# Patient Record
Sex: MALE | Race: WHITE | Employment: OTHER | URBAN - METROPOLITAN AREA
[De-identification: names, ages, dates, MRNs, and addresses within clinical notes are randomized per-mention and may not be internally consistent; named-entity substitution may affect disease eponyms.]

---

## 2019-01-01 ENCOUNTER — APPOINTMENT (OUTPATIENT)
Dept: GENERAL RADIOLOGY | Age: 69
DRG: 003 | End: 2019-01-01
Attending: THORACIC SURGERY (CARDIOTHORACIC VASCULAR SURGERY)
Payer: COMMERCIAL

## 2019-01-01 ENCOUNTER — APPOINTMENT (OUTPATIENT)
Dept: GENERAL RADIOLOGY | Age: 69
DRG: 003 | End: 2019-01-01
Attending: NURSE PRACTITIONER
Payer: COMMERCIAL

## 2019-01-01 ENCOUNTER — APPOINTMENT (OUTPATIENT)
Dept: GENERAL RADIOLOGY | Age: 69
DRG: 003 | End: 2019-01-01
Attending: PHYSICIAN ASSISTANT
Payer: COMMERCIAL

## 2019-01-01 ENCOUNTER — APPOINTMENT (OUTPATIENT)
Dept: CT IMAGING | Age: 69
DRG: 003 | End: 2019-01-01
Attending: INTERNAL MEDICINE
Payer: COMMERCIAL

## 2019-01-01 ENCOUNTER — APPOINTMENT (OUTPATIENT)
Dept: NON INVASIVE DIAGNOSTICS | Age: 69
DRG: 003 | End: 2019-01-01
Attending: PHYSICIAN ASSISTANT
Payer: COMMERCIAL

## 2019-01-01 ENCOUNTER — ANESTHESIA EVENT (OUTPATIENT)
Dept: CARDIOTHORACIC SURGERY | Age: 69
DRG: 003 | End: 2019-01-01
Payer: COMMERCIAL

## 2019-01-01 ENCOUNTER — APPOINTMENT (OUTPATIENT)
Dept: VASCULAR SURGERY | Age: 69
DRG: 003 | End: 2019-01-01
Attending: PHYSICIAN ASSISTANT
Payer: COMMERCIAL

## 2019-01-01 ENCOUNTER — ANESTHESIA (OUTPATIENT)
Dept: CARDIOTHORACIC SURGERY | Age: 69
DRG: 003 | End: 2019-01-01
Payer: COMMERCIAL

## 2019-01-01 ENCOUNTER — ANESTHESIA EVENT (OUTPATIENT)
Dept: ENDOSCOPY | Age: 69
DRG: 003 | End: 2019-01-01
Payer: COMMERCIAL

## 2019-01-01 ENCOUNTER — HOSPITAL ENCOUNTER (INPATIENT)
Age: 69
LOS: 38 days | DRG: 003 | End: 2019-11-26
Attending: EMERGENCY MEDICINE | Admitting: INTERNAL MEDICINE
Payer: COMMERCIAL

## 2019-01-01 ENCOUNTER — ANESTHESIA (OUTPATIENT)
Dept: ENDOSCOPY | Age: 69
DRG: 003 | End: 2019-01-01
Payer: COMMERCIAL

## 2019-01-01 ENCOUNTER — APPOINTMENT (OUTPATIENT)
Dept: GENERAL RADIOLOGY | Age: 69
DRG: 003 | End: 2019-01-01
Attending: INTERNAL MEDICINE
Payer: COMMERCIAL

## 2019-01-01 ENCOUNTER — HOSPITAL ENCOUNTER (OUTPATIENT)
Dept: NON INVASIVE DIAGNOSTICS | Age: 69
Discharge: HOME | End: 2019-10-23
Attending: THORACIC SURGERY (CARDIOTHORACIC VASCULAR SURGERY)

## 2019-01-01 ENCOUNTER — APPOINTMENT (OUTPATIENT)
Dept: CT IMAGING | Age: 69
DRG: 003 | End: 2019-01-01
Attending: NURSE PRACTITIONER
Payer: COMMERCIAL

## 2019-01-01 VITALS
WEIGHT: 132.5 LBS | HEART RATE: 108 BPM | BODY MASS INDEX: 20.8 KG/M2 | RESPIRATION RATE: 32 BRPM | DIASTOLIC BLOOD PRESSURE: 60 MMHG | SYSTOLIC BLOOD PRESSURE: 94 MMHG | OXYGEN SATURATION: 91 % | HEIGHT: 67 IN | TEMPERATURE: 100.6 F

## 2019-01-01 DIAGNOSIS — I21.3 ST ELEVATION MYOCARDIAL INFARCTION (STEMI), UNSPECIFIED ARTERY (HCC): ICD-10-CM

## 2019-01-01 DIAGNOSIS — I35.0 NONRHEUMATIC AORTIC VALVE STENOSIS: Primary | ICD-10-CM

## 2019-01-01 LAB
ABO + RH BLD: NORMAL
ADMINISTERED INITIALS, ADMINIT: NORMAL
ALBUMIN SERPL-MCNC: 1.7 G/DL (ref 3.5–5)
ALBUMIN SERPL-MCNC: 1.8 G/DL (ref 3.5–5)
ALBUMIN SERPL-MCNC: 1.8 G/DL (ref 3.5–5)
ALBUMIN SERPL-MCNC: 1.9 G/DL (ref 3.5–5)
ALBUMIN SERPL-MCNC: 2 G/DL (ref 3.5–5)
ALBUMIN SERPL-MCNC: 2.1 G/DL (ref 3.5–5)
ALBUMIN SERPL-MCNC: 2.2 G/DL (ref 3.5–5)
ALBUMIN SERPL-MCNC: 2.3 G/DL (ref 3.5–5)
ALBUMIN SERPL-MCNC: 2.4 G/DL (ref 3.5–5)
ALBUMIN SERPL-MCNC: 2.6 G/DL (ref 3.5–5)
ALBUMIN SERPL-MCNC: 2.7 G/DL (ref 3.5–5)
ALBUMIN SERPL-MCNC: 2.8 G/DL (ref 3.5–5)
ALBUMIN SERPL-MCNC: 2.9 G/DL (ref 3.5–5)
ALBUMIN SERPL-MCNC: 3 G/DL (ref 3.5–5)
ALBUMIN SERPL-MCNC: 3.1 G/DL (ref 3.5–5)
ALBUMIN SERPL-MCNC: 3.2 G/DL (ref 3.5–5)
ALBUMIN SERPL-MCNC: 3.3 G/DL (ref 3.5–5)
ALBUMIN SERPL-MCNC: 3.4 G/DL (ref 3.5–5)
ALBUMIN SERPL-MCNC: 3.4 G/DL (ref 3.5–5)
ALBUMIN SERPL-MCNC: 3.7 G/DL (ref 3.5–5)
ALBUMIN SERPL-MCNC: 3.7 G/DL (ref 3.5–5)
ALBUMIN/GLOB SERPL: 0.5 {RATIO} (ref 1.1–2.2)
ALBUMIN/GLOB SERPL: 0.6 {RATIO} (ref 1.1–2.2)
ALBUMIN/GLOB SERPL: 0.7 {RATIO} (ref 1.1–2.2)
ALBUMIN/GLOB SERPL: 0.8 {RATIO} (ref 1.1–2.2)
ALBUMIN/GLOB SERPL: 0.9 {RATIO} (ref 1.1–2.2)
ALBUMIN/GLOB SERPL: 1.1 {RATIO} (ref 1.1–2.2)
ALBUMIN/GLOB SERPL: 1.4 {RATIO} (ref 1.1–2.2)
ALBUMIN/GLOB SERPL: 1.6 {RATIO} (ref 1.1–2.2)
ALBUMIN/GLOB SERPL: 2.2 {RATIO} (ref 1.1–2.2)
ALBUMIN/GLOB SERPL: 2.2 {RATIO} (ref 1.1–2.2)
ALBUMIN/GLOB SERPL: 2.3 {RATIO} (ref 1.1–2.2)
ALP SERPL-CCNC: 100 U/L (ref 45–117)
ALP SERPL-CCNC: 102 U/L (ref 45–117)
ALP SERPL-CCNC: 108 U/L (ref 45–117)
ALP SERPL-CCNC: 108 U/L (ref 45–117)
ALP SERPL-CCNC: 109 U/L (ref 45–117)
ALP SERPL-CCNC: 110 U/L (ref 45–117)
ALP SERPL-CCNC: 111 U/L (ref 45–117)
ALP SERPL-CCNC: 115 U/L (ref 45–117)
ALP SERPL-CCNC: 116 U/L (ref 45–117)
ALP SERPL-CCNC: 122 U/L (ref 45–117)
ALP SERPL-CCNC: 126 U/L (ref 45–117)
ALP SERPL-CCNC: 128 U/L (ref 45–117)
ALP SERPL-CCNC: 131 U/L (ref 45–117)
ALP SERPL-CCNC: 133 U/L (ref 45–117)
ALP SERPL-CCNC: 133 U/L (ref 45–117)
ALP SERPL-CCNC: 134 U/L (ref 45–117)
ALP SERPL-CCNC: 135 U/L (ref 45–117)
ALP SERPL-CCNC: 142 U/L (ref 45–117)
ALP SERPL-CCNC: 144 U/L (ref 45–117)
ALP SERPL-CCNC: 150 U/L (ref 45–117)
ALP SERPL-CCNC: 155 U/L (ref 45–117)
ALP SERPL-CCNC: 17 U/L (ref 45–117)
ALP SERPL-CCNC: 172 U/L (ref 45–117)
ALP SERPL-CCNC: 176 U/L (ref 45–117)
ALP SERPL-CCNC: 179 U/L (ref 45–117)
ALP SERPL-CCNC: 18 U/L (ref 45–117)
ALP SERPL-CCNC: 20 U/L (ref 45–117)
ALP SERPL-CCNC: 24 U/L (ref 45–117)
ALP SERPL-CCNC: 26 U/L (ref 45–117)
ALP SERPL-CCNC: 32 U/L (ref 45–117)
ALP SERPL-CCNC: 47 U/L (ref 45–117)
ALP SERPL-CCNC: 52 U/L (ref 45–117)
ALP SERPL-CCNC: 70 U/L (ref 45–117)
ALP SERPL-CCNC: 72 U/L (ref 45–117)
ALP SERPL-CCNC: 85 U/L (ref 45–117)
ALP SERPL-CCNC: 88 U/L (ref 45–117)
ALP SERPL-CCNC: 91 U/L (ref 45–117)
ALP SERPL-CCNC: 94 U/L (ref 45–117)
ALP SERPL-CCNC: 96 U/L (ref 45–117)
ALP SERPL-CCNC: 96 U/L (ref 45–117)
ALP SERPL-CCNC: 97 U/L (ref 45–117)
ALP SERPL-CCNC: 98 U/L (ref 45–117)
ALP SERPL-CCNC: 99 U/L (ref 45–117)
ALT SERPL-CCNC: 102 U/L (ref 12–78)
ALT SERPL-CCNC: 106 U/L (ref 12–78)
ALT SERPL-CCNC: 11 U/L (ref 12–78)
ALT SERPL-CCNC: 112 U/L (ref 12–78)
ALT SERPL-CCNC: 12 U/L (ref 12–78)
ALT SERPL-CCNC: 12 U/L (ref 12–78)
ALT SERPL-CCNC: 129 U/L (ref 12–78)
ALT SERPL-CCNC: 17 U/L (ref 12–78)
ALT SERPL-CCNC: 174 U/L (ref 12–78)
ALT SERPL-CCNC: 19 U/L (ref 12–78)
ALT SERPL-CCNC: 28 U/L (ref 12–78)
ALT SERPL-CCNC: 28 U/L (ref 12–78)
ALT SERPL-CCNC: 29 U/L (ref 12–78)
ALT SERPL-CCNC: 297 U/L (ref 12–78)
ALT SERPL-CCNC: 31 U/L (ref 12–78)
ALT SERPL-CCNC: 33 U/L (ref 12–78)
ALT SERPL-CCNC: 34 U/L (ref 12–78)
ALT SERPL-CCNC: 38 U/L (ref 12–78)
ALT SERPL-CCNC: 41 U/L (ref 12–78)
ALT SERPL-CCNC: 41 U/L (ref 12–78)
ALT SERPL-CCNC: 42 U/L (ref 12–78)
ALT SERPL-CCNC: 454 U/L (ref 12–78)
ALT SERPL-CCNC: 46 U/L (ref 12–78)
ALT SERPL-CCNC: 47 U/L (ref 12–78)
ALT SERPL-CCNC: 50 U/L (ref 12–78)
ALT SERPL-CCNC: 51 U/L (ref 12–78)
ALT SERPL-CCNC: 536 U/L (ref 12–78)
ALT SERPL-CCNC: 56 U/L (ref 12–78)
ALT SERPL-CCNC: 61 U/L (ref 12–78)
ALT SERPL-CCNC: 62 U/L (ref 12–78)
ALT SERPL-CCNC: 63 U/L (ref 12–78)
ALT SERPL-CCNC: 64 U/L (ref 12–78)
ALT SERPL-CCNC: 65 U/L (ref 12–78)
ALT SERPL-CCNC: 65 U/L (ref 12–78)
ALT SERPL-CCNC: 69 U/L (ref 12–78)
ALT SERPL-CCNC: 7 U/L (ref 12–78)
ALT SERPL-CCNC: 7 U/L (ref 12–78)
ALT SERPL-CCNC: 74 U/L (ref 12–78)
ALT SERPL-CCNC: 84 U/L (ref 12–78)
ALT SERPL-CCNC: 84 U/L (ref 12–78)
ALT SERPL-CCNC: 87 U/L (ref 12–78)
ALT SERPL-CCNC: 9 U/L (ref 12–78)
ALT SERPL-CCNC: 9 U/L (ref 12–78)
ANA SER QL: NEGATIVE
ANION GAP SERPL CALC-SCNC: 10 MMOL/L (ref 5–15)
ANION GAP SERPL CALC-SCNC: 10 MMOL/L (ref 5–15)
ANION GAP SERPL CALC-SCNC: 2 MMOL/L (ref 5–15)
ANION GAP SERPL CALC-SCNC: 2 MMOL/L (ref 5–15)
ANION GAP SERPL CALC-SCNC: 3 MMOL/L (ref 5–15)
ANION GAP SERPL CALC-SCNC: 3 MMOL/L (ref 5–15)
ANION GAP SERPL CALC-SCNC: 4 MMOL/L (ref 5–15)
ANION GAP SERPL CALC-SCNC: 5 MMOL/L (ref 5–15)
ANION GAP SERPL CALC-SCNC: 6 MMOL/L (ref 5–15)
ANION GAP SERPL CALC-SCNC: 7 MMOL/L (ref 5–15)
ANION GAP SERPL CALC-SCNC: 8 MMOL/L (ref 5–15)
ANION GAP SERPL CALC-SCNC: 9 MMOL/L (ref 5–15)
ANION GAP SERPL CALC-SCNC: 9 MMOL/L (ref 5–15)
APPEARANCE FLD: CLEAR
APPEARANCE UR: CLEAR
APTT PPP: 24 SEC (ref 22.1–32)
APTT PPP: 38.1 SEC (ref 22.1–32)
APTT PPP: 46.7 SEC (ref 22.1–32)
APTT PPP: 61.4 SEC (ref 22.1–32)
ARTERIAL PATENCY WRIST A: ABNORMAL
ARTERIAL PATENCY WRIST A: YES
AST SERPL-CCNC: 17 U/L (ref 15–37)
AST SERPL-CCNC: 20 U/L (ref 15–37)
AST SERPL-CCNC: 20 U/L (ref 15–37)
AST SERPL-CCNC: 212 U/L (ref 15–37)
AST SERPL-CCNC: 22 U/L (ref 15–37)
AST SERPL-CCNC: 22 U/L (ref 15–37)
AST SERPL-CCNC: 23 U/L (ref 15–37)
AST SERPL-CCNC: 23 U/L (ref 15–37)
AST SERPL-CCNC: 25 U/L (ref 15–37)
AST SERPL-CCNC: 26 U/L (ref 15–37)
AST SERPL-CCNC: 28 U/L (ref 15–37)
AST SERPL-CCNC: 28 U/L (ref 15–37)
AST SERPL-CCNC: 31 U/L (ref 15–37)
AST SERPL-CCNC: 32 U/L (ref 15–37)
AST SERPL-CCNC: 32 U/L (ref 15–37)
AST SERPL-CCNC: 33 U/L (ref 15–37)
AST SERPL-CCNC: 33 U/L (ref 15–37)
AST SERPL-CCNC: 34 U/L (ref 15–37)
AST SERPL-CCNC: 34 U/L (ref 15–37)
AST SERPL-CCNC: 35 U/L (ref 15–37)
AST SERPL-CCNC: 36 U/L (ref 15–37)
AST SERPL-CCNC: 36 U/L (ref 15–37)
AST SERPL-CCNC: 37 U/L (ref 15–37)
AST SERPL-CCNC: 38 U/L (ref 15–37)
AST SERPL-CCNC: 39 U/L (ref 15–37)
AST SERPL-CCNC: 43 U/L (ref 15–37)
AST SERPL-CCNC: 43 U/L (ref 15–37)
AST SERPL-CCNC: 44 U/L (ref 15–37)
AST SERPL-CCNC: 45 U/L (ref 15–37)
AST SERPL-CCNC: 46 U/L (ref 15–37)
AST SERPL-CCNC: 48 U/L (ref 15–37)
AST SERPL-CCNC: 49 U/L (ref 15–37)
AST SERPL-CCNC: 50 U/L (ref 15–37)
AST SERPL-CCNC: 51 U/L (ref 15–37)
AST SERPL-CCNC: 52 U/L (ref 15–37)
AST SERPL-CCNC: 57 U/L (ref 15–37)
AST SERPL-CCNC: 629 U/L (ref 15–37)
AST SERPL-CCNC: 64 U/L (ref 15–37)
AST SERPL-CCNC: 715 U/L (ref 15–37)
AST SERPL-CCNC: 77 U/L (ref 15–37)
ATRIAL RATE: 102 BPM
ATRIAL RATE: 64 BPM
ATRIAL RATE: 79 BPM
ATRIAL RATE: 80 BPM
ATRIAL RATE: 90 BPM
AV PEAK GRADIENT: 37.77 MMHG
AV VELOCITY RATIO: 0.65
B2 GLYCOPROT1 IGG SER-ACNC: <9 GPI IGG UNITS (ref 0–20)
B2 GLYCOPROT1 IGM SER-ACNC: <9 GPI IGM UNITS (ref 0–32)
BACTERIA SPEC CULT: NORMAL
BACTERIA URNS QL MICRO: NEGATIVE /HPF
BASE DEFICIT BLD-SCNC: 3 MMOL/L
BASE DEFICIT BLD-SCNC: 5 MMOL/L
BASE DEFICIT BLD-SCNC: 6 MMOL/L
BASE DEFICIT BLD-SCNC: 7 MMOL/L
BASE DEFICIT BLDV-SCNC: 1 MMOL/L
BASE DEFICIT BLDV-SCNC: 2 MMOL/L
BASE DEFICIT BLDV-SCNC: 2 MMOL/L
BASE DEFICIT BLDV-SCNC: 3 MMOL/L
BASE DEFICIT BLDV-SCNC: 5 MMOL/L
BASE DEFICIT BLDV-SCNC: 6 MMOL/L
BASE DEFICIT BLDV-SCNC: 7 MMOL/L
BASE DEFICIT BLDV-SCNC: 7 MMOL/L
BASE DEFICIT BLDV-SCNC: 8 MMOL/L
BASE DEFICIT BLDV-SCNC: 8 MMOL/L
BASE EXCESS BLD CALC-SCNC: 0 MMOL/L
BASE EXCESS BLD CALC-SCNC: 0 MMOL/L
BASE EXCESS BLD CALC-SCNC: 1 MMOL/L
BASE EXCESS BLD CALC-SCNC: 10 MMOL/L
BASE EXCESS BLD CALC-SCNC: 10 MMOL/L
BASE EXCESS BLD CALC-SCNC: 11 MMOL/L
BASE EXCESS BLD CALC-SCNC: 11 MMOL/L
BASE EXCESS BLD CALC-SCNC: 13 MMOL/L
BASE EXCESS BLD CALC-SCNC: 2 MMOL/L
BASE EXCESS BLD CALC-SCNC: 4 MMOL/L
BASE EXCESS BLD CALC-SCNC: 5 MMOL/L
BASE EXCESS BLD CALC-SCNC: 5 MMOL/L
BASE EXCESS BLD CALC-SCNC: 6 MMOL/L
BASE EXCESS BLD CALC-SCNC: 7 MMOL/L
BASE EXCESS BLD CALC-SCNC: 8 MMOL/L
BASE EXCESS BLD CALC-SCNC: 9 MMOL/L
BASE EXCESS BLD CALC-SCNC: 9 MMOL/L
BASE EXCESS BLDV CALC-SCNC: 0 MMOL/L
BASOPHILS # BLD: 0 K/UL (ref 0–0.1)
BASOPHILS # BLD: 0.1 K/UL (ref 0–0.1)
BASOPHILS NFR BLD: 0 % (ref 0–1)
BASOPHILS NFR BLD: 1 % (ref 0–1)
BASOPHILS NFR BLD: 1 % (ref 0–1)
BDY SITE: ABNORMAL
BILIRUB SERPL-MCNC: 0.3 MG/DL (ref 0.2–1)
BILIRUB SERPL-MCNC: 0.4 MG/DL (ref 0.2–1)
BILIRUB SERPL-MCNC: 0.5 MG/DL (ref 0.2–1)
BILIRUB SERPL-MCNC: 0.6 MG/DL (ref 0.2–1)
BILIRUB SERPL-MCNC: 0.7 MG/DL (ref 0.2–1)
BILIRUB SERPL-MCNC: 0.8 MG/DL (ref 0.2–1)
BILIRUB SERPL-MCNC: 0.9 MG/DL (ref 0.2–1)
BILIRUB SERPL-MCNC: 0.9 MG/DL (ref 0.2–1)
BILIRUB UR QL: NEGATIVE
BLD PROD TYP BPU: NORMAL
BLOOD GROUP ANTIBODIES SERPL: NORMAL
BNP SERPL-MCNC: 8471 PG/ML
BNP SERPL-MCNC: 8560 PG/ML
BNP SERPL-MCNC: 9465 PG/ML
BNP SERPL-MCNC: 9898 PG/ML
BNP SERPL-MCNC: ABNORMAL PG/ML
BPU ID: NORMAL
BUN SERPL-MCNC: 100 MG/DL (ref 6–20)
BUN SERPL-MCNC: 100 MG/DL (ref 6–20)
BUN SERPL-MCNC: 111 MG/DL (ref 6–20)
BUN SERPL-MCNC: 114 MG/DL (ref 6–20)
BUN SERPL-MCNC: 121 MG/DL (ref 6–20)
BUN SERPL-MCNC: 122 MG/DL (ref 6–20)
BUN SERPL-MCNC: 123 MG/DL (ref 6–20)
BUN SERPL-MCNC: 127 MG/DL (ref 6–20)
BUN SERPL-MCNC: 128 MG/DL (ref 6–20)
BUN SERPL-MCNC: 135 MG/DL (ref 6–20)
BUN SERPL-MCNC: 136 MG/DL (ref 6–20)
BUN SERPL-MCNC: 144 MG/DL (ref 6–20)
BUN SERPL-MCNC: 148 MG/DL (ref 6–20)
BUN SERPL-MCNC: 149 MG/DL (ref 6–20)
BUN SERPL-MCNC: 159 MG/DL (ref 6–20)
BUN SERPL-MCNC: 167 MG/DL (ref 6–20)
BUN SERPL-MCNC: 172 MG/DL (ref 6–20)
BUN SERPL-MCNC: 22 MG/DL (ref 6–20)
BUN SERPL-MCNC: 23 MG/DL (ref 6–20)
BUN SERPL-MCNC: 23 MG/DL (ref 6–20)
BUN SERPL-MCNC: 24 MG/DL (ref 6–20)
BUN SERPL-MCNC: 24 MG/DL (ref 6–20)
BUN SERPL-MCNC: 25 MG/DL (ref 6–20)
BUN SERPL-MCNC: 25 MG/DL (ref 6–20)
BUN SERPL-MCNC: 26 MG/DL (ref 6–20)
BUN SERPL-MCNC: 28 MG/DL (ref 6–20)
BUN SERPL-MCNC: 28 MG/DL (ref 6–20)
BUN SERPL-MCNC: 29 MG/DL (ref 6–20)
BUN SERPL-MCNC: 31 MG/DL (ref 6–20)
BUN SERPL-MCNC: 34 MG/DL (ref 6–20)
BUN SERPL-MCNC: 41 MG/DL (ref 6–20)
BUN SERPL-MCNC: 48 MG/DL (ref 6–20)
BUN SERPL-MCNC: 53 MG/DL (ref 6–20)
BUN SERPL-MCNC: 54 MG/DL (ref 6–20)
BUN SERPL-MCNC: 54 MG/DL (ref 6–20)
BUN SERPL-MCNC: 55 MG/DL (ref 6–20)
BUN SERPL-MCNC: 56 MG/DL (ref 6–20)
BUN SERPL-MCNC: 57 MG/DL (ref 6–20)
BUN SERPL-MCNC: 58 MG/DL (ref 6–20)
BUN SERPL-MCNC: 60 MG/DL (ref 6–20)
BUN SERPL-MCNC: 60 MG/DL (ref 6–20)
BUN SERPL-MCNC: 61 MG/DL (ref 6–20)
BUN SERPL-MCNC: 61 MG/DL (ref 6–20)
BUN SERPL-MCNC: 62 MG/DL (ref 6–20)
BUN SERPL-MCNC: 66 MG/DL (ref 6–20)
BUN SERPL-MCNC: 70 MG/DL (ref 6–20)
BUN SERPL-MCNC: 72 MG/DL (ref 6–20)
BUN SERPL-MCNC: 76 MG/DL (ref 6–20)
BUN SERPL-MCNC: 82 MG/DL (ref 6–20)
BUN SERPL-MCNC: 88 MG/DL (ref 6–20)
BUN SERPL-MCNC: 92 MG/DL (ref 6–20)
BUN/CREAT SERPL: 15 (ref 12–20)
BUN/CREAT SERPL: 15 (ref 12–20)
BUN/CREAT SERPL: 16 (ref 12–20)
BUN/CREAT SERPL: 17 (ref 12–20)
BUN/CREAT SERPL: 18 (ref 12–20)
BUN/CREAT SERPL: 19 (ref 12–20)
BUN/CREAT SERPL: 20 (ref 12–20)
BUN/CREAT SERPL: 21 (ref 12–20)
BUN/CREAT SERPL: 23 (ref 12–20)
BUN/CREAT SERPL: 23 (ref 12–20)
BUN/CREAT SERPL: 24 (ref 12–20)
BUN/CREAT SERPL: 27 (ref 12–20)
BUN/CREAT SERPL: 31 (ref 12–20)
BUN/CREAT SERPL: 32 (ref 12–20)
BUN/CREAT SERPL: 38 (ref 12–20)
BUN/CREAT SERPL: 39 (ref 12–20)
BUN/CREAT SERPL: 39 (ref 12–20)
BUN/CREAT SERPL: 40 (ref 12–20)
BUN/CREAT SERPL: 40 (ref 12–20)
BUN/CREAT SERPL: 41 (ref 12–20)
BUN/CREAT SERPL: 42 (ref 12–20)
BUN/CREAT SERPL: 44 (ref 12–20)
BUN/CREAT SERPL: 46 (ref 12–20)
BUN/CREAT SERPL: 46 (ref 12–20)
BUN/CREAT SERPL: 48 (ref 12–20)
BUN/CREAT SERPL: 48 (ref 12–20)
BUN/CREAT SERPL: 49 (ref 12–20)
BUN/CREAT SERPL: 49 (ref 12–20)
BUN/CREAT SERPL: 52 (ref 12–20)
BUN/CREAT SERPL: 56 (ref 12–20)
BUN/CREAT SERPL: 56 (ref 12–20)
BUN/CREAT SERPL: 59 (ref 12–20)
BUN/CREAT SERPL: 60 (ref 12–20)
BUN/CREAT SERPL: 61 (ref 12–20)
BUN/CREAT SERPL: 64 (ref 12–20)
BUN/CREAT SERPL: 64 (ref 12–20)
BUN/CREAT SERPL: 66 (ref 12–20)
BUN/CREAT SERPL: 67 (ref 12–20)
BUN/CREAT SERPL: 71 (ref 12–20)
BUN/CREAT SERPL: 71 (ref 12–20)
BUN/CREAT SERPL: 72 (ref 12–20)
BUN/CREAT SERPL: 73 (ref 12–20)
BUN/CREAT SERPL: 77 (ref 12–20)
BUN/CREAT SERPL: 79 (ref 12–20)
BUN/CREAT SERPL: 86 (ref 12–20)
CA-I BLD-SCNC: 1.16 MMOL/L (ref 1.12–1.32)
CALCIUM SERPL-MCNC: 6.9 MG/DL (ref 8.5–10.1)
CALCIUM SERPL-MCNC: 7.2 MG/DL (ref 8.5–10.1)
CALCIUM SERPL-MCNC: 7.3 MG/DL (ref 8.5–10.1)
CALCIUM SERPL-MCNC: 7.3 MG/DL (ref 8.5–10.1)
CALCIUM SERPL-MCNC: 7.6 MG/DL (ref 8.5–10.1)
CALCIUM SERPL-MCNC: 7.6 MG/DL (ref 8.5–10.1)
CALCIUM SERPL-MCNC: 7.7 MG/DL (ref 8.5–10.1)
CALCIUM SERPL-MCNC: 7.7 MG/DL (ref 8.5–10.1)
CALCIUM SERPL-MCNC: 7.8 MG/DL (ref 8.5–10.1)
CALCIUM SERPL-MCNC: 7.9 MG/DL (ref 8.5–10.1)
CALCIUM SERPL-MCNC: 8 MG/DL (ref 8.5–10.1)
CALCIUM SERPL-MCNC: 8.1 MG/DL (ref 8.5–10.1)
CALCIUM SERPL-MCNC: 8.2 MG/DL (ref 8.5–10.1)
CALCIUM SERPL-MCNC: 8.3 MG/DL (ref 8.5–10.1)
CALCIUM SERPL-MCNC: 8.4 MG/DL (ref 8.5–10.1)
CALCIUM SERPL-MCNC: 8.5 MG/DL (ref 8.5–10.1)
CALCIUM SERPL-MCNC: 8.5 MG/DL (ref 8.5–10.1)
CALCIUM SERPL-MCNC: 8.6 MG/DL (ref 8.5–10.1)
CALCIUM SERPL-MCNC: 8.7 MG/DL (ref 8.5–10.1)
CALCIUM SERPL-MCNC: 8.7 MG/DL (ref 8.5–10.1)
CALCIUM SERPL-MCNC: 8.8 MG/DL (ref 8.5–10.1)
CALCIUM SERPL-MCNC: 8.9 MG/DL (ref 8.5–10.1)
CALCIUM SERPL-MCNC: 9 MG/DL (ref 8.5–10.1)
CALCIUM SERPL-MCNC: 9.1 MG/DL (ref 8.5–10.1)
CALCIUM SERPL-MCNC: 9.3 MG/DL (ref 8.5–10.1)
CALCIUM SERPL-MCNC: 9.3 MG/DL (ref 8.5–10.1)
CALCIUM SERPL-MCNC: 9.4 MG/DL (ref 8.5–10.1)
CALCIUM SERPL-MCNC: 9.5 MG/DL (ref 8.5–10.1)
CALCIUM SERPL-MCNC: 9.6 MG/DL (ref 8.5–10.1)
CALCULATED P AXIS, ECG09: 52 DEGREES
CALCULATED P AXIS, ECG09: 60 DEGREES
CALCULATED P AXIS, ECG09: 61 DEGREES
CALCULATED P AXIS, ECG09: 63 DEGREES
CALCULATED P AXIS, ECG09: 70 DEGREES
CALCULATED R AXIS, ECG10: 50 DEGREES
CALCULATED R AXIS, ECG10: 54 DEGREES
CALCULATED R AXIS, ECG10: 66 DEGREES
CALCULATED R AXIS, ECG10: 70 DEGREES
CALCULATED R AXIS, ECG10: 73 DEGREES
CALCULATED T AXIS, ECG11: -31 DEGREES
CALCULATED T AXIS, ECG11: -33 DEGREES
CALCULATED T AXIS, ECG11: -70 DEGREES
CALCULATED T AXIS, ECG11: -84 DEGREES
CALCULATED T AXIS, ECG11: 9 DEGREES
CARDIOLIPIN IGA SER IA-ACNC: <9 APL U/ML (ref 0–11)
CARDIOLIPIN IGG SER IA-ACNC: <9 GPL U/ML (ref 0–14)
CARDIOLIPIN IGM SER IA-ACNC: <9 MPL U/ML (ref 0–12)
CCP IGA+IGG SERPL IA-ACNC: 7 UNITS (ref 0–19)
CHLORIDE SERPL-SCNC: 100 MMOL/L (ref 97–108)
CHLORIDE SERPL-SCNC: 100 MMOL/L (ref 97–108)
CHLORIDE SERPL-SCNC: 101 MMOL/L (ref 97–108)
CHLORIDE SERPL-SCNC: 102 MMOL/L (ref 97–108)
CHLORIDE SERPL-SCNC: 103 MMOL/L (ref 97–108)
CHLORIDE SERPL-SCNC: 104 MMOL/L (ref 97–108)
CHLORIDE SERPL-SCNC: 104 MMOL/L (ref 97–108)
CHLORIDE SERPL-SCNC: 105 MMOL/L (ref 97–108)
CHLORIDE SERPL-SCNC: 106 MMOL/L (ref 97–108)
CHLORIDE SERPL-SCNC: 107 MMOL/L (ref 97–108)
CHLORIDE SERPL-SCNC: 108 MMOL/L (ref 97–108)
CHLORIDE SERPL-SCNC: 109 MMOL/L (ref 97–108)
CHLORIDE SERPL-SCNC: 110 MMOL/L (ref 97–108)
CHLORIDE SERPL-SCNC: 111 MMOL/L (ref 97–108)
CHLORIDE SERPL-SCNC: 112 MMOL/L (ref 97–108)
CHLORIDE SERPL-SCNC: 113 MMOL/L (ref 97–108)
CHLORIDE SERPL-SCNC: 114 MMOL/L (ref 97–108)
CHLORIDE SERPL-SCNC: 114 MMOL/L (ref 97–108)
CHLORIDE SERPL-SCNC: 115 MMOL/L (ref 97–108)
CHLORIDE SERPL-SCNC: 117 MMOL/L (ref 97–108)
CHLORIDE SERPL-SCNC: 118 MMOL/L (ref 97–108)
CHLORIDE SERPL-SCNC: 120 MMOL/L (ref 97–108)
CHLORIDE SERPL-SCNC: 88 MMOL/L (ref 97–108)
CHLORIDE SERPL-SCNC: 90 MMOL/L (ref 97–108)
CHLORIDE SERPL-SCNC: 90 MMOL/L (ref 97–108)
CHLORIDE SERPL-SCNC: 93 MMOL/L (ref 97–108)
CHLORIDE SERPL-SCNC: 93 MMOL/L (ref 97–108)
CHLORIDE SERPL-SCNC: 94 MMOL/L (ref 97–108)
CHLORIDE SERPL-SCNC: 97 MMOL/L (ref 97–108)
CHLORIDE SERPL-SCNC: 98 MMOL/L (ref 97–108)
CHLORIDE SERPL-SCNC: 99 MMOL/L (ref 97–108)
CHLORIDE UR-SCNC: 27 MMOL/L
CHOLEST SERPL-MCNC: 170 MG/DL
CO2 SERPL-SCNC: 20 MMOL/L (ref 21–32)
CO2 SERPL-SCNC: 21 MMOL/L (ref 21–32)
CO2 SERPL-SCNC: 22 MMOL/L (ref 21–32)
CO2 SERPL-SCNC: 23 MMOL/L (ref 21–32)
CO2 SERPL-SCNC: 24 MMOL/L (ref 21–32)
CO2 SERPL-SCNC: 24 MMOL/L (ref 21–32)
CO2 SERPL-SCNC: 25 MMOL/L (ref 21–32)
CO2 SERPL-SCNC: 26 MMOL/L (ref 21–32)
CO2 SERPL-SCNC: 27 MMOL/L (ref 21–32)
CO2 SERPL-SCNC: 28 MMOL/L (ref 21–32)
CO2 SERPL-SCNC: 28 MMOL/L (ref 21–32)
CO2 SERPL-SCNC: 29 MMOL/L (ref 21–32)
CO2 SERPL-SCNC: 29 MMOL/L (ref 21–32)
CO2 SERPL-SCNC: 30 MMOL/L (ref 21–32)
CO2 SERPL-SCNC: 31 MMOL/L (ref 21–32)
CO2 SERPL-SCNC: 32 MMOL/L (ref 21–32)
CO2 SERPL-SCNC: 33 MMOL/L (ref 21–32)
CO2 SERPL-SCNC: 33 MMOL/L (ref 21–32)
CO2 SERPL-SCNC: 34 MMOL/L (ref 21–32)
CO2 SERPL-SCNC: 35 MMOL/L (ref 21–32)
CO2 SERPL-SCNC: 37 MMOL/L (ref 21–32)
COLOR FLD: COLORLESS
COLOR UR: ABNORMAL
COMMENT, HOLDF: NORMAL
COPPER SERPL-MCNC: 92 UG/DL (ref 72–166)
CREAT SERPL-MCNC: 1.26 MG/DL (ref 0.7–1.3)
CREAT SERPL-MCNC: 1.31 MG/DL (ref 0.7–1.3)
CREAT SERPL-MCNC: 1.32 MG/DL (ref 0.7–1.3)
CREAT SERPL-MCNC: 1.32 MG/DL (ref 0.7–1.3)
CREAT SERPL-MCNC: 1.36 MG/DL (ref 0.7–1.3)
CREAT SERPL-MCNC: 1.37 MG/DL (ref 0.7–1.3)
CREAT SERPL-MCNC: 1.38 MG/DL (ref 0.7–1.3)
CREAT SERPL-MCNC: 1.39 MG/DL (ref 0.7–1.3)
CREAT SERPL-MCNC: 1.39 MG/DL (ref 0.7–1.3)
CREAT SERPL-MCNC: 1.4 MG/DL (ref 0.7–1.3)
CREAT SERPL-MCNC: 1.41 MG/DL (ref 0.7–1.3)
CREAT SERPL-MCNC: 1.41 MG/DL (ref 0.7–1.3)
CREAT SERPL-MCNC: 1.42 MG/DL (ref 0.7–1.3)
CREAT SERPL-MCNC: 1.45 MG/DL (ref 0.7–1.3)
CREAT SERPL-MCNC: 1.46 MG/DL (ref 0.7–1.3)
CREAT SERPL-MCNC: 1.47 MG/DL (ref 0.7–1.3)
CREAT SERPL-MCNC: 1.49 MG/DL (ref 0.7–1.3)
CREAT SERPL-MCNC: 1.52 MG/DL (ref 0.7–1.3)
CREAT SERPL-MCNC: 1.52 MG/DL (ref 0.7–1.3)
CREAT SERPL-MCNC: 1.54 MG/DL (ref 0.7–1.3)
CREAT SERPL-MCNC: 1.56 MG/DL (ref 0.7–1.3)
CREAT SERPL-MCNC: 1.56 MG/DL (ref 0.7–1.3)
CREAT SERPL-MCNC: 1.58 MG/DL (ref 0.7–1.3)
CREAT SERPL-MCNC: 1.59 MG/DL (ref 0.7–1.3)
CREAT SERPL-MCNC: 1.63 MG/DL (ref 0.7–1.3)
CREAT SERPL-MCNC: 1.67 MG/DL (ref 0.7–1.3)
CREAT SERPL-MCNC: 1.67 MG/DL (ref 0.7–1.3)
CREAT SERPL-MCNC: 1.7 MG/DL (ref 0.7–1.3)
CREAT SERPL-MCNC: 1.7 MG/DL (ref 0.7–1.3)
CREAT SERPL-MCNC: 1.73 MG/DL (ref 0.7–1.3)
CREAT SERPL-MCNC: 1.74 MG/DL (ref 0.7–1.3)
CREAT SERPL-MCNC: 1.75 MG/DL (ref 0.7–1.3)
CREAT SERPL-MCNC: 1.75 MG/DL (ref 0.7–1.3)
CREAT SERPL-MCNC: 1.77 MG/DL (ref 0.7–1.3)
CREAT SERPL-MCNC: 1.91 MG/DL (ref 0.7–1.3)
CREAT SERPL-MCNC: 1.94 MG/DL (ref 0.7–1.3)
CREAT SERPL-MCNC: 1.97 MG/DL (ref 0.7–1.3)
CREAT SERPL-MCNC: 2.07 MG/DL (ref 0.7–1.3)
CREAT SERPL-MCNC: 2.14 MG/DL (ref 0.7–1.3)
CREAT SERPL-MCNC: 2.28 MG/DL (ref 0.7–1.3)
CREAT SERPL-MCNC: 2.3 MG/DL (ref 0.7–1.3)
CREAT SERPL-MCNC: 2.3 MG/DL (ref 0.7–1.3)
CREAT SERPL-MCNC: 2.41 MG/DL (ref 0.7–1.3)
CREAT SERPL-MCNC: 2.58 MG/DL (ref 0.7–1.3)
CREAT SERPL-MCNC: 2.61 MG/DL (ref 0.7–1.3)
CREAT SERPL-MCNC: 2.92 MG/DL (ref 0.7–1.3)
CREAT UR-MCNC: 33.7 MG/DL
CROSSMATCH RESULT,%XM: NORMAL
D50 ADMINISTERED, D50ADM: 0 ML
D50 ADMINISTERED, D50ADM: 10 ML
D50 ADMINISTERED, D50ADM: 12 ML
D50 ADMINISTERED, D50ADM: 19 ML
D50 ADMINISTERED, D50ADM: 8 ML
D50 ADMINISTERED, D50ADM: 9 ML
D50 ADMINISTERED, D50ADM: 9 ML
D50 ORDER, D50ORD: 0 ML
D50 ORDER, D50ORD: 10 ML
D50 ORDER, D50ORD: 12 ML
D50 ORDER, D50ORD: 12 ML
D50 ORDER, D50ORD: 19 ML
D50 ORDER, D50ORD: 8 ML
D50 ORDER, D50ORD: 9 ML
DIAGNOSIS, 93000: NORMAL
DIFFERENTIAL METHOD BLD: ABNORMAL
ECHO AO ROOT DIAM: 4.48 CM
ECHO AV AREA PEAK VELOCITY: 2.1 CM2
ECHO AV CUSP MM: 1.71 CM
ECHO AV PEAK GRADIENT: 4.6 MMHG
ECHO AV PEAK VELOCITY: 106.92 CM/S
ECHO AV REGURGITANT PHT: 506.5 CM
ECHO LA AREA 4C: 11.3 CM2
ECHO LA MAJOR AXIS: 2.95 CM
ECHO LA TO AORTIC ROOT RATIO: 1.52
ECHO LA VOL 2C: 36.95 ML (ref 18–58)
ECHO LA VOL 4C: 26.86 ML (ref 18–58)
ECHO LA VOL BP: 37.02 ML (ref 18–58)
ECHO LA VOL/BSA BIPLANE: 22.14 ML/M2 (ref 16–28)
ECHO LA VOLUME INDEX A2C: 22.1 ML/M2 (ref 16–28)
ECHO LA VOLUME INDEX A4C: 16.06 ML/M2 (ref 16–28)
ECHO LV E' LATERAL VELOCITY: 4.44 CM/S
ECHO LV E' SEPTAL VELOCITY: 3.96 CM/S
ECHO LV EDV A2C: 144.8 ML
ECHO LV EDV A4C: 99.2 ML
ECHO LV EDV BP: 125.4 ML (ref 67–155)
ECHO LV EDV INDEX A4C: 59.3 ML/M2
ECHO LV EDV INDEX BP: 75 ML/M2
ECHO LV EDV NDEX A2C: 86.6 ML/M2
ECHO LV EDV TEICHHOLZ: 95.13 ML
ECHO LV EJECTION FRACTION A2C: 38 %
ECHO LV EJECTION FRACTION A4C: 28 %
ECHO LV EJECTION FRACTION BIPLANE: 34.7 % (ref 55–100)
ECHO LV ESV A2C: 90 ML
ECHO LV ESV A4C: 71.1 ML
ECHO LV ESV BP: 62.09 ML (ref 22–58)
ECHO LV ESV INDEX A2C: 53.8 ML/M2
ECHO LV ESV INDEX A4C: 42.5 ML/M2
ECHO LV ESV INDEX BP: 37.1 ML/M2
ECHO LV INTERNAL DIMENSION DIASTOLIC: 4.56 CM (ref 4.2–5.9)
ECHO LV INTERNAL DIMENSION SYSTOLIC: 3.8 CM
ECHO LV IVSD: 1.11 CM (ref 0.6–1)
ECHO LV MASS 2D: 169.84 G (ref 88–224)
ECHO LV MASS INDEX 2D: 101.6 G/M2 (ref 49–115)
ECHO LV POSTERIOR WALL DIASTOLIC: 44.35 CM (ref 0.6–1)
ECHO LVOT DIAM: 2.02 CM
ECHO LVOT PEAK GRADIENT: 2 MMHG
ECHO LVOT PEAK VELOCITY: 69.93 CM/S
ECHO LVOT SV: 33 ML
ECHO MV A VELOCITY: 78.72 CM/S
ECHO MV AREA PHT: 4.1 CM2
ECHO MV E DECELERATION TIME (DT): 186.5 MS
ECHO MV E VELOCITY: 61.26 CM/S
ECHO MV E/A RATIO: 0.78
ECHO MV E/E' LATERAL: 13.8
ECHO MV E/E' RATIO (AVERAGED): 14.63
ECHO MV E/E' SEPTAL: 15.47
ECHO MV PRESSURE HALF TIME (PHT): 54.1 MS
ECHO PV MAX VELOCITY: 83.35 CM/S
ECHO PV PEAK GRADIENT: 2.8 MMHG
ECHO RV INTERNAL DIMENSION: 2.1 CM
ECHO RV TAPSE: 1.7 CM (ref 1.5–2)
ECHO TV REGURGITANT MAX VELOCITY: 197.44 CM/S
ECHO TV REGURGITANT PEAK GRADIENT: 15.6 MMHG
EOSINOPHIL # BLD: 0 K/UL (ref 0–0.4)
EOSINOPHIL # BLD: 0.4 K/UL (ref 0–0.4)
EOSINOPHIL # BLD: 0.4 K/UL (ref 0–0.4)
EOSINOPHIL NFR BLD: 0 % (ref 0–7)
EOSINOPHIL NFR BLD: 5 % (ref 0–7)
EOSINOPHIL NFR BLD: 6 % (ref 0–7)
EPITH CASTS URNS QL MICRO: ABNORMAL /LPF
ERYTHROCYTE [DISTWIDTH] IN BLOOD BY AUTOMATED COUNT: 12.2 % (ref 11.5–14.5)
ERYTHROCYTE [DISTWIDTH] IN BLOOD BY AUTOMATED COUNT: 12.5 % (ref 11.5–14.5)
ERYTHROCYTE [DISTWIDTH] IN BLOOD BY AUTOMATED COUNT: 12.5 % (ref 11.5–14.5)
ERYTHROCYTE [DISTWIDTH] IN BLOOD BY AUTOMATED COUNT: 12.8 % (ref 11.5–14.5)
ERYTHROCYTE [DISTWIDTH] IN BLOOD BY AUTOMATED COUNT: 13 % (ref 11.5–14.5)
ERYTHROCYTE [DISTWIDTH] IN BLOOD BY AUTOMATED COUNT: 13.5 % (ref 11.5–14.5)
ERYTHROCYTE [DISTWIDTH] IN BLOOD BY AUTOMATED COUNT: 14.5 % (ref 11.5–14.5)
ERYTHROCYTE [DISTWIDTH] IN BLOOD BY AUTOMATED COUNT: 14.6 % (ref 11.5–14.5)
ERYTHROCYTE [DISTWIDTH] IN BLOOD BY AUTOMATED COUNT: 14.6 % (ref 11.5–14.5)
ERYTHROCYTE [DISTWIDTH] IN BLOOD BY AUTOMATED COUNT: 14.7 % (ref 11.5–14.5)
ERYTHROCYTE [DISTWIDTH] IN BLOOD BY AUTOMATED COUNT: 14.7 % (ref 11.5–14.5)
ERYTHROCYTE [DISTWIDTH] IN BLOOD BY AUTOMATED COUNT: 14.8 % (ref 11.5–14.5)
ERYTHROCYTE [DISTWIDTH] IN BLOOD BY AUTOMATED COUNT: 14.9 % (ref 11.5–14.5)
ERYTHROCYTE [DISTWIDTH] IN BLOOD BY AUTOMATED COUNT: 15.1 % (ref 11.5–14.5)
ERYTHROCYTE [DISTWIDTH] IN BLOOD BY AUTOMATED COUNT: 15.2 % (ref 11.5–14.5)
ERYTHROCYTE [DISTWIDTH] IN BLOOD BY AUTOMATED COUNT: 15.3 % (ref 11.5–14.5)
ERYTHROCYTE [DISTWIDTH] IN BLOOD BY AUTOMATED COUNT: 15.4 % (ref 11.5–14.5)
ERYTHROCYTE [DISTWIDTH] IN BLOOD BY AUTOMATED COUNT: 15.5 % (ref 11.5–14.5)
ERYTHROCYTE [DISTWIDTH] IN BLOOD BY AUTOMATED COUNT: 15.5 % (ref 11.5–14.5)
ERYTHROCYTE [DISTWIDTH] IN BLOOD BY AUTOMATED COUNT: 15.6 % (ref 11.5–14.5)
ERYTHROCYTE [DISTWIDTH] IN BLOOD BY AUTOMATED COUNT: 15.6 % (ref 11.5–14.5)
ERYTHROCYTE [DISTWIDTH] IN BLOOD BY AUTOMATED COUNT: 15.7 % (ref 11.5–14.5)
ERYTHROCYTE [DISTWIDTH] IN BLOOD BY AUTOMATED COUNT: 15.8 % (ref 11.5–14.5)
ERYTHROCYTE [DISTWIDTH] IN BLOOD BY AUTOMATED COUNT: 15.9 % (ref 11.5–14.5)
ERYTHROCYTE [DISTWIDTH] IN BLOOD BY AUTOMATED COUNT: 16 % (ref 11.5–14.5)
ERYTHROCYTE [DISTWIDTH] IN BLOOD BY AUTOMATED COUNT: 16 % (ref 11.5–14.5)
ERYTHROCYTE [DISTWIDTH] IN BLOOD BY AUTOMATED COUNT: 16.1 % (ref 11.5–14.5)
ERYTHROCYTE [DISTWIDTH] IN BLOOD BY AUTOMATED COUNT: 16.2 % (ref 11.5–14.5)
ERYTHROCYTE [DISTWIDTH] IN BLOOD BY AUTOMATED COUNT: 16.3 % (ref 11.5–14.5)
ERYTHROCYTE [DISTWIDTH] IN BLOOD BY AUTOMATED COUNT: 16.7 % (ref 11.5–14.5)
ERYTHROCYTE [SEDIMENTATION RATE] IN BLOOD: 70 MM/HR (ref 0–20)
EST. AVERAGE GLUCOSE BLD GHB EST-MCNC: 105 MG/DL
FERRITIN SERPL-MCNC: 221 NG/ML (ref 26–388)
FIBRINOGEN PPP-MCNC: 546 MG/DL (ref 200–475)
FOLATE SERPL-MCNC: 38.5 NG/ML (ref 5–21)
GAS FLOW.O2 O2 DELIVERY SYS: ABNORMAL L/MIN
GAS FLOW.O2 SETTING OXYMISER: 1 BPM
GAS FLOW.O2 SETTING OXYMISER: 14 BPM
GAS FLOW.O2 SETTING OXYMISER: 15 L/M
GAS FLOW.O2 SETTING OXYMISER: 15 L/M
GAS FLOW.O2 SETTING OXYMISER: 2 L/M
GAS FLOW.O2 SETTING OXYMISER: 2 L/M
GAS FLOW.O2 SETTING OXYMISER: 20 BPM
GAS FLOW.O2 SETTING OXYMISER: 20 BPM
GAS FLOW.O2 SETTING OXYMISER: 22 BPM
GAS FLOW.O2 SETTING OXYMISER: 24 BPM
GAS FLOW.O2 SETTING OXYMISER: 40 L/M
GAS FLOW.O2 SETTING OXYMISER: 50 L/M
GAS FLOW.O2 SETTING OXYMISER: 6 L/M
GLOBULIN SER CALC-MCNC: 1.5 G/DL (ref 2–4)
GLOBULIN SER CALC-MCNC: 1.7 G/DL (ref 2–4)
GLOBULIN SER CALC-MCNC: 1.7 G/DL (ref 2–4)
GLOBULIN SER CALC-MCNC: 2 G/DL (ref 2–4)
GLOBULIN SER CALC-MCNC: 2.4 G/DL (ref 2–4)
GLOBULIN SER CALC-MCNC: 2.9 G/DL (ref 2–4)
GLOBULIN SER CALC-MCNC: 2.9 G/DL (ref 2–4)
GLOBULIN SER CALC-MCNC: 3 G/DL (ref 2–4)
GLOBULIN SER CALC-MCNC: 3 G/DL (ref 2–4)
GLOBULIN SER CALC-MCNC: 3.1 G/DL (ref 2–4)
GLOBULIN SER CALC-MCNC: 3.2 G/DL (ref 2–4)
GLOBULIN SER CALC-MCNC: 3.3 G/DL (ref 2–4)
GLOBULIN SER CALC-MCNC: 3.3 G/DL (ref 2–4)
GLOBULIN SER CALC-MCNC: 3.4 G/DL (ref 2–4)
GLOBULIN SER CALC-MCNC: 3.5 G/DL (ref 2–4)
GLOBULIN SER CALC-MCNC: 3.5 G/DL (ref 2–4)
GLOBULIN SER CALC-MCNC: 3.7 G/DL (ref 2–4)
GLOBULIN SER CALC-MCNC: 3.7 G/DL (ref 2–4)
GLOBULIN SER CALC-MCNC: 3.8 G/DL (ref 2–4)
GLOBULIN SER CALC-MCNC: 3.8 G/DL (ref 2–4)
GLOBULIN SER CALC-MCNC: 3.9 G/DL (ref 2–4)
GLOBULIN SER CALC-MCNC: 4 G/DL (ref 2–4)
GLOBULIN SER CALC-MCNC: 4 G/DL (ref 2–4)
GLOBULIN SER CALC-MCNC: 4.1 G/DL (ref 2–4)
GLOBULIN SER CALC-MCNC: 4.2 G/DL (ref 2–4)
GLOBULIN SER CALC-MCNC: 4.5 G/DL (ref 2–4)
GLOBULIN SER CALC-MCNC: 4.6 G/DL (ref 2–4)
GLOBULIN SER CALC-MCNC: 4.7 G/DL (ref 2–4)
GLSCOM COMMENTS: NORMAL
GLUCOSE BLD STRIP.AUTO-MCNC: 100 MG/DL (ref 65–100)
GLUCOSE BLD STRIP.AUTO-MCNC: 101 MG/DL (ref 65–100)
GLUCOSE BLD STRIP.AUTO-MCNC: 102 MG/DL (ref 65–100)
GLUCOSE BLD STRIP.AUTO-MCNC: 102 MG/DL (ref 65–100)
GLUCOSE BLD STRIP.AUTO-MCNC: 103 MG/DL (ref 65–100)
GLUCOSE BLD STRIP.AUTO-MCNC: 104 MG/DL (ref 65–100)
GLUCOSE BLD STRIP.AUTO-MCNC: 105 MG/DL (ref 65–100)
GLUCOSE BLD STRIP.AUTO-MCNC: 106 MG/DL (ref 65–100)
GLUCOSE BLD STRIP.AUTO-MCNC: 107 MG/DL (ref 65–100)
GLUCOSE BLD STRIP.AUTO-MCNC: 108 MG/DL (ref 65–100)
GLUCOSE BLD STRIP.AUTO-MCNC: 109 MG/DL (ref 65–100)
GLUCOSE BLD STRIP.AUTO-MCNC: 110 MG/DL (ref 65–100)
GLUCOSE BLD STRIP.AUTO-MCNC: 111 MG/DL (ref 65–100)
GLUCOSE BLD STRIP.AUTO-MCNC: 112 MG/DL (ref 65–100)
GLUCOSE BLD STRIP.AUTO-MCNC: 113 MG/DL (ref 65–100)
GLUCOSE BLD STRIP.AUTO-MCNC: 114 MG/DL (ref 65–100)
GLUCOSE BLD STRIP.AUTO-MCNC: 115 MG/DL (ref 65–100)
GLUCOSE BLD STRIP.AUTO-MCNC: 116 MG/DL (ref 65–100)
GLUCOSE BLD STRIP.AUTO-MCNC: 117 MG/DL (ref 65–100)
GLUCOSE BLD STRIP.AUTO-MCNC: 117 MG/DL (ref 65–100)
GLUCOSE BLD STRIP.AUTO-MCNC: 118 MG/DL (ref 65–100)
GLUCOSE BLD STRIP.AUTO-MCNC: 119 MG/DL (ref 65–100)
GLUCOSE BLD STRIP.AUTO-MCNC: 120 MG/DL (ref 65–100)
GLUCOSE BLD STRIP.AUTO-MCNC: 121 MG/DL (ref 65–100)
GLUCOSE BLD STRIP.AUTO-MCNC: 122 MG/DL (ref 65–100)
GLUCOSE BLD STRIP.AUTO-MCNC: 123 MG/DL (ref 65–100)
GLUCOSE BLD STRIP.AUTO-MCNC: 124 MG/DL (ref 65–100)
GLUCOSE BLD STRIP.AUTO-MCNC: 125 MG/DL (ref 65–100)
GLUCOSE BLD STRIP.AUTO-MCNC: 126 MG/DL (ref 65–100)
GLUCOSE BLD STRIP.AUTO-MCNC: 127 MG/DL (ref 65–100)
GLUCOSE BLD STRIP.AUTO-MCNC: 128 MG/DL (ref 65–100)
GLUCOSE BLD STRIP.AUTO-MCNC: 129 MG/DL (ref 65–100)
GLUCOSE BLD STRIP.AUTO-MCNC: 130 MG/DL (ref 65–100)
GLUCOSE BLD STRIP.AUTO-MCNC: 131 MG/DL (ref 65–100)
GLUCOSE BLD STRIP.AUTO-MCNC: 132 MG/DL (ref 65–100)
GLUCOSE BLD STRIP.AUTO-MCNC: 133 MG/DL (ref 65–100)
GLUCOSE BLD STRIP.AUTO-MCNC: 134 MG/DL (ref 65–100)
GLUCOSE BLD STRIP.AUTO-MCNC: 135 MG/DL (ref 65–100)
GLUCOSE BLD STRIP.AUTO-MCNC: 136 MG/DL (ref 65–100)
GLUCOSE BLD STRIP.AUTO-MCNC: 137 MG/DL (ref 65–100)
GLUCOSE BLD STRIP.AUTO-MCNC: 138 MG/DL (ref 65–100)
GLUCOSE BLD STRIP.AUTO-MCNC: 139 MG/DL (ref 65–100)
GLUCOSE BLD STRIP.AUTO-MCNC: 140 MG/DL (ref 65–100)
GLUCOSE BLD STRIP.AUTO-MCNC: 141 MG/DL (ref 65–100)
GLUCOSE BLD STRIP.AUTO-MCNC: 142 MG/DL (ref 65–100)
GLUCOSE BLD STRIP.AUTO-MCNC: 143 MG/DL (ref 65–100)
GLUCOSE BLD STRIP.AUTO-MCNC: 144 MG/DL (ref 65–100)
GLUCOSE BLD STRIP.AUTO-MCNC: 145 MG/DL (ref 65–100)
GLUCOSE BLD STRIP.AUTO-MCNC: 146 MG/DL (ref 65–100)
GLUCOSE BLD STRIP.AUTO-MCNC: 147 MG/DL (ref 65–100)
GLUCOSE BLD STRIP.AUTO-MCNC: 148 MG/DL (ref 65–100)
GLUCOSE BLD STRIP.AUTO-MCNC: 148 MG/DL (ref 65–100)
GLUCOSE BLD STRIP.AUTO-MCNC: 150 MG/DL (ref 65–100)
GLUCOSE BLD STRIP.AUTO-MCNC: 151 MG/DL (ref 65–100)
GLUCOSE BLD STRIP.AUTO-MCNC: 152 MG/DL (ref 65–100)
GLUCOSE BLD STRIP.AUTO-MCNC: 152 MG/DL (ref 65–100)
GLUCOSE BLD STRIP.AUTO-MCNC: 153 MG/DL (ref 65–100)
GLUCOSE BLD STRIP.AUTO-MCNC: 154 MG/DL (ref 65–100)
GLUCOSE BLD STRIP.AUTO-MCNC: 155 MG/DL (ref 65–100)
GLUCOSE BLD STRIP.AUTO-MCNC: 156 MG/DL (ref 65–100)
GLUCOSE BLD STRIP.AUTO-MCNC: 159 MG/DL (ref 65–100)
GLUCOSE BLD STRIP.AUTO-MCNC: 160 MG/DL (ref 65–100)
GLUCOSE BLD STRIP.AUTO-MCNC: 162 MG/DL (ref 65–100)
GLUCOSE BLD STRIP.AUTO-MCNC: 163 MG/DL (ref 65–100)
GLUCOSE BLD STRIP.AUTO-MCNC: 163 MG/DL (ref 65–100)
GLUCOSE BLD STRIP.AUTO-MCNC: 164 MG/DL (ref 65–100)
GLUCOSE BLD STRIP.AUTO-MCNC: 164 MG/DL (ref 65–100)
GLUCOSE BLD STRIP.AUTO-MCNC: 165 MG/DL (ref 65–100)
GLUCOSE BLD STRIP.AUTO-MCNC: 167 MG/DL (ref 65–100)
GLUCOSE BLD STRIP.AUTO-MCNC: 168 MG/DL (ref 65–100)
GLUCOSE BLD STRIP.AUTO-MCNC: 169 MG/DL (ref 65–100)
GLUCOSE BLD STRIP.AUTO-MCNC: 170 MG/DL (ref 65–100)
GLUCOSE BLD STRIP.AUTO-MCNC: 170 MG/DL (ref 65–100)
GLUCOSE BLD STRIP.AUTO-MCNC: 172 MG/DL (ref 65–100)
GLUCOSE BLD STRIP.AUTO-MCNC: 173 MG/DL (ref 65–100)
GLUCOSE BLD STRIP.AUTO-MCNC: 173 MG/DL (ref 65–100)
GLUCOSE BLD STRIP.AUTO-MCNC: 175 MG/DL (ref 65–100)
GLUCOSE BLD STRIP.AUTO-MCNC: 176 MG/DL (ref 65–100)
GLUCOSE BLD STRIP.AUTO-MCNC: 177 MG/DL (ref 65–100)
GLUCOSE BLD STRIP.AUTO-MCNC: 179 MG/DL (ref 65–100)
GLUCOSE BLD STRIP.AUTO-MCNC: 180 MG/DL (ref 65–100)
GLUCOSE BLD STRIP.AUTO-MCNC: 181 MG/DL (ref 65–100)
GLUCOSE BLD STRIP.AUTO-MCNC: 182 MG/DL (ref 65–100)
GLUCOSE BLD STRIP.AUTO-MCNC: 183 MG/DL (ref 65–100)
GLUCOSE BLD STRIP.AUTO-MCNC: 185 MG/DL (ref 65–100)
GLUCOSE BLD STRIP.AUTO-MCNC: 185 MG/DL (ref 65–100)
GLUCOSE BLD STRIP.AUTO-MCNC: 186 MG/DL (ref 65–100)
GLUCOSE BLD STRIP.AUTO-MCNC: 190 MG/DL (ref 65–100)
GLUCOSE BLD STRIP.AUTO-MCNC: 190 MG/DL (ref 65–100)
GLUCOSE BLD STRIP.AUTO-MCNC: 194 MG/DL (ref 65–100)
GLUCOSE BLD STRIP.AUTO-MCNC: 195 MG/DL (ref 65–100)
GLUCOSE BLD STRIP.AUTO-MCNC: 198 MG/DL (ref 65–100)
GLUCOSE BLD STRIP.AUTO-MCNC: 199 MG/DL (ref 65–100)
GLUCOSE BLD STRIP.AUTO-MCNC: 199 MG/DL (ref 65–100)
GLUCOSE BLD STRIP.AUTO-MCNC: 200 MG/DL (ref 65–100)
GLUCOSE BLD STRIP.AUTO-MCNC: 200 MG/DL (ref 65–100)
GLUCOSE BLD STRIP.AUTO-MCNC: 202 MG/DL (ref 65–100)
GLUCOSE BLD STRIP.AUTO-MCNC: 204 MG/DL (ref 65–100)
GLUCOSE BLD STRIP.AUTO-MCNC: 204 MG/DL (ref 65–100)
GLUCOSE BLD STRIP.AUTO-MCNC: 207 MG/DL (ref 65–100)
GLUCOSE BLD STRIP.AUTO-MCNC: 209 MG/DL (ref 65–100)
GLUCOSE BLD STRIP.AUTO-MCNC: 212 MG/DL (ref 65–100)
GLUCOSE BLD STRIP.AUTO-MCNC: 213 MG/DL (ref 65–100)
GLUCOSE BLD STRIP.AUTO-MCNC: 214 MG/DL (ref 65–100)
GLUCOSE BLD STRIP.AUTO-MCNC: 214 MG/DL (ref 65–100)
GLUCOSE BLD STRIP.AUTO-MCNC: 216 MG/DL (ref 65–100)
GLUCOSE BLD STRIP.AUTO-MCNC: 217 MG/DL (ref 65–100)
GLUCOSE BLD STRIP.AUTO-MCNC: 219 MG/DL (ref 65–100)
GLUCOSE BLD STRIP.AUTO-MCNC: 219 MG/DL (ref 65–100)
GLUCOSE BLD STRIP.AUTO-MCNC: 222 MG/DL (ref 65–100)
GLUCOSE BLD STRIP.AUTO-MCNC: 224 MG/DL (ref 65–100)
GLUCOSE BLD STRIP.AUTO-MCNC: 224 MG/DL (ref 65–100)
GLUCOSE BLD STRIP.AUTO-MCNC: 225 MG/DL (ref 65–100)
GLUCOSE BLD STRIP.AUTO-MCNC: 226 MG/DL (ref 65–100)
GLUCOSE BLD STRIP.AUTO-MCNC: 228 MG/DL (ref 65–100)
GLUCOSE BLD STRIP.AUTO-MCNC: 228 MG/DL (ref 65–100)
GLUCOSE BLD STRIP.AUTO-MCNC: 230 MG/DL (ref 65–100)
GLUCOSE BLD STRIP.AUTO-MCNC: 230 MG/DL (ref 65–100)
GLUCOSE BLD STRIP.AUTO-MCNC: 234 MG/DL (ref 65–100)
GLUCOSE BLD STRIP.AUTO-MCNC: 240 MG/DL (ref 65–100)
GLUCOSE BLD STRIP.AUTO-MCNC: 243 MG/DL (ref 65–100)
GLUCOSE BLD STRIP.AUTO-MCNC: 244 MG/DL (ref 65–100)
GLUCOSE BLD STRIP.AUTO-MCNC: 249 MG/DL (ref 65–100)
GLUCOSE BLD STRIP.AUTO-MCNC: 251 MG/DL (ref 65–100)
GLUCOSE BLD STRIP.AUTO-MCNC: 260 MG/DL (ref 65–100)
GLUCOSE BLD STRIP.AUTO-MCNC: 265 MG/DL (ref 65–100)
GLUCOSE BLD STRIP.AUTO-MCNC: 265 MG/DL (ref 65–100)
GLUCOSE BLD STRIP.AUTO-MCNC: 270 MG/DL (ref 65–100)
GLUCOSE BLD STRIP.AUTO-MCNC: 271 MG/DL (ref 65–100)
GLUCOSE BLD STRIP.AUTO-MCNC: 272 MG/DL (ref 65–100)
GLUCOSE BLD STRIP.AUTO-MCNC: 276 MG/DL (ref 65–100)
GLUCOSE BLD STRIP.AUTO-MCNC: 289 MG/DL (ref 65–100)
GLUCOSE BLD STRIP.AUTO-MCNC: 307 MG/DL (ref 65–100)
GLUCOSE BLD STRIP.AUTO-MCNC: 308 MG/DL (ref 65–100)
GLUCOSE BLD STRIP.AUTO-MCNC: 311 MG/DL (ref 65–100)
GLUCOSE BLD STRIP.AUTO-MCNC: 315 MG/DL (ref 65–100)
GLUCOSE BLD STRIP.AUTO-MCNC: 318 MG/DL (ref 65–100)
GLUCOSE BLD STRIP.AUTO-MCNC: 322 MG/DL (ref 65–100)
GLUCOSE BLD STRIP.AUTO-MCNC: 342 MG/DL (ref 65–100)
GLUCOSE BLD STRIP.AUTO-MCNC: 353 MG/DL (ref 65–100)
GLUCOSE BLD STRIP.AUTO-MCNC: 361 MG/DL (ref 65–100)
GLUCOSE BLD STRIP.AUTO-MCNC: 372 MG/DL (ref 65–100)
GLUCOSE BLD STRIP.AUTO-MCNC: 390 MG/DL (ref 65–100)
GLUCOSE BLD STRIP.AUTO-MCNC: 399 MG/DL (ref 65–100)
GLUCOSE BLD STRIP.AUTO-MCNC: 416 MG/DL (ref 65–100)
GLUCOSE BLD STRIP.AUTO-MCNC: 439 MG/DL (ref 65–100)
GLUCOSE BLD STRIP.AUTO-MCNC: 52 MG/DL (ref 65–100)
GLUCOSE BLD STRIP.AUTO-MCNC: 59 MG/DL (ref 65–100)
GLUCOSE BLD STRIP.AUTO-MCNC: 65 MG/DL (ref 65–100)
GLUCOSE BLD STRIP.AUTO-MCNC: 69 MG/DL (ref 65–100)
GLUCOSE BLD STRIP.AUTO-MCNC: 70 MG/DL (ref 65–100)
GLUCOSE BLD STRIP.AUTO-MCNC: 71 MG/DL (ref 65–100)
GLUCOSE BLD STRIP.AUTO-MCNC: 71 MG/DL (ref 65–100)
GLUCOSE BLD STRIP.AUTO-MCNC: 74 MG/DL (ref 65–100)
GLUCOSE BLD STRIP.AUTO-MCNC: 74 MG/DL (ref 65–100)
GLUCOSE BLD STRIP.AUTO-MCNC: 75 MG/DL (ref 65–100)
GLUCOSE BLD STRIP.AUTO-MCNC: 75 MG/DL (ref 65–100)
GLUCOSE BLD STRIP.AUTO-MCNC: 76 MG/DL (ref 65–100)
GLUCOSE BLD STRIP.AUTO-MCNC: 77 MG/DL (ref 65–100)
GLUCOSE BLD STRIP.AUTO-MCNC: 77 MG/DL (ref 65–100)
GLUCOSE BLD STRIP.AUTO-MCNC: 78 MG/DL (ref 65–100)
GLUCOSE BLD STRIP.AUTO-MCNC: 79 MG/DL (ref 65–100)
GLUCOSE BLD STRIP.AUTO-MCNC: 80 MG/DL (ref 65–100)
GLUCOSE BLD STRIP.AUTO-MCNC: 80 MG/DL (ref 65–100)
GLUCOSE BLD STRIP.AUTO-MCNC: 81 MG/DL (ref 65–100)
GLUCOSE BLD STRIP.AUTO-MCNC: 82 MG/DL (ref 65–100)
GLUCOSE BLD STRIP.AUTO-MCNC: 83 MG/DL (ref 65–100)
GLUCOSE BLD STRIP.AUTO-MCNC: 84 MG/DL (ref 65–100)
GLUCOSE BLD STRIP.AUTO-MCNC: 85 MG/DL (ref 65–100)
GLUCOSE BLD STRIP.AUTO-MCNC: 86 MG/DL (ref 65–100)
GLUCOSE BLD STRIP.AUTO-MCNC: 87 MG/DL (ref 65–100)
GLUCOSE BLD STRIP.AUTO-MCNC: 87 MG/DL (ref 65–100)
GLUCOSE BLD STRIP.AUTO-MCNC: 88 MG/DL (ref 65–100)
GLUCOSE BLD STRIP.AUTO-MCNC: 89 MG/DL (ref 65–100)
GLUCOSE BLD STRIP.AUTO-MCNC: 90 MG/DL (ref 65–100)
GLUCOSE BLD STRIP.AUTO-MCNC: 91 MG/DL (ref 65–100)
GLUCOSE BLD STRIP.AUTO-MCNC: 92 MG/DL (ref 65–100)
GLUCOSE BLD STRIP.AUTO-MCNC: 93 MG/DL (ref 65–100)
GLUCOSE BLD STRIP.AUTO-MCNC: 94 MG/DL (ref 65–100)
GLUCOSE BLD STRIP.AUTO-MCNC: 95 MG/DL (ref 65–100)
GLUCOSE BLD STRIP.AUTO-MCNC: 96 MG/DL (ref 65–100)
GLUCOSE BLD STRIP.AUTO-MCNC: 97 MG/DL (ref 65–100)
GLUCOSE BLD STRIP.AUTO-MCNC: 98 MG/DL (ref 65–100)
GLUCOSE BLD STRIP.AUTO-MCNC: 99 MG/DL (ref 65–100)
GLUCOSE BLD STRIP.AUTO-MCNC: NORMAL MG/DL (ref 65–100)
GLUCOSE SERPL-MCNC: 100 MG/DL (ref 65–100)
GLUCOSE SERPL-MCNC: 102 MG/DL (ref 65–100)
GLUCOSE SERPL-MCNC: 102 MG/DL (ref 65–100)
GLUCOSE SERPL-MCNC: 106 MG/DL (ref 65–100)
GLUCOSE SERPL-MCNC: 106 MG/DL (ref 65–100)
GLUCOSE SERPL-MCNC: 108 MG/DL (ref 65–100)
GLUCOSE SERPL-MCNC: 110 MG/DL (ref 65–100)
GLUCOSE SERPL-MCNC: 111 MG/DL (ref 65–100)
GLUCOSE SERPL-MCNC: 112 MG/DL (ref 65–100)
GLUCOSE SERPL-MCNC: 113 MG/DL (ref 65–100)
GLUCOSE SERPL-MCNC: 115 MG/DL (ref 65–100)
GLUCOSE SERPL-MCNC: 116 MG/DL (ref 65–100)
GLUCOSE SERPL-MCNC: 117 MG/DL (ref 65–100)
GLUCOSE SERPL-MCNC: 118 MG/DL (ref 65–100)
GLUCOSE SERPL-MCNC: 118 MG/DL (ref 65–100)
GLUCOSE SERPL-MCNC: 123 MG/DL (ref 65–100)
GLUCOSE SERPL-MCNC: 124 MG/DL (ref 65–100)
GLUCOSE SERPL-MCNC: 130 MG/DL (ref 65–100)
GLUCOSE SERPL-MCNC: 147 MG/DL (ref 65–100)
GLUCOSE SERPL-MCNC: 149 MG/DL (ref 65–100)
GLUCOSE SERPL-MCNC: 153 MG/DL (ref 65–100)
GLUCOSE SERPL-MCNC: 154 MG/DL (ref 65–100)
GLUCOSE SERPL-MCNC: 155 MG/DL (ref 65–100)
GLUCOSE SERPL-MCNC: 157 MG/DL (ref 65–100)
GLUCOSE SERPL-MCNC: 170 MG/DL (ref 65–100)
GLUCOSE SERPL-MCNC: 170 MG/DL (ref 65–100)
GLUCOSE SERPL-MCNC: 183 MG/DL (ref 65–100)
GLUCOSE SERPL-MCNC: 214 MG/DL (ref 65–100)
GLUCOSE SERPL-MCNC: 218 MG/DL (ref 65–100)
GLUCOSE SERPL-MCNC: 220 MG/DL (ref 65–100)
GLUCOSE SERPL-MCNC: 221 MG/DL (ref 65–100)
GLUCOSE SERPL-MCNC: 257 MG/DL (ref 65–100)
GLUCOSE SERPL-MCNC: 263 MG/DL (ref 65–100)
GLUCOSE SERPL-MCNC: 285 MG/DL (ref 65–100)
GLUCOSE SERPL-MCNC: 370 MG/DL (ref 65–100)
GLUCOSE SERPL-MCNC: 408 MG/DL (ref 65–100)
GLUCOSE SERPL-MCNC: 72 MG/DL (ref 65–100)
GLUCOSE SERPL-MCNC: 74 MG/DL (ref 65–100)
GLUCOSE SERPL-MCNC: 78 MG/DL (ref 65–100)
GLUCOSE SERPL-MCNC: 80 MG/DL (ref 65–100)
GLUCOSE SERPL-MCNC: 85 MG/DL (ref 65–100)
GLUCOSE SERPL-MCNC: 89 MG/DL (ref 65–100)
GLUCOSE SERPL-MCNC: 89 MG/DL (ref 65–100)
GLUCOSE SERPL-MCNC: 91 MG/DL (ref 65–100)
GLUCOSE SERPL-MCNC: 92 MG/DL (ref 65–100)
GLUCOSE SERPL-MCNC: 93 MG/DL (ref 65–100)
GLUCOSE SERPL-MCNC: 95 MG/DL (ref 65–100)
GLUCOSE SERPL-MCNC: 95 MG/DL (ref 65–100)
GLUCOSE SERPL-MCNC: 96 MG/DL (ref 65–100)
GLUCOSE UR STRIP.AUTO-MCNC: NEGATIVE MG/DL
GLUCOSE, GLC: 100 MG/DL
GLUCOSE, GLC: 101 MG/DL
GLUCOSE, GLC: 102 MG/DL
GLUCOSE, GLC: 102 MG/DL
GLUCOSE, GLC: 103 MG/DL
GLUCOSE, GLC: 104 MG/DL
GLUCOSE, GLC: 105 MG/DL
GLUCOSE, GLC: 106 MG/DL
GLUCOSE, GLC: 107 MG/DL
GLUCOSE, GLC: 108 MG/DL
GLUCOSE, GLC: 109 MG/DL
GLUCOSE, GLC: 110 MG/DL
GLUCOSE, GLC: 111 MG/DL
GLUCOSE, GLC: 112 MG/DL
GLUCOSE, GLC: 113 MG/DL
GLUCOSE, GLC: 114 MG/DL
GLUCOSE, GLC: 115 MG/DL
GLUCOSE, GLC: 116 MG/DL
GLUCOSE, GLC: 117 MG/DL
GLUCOSE, GLC: 117 MG/DL
GLUCOSE, GLC: 118 MG/DL
GLUCOSE, GLC: 119 MG/DL
GLUCOSE, GLC: 120 MG/DL
GLUCOSE, GLC: 121 MG/DL
GLUCOSE, GLC: 122 MG/DL
GLUCOSE, GLC: 123 MG/DL
GLUCOSE, GLC: 124 MG/DL
GLUCOSE, GLC: 125 MG/DL
GLUCOSE, GLC: 126 MG/DL
GLUCOSE, GLC: 127 MG/DL
GLUCOSE, GLC: 128 MG/DL
GLUCOSE, GLC: 129 MG/DL
GLUCOSE, GLC: 130 MG/DL
GLUCOSE, GLC: 131 MG/DL
GLUCOSE, GLC: 132 MG/DL
GLUCOSE, GLC: 133 MG/DL
GLUCOSE, GLC: 134 MG/DL
GLUCOSE, GLC: 135 MG/DL
GLUCOSE, GLC: 136 MG/DL
GLUCOSE, GLC: 137 MG/DL
GLUCOSE, GLC: 138 MG/DL
GLUCOSE, GLC: 139 MG/DL
GLUCOSE, GLC: 140 MG/DL
GLUCOSE, GLC: 141 MG/DL
GLUCOSE, GLC: 142 MG/DL
GLUCOSE, GLC: 143 MG/DL
GLUCOSE, GLC: 144 MG/DL
GLUCOSE, GLC: 144 MG/DL
GLUCOSE, GLC: 145 MG/DL
GLUCOSE, GLC: 145 MG/DL
GLUCOSE, GLC: 146 MG/DL
GLUCOSE, GLC: 147 MG/DL
GLUCOSE, GLC: 148 MG/DL
GLUCOSE, GLC: 148 MG/DL
GLUCOSE, GLC: 150 MG/DL
GLUCOSE, GLC: 150 MG/DL
GLUCOSE, GLC: 151 MG/DL
GLUCOSE, GLC: 152 MG/DL
GLUCOSE, GLC: 152 MG/DL
GLUCOSE, GLC: 153 MG/DL
GLUCOSE, GLC: 154 MG/DL
GLUCOSE, GLC: 155 MG/DL
GLUCOSE, GLC: 156 MG/DL
GLUCOSE, GLC: 159 MG/DL
GLUCOSE, GLC: 160 MG/DL
GLUCOSE, GLC: 160 MG/DL
GLUCOSE, GLC: 162 MG/DL
GLUCOSE, GLC: 164 MG/DL
GLUCOSE, GLC: 164 MG/DL
GLUCOSE, GLC: 165 MG/DL
GLUCOSE, GLC: 167 MG/DL
GLUCOSE, GLC: 168 MG/DL
GLUCOSE, GLC: 170 MG/DL
GLUCOSE, GLC: 170 MG/DL
GLUCOSE, GLC: 173 MG/DL
GLUCOSE, GLC: 175 MG/DL
GLUCOSE, GLC: 176 MG/DL
GLUCOSE, GLC: 179 MG/DL
GLUCOSE, GLC: 180 MG/DL
GLUCOSE, GLC: 180 MG/DL
GLUCOSE, GLC: 182 MG/DL
GLUCOSE, GLC: 185 MG/DL
GLUCOSE, GLC: 186 MG/DL
GLUCOSE, GLC: 190 MG/DL
GLUCOSE, GLC: 190 MG/DL
GLUCOSE, GLC: 195 MG/DL
GLUCOSE, GLC: 199 MG/DL
GLUCOSE, GLC: 200 MG/DL
GLUCOSE, GLC: 214 MG/DL
GLUCOSE, GLC: 216 MG/DL
GLUCOSE, GLC: 217 MG/DL
GLUCOSE, GLC: 224 MG/DL
GLUCOSE, GLC: 225 MG/DL
GLUCOSE, GLC: 244 MG/DL
GLUCOSE, GLC: 265 MG/DL
GLUCOSE, GLC: 307 MG/DL
GLUCOSE, GLC: 311 MG/DL
GLUCOSE, GLC: 318 MG/DL
GLUCOSE, GLC: 353 MG/DL
GLUCOSE, GLC: 439 MG/DL
GLUCOSE, GLC: 52 MG/DL
GLUCOSE, GLC: 70 MG/DL
GLUCOSE, GLC: 71 MG/DL
GLUCOSE, GLC: 74 MG/DL
GLUCOSE, GLC: 75 MG/DL
GLUCOSE, GLC: 75 MG/DL
GLUCOSE, GLC: 76 MG/DL
GLUCOSE, GLC: 77 MG/DL
GLUCOSE, GLC: 77 MG/DL
GLUCOSE, GLC: 78 MG/DL
GLUCOSE, GLC: 79 MG/DL
GLUCOSE, GLC: 80 MG/DL
GLUCOSE, GLC: 80 MG/DL
GLUCOSE, GLC: 81 MG/DL
GLUCOSE, GLC: 81 MG/DL
GLUCOSE, GLC: 82 MG/DL
GLUCOSE, GLC: 83 MG/DL
GLUCOSE, GLC: 84 MG/DL
GLUCOSE, GLC: 84 MG/DL
GLUCOSE, GLC: 85 MG/DL
GLUCOSE, GLC: 86 MG/DL
GLUCOSE, GLC: 87 MG/DL
GLUCOSE, GLC: 87 MG/DL
GLUCOSE, GLC: 88 MG/DL
GLUCOSE, GLC: 89 MG/DL
GLUCOSE, GLC: 90 MG/DL
GLUCOSE, GLC: 91 MG/DL
GLUCOSE, GLC: 92 MG/DL
GLUCOSE, GLC: 93 MG/DL
GLUCOSE, GLC: 94 MG/DL
GLUCOSE, GLC: 95 MG/DL
GLUCOSE, GLC: 96 MG/DL
GLUCOSE, GLC: 97 MG/DL
GLUCOSE, GLC: 98 MG/DL
GLUCOSE, GLC: 99 MG/DL
GRAM STN SPEC: NORMAL
HBA1C MFR BLD: 5.3 % (ref 4.2–6.3)
HCO3 BLD-SCNC: 17.7 MMOL/L (ref 22–26)
HCO3 BLD-SCNC: 20 MMOL/L (ref 22–26)
HCO3 BLD-SCNC: 20 MMOL/L (ref 22–26)
HCO3 BLD-SCNC: 20.6 MMOL/L (ref 22–26)
HCO3 BLD-SCNC: 20.7 MMOL/L (ref 22–26)
HCO3 BLD-SCNC: 20.7 MMOL/L (ref 22–26)
HCO3 BLD-SCNC: 21.1 MMOL/L (ref 22–26)
HCO3 BLD-SCNC: 22.1 MMOL/L (ref 22–26)
HCO3 BLD-SCNC: 23.9 MMOL/L (ref 22–26)
HCO3 BLD-SCNC: 24.6 MMOL/L (ref 22–26)
HCO3 BLD-SCNC: 24.6 MMOL/L (ref 22–26)
HCO3 BLD-SCNC: 25.9 MMOL/L (ref 22–26)
HCO3 BLD-SCNC: 26.1 MMOL/L (ref 22–26)
HCO3 BLD-SCNC: 26.3 MMOL/L (ref 22–26)
HCO3 BLD-SCNC: 28 MMOL/L (ref 22–26)
HCO3 BLD-SCNC: 29.2 MMOL/L (ref 22–26)
HCO3 BLD-SCNC: 29.7 MMOL/L (ref 22–26)
HCO3 BLD-SCNC: 29.7 MMOL/L (ref 22–26)
HCO3 BLD-SCNC: 29.8 MMOL/L (ref 22–26)
HCO3 BLD-SCNC: 31.2 MMOL/L (ref 22–26)
HCO3 BLD-SCNC: 31.5 MMOL/L (ref 22–26)
HCO3 BLD-SCNC: 31.5 MMOL/L (ref 22–26)
HCO3 BLD-SCNC: 31.7 MMOL/L (ref 22–26)
HCO3 BLD-SCNC: 32.6 MMOL/L (ref 22–26)
HCO3 BLD-SCNC: 32.6 MMOL/L (ref 22–26)
HCO3 BLD-SCNC: 33.2 MMOL/L (ref 22–26)
HCO3 BLD-SCNC: 33.6 MMOL/L (ref 22–26)
HCO3 BLD-SCNC: 34.1 MMOL/L (ref 22–26)
HCO3 BLD-SCNC: 35.1 MMOL/L (ref 22–26)
HCO3 BLD-SCNC: 35.3 MMOL/L (ref 22–26)
HCO3 BLD-SCNC: 38.9 MMOL/L (ref 22–26)
HCO3 BLDV-SCNC: 17.5 MMOL/L (ref 23–28)
HCO3 BLDV-SCNC: 18.3 MMOL/L (ref 23–28)
HCO3 BLDV-SCNC: 18.6 MMOL/L (ref 23–28)
HCO3 BLDV-SCNC: 19.9 MMOL/L (ref 23–28)
HCO3 BLDV-SCNC: 20.7 MMOL/L (ref 23–28)
HCO3 BLDV-SCNC: 21.1 MMOL/L (ref 23–28)
HCO3 BLDV-SCNC: 21.7 MMOL/L (ref 23–28)
HCO3 BLDV-SCNC: 21.9 MMOL/L (ref 23–28)
HCO3 BLDV-SCNC: 22.1 MMOL/L (ref 23–28)
HCO3 BLDV-SCNC: 23.7 MMOL/L (ref 23–28)
HCO3 BLDV-SCNC: 24.9 MMOL/L (ref 23–28)
HCT VFR BLD AUTO: 19.8 % (ref 36.6–50.3)
HCT VFR BLD AUTO: 19.8 % (ref 36.6–50.3)
HCT VFR BLD AUTO: 19.9 % (ref 36.6–50.3)
HCT VFR BLD AUTO: 20.8 % (ref 36.6–50.3)
HCT VFR BLD AUTO: 21.1 % (ref 36.6–50.3)
HCT VFR BLD AUTO: 21.4 % (ref 36.6–50.3)
HCT VFR BLD AUTO: 21.5 % (ref 36.6–50.3)
HCT VFR BLD AUTO: 21.7 % (ref 36.6–50.3)
HCT VFR BLD AUTO: 22.2 % (ref 36.6–50.3)
HCT VFR BLD AUTO: 22.5 % (ref 36.6–50.3)
HCT VFR BLD AUTO: 23.3 % (ref 36.6–50.3)
HCT VFR BLD AUTO: 23.3 % (ref 36.6–50.3)
HCT VFR BLD AUTO: 23.7 % (ref 36.6–50.3)
HCT VFR BLD AUTO: 24.2 % (ref 36.6–50.3)
HCT VFR BLD AUTO: 24.3 % (ref 36.6–50.3)
HCT VFR BLD AUTO: 24.3 % (ref 36.6–50.3)
HCT VFR BLD AUTO: 24.4 % (ref 36.6–50.3)
HCT VFR BLD AUTO: 24.7 % (ref 36.6–50.3)
HCT VFR BLD AUTO: 24.8 % (ref 36.6–50.3)
HCT VFR BLD AUTO: 24.9 % (ref 36.6–50.3)
HCT VFR BLD AUTO: 25.1 % (ref 36.6–50.3)
HCT VFR BLD AUTO: 25.6 % (ref 36.6–50.3)
HCT VFR BLD AUTO: 25.6 % (ref 36.6–50.3)
HCT VFR BLD AUTO: 26 % (ref 36.6–50.3)
HCT VFR BLD AUTO: 26.5 % (ref 36.6–50.3)
HCT VFR BLD AUTO: 27.1 % (ref 36.6–50.3)
HCT VFR BLD AUTO: 27.7 % (ref 36.6–50.3)
HCT VFR BLD AUTO: 27.7 % (ref 36.6–50.3)
HCT VFR BLD AUTO: 27.8 % (ref 36.6–50.3)
HCT VFR BLD AUTO: 27.9 % (ref 36.6–50.3)
HCT VFR BLD AUTO: 28.1 % (ref 36.6–50.3)
HCT VFR BLD AUTO: 28.3 % (ref 36.6–50.3)
HCT VFR BLD AUTO: 28.4 % (ref 36.6–50.3)
HCT VFR BLD AUTO: 28.5 % (ref 36.6–50.3)
HCT VFR BLD AUTO: 28.6 % (ref 36.6–50.3)
HCT VFR BLD AUTO: 28.7 % (ref 36.6–50.3)
HCT VFR BLD AUTO: 28.8 % (ref 36.6–50.3)
HCT VFR BLD AUTO: 28.9 % (ref 36.6–50.3)
HCT VFR BLD AUTO: 29.2 % (ref 36.6–50.3)
HCT VFR BLD AUTO: 29.5 % (ref 36.6–50.3)
HCT VFR BLD AUTO: 29.5 % (ref 36.6–50.3)
HCT VFR BLD AUTO: 29.6 % (ref 36.6–50.3)
HCT VFR BLD AUTO: 29.8 % (ref 36.6–50.3)
HCT VFR BLD AUTO: 30.4 % (ref 36.6–50.3)
HCT VFR BLD AUTO: 30.9 % (ref 36.6–50.3)
HCT VFR BLD AUTO: 31 % (ref 36.6–50.3)
HCT VFR BLD AUTO: 31.2 % (ref 36.6–50.3)
HCT VFR BLD AUTO: 33.3 % (ref 36.6–50.3)
HDLC SERPL-MCNC: 58 MG/DL
HDLC SERPL: 2.9 {RATIO} (ref 0–5)
HEMOCCULT STL QL: NEGATIVE
HGB BLD-MCNC: 10 G/DL (ref 12.1–17)
HGB BLD-MCNC: 10 G/DL (ref 12.1–17)
HGB BLD-MCNC: 10.2 G/DL (ref 12.1–17)
HGB BLD-MCNC: 10.9 G/DL (ref 12.1–17)
HGB BLD-MCNC: 6 G/DL (ref 12.1–17)
HGB BLD-MCNC: 6 G/DL (ref 12.1–17)
HGB BLD-MCNC: 6.1 G/DL (ref 12.1–17)
HGB BLD-MCNC: 6.6 G/DL (ref 12.1–17)
HGB BLD-MCNC: 6.7 G/DL (ref 12.1–17)
HGB BLD-MCNC: 7 G/DL (ref 12.1–17)
HGB BLD-MCNC: 7.2 G/DL (ref 12.1–17)
HGB BLD-MCNC: 7.3 G/DL (ref 12.1–17)
HGB BLD-MCNC: 7.5 G/DL (ref 12.1–17)
HGB BLD-MCNC: 7.6 G/DL (ref 12.1–17)
HGB BLD-MCNC: 7.7 G/DL (ref 12.1–17)
HGB BLD-MCNC: 7.7 G/DL (ref 12.1–17)
HGB BLD-MCNC: 7.9 G/DL (ref 12.1–17)
HGB BLD-MCNC: 7.9 G/DL (ref 12.1–17)
HGB BLD-MCNC: 8 G/DL (ref 12.1–17)
HGB BLD-MCNC: 8.1 G/DL (ref 12.1–17)
HGB BLD-MCNC: 8.3 G/DL (ref 12.1–17)
HGB BLD-MCNC: 8.4 G/DL (ref 12.1–17)
HGB BLD-MCNC: 8.4 G/DL (ref 12.1–17)
HGB BLD-MCNC: 8.5 G/DL (ref 12.1–17)
HGB BLD-MCNC: 8.7 G/DL (ref 12.1–17)
HGB BLD-MCNC: 8.8 G/DL (ref 12.1–17)
HGB BLD-MCNC: 8.8 G/DL (ref 12.1–17)
HGB BLD-MCNC: 9.1 G/DL (ref 12.1–17)
HGB BLD-MCNC: 9.1 G/DL (ref 12.1–17)
HGB BLD-MCNC: 9.2 G/DL (ref 12.1–17)
HGB BLD-MCNC: 9.3 G/DL (ref 12.1–17)
HGB BLD-MCNC: 9.4 G/DL (ref 12.1–17)
HGB BLD-MCNC: 9.4 G/DL (ref 12.1–17)
HGB BLD-MCNC: 9.5 G/DL (ref 12.1–17)
HGB BLD-MCNC: 9.5 G/DL (ref 12.1–17)
HGB BLD-MCNC: 9.7 G/DL (ref 12.1–17)
HGB BLD-MCNC: 9.8 G/DL (ref 12.1–17)
HGB BLD-MCNC: 9.8 G/DL (ref 12.1–17)
HGB UR QL STRIP: ABNORMAL
HIGH TARGET, HITG: 130 MG/DL
HIGH TARGET, HITG: 140 MG/DL
HYALINE CASTS URNS QL MICRO: ABNORMAL /LPF (ref 0–5)
IMM GRANULOCYTES # BLD AUTO: 0 K/UL
IMM GRANULOCYTES # BLD AUTO: 0 K/UL (ref 0–0.04)
IMM GRANULOCYTES # BLD AUTO: 0 K/UL (ref 0–0.04)
IMM GRANULOCYTES # BLD AUTO: 0.2 K/UL (ref 0–0.04)
IMM GRANULOCYTES NFR BLD AUTO: 0 %
IMM GRANULOCYTES NFR BLD AUTO: 0 % (ref 0–0.5)
IMM GRANULOCYTES NFR BLD AUTO: 0 % (ref 0–0.5)
IMM GRANULOCYTES NFR BLD AUTO: 1 % (ref 0–0.5)
INR PPP: 1 (ref 0.9–1.1)
INR PPP: 1.1 (ref 0.9–1.1)
INR PPP: 1.2 (ref 0.9–1.1)
INR PPP: 1.4 (ref 0.9–1.1)
INR PPP: 1.5 (ref 0.9–1.1)
INSPIRATION.DURATION SETTING TIME VENT: 1 SEC
INSPIRATION.DURATION SETTING TIME VENT: 1.2 SEC
INSULIN ADMINSTERED, INSADM: 0 UNITS/HOUR
INSULIN ADMINSTERED, INSADM: 0.1 UNITS/HOUR
INSULIN ADMINSTERED, INSADM: 0.1 UNITS/HOUR
INSULIN ADMINSTERED, INSADM: 0.2 UNITS/HOUR
INSULIN ADMINSTERED, INSADM: 0.2 UNITS/HOUR
INSULIN ADMINSTERED, INSADM: 0.3 UNITS/HOUR
INSULIN ADMINSTERED, INSADM: 0.4 UNITS/HOUR
INSULIN ADMINSTERED, INSADM: 0.5 UNITS/HOUR
INSULIN ADMINSTERED, INSADM: 0.6 UNITS/HOUR
INSULIN ADMINSTERED, INSADM: 0.7 UNITS/HOUR
INSULIN ADMINSTERED, INSADM: 0.8 UNITS/HOUR
INSULIN ADMINSTERED, INSADM: 0.9 UNITS/HOUR
INSULIN ADMINSTERED, INSADM: 1 UNITS/HOUR
INSULIN ADMINSTERED, INSADM: 1.1 UNITS/HOUR
INSULIN ADMINSTERED, INSADM: 1.2 UNITS/HOUR
INSULIN ADMINSTERED, INSADM: 1.3 UNITS/HOUR
INSULIN ADMINSTERED, INSADM: 1.4 UNITS/HOUR
INSULIN ADMINSTERED, INSADM: 1.5 UNITS/HOUR
INSULIN ADMINSTERED, INSADM: 1.6 UNITS/HOUR
INSULIN ADMINSTERED, INSADM: 1.7 UNITS/HOUR
INSULIN ADMINSTERED, INSADM: 1.8 UNITS/HOUR
INSULIN ADMINSTERED, INSADM: 1.9 UNITS/HOUR
INSULIN ADMINSTERED, INSADM: 10 UNITS/HOUR
INSULIN ADMINSTERED, INSADM: 10 UNITS/HOUR
INSULIN ADMINSTERED, INSADM: 10.1 UNITS/HOUR
INSULIN ADMINSTERED, INSADM: 10.1 UNITS/HOUR
INSULIN ADMINSTERED, INSADM: 10.2 UNITS/HOUR
INSULIN ADMINSTERED, INSADM: 10.2 UNITS/HOUR
INSULIN ADMINSTERED, INSADM: 10.8 UNITS/HOUR
INSULIN ADMINSTERED, INSADM: 10.8 UNITS/HOUR
INSULIN ADMINSTERED, INSADM: 11 UNITS/HOUR
INSULIN ADMINSTERED, INSADM: 11.1 UNITS/HOUR
INSULIN ADMINSTERED, INSADM: 11.2 UNITS/HOUR
INSULIN ADMINSTERED, INSADM: 11.3 UNITS/HOUR
INSULIN ADMINSTERED, INSADM: 11.4 UNITS/HOUR
INSULIN ADMINSTERED, INSADM: 11.7 UNITS/HOUR
INSULIN ADMINSTERED, INSADM: 12.1 UNITS/HOUR
INSULIN ADMINSTERED, INSADM: 12.1 UNITS/HOUR
INSULIN ADMINSTERED, INSADM: 12.3 UNITS/HOUR
INSULIN ADMINSTERED, INSADM: 12.4 UNITS/HOUR
INSULIN ADMINSTERED, INSADM: 12.9 UNITS/HOUR
INSULIN ADMINSTERED, INSADM: 13.4 UNITS/HOUR
INSULIN ADMINSTERED, INSADM: 14.4 UNITS/HOUR
INSULIN ADMINSTERED, INSADM: 2 UNITS/HOUR
INSULIN ADMINSTERED, INSADM: 2.1 UNITS/HOUR
INSULIN ADMINSTERED, INSADM: 2.2 UNITS/HOUR
INSULIN ADMINSTERED, INSADM: 2.3 UNITS/HOUR
INSULIN ADMINSTERED, INSADM: 2.4 UNITS/HOUR
INSULIN ADMINSTERED, INSADM: 2.5 UNITS/HOUR
INSULIN ADMINSTERED, INSADM: 2.6 UNITS/HOUR
INSULIN ADMINSTERED, INSADM: 2.7 UNITS/HOUR
INSULIN ADMINSTERED, INSADM: 2.8 UNITS/HOUR
INSULIN ADMINSTERED, INSADM: 2.9 UNITS/HOUR
INSULIN ADMINSTERED, INSADM: 22 UNITS/HOUR
INSULIN ADMINSTERED, INSADM: 3 UNITS/HOUR
INSULIN ADMINSTERED, INSADM: 3.1 UNITS/HOUR
INSULIN ADMINSTERED, INSADM: 3.2 UNITS/HOUR
INSULIN ADMINSTERED, INSADM: 3.3 UNITS/HOUR
INSULIN ADMINSTERED, INSADM: 3.4 UNITS/HOUR
INSULIN ADMINSTERED, INSADM: 3.5 UNITS/HOUR
INSULIN ADMINSTERED, INSADM: 3.6 UNITS/HOUR
INSULIN ADMINSTERED, INSADM: 3.7 UNITS/HOUR
INSULIN ADMINSTERED, INSADM: 3.7 UNITS/HOUR
INSULIN ADMINSTERED, INSADM: 3.8 UNITS/HOUR
INSULIN ADMINSTERED, INSADM: 3.8 UNITS/HOUR
INSULIN ADMINSTERED, INSADM: 3.9 UNITS/HOUR
INSULIN ADMINSTERED, INSADM: 4 UNITS/HOUR
INSULIN ADMINSTERED, INSADM: 4.1 UNITS/HOUR
INSULIN ADMINSTERED, INSADM: 4.2 UNITS/HOUR
INSULIN ADMINSTERED, INSADM: 4.3 UNITS/HOUR
INSULIN ADMINSTERED, INSADM: 4.4 UNITS/HOUR
INSULIN ADMINSTERED, INSADM: 4.5 UNITS/HOUR
INSULIN ADMINSTERED, INSADM: 4.6 UNITS/HOUR
INSULIN ADMINSTERED, INSADM: 4.6 UNITS/HOUR
INSULIN ADMINSTERED, INSADM: 4.7 UNITS/HOUR
INSULIN ADMINSTERED, INSADM: 4.8 UNITS/HOUR
INSULIN ADMINSTERED, INSADM: 4.9 UNITS/HOUR
INSULIN ADMINSTERED, INSADM: 5 UNITS/HOUR
INSULIN ADMINSTERED, INSADM: 5.1 UNITS/HOUR
INSULIN ADMINSTERED, INSADM: 5.1 UNITS/HOUR
INSULIN ADMINSTERED, INSADM: 5.2 UNITS/HOUR
INSULIN ADMINSTERED, INSADM: 5.3 UNITS/HOUR
INSULIN ADMINSTERED, INSADM: 5.4 UNITS/HOUR
INSULIN ADMINSTERED, INSADM: 5.5 UNITS/HOUR
INSULIN ADMINSTERED, INSADM: 5.6 UNITS/HOUR
INSULIN ADMINSTERED, INSADM: 5.7 UNITS/HOUR
INSULIN ADMINSTERED, INSADM: 5.8 UNITS/HOUR
INSULIN ADMINSTERED, INSADM: 5.9 UNITS/HOUR
INSULIN ADMINSTERED, INSADM: 6 UNITS/HOUR
INSULIN ADMINSTERED, INSADM: 6.1 UNITS/HOUR
INSULIN ADMINSTERED, INSADM: 6.3 UNITS/HOUR
INSULIN ADMINSTERED, INSADM: 6.4 UNITS/HOUR
INSULIN ADMINSTERED, INSADM: 6.5 UNITS/HOUR
INSULIN ADMINSTERED, INSADM: 6.5 UNITS/HOUR
INSULIN ADMINSTERED, INSADM: 6.6 UNITS/HOUR
INSULIN ADMINSTERED, INSADM: 6.7 UNITS/HOUR
INSULIN ADMINSTERED, INSADM: 6.8 UNITS/HOUR
INSULIN ADMINSTERED, INSADM: 6.9 UNITS/HOUR
INSULIN ADMINSTERED, INSADM: 7 UNITS/HOUR
INSULIN ADMINSTERED, INSADM: 7 UNITS/HOUR
INSULIN ADMINSTERED, INSADM: 7.1 UNITS/HOUR
INSULIN ADMINSTERED, INSADM: 7.2 UNITS/HOUR
INSULIN ADMINSTERED, INSADM: 7.3 UNITS/HOUR
INSULIN ADMINSTERED, INSADM: 7.3 UNITS/HOUR
INSULIN ADMINSTERED, INSADM: 7.5 UNITS/HOUR
INSULIN ADMINSTERED, INSADM: 7.6 UNITS/HOUR
INSULIN ADMINSTERED, INSADM: 7.6 UNITS/HOUR
INSULIN ADMINSTERED, INSADM: 7.7 UNITS/HOUR
INSULIN ADMINSTERED, INSADM: 8 UNITS/HOUR
INSULIN ADMINSTERED, INSADM: 8.1 UNITS/HOUR
INSULIN ADMINSTERED, INSADM: 8.1 UNITS/HOUR
INSULIN ADMINSTERED, INSADM: 8.2 UNITS/HOUR
INSULIN ADMINSTERED, INSADM: 8.3 UNITS/HOUR
INSULIN ADMINSTERED, INSADM: 8.3 UNITS/HOUR
INSULIN ADMINSTERED, INSADM: 8.7 UNITS/HOUR
INSULIN ADMINSTERED, INSADM: 8.7 UNITS/HOUR
INSULIN ADMINSTERED, INSADM: 8.9 UNITS/HOUR
INSULIN ADMINSTERED, INSADM: 9.1 UNITS/HOUR
INSULIN ADMINSTERED, INSADM: 9.2 UNITS/HOUR
INSULIN ADMINSTERED, INSADM: 9.4 UNITS/HOUR
INSULIN ADMINSTERED, INSADM: 9.8 UNITS/HOUR
INSULIN ORDER, INSORD: 0 UNITS/HOUR
INSULIN ORDER, INSORD: 0.1 UNITS/HOUR
INSULIN ORDER, INSORD: 0.1 UNITS/HOUR
INSULIN ORDER, INSORD: 0.2 UNITS/HOUR
INSULIN ORDER, INSORD: 0.3 UNITS/HOUR
INSULIN ORDER, INSORD: 0.4 UNITS/HOUR
INSULIN ORDER, INSORD: 0.4 UNITS/HOUR
INSULIN ORDER, INSORD: 0.5 UNITS/HOUR
INSULIN ORDER, INSORD: 0.6 UNITS/HOUR
INSULIN ORDER, INSORD: 0.7 UNITS/HOUR
INSULIN ORDER, INSORD: 0.8 UNITS/HOUR
INSULIN ORDER, INSORD: 0.9 UNITS/HOUR
INSULIN ORDER, INSORD: 1 UNITS/HOUR
INSULIN ORDER, INSORD: 1.1 UNITS/HOUR
INSULIN ORDER, INSORD: 1.2 UNITS/HOUR
INSULIN ORDER, INSORD: 1.3 UNITS/HOUR
INSULIN ORDER, INSORD: 1.4 UNITS/HOUR
INSULIN ORDER, INSORD: 1.5 UNITS/HOUR
INSULIN ORDER, INSORD: 1.6 UNITS/HOUR
INSULIN ORDER, INSORD: 1.7 UNITS/HOUR
INSULIN ORDER, INSORD: 1.8 UNITS/HOUR
INSULIN ORDER, INSORD: 1.9 UNITS/HOUR
INSULIN ORDER, INSORD: 10 UNITS/HOUR
INSULIN ORDER, INSORD: 10 UNITS/HOUR
INSULIN ORDER, INSORD: 10.1 UNITS/HOUR
INSULIN ORDER, INSORD: 10.1 UNITS/HOUR
INSULIN ORDER, INSORD: 10.2 UNITS/HOUR
INSULIN ORDER, INSORD: 10.2 UNITS/HOUR
INSULIN ORDER, INSORD: 10.8 UNITS/HOUR
INSULIN ORDER, INSORD: 10.8 UNITS/HOUR
INSULIN ORDER, INSORD: 11 UNITS/HOUR
INSULIN ORDER, INSORD: 11.1 UNITS/HOUR
INSULIN ORDER, INSORD: 11.2 UNITS/HOUR
INSULIN ORDER, INSORD: 11.3 UNITS/HOUR
INSULIN ORDER, INSORD: 11.4 UNITS/HOUR
INSULIN ORDER, INSORD: 11.7 UNITS/HOUR
INSULIN ORDER, INSORD: 12.1 UNITS/HOUR
INSULIN ORDER, INSORD: 12.1 UNITS/HOUR
INSULIN ORDER, INSORD: 12.3 UNITS/HOUR
INSULIN ORDER, INSORD: 12.4 UNITS/HOUR
INSULIN ORDER, INSORD: 12.9 UNITS/HOUR
INSULIN ORDER, INSORD: 13.4 UNITS/HOUR
INSULIN ORDER, INSORD: 14.4 UNITS/HOUR
INSULIN ORDER, INSORD: 2 UNITS/HOUR
INSULIN ORDER, INSORD: 2.1 UNITS/HOUR
INSULIN ORDER, INSORD: 2.2 UNITS/HOUR
INSULIN ORDER, INSORD: 2.3 UNITS/HOUR
INSULIN ORDER, INSORD: 2.4 UNITS/HOUR
INSULIN ORDER, INSORD: 2.5 UNITS/HOUR
INSULIN ORDER, INSORD: 2.6 UNITS/HOUR
INSULIN ORDER, INSORD: 2.7 UNITS/HOUR
INSULIN ORDER, INSORD: 2.8 UNITS/HOUR
INSULIN ORDER, INSORD: 2.9 UNITS/HOUR
INSULIN ORDER, INSORD: 22 UNITS/HOUR
INSULIN ORDER, INSORD: 3 UNITS/HOUR
INSULIN ORDER, INSORD: 3.1 UNITS/HOUR
INSULIN ORDER, INSORD: 3.2 UNITS/HOUR
INSULIN ORDER, INSORD: 3.3 UNITS/HOUR
INSULIN ORDER, INSORD: 3.4 UNITS/HOUR
INSULIN ORDER, INSORD: 3.5 UNITS/HOUR
INSULIN ORDER, INSORD: 3.6 UNITS/HOUR
INSULIN ORDER, INSORD: 3.7 UNITS/HOUR
INSULIN ORDER, INSORD: 3.7 UNITS/HOUR
INSULIN ORDER, INSORD: 3.8 UNITS/HOUR
INSULIN ORDER, INSORD: 3.8 UNITS/HOUR
INSULIN ORDER, INSORD: 3.9 UNITS/HOUR
INSULIN ORDER, INSORD: 4 UNITS/HOUR
INSULIN ORDER, INSORD: 4.1 UNITS/HOUR
INSULIN ORDER, INSORD: 4.2 UNITS/HOUR
INSULIN ORDER, INSORD: 4.3 UNITS/HOUR
INSULIN ORDER, INSORD: 4.4 UNITS/HOUR
INSULIN ORDER, INSORD: 4.5 UNITS/HOUR
INSULIN ORDER, INSORD: 4.6 UNITS/HOUR
INSULIN ORDER, INSORD: 4.6 UNITS/HOUR
INSULIN ORDER, INSORD: 4.7 UNITS/HOUR
INSULIN ORDER, INSORD: 4.8 UNITS/HOUR
INSULIN ORDER, INSORD: 4.9 UNITS/HOUR
INSULIN ORDER, INSORD: 5 UNITS/HOUR
INSULIN ORDER, INSORD: 5.1 UNITS/HOUR
INSULIN ORDER, INSORD: 5.1 UNITS/HOUR
INSULIN ORDER, INSORD: 5.2 UNITS/HOUR
INSULIN ORDER, INSORD: 5.3 UNITS/HOUR
INSULIN ORDER, INSORD: 5.4 UNITS/HOUR
INSULIN ORDER, INSORD: 5.5 UNITS/HOUR
INSULIN ORDER, INSORD: 5.6 UNITS/HOUR
INSULIN ORDER, INSORD: 5.7 UNITS/HOUR
INSULIN ORDER, INSORD: 5.8 UNITS/HOUR
INSULIN ORDER, INSORD: 5.9 UNITS/HOUR
INSULIN ORDER, INSORD: 6 UNITS/HOUR
INSULIN ORDER, INSORD: 6.1 UNITS/HOUR
INSULIN ORDER, INSORD: 6.3 UNITS/HOUR
INSULIN ORDER, INSORD: 6.4 UNITS/HOUR
INSULIN ORDER, INSORD: 6.5 UNITS/HOUR
INSULIN ORDER, INSORD: 6.5 UNITS/HOUR
INSULIN ORDER, INSORD: 6.6 UNITS/HOUR
INSULIN ORDER, INSORD: 6.7 UNITS/HOUR
INSULIN ORDER, INSORD: 6.8 UNITS/HOUR
INSULIN ORDER, INSORD: 6.9 UNITS/HOUR
INSULIN ORDER, INSORD: 7 UNITS/HOUR
INSULIN ORDER, INSORD: 7 UNITS/HOUR
INSULIN ORDER, INSORD: 7.1 UNITS/HOUR
INSULIN ORDER, INSORD: 7.2 UNITS/HOUR
INSULIN ORDER, INSORD: 7.3 UNITS/HOUR
INSULIN ORDER, INSORD: 7.3 UNITS/HOUR
INSULIN ORDER, INSORD: 7.5 UNITS/HOUR
INSULIN ORDER, INSORD: 7.6 UNITS/HOUR
INSULIN ORDER, INSORD: 7.6 UNITS/HOUR
INSULIN ORDER, INSORD: 7.7 UNITS/HOUR
INSULIN ORDER, INSORD: 8 UNITS/HOUR
INSULIN ORDER, INSORD: 8.1 UNITS/HOUR
INSULIN ORDER, INSORD: 8.1 UNITS/HOUR
INSULIN ORDER, INSORD: 8.2 UNITS/HOUR
INSULIN ORDER, INSORD: 8.3 UNITS/HOUR
INSULIN ORDER, INSORD: 8.3 UNITS/HOUR
INSULIN ORDER, INSORD: 8.7 UNITS/HOUR
INSULIN ORDER, INSORD: 8.7 UNITS/HOUR
INSULIN ORDER, INSORD: 8.9 UNITS/HOUR
INSULIN ORDER, INSORD: 9.1 UNITS/HOUR
INSULIN ORDER, INSORD: 9.2 UNITS/HOUR
INSULIN ORDER, INSORD: 9.4 UNITS/HOUR
INSULIN ORDER, INSORD: 9.8 UNITS/HOUR
IRON SATN MFR SERPL: 10 % (ref 20–50)
IRON SERPL-MCNC: 34 UG/DL (ref 35–150)
KETONES UR QL STRIP.AUTO: ABNORMAL MG/DL
KOH PREP SPEC: NORMAL
KOH PREP SPEC: NORMAL
L PNEUMO AG SPEC QL IF: NEGATIVE
LACTATE SERPL-SCNC: 1.4 MMOL/L (ref 0.4–2)
LDLC SERPL CALC-MCNC: 101.2 MG/DL (ref 0–100)
LEFT ABI: 0.95
LEFT ANTERIOR TIBIAL: 90 MMHG
LEFT ARM BP: 116 MMHG
LEFT CCA DIST DIAS: 12 CM/S
LEFT CCA DIST SYS: 103.3 CM/S
LEFT CCA PROX DIAS: 9.7 CM/S
LEFT CCA PROX SYS: 97.7 CM/S
LEFT ECA DIAS: 0 CM/S
LEFT ECA SYS: 110.7 CM/S
LEFT ICA DIST DIAS: 12.5 CM/S
LEFT ICA DIST SYS: 42.1 CM/S
LEFT ICA MID DIAS: 13.3 CM/S
LEFT ICA MID SYS: 49.8 CM/S
LEFT ICA PROX DIAS: 7.9 CM/S
LEFT ICA PROX SYS: 68.4 CM/S
LEFT ICA/CCA SYS: 0.66
LEFT POSTERIOR TIBIAL: 110 MMHG
LEFT VERTEBRAL DIAS: 14.14 CM/S
LEFT VERTEBRAL SYS: 46.1 CM/S
LEUKOCYTE ESTERASE UR QL STRIP.AUTO: NEGATIVE
LIPID PROFILE,FLP: ABNORMAL
LOW TARGET, LOT: 100 MG/DL
LOW TARGET, LOT: 95 MG/DL
LVFS 2D: 16.5 %
LYMPHOCYTES # BLD: 0.1 K/UL (ref 0.8–3.5)
LYMPHOCYTES # BLD: 0.1 K/UL (ref 0.8–3.5)
LYMPHOCYTES # BLD: 0.2 K/UL (ref 0.8–3.5)
LYMPHOCYTES # BLD: 0.3 K/UL (ref 0.8–3.5)
LYMPHOCYTES # BLD: 0.6 K/UL (ref 0.8–3.5)
LYMPHOCYTES # BLD: 1.3 K/UL (ref 0.8–3.5)
LYMPHOCYTES # BLD: 1.5 K/UL (ref 0.8–3.5)
LYMPHOCYTES NFR BLD: 1 % (ref 12–49)
LYMPHOCYTES NFR BLD: 18 % (ref 12–49)
LYMPHOCYTES NFR BLD: 2 % (ref 12–49)
LYMPHOCYTES NFR BLD: 23 % (ref 12–49)
LYMPHOCYTES NFR BLD: 4 % (ref 12–49)
LYMPHOCYTES NFR BLD: 7 % (ref 12–49)
LYMPHOCYTES NFR BLD: 9 % (ref 12–49)
LYMPHOCYTES NFR FLD: 2 %
MAGNESIUM SERPL-MCNC: 1.5 MG/DL (ref 1.6–2.4)
MAGNESIUM SERPL-MCNC: 1.6 MG/DL (ref 1.6–2.4)
MAGNESIUM SERPL-MCNC: 1.8 MG/DL (ref 1.6–2.4)
MAGNESIUM SERPL-MCNC: 1.8 MG/DL (ref 1.6–2.4)
MAGNESIUM SERPL-MCNC: 1.9 MG/DL (ref 1.6–2.4)
MAGNESIUM SERPL-MCNC: 2 MG/DL (ref 1.6–2.4)
MAGNESIUM SERPL-MCNC: 2.1 MG/DL (ref 1.6–2.4)
MAGNESIUM SERPL-MCNC: 2.2 MG/DL (ref 1.6–2.4)
MAGNESIUM SERPL-MCNC: 2.4 MG/DL (ref 1.6–2.4)
MAGNESIUM SERPL-MCNC: 2.5 MG/DL (ref 1.6–2.4)
MAGNESIUM SERPL-MCNC: 2.5 MG/DL (ref 1.6–2.4)
MAGNESIUM SERPL-MCNC: 2.6 MG/DL (ref 1.6–2.4)
MAGNESIUM SERPL-MCNC: 2.7 MG/DL (ref 1.6–2.4)
MAGNESIUM SERPL-MCNC: 2.8 MG/DL (ref 1.6–2.4)
MAGNESIUM SERPL-MCNC: 2.8 MG/DL (ref 1.6–2.4)
MAGNESIUM SERPL-MCNC: 2.9 MG/DL (ref 1.6–2.4)
MAGNESIUM SERPL-MCNC: 2.9 MG/DL (ref 1.6–2.4)
MAGNESIUM SERPL-MCNC: 3 MG/DL (ref 1.6–2.4)
MAGNESIUM SERPL-MCNC: 3 MG/DL (ref 1.6–2.4)
MAGNESIUM SERPL-MCNC: 3.1 MG/DL (ref 1.6–2.4)
MAGNESIUM SERPL-MCNC: 3.2 MG/DL (ref 1.6–2.4)
MAGNESIUM SERPL-MCNC: 3.4 MG/DL (ref 1.6–2.4)
MAGNESIUM SERPL-MCNC: 3.4 MG/DL (ref 1.6–2.4)
MAGNESIUM SERPL-MCNC: 3.5 MG/DL (ref 1.6–2.4)
MAGNESIUM SERPL-MCNC: 3.5 MG/DL (ref 1.6–2.4)
MAGNESIUM SERPL-MCNC: NORMAL MG/DL (ref 1.6–2.4)
MCH RBC QN AUTO: 29.9 PG (ref 26–34)
MCH RBC QN AUTO: 30 PG (ref 26–34)
MCH RBC QN AUTO: 30 PG (ref 26–34)
MCH RBC QN AUTO: 30.1 PG (ref 26–34)
MCH RBC QN AUTO: 30.2 PG (ref 26–34)
MCH RBC QN AUTO: 30.2 PG (ref 26–34)
MCH RBC QN AUTO: 30.3 PG (ref 26–34)
MCH RBC QN AUTO: 30.3 PG (ref 26–34)
MCH RBC QN AUTO: 30.4 PG (ref 26–34)
MCH RBC QN AUTO: 30.6 PG (ref 26–34)
MCH RBC QN AUTO: 30.6 PG (ref 26–34)
MCH RBC QN AUTO: 30.7 PG (ref 26–34)
MCH RBC QN AUTO: 30.8 PG (ref 26–34)
MCH RBC QN AUTO: 30.9 PG (ref 26–34)
MCH RBC QN AUTO: 31 PG (ref 26–34)
MCH RBC QN AUTO: 31.1 PG (ref 26–34)
MCH RBC QN AUTO: 31.2 PG (ref 26–34)
MCH RBC QN AUTO: 31.3 PG (ref 26–34)
MCH RBC QN AUTO: 31.3 PG (ref 26–34)
MCH RBC QN AUTO: 31.4 PG (ref 26–34)
MCH RBC QN AUTO: 31.5 PG (ref 26–34)
MCH RBC QN AUTO: 31.6 PG (ref 26–34)
MCH RBC QN AUTO: 31.7 PG (ref 26–34)
MCH RBC QN AUTO: 31.8 PG (ref 26–34)
MCH RBC QN AUTO: 31.8 PG (ref 26–34)
MCH RBC QN AUTO: 32 PG (ref 26–34)
MCH RBC QN AUTO: 32.1 PG (ref 26–34)
MCH RBC QN AUTO: 32.3 PG (ref 26–34)
MCH RBC QN AUTO: 32.3 PG (ref 26–34)
MCH RBC QN AUTO: 32.6 PG (ref 26–34)
MCH RBC QN AUTO: 33.3 PG (ref 26–34)
MCH RBC QN AUTO: 33.5 PG (ref 26–34)
MCH RBC QN AUTO: 33.7 PG (ref 26–34)
MCH RBC QN AUTO: 33.9 PG (ref 26–34)
MCH RBC QN AUTO: 34.1 PG (ref 26–34)
MCH RBC QN AUTO: 34.1 PG (ref 26–34)
MCHC RBC AUTO-ENTMCNC: 28.5 G/DL (ref 30–36.5)
MCHC RBC AUTO-ENTMCNC: 29 G/DL (ref 30–36.5)
MCHC RBC AUTO-ENTMCNC: 29.3 G/DL (ref 30–36.5)
MCHC RBC AUTO-ENTMCNC: 30.2 G/DL (ref 30–36.5)
MCHC RBC AUTO-ENTMCNC: 30.3 G/DL (ref 30–36.5)
MCHC RBC AUTO-ENTMCNC: 30.4 G/DL (ref 30–36.5)
MCHC RBC AUTO-ENTMCNC: 30.6 G/DL (ref 30–36.5)
MCHC RBC AUTO-ENTMCNC: 30.8 G/DL (ref 30–36.5)
MCHC RBC AUTO-ENTMCNC: 30.9 G/DL (ref 30–36.5)
MCHC RBC AUTO-ENTMCNC: 31 G/DL (ref 30–36.5)
MCHC RBC AUTO-ENTMCNC: 31.1 G/DL (ref 30–36.5)
MCHC RBC AUTO-ENTMCNC: 31.3 G/DL (ref 30–36.5)
MCHC RBC AUTO-ENTMCNC: 31.5 G/DL (ref 30–36.5)
MCHC RBC AUTO-ENTMCNC: 31.6 G/DL (ref 30–36.5)
MCHC RBC AUTO-ENTMCNC: 31.7 G/DL (ref 30–36.5)
MCHC RBC AUTO-ENTMCNC: 31.7 G/DL (ref 30–36.5)
MCHC RBC AUTO-ENTMCNC: 31.8 G/DL (ref 30–36.5)
MCHC RBC AUTO-ENTMCNC: 31.8 G/DL (ref 30–36.5)
MCHC RBC AUTO-ENTMCNC: 32.1 G/DL (ref 30–36.5)
MCHC RBC AUTO-ENTMCNC: 32.1 G/DL (ref 30–36.5)
MCHC RBC AUTO-ENTMCNC: 32.2 G/DL (ref 30–36.5)
MCHC RBC AUTO-ENTMCNC: 32.5 G/DL (ref 30–36.5)
MCHC RBC AUTO-ENTMCNC: 32.6 G/DL (ref 30–36.5)
MCHC RBC AUTO-ENTMCNC: 32.7 G/DL (ref 30–36.5)
MCHC RBC AUTO-ENTMCNC: 32.7 G/DL (ref 30–36.5)
MCHC RBC AUTO-ENTMCNC: 32.8 G/DL (ref 30–36.5)
MCHC RBC AUTO-ENTMCNC: 33 G/DL (ref 30–36.5)
MCHC RBC AUTO-ENTMCNC: 33.1 G/DL (ref 30–36.5)
MCHC RBC AUTO-ENTMCNC: 33.2 G/DL (ref 30–36.5)
MCHC RBC AUTO-ENTMCNC: 33.3 G/DL (ref 30–36.5)
MCHC RBC AUTO-ENTMCNC: 33.3 G/DL (ref 30–36.5)
MCHC RBC AUTO-ENTMCNC: 33.5 G/DL (ref 30–36.5)
MCHC RBC AUTO-ENTMCNC: 33.6 G/DL (ref 30–36.5)
MCHC RBC AUTO-ENTMCNC: 33.7 G/DL (ref 30–36.5)
MCHC RBC AUTO-ENTMCNC: 33.8 G/DL (ref 30–36.5)
MCHC RBC AUTO-ENTMCNC: 33.9 G/DL (ref 30–36.5)
MCHC RBC AUTO-ENTMCNC: 34 G/DL (ref 30–36.5)
MCHC RBC AUTO-ENTMCNC: 34.4 G/DL (ref 30–36.5)
MCHC RBC AUTO-ENTMCNC: 34.7 G/DL (ref 30–36.5)
MCV RBC AUTO: 100 FL (ref 80–99)
MCV RBC AUTO: 100 FL (ref 80–99)
MCV RBC AUTO: 100.5 FL (ref 80–99)
MCV RBC AUTO: 100.7 FL (ref 80–99)
MCV RBC AUTO: 101.4 FL (ref 80–99)
MCV RBC AUTO: 101.7 FL (ref 80–99)
MCV RBC AUTO: 102.3 FL (ref 80–99)
MCV RBC AUTO: 102.5 FL (ref 80–99)
MCV RBC AUTO: 102.6 FL (ref 80–99)
MCV RBC AUTO: 102.7 FL (ref 80–99)
MCV RBC AUTO: 103.6 FL (ref 80–99)
MCV RBC AUTO: 106.2 FL (ref 80–99)
MCV RBC AUTO: 106.5 FL (ref 80–99)
MCV RBC AUTO: 110.1 FL (ref 80–99)
MCV RBC AUTO: 92.2 FL (ref 80–99)
MCV RBC AUTO: 93.1 FL (ref 80–99)
MCV RBC AUTO: 93.6 FL (ref 80–99)
MCV RBC AUTO: 93.8 FL (ref 80–99)
MCV RBC AUTO: 93.9 FL (ref 80–99)
MCV RBC AUTO: 94.2 FL (ref 80–99)
MCV RBC AUTO: 94.9 FL (ref 80–99)
MCV RBC AUTO: 94.9 FL (ref 80–99)
MCV RBC AUTO: 95 FL (ref 80–99)
MCV RBC AUTO: 95.2 FL (ref 80–99)
MCV RBC AUTO: 95.4 FL (ref 80–99)
MCV RBC AUTO: 95.5 FL (ref 80–99)
MCV RBC AUTO: 95.9 FL (ref 80–99)
MCV RBC AUTO: 96.2 FL (ref 80–99)
MCV RBC AUTO: 96.4 FL (ref 80–99)
MCV RBC AUTO: 96.6 FL (ref 80–99)
MCV RBC AUTO: 96.6 FL (ref 80–99)
MCV RBC AUTO: 96.7 FL (ref 80–99)
MCV RBC AUTO: 97 FL (ref 80–99)
MCV RBC AUTO: 97.1 FL (ref 80–99)
MCV RBC AUTO: 97.3 FL (ref 80–99)
MCV RBC AUTO: 97.3 FL (ref 80–99)
MCV RBC AUTO: 97.4 FL (ref 80–99)
MCV RBC AUTO: 97.6 FL (ref 80–99)
MCV RBC AUTO: 97.9 FL (ref 80–99)
MCV RBC AUTO: 98 FL (ref 80–99)
MCV RBC AUTO: 98.4 FL (ref 80–99)
MCV RBC AUTO: 98.4 FL (ref 80–99)
MCV RBC AUTO: 99.5 FL (ref 80–99)
MCV RBC AUTO: 99.7 FL (ref 80–99)
MCV RBC AUTO: 99.7 FL (ref 80–99)
METAMYELOCYTES NFR BLD MANUAL: 2 %
METAMYELOCYTES NFR BLD MANUAL: 6 %
MICROALBUMIN UR-MCNC: 5.68 MG/DL
MICROALBUMIN/CREAT UR-RTO: 169 MG/G (ref 0–30)
MINUTES UNTIL NEXT BG, NBG: 120 MIN
MINUTES UNTIL NEXT BG, NBG: 15 MIN
MINUTES UNTIL NEXT BG, NBG: 60 MIN
MONOCYTES # BLD: 0.1 K/UL (ref 0–1)
MONOCYTES # BLD: 0.1 K/UL (ref 0–1)
MONOCYTES # BLD: 0.4 K/UL (ref 0–1)
MONOCYTES # BLD: 0.5 K/UL (ref 0–1)
MONOCYTES # BLD: 0.6 K/UL (ref 0–1)
MONOCYTES # BLD: 0.7 K/UL (ref 0–1)
MONOCYTES # BLD: 0.7 K/UL (ref 0–1)
MONOCYTES NFR BLD: 10 % (ref 5–13)
MONOCYTES NFR BLD: 10 % (ref 5–13)
MONOCYTES NFR BLD: 3 % (ref 5–13)
MONOCYTES NFR BLD: 4 % (ref 5–13)
MONOCYTES NFR BLD: 4 % (ref 5–13)
MONOCYTES NFR BLD: 5 % (ref 5–13)
MONOCYTES NFR BLD: 6 % (ref 5–13)
MONOS+MACROS NFR FLD: 18 %
MULTIPLIER, MUL: 0
MULTIPLIER, MUL: 0.01
MULTIPLIER, MUL: 0.02
MULTIPLIER, MUL: 0.03
MULTIPLIER, MUL: 0.04
MULTIPLIER, MUL: 0.05
MULTIPLIER, MUL: 0.06
MULTIPLIER, MUL: 0.07
MULTIPLIER, MUL: 0.08
MULTIPLIER, MUL: 0.09
MULTIPLIER, MUL: 0.1
MULTIPLIER, MUL: 0.11
MULTIPLIER, MUL: 0.12
MULTIPLIER, MUL: 0.13
MULTIPLIER, MUL: 0.14
MULTIPLIER, MUL: 0.14
MV DEC SLOPE: 3.28
MYELOCYTES NFR BLD MANUAL: 1 %
NEUTROPHILS NFR FLD: 80 %
NEUTS BAND NFR BLD MANUAL: 1 % (ref 0–6)
NEUTS BAND NFR BLD MANUAL: 12 % (ref 0–6)
NEUTS BAND NFR BLD MANUAL: 8 % (ref 0–6)
NEUTS SEG # BLD: 1.3 K/UL (ref 1.8–8)
NEUTS SEG # BLD: 12.9 K/UL (ref 1.8–8)
NEUTS SEG # BLD: 22.4 K/UL (ref 1.8–8)
NEUTS SEG # BLD: 3 K/UL (ref 1.8–8)
NEUTS SEG # BLD: 3.8 K/UL (ref 1.8–8)
NEUTS SEG # BLD: 5.1 K/UL (ref 1.8–8)
NEUTS SEG # BLD: 5.7 K/UL (ref 1.8–8)
NEUTS SEG NFR BLD: 60 % (ref 32–75)
NEUTS SEG NFR BLD: 66 % (ref 32–75)
NEUTS SEG NFR BLD: 74 % (ref 32–75)
NEUTS SEG NFR BLD: 78 % (ref 32–75)
NEUTS SEG NFR BLD: 84 % (ref 32–75)
NEUTS SEG NFR BLD: 93 % (ref 32–75)
NEUTS SEG NFR BLD: 95 % (ref 32–75)
NITRITE UR QL STRIP.AUTO: NEGATIVE
NRBC # BLD: 0 K/UL (ref 0–0.01)
NRBC # BLD: 0.02 K/UL (ref 0–0.01)
NRBC # BLD: 0.03 K/UL (ref 0–0.01)
NRBC # BLD: 0.04 K/UL (ref 0–0.01)
NRBC BLD-RTO: 0 PER 100 WBC
NRBC BLD-RTO: 0.1 PER 100 WBC
NRBC BLD-RTO: 0.2 PER 100 WBC
NRBC BLD-RTO: 0.3 PER 100 WBC
NRBC BLD-RTO: 1.2 PER 100 WBC
NRBC BLD-RTO: 1.6 PER 100 WBC
NUC CELL # FLD: 49 /CU MM
O2/TOTAL GAS SETTING VFR VENT: 0.7 %
O2/TOTAL GAS SETTING VFR VENT: 0.8 %
O2/TOTAL GAS SETTING VFR VENT: 1 %
O2/TOTAL GAS SETTING VFR VENT: 100 %
O2/TOTAL GAS SETTING VFR VENT: 21 %
O2/TOTAL GAS SETTING VFR VENT: 40 %
O2/TOTAL GAS SETTING VFR VENT: 50 %
O2/TOTAL GAS SETTING VFR VENT: 60 %
O2/TOTAL GAS SETTING VFR VENT: 70 %
O2/TOTAL GAS SETTING VFR VENT: 80 %
O2/TOTAL GAS SETTING VFR VENT: 85 %
O2/TOTAL GAS SETTING VFR VENT: 90 %
ORDER INITIALS, ORDINIT: NORMAL
OSMOLALITY SERPL: 368 MOSM/KG H2O
OSMOLALITY UR: 669 MOSM/KG H2O
P-R INTERVAL, ECG05: 158 MS
P-R INTERVAL, ECG05: 160 MS
P-R INTERVAL, ECG05: 166 MS
P-R INTERVAL, ECG05: 170 MS
P-R INTERVAL, ECG05: 174 MS
PCO2 BLD: 28.6 MMHG (ref 35–45)
PCO2 BLD: 28.8 MMHG (ref 35–45)
PCO2 BLD: 30.1 MMHG (ref 35–45)
PCO2 BLD: 31.3 MMHG (ref 35–45)
PCO2 BLD: 32.4 MMHG (ref 35–45)
PCO2 BLD: 32.7 MMHG (ref 35–45)
PCO2 BLD: 35.1 MMHG (ref 35–45)
PCO2 BLD: 36.3 MMHG (ref 35–45)
PCO2 BLD: 36.7 MMHG (ref 35–45)
PCO2 BLD: 36.8 MMHG (ref 35–45)
PCO2 BLD: 36.9 MMHG (ref 35–45)
PCO2 BLD: 38.1 MMHG (ref 35–45)
PCO2 BLD: 38.2 MMHG (ref 35–45)
PCO2 BLD: 38.4 MMHG (ref 35–45)
PCO2 BLD: 38.6 MMHG (ref 35–45)
PCO2 BLD: 39.2 MMHG (ref 35–45)
PCO2 BLD: 40.9 MMHG (ref 35–45)
PCO2 BLD: 43.9 MMHG (ref 35–45)
PCO2 BLD: 45.2 MMHG (ref 35–45)
PCO2 BLD: 47 MMHG (ref 35–45)
PCO2 BLD: 47.6 MMHG (ref 35–45)
PCO2 BLD: 48.6 MMHG (ref 35–45)
PCO2 BLD: 49.1 MMHG (ref 35–45)
PCO2 BLD: 49.8 MMHG (ref 35–45)
PCO2 BLD: 53.1 MMHG (ref 35–45)
PCO2 BLD: 53.7 MMHG (ref 35–45)
PCO2 BLD: 53.7 MMHG (ref 35–45)
PCO2 BLD: 54.5 MMHG (ref 35–45)
PCO2 BLD: 59.8 MMHG (ref 35–45)
PCO2 BLD: 67.8 MMHG (ref 35–45)
PCO2 BLD: 77.4 MMHG (ref 35–45)
PCO2 BLD: >90 MMHG (ref 35–45)
PCO2 BLDV: 30.2 MMHG (ref 41–51)
PCO2 BLDV: 30.3 MMHG (ref 41–51)
PCO2 BLDV: 33 MMHG (ref 41–51)
PCO2 BLDV: 34.6 MMHG (ref 41–51)
PCO2 BLDV: 35.3 MMHG (ref 41–51)
PCO2 BLDV: 36.4 MMHG (ref 41–51)
PCO2 BLDV: 38.5 MMHG (ref 41–51)
PCO2 BLDV: 40.8 MMHG (ref 41–51)
PCO2 BLDV: 41.3 MMHG (ref 41–51)
PCO2 BLDV: 41.7 MMHG (ref 41–51)
PCO2 BLDV: 43.5 MMHG (ref 41–51)
PCO2 BLDV: >90 MMHG (ref 41–51)
PEEP RESPIRATORY: 5 CMH2O
PEEP RESPIRATORY: 7 CMH2O
PF4 HEPARIN CMPLX AB SER-ACNC: 0.19 OD (ref 0–0.4)
PH BLD: 7.11 [PH] (ref 7.35–7.45)
PH BLD: 7.3 [PH] (ref 7.35–7.45)
PH BLD: 7.31 [PH] (ref 7.35–7.45)
PH BLD: 7.31 [PH] (ref 7.35–7.45)
PH BLD: 7.33 [PH] (ref 7.35–7.45)
PH BLD: 7.34 [PH] (ref 7.35–7.45)
PH BLD: 7.34 [PH] (ref 7.35–7.45)
PH BLD: 7.37 [PH] (ref 7.35–7.45)
PH BLD: 7.38 [PH] (ref 7.35–7.45)
PH BLD: 7.38 [PH] (ref 7.35–7.45)
PH BLD: 7.4 [PH] (ref 7.35–7.45)
PH BLD: 7.41 [PH] (ref 7.35–7.45)
PH BLD: 7.42 [PH] (ref 7.35–7.45)
PH BLD: 7.43 [PH] (ref 7.35–7.45)
PH BLD: 7.44 [PH] (ref 7.35–7.45)
PH BLD: 7.45 [PH] (ref 7.35–7.45)
PH BLD: 7.45 [PH] (ref 7.35–7.45)
PH BLD: 7.46 [PH] (ref 7.35–7.45)
PH BLD: 7.47 [PH] (ref 7.35–7.45)
PH BLD: 7.47 [PH] (ref 7.35–7.45)
PH BLD: 7.49 [PH] (ref 7.35–7.45)
PH BLD: 7.5 [PH] (ref 7.35–7.45)
PH BLD: 7.59 [PH] (ref 7.35–7.45)
PH BLDV: 7.09 [PH] (ref 7.32–7.42)
PH BLDV: 7.29 [PH] (ref 7.32–7.42)
PH BLDV: 7.3 [PH] (ref 7.32–7.42)
PH BLDV: 7.3 [PH] (ref 7.32–7.42)
PH BLDV: 7.32 [PH] (ref 7.32–7.42)
PH BLDV: 7.32 [PH] (ref 7.32–7.42)
PH BLDV: 7.37 [PH] (ref 7.32–7.42)
PH BLDV: 7.39 [PH] (ref 7.32–7.42)
PH BLDV: 7.4 [PH] (ref 7.32–7.42)
PH BLDV: 7.41 [PH] (ref 7.32–7.42)
PH BLDV: 7.43 [PH] (ref 7.32–7.42)
PH BLDV: 7.45 [PH] (ref 7.32–7.42)
PH UR STRIP: 5.5 [PH] (ref 5–8)
PHOSPHATE SERPL-MCNC: 2.6 MG/DL (ref 2.6–4.7)
PHOSPHATE SERPL-MCNC: 2.7 MG/DL (ref 2.6–4.7)
PHOSPHATE SERPL-MCNC: 2.9 MG/DL (ref 2.6–4.7)
PHOSPHATE SERPL-MCNC: 3 MG/DL (ref 2.6–4.7)
PHOSPHATE SERPL-MCNC: 3.7 MG/DL (ref 2.6–4.7)
PHOSPHATE SERPL-MCNC: 3.8 MG/DL (ref 2.6–4.7)
PHOSPHATE SERPL-MCNC: 4 MG/DL (ref 2.6–4.7)
PHOSPHATE SERPL-MCNC: 4.2 MG/DL (ref 2.6–4.7)
PHOSPHATE SERPL-MCNC: 4.6 MG/DL (ref 2.6–4.7)
PHOSPHATE SERPL-MCNC: 4.8 MG/DL (ref 2.6–4.7)
PHOSPHATE SERPL-MCNC: 5.9 MG/DL (ref 2.6–4.7)
PIP ISTAT,IPIP: 10
PIP ISTAT,IPIP: 12
PIP ISTAT,IPIP: 12
PIP ISTAT,IPIP: 13
PIP ISTAT,IPIP: 15
PIP ISTAT,IPIP: 15
PIP ISTAT,IPIP: 16
PIP ISTAT,IPIP: 18
PIP ISTAT,IPIP: 22
PIP ISTAT,IPIP: 24
PIP ISTAT,IPIP: 25
PISA AR MAX VEL: 307.29 CM/S
PLATELET # BLD AUTO: 110 K/UL (ref 150–400)
PLATELET # BLD AUTO: 122 K/UL (ref 150–400)
PLATELET # BLD AUTO: 123 K/UL (ref 150–400)
PLATELET # BLD AUTO: 131 K/UL (ref 150–400)
PLATELET # BLD AUTO: 131 K/UL (ref 150–400)
PLATELET # BLD AUTO: 142 K/UL (ref 150–400)
PLATELET # BLD AUTO: 144 K/UL (ref 150–400)
PLATELET # BLD AUTO: 145 K/UL (ref 150–400)
PLATELET # BLD AUTO: 151 K/UL (ref 150–400)
PLATELET # BLD AUTO: 154 K/UL (ref 150–400)
PLATELET # BLD AUTO: 180 K/UL (ref 150–400)
PLATELET # BLD AUTO: 183 K/UL (ref 150–400)
PLATELET # BLD AUTO: 199 K/UL (ref 150–400)
PLATELET # BLD AUTO: 210 K/UL (ref 150–400)
PLATELET # BLD AUTO: 219 K/UL (ref 150–400)
PLATELET # BLD AUTO: 228 K/UL (ref 150–400)
PLATELET # BLD AUTO: 253 K/UL (ref 150–400)
PLATELET # BLD AUTO: 290 K/UL (ref 150–400)
PLATELET # BLD AUTO: 301 K/UL (ref 150–400)
PLATELET # BLD AUTO: 301 K/UL (ref 150–400)
PLATELET # BLD AUTO: 306 K/UL (ref 150–400)
PLATELET # BLD AUTO: 317 K/UL (ref 150–400)
PLATELET # BLD AUTO: 331 K/UL (ref 150–400)
PLATELET # BLD AUTO: 332 K/UL (ref 150–400)
PLATELET # BLD AUTO: 36 K/UL (ref 150–400)
PLATELET # BLD AUTO: 38 K/UL (ref 150–400)
PLATELET # BLD AUTO: 39 K/UL (ref 150–400)
PLATELET # BLD AUTO: 40 K/UL (ref 150–400)
PLATELET # BLD AUTO: 42 K/UL (ref 150–400)
PLATELET # BLD AUTO: 44 K/UL (ref 150–400)
PLATELET # BLD AUTO: 46 K/UL (ref 150–400)
PLATELET # BLD AUTO: 46 K/UL (ref 150–400)
PLATELET # BLD AUTO: 47 K/UL (ref 150–400)
PLATELET # BLD AUTO: 48 K/UL (ref 150–400)
PLATELET # BLD AUTO: 49 K/UL (ref 150–400)
PLATELET # BLD AUTO: 50 K/UL (ref 150–400)
PLATELET # BLD AUTO: 51 K/UL (ref 150–400)
PLATELET # BLD AUTO: 52 K/UL (ref 150–400)
PLATELET # BLD AUTO: 53 K/UL (ref 150–400)
PLATELET # BLD AUTO: 54 K/UL (ref 150–400)
PLATELET # BLD AUTO: 55 K/UL (ref 150–400)
PLATELET # BLD AUTO: 56 K/UL (ref 150–400)
PLATELET # BLD AUTO: 57 K/UL (ref 150–400)
PLATELET # BLD AUTO: 58 K/UL (ref 150–400)
PLATELET # BLD AUTO: 61 K/UL (ref 150–400)
PLATELET # BLD AUTO: 75 K/UL (ref 150–400)
PLATELET # BLD AUTO: 79 K/UL (ref 150–400)
PLATELET # BLD AUTO: 80 K/UL (ref 150–400)
PLATELET # BLD AUTO: 83 K/UL (ref 150–400)
PLATELET # BLD AUTO: 84 K/UL (ref 150–400)
PLATELET # BLD AUTO: 84 K/UL (ref 150–400)
PLATELET # BLD AUTO: 94 K/UL (ref 150–400)
PLATELET COMMENTS,PCOM: ABNORMAL
PLATELET COMMENTS,PCOM: ABNORMAL
PMV BLD AUTO: 10 FL (ref 8.9–12.9)
PMV BLD AUTO: 10.1 FL (ref 8.9–12.9)
PMV BLD AUTO: 10.1 FL (ref 8.9–12.9)
PMV BLD AUTO: 10.3 FL (ref 8.9–12.9)
PMV BLD AUTO: 10.4 FL (ref 8.9–12.9)
PMV BLD AUTO: 10.5 FL (ref 8.9–12.9)
PMV BLD AUTO: 10.9 FL (ref 8.9–12.9)
PMV BLD AUTO: 11.1 FL (ref 8.9–12.9)
PMV BLD AUTO: 11.2 FL (ref 8.9–12.9)
PMV BLD AUTO: 11.2 FL (ref 8.9–12.9)
PMV BLD AUTO: 11.4 FL (ref 8.9–12.9)
PMV BLD AUTO: 11.4 FL (ref 8.9–12.9)
PMV BLD AUTO: 11.5 FL (ref 8.9–12.9)
PMV BLD AUTO: 11.6 FL (ref 8.9–12.9)
PMV BLD AUTO: 11.7 FL (ref 8.9–12.9)
PMV BLD AUTO: 11.8 FL (ref 8.9–12.9)
PMV BLD AUTO: 12 FL (ref 8.9–12.9)
PMV BLD AUTO: 12.1 FL (ref 8.9–12.9)
PMV BLD AUTO: 12.5 FL (ref 8.9–12.9)
PMV BLD AUTO: 12.8 FL (ref 8.9–12.9)
PMV BLD AUTO: 12.8 FL (ref 8.9–12.9)
PO2 BLD: 124 MMHG (ref 80–100)
PO2 BLD: 141 MMHG (ref 80–100)
PO2 BLD: 144 MMHG (ref 80–100)
PO2 BLD: 168 MMHG (ref 80–100)
PO2 BLD: 174 MMHG (ref 80–100)
PO2 BLD: 181 MMHG (ref 80–100)
PO2 BLD: 183 MMHG (ref 80–100)
PO2 BLD: 195 MMHG (ref 80–100)
PO2 BLD: 198 MMHG (ref 80–100)
PO2 BLD: 220 MMHG (ref 80–100)
PO2 BLD: 224 MMHG (ref 80–100)
PO2 BLD: 226 MMHG (ref 80–100)
PO2 BLD: 52 MMHG (ref 80–100)
PO2 BLD: 55 MMHG (ref 80–100)
PO2 BLD: 58 MMHG (ref 80–100)
PO2 BLD: 59 MMHG (ref 80–100)
PO2 BLD: 62 MMHG (ref 80–100)
PO2 BLD: 64 MMHG (ref 80–100)
PO2 BLD: 66 MMHG (ref 80–100)
PO2 BLD: 67 MMHG (ref 80–100)
PO2 BLD: 67 MMHG (ref 80–100)
PO2 BLD: 70 MMHG (ref 80–100)
PO2 BLD: 70 MMHG (ref 80–100)
PO2 BLD: 73 MMHG (ref 80–100)
PO2 BLD: 75 MMHG (ref 80–100)
PO2 BLD: 76 MMHG (ref 80–100)
PO2 BLD: 78 MMHG (ref 80–100)
PO2 BLD: 78 MMHG (ref 80–100)
PO2 BLD: 85 MMHG (ref 80–100)
PO2 BLD: 89 MMHG (ref 80–100)
PO2 BLD: 93 MMHG (ref 80–100)
PO2 BLDV: 103 MMHG (ref 25–40)
PO2 BLDV: 24 MMHG (ref 25–40)
PO2 BLDV: 26 MMHG (ref 25–40)
PO2 BLDV: 27 MMHG (ref 25–40)
PO2 BLDV: 28 MMHG (ref 25–40)
PO2 BLDV: 28 MMHG (ref 25–40)
PO2 BLDV: 30 MMHG (ref 25–40)
PO2 BLDV: 32 MMHG (ref 25–40)
PO2 BLDV: 32 MMHG (ref 25–40)
PO2 BLDV: 35 MMHG (ref 25–40)
PO2 BLDV: 38 MMHG (ref 25–40)
POTASSIUM SERPL-SCNC: 3.3 MMOL/L (ref 3.5–5.1)
POTASSIUM SERPL-SCNC: 3.4 MMOL/L (ref 3.5–5.1)
POTASSIUM SERPL-SCNC: 3.5 MMOL/L (ref 3.5–5.1)
POTASSIUM SERPL-SCNC: 3.6 MMOL/L (ref 3.5–5.1)
POTASSIUM SERPL-SCNC: 3.7 MMOL/L (ref 3.5–5.1)
POTASSIUM SERPL-SCNC: 3.8 MMOL/L (ref 3.5–5.1)
POTASSIUM SERPL-SCNC: 3.8 MMOL/L (ref 3.5–5.1)
POTASSIUM SERPL-SCNC: 3.9 MMOL/L (ref 3.5–5.1)
POTASSIUM SERPL-SCNC: 4 MMOL/L (ref 3.5–5.1)
POTASSIUM SERPL-SCNC: 4 MMOL/L (ref 3.5–5.1)
POTASSIUM SERPL-SCNC: 4.1 MMOL/L (ref 3.5–5.1)
POTASSIUM SERPL-SCNC: 4.2 MMOL/L (ref 3.5–5.1)
POTASSIUM SERPL-SCNC: 4.3 MMOL/L (ref 3.5–5.1)
POTASSIUM SERPL-SCNC: 4.4 MMOL/L (ref 3.5–5.1)
POTASSIUM SERPL-SCNC: 4.5 MMOL/L (ref 3.5–5.1)
POTASSIUM SERPL-SCNC: 4.6 MMOL/L (ref 3.5–5.1)
POTASSIUM SERPL-SCNC: 4.7 MMOL/L (ref 3.5–5.1)
POTASSIUM SERPL-SCNC: 4.7 MMOL/L (ref 3.5–5.1)
POTASSIUM SERPL-SCNC: 4.8 MMOL/L (ref 3.5–5.1)
POTASSIUM SERPL-SCNC: 4.8 MMOL/L (ref 3.5–5.1)
POTASSIUM SERPL-SCNC: 4.9 MMOL/L (ref 3.5–5.1)
POTASSIUM SERPL-SCNC: 5.1 MMOL/L (ref 3.5–5.1)
POTASSIUM SERPL-SCNC: 5.2 MMOL/L (ref 3.5–5.1)
POTASSIUM SERPL-SCNC: 5.3 MMOL/L (ref 3.5–5.1)
POTASSIUM SERPL-SCNC: 5.4 MMOL/L (ref 3.5–5.1)
POTASSIUM SERPL-SCNC: ABNORMAL MMOL/L (ref 3.5–5.1)
PREALB SERPL-MCNC: 21.1 MG/DL (ref 20–40)
PRESSURE CONTROL, IPC: YES
PRESSURE SUPPORT SETTING VENT: 12 CMH2O
PRESSURE SUPPORT SETTING VENT: 5 CMH2O
PROCALCITONIN SERPL-MCNC: 0.2 NG/ML
PROCALCITONIN SERPL-MCNC: 0.4 NG/ML
PROCALCITONIN SERPL-MCNC: 0.5 NG/ML
PROCALCITONIN SERPL-MCNC: 0.6 NG/ML
PROCALCITONIN SERPL-MCNC: 0.6 NG/ML
PROCALCITONIN SERPL-MCNC: 1.6 NG/ML
PROCALCITONIN SERPL-MCNC: 2.1 NG/ML
PROCALCITONIN SERPL-MCNC: 2.9 NG/ML
PROT SERPL-MCNC: 4.9 G/DL (ref 6.4–8.2)
PROT SERPL-MCNC: 4.9 G/DL (ref 6.4–8.2)
PROT SERPL-MCNC: 5.1 G/DL (ref 6.4–8.2)
PROT SERPL-MCNC: 5.1 G/DL (ref 6.4–8.2)
PROT SERPL-MCNC: 5.2 G/DL (ref 6.4–8.2)
PROT SERPL-MCNC: 5.4 G/DL (ref 6.4–8.2)
PROT SERPL-MCNC: 5.5 G/DL (ref 6.4–8.2)
PROT SERPL-MCNC: 5.6 G/DL (ref 6.4–8.2)
PROT SERPL-MCNC: 5.6 G/DL (ref 6.4–8.2)
PROT SERPL-MCNC: 5.7 G/DL (ref 6.4–8.2)
PROT SERPL-MCNC: 5.8 G/DL (ref 6.4–8.2)
PROT SERPL-MCNC: 5.9 G/DL (ref 6.4–8.2)
PROT SERPL-MCNC: 6 G/DL (ref 6.4–8.2)
PROT SERPL-MCNC: 6.1 G/DL (ref 6.4–8.2)
PROT SERPL-MCNC: 6.1 G/DL (ref 6.4–8.2)
PROT SERPL-MCNC: 6.2 G/DL (ref 6.4–8.2)
PROT SERPL-MCNC: 6.2 G/DL (ref 6.4–8.2)
PROT SERPL-MCNC: 6.3 G/DL (ref 6.4–8.2)
PROT SERPL-MCNC: 6.5 G/DL (ref 6.4–8.2)
PROT SERPL-MCNC: 6.6 G/DL (ref 6.4–8.2)
PROT SERPL-MCNC: 6.6 G/DL (ref 6.4–8.2)
PROT SERPL-MCNC: 6.7 G/DL (ref 6.4–8.2)
PROT SERPL-MCNC: 6.9 G/DL (ref 6.4–8.2)
PROT SERPL-MCNC: 6.9 G/DL (ref 6.4–8.2)
PROT SERPL-MCNC: 7 G/DL (ref 6.4–8.2)
PROT SERPL-MCNC: 7 G/DL (ref 6.4–8.2)
PROT SERPL-MCNC: 7.4 G/DL (ref 6.4–8.2)
PROT SERPL-MCNC: 7.6 G/DL (ref 6.4–8.2)
PROT UR STRIP-MCNC: 30 MG/DL
PROTHROMBIN TIME: 10.4 SEC (ref 9–11.1)
PROTHROMBIN TIME: 10.9 SEC (ref 9–11.1)
PROTHROMBIN TIME: 12.1 SEC (ref 9–11.1)
PROTHROMBIN TIME: 13.5 SEC (ref 9–11.1)
PROTHROMBIN TIME: 14.7 SEC (ref 9–11.1)
Q-T INTERVAL, ECG07: 346 MS
Q-T INTERVAL, ECG07: 404 MS
Q-T INTERVAL, ECG07: 414 MS
Q-T INTERVAL, ECG07: 442 MS
Q-T INTERVAL, ECG07: 472 MS
QRS DURATION, ECG06: 118 MS
QRS DURATION, ECG06: 84 MS
QRS DURATION, ECG06: 84 MS
QRS DURATION, ECG06: 88 MS
QRS DURATION, ECG06: 90 MS
QTC CALCULATION (BEZET), ECG08: 450 MS
QTC CALCULATION (BEZET), ECG08: 455 MS
QTC CALCULATION (BEZET), ECG08: 477 MS
QTC CALCULATION (BEZET), ECG08: 494 MS
QTC CALCULATION (BEZET), ECG08: 541 MS
RBC # BLD AUTO: 1.93 M/UL (ref 4.1–5.7)
RBC # BLD AUTO: 1.98 M/UL (ref 4.1–5.7)
RBC # BLD AUTO: 2 M/UL (ref 4.1–5.7)
RBC # BLD AUTO: 2.11 M/UL (ref 4.1–5.7)
RBC # BLD AUTO: 2.17 M/UL (ref 4.1–5.7)
RBC # BLD AUTO: 2.21 M/UL (ref 4.1–5.7)
RBC # BLD AUTO: 2.25 M/UL (ref 4.1–5.7)
RBC # BLD AUTO: 2.34 M/UL (ref 4.1–5.7)
RBC # BLD AUTO: 2.38 M/UL (ref 4.1–5.7)
RBC # BLD AUTO: 2.41 M/UL (ref 4.1–5.7)
RBC # BLD AUTO: 2.41 M/UL (ref 4.1–5.7)
RBC # BLD AUTO: 2.47 M/UL (ref 4.1–5.7)
RBC # BLD AUTO: 2.48 M/UL (ref 4.1–5.7)
RBC # BLD AUTO: 2.49 M/UL (ref 4.1–5.7)
RBC # BLD AUTO: 2.54 M/UL (ref 4.1–5.7)
RBC # BLD AUTO: 2.55 M/UL (ref 4.1–5.7)
RBC # BLD AUTO: 2.58 M/UL (ref 4.1–5.7)
RBC # BLD AUTO: 2.62 M/UL (ref 4.1–5.7)
RBC # BLD AUTO: 2.63 M/UL (ref 4.1–5.7)
RBC # BLD AUTO: 2.65 M/UL (ref 4.1–5.7)
RBC # BLD AUTO: 2.65 M/UL (ref 4.1–5.7)
RBC # BLD AUTO: 2.68 M/UL (ref 4.1–5.7)
RBC # BLD AUTO: 2.76 M/UL (ref 4.1–5.7)
RBC # BLD AUTO: 2.76 M/UL (ref 4.1–5.7)
RBC # BLD AUTO: 2.79 M/UL (ref 4.1–5.7)
RBC # BLD AUTO: 2.82 M/UL (ref 4.1–5.7)
RBC # BLD AUTO: 2.87 M/UL (ref 4.1–5.7)
RBC # BLD AUTO: 2.89 M/UL (ref 4.1–5.7)
RBC # BLD AUTO: 2.9 M/UL (ref 4.1–5.7)
RBC # BLD AUTO: 2.93 M/UL (ref 4.1–5.7)
RBC # BLD AUTO: 2.96 M/UL (ref 4.1–5.7)
RBC # BLD AUTO: 3.02 M/UL (ref 4.1–5.7)
RBC # BLD AUTO: 3.05 M/UL (ref 4.1–5.7)
RBC # BLD AUTO: 3.07 M/UL (ref 4.1–5.7)
RBC # BLD AUTO: 3.09 M/UL (ref 4.1–5.7)
RBC # BLD AUTO: 3.11 M/UL (ref 4.1–5.7)
RBC # BLD AUTO: 3.12 M/UL (ref 4.1–5.7)
RBC # BLD AUTO: 3.17 M/UL (ref 4.1–5.7)
RBC # BLD AUTO: 3.23 M/UL (ref 4.1–5.7)
RBC # BLD AUTO: 3.24 M/UL (ref 4.1–5.7)
RBC # BLD AUTO: 3.25 M/UL (ref 4.1–5.7)
RBC # FLD: >100 /CU MM
RBC #/AREA URNS HPF: ABNORMAL /HPF (ref 0–5)
RBC MORPH BLD: ABNORMAL
RHEUMATOID FACT SERPL-ACNC: <10 IU/ML
RIGHT ABI: 0.88
RIGHT ANTERIOR TIBIAL: 102 MMHG
RIGHT ARM BP: 114 MMHG
RIGHT CCA DIST DIAS: 9.6 CM/S
RIGHT CCA DIST SYS: 72.9 CM/S
RIGHT CCA PROX DIAS: 5.8 CM/S
RIGHT CCA PROX SYS: 79.3 CM/S
RIGHT ECA DIAS: 0 CM/S
RIGHT ECA SYS: 114.6 CM/S
RIGHT ICA DIST DIAS: 12.5 CM/S
RIGHT ICA DIST SYS: 40.5 CM/S
RIGHT ICA MID DIAS: 14.3 CM/S
RIGHT ICA MID SYS: 53.2 CM/S
RIGHT ICA PROX DIAS: 12.2 CM/S
RIGHT ICA PROX SYS: 69.8 CM/S
RIGHT ICA/CCA SYS: 1
RIGHT POSTERIOR TIBIAL: 85 MMHG
RIGHT VERTEBRAL DIAS: 8.44 CM/S
RIGHT VERTEBRAL SYS: 31.4 CM/S
SAMPLES BEING HELD,HOLD: NORMAL
SAO2 % BLD: 100 % (ref 92–97)
SAO2 % BLD: 89 % (ref 92–97)
SAO2 % BLD: 90 % (ref 92–97)
SAO2 % BLD: 91 % (ref 92–97)
SAO2 % BLD: 91 % (ref 92–97)
SAO2 % BLD: 93 % (ref 92–97)
SAO2 % BLD: 93 % (ref 92–97)
SAO2 % BLD: 94 % (ref 92–97)
SAO2 % BLD: 95 % (ref 92–97)
SAO2 % BLD: 96 % (ref 92–97)
SAO2 % BLD: 96 % (ref 92–97)
SAO2 % BLD: 97 % (ref 92–97)
SAO2 % BLD: 98 % (ref 92–97)
SAO2 % BLD: 99 % (ref 92–97)
SAO2 % BLDV: 44 % (ref 65–88)
SAO2 % BLDV: 46 % (ref 65–88)
SAO2 % BLDV: 50 % (ref 65–88)
SAO2 % BLDV: 55 % (ref 65–88)
SAO2 % BLDV: 55 % (ref 65–88)
SAO2 % BLDV: 56 % (ref 65–88)
SAO2 % BLDV: 58 % (ref 65–88)
SAO2 % BLDV: 58 % (ref 65–88)
SAO2 % BLDV: 60 % (ref 65–88)
SAO2 % BLDV: 97 % (ref 65–88)
SERVICE CMNT-IMP: ABNORMAL
SERVICE CMNT-IMP: NORMAL
SODIUM SERPL-SCNC: 127 MMOL/L (ref 136–145)
SODIUM SERPL-SCNC: 128 MMOL/L (ref 136–145)
SODIUM SERPL-SCNC: 128 MMOL/L (ref 136–145)
SODIUM SERPL-SCNC: 130 MMOL/L (ref 136–145)
SODIUM SERPL-SCNC: 131 MMOL/L (ref 136–145)
SODIUM SERPL-SCNC: 132 MMOL/L (ref 136–145)
SODIUM SERPL-SCNC: 134 MMOL/L (ref 136–145)
SODIUM SERPL-SCNC: 135 MMOL/L (ref 136–145)
SODIUM SERPL-SCNC: 137 MMOL/L (ref 136–145)
SODIUM SERPL-SCNC: 137 MMOL/L (ref 136–145)
SODIUM SERPL-SCNC: 138 MMOL/L (ref 136–145)
SODIUM SERPL-SCNC: 139 MMOL/L (ref 136–145)
SODIUM SERPL-SCNC: 140 MMOL/L (ref 136–145)
SODIUM SERPL-SCNC: 141 MMOL/L (ref 136–145)
SODIUM SERPL-SCNC: 142 MMOL/L (ref 136–145)
SODIUM SERPL-SCNC: 143 MMOL/L (ref 136–145)
SODIUM SERPL-SCNC: 143 MMOL/L (ref 136–145)
SODIUM SERPL-SCNC: 144 MMOL/L (ref 136–145)
SODIUM SERPL-SCNC: 145 MMOL/L (ref 136–145)
SODIUM SERPL-SCNC: 146 MMOL/L (ref 136–145)
SODIUM SERPL-SCNC: 147 MMOL/L (ref 136–145)
SODIUM SERPL-SCNC: 148 MMOL/L (ref 136–145)
SODIUM SERPL-SCNC: 148 MMOL/L (ref 136–145)
SODIUM SERPL-SCNC: 149 MMOL/L (ref 136–145)
SODIUM SERPL-SCNC: 150 MMOL/L (ref 136–145)
SODIUM SERPL-SCNC: 150 MMOL/L (ref 136–145)
SODIUM SERPL-SCNC: 151 MMOL/L (ref 136–145)
SODIUM SERPL-SCNC: 152 MMOL/L (ref 136–145)
SODIUM SERPL-SCNC: 153 MMOL/L (ref 136–145)
SODIUM SERPL-SCNC: 153 MMOL/L (ref 136–145)
SODIUM SERPL-SCNC: 155 MMOL/L (ref 136–145)
SODIUM SERPL-SCNC: 156 MMOL/L (ref 136–145)
SODIUM UR-SCNC: 39 MMOL/L
SP GR UR REFRACTOMETRY: 1.01 (ref 1–1.03)
SPECIMEN EXP DATE BLD: NORMAL
SPECIMEN SOURCE FLD: ABNORMAL
SPECIMEN SOURCE: NORMAL
SPECIMEN TYPE: ABNORMAL
SRA 100IU/ML UFH SER-ACNC: 1 % (ref 0–20)
SRA UFH SER-IMP: NORMAL
STATUS OF UNIT,%ST: NORMAL
THERAPEUTIC RANGE,PTTT: ABNORMAL SECS (ref 58–77)
THERAPEUTIC RANGE,PTTT: NORMAL SECS (ref 58–77)
TIBC SERPL-MCNC: 333 UG/DL (ref 250–450)
TOTAL RESP. RATE, ITRR: 14
TOTAL RESP. RATE, ITRR: 16
TOTAL RESP. RATE, ITRR: 17
TOTAL RESP. RATE, ITRR: 17
TOTAL RESP. RATE, ITRR: 18
TOTAL RESP. RATE, ITRR: 20
TOTAL RESP. RATE, ITRR: 21
TOTAL RESP. RATE, ITRR: 21
TOTAL RESP. RATE, ITRR: 22
TOTAL RESP. RATE, ITRR: 24
TOTAL RESP. RATE, ITRR: 25
TOTAL RESP. RATE, ITRR: 26
TOTAL RESP. RATE, ITRR: 26
TOTAL RESP. RATE, ITRR: 27
TOTAL RESP. RATE, ITRR: 28
TOTAL RESP. RATE, ITRR: 29
TOTAL RESP. RATE, ITRR: 30
TOTAL RESP. RATE, ITRR: 32
TOTAL RESP. RATE, ITRR: 36
TRIGL SERPL-MCNC: 54 MG/DL (ref ?–150)
TROPONIN I BLD-MCNC: 1.32 NG/ML (ref 0–0.08)
TROPONIN I SERPL-MCNC: 0.27 NG/ML
TROPONIN I SERPL-MCNC: 2.22 NG/ML
TROPONIN I SERPL-MCNC: 4.78 NG/ML
TSH SERPL DL<=0.05 MIU/L-ACNC: 0.95 UIU/ML (ref 0.36–3.74)
UA: UC IF INDICATED,UAUC: ABNORMAL
UNFRAC HEPARIN LOW DOSE: 1 % (ref 0–20)
UNIT DIVISION, %UDIV: 0
UROBILINOGEN UR QL STRIP.AUTO: 1 EU/DL (ref 0.2–1)
VENTILATION MODE VENT: ABNORMAL
VENTRICULAR RATE, ECG03: 102 BPM
VENTRICULAR RATE, ECG03: 64 BPM
VENTRICULAR RATE, ECG03: 79 BPM
VENTRICULAR RATE, ECG03: 80 BPM
VENTRICULAR RATE, ECG03: 90 BPM
VIT B12 SERPL-MCNC: >2000 PG/ML (ref 193–986)
VLDLC SERPL CALC-MCNC: 10.8 MG/DL
VOLUME CONTROL IVLC: YES
VT SETTING VENT: 500 ML
WBC # BLD AUTO: 1.2 K/UL (ref 4.1–11.1)
WBC # BLD AUTO: 1.6 K/UL (ref 4.1–11.1)
WBC # BLD AUTO: 1.7 K/UL (ref 4.1–11.1)
WBC # BLD AUTO: 10.3 K/UL (ref 4.1–11.1)
WBC # BLD AUTO: 10.4 K/UL (ref 4.1–11.1)
WBC # BLD AUTO: 10.5 K/UL (ref 4.1–11.1)
WBC # BLD AUTO: 10.7 K/UL (ref 4.1–11.1)
WBC # BLD AUTO: 10.8 K/UL (ref 4.1–11.1)
WBC # BLD AUTO: 11 K/UL (ref 4.1–11.1)
WBC # BLD AUTO: 11.1 K/UL (ref 4.1–11.1)
WBC # BLD AUTO: 12.5 K/UL (ref 4.1–11.1)
WBC # BLD AUTO: 13.7 K/UL (ref 4.1–11.1)
WBC # BLD AUTO: 15.1 K/UL (ref 4.1–11.1)
WBC # BLD AUTO: 15.2 K/UL (ref 4.1–11.1)
WBC # BLD AUTO: 16.3 K/UL (ref 4.1–11.1)
WBC # BLD AUTO: 16.6 K/UL (ref 4.1–11.1)
WBC # BLD AUTO: 19.1 K/UL (ref 4.1–11.1)
WBC # BLD AUTO: 19.6 K/UL (ref 4.1–11.1)
WBC # BLD AUTO: 21.1 K/UL (ref 4.1–11.1)
WBC # BLD AUTO: 21.1 K/UL (ref 4.1–11.1)
WBC # BLD AUTO: 23.5 K/UL (ref 4.1–11.1)
WBC # BLD AUTO: 23.8 K/UL (ref 4.1–11.1)
WBC # BLD AUTO: 3.3 K/UL (ref 4.1–11.1)
WBC # BLD AUTO: 5.8 K/UL (ref 4.1–11.1)
WBC # BLD AUTO: 6.1 K/UL (ref 4.1–11.1)
WBC # BLD AUTO: 6.3 K/UL (ref 4.1–11.1)
WBC # BLD AUTO: 6.6 K/UL (ref 4.1–11.1)
WBC # BLD AUTO: 6.6 K/UL (ref 4.1–11.1)
WBC # BLD AUTO: 6.8 K/UL (ref 4.1–11.1)
WBC # BLD AUTO: 7 K/UL (ref 4.1–11.1)
WBC # BLD AUTO: 7 K/UL (ref 4.1–11.1)
WBC # BLD AUTO: 7.1 K/UL (ref 4.1–11.1)
WBC # BLD AUTO: 7.2 K/UL (ref 4.1–11.1)
WBC # BLD AUTO: 7.6 K/UL (ref 4.1–11.1)
WBC # BLD AUTO: 7.7 K/UL (ref 4.1–11.1)
WBC # BLD AUTO: 8.2 K/UL (ref 4.1–11.1)
WBC # BLD AUTO: 8.4 K/UL (ref 4.1–11.1)
WBC # BLD AUTO: 8.7 K/UL (ref 4.1–11.1)
WBC # BLD AUTO: 9.2 K/UL (ref 4.1–11.1)
WBC # BLD AUTO: 9.5 K/UL (ref 4.1–11.1)
WBC # BLD AUTO: 9.9 K/UL (ref 4.1–11.1)
WBC MORPH BLD: ABNORMAL
WBC URNS QL MICRO: ABNORMAL /HPF (ref 0–4)

## 2019-01-01 PROCEDURE — 82803 BLOOD GASES ANY COMBINATION: CPT

## 2019-01-01 PROCEDURE — 74011250637 HC RX REV CODE- 250/637: Performed by: NURSE PRACTITIONER

## 2019-01-01 PROCEDURE — 36415 COLL VENOUS BLD VENIPUNCTURE: CPT

## 2019-01-01 PROCEDURE — 74011250636 HC RX REV CODE- 250/636: Performed by: NURSE PRACTITIONER

## 2019-01-01 PROCEDURE — 74011000250 HC RX REV CODE- 250: Performed by: PHYSICIAN ASSISTANT

## 2019-01-01 PROCEDURE — 74011000250 HC RX REV CODE- 250: Performed by: INTERNAL MEDICINE

## 2019-01-01 PROCEDURE — 74011000258 HC RX REV CODE- 258: Performed by: PHYSICIAN ASSISTANT

## 2019-01-01 PROCEDURE — 65610000003 HC RM ICU SURGICAL

## 2019-01-01 PROCEDURE — 74011250636 HC RX REV CODE- 250/636: Performed by: INTERNAL MEDICINE

## 2019-01-01 PROCEDURE — 77030006998

## 2019-01-01 PROCEDURE — 83735 ASSAY OF MAGNESIUM: CPT

## 2019-01-01 PROCEDURE — 74011000250 HC RX REV CODE- 250: Performed by: NURSE PRACTITIONER

## 2019-01-01 PROCEDURE — 94640 AIRWAY INHALATION TREATMENT: CPT

## 2019-01-01 PROCEDURE — 77030019579 HC CBL PACE DISP REMG -B: Performed by: THORACIC SURGERY (CARDIOTHORACIC VASCULAR SURGERY)

## 2019-01-01 PROCEDURE — 74011250636 HC RX REV CODE- 250/636: Performed by: PHYSICIAN ASSISTANT

## 2019-01-01 PROCEDURE — 86147 CARDIOLIPIN ANTIBODY EA IG: CPT

## 2019-01-01 PROCEDURE — 87116 MYCOBACTERIA CULTURE: CPT

## 2019-01-01 PROCEDURE — 77030013798 HC KT TRNSDUC PRSSR EDWD -B: Performed by: THORACIC SURGERY (CARDIOTHORACIC VASCULAR SURGERY)

## 2019-01-01 PROCEDURE — 85049 AUTOMATED PLATELET COUNT: CPT

## 2019-01-01 PROCEDURE — 80053 COMPREHEN METABOLIC PANEL: CPT

## 2019-01-01 PROCEDURE — 77030008771 HC TU NG SALEM SUMP -A: Performed by: ANESTHESIOLOGY

## 2019-01-01 PROCEDURE — 97530 THERAPEUTIC ACTIVITIES: CPT

## 2019-01-01 PROCEDURE — 74011250636 HC RX REV CODE- 250/636: Performed by: THORACIC SURGERY (CARDIOTHORACIC VASCULAR SURGERY)

## 2019-01-01 PROCEDURE — C9113 INJ PANTOPRAZOLE SODIUM, VIA: HCPCS | Performed by: THORACIC SURGERY (CARDIOTHORACIC VASCULAR SURGERY)

## 2019-01-01 PROCEDURE — 85018 HEMOGLOBIN: CPT

## 2019-01-01 PROCEDURE — 77030004533 HC CATH ANGI DX IMP BSC -B: Performed by: INTERNAL MEDICINE

## 2019-01-01 PROCEDURE — 77030027138 HC INCENT SPIROMETER -A

## 2019-01-01 PROCEDURE — 84145 PROCALCITONIN (PCT): CPT

## 2019-01-01 PROCEDURE — 86900 BLOOD TYPING SEROLOGIC ABO: CPT

## 2019-01-01 PROCEDURE — 82962 GLUCOSE BLOOD TEST: CPT

## 2019-01-01 PROCEDURE — P9047 ALBUMIN (HUMAN), 25%, 50ML: HCPCS | Performed by: INTERNAL MEDICINE

## 2019-01-01 PROCEDURE — 77030032058 HC PK ACC HARV VESL VSVIEW GTNG -F: Performed by: THORACIC SURGERY (CARDIOTHORACIC VASCULAR SURGERY)

## 2019-01-01 PROCEDURE — 94010 BREATHING CAPACITY TEST: CPT

## 2019-01-01 PROCEDURE — 74011250637 HC RX REV CODE- 250/637: Performed by: THORACIC SURGERY (CARDIOTHORACIC VASCULAR SURGERY)

## 2019-01-01 PROCEDURE — 77030002978 HC SUT POLY TELE -A: Performed by: THORACIC SURGERY (CARDIOTHORACIC VASCULAR SURGERY)

## 2019-01-01 PROCEDURE — 84100 ASSAY OF PHOSPHORUS: CPT

## 2019-01-01 PROCEDURE — 77030005401 HC CATH RAD ARRO -A

## 2019-01-01 PROCEDURE — 71045 X-RAY EXAM CHEST 1 VIEW: CPT

## 2019-01-01 PROCEDURE — 0WCC0ZZ EXTIRPATION OF MATTER FROM MEDIASTINUM, OPEN APPROACH: ICD-10-PCS | Performed by: THORACIC SURGERY (CARDIOTHORACIC VASCULAR SURGERY)

## 2019-01-01 PROCEDURE — 77030005513 HC CATH URETH FOL11 MDII -B

## 2019-01-01 PROCEDURE — 77030002933 HC SUT MCRYL J&J -A: Performed by: THORACIC SURGERY (CARDIOTHORACIC VASCULAR SURGERY)

## 2019-01-01 PROCEDURE — 74011250637 HC RX REV CODE- 250/637: Performed by: PHYSICIAN ASSISTANT

## 2019-01-01 PROCEDURE — 74011636637 HC RX REV CODE- 636/637: Performed by: NURSE PRACTITIONER

## 2019-01-01 PROCEDURE — 74011250636 HC RX REV CODE- 250/636: Performed by: ANESTHESIOLOGY

## 2019-01-01 PROCEDURE — 83880 ASSAY OF NATRIURETIC PEPTIDE: CPT

## 2019-01-01 PROCEDURE — 74011000250 HC RX REV CODE- 250: Performed by: THORACIC SURGERY (CARDIOTHORACIC VASCULAR SURGERY)

## 2019-01-01 PROCEDURE — 94664 DEMO&/EVAL PT USE INHALER: CPT

## 2019-01-01 PROCEDURE — 85027 COMPLETE CBC AUTOMATED: CPT

## 2019-01-01 PROCEDURE — C1894 INTRO/SHEATH, NON-LASER: HCPCS

## 2019-01-01 PROCEDURE — 74011636637 HC RX REV CODE- 636/637: Performed by: NURSE ANESTHETIST, CERTIFIED REGISTERED

## 2019-01-01 PROCEDURE — 77030018836 HC SOL IRR NACL ICUM -A

## 2019-01-01 PROCEDURE — 94660 CPAP INITIATION&MGMT: CPT

## 2019-01-01 PROCEDURE — 74011636637 HC RX REV CODE- 636/637: Performed by: PHYSICIAN ASSISTANT

## 2019-01-01 PROCEDURE — 77030010545

## 2019-01-01 PROCEDURE — C1751 CATH, INF, PER/CENT/MIDLINE: HCPCS

## 2019-01-01 PROCEDURE — 74011000258 HC RX REV CODE- 258: Performed by: THORACIC SURGERY (CARDIOTHORACIC VASCULAR SURGERY)

## 2019-01-01 PROCEDURE — 65620000000 HC RM CCU GENERAL

## 2019-01-01 PROCEDURE — 86923 COMPATIBILITY TEST ELECTRIC: CPT

## 2019-01-01 PROCEDURE — 74011250637 HC RX REV CODE- 250/637: Performed by: INTERNAL MEDICINE

## 2019-01-01 PROCEDURE — P9035 PLATELET PHERES LEUKOREDUCED: HCPCS

## 2019-01-01 PROCEDURE — 77030040361 HC SLV COMPR DVT MDII -B

## 2019-01-01 PROCEDURE — 77030021668 HC NEB PREFIL KT VYRM -A

## 2019-01-01 PROCEDURE — 36430 TRANSFUSION BLD/BLD COMPNT: CPT

## 2019-01-01 PROCEDURE — 74011250636 HC RX REV CODE- 250/636

## 2019-01-01 PROCEDURE — C8929 TTE W OR WO FOL WCON,DOPPLER: HCPCS

## 2019-01-01 PROCEDURE — 74011250636 HC RX REV CODE- 250/636: Performed by: NURSE ANESTHETIST, CERTIFIED REGISTERED

## 2019-01-01 PROCEDURE — 74011000250 HC RX REV CODE- 250

## 2019-01-01 PROCEDURE — B3101ZZ FLUOROSCOPY OF THORACIC AORTA USING LOW OSMOLAR CONTRAST: ICD-10-PCS | Performed by: INTERNAL MEDICINE

## 2019-01-01 PROCEDURE — 74011000258 HC RX REV CODE- 258: Performed by: NURSE PRACTITIONER

## 2019-01-01 PROCEDURE — 77030010605 HC BLWR MR MAL MEDT -B: Performed by: THORACIC SURGERY (CARDIOTHORACIC VASCULAR SURGERY)

## 2019-01-01 PROCEDURE — 82525 ASSAY OF COPPER: CPT

## 2019-01-01 PROCEDURE — 94003 VENT MGMT INPAT SUBQ DAY: CPT

## 2019-01-01 PROCEDURE — C1760 CLOSURE DEV, VASC: HCPCS | Performed by: INTERNAL MEDICINE

## 2019-01-01 PROCEDURE — C9113 INJ PANTOPRAZOLE SODIUM, VIA: HCPCS | Performed by: PHYSICIAN ASSISTANT

## 2019-01-01 PROCEDURE — 99152 MOD SED SAME PHYS/QHP 5/>YRS: CPT | Performed by: INTERNAL MEDICINE

## 2019-01-01 PROCEDURE — 77030002986 HC SUT PROL J&J -A: Performed by: THORACIC SURGERY (CARDIOTHORACIC VASCULAR SURGERY)

## 2019-01-01 PROCEDURE — 36600 WITHDRAWAL OF ARTERIAL BLOOD: CPT

## 2019-01-01 PROCEDURE — 77030018798 HC PMP KT ENTRL FED COVD -A

## 2019-01-01 PROCEDURE — 85610 PROTHROMBIN TIME: CPT

## 2019-01-01 PROCEDURE — 74011000272 HC RX REV CODE- 272: Performed by: THORACIC SURGERY (CARDIOTHORACIC VASCULAR SURGERY)

## 2019-01-01 PROCEDURE — 77030018846 HC SOL IRR STRL H20 ICUM -A: Performed by: THORACIC SURGERY (CARDIOTHORACIC VASCULAR SURGERY)

## 2019-01-01 PROCEDURE — 88313 SPECIAL STAINS GROUP 2: CPT

## 2019-01-01 PROCEDURE — B548ZZA ULTRASONOGRAPHY OF SUPERIOR VENA CAVA, GUIDANCE: ICD-10-PCS | Performed by: ANESTHESIOLOGY

## 2019-01-01 PROCEDURE — 77030019563 HC DEV ATTCH FEED HOLL -A

## 2019-01-01 PROCEDURE — 93005 ELECTROCARDIOGRAM TRACING: CPT

## 2019-01-01 PROCEDURE — 51798 US URINE CAPACITY MEASURE: CPT

## 2019-01-01 PROCEDURE — 85025 COMPLETE CBC W/AUTO DIFF WBC: CPT

## 2019-01-01 PROCEDURE — P9045 ALBUMIN (HUMAN), 5%, 250 ML: HCPCS | Performed by: NURSE ANESTHETIST, CERTIFIED REGISTERED

## 2019-01-01 PROCEDURE — 74011000258 HC RX REV CODE- 258: Performed by: INTERNAL MEDICINE

## 2019-01-01 PROCEDURE — 74011636637 HC RX REV CODE- 636/637: Performed by: THORACIC SURGERY (CARDIOTHORACIC VASCULAR SURGERY)

## 2019-01-01 PROCEDURE — 77030018861

## 2019-01-01 PROCEDURE — 77030020747 HC TU INSUF ENDOSC TELE -A: Performed by: THORACIC SURGERY (CARDIOTHORACIC VASCULAR SURGERY)

## 2019-01-01 PROCEDURE — 80048 BASIC METABOLIC PNL TOTAL CA: CPT

## 2019-01-01 PROCEDURE — 99153 MOD SED SAME PHYS/QHP EA: CPT | Performed by: INTERNAL MEDICINE

## 2019-01-01 PROCEDURE — 87070 CULTURE OTHR SPECIMN AEROBIC: CPT

## 2019-01-01 PROCEDURE — 81001 URINALYSIS AUTO W/SCOPE: CPT

## 2019-01-01 PROCEDURE — 83540 ASSAY OF IRON: CPT

## 2019-01-01 PROCEDURE — 77030020365 HC SOL INJ SOD CL 0.9% 50ML

## 2019-01-01 PROCEDURE — 76937 US GUIDE VASCULAR ACCESS: CPT

## 2019-01-01 PROCEDURE — 77030013861 HC PNCH AORT CLNCUT QUES -B: Performed by: THORACIC SURGERY (CARDIOTHORACIC VASCULAR SURGERY)

## 2019-01-01 PROCEDURE — 89050 BODY FLUID CELL COUNT: CPT

## 2019-01-01 PROCEDURE — 85652 RBC SED RATE AUTOMATED: CPT

## 2019-01-01 PROCEDURE — 77030020256 HC SOL INJ NACL 0.9%  500ML: Performed by: THORACIC SURGERY (CARDIOTHORACIC VASCULAR SURGERY)

## 2019-01-01 PROCEDURE — 75630 X-RAY AORTA LEG ARTERIES: CPT | Performed by: INTERNAL MEDICINE

## 2019-01-01 PROCEDURE — 77030036958 HC BRONCHSCOPE ASCOPE3 FLX DISP AMBU -D

## 2019-01-01 PROCEDURE — 74011000250 HC RX REV CODE- 250: Performed by: NURSE ANESTHETIST, CERTIFIED REGISTERED

## 2019-01-01 PROCEDURE — 83036 HEMOGLOBIN GLYCOSYLATED A1C: CPT

## 2019-01-01 PROCEDURE — 77030003010 HC SUT SURG STL J&J -B: Performed by: THORACIC SURGERY (CARDIOTHORACIC VASCULAR SURGERY)

## 2019-01-01 PROCEDURE — 86022 PLATELET ANTIBODIES: CPT

## 2019-01-01 PROCEDURE — 02HV33Z INSERTION OF INFUSION DEVICE INTO SUPERIOR VENA CAVA, PERCUTANEOUS APPROACH: ICD-10-PCS | Performed by: ANESTHESIOLOGY

## 2019-01-01 PROCEDURE — 84300 ASSAY OF URINE SODIUM: CPT

## 2019-01-01 PROCEDURE — 76010000379 HC BRONCHOSCOPY DIAG/THERAPEUTIC

## 2019-01-01 PROCEDURE — 82728 ASSAY OF FERRITIN: CPT

## 2019-01-01 PROCEDURE — 76060000034 HC ANESTHESIA 1.5 TO 2 HR: Performed by: THORACIC SURGERY (CARDIOTHORACIC VASCULAR SURGERY)

## 2019-01-01 PROCEDURE — 76040000019: Performed by: THORACIC SURGERY (CARDIOTHORACIC VASCULAR SURGERY)

## 2019-01-01 PROCEDURE — C1894 INTRO/SHEATH, NON-LASER: HCPCS | Performed by: INTERNAL MEDICINE

## 2019-01-01 PROCEDURE — 5A1221Z PERFORMANCE OF CARDIAC OUTPUT, CONTINUOUS: ICD-10-PCS | Performed by: THORACIC SURGERY (CARDIOTHORACIC VASCULAR SURGERY)

## 2019-01-01 PROCEDURE — 77010033711 HC HIGH FLOW OXYGEN

## 2019-01-01 PROCEDURE — 93880 EXTRACRANIAL BILAT STUDY: CPT

## 2019-01-01 PROCEDURE — 85384 FIBRINOGEN ACTIVITY: CPT

## 2019-01-01 PROCEDURE — 0BH18EZ INSERTION OF ENDOTRACHEAL AIRWAY INTO TRACHEA, VIA NATURAL OR ARTIFICIAL OPENING ENDOSCOPIC: ICD-10-PCS | Performed by: THORACIC SURGERY (CARDIOTHORACIC VASCULAR SURGERY)

## 2019-01-01 PROCEDURE — 77030002987 HC SUT PROL J&J -B: Performed by: THORACIC SURGERY (CARDIOTHORACIC VASCULAR SURGERY)

## 2019-01-01 PROCEDURE — 86431 RHEUMATOID FACTOR QUANT: CPT

## 2019-01-01 PROCEDURE — 31500 INSERT EMERGENCY AIRWAY: CPT

## 2019-01-01 PROCEDURE — 77030013797 HC KT TRNSDUC PRSSR EDWD -A

## 2019-01-01 PROCEDURE — 06BY4ZZ EXCISION OF LOWER VEIN, PERCUTANEOUS ENDOSCOPIC APPROACH: ICD-10-PCS | Performed by: THORACIC SURGERY (CARDIOTHORACIC VASCULAR SURGERY)

## 2019-01-01 PROCEDURE — 77030012794 HC KT NSL CANN/HGH TRAN -A

## 2019-01-01 PROCEDURE — 97110 THERAPEUTIC EXERCISES: CPT

## 2019-01-01 PROCEDURE — 77030039266 HC ADH SKN EXOFIN S2SG -A: Performed by: THORACIC SURGERY (CARDIOTHORACIC VASCULAR SURGERY)

## 2019-01-01 PROCEDURE — 84484 ASSAY OF TROPONIN QUANT: CPT

## 2019-01-01 PROCEDURE — 74011000272 HC RX REV CODE- 272

## 2019-01-01 PROCEDURE — P9045 ALBUMIN (HUMAN), 5%, 250 ML: HCPCS | Performed by: THORACIC SURGERY (CARDIOTHORACIC VASCULAR SURGERY)

## 2019-01-01 PROCEDURE — 77030006247 HC LD PCMKR MYOCRD BPLR TEMP MEDT -B: Performed by: THORACIC SURGERY (CARDIOTHORACIC VASCULAR SURGERY)

## 2019-01-01 PROCEDURE — 4A023N7 MEASUREMENT OF CARDIAC SAMPLING AND PRESSURE, LEFT HEART, PERCUTANEOUS APPROACH: ICD-10-PCS | Performed by: INTERNAL MEDICINE

## 2019-01-01 PROCEDURE — P9045 ALBUMIN (HUMAN), 5%, 250 ML: HCPCS | Performed by: PHYSICIAN ASSISTANT

## 2019-01-01 PROCEDURE — 94002 VENT MGMT INPAT INIT DAY: CPT

## 2019-01-01 PROCEDURE — 97165 OT EVAL LOW COMPLEX 30 MIN: CPT

## 2019-01-01 PROCEDURE — 82542 COL CHROMOTOGRAPHY QUAL/QUAN: CPT

## 2019-01-01 PROCEDURE — 5A09557 ASSISTANCE WITH RESPIRATORY VENTILATION, GREATER THAN 96 CONSECUTIVE HOURS, CONTINUOUS POSITIVE AIRWAY PRESSURE: ICD-10-PCS | Performed by: THORACIC SURGERY (CARDIOTHORACIC VASCULAR SURGERY)

## 2019-01-01 PROCEDURE — 70450 CT HEAD/BRAIN W/O DYE: CPT

## 2019-01-01 PROCEDURE — 80061 LIPID PANEL: CPT

## 2019-01-01 PROCEDURE — 74011636320 HC RX REV CODE- 636/320: Performed by: INTERNAL MEDICINE

## 2019-01-01 PROCEDURE — 77030026438 HC STYL ET INTUB CARD -A: Performed by: ANESTHESIOLOGY

## 2019-01-01 PROCEDURE — 77030041247 HC PROTECTOR HEEL HEELMEDIX MDII -B

## 2019-01-01 PROCEDURE — 77030012390 HC DRN CHST BTL GTNG -B: Performed by: THORACIC SURGERY (CARDIOTHORACIC VASCULAR SURGERY)

## 2019-01-01 PROCEDURE — 77030018836 HC SOL IRR NACL ICUM -A: Performed by: THORACIC SURGERY (CARDIOTHORACIC VASCULAR SURGERY)

## 2019-01-01 PROCEDURE — 83935 ASSAY OF URINE OSMOLALITY: CPT

## 2019-01-01 PROCEDURE — 86038 ANTINUCLEAR ANTIBODIES: CPT

## 2019-01-01 PROCEDURE — 83605 ASSAY OF LACTIC ACID: CPT

## 2019-01-01 PROCEDURE — 84132 ASSAY OF SERUM POTASSIUM: CPT

## 2019-01-01 PROCEDURE — 96374 THER/PROPH/DIAG INJ IV PUSH: CPT

## 2019-01-01 PROCEDURE — 0B113F4 BYPASS TRACHEA TO CUTANEOUS WITH TRACHEOSTOMY DEVICE, PERCUTANEOUS APPROACH: ICD-10-PCS | Performed by: THORACIC SURGERY (CARDIOTHORACIC VASCULAR SURGERY)

## 2019-01-01 PROCEDURE — 87278 LEGION PNEUMOPHILIA AG IF: CPT

## 2019-01-01 PROCEDURE — 77030037878 HC DRSG MEPILEX >48IN BORD MOLN -B: Performed by: THORACIC SURGERY (CARDIOTHORACIC VASCULAR SURGERY)

## 2019-01-01 PROCEDURE — 94762 N-INVAS EAR/PLS OXIMTRY CONT: CPT

## 2019-01-01 PROCEDURE — 85730 THROMBOPLASTIN TIME PARTIAL: CPT

## 2019-01-01 PROCEDURE — B246ZZ4 ULTRASONOGRAPHY OF RIGHT AND LEFT HEART, TRANSESOPHAGEAL: ICD-10-PCS | Performed by: THORACIC SURGERY (CARDIOTHORACIC VASCULAR SURGERY)

## 2019-01-01 PROCEDURE — 04HY32Z INSERTION OF MONITORING DEVICE INTO LOWER ARTERY, PERCUTANEOUS APPROACH: ICD-10-PCS | Performed by: THORACIC SURGERY (CARDIOTHORACIC VASCULAR SURGERY)

## 2019-01-01 PROCEDURE — 77030010389 HC WRE ATR PACE AEMC -B: Performed by: THORACIC SURGERY (CARDIOTHORACIC VASCULAR SURGERY)

## 2019-01-01 PROCEDURE — P9016 RBC LEUKOCYTES REDUCED: HCPCS

## 2019-01-01 PROCEDURE — 74011000258 HC RX REV CODE- 258: Performed by: NURSE ANESTHETIST, CERTIFIED REGISTERED

## 2019-01-01 PROCEDURE — 76040000020: Performed by: THORACIC SURGERY (CARDIOTHORACIC VASCULAR SURGERY)

## 2019-01-01 PROCEDURE — 77030040922 HC BLNKT HYPOTHRM STRY -A

## 2019-01-01 PROCEDURE — 76010000115 HC CV SURG 4.5 TO 5 HR: Performed by: THORACIC SURGERY (CARDIOTHORACIC VASCULAR SURGERY)

## 2019-01-01 PROCEDURE — 5A1955Z RESPIRATORY VENTILATION, GREATER THAN 96 CONSECUTIVE HOURS: ICD-10-PCS | Performed by: THORACIC SURGERY (CARDIOTHORACIC VASCULAR SURGERY)

## 2019-01-01 PROCEDURE — P9045 ALBUMIN (HUMAN), 5%, 250 ML: HCPCS | Performed by: NURSE PRACTITIONER

## 2019-01-01 PROCEDURE — 84443 ASSAY THYROID STIM HORMONE: CPT

## 2019-01-01 PROCEDURE — 80069 RENAL FUNCTION PANEL: CPT

## 2019-01-01 PROCEDURE — 77030013079 HC BLNKT BAIR HGGR 3M -A: Performed by: NURSE ANESTHETIST, CERTIFIED REGISTERED

## 2019-01-01 PROCEDURE — 021309W BYPASS CORONARY ARTERY, FOUR OR MORE ARTERIES FROM AORTA WITH AUTOLOGOUS VENOUS TISSUE, OPEN APPROACH: ICD-10-PCS | Performed by: THORACIC SURGERY (CARDIOTHORACIC VASCULAR SURGERY)

## 2019-01-01 PROCEDURE — 82607 VITAMIN B-12: CPT

## 2019-01-01 PROCEDURE — 99284 EMERGENCY DEPT VISIT MOD MDM: CPT

## 2019-01-01 PROCEDURE — 77030005513 HC CATH URETH FOL11 MDII -B: Performed by: THORACIC SURGERY (CARDIOTHORACIC VASCULAR SURGERY)

## 2019-01-01 PROCEDURE — 76060000032 HC ANESTHESIA 0.5 TO 1 HR: Performed by: THORACIC SURGERY (CARDIOTHORACIC VASCULAR SURGERY)

## 2019-01-01 PROCEDURE — 86146 BETA-2 GLYCOPROTEIN ANTIBODY: CPT

## 2019-01-01 PROCEDURE — 77030040504 HC DRN WND MDII -B: Performed by: THORACIC SURGERY (CARDIOTHORACIC VASCULAR SURGERY)

## 2019-01-01 PROCEDURE — 83930 ASSAY OF BLOOD OSMOLALITY: CPT

## 2019-01-01 PROCEDURE — 77030008683 HC TU ET CUF COVD -A

## 2019-01-01 PROCEDURE — 82436 ASSAY OF URINE CHLORIDE: CPT

## 2019-01-01 PROCEDURE — 74018 RADEX ABDOMEN 1 VIEW: CPT

## 2019-01-01 PROCEDURE — 77030020061 HC IV BLD WRMR ADMIN SET 3M -B: Performed by: NURSE ANESTHETIST, CERTIFIED REGISTERED

## 2019-01-01 PROCEDURE — 71250 CT THORAX DX C-: CPT

## 2019-01-01 PROCEDURE — P9047 ALBUMIN (HUMAN), 25%, 50ML: HCPCS

## 2019-01-01 PROCEDURE — 87040 BLOOD CULTURE FOR BACTERIA: CPT

## 2019-01-01 PROCEDURE — 76010000129 HC CV SURG 1.5 TO 2 HR: Performed by: THORACIC SURGERY (CARDIOTHORACIC VASCULAR SURGERY)

## 2019-01-01 PROCEDURE — B2111ZZ FLUOROSCOPY OF MULTIPLE CORONARY ARTERIES USING LOW OSMOLAR CONTRAST: ICD-10-PCS | Performed by: INTERNAL MEDICINE

## 2019-01-01 PROCEDURE — 82746 ASSAY OF FOLIC ACID SERUM: CPT

## 2019-01-01 PROCEDURE — 82043 UR ALBUMIN QUANTITATIVE: CPT

## 2019-01-01 PROCEDURE — 86200 CCP ANTIBODY: CPT

## 2019-01-01 PROCEDURE — 77030011255 HC DSG AQUACEL AG BMS -A: Performed by: THORACIC SURGERY (CARDIOTHORACIC VASCULAR SURGERY)

## 2019-01-01 PROCEDURE — 97162 PT EVAL MOD COMPLEX 30 MIN: CPT

## 2019-01-01 PROCEDURE — 30233N1 TRANSFUSION OF NONAUTOLOGOUS RED BLOOD CELLS INTO PERIPHERAL VEIN, PERCUTANEOUS APPROACH: ICD-10-PCS | Performed by: THORACIC SURGERY (CARDIOTHORACIC VASCULAR SURGERY)

## 2019-01-01 PROCEDURE — 82272 OCCULT BLD FECES 1-3 TESTS: CPT

## 2019-01-01 PROCEDURE — 77010033678 HC OXYGEN DAILY

## 2019-01-01 PROCEDURE — 77030008793 HC TU TRACH CUF COVD -B

## 2019-01-01 PROCEDURE — 77030018835 HC SOL IRR LR ICUM -A: Performed by: THORACIC SURGERY (CARDIOTHORACIC VASCULAR SURGERY)

## 2019-01-01 PROCEDURE — 77030006920 HC BLD STRNL SAW STRY -B: Performed by: THORACIC SURGERY (CARDIOTHORACIC VASCULAR SURGERY)

## 2019-01-01 PROCEDURE — 93459 L HRT ART/GRFT ANGIO: CPT | Performed by: INTERNAL MEDICINE

## 2019-01-01 PROCEDURE — 93355 ECHO TRANSESOPHAGEAL (TEE): CPT | Performed by: THORACIC SURGERY (CARDIOTHORACIC VASCULAR SURGERY)

## 2019-01-01 PROCEDURE — 02100Z9 BYPASS CORONARY ARTERY, ONE ARTERY FROM LEFT INTERNAL MAMMARY, OPEN APPROACH: ICD-10-PCS | Performed by: THORACIC SURGERY (CARDIOTHORACIC VASCULAR SURGERY)

## 2019-01-01 PROCEDURE — 4A02X4A MEASUREMENT OF CARDIAC ELECTRICAL ACTIVITY, GUIDANCE, EXTERNAL APPROACH: ICD-10-PCS | Performed by: THORACIC SURGERY (CARDIOTHORACIC VASCULAR SURGERY)

## 2019-01-01 PROCEDURE — 77030007667 HC INSRT SUT HLD MEDT -B: Performed by: THORACIC SURGERY (CARDIOTHORACIC VASCULAR SURGERY)

## 2019-01-01 PROCEDURE — 88112 CYTOPATH CELL ENHANCE TECH: CPT

## 2019-01-01 PROCEDURE — 02HV33Z INSERTION OF INFUSION DEVICE INTO SUPERIOR VENA CAVA, PERCUTANEOUS APPROACH: ICD-10-PCS | Performed by: THORACIC SURGERY (CARDIOTHORACIC VASCULAR SURGERY)

## 2019-01-01 PROCEDURE — 84134 ASSAY OF PREALBUMIN: CPT

## 2019-01-01 PROCEDURE — 77030008684 HC TU ET CUF COVD -B: Performed by: ANESTHESIOLOGY

## 2019-01-01 PROCEDURE — 97535 SELF CARE MNGMENT TRAINING: CPT

## 2019-01-01 PROCEDURE — 0B9H8ZX DRAINAGE OF LUNG LINGULA, VIA NATURAL OR ARTIFICIAL OPENING ENDOSCOPIC, DIAGNOSTIC: ICD-10-PCS | Performed by: INTERNAL MEDICINE

## 2019-01-01 PROCEDURE — 93922 UPR/L XTREMITY ART 2 LEVELS: CPT

## 2019-01-01 PROCEDURE — 77030010938 HC CLP LIG TELE -A: Performed by: THORACIC SURGERY (CARDIOTHORACIC VASCULAR SURGERY)

## 2019-01-01 PROCEDURE — P9045 ALBUMIN (HUMAN), 5%, 250 ML: HCPCS

## 2019-01-01 PROCEDURE — 36573 INSJ PICC RS&I 5 YR+: CPT | Performed by: NURSE PRACTITIONER

## 2019-01-01 PROCEDURE — 87210 SMEAR WET MOUNT SALINE/INK: CPT

## 2019-01-01 PROCEDURE — 77030020291 HC FLEXSEAL FMS BMS -C

## 2019-01-01 PROCEDURE — 77030006689 HC BLD OPHTH BVR BD -A: Performed by: THORACIC SURGERY (CARDIOTHORACIC VASCULAR SURGERY)

## 2019-01-01 PROCEDURE — 77030020263 HC SOL INJ SOD CL0.9% LFCR 1000ML: Performed by: THORACIC SURGERY (CARDIOTHORACIC VASCULAR SURGERY)

## 2019-01-01 PROCEDURE — 77030022199 HC SYS HARV VESL GTNG -G1: Performed by: THORACIC SURGERY (CARDIOTHORACIC VASCULAR SURGERY)

## 2019-01-01 PROCEDURE — 77030005402 HC CATH RAD ART LN KT TELE -B

## 2019-01-01 PROCEDURE — 99285 EMERGENCY DEPT VISIT HI MDM: CPT

## 2019-01-01 PROCEDURE — 77030011640 HC PAD GRND REM COVD -A: Performed by: THORACIC SURGERY (CARDIOTHORACIC VASCULAR SURGERY)

## 2019-01-01 PROCEDURE — 76060000040 HC ANESTHESIA 4.5 TO 5 HR: Performed by: THORACIC SURGERY (CARDIOTHORACIC VASCULAR SURGERY)

## 2019-01-01 PROCEDURE — 77030013744: Performed by: INTERNAL MEDICINE

## 2019-01-01 RX ORDER — INSULIN LISPRO 100 [IU]/ML
10 INJECTION, SOLUTION INTRAVENOUS; SUBCUTANEOUS ONCE
Status: ACTIVE | OUTPATIENT
Start: 2019-01-01 | End: 2019-01-01

## 2019-01-01 RX ORDER — INSULIN GLARGINE 100 [IU]/ML
13 INJECTION, SOLUTION SUBCUTANEOUS DAILY
Status: DISCONTINUED | OUTPATIENT
Start: 2019-01-01 | End: 2019-01-01

## 2019-01-01 RX ORDER — SODIUM CHLORIDE 0.9 % (FLUSH) 0.9 %
5-40 SYRINGE (ML) INJECTION EVERY 8 HOURS
Status: CANCELLED | OUTPATIENT
Start: 2019-01-01

## 2019-01-01 RX ORDER — MIDAZOLAM HYDROCHLORIDE 1 MG/ML
INJECTION, SOLUTION INTRAMUSCULAR; INTRAVENOUS
Status: COMPLETED
Start: 2019-01-01 | End: 2019-01-01

## 2019-01-01 RX ORDER — NEOSTIGMINE METHYLSULFATE 1 MG/ML
INJECTION, SOLUTION INTRAVENOUS AS NEEDED
Status: DISCONTINUED | OUTPATIENT
Start: 2019-01-01 | End: 2019-01-01 | Stop reason: HOSPADM

## 2019-01-01 RX ORDER — PHENYLEPHRINE HCL IN 0.9% NACL 0.4MG/10ML
SYRINGE (ML) INTRAVENOUS AS NEEDED
Status: DISCONTINUED | OUTPATIENT
Start: 2019-01-01 | End: 2019-01-01 | Stop reason: HOSPADM

## 2019-01-01 RX ORDER — MAGNESIUM SULFATE 1 G/100ML
1 INJECTION INTRAVENOUS AS NEEDED
Status: COMPLETED | OUTPATIENT
Start: 2019-01-01 | End: 2019-01-01

## 2019-01-01 RX ORDER — PAPAVERINE HYDROCHLORIDE 30 MG/ML
10 INJECTION INTRAMUSCULAR; INTRAVENOUS ONCE
Status: COMPLETED | OUTPATIENT
Start: 2019-01-01 | End: 2019-01-01

## 2019-01-01 RX ORDER — DOBUTAMINE HYDROCHLORIDE 200 MG/100ML
INJECTION INTRAVENOUS
Status: DISCONTINUED | OUTPATIENT
Start: 2019-01-01 | End: 2019-01-01 | Stop reason: HOSPADM

## 2019-01-01 RX ORDER — LIDOCAINE 50 MG/G
1 PATCH TOPICAL EVERY 24 HOURS
Status: DISCONTINUED | OUTPATIENT
Start: 2019-01-01 | End: 2019-01-01

## 2019-01-01 RX ORDER — BUMETANIDE 0.25 MG/ML
2 INJECTION INTRAMUSCULAR; INTRAVENOUS ONCE
Status: COMPLETED | OUTPATIENT
Start: 2019-01-01 | End: 2019-01-01

## 2019-01-01 RX ORDER — ALBUMIN HUMAN 50 G/1000ML
SOLUTION INTRAVENOUS AS NEEDED
Status: DISCONTINUED | OUTPATIENT
Start: 2019-01-01 | End: 2019-01-01 | Stop reason: HOSPADM

## 2019-01-01 RX ORDER — MIDAZOLAM HYDROCHLORIDE 1 MG/ML
1 INJECTION, SOLUTION INTRAMUSCULAR; INTRAVENOUS
Status: DISCONTINUED | OUTPATIENT
Start: 2019-01-01 | End: 2019-01-01

## 2019-01-01 RX ORDER — SODIUM CHLORIDE 9 MG/ML
250 INJECTION, SOLUTION INTRAVENOUS AS NEEDED
Status: DISCONTINUED | OUTPATIENT
Start: 2019-01-01 | End: 2019-01-01

## 2019-01-01 RX ORDER — INSULIN GLARGINE 100 [IU]/ML
10 INJECTION, SOLUTION SUBCUTANEOUS DAILY
Status: DISCONTINUED | OUTPATIENT
Start: 2019-01-01 | End: 2019-01-01

## 2019-01-01 RX ORDER — SODIUM BICARBONATE 84 MG/ML
50 INJECTION, SOLUTION INTRAVENOUS ONCE
Status: COMPLETED | OUTPATIENT
Start: 2019-01-01 | End: 2019-01-01

## 2019-01-01 RX ORDER — MIDAZOLAM HYDROCHLORIDE 1 MG/ML
2 INJECTION, SOLUTION INTRAMUSCULAR; INTRAVENOUS ONCE
Status: COMPLETED | OUTPATIENT
Start: 2019-01-01 | End: 2019-01-01

## 2019-01-01 RX ORDER — SODIUM CHLORIDE 0.9 % (FLUSH) 0.9 %
5-40 SYRINGE (ML) INJECTION AS NEEDED
Status: CANCELLED | OUTPATIENT
Start: 2019-01-01

## 2019-01-01 RX ORDER — POLYETHYLENE GLYCOL 3350 17 G/17G
17 POWDER, FOR SOLUTION ORAL DAILY
Status: DISCONTINUED | OUTPATIENT
Start: 2019-01-01 | End: 2019-01-01 | Stop reason: HOSPADM

## 2019-01-01 RX ORDER — BUPIVACAINE HYDROCHLORIDE 2.5 MG/ML
30 INJECTION, SOLUTION EPIDURAL; INFILTRATION; INTRACAUDAL ONCE
Status: COMPLETED | OUTPATIENT
Start: 2019-01-01 | End: 2019-01-01

## 2019-01-01 RX ORDER — SODIUM CHLORIDE 9 MG/ML
3 INJECTION, SOLUTION INTRAVENOUS CONTINUOUS
Status: DISCONTINUED | OUTPATIENT
Start: 2019-01-01 | End: 2019-01-01 | Stop reason: HOSPADM

## 2019-01-01 RX ORDER — NITROGLYCERIN 0.4 MG/1
0.4 TABLET SUBLINGUAL AS NEEDED
Status: DISCONTINUED | OUTPATIENT
Start: 2019-01-01 | End: 2019-01-01

## 2019-01-01 RX ORDER — HEPARIN SOD,PORCINE/0.9 % NACL 30K/1000ML
50-1000 INTRAVENOUS SOLUTION INTRAVENOUS AS NEEDED
Status: DISCONTINUED | OUTPATIENT
Start: 2019-01-01 | End: 2019-01-01 | Stop reason: HOSPADM

## 2019-01-01 RX ORDER — QUETIAPINE FUMARATE 25 MG/1
50 TABLET, FILM COATED ORAL 2 TIMES DAILY
Status: DISCONTINUED | OUTPATIENT
Start: 2019-01-01 | End: 2019-01-01 | Stop reason: HOSPADM

## 2019-01-01 RX ORDER — DEXTROSE MONOHYDRATE 50 MG/ML
75 INJECTION, SOLUTION INTRAVENOUS CONTINUOUS
Status: DISPENSED | OUTPATIENT
Start: 2019-01-01 | End: 2019-01-01

## 2019-01-01 RX ORDER — POTASSIUM CHLORIDE 29.8 MG/ML
20 INJECTION INTRAVENOUS ONCE
Status: ACTIVE | OUTPATIENT
Start: 2019-01-01 | End: 2019-01-01

## 2019-01-01 RX ORDER — PHENYLEPHRINE 10 MG/250 ML(40 MCG/ML)IN 0.9 % SOD.CHLORIDE INTRAVENOUS
10-100
Status: DISCONTINUED | OUTPATIENT
Start: 2019-01-01 | End: 2019-01-01

## 2019-01-01 RX ORDER — SUFENTANIL CITRATE 50 UG/ML
INJECTION EPIDURAL; INTRAVENOUS AS NEEDED
Status: DISCONTINUED | OUTPATIENT
Start: 2019-01-01 | End: 2019-01-01 | Stop reason: HOSPADM

## 2019-01-01 RX ORDER — CEFAZOLIN SODIUM/WATER 2 G/20 ML
2 SYRINGE (ML) INTRAVENOUS ONCE
Status: COMPLETED | OUTPATIENT
Start: 2019-01-01 | End: 2019-01-01

## 2019-01-01 RX ORDER — GUAIFENESIN 100 MG/5ML
400 SOLUTION ORAL
Status: DISCONTINUED | OUTPATIENT
Start: 2019-01-01 | End: 2019-01-01 | Stop reason: HOSPADM

## 2019-01-01 RX ORDER — LISINOPRIL 40 MG/1
40 TABLET ORAL DAILY
COMMUNITY

## 2019-01-01 RX ORDER — POTASSIUM CHLORIDE 29.8 MG/ML
20 INJECTION INTRAVENOUS
Status: COMPLETED | OUTPATIENT
Start: 2019-01-01 | End: 2019-01-01

## 2019-01-01 RX ORDER — SODIUM CHLORIDE 9 MG/ML
10 INJECTION, SOLUTION INTRAVENOUS CONTINUOUS
Status: DISCONTINUED | OUTPATIENT
Start: 2019-01-01 | End: 2019-01-01 | Stop reason: HOSPADM

## 2019-01-01 RX ORDER — OXYCODONE HYDROCHLORIDE 5 MG/1
5 TABLET ORAL AS NEEDED
Status: CANCELLED | OUTPATIENT
Start: 2019-01-01

## 2019-01-01 RX ORDER — METOLAZONE 2.5 MG/1
2.5 TABLET ORAL ONCE
Status: DISCONTINUED | OUTPATIENT
Start: 2019-01-01 | End: 2019-01-01

## 2019-01-01 RX ORDER — METOPROLOL TARTRATE 25 MG/1
25 TABLET, FILM COATED ORAL 2 TIMES DAILY
Status: DISCONTINUED | OUTPATIENT
Start: 2019-01-01 | End: 2019-01-01

## 2019-01-01 RX ORDER — METOLAZONE 5 MG/1
5 TABLET ORAL ONCE
Status: COMPLETED | OUTPATIENT
Start: 2019-01-01 | End: 2019-01-01

## 2019-01-01 RX ORDER — PREDNISONE 5 MG/1
10 TABLET ORAL
Status: DISCONTINUED | OUTPATIENT
Start: 2019-01-01 | End: 2019-01-01

## 2019-01-01 RX ORDER — CHLORHEXIDINE GLUCONATE 1.2 MG/ML
10 RINSE ORAL EVERY 12 HOURS
Status: DISCONTINUED | OUTPATIENT
Start: 2019-01-01 | End: 2019-01-01 | Stop reason: HOSPADM

## 2019-01-01 RX ORDER — DESMOPRESSIN ACETATE 4 UG/ML
INJECTION, SOLUTION INTRAVENOUS; SUBCUTANEOUS AS NEEDED
Status: DISCONTINUED | OUTPATIENT
Start: 2019-01-01 | End: 2019-01-01 | Stop reason: HOSPADM

## 2019-01-01 RX ORDER — LORAZEPAM 2 MG/ML
1-2 INJECTION INTRAMUSCULAR
Status: DISCONTINUED | OUTPATIENT
Start: 2019-01-01 | End: 2019-01-01 | Stop reason: HOSPADM

## 2019-01-01 RX ORDER — ROPIVACAINE HYDROCHLORIDE 5 MG/ML
30 INJECTION, SOLUTION EPIDURAL; INFILTRATION; PERINEURAL AS NEEDED
Status: DISCONTINUED | OUTPATIENT
Start: 2019-01-01 | End: 2019-01-01 | Stop reason: HOSPADM

## 2019-01-01 RX ORDER — METOPROLOL TARTRATE 5 MG/5ML
2.5 INJECTION INTRAVENOUS EVERY 6 HOURS
Status: DISCONTINUED | OUTPATIENT
Start: 2019-01-01 | End: 2019-01-01

## 2019-01-01 RX ORDER — ONDANSETRON 2 MG/ML
4 INJECTION INTRAMUSCULAR; INTRAVENOUS
Status: DISCONTINUED | OUTPATIENT
Start: 2019-01-01 | End: 2019-01-01 | Stop reason: HOSPADM

## 2019-01-01 RX ORDER — HEPARIN SOD,PORCINE/0.9 % NACL 30K/1000ML
50-1000 INTRAVENOUS SOLUTION INTRAVENOUS AS NEEDED
Status: DISCONTINUED | OUTPATIENT
Start: 2019-01-01 | End: 2019-01-01

## 2019-01-01 RX ORDER — SODIUM CHLORIDE 0.9 % (FLUSH) 0.9 %
5-40 SYRINGE (ML) INJECTION AS NEEDED
Status: DISCONTINUED | OUTPATIENT
Start: 2019-01-01 | End: 2019-01-01 | Stop reason: HOSPADM

## 2019-01-01 RX ORDER — INSULIN GLARGINE 100 [IU]/ML
20 INJECTION, SOLUTION SUBCUTANEOUS EVERY 12 HOURS
Status: DISCONTINUED | OUTPATIENT
Start: 2019-01-01 | End: 2019-01-01

## 2019-01-01 RX ORDER — DEXTROSE MONOHYDRATE 100 MG/ML
0-250 INJECTION, SOLUTION INTRAVENOUS AS NEEDED
Status: DISCONTINUED | OUTPATIENT
Start: 2019-01-01 | End: 2019-01-01 | Stop reason: HOSPADM

## 2019-01-01 RX ORDER — MORPHINE SULFATE 10 MG/ML
2 INJECTION, SOLUTION INTRAMUSCULAR; INTRAVENOUS
Status: CANCELLED | OUTPATIENT
Start: 2019-01-01

## 2019-01-01 RX ORDER — SODIUM CHLORIDE, SODIUM LACTATE, POTASSIUM CHLORIDE, CALCIUM CHLORIDE 600; 310; 30; 20 MG/100ML; MG/100ML; MG/100ML; MG/100ML
25 INJECTION, SOLUTION INTRAVENOUS CONTINUOUS
Status: DISCONTINUED | OUTPATIENT
Start: 2019-01-01 | End: 2019-01-01 | Stop reason: HOSPADM

## 2019-01-01 RX ORDER — HYDRALAZINE HYDROCHLORIDE 20 MG/ML
10 INJECTION INTRAMUSCULAR; INTRAVENOUS
Status: DISCONTINUED | OUTPATIENT
Start: 2019-01-01 | End: 2019-01-01 | Stop reason: HOSPADM

## 2019-01-01 RX ORDER — MIDAZOLAM IN 0.9 % SOD.CHLORID 1 MG/ML
0-10 PLASTIC BAG, INJECTION (ML) INTRAVENOUS
Status: DISCONTINUED | OUTPATIENT
Start: 2019-01-01 | End: 2019-01-01

## 2019-01-01 RX ORDER — SUCCINYLCHOLINE CHLORIDE 20 MG/ML
10 INJECTION INTRAMUSCULAR; INTRAVENOUS
Status: COMPLETED | OUTPATIENT
Start: 2019-01-01 | End: 2019-01-01

## 2019-01-01 RX ORDER — QUETIAPINE FUMARATE 25 MG/1
25 TABLET, FILM COATED ORAL
Status: DISCONTINUED | OUTPATIENT
Start: 2019-01-01 | End: 2019-01-01

## 2019-01-01 RX ORDER — DOBUTAMINE HYDROCHLORIDE 200 MG/100ML
2.5 INJECTION INTRAVENOUS
Status: DISCONTINUED | OUTPATIENT
Start: 2019-01-01 | End: 2019-01-01 | Stop reason: SDUPTHER

## 2019-01-01 RX ORDER — SERTRALINE HYDROCHLORIDE 50 MG/1
100 TABLET, FILM COATED ORAL DAILY
Status: DISCONTINUED | OUTPATIENT
Start: 2019-01-01 | End: 2019-01-01 | Stop reason: HOSPADM

## 2019-01-01 RX ORDER — HYDROMORPHONE HYDROCHLORIDE 1 MG/ML
0.5 INJECTION, SOLUTION INTRAMUSCULAR; INTRAVENOUS; SUBCUTANEOUS
Status: DISCONTINUED | OUTPATIENT
Start: 2019-01-01 | End: 2019-01-01

## 2019-01-01 RX ORDER — PROTAMINE SULFATE 10 MG/ML
500 INJECTION, SOLUTION INTRAVENOUS ONCE
Status: DISPENSED | OUTPATIENT
Start: 2019-01-01 | End: 2019-01-01

## 2019-01-01 RX ORDER — FUROSEMIDE 10 MG/ML
40 INJECTION INTRAMUSCULAR; INTRAVENOUS EVERY 12 HOURS
Status: DISCONTINUED | OUTPATIENT
Start: 2019-01-01 | End: 2019-01-01

## 2019-01-01 RX ORDER — MAGNESIUM SULFATE 100 %
4 CRYSTALS MISCELLANEOUS AS NEEDED
Status: DISCONTINUED | OUTPATIENT
Start: 2019-01-01 | End: 2019-01-01 | Stop reason: HOSPADM

## 2019-01-01 RX ORDER — ZOLPIDEM TARTRATE 5 MG/1
5 TABLET ORAL
Status: DISCONTINUED | OUTPATIENT
Start: 2019-01-01 | End: 2019-01-01

## 2019-01-01 RX ORDER — PHENYLEPHRINE 10 MG/250 ML(40 MCG/ML)IN 0.9 % SOD.CHLORIDE INTRAVENOUS
10-100
Status: DISCONTINUED | OUTPATIENT
Start: 2019-01-01 | End: 2019-01-01 | Stop reason: HOSPADM

## 2019-01-01 RX ORDER — INSULIN GLARGINE 100 [IU]/ML
35 INJECTION, SOLUTION SUBCUTANEOUS EVERY 12 HOURS
Status: DISCONTINUED | OUTPATIENT
Start: 2019-01-01 | End: 2019-01-01

## 2019-01-01 RX ORDER — SODIUM CHLORIDE 9 MG/ML
250 INJECTION, SOLUTION INTRAVENOUS AS NEEDED
Status: DISCONTINUED | OUTPATIENT
Start: 2019-01-01 | End: 2019-01-01 | Stop reason: HOSPADM

## 2019-01-01 RX ORDER — SERTRALINE HYDROCHLORIDE 100 MG/1
100 TABLET, FILM COATED ORAL DAILY
COMMUNITY

## 2019-01-01 RX ORDER — INSULIN LISPRO 100 [IU]/ML
INJECTION, SOLUTION INTRAVENOUS; SUBCUTANEOUS
Status: DISCONTINUED | OUTPATIENT
Start: 2019-01-01 | End: 2019-01-01

## 2019-01-01 RX ORDER — ONDANSETRON 2 MG/ML
4 INJECTION INTRAMUSCULAR; INTRAVENOUS AS NEEDED
Status: CANCELLED | OUTPATIENT
Start: 2019-01-01

## 2019-01-01 RX ORDER — HEPARIN SODIUM 5000 [USP'U]/ML
5000 INJECTION, SOLUTION INTRAVENOUS; SUBCUTANEOUS EVERY 8 HOURS
Status: DISCONTINUED | OUTPATIENT
Start: 2019-01-01 | End: 2019-01-01 | Stop reason: HOSPADM

## 2019-01-01 RX ORDER — BALSAM PERU/CASTOR OIL
OINTMENT (GRAM) TOPICAL 2 TIMES DAILY
Status: DISCONTINUED | OUTPATIENT
Start: 2019-01-01 | End: 2019-01-01 | Stop reason: HOSPADM

## 2019-01-01 RX ORDER — DOBUTAMINE HYDROCHLORIDE 200 MG/100ML
0-10 INJECTION INTRAVENOUS
Status: DISCONTINUED | OUTPATIENT
Start: 2019-01-01 | End: 2019-01-01

## 2019-01-01 RX ORDER — FUROSEMIDE 10 MG/ML
40 INJECTION INTRAMUSCULAR; INTRAVENOUS ONCE
Status: COMPLETED | OUTPATIENT
Start: 2019-01-01 | End: 2019-01-01

## 2019-01-01 RX ORDER — NALOXONE HYDROCHLORIDE 0.4 MG/ML
0.4 INJECTION, SOLUTION INTRAMUSCULAR; INTRAVENOUS; SUBCUTANEOUS AS NEEDED
Status: DISCONTINUED | OUTPATIENT
Start: 2019-01-01 | End: 2019-01-01 | Stop reason: HOSPADM

## 2019-01-01 RX ORDER — ENOXAPARIN SODIUM 100 MG/ML
1 INJECTION SUBCUTANEOUS EVERY 12 HOURS
Status: DISCONTINUED | OUTPATIENT
Start: 2019-01-01 | End: 2019-01-01

## 2019-01-01 RX ORDER — NITROGLYCERIN 20 MG/100ML
0-200 INJECTION INTRAVENOUS
Status: DISCONTINUED | OUTPATIENT
Start: 2019-01-01 | End: 2019-01-01

## 2019-01-01 RX ORDER — CARVEDILOL 12.5 MG/1
12.5 TABLET ORAL 2 TIMES DAILY WITH MEALS
Status: DISCONTINUED | OUTPATIENT
Start: 2019-01-01 | End: 2019-01-01

## 2019-01-01 RX ORDER — CEFAZOLIN SODIUM/WATER 2 G/20 ML
2 SYRINGE (ML) INTRAVENOUS EVERY 8 HOURS
Status: COMPLETED | OUTPATIENT
Start: 2019-01-01 | End: 2019-01-01

## 2019-01-01 RX ORDER — MAGNESIUM SULFATE 1 G/100ML
1 INJECTION INTRAVENOUS ONCE
Status: COMPLETED | OUTPATIENT
Start: 2019-01-01 | End: 2019-01-01

## 2019-01-01 RX ORDER — BUMETANIDE 0.25 MG/ML
2 INJECTION INTRAMUSCULAR; INTRAVENOUS 2 TIMES DAILY
Status: DISCONTINUED | OUTPATIENT
Start: 2019-01-01 | End: 2019-01-01

## 2019-01-01 RX ORDER — DIPHENHYDRAMINE HYDROCHLORIDE 50 MG/ML
12.5 INJECTION, SOLUTION INTRAMUSCULAR; INTRAVENOUS AS NEEDED
Status: CANCELLED | OUTPATIENT
Start: 2019-01-01 | End: 2019-01-01

## 2019-01-01 RX ORDER — AMOXICILLIN 250 MG
1 CAPSULE ORAL
Status: DISCONTINUED | OUTPATIENT
Start: 2019-01-01 | End: 2019-01-01

## 2019-01-01 RX ORDER — MUPIROCIN 20 MG/G
OINTMENT TOPICAL 2 TIMES DAILY
Status: COMPLETED | OUTPATIENT
Start: 2019-01-01 | End: 2019-01-01

## 2019-01-01 RX ORDER — SODIUM CHLORIDE 9 MG/ML
9 INJECTION, SOLUTION INTRAVENOUS CONTINUOUS
Status: DISCONTINUED | OUTPATIENT
Start: 2019-01-01 | End: 2019-01-01

## 2019-01-01 RX ORDER — MAGNESIUM SULFATE HEPTAHYDRATE 40 MG/ML
2 INJECTION, SOLUTION INTRAVENOUS ONCE
Status: ACTIVE | OUTPATIENT
Start: 2019-01-01 | End: 2019-01-01

## 2019-01-01 RX ORDER — ROCURONIUM BROMIDE 10 MG/ML
INJECTION, SOLUTION INTRAVENOUS AS NEEDED
Status: DISCONTINUED | OUTPATIENT
Start: 2019-01-01 | End: 2019-01-01 | Stop reason: HOSPADM

## 2019-01-01 RX ORDER — SODIUM CHLORIDE 9 MG/ML
25 INJECTION, SOLUTION INTRAVENOUS CONTINUOUS
Status: DISCONTINUED | OUTPATIENT
Start: 2019-01-01 | End: 2019-01-01 | Stop reason: HOSPADM

## 2019-01-01 RX ORDER — DOCUSATE SODIUM 100 MG/1
100 CAPSULE, LIQUID FILLED ORAL
Status: DISCONTINUED | OUTPATIENT
Start: 2019-01-01 | End: 2019-01-01

## 2019-01-01 RX ORDER — MIDAZOLAM HYDROCHLORIDE 1 MG/ML
1 INJECTION, SOLUTION INTRAMUSCULAR; INTRAVENOUS AS NEEDED
Status: DISCONTINUED | OUTPATIENT
Start: 2019-01-01 | End: 2019-01-01 | Stop reason: HOSPADM

## 2019-01-01 RX ORDER — POTASSIUM CHLORIDE 29.8 MG/ML
20 INJECTION INTRAVENOUS ONCE
Status: COMPLETED | OUTPATIENT
Start: 2019-01-01 | End: 2019-01-01

## 2019-01-01 RX ORDER — ACETAMINOPHEN 325 MG/1
650 TABLET ORAL
Status: DISCONTINUED | OUTPATIENT
Start: 2019-01-01 | End: 2019-01-01 | Stop reason: HOSPADM

## 2019-01-01 RX ORDER — SODIUM CHLORIDE 0.9 % (FLUSH) 0.9 %
5-40 SYRINGE (ML) INJECTION EVERY 8 HOURS
Status: DISCONTINUED | OUTPATIENT
Start: 2019-01-01 | End: 2019-01-01

## 2019-01-01 RX ORDER — SODIUM CHLORIDE 9 MG/ML
INJECTION, SOLUTION INTRAVENOUS
Status: DISCONTINUED | OUTPATIENT
Start: 2019-01-01 | End: 2019-01-01 | Stop reason: HOSPADM

## 2019-01-01 RX ORDER — FACIAL-BODY WIPES
10 EACH TOPICAL DAILY PRN
Status: DISCONTINUED | OUTPATIENT
Start: 2019-01-01 | End: 2019-01-01 | Stop reason: HOSPADM

## 2019-01-01 RX ORDER — INSULIN GLARGINE 100 [IU]/ML
45 INJECTION, SOLUTION SUBCUTANEOUS EVERY 12 HOURS
Status: DISCONTINUED | OUTPATIENT
Start: 2019-01-01 | End: 2019-01-01

## 2019-01-01 RX ORDER — BUMETANIDE 0.25 MG/ML
1.5 INJECTION INTRAMUSCULAR; INTRAVENOUS EVERY 8 HOURS
Status: DISCONTINUED | OUTPATIENT
Start: 2019-01-01 | End: 2019-01-01 | Stop reason: HOSPADM

## 2019-01-01 RX ORDER — ASPIRIN 325 MG
325 TABLET ORAL ONCE
Status: COMPLETED | OUTPATIENT
Start: 2019-01-01 | End: 2019-01-01

## 2019-01-01 RX ORDER — FERROUS SULFATE 300 MG/5ML
300 LIQUID (ML) ORAL
Status: DISCONTINUED | OUTPATIENT
Start: 2019-01-01 | End: 2019-01-01 | Stop reason: HOSPADM

## 2019-01-01 RX ORDER — INSULIN GLARGINE 100 [IU]/ML
1-50 INJECTION, SOLUTION SUBCUTANEOUS
Status: ACTIVE | OUTPATIENT
Start: 2019-01-01 | End: 2019-01-01

## 2019-01-01 RX ORDER — FENTANYL CITRATE 50 UG/ML
25 INJECTION, SOLUTION INTRAMUSCULAR; INTRAVENOUS
Status: CANCELLED | OUTPATIENT
Start: 2019-01-01

## 2019-01-01 RX ORDER — BUMETANIDE 0.25 MG/ML
1 INJECTION INTRAMUSCULAR; INTRAVENOUS EVERY 12 HOURS
Status: DISCONTINUED | OUTPATIENT
Start: 2019-01-01 | End: 2019-01-01

## 2019-01-01 RX ORDER — BACITRACIN 500 UNIT/G
1 PACKET (EA) TOPICAL AS NEEDED
Status: DISCONTINUED | OUTPATIENT
Start: 2019-01-01 | End: 2019-01-01 | Stop reason: HOSPADM

## 2019-01-01 RX ORDER — AMIODARONE HYDROCHLORIDE 200 MG/1
400 TABLET ORAL EVERY 12 HOURS
Status: DISCONTINUED | OUTPATIENT
Start: 2019-01-01 | End: 2019-01-01 | Stop reason: HOSPADM

## 2019-01-01 RX ORDER — PROPOFOL 10 MG/ML
INJECTION, EMULSION INTRAVENOUS AS NEEDED
Status: DISCONTINUED | OUTPATIENT
Start: 2019-01-01 | End: 2019-01-01 | Stop reason: HOSPADM

## 2019-01-01 RX ORDER — ALBUMIN HUMAN 50 G/1000ML
12.5 SOLUTION INTRAVENOUS ONCE
Status: COMPLETED | OUTPATIENT
Start: 2019-01-01 | End: 2019-01-01

## 2019-01-01 RX ORDER — PANTOPRAZOLE SODIUM 40 MG/1
40 TABLET, DELAYED RELEASE ORAL
Status: DISCONTINUED | OUTPATIENT
Start: 2019-01-01 | End: 2019-01-01

## 2019-01-01 RX ORDER — SODIUM CHLORIDE 450 MG/100ML
10 INJECTION, SOLUTION INTRAVENOUS CONTINUOUS
Status: DISCONTINUED | OUTPATIENT
Start: 2019-01-01 | End: 2019-01-01

## 2019-01-01 RX ORDER — QUETIAPINE FUMARATE 25 MG/1
25 TABLET, FILM COATED ORAL ONCE
Status: COMPLETED | OUTPATIENT
Start: 2019-01-01 | End: 2019-01-01

## 2019-01-01 RX ORDER — METOLAZONE 2.5 MG/1
2.5 TABLET ORAL ONCE
Status: COMPLETED | OUTPATIENT
Start: 2019-01-01 | End: 2019-01-01

## 2019-01-01 RX ORDER — MAGNESIUM SULFATE HEPTAHYDRATE 40 MG/ML
2 INJECTION, SOLUTION INTRAVENOUS ONCE
Status: DISCONTINUED | OUTPATIENT
Start: 2019-01-01 | End: 2019-01-01 | Stop reason: HOSPADM

## 2019-01-01 RX ORDER — FENTANYL CITRATE 50 UG/ML
INJECTION, SOLUTION INTRAMUSCULAR; INTRAVENOUS AS NEEDED
Status: DISCONTINUED | OUTPATIENT
Start: 2019-01-01 | End: 2019-01-01 | Stop reason: HOSPADM

## 2019-01-01 RX ORDER — LANOLIN ALCOHOL/MO/W.PET/CERES
1 CREAM (GRAM) TOPICAL
Status: DISCONTINUED | OUTPATIENT
Start: 2019-01-01 | End: 2019-01-01

## 2019-01-01 RX ORDER — GUAIFENESIN 100 MG/5ML
400 SOLUTION ORAL EVERY 6 HOURS
Status: DISCONTINUED | OUTPATIENT
Start: 2019-01-01 | End: 2019-01-01

## 2019-01-01 RX ORDER — QUETIAPINE FUMARATE 25 MG/1
25 TABLET, FILM COATED ORAL 2 TIMES DAILY
Status: DISCONTINUED | OUTPATIENT
Start: 2019-01-01 | End: 2019-01-01

## 2019-01-01 RX ORDER — AMOXICILLIN 250 MG
1 CAPSULE ORAL 2 TIMES DAILY
Status: DISCONTINUED | OUTPATIENT
Start: 2019-01-01 | End: 2019-01-01

## 2019-01-01 RX ORDER — BUPIVACAINE HYDROCHLORIDE 2.5 MG/ML
INJECTION, SOLUTION EPIDURAL; INFILTRATION; INTRACAUDAL
Status: COMPLETED
Start: 2019-01-01 | End: 2019-01-01

## 2019-01-01 RX ORDER — ACETAMINOPHEN 10 MG/ML
1000 INJECTION, SOLUTION INTRAVENOUS EVERY 6 HOURS
Status: DISPENSED | OUTPATIENT
Start: 2019-01-01 | End: 2019-01-01

## 2019-01-01 RX ORDER — CEFAZOLIN SODIUM 1 G/3ML
INJECTION, POWDER, FOR SOLUTION INTRAMUSCULAR; INTRAVENOUS AS NEEDED
Status: DISCONTINUED | OUTPATIENT
Start: 2019-01-01 | End: 2019-01-01 | Stop reason: HOSPADM

## 2019-01-01 RX ORDER — PROTAMINE SULFATE 10 MG/ML
INJECTION, SOLUTION INTRAVENOUS AS NEEDED
Status: DISCONTINUED | OUTPATIENT
Start: 2019-01-01 | End: 2019-01-01 | Stop reason: HOSPADM

## 2019-01-01 RX ORDER — ALBUMIN HUMAN 50 G/1000ML
12.5 SOLUTION INTRAVENOUS
Status: COMPLETED | OUTPATIENT
Start: 2019-01-01 | End: 2019-01-01

## 2019-01-01 RX ORDER — FENTANYL CITRATE 50 UG/ML
50 INJECTION, SOLUTION INTRAMUSCULAR; INTRAVENOUS AS NEEDED
Status: DISCONTINUED | OUTPATIENT
Start: 2019-01-01 | End: 2019-01-01 | Stop reason: HOSPADM

## 2019-01-01 RX ORDER — LIDOCAINE HYDROCHLORIDE 10 MG/ML
INJECTION INFILTRATION; PERINEURAL AS NEEDED
Status: DISCONTINUED | OUTPATIENT
Start: 2019-01-01 | End: 2019-01-01 | Stop reason: HOSPADM

## 2019-01-01 RX ORDER — SODIUM BICARBONATE 84 MG/ML
INJECTION, SOLUTION INTRAVENOUS
Status: COMPLETED
Start: 2019-01-01 | End: 2019-01-01

## 2019-01-01 RX ORDER — PROPOFOL 10 MG/ML
INJECTION, EMULSION INTRAVENOUS
Status: DISCONTINUED | OUTPATIENT
Start: 2019-01-01 | End: 2019-01-01 | Stop reason: HOSPADM

## 2019-01-01 RX ORDER — ALBUMIN HUMAN 50 G/1000ML
SOLUTION INTRAVENOUS
Status: DISPENSED
Start: 2019-01-01 | End: 2019-01-01

## 2019-01-01 RX ORDER — GUAIFENESIN 100 MG/5ML
81 LIQUID (ML) ORAL DAILY
Status: DISCONTINUED | OUTPATIENT
Start: 2019-01-01 | End: 2019-01-01

## 2019-01-01 RX ORDER — INSULIN GLARGINE 100 [IU]/ML
40 INJECTION, SOLUTION SUBCUTANEOUS EVERY 12 HOURS
Status: DISCONTINUED | OUTPATIENT
Start: 2019-01-01 | End: 2019-01-01

## 2019-01-01 RX ORDER — SODIUM CHLORIDE, SODIUM LACTATE, POTASSIUM CHLORIDE, CALCIUM CHLORIDE 600; 310; 30; 20 MG/100ML; MG/100ML; MG/100ML; MG/100ML
100 INJECTION, SOLUTION INTRAVENOUS CONTINUOUS
Status: CANCELLED | OUTPATIENT
Start: 2019-01-01

## 2019-01-01 RX ORDER — NOREPINEPHRINE BITARTRATE/D5W 8 MG/250ML
.5-16 PLASTIC BAG, INJECTION (ML) INTRAVENOUS
Status: DISCONTINUED | OUTPATIENT
Start: 2019-01-01 | End: 2019-01-01

## 2019-01-01 RX ORDER — SODIUM CHLORIDE 9 MG/ML
6 INJECTION, SOLUTION INTRAVENOUS CONTINUOUS
Status: DISCONTINUED | OUTPATIENT
Start: 2019-01-01 | End: 2019-01-01

## 2019-01-01 RX ORDER — ROCURONIUM BROMIDE 10 MG/ML
20 INJECTION, SOLUTION INTRAVENOUS
Status: COMPLETED | OUTPATIENT
Start: 2019-01-01 | End: 2019-01-01

## 2019-01-01 RX ORDER — ALBUMIN HUMAN 50 G/1000ML
25 SOLUTION INTRAVENOUS ONCE
Status: DISCONTINUED | OUTPATIENT
Start: 2019-01-01 | End: 2019-01-01 | Stop reason: HOSPADM

## 2019-01-01 RX ORDER — SODIUM CHLORIDE 0.9 % (FLUSH) 0.9 %
5-40 SYRINGE (ML) INJECTION EVERY 8 HOURS
Status: DISCONTINUED | OUTPATIENT
Start: 2019-01-01 | End: 2019-01-01 | Stop reason: HOSPADM

## 2019-01-01 RX ORDER — ALBUMIN HUMAN 50 G/1000ML
12.5 SOLUTION INTRAVENOUS EVERY 6 HOURS
Status: DISPENSED | OUTPATIENT
Start: 2019-01-01 | End: 2019-01-01

## 2019-01-01 RX ORDER — INSULIN LISPRO 100 [IU]/ML
INJECTION, SOLUTION INTRAVENOUS; SUBCUTANEOUS EVERY 6 HOURS
Status: DISCONTINUED | OUTPATIENT
Start: 2019-01-01 | End: 2019-01-01 | Stop reason: HOSPADM

## 2019-01-01 RX ORDER — NALOXONE HYDROCHLORIDE 1 MG/ML
0.4 INJECTION INTRAMUSCULAR; INTRAVENOUS; SUBCUTANEOUS ONCE
Status: DISPENSED | OUTPATIENT
Start: 2019-01-01 | End: 2019-01-01

## 2019-01-01 RX ORDER — ROCURONIUM BROMIDE 10 MG/ML
INJECTION, SOLUTION INTRAVENOUS
Status: DISCONTINUED
Start: 2019-01-01 | End: 2019-01-01 | Stop reason: WASHOUT

## 2019-01-01 RX ORDER — CEFAZOLIN SODIUM/WATER 2 G/20 ML
2 SYRINGE (ML) INTRAVENOUS EVERY 8 HOURS
Status: DISCONTINUED | OUTPATIENT
Start: 2019-01-01 | End: 2019-01-01 | Stop reason: SDUPTHER

## 2019-01-01 RX ORDER — SODIUM CHLORIDE, SODIUM LACTATE, POTASSIUM CHLORIDE, CALCIUM CHLORIDE 600; 310; 30; 20 MG/100ML; MG/100ML; MG/100ML; MG/100ML
INJECTION, SOLUTION INTRAVENOUS
Status: DISCONTINUED | OUTPATIENT
Start: 2019-01-01 | End: 2019-01-01 | Stop reason: HOSPADM

## 2019-01-01 RX ORDER — BUMETANIDE 0.25 MG/ML
2 INJECTION INTRAMUSCULAR; INTRAVENOUS 3 TIMES DAILY
Status: DISCONTINUED | OUTPATIENT
Start: 2019-01-01 | End: 2019-01-01

## 2019-01-01 RX ORDER — ROCURONIUM BROMIDE 10 MG/ML
INJECTION, SOLUTION INTRAVENOUS
Status: COMPLETED
Start: 2019-01-01 | End: 2019-01-01

## 2019-01-01 RX ORDER — POTASSIUM CHLORIDE 29.8 MG/ML
20 INJECTION INTRAVENOUS
Status: DISPENSED | OUTPATIENT
Start: 2019-01-01 | End: 2019-01-01

## 2019-01-01 RX ORDER — DIPHENHYDRAMINE HCL 25 MG
25 CAPSULE ORAL
Status: DISCONTINUED | OUTPATIENT
Start: 2019-01-01 | End: 2019-01-01 | Stop reason: HOSPADM

## 2019-01-01 RX ORDER — MORPHINE SULFATE 2 MG/ML
2 INJECTION, SOLUTION INTRAMUSCULAR; INTRAVENOUS
Status: DISCONTINUED | OUTPATIENT
Start: 2019-01-01 | End: 2019-01-01 | Stop reason: HOSPADM

## 2019-01-01 RX ORDER — INSULIN LISPRO 100 [IU]/ML
INJECTION, SOLUTION INTRAVENOUS; SUBCUTANEOUS EVERY 6 HOURS
Status: DISCONTINUED | OUTPATIENT
Start: 2019-01-01 | End: 2019-01-01

## 2019-01-01 RX ORDER — INSULIN GLARGINE 100 [IU]/ML
38 INJECTION, SOLUTION SUBCUTANEOUS EVERY 12 HOURS
Status: DISCONTINUED | OUTPATIENT
Start: 2019-01-01 | End: 2019-01-01

## 2019-01-01 RX ORDER — IBUPROFEN 200 MG
1 TABLET ORAL DAILY
Status: DISCONTINUED | OUTPATIENT
Start: 2019-01-01 | End: 2019-01-01

## 2019-01-01 RX ORDER — POTASSIUM CHLORIDE 750 MG/1
40 TABLET, FILM COATED, EXTENDED RELEASE ORAL 2 TIMES DAILY
Status: DISCONTINUED | OUTPATIENT
Start: 2019-01-01 | End: 2019-01-01

## 2019-01-01 RX ORDER — NYSTATIN 100000 [USP'U]/ML
500000 SUSPENSION ORAL 4 TIMES DAILY
Status: DISCONTINUED | OUTPATIENT
Start: 2019-01-01 | End: 2019-01-01 | Stop reason: HOSPADM

## 2019-01-01 RX ORDER — BUMETANIDE 0.25 MG/ML
1 INJECTION INTRAMUSCULAR; INTRAVENOUS EVERY 8 HOURS
Status: DISCONTINUED | OUTPATIENT
Start: 2019-01-01 | End: 2019-01-01

## 2019-01-01 RX ORDER — DESMOPRESSIN ACETATE 4 UG/ML
1 INJECTION, SOLUTION INTRAVENOUS; SUBCUTANEOUS EVERY 12 HOURS
Status: DISCONTINUED | OUTPATIENT
Start: 2019-01-01 | End: 2019-01-01

## 2019-01-01 RX ORDER — HYDRALAZINE HYDROCHLORIDE 20 MG/ML
10 INJECTION INTRAMUSCULAR; INTRAVENOUS
Status: DISCONTINUED | OUTPATIENT
Start: 2019-01-01 | End: 2019-01-01

## 2019-01-01 RX ORDER — PRAVASTATIN SODIUM 40 MG/1
40 TABLET ORAL
Status: DISCONTINUED | OUTPATIENT
Start: 2019-01-01 | End: 2019-01-01 | Stop reason: HOSPADM

## 2019-01-01 RX ORDER — HYDRALAZINE HYDROCHLORIDE 10 MG/1
10 TABLET, FILM COATED ORAL 3 TIMES DAILY
Status: DISCONTINUED | OUTPATIENT
Start: 2019-01-01 | End: 2019-01-01

## 2019-01-01 RX ORDER — INSULIN GLARGINE 100 [IU]/ML
30 INJECTION, SOLUTION SUBCUTANEOUS EVERY 12 HOURS
Status: DISCONTINUED | OUTPATIENT
Start: 2019-01-01 | End: 2019-01-01

## 2019-01-01 RX ORDER — ALBUTEROL SULFATE 0.83 MG/ML
2.5 SOLUTION RESPIRATORY (INHALATION)
Status: DISCONTINUED | OUTPATIENT
Start: 2019-01-01 | End: 2019-01-01 | Stop reason: HOSPADM

## 2019-01-01 RX ORDER — POTASSIUM CHLORIDE 750 MG/1
20 TABLET, FILM COATED, EXTENDED RELEASE ORAL 2 TIMES DAILY
Status: DISCONTINUED | OUTPATIENT
Start: 2019-01-01 | End: 2019-01-01

## 2019-01-01 RX ORDER — DEXTROSE MONOHYDRATE 50 MG/ML
65 INJECTION, SOLUTION INTRAVENOUS CONTINUOUS
Status: DISCONTINUED | OUTPATIENT
Start: 2019-01-01 | End: 2019-01-01

## 2019-01-01 RX ORDER — METOPROLOL TARTRATE 5 MG/5ML
INJECTION INTRAVENOUS
Status: DISPENSED
Start: 2019-01-01 | End: 2019-01-01

## 2019-01-01 RX ORDER — METOPROLOL TARTRATE 25 MG/1
12.5 TABLET, FILM COATED ORAL EVERY 12 HOURS
Status: DISCONTINUED | OUTPATIENT
Start: 2019-01-01 | End: 2019-01-01

## 2019-01-01 RX ORDER — FENTANYL CITRATE 50 UG/ML
2 INJECTION, SOLUTION INTRAMUSCULAR; INTRAVENOUS ONCE
Status: COMPLETED | OUTPATIENT
Start: 2019-01-01 | End: 2019-01-01

## 2019-01-01 RX ORDER — AMIODARONE HYDROCHLORIDE 200 MG/1
400 TABLET ORAL EVERY 12 HOURS
Status: DISCONTINUED | OUTPATIENT
Start: 2019-01-01 | End: 2019-01-01

## 2019-01-01 RX ORDER — INSULIN GLARGINE 100 [IU]/ML
20 INJECTION, SOLUTION SUBCUTANEOUS ONCE
Status: COMPLETED | OUTPATIENT
Start: 2019-01-01 | End: 2019-01-01

## 2019-01-01 RX ORDER — SODIUM CHLORIDE 0.9 % (FLUSH) 0.9 %
5-40 SYRINGE (ML) INJECTION AS NEEDED
Status: DISCONTINUED | OUTPATIENT
Start: 2019-01-01 | End: 2019-01-01

## 2019-01-01 RX ORDER — MUPIROCIN 20 MG/G
1 OINTMENT TOPICAL 2 TIMES DAILY
Status: DISCONTINUED | OUTPATIENT
Start: 2019-01-01 | End: 2019-01-01

## 2019-01-01 RX ORDER — FUROSEMIDE 10 MG/ML
20 INJECTION INTRAMUSCULAR; INTRAVENOUS ONCE
Status: COMPLETED | OUTPATIENT
Start: 2019-01-01 | End: 2019-01-01

## 2019-01-01 RX ORDER — BALSAM PERU/CASTOR OIL
OINTMENT (GRAM) TOPICAL AS NEEDED
Status: DISCONTINUED | OUTPATIENT
Start: 2019-01-01 | End: 2019-01-01 | Stop reason: HOSPADM

## 2019-01-01 RX ORDER — GUAIFENESIN 100 MG/5ML
81 LIQUID (ML) ORAL DAILY
Status: DISCONTINUED | OUTPATIENT
Start: 2019-01-01 | End: 2019-01-01 | Stop reason: HOSPADM

## 2019-01-01 RX ORDER — BUMETANIDE 0.25 MG/ML
1 INJECTION INTRAMUSCULAR; INTRAVENOUS 2 TIMES DAILY
Status: DISCONTINUED | OUTPATIENT
Start: 2019-01-01 | End: 2019-01-01

## 2019-01-01 RX ORDER — ACETYLCYSTEINE 200 MG/ML
200 SOLUTION ORAL; RESPIRATORY (INHALATION) 2 TIMES DAILY
Status: DISCONTINUED | OUTPATIENT
Start: 2019-01-01 | End: 2019-01-01

## 2019-01-01 RX ORDER — PROPOFOL 10 MG/ML
INJECTION, EMULSION INTRAVENOUS
Status: COMPLETED
Start: 2019-01-01 | End: 2019-01-01

## 2019-01-01 RX ORDER — LORAZEPAM 2 MG/ML
1 INJECTION INTRAMUSCULAR
Status: DISCONTINUED | OUTPATIENT
Start: 2019-01-01 | End: 2019-01-01

## 2019-01-01 RX ORDER — NITROGLYCERIN 20 MG/100ML
16.5 INJECTION INTRAVENOUS CONTINUOUS
Status: DISCONTINUED | OUTPATIENT
Start: 2019-01-01 | End: 2019-01-01

## 2019-01-01 RX ORDER — LANOLIN ALCOHOL/MO/W.PET/CERES
400 CREAM (GRAM) TOPICAL 2 TIMES DAILY
Status: DISCONTINUED | OUTPATIENT
Start: 2019-01-01 | End: 2019-01-01

## 2019-01-01 RX ORDER — DESMOPRESSIN ACETATE 4 UG/ML
2 INJECTION, SOLUTION INTRAVENOUS; SUBCUTANEOUS ONCE
Status: COMPLETED | OUTPATIENT
Start: 2019-01-01 | End: 2019-01-01

## 2019-01-01 RX ORDER — GUAIFENESIN 600 MG/1
1200 TABLET, EXTENDED RELEASE ORAL EVERY 12 HOURS
Status: DISCONTINUED | OUTPATIENT
Start: 2019-01-01 | End: 2019-01-01

## 2019-01-01 RX ORDER — PROPOFOL 10 MG/ML
0-50 VIAL (ML) INTRAVENOUS
Status: DISCONTINUED | OUTPATIENT
Start: 2019-01-01 | End: 2019-01-01 | Stop reason: HOSPADM

## 2019-01-01 RX ORDER — HEPARIN SODIUM 1000 [USP'U]/ML
2000 INJECTION, SOLUTION INTRAVENOUS; SUBCUTANEOUS ONCE
Status: COMPLETED | OUTPATIENT
Start: 2019-01-01 | End: 2019-01-01

## 2019-01-01 RX ORDER — METOPROLOL TARTRATE 5 MG/5ML
5 INJECTION INTRAVENOUS
Status: COMPLETED | OUTPATIENT
Start: 2019-01-01 | End: 2019-01-01

## 2019-01-01 RX ORDER — CHLORHEXIDINE GLUCONATE 1.2 MG/ML
15 RINSE ORAL EVERY 12 HOURS
Status: DISCONTINUED | OUTPATIENT
Start: 2019-01-01 | End: 2019-01-01

## 2019-01-01 RX ORDER — HEPARIN SODIUM 1000 [USP'U]/ML
INJECTION, SOLUTION INTRAVENOUS; SUBCUTANEOUS AS NEEDED
Status: DISCONTINUED | OUTPATIENT
Start: 2019-01-01 | End: 2019-01-01 | Stop reason: HOSPADM

## 2019-01-01 RX ORDER — CLOPIDOGREL BISULFATE 75 MG/1
TABLET ORAL DAILY
COMMUNITY

## 2019-01-01 RX ORDER — ACETYLCYSTEINE 200 MG/ML
200 SOLUTION ORAL; RESPIRATORY (INHALATION)
Status: DISCONTINUED | OUTPATIENT
Start: 2019-01-01 | End: 2019-01-01 | Stop reason: HOSPADM

## 2019-01-01 RX ORDER — OXYCODONE HYDROCHLORIDE 5 MG/1
5 TABLET ORAL
Status: DISCONTINUED | OUTPATIENT
Start: 2019-01-01 | End: 2019-01-01 | Stop reason: HOSPADM

## 2019-01-01 RX ORDER — HYDROMORPHONE HYDROCHLORIDE 1 MG/ML
0.2 INJECTION, SOLUTION INTRAMUSCULAR; INTRAVENOUS; SUBCUTANEOUS
Status: CANCELLED | OUTPATIENT
Start: 2019-01-01

## 2019-01-01 RX ORDER — POTASSIUM CHLORIDE 29.8 MG/ML
20 INJECTION INTRAVENOUS ONCE
Status: DISCONTINUED | OUTPATIENT
Start: 2019-01-01 | End: 2019-01-01 | Stop reason: HOSPADM

## 2019-01-01 RX ORDER — MIDAZOLAM HYDROCHLORIDE 1 MG/ML
INJECTION, SOLUTION INTRAMUSCULAR; INTRAVENOUS AS NEEDED
Status: DISCONTINUED | OUTPATIENT
Start: 2019-01-01 | End: 2019-01-01 | Stop reason: HOSPADM

## 2019-01-01 RX ORDER — MORPHINE SULFATE 2 MG/ML
INJECTION, SOLUTION INTRAMUSCULAR; INTRAVENOUS AS NEEDED
Status: DISCONTINUED | OUTPATIENT
Start: 2019-01-01 | End: 2019-01-01 | Stop reason: HOSPADM

## 2019-01-01 RX ORDER — POTASSIUM CHLORIDE 29.8 MG/ML
INJECTION INTRAVENOUS
Status: COMPLETED
Start: 2019-01-01 | End: 2019-01-01

## 2019-01-01 RX ORDER — PRAVASTATIN SODIUM 40 MG/1
40 TABLET ORAL
COMMUNITY

## 2019-01-01 RX ORDER — AMOXICILLIN 250 MG
1 CAPSULE ORAL 2 TIMES DAILY
Status: DISCONTINUED | OUTPATIENT
Start: 2019-01-01 | End: 2019-01-01 | Stop reason: HOSPADM

## 2019-01-01 RX ORDER — LANOLIN ALCOHOL/MO/W.PET/CERES
3 CREAM (GRAM) TOPICAL
Status: DISCONTINUED | OUTPATIENT
Start: 2019-01-01 | End: 2019-01-01 | Stop reason: HOSPADM

## 2019-01-01 RX ORDER — SUFENTANIL CITRATE 50 UG/ML
INJECTION EPIDURAL; INTRAVENOUS
Status: DISCONTINUED | OUTPATIENT
Start: 2019-01-01 | End: 2019-01-01 | Stop reason: HOSPADM

## 2019-01-01 RX ORDER — POTASSIUM CHLORIDE 29.8 MG/ML
20 INJECTION INTRAVENOUS
Status: ACTIVE | OUTPATIENT
Start: 2019-01-01 | End: 2019-01-01

## 2019-01-01 RX ORDER — ALBUMIN HUMAN 250 G/1000ML
25 SOLUTION INTRAVENOUS EVERY 8 HOURS
Status: COMPLETED | OUTPATIENT
Start: 2019-01-01 | End: 2019-01-01

## 2019-01-01 RX ORDER — ONDANSETRON 2 MG/ML
4 INJECTION INTRAMUSCULAR; INTRAVENOUS
Status: DISCONTINUED | OUTPATIENT
Start: 2019-01-01 | End: 2019-01-01

## 2019-01-01 RX ORDER — BUMETANIDE 0.25 MG/ML
2 INJECTION INTRAMUSCULAR; INTRAVENOUS EVERY 12 HOURS
Status: DISCONTINUED | OUTPATIENT
Start: 2019-01-01 | End: 2019-01-01

## 2019-01-01 RX ORDER — GLYCOPYRROLATE 0.2 MG/ML
INJECTION INTRAMUSCULAR; INTRAVENOUS AS NEEDED
Status: DISCONTINUED | OUTPATIENT
Start: 2019-01-01 | End: 2019-01-01 | Stop reason: HOSPADM

## 2019-01-01 RX ORDER — BUDESONIDE 0.5 MG/2ML
500 INHALANT ORAL
Status: DISCONTINUED | OUTPATIENT
Start: 2019-01-01 | End: 2019-01-01 | Stop reason: HOSPADM

## 2019-01-01 RX ORDER — HYDROMORPHONE HYDROCHLORIDE 1 MG/ML
1 INJECTION, SOLUTION INTRAMUSCULAR; INTRAVENOUS; SUBCUTANEOUS
Status: DISCONTINUED | OUTPATIENT
Start: 2019-01-01 | End: 2019-01-01

## 2019-01-01 RX ORDER — CEFAZOLIN SODIUM 1 G/3ML
1 INJECTION, POWDER, FOR SOLUTION INTRAMUSCULAR; INTRAVENOUS ONCE
Status: COMPLETED | OUTPATIENT
Start: 2019-01-01 | End: 2019-01-01

## 2019-01-01 RX ORDER — ALBUMIN HUMAN 50 G/1000ML
25 SOLUTION INTRAVENOUS ONCE
Status: ACTIVE | OUTPATIENT
Start: 2019-01-01 | End: 2019-01-01

## 2019-01-01 RX ORDER — ETOMIDATE 2 MG/ML
10 INJECTION INTRAVENOUS ONCE
Status: DISPENSED | OUTPATIENT
Start: 2019-01-01 | End: 2019-01-01

## 2019-01-01 RX ORDER — OXYCODONE HYDROCHLORIDE 5 MG/1
10 TABLET ORAL
Status: DISCONTINUED | OUTPATIENT
Start: 2019-01-01 | End: 2019-01-01 | Stop reason: HOSPADM

## 2019-01-01 RX ORDER — LIDOCAINE HYDROCHLORIDE 20 MG/ML
INJECTION, SOLUTION EPIDURAL; INFILTRATION; INTRACAUDAL; PERINEURAL AS NEEDED
Status: DISCONTINUED | OUTPATIENT
Start: 2019-01-01 | End: 2019-01-01 | Stop reason: HOSPADM

## 2019-01-01 RX ORDER — NITROGLYCERIN 20 MG/100ML
0-20 INJECTION INTRAVENOUS
Status: DISCONTINUED | OUTPATIENT
Start: 2019-01-01 | End: 2019-01-01

## 2019-01-01 RX ORDER — LIDOCAINE HYDROCHLORIDE 10 MG/ML
0.1 INJECTION, SOLUTION EPIDURAL; INFILTRATION; INTRACAUDAL; PERINEURAL AS NEEDED
Status: DISCONTINUED | OUTPATIENT
Start: 2019-01-01 | End: 2019-01-01 | Stop reason: HOSPADM

## 2019-01-01 RX ORDER — HEPARIN SODIUM 200 [USP'U]/100ML
INJECTION, SOLUTION INTRAVENOUS
Status: COMPLETED | OUTPATIENT
Start: 2019-01-01 | End: 2019-01-01

## 2019-01-01 RX ORDER — INSULIN GLARGINE 100 [IU]/ML
15 INJECTION, SOLUTION SUBCUTANEOUS DAILY
Status: DISCONTINUED | OUTPATIENT
Start: 2019-01-01 | End: 2019-01-01

## 2019-01-01 RX ORDER — CEFAZOLIN SODIUM/WATER 2 G/20 ML
2 SYRINGE (ML) INTRAVENOUS
Status: COMPLETED | OUTPATIENT
Start: 2019-01-01 | End: 2019-01-01

## 2019-01-01 RX ORDER — MIDAZOLAM HYDROCHLORIDE 1 MG/ML
0.5 INJECTION, SOLUTION INTRAMUSCULAR; INTRAVENOUS
Status: CANCELLED | OUTPATIENT
Start: 2019-01-01

## 2019-01-01 RX ORDER — DIPHENHYDRAMINE HYDROCHLORIDE 50 MG/ML
25 INJECTION, SOLUTION INTRAMUSCULAR; INTRAVENOUS
Status: DISCONTINUED | OUTPATIENT
Start: 2019-01-01 | End: 2019-01-01 | Stop reason: HOSPADM

## 2019-01-01 RX ORDER — METOPROLOL TARTRATE 5 MG/5ML
2.5 INJECTION INTRAVENOUS
Status: DISCONTINUED | OUTPATIENT
Start: 2019-01-01 | End: 2019-01-01

## 2019-01-01 RX ORDER — PROTAMINE SULFATE 10 MG/ML
500 INJECTION, SOLUTION INTRAVENOUS ONCE
Status: DISCONTINUED | OUTPATIENT
Start: 2019-01-01 | End: 2019-01-01 | Stop reason: HOSPADM

## 2019-01-01 RX ORDER — INSULIN GLARGINE 100 [IU]/ML
25 INJECTION, SOLUTION SUBCUTANEOUS EVERY 12 HOURS
Status: DISCONTINUED | OUTPATIENT
Start: 2019-01-01 | End: 2019-01-01

## 2019-01-01 RX ORDER — ALBUMIN HUMAN 50 G/1000ML
SOLUTION INTRAVENOUS
Status: COMPLETED
Start: 2019-01-01 | End: 2019-01-01

## 2019-01-01 RX ORDER — SUCCINYLCHOLINE CHLORIDE 20 MG/ML
INJECTION INTRAMUSCULAR; INTRAVENOUS
Status: COMPLETED
Start: 2019-01-01 | End: 2019-01-01

## 2019-01-01 RX ORDER — ARFORMOTEROL TARTRATE 15 UG/2ML
15 SOLUTION RESPIRATORY (INHALATION)
Status: DISCONTINUED | OUTPATIENT
Start: 2019-01-01 | End: 2019-01-01 | Stop reason: HOSPADM

## 2019-01-01 RX ORDER — SODIUM CHLORIDE 9 MG/ML
25 INJECTION, SOLUTION INTRAVENOUS CONTINUOUS
Status: DISCONTINUED | OUTPATIENT
Start: 2019-01-01 | End: 2019-01-01

## 2019-01-01 RX ORDER — SODIUM CHLORIDE 9 MG/ML
10 INJECTION, SOLUTION INTRAVENOUS CONTINUOUS
Status: DISCONTINUED | OUTPATIENT
Start: 2019-01-01 | End: 2019-01-01

## 2019-01-01 RX ADMIN — MIDAZOLAM HYDROCHLORIDE 8 MG/HR: 5 INJECTION, SOLUTION INTRAMUSCULAR; INTRAVENOUS at 09:21

## 2019-01-01 RX ADMIN — Medication 10 ML: at 21:14

## 2019-01-01 RX ADMIN — MAGNESIUM OXIDE TAB 400 MG (241.3 MG ELEMENTAL MG) 400 MG: 400 (241.3 MG) TAB at 19:26

## 2019-01-01 RX ADMIN — GUAIFENESIN 400 MG: 100 SOLUTION ORAL at 23:40

## 2019-01-01 RX ADMIN — METHYLPREDNISOLONE SODIUM SUCCINATE 80 MG: 125 INJECTION, POWDER, FOR SOLUTION INTRAMUSCULAR; INTRAVENOUS at 21:17

## 2019-01-01 RX ADMIN — DOBUTAMINE IN DEXTROSE 7 MCG/KG/MIN: 200 INJECTION, SOLUTION INTRAVENOUS at 05:59

## 2019-01-01 RX ADMIN — NYSTATIN 500000 UNITS: 100000 SUSPENSION ORAL at 08:08

## 2019-01-01 RX ADMIN — BUDESONIDE 500 MCG: 0.5 INHALANT RESPIRATORY (INHALATION) at 19:13

## 2019-01-01 RX ADMIN — METOPROLOL TARTRATE 12.5 MG: 25 TABLET ORAL at 23:06

## 2019-01-01 RX ADMIN — ACETYLCYSTEINE 200 MG: 200 SOLUTION ORAL; RESPIRATORY (INHALATION) at 20:00

## 2019-01-01 RX ADMIN — MORPHINE SULFATE 2 MG: 2 INJECTION, SOLUTION INTRAMUSCULAR; INTRAVENOUS at 20:09

## 2019-01-01 RX ADMIN — POTASSIUM CHLORIDE 20 MEQ: 400 INJECTION, SOLUTION INTRAVENOUS at 06:21

## 2019-01-01 RX ADMIN — INSULIN LISPRO 2 UNITS: 100 INJECTION, SOLUTION INTRAVENOUS; SUBCUTANEOUS at 12:21

## 2019-01-01 RX ADMIN — BUMETANIDE 1 MG: 0.25 INJECTION INTRAMUSCULAR; INTRAVENOUS at 21:59

## 2019-01-01 RX ADMIN — POTASSIUM CHLORIDE 20 MEQ: 29.8 INJECTION, SOLUTION INTRAVENOUS at 21:58

## 2019-01-01 RX ADMIN — Medication 10 ML: at 22:01

## 2019-01-01 RX ADMIN — SERTRALINE HYDROCHLORIDE 100 MG: 50 TABLET ORAL at 08:20

## 2019-01-01 RX ADMIN — ACETYLCYSTEINE 200 MG: 200 SOLUTION ORAL; RESPIRATORY (INHALATION) at 21:23

## 2019-01-01 RX ADMIN — Medication 10 ML: at 05:50

## 2019-01-01 RX ADMIN — DEXMEDETOMIDINE HYDROCHLORIDE 0.7 MCG/KG/HR: 100 INJECTION, SOLUTION, CONCENTRATE INTRAVENOUS at 19:50

## 2019-01-01 RX ADMIN — ALBUMIN (HUMAN) 25 G: 0.25 INJECTION, SOLUTION INTRAVENOUS at 14:27

## 2019-01-01 RX ADMIN — DEXMEDETOMIDINE HYDROCHLORIDE 0.8 MCG/KG/HR: 100 INJECTION, SOLUTION, CONCENTRATE INTRAVENOUS at 20:18

## 2019-01-01 RX ADMIN — AMIODARONE HYDROCHLORIDE 400 MG: 200 TABLET ORAL at 20:47

## 2019-01-01 RX ADMIN — ASPIRIN 81 MG 81 MG: 81 TABLET ORAL at 08:36

## 2019-01-01 RX ADMIN — Medication 10 ML: at 13:38

## 2019-01-01 RX ADMIN — Medication 10 ML: at 07:02

## 2019-01-01 RX ADMIN — POTASSIUM CHLORIDE 20 MEQ: 29.8 INJECTION, SOLUTION INTRAVENOUS at 07:11

## 2019-01-01 RX ADMIN — METHYLPREDNISOLONE SODIUM SUCCINATE 80 MG: 125 INJECTION, POWDER, FOR SOLUTION INTRAMUSCULAR; INTRAVENOUS at 06:19

## 2019-01-01 RX ADMIN — ARFORMOTEROL TARTRATE 15 MCG: 15 SOLUTION RESPIRATORY (INHALATION) at 07:43

## 2019-01-01 RX ADMIN — Medication 10 ML: at 18:25

## 2019-01-01 RX ADMIN — ACETYLCYSTEINE 200 MG: 200 SOLUTION ORAL; RESPIRATORY (INHALATION) at 17:53

## 2019-01-01 RX ADMIN — INSULIN LISPRO 2 UNITS: 100 INJECTION, SOLUTION INTRAVENOUS; SUBCUTANEOUS at 18:28

## 2019-01-01 RX ADMIN — SODIUM CHLORIDE 40 MG: 9 INJECTION INTRAMUSCULAR; INTRAVENOUS; SUBCUTANEOUS at 09:09

## 2019-01-01 RX ADMIN — PROPOFOL 50 MCG/KG/MIN: 10 INJECTION, EMULSION INTRAVENOUS at 12:00

## 2019-01-01 RX ADMIN — ARFORMOTEROL TARTRATE 15 MCG: 15 SOLUTION RESPIRATORY (INHALATION) at 08:21

## 2019-01-01 RX ADMIN — BUDESONIDE 500 MCG: 0.5 INHALANT RESPIRATORY (INHALATION) at 18:59

## 2019-01-01 RX ADMIN — CHLORHEXIDINE GLUCONATE 15 ML: 1.2 RINSE ORAL at 21:11

## 2019-01-01 RX ADMIN — POTASSIUM BICARBONATE 20 MEQ: 782 TABLET, EFFERVESCENT ORAL at 17:08

## 2019-01-01 RX ADMIN — LORAZEPAM 2 MG: 2 INJECTION INTRAMUSCULAR; INTRAVENOUS at 08:35

## 2019-01-01 RX ADMIN — BUDESONIDE 500 MCG: 0.5 INHALANT RESPIRATORY (INHALATION) at 20:21

## 2019-01-01 RX ADMIN — FUROSEMIDE 40 MG: 10 INJECTION, SOLUTION INTRAMUSCULAR; INTRAVENOUS at 02:21

## 2019-01-01 RX ADMIN — HEPARIN SODIUM 5000 UNITS: 5000 INJECTION INTRAVENOUS; SUBCUTANEOUS at 04:10

## 2019-01-01 RX ADMIN — POTASSIUM BICARBONATE 20 MEQ: 782 TABLET, EFFERVESCENT ORAL at 08:20

## 2019-01-01 RX ADMIN — SODIUM CHLORIDE 10 ML/HR: 900 INJECTION, SOLUTION INTRAVENOUS at 08:09

## 2019-01-01 RX ADMIN — SUFENTANIL CITRATE 0.2 MCG/KG/HR: 50 INJECTION EPIDURAL; INTRAVENOUS at 11:37

## 2019-01-01 RX ADMIN — ARFORMOTEROL TARTRATE 15 MCG: 15 SOLUTION RESPIRATORY (INHALATION) at 07:30

## 2019-01-01 RX ADMIN — ACETYLCYSTEINE 200 MG: 200 SOLUTION ORAL; RESPIRATORY (INHALATION) at 20:11

## 2019-01-01 RX ADMIN — CHLORHEXIDINE GLUCONATE 10 ML: 1.2 RINSE ORAL at 20:13

## 2019-01-01 RX ADMIN — CHLORHEXIDINE GLUCONATE 10 ML: 1.2 RINSE ORAL at 09:00

## 2019-01-01 RX ADMIN — CHLORHEXIDINE GLUCONATE 10 ML: 1.2 RINSE ORAL at 08:20

## 2019-01-01 RX ADMIN — WATER 500 MG: 1 INJECTION INTRAMUSCULAR; INTRAVENOUS; SUBCUTANEOUS at 18:00

## 2019-01-01 RX ADMIN — MORPHINE SULFATE 2 MG: 2 INJECTION, SOLUTION INTRAMUSCULAR; INTRAVENOUS at 23:03

## 2019-01-01 RX ADMIN — METHYLPREDNISOLONE SODIUM SUCCINATE 80 MG: 125 INJECTION, POWDER, FOR SOLUTION INTRAMUSCULAR; INTRAVENOUS at 21:12

## 2019-01-01 RX ADMIN — BUMETANIDE 1 MG: 0.25 INJECTION INTRAMUSCULAR; INTRAVENOUS at 08:04

## 2019-01-01 RX ADMIN — METHYLPREDNISOLONE SODIUM SUCCINATE 80 MG: 125 INJECTION, POWDER, FOR SOLUTION INTRAMUSCULAR; INTRAVENOUS at 05:25

## 2019-01-01 RX ADMIN — CASTOR OIL AND BALSAM, PERU: 788; 87 OINTMENT TOPICAL at 08:18

## 2019-01-01 RX ADMIN — SODIUM CHLORIDE 10 ML/HR: 900 INJECTION, SOLUTION INTRAVENOUS at 03:24

## 2019-01-01 RX ADMIN — HYDROMORPHONE HYDROCHLORIDE 0.5 MG: 1 INJECTION, SOLUTION INTRAMUSCULAR; INTRAVENOUS; SUBCUTANEOUS at 05:07

## 2019-01-01 RX ADMIN — SERTRALINE HYDROCHLORIDE 100 MG: 50 TABLET ORAL at 09:26

## 2019-01-01 RX ADMIN — BUMETANIDE 1 MG: 0.25 INJECTION INTRAMUSCULAR; INTRAVENOUS at 09:53

## 2019-01-01 RX ADMIN — CHLORHEXIDINE GLUCONATE 10 ML: 1.2 RINSE ORAL at 08:37

## 2019-01-01 RX ADMIN — PHENYLEPHRINE HYDROCHLORIDE 80 MCG/MIN: 10 INJECTION INTRAVENOUS at 05:31

## 2019-01-01 RX ADMIN — HUMAN INSULIN 6 UNITS: 100 INJECTION, SUSPENSION SUBCUTANEOUS at 09:37

## 2019-01-01 RX ADMIN — PHENYLEPHRINE HYDROCHLORIDE 20 MCG/MIN: 10 INJECTION INTRAVENOUS at 15:37

## 2019-01-01 RX ADMIN — INSULIN LISPRO 3 UNITS: 100 INJECTION, SOLUTION INTRAVENOUS; SUBCUTANEOUS at 18:58

## 2019-01-01 RX ADMIN — CHLORHEXIDINE GLUCONATE 10 ML: 1.2 RINSE ORAL at 21:19

## 2019-01-01 RX ADMIN — STANDARDIZED SENNA CONCENTRATE AND DOCUSATE SODIUM 1 TABLET: 8.6; 5 TABLET ORAL at 08:21

## 2019-01-01 RX ADMIN — PREDNISONE 10 MG: 5 TABLET ORAL at 10:39

## 2019-01-01 RX ADMIN — AMIODARONE HYDROCHLORIDE 400 MG: 200 TABLET ORAL at 21:51

## 2019-01-01 RX ADMIN — MIDAZOLAM HYDROCHLORIDE 8 MG/HR: 5 INJECTION, SOLUTION INTRAMUSCULAR; INTRAVENOUS at 17:13

## 2019-01-01 RX ADMIN — ALBUMIN (HUMAN) 250 ML: 12.5 INJECTION, SOLUTION INTRAVENOUS at 14:37

## 2019-01-01 RX ADMIN — GUAIFENESIN 400 MG: 100 SOLUTION ORAL at 18:24

## 2019-01-01 RX ADMIN — Medication 10 ML: at 16:05

## 2019-01-01 RX ADMIN — ARFORMOTEROL TARTRATE 15 MCG: 15 SOLUTION RESPIRATORY (INHALATION) at 19:13

## 2019-01-01 RX ADMIN — Medication 10 ML: at 05:09

## 2019-01-01 RX ADMIN — MINERAL SUPPLEMENT IRON 300 MG / 5 ML STRENGTH LIQUID 100 PER BOX UNFLAVORED 300 MG: at 08:00

## 2019-01-01 RX ADMIN — Medication 200 MCG/HR: at 18:39

## 2019-01-01 RX ADMIN — SODIUM CHLORIDE 6.9 UNITS/HR: 9 INJECTION, SOLUTION INTRAVENOUS at 18:57

## 2019-01-01 RX ADMIN — LORAZEPAM 2 MG: 2 INJECTION INTRAMUSCULAR; INTRAVENOUS at 08:08

## 2019-01-01 RX ADMIN — BUMETANIDE 1 MG: 0.25 INJECTION INTRAMUSCULAR; INTRAVENOUS at 21:03

## 2019-01-01 RX ADMIN — MAGNESIUM OXIDE TAB 400 MG (241.3 MG ELEMENTAL MG) 400 MG: 400 (241.3 MG) TAB at 18:08

## 2019-01-01 RX ADMIN — LORAZEPAM 2 MG: 2 INJECTION INTRAMUSCULAR; INTRAVENOUS at 16:35

## 2019-01-01 RX ADMIN — INSULIN LISPRO 3 UNITS: 100 INJECTION, SOLUTION INTRAVENOUS; SUBCUTANEOUS at 23:40

## 2019-01-01 RX ADMIN — MUPIROCIN: 20 OINTMENT TOPICAL at 18:25

## 2019-01-01 RX ADMIN — QUETIAPINE FUMARATE 50 MG: 25 TABLET ORAL at 08:54

## 2019-01-01 RX ADMIN — ASPIRIN 81 MG CHEWABLE TABLET 81 MG: 81 TABLET CHEWABLE at 08:42

## 2019-01-01 RX ADMIN — MIDAZOLAM HYDROCHLORIDE 5 MG/HR: 5 INJECTION, SOLUTION INTRAMUSCULAR; INTRAVENOUS at 09:51

## 2019-01-01 RX ADMIN — MIDAZOLAM HYDROCHLORIDE 6 MG/HR: 5 INJECTION, SOLUTION INTRAMUSCULAR; INTRAVENOUS at 06:24

## 2019-01-01 RX ADMIN — PIPERACILLIN AND TAZOBACTAM 3.38 G: 3; .375 INJECTION, POWDER, FOR SOLUTION INTRAVENOUS at 10:26

## 2019-01-01 RX ADMIN — MIDAZOLAM HYDROCHLORIDE 6 MG/HR: 5 INJECTION, SOLUTION INTRAMUSCULAR; INTRAVENOUS at 15:37

## 2019-01-01 RX ADMIN — HYDRALAZINE HYDROCHLORIDE 10 MG: 20 INJECTION INTRAMUSCULAR; INTRAVENOUS at 06:30

## 2019-01-01 RX ADMIN — ACETYLCYSTEINE 200 MG: 200 SOLUTION ORAL; RESPIRATORY (INHALATION) at 07:28

## 2019-01-01 RX ADMIN — CASTOR OIL AND BALSAM, PERU: 788; 87 OINTMENT TOPICAL at 08:45

## 2019-01-01 RX ADMIN — PREDNISONE 10 MG: 5 TABLET ORAL at 09:18

## 2019-01-01 RX ADMIN — NYSTATIN 500000 UNITS: 100000 SUSPENSION ORAL at 18:20

## 2019-01-01 RX ADMIN — Medication 2 G: at 23:00

## 2019-01-01 RX ADMIN — BUDESONIDE 500 MCG: 0.5 INHALANT RESPIRATORY (INHALATION) at 20:42

## 2019-01-01 RX ADMIN — SODIUM CHLORIDE 40 MG: 9 INJECTION INTRAMUSCULAR; INTRAVENOUS; SUBCUTANEOUS at 09:53

## 2019-01-01 RX ADMIN — MAGNESIUM SULFATE HEPTAHYDRATE 1 G: 1 INJECTION, SOLUTION INTRAVENOUS at 07:13

## 2019-01-01 RX ADMIN — MORPHINE SULFATE 2 MG: 2 INJECTION, SOLUTION INTRAMUSCULAR; INTRAVENOUS at 14:47

## 2019-01-01 RX ADMIN — CARVEDILOL 12.5 MG: 12.5 TABLET, FILM COATED ORAL at 08:42

## 2019-01-01 RX ADMIN — METHYLPREDNISOLONE SODIUM SUCCINATE 80 MG: 125 INJECTION, POWDER, FOR SOLUTION INTRAMUSCULAR; INTRAVENOUS at 15:41

## 2019-01-01 RX ADMIN — NYSTATIN 500000 UNITS: 100000 SUSPENSION ORAL at 21:45

## 2019-01-01 RX ADMIN — Medication 20 ML: at 11:10

## 2019-01-01 RX ADMIN — MINERAL SUPPLEMENT IRON 300 MG / 5 ML STRENGTH LIQUID 100 PER BOX UNFLAVORED 300 MG: at 09:36

## 2019-01-01 RX ADMIN — Medication 200 MCG/HR: at 04:04

## 2019-01-01 RX ADMIN — ARFORMOTEROL TARTRATE 15 MCG: 15 SOLUTION RESPIRATORY (INHALATION) at 20:00

## 2019-01-01 RX ADMIN — SODIUM CHLORIDE 40 MG: 9 INJECTION INTRAMUSCULAR; INTRAVENOUS; SUBCUTANEOUS at 21:59

## 2019-01-01 RX ADMIN — DEXMEDETOMIDINE HYDROCHLORIDE 0.9 MCG/KG/HR: 100 INJECTION, SOLUTION, CONCENTRATE INTRAVENOUS at 04:17

## 2019-01-01 RX ADMIN — ACETYLCYSTEINE 200 MG: 200 SOLUTION ORAL; RESPIRATORY (INHALATION) at 21:36

## 2019-01-01 RX ADMIN — Medication 10 ML: at 22:00

## 2019-01-01 RX ADMIN — POLYETHYLENE GLYCOL 3350 17 G: 17 POWDER, FOR SOLUTION ORAL at 09:00

## 2019-01-01 RX ADMIN — PRAVASTATIN SODIUM 40 MG: 40 TABLET ORAL at 21:01

## 2019-01-01 RX ADMIN — STANDARDIZED SENNA CONCENTRATE AND DOCUSATE SODIUM 1 TABLET: 8.6; 5 TABLET ORAL at 09:04

## 2019-01-01 RX ADMIN — DOBUTAMINE IN DEXTROSE 1 MCG/KG/MIN: 200 INJECTION, SOLUTION INTRAVENOUS at 10:31

## 2019-01-01 RX ADMIN — PIPERACILLIN AND TAZOBACTAM 3.38 G: 3; .375 INJECTION, POWDER, FOR SOLUTION INTRAVENOUS at 02:21

## 2019-01-01 RX ADMIN — METHYLPREDNISOLONE SODIUM SUCCINATE 40 MG: 40 INJECTION, POWDER, FOR SOLUTION INTRAMUSCULAR; INTRAVENOUS at 06:03

## 2019-01-01 RX ADMIN — CHLORHEXIDINE GLUCONATE 10 ML: 1.2 RINSE ORAL at 08:52

## 2019-01-01 RX ADMIN — HUMAN INSULIN 6 UNITS: 100 INJECTION, SUSPENSION SUBCUTANEOUS at 13:20

## 2019-01-01 RX ADMIN — Medication 10 ML: at 21:53

## 2019-01-01 RX ADMIN — GUAIFENESIN 400 MG: 100 SOLUTION ORAL at 00:18

## 2019-01-01 RX ADMIN — Medication 30 ML: at 08:19

## 2019-01-01 RX ADMIN — CHLORHEXIDINE GLUCONATE 10 ML: 1.2 RINSE ORAL at 08:33

## 2019-01-01 RX ADMIN — OXYCODONE HYDROCHLORIDE 5 MG: 5 TABLET ORAL at 00:06

## 2019-01-01 RX ADMIN — CASTOR OIL AND BALSAM, PERU: 788; 87 OINTMENT TOPICAL at 19:08

## 2019-01-01 RX ADMIN — PRAVASTATIN SODIUM 40 MG: 40 TABLET ORAL at 21:19

## 2019-01-01 RX ADMIN — ALBUTEROL SULFATE 2.5 MG: 2.5 SOLUTION RESPIRATORY (INHALATION) at 19:34

## 2019-01-01 RX ADMIN — METHYLPREDNISOLONE SODIUM SUCCINATE 80 MG: 125 INJECTION, POWDER, FOR SOLUTION INTRAMUSCULAR; INTRAVENOUS at 05:45

## 2019-01-01 RX ADMIN — PRAVASTATIN SODIUM 40 MG: 40 TABLET ORAL at 21:03

## 2019-01-01 RX ADMIN — ACETAMINOPHEN 650 MG: 325 TABLET, FILM COATED ORAL at 18:14

## 2019-01-01 RX ADMIN — POTASSIUM BICARBONATE 20 MEQ: 782 TABLET, EFFERVESCENT ORAL at 08:00

## 2019-01-01 RX ADMIN — ARFORMOTEROL TARTRATE 15 MCG: 15 SOLUTION RESPIRATORY (INHALATION) at 07:44

## 2019-01-01 RX ADMIN — MIDAZOLAM 1 MG: 1 INJECTION INTRAMUSCULAR; INTRAVENOUS at 00:43

## 2019-01-01 RX ADMIN — BUDESONIDE 500 MCG: 0.5 INHALANT RESPIRATORY (INHALATION) at 21:34

## 2019-01-01 RX ADMIN — CHLORHEXIDINE GLUCONATE 10 ML: 1.2 RINSE ORAL at 09:18

## 2019-01-01 RX ADMIN — ALBUMIN (HUMAN) 12.5 G: 12.5 INJECTION, SOLUTION INTRAVENOUS at 11:15

## 2019-01-01 RX ADMIN — MIDAZOLAM HYDROCHLORIDE 6 MG/HR: 5 INJECTION, SOLUTION INTRAMUSCULAR; INTRAVENOUS at 20:37

## 2019-01-01 RX ADMIN — MAGNESIUM OXIDE TAB 400 MG (241.3 MG ELEMENTAL MG) 400 MG: 400 (241.3 MG) TAB at 17:46

## 2019-01-01 RX ADMIN — CARVEDILOL 12.5 MG: 12.5 TABLET, FILM COATED ORAL at 08:38

## 2019-01-01 RX ADMIN — ARFORMOTEROL TARTRATE 15 MCG: 15 SOLUTION RESPIRATORY (INHALATION) at 08:29

## 2019-01-01 RX ADMIN — EPINEPHRINE 7 MCG/MIN: 1 INJECTION INTRAMUSCULAR; INTRAVENOUS; SUBCUTANEOUS at 16:56

## 2019-01-01 RX ADMIN — MINERAL SUPPLEMENT IRON 300 MG / 5 ML STRENGTH LIQUID 100 PER BOX UNFLAVORED 300 MG: at 09:11

## 2019-01-01 RX ADMIN — Medication 10 ML: at 21:00

## 2019-01-01 RX ADMIN — BUDESONIDE 500 MCG: 0.5 INHALANT RESPIRATORY (INHALATION) at 08:59

## 2019-01-01 RX ADMIN — METHYLPREDNISOLONE SODIUM SUCCINATE 40 MG: 40 INJECTION, POWDER, FOR SOLUTION INTRAMUSCULAR; INTRAVENOUS at 18:07

## 2019-01-01 RX ADMIN — PRAVASTATIN SODIUM 40 MG: 40 TABLET ORAL at 21:17

## 2019-01-01 RX ADMIN — INSULIN GLARGINE 20 UNITS: 100 INJECTION, SOLUTION SUBCUTANEOUS at 10:13

## 2019-01-01 RX ADMIN — ARFORMOTEROL TARTRATE 15 MCG: 15 SOLUTION RESPIRATORY (INHALATION) at 19:50

## 2019-01-01 RX ADMIN — BUDESONIDE 500 MCG: 0.5 INHALANT RESPIRATORY (INHALATION) at 19:17

## 2019-01-01 RX ADMIN — PIPERACILLIN AND TAZOBACTAM 3.38 G: 3; .375 INJECTION, POWDER, FOR SOLUTION INTRAVENOUS at 17:30

## 2019-01-01 RX ADMIN — POTASSIUM CHLORIDE 20 MEQ: 400 INJECTION, SOLUTION INTRAVENOUS at 07:53

## 2019-01-01 RX ADMIN — MORPHINE SULFATE 2 MG: 2 INJECTION, SOLUTION INTRAMUSCULAR; INTRAVENOUS at 11:09

## 2019-01-01 RX ADMIN — ARFORMOTEROL TARTRATE 15 MCG: 15 SOLUTION RESPIRATORY (INHALATION) at 21:34

## 2019-01-01 RX ADMIN — ENOXAPARIN SODIUM 60 MG: 60 INJECTION SUBCUTANEOUS at 09:32

## 2019-01-01 RX ADMIN — SODIUM CHLORIDE 5 MG/HR: 900 INJECTION, SOLUTION INTRAVENOUS at 01:52

## 2019-01-01 RX ADMIN — ARFORMOTEROL TARTRATE 15 MCG: 15 SOLUTION RESPIRATORY (INHALATION) at 10:10

## 2019-01-01 RX ADMIN — PHENYLEPHRINE HYDROCHLORIDE 40 MCG/MIN: 10 INJECTION INTRAVENOUS at 02:15

## 2019-01-01 RX ADMIN — HEPARIN SODIUM 5000 UNITS: 5000 INJECTION INTRAVENOUS; SUBCUTANEOUS at 09:42

## 2019-01-01 RX ADMIN — MAGNESIUM OXIDE TAB 400 MG (241.3 MG ELEMENTAL MG) 400 MG: 400 (241.3 MG) TAB at 18:53

## 2019-01-01 RX ADMIN — BUDESONIDE 500 MCG: 0.5 INHALANT RESPIRATORY (INHALATION) at 19:20

## 2019-01-01 RX ADMIN — ARFORMOTEROL TARTRATE 15 MCG: 15 SOLUTION RESPIRATORY (INHALATION) at 08:05

## 2019-01-01 RX ADMIN — PHENYLEPHRINE HYDROCHLORIDE 30 MCG/MIN: 10 INJECTION INTRAVENOUS at 10:10

## 2019-01-01 RX ADMIN — METHYLPREDNISOLONE SODIUM SUCCINATE 20 MG: 40 INJECTION, POWDER, FOR SOLUTION INTRAMUSCULAR; INTRAVENOUS at 08:37

## 2019-01-01 RX ADMIN — INSULIN LISPRO 3 UNITS: 100 INJECTION, SOLUTION INTRAVENOUS; SUBCUTANEOUS at 12:37

## 2019-01-01 RX ADMIN — ALBUMIN (HUMAN) 250 ML: 12.5 INJECTION, SOLUTION INTRAVENOUS at 21:32

## 2019-01-01 RX ADMIN — HEPARIN SODIUM 5000 UNITS: 5000 INJECTION INTRAVENOUS; SUBCUTANEOUS at 12:03

## 2019-01-01 RX ADMIN — SODIUM CHLORIDE 40 MG: 9 INJECTION INTRAMUSCULAR; INTRAVENOUS; SUBCUTANEOUS at 09:13

## 2019-01-01 RX ADMIN — Medication 10 ML: at 14:30

## 2019-01-01 RX ADMIN — AMINOCAPROIC ACID 10 G: 250 INJECTION, SOLUTION INTRAVENOUS at 12:24

## 2019-01-01 RX ADMIN — CHLORHEXIDINE GLUCONATE 10 ML: 1.2 RINSE ORAL at 21:59

## 2019-01-01 RX ADMIN — Medication 10 ML: at 13:43

## 2019-01-01 RX ADMIN — METHYLPREDNISOLONE SODIUM SUCCINATE 40 MG: 40 INJECTION, POWDER, FOR SOLUTION INTRAMUSCULAR; INTRAVENOUS at 17:42

## 2019-01-01 RX ADMIN — PHENYLEPHRINE HYDROCHLORIDE 200 MCG/MIN: 10 INJECTION INTRAVENOUS at 18:48

## 2019-01-01 RX ADMIN — PIPERACILLIN AND TAZOBACTAM 3.38 G: 3; .375 INJECTION, POWDER, FOR SOLUTION INTRAVENOUS at 17:27

## 2019-01-01 RX ADMIN — SODIUM BICARBONATE 50 MEQ: 84 INJECTION, SOLUTION INTRAVENOUS at 11:30

## 2019-01-01 RX ADMIN — PIPERACILLIN AND TAZOBACTAM 3.38 G: 3; .375 INJECTION, POWDER, FOR SOLUTION INTRAVENOUS at 10:01

## 2019-01-01 RX ADMIN — MORPHINE SULFATE 2 MG: 2 INJECTION, SOLUTION INTRAMUSCULAR; INTRAVENOUS at 04:01

## 2019-01-01 RX ADMIN — POTASSIUM CHLORIDE 20 MEQ: 750 TABLET, FILM COATED, EXTENDED RELEASE ORAL at 08:49

## 2019-01-01 RX ADMIN — BUMETANIDE 2 MG: 0.25 INJECTION INTRAMUSCULAR; INTRAVENOUS at 09:39

## 2019-01-01 RX ADMIN — MORPHINE SULFATE 2 MG: 2 INJECTION, SOLUTION INTRAMUSCULAR; INTRAVENOUS at 06:19

## 2019-01-01 RX ADMIN — ALBUMIN (HUMAN) 250 ML: 12.5 INJECTION, SOLUTION INTRAVENOUS at 20:04

## 2019-01-01 RX ADMIN — CHLORHEXIDINE GLUCONATE 10 ML: 1.2 RINSE ORAL at 21:16

## 2019-01-01 RX ADMIN — CHLORHEXIDINE GLUCONATE 10 ML: 1.2 RINSE ORAL at 20:23

## 2019-01-01 RX ADMIN — LORAZEPAM 2 MG: 2 INJECTION INTRAMUSCULAR; INTRAVENOUS at 06:07

## 2019-01-01 RX ADMIN — MINERAL SUPPLEMENT IRON 300 MG / 5 ML STRENGTH LIQUID 100 PER BOX UNFLAVORED 300 MG: at 08:20

## 2019-01-01 RX ADMIN — MINERAL SUPPLEMENT IRON 300 MG / 5 ML STRENGTH LIQUID 100 PER BOX UNFLAVORED 300 MG: at 08:09

## 2019-01-01 RX ADMIN — HEPARIN SODIUM 5000 UNITS: 5000 INJECTION INTRAVENOUS; SUBCUTANEOUS at 20:24

## 2019-01-01 RX ADMIN — SODIUM CHLORIDE 1.5 ML: 9 INJECTION INTRAMUSCULAR; INTRAVENOUS; SUBCUTANEOUS at 14:00

## 2019-01-01 RX ADMIN — MIDAZOLAM HYDROCHLORIDE 2 MG/HR: 5 INJECTION, SOLUTION INTRAMUSCULAR; INTRAVENOUS at 16:17

## 2019-01-01 RX ADMIN — METOLAZONE 5 MG: 5 TABLET ORAL at 09:14

## 2019-01-01 RX ADMIN — MIDAZOLAM 1 MG: 1 INJECTION INTRAMUSCULAR; INTRAVENOUS at 16:07

## 2019-01-01 RX ADMIN — PRAVASTATIN SODIUM 40 MG: 40 TABLET ORAL at 21:52

## 2019-01-01 RX ADMIN — GUAIFENESIN 400 MG: 100 SOLUTION ORAL at 06:10

## 2019-01-01 RX ADMIN — PIPERACILLIN AND TAZOBACTAM 3.38 G: 3; .375 INJECTION, POWDER, FOR SOLUTION INTRAVENOUS at 02:00

## 2019-01-01 RX ADMIN — MORPHINE SULFATE 2 MG: 2 INJECTION, SOLUTION INTRAMUSCULAR; INTRAVENOUS at 12:54

## 2019-01-01 RX ADMIN — SODIUM CHLORIDE 5 MG: 9 INJECTION INTRAMUSCULAR; INTRAVENOUS; SUBCUTANEOUS at 13:39

## 2019-01-01 RX ADMIN — ACETAMINOPHEN 650 MG: 325 TABLET, FILM COATED ORAL at 00:10

## 2019-01-01 RX ADMIN — MORPHINE SULFATE 2 MG: 2 INJECTION, SOLUTION INTRAMUSCULAR; INTRAVENOUS at 05:37

## 2019-01-01 RX ADMIN — DEXMEDETOMIDINE HYDROCHLORIDE 0.9 MCG/KG/HR: 100 INJECTION, SOLUTION, CONCENTRATE INTRAVENOUS at 19:17

## 2019-01-01 RX ADMIN — Medication 30 ML: at 08:18

## 2019-01-01 RX ADMIN — DEXMEDETOMIDINE HYDROCHLORIDE 0.4 MCG/KG/HR: 100 INJECTION, SOLUTION, CONCENTRATE INTRAVENOUS at 14:02

## 2019-01-01 RX ADMIN — PIPERACILLIN AND TAZOBACTAM 3.38 G: 3; .375 INJECTION, POWDER, FOR SOLUTION INTRAVENOUS at 18:15

## 2019-01-01 RX ADMIN — POTASSIUM CHLORIDE 40 MEQ: 750 TABLET, FILM COATED, EXTENDED RELEASE ORAL at 08:41

## 2019-01-01 RX ADMIN — MAGNESIUM SULFATE HEPTAHYDRATE 1 G: 1 INJECTION, SOLUTION INTRAVENOUS at 06:27

## 2019-01-01 RX ADMIN — Medication 30 ML: at 08:38

## 2019-01-01 RX ADMIN — HYDROMORPHONE HYDROCHLORIDE 1 MG: 1 INJECTION, SOLUTION INTRAMUSCULAR; INTRAVENOUS; SUBCUTANEOUS at 01:48

## 2019-01-01 RX ADMIN — Medication 10 ML: at 15:01

## 2019-01-01 RX ADMIN — SODIUM CHLORIDE 40 MG: 9 INJECTION INTRAMUSCULAR; INTRAVENOUS; SUBCUTANEOUS at 21:20

## 2019-01-01 RX ADMIN — QUETIAPINE FUMARATE 25 MG: 25 TABLET ORAL at 10:15

## 2019-01-01 RX ADMIN — INSULIN GLARGINE 25 UNITS: 100 INJECTION, SOLUTION SUBCUTANEOUS at 20:36

## 2019-01-01 RX ADMIN — POTASSIUM BICARBONATE 20 MEQ: 782 TABLET, EFFERVESCENT ORAL at 18:00

## 2019-01-01 RX ADMIN — ARFORMOTEROL TARTRATE 15 MCG: 15 SOLUTION RESPIRATORY (INHALATION) at 19:23

## 2019-01-01 RX ADMIN — MIDAZOLAM HYDROCHLORIDE 8 MG/HR: 5 INJECTION, SOLUTION INTRAMUSCULAR; INTRAVENOUS at 04:10

## 2019-01-01 RX ADMIN — CHLORHEXIDINE GLUCONATE 10 ML: 1.2 RINSE ORAL at 08:39

## 2019-01-01 RX ADMIN — HYDROMORPHONE HYDROCHLORIDE 0.5 MG: 1 INJECTION, SOLUTION INTRAMUSCULAR; INTRAVENOUS; SUBCUTANEOUS at 06:49

## 2019-01-01 RX ADMIN — ALBUTEROL SULFATE 2.5 MG: 2.5 SOLUTION RESPIRATORY (INHALATION) at 17:52

## 2019-01-01 RX ADMIN — METHYLPREDNISOLONE SODIUM SUCCINATE 80 MG: 125 INJECTION, POWDER, FOR SOLUTION INTRAMUSCULAR; INTRAVENOUS at 12:39

## 2019-01-01 RX ADMIN — BUMETANIDE 2 MG: 0.25 INJECTION INTRAMUSCULAR; INTRAVENOUS at 19:27

## 2019-01-01 RX ADMIN — BUDESONIDE 500 MCG: 0.5 INHALANT RESPIRATORY (INHALATION) at 09:46

## 2019-01-01 RX ADMIN — BUDESONIDE 500 MCG: 0.5 INHALANT RESPIRATORY (INHALATION) at 19:34

## 2019-01-01 RX ADMIN — INSULIN LISPRO 2 UNITS: 100 INJECTION, SOLUTION INTRAVENOUS; SUBCUTANEOUS at 18:20

## 2019-01-01 RX ADMIN — NYSTATIN 500000 UNITS: 100000 SUSPENSION ORAL at 09:09

## 2019-01-01 RX ADMIN — ACETYLCYSTEINE 200 MG: 200 SOLUTION ORAL; RESPIRATORY (INHALATION) at 19:50

## 2019-01-01 RX ADMIN — MIDAZOLAM 2 MG: 1 INJECTION INTRAMUSCULAR; INTRAVENOUS at 09:51

## 2019-01-01 RX ADMIN — POTASSIUM BICARBONATE 20 MEQ: 782 TABLET, EFFERVESCENT ORAL at 16:26

## 2019-01-01 RX ADMIN — ACETAMINOPHEN 650 MG: 325 TABLET, FILM COATED ORAL at 20:41

## 2019-01-01 RX ADMIN — BUDESONIDE 500 MCG: 0.5 INHALANT RESPIRATORY (INHALATION) at 07:50

## 2019-01-01 RX ADMIN — ALBUTEROL SULFATE 2.5 MG: 2.5 SOLUTION RESPIRATORY (INHALATION) at 19:30

## 2019-01-01 RX ADMIN — PRAVASTATIN SODIUM 40 MG: 40 TABLET ORAL at 21:51

## 2019-01-01 RX ADMIN — BUMETANIDE 1.5 MG: 0.25 INJECTION INTRAMUSCULAR; INTRAVENOUS at 06:24

## 2019-01-01 RX ADMIN — PRAVASTATIN SODIUM 40 MG: 40 TABLET ORAL at 22:00

## 2019-01-01 RX ADMIN — SODIUM CHLORIDE 9 ML/HR: 900 INJECTION, SOLUTION INTRAVENOUS at 06:04

## 2019-01-01 RX ADMIN — AMIODARONE HYDROCHLORIDE 400 MG: 200 TABLET ORAL at 08:49

## 2019-01-01 RX ADMIN — CHLORHEXIDINE GLUCONATE 10 ML: 1.2 RINSE ORAL at 20:36

## 2019-01-01 RX ADMIN — NYSTATIN 500000 UNITS: 100000 SUSPENSION ORAL at 12:15

## 2019-01-01 RX ADMIN — DEXMEDETOMIDINE HYDROCHLORIDE 0.8 MCG/KG/HR: 100 INJECTION, SOLUTION, CONCENTRATE INTRAVENOUS at 10:31

## 2019-01-01 RX ADMIN — PROPOFOL 45 MCG/KG/MIN: 10 INJECTION, EMULSION INTRAVENOUS at 23:23

## 2019-01-01 RX ADMIN — LORAZEPAM 2 MG: 2 INJECTION INTRAMUSCULAR; INTRAVENOUS at 09:39

## 2019-01-01 RX ADMIN — ARFORMOTEROL TARTRATE 15 MCG: 15 SOLUTION RESPIRATORY (INHALATION) at 07:34

## 2019-01-01 RX ADMIN — SODIUM CHLORIDE 40 MG: 9 INJECTION INTRAMUSCULAR; INTRAVENOUS; SUBCUTANEOUS at 14:02

## 2019-01-01 RX ADMIN — Medication 10 ML: at 17:04

## 2019-01-01 RX ADMIN — BUMETANIDE 2 MG: 0.25 INJECTION INTRAMUSCULAR; INTRAVENOUS at 18:22

## 2019-01-01 RX ADMIN — SODIUM CHLORIDE 11.4 UNITS/HR: 9 INJECTION, SOLUTION INTRAVENOUS at 12:38

## 2019-01-01 RX ADMIN — HEPARIN SODIUM 5000 UNITS: 5000 INJECTION INTRAVENOUS; SUBCUTANEOUS at 04:00

## 2019-01-01 RX ADMIN — LORAZEPAM 2 MG: 2 INJECTION INTRAMUSCULAR; INTRAVENOUS at 08:44

## 2019-01-01 RX ADMIN — NYSTATIN 500000 UNITS: 100000 SUSPENSION ORAL at 08:36

## 2019-01-01 RX ADMIN — AMIODARONE HYDROCHLORIDE 400 MG: 200 TABLET ORAL at 21:10

## 2019-01-01 RX ADMIN — ARFORMOTEROL TARTRATE 15 MCG: 15 SOLUTION RESPIRATORY (INHALATION) at 19:41

## 2019-01-01 RX ADMIN — PHENYLEPHRINE HYDROCHLORIDE 70 MCG/MIN: 10 INJECTION INTRAVENOUS at 12:59

## 2019-01-01 RX ADMIN — MIDAZOLAM HYDROCHLORIDE 8 MG/HR: 5 INJECTION, SOLUTION INTRAMUSCULAR; INTRAVENOUS at 02:25

## 2019-01-01 RX ADMIN — PHENYLEPHRINE HYDROCHLORIDE 50 MCG/MIN: 10 INJECTION INTRAVENOUS at 12:23

## 2019-01-01 RX ADMIN — ACETAMINOPHEN 650 MG: 325 TABLET, FILM COATED ORAL at 03:21

## 2019-01-01 RX ADMIN — MORPHINE SULFATE 2 MG: 2 INJECTION, SOLUTION INTRAMUSCULAR; INTRAVENOUS at 12:42

## 2019-01-01 RX ADMIN — BUDESONIDE 500 MCG: 0.5 INHALANT RESPIRATORY (INHALATION) at 20:35

## 2019-01-01 RX ADMIN — CARVEDILOL 12.5 MG: 12.5 TABLET, FILM COATED ORAL at 16:46

## 2019-01-01 RX ADMIN — BUMETANIDE 2 MG: 0.25 INJECTION INTRAMUSCULAR; INTRAVENOUS at 17:02

## 2019-01-01 RX ADMIN — LORAZEPAM 2 MG: 2 INJECTION INTRAMUSCULAR; INTRAVENOUS at 17:16

## 2019-01-01 RX ADMIN — SODIUM CHLORIDE 40 MG: 9 INJECTION INTRAMUSCULAR; INTRAVENOUS; SUBCUTANEOUS at 18:27

## 2019-01-01 RX ADMIN — MORPHINE SULFATE 2 MG: 2 INJECTION, SOLUTION INTRAMUSCULAR; INTRAVENOUS at 07:22

## 2019-01-01 RX ADMIN — MORPHINE SULFATE 2 MG: 2 INJECTION, SOLUTION INTRAMUSCULAR; INTRAVENOUS at 10:33

## 2019-01-01 RX ADMIN — SODIUM CHLORIDE 40 MG: 9 INJECTION INTRAMUSCULAR; INTRAVENOUS; SUBCUTANEOUS at 08:08

## 2019-01-01 RX ADMIN — Medication 30 ML: at 09:12

## 2019-01-01 RX ADMIN — PRAVASTATIN SODIUM 40 MG: 40 TABLET ORAL at 21:35

## 2019-01-01 RX ADMIN — CHLORHEXIDINE GLUCONATE 10 ML: 1.2 RINSE ORAL at 21:17

## 2019-01-01 RX ADMIN — LORAZEPAM 2 MG: 2 INJECTION INTRAMUSCULAR; INTRAVENOUS at 12:45

## 2019-01-01 RX ADMIN — METHYLPREDNISOLONE SODIUM SUCCINATE 80 MG: 125 INJECTION, POWDER, FOR SOLUTION INTRAMUSCULAR; INTRAVENOUS at 17:01

## 2019-01-01 RX ADMIN — DEXMEDETOMIDINE HYDROCHLORIDE 0.2 MCG/KG/HR: 100 INJECTION, SOLUTION, CONCENTRATE INTRAVENOUS at 08:09

## 2019-01-01 RX ADMIN — QUETIAPINE FUMARATE 50 MG: 25 TABLET ORAL at 18:35

## 2019-01-01 RX ADMIN — MAGNESIUM SULFATE HEPTAHYDRATE 1 G: 1 INJECTION, SOLUTION INTRAVENOUS at 18:03

## 2019-01-01 RX ADMIN — SODIUM CHLORIDE 1.8 UNITS/HR: 9 INJECTION, SOLUTION INTRAVENOUS at 03:08

## 2019-01-01 RX ADMIN — METHYLPREDNISOLONE SODIUM SUCCINATE 80 MG: 125 INJECTION, POWDER, FOR SOLUTION INTRAMUSCULAR; INTRAVENOUS at 06:04

## 2019-01-01 RX ADMIN — PIPERACILLIN AND TAZOBACTAM 3.38 G: 3; .375 INJECTION, POWDER, FOR SOLUTION INTRAVENOUS at 10:15

## 2019-01-01 RX ADMIN — PRAVASTATIN SODIUM 40 MG: 40 TABLET ORAL at 21:49

## 2019-01-01 RX ADMIN — Medication 10 ML: at 07:41

## 2019-01-01 RX ADMIN — CHLOROTHIAZIDE SODIUM 500 MG: 500 INJECTION, POWDER, LYOPHILIZED, FOR SOLUTION INTRAVENOUS at 20:46

## 2019-01-01 RX ADMIN — SODIUM CHLORIDE 40 MG: 9 INJECTION INTRAMUSCULAR; INTRAVENOUS; SUBCUTANEOUS at 09:11

## 2019-01-01 RX ADMIN — SODIUM CHLORIDE 40 MG: 9 INJECTION INTRAMUSCULAR; INTRAVENOUS; SUBCUTANEOUS at 08:38

## 2019-01-01 RX ADMIN — SODIUM CHLORIDE 1.8 UNITS/HR: 9 INJECTION, SOLUTION INTRAVENOUS at 03:01

## 2019-01-01 RX ADMIN — CHLORHEXIDINE GLUCONATE 10 ML: 1.2 RINSE ORAL at 21:53

## 2019-01-01 RX ADMIN — WATER 500 MG: 1 INJECTION INTRAMUSCULAR; INTRAVENOUS; SUBCUTANEOUS at 09:00

## 2019-01-01 RX ADMIN — AMIODARONE HYDROCHLORIDE 400 MG: 200 TABLET ORAL at 09:26

## 2019-01-01 RX ADMIN — MORPHINE SULFATE 2 MG: 2 INJECTION, SOLUTION INTRAMUSCULAR; INTRAVENOUS at 09:12

## 2019-01-01 RX ADMIN — SODIUM CHLORIDE 5 MG/HR: 900 INJECTION, SOLUTION INTRAVENOUS at 06:56

## 2019-01-01 RX ADMIN — ARFORMOTEROL TARTRATE 15 MCG: 15 SOLUTION RESPIRATORY (INHALATION) at 06:41

## 2019-01-01 RX ADMIN — DEXTROSE MONOHYDRATE 40 ML: 10 INJECTION, SOLUTION INTRAVENOUS at 04:23

## 2019-01-01 RX ADMIN — MIDAZOLAM HYDROCHLORIDE 8 MG/HR: 5 INJECTION, SOLUTION INTRAMUSCULAR; INTRAVENOUS at 01:54

## 2019-01-01 RX ADMIN — MAGNESIUM OXIDE TAB 400 MG (241.3 MG ELEMENTAL MG) 400 MG: 400 (241.3 MG) TAB at 08:59

## 2019-01-01 RX ADMIN — HYDROMORPHONE HYDROCHLORIDE 0.5 MG: 1 INJECTION, SOLUTION INTRAMUSCULAR; INTRAVENOUS; SUBCUTANEOUS at 18:52

## 2019-01-01 RX ADMIN — NYSTATIN 500000 UNITS: 100000 SUSPENSION ORAL at 21:03

## 2019-01-01 RX ADMIN — GUAIFENESIN 1200 MG: 600 TABLET, EXTENDED RELEASE ORAL at 21:47

## 2019-01-01 RX ADMIN — NYSTATIN 500000 UNITS: 100000 SUSPENSION ORAL at 08:38

## 2019-01-01 RX ADMIN — Medication 10 ML: at 21:48

## 2019-01-01 RX ADMIN — Medication 10 ML: at 11:09

## 2019-01-01 RX ADMIN — METHYLPREDNISOLONE SODIUM SUCCINATE 40 MG: 40 INJECTION, POWDER, FOR SOLUTION INTRAMUSCULAR; INTRAVENOUS at 18:50

## 2019-01-01 RX ADMIN — CHLORHEXIDINE GLUCONATE 10 ML: 1.2 RINSE ORAL at 22:00

## 2019-01-01 RX ADMIN — SODIUM CHLORIDE 40 MG: 9 INJECTION INTRAMUSCULAR; INTRAVENOUS; SUBCUTANEOUS at 21:51

## 2019-01-01 RX ADMIN — Medication 10 ML: at 05:57

## 2019-01-01 RX ADMIN — ONDANSETRON 4 MG: 2 INJECTION INTRAMUSCULAR; INTRAVENOUS at 23:42

## 2019-01-01 RX ADMIN — ACETAMINOPHEN 1000 MG: 10 INJECTION, SOLUTION INTRAVENOUS at 11:50

## 2019-01-01 RX ADMIN — ALBUMIN (HUMAN) 25 G: 0.25 INJECTION, SOLUTION INTRAVENOUS at 21:59

## 2019-01-01 RX ADMIN — PIPERACILLIN AND TAZOBACTAM 3.38 G: 3; .375 INJECTION, POWDER, FOR SOLUTION INTRAVENOUS at 09:30

## 2019-01-01 RX ADMIN — PROPOFOL 40 MCG/KG/MIN: 10 INJECTION, EMULSION INTRAVENOUS at 11:59

## 2019-01-01 RX ADMIN — MUPIROCIN: 20 OINTMENT TOPICAL at 09:40

## 2019-01-01 RX ADMIN — PRAVASTATIN SODIUM 40 MG: 40 TABLET ORAL at 21:57

## 2019-01-01 RX ADMIN — MIDAZOLAM HYDROCHLORIDE 6 MG/HR: 5 INJECTION, SOLUTION INTRAMUSCULAR; INTRAVENOUS at 21:25

## 2019-01-01 RX ADMIN — PANTOPRAZOLE SODIUM 40 MG: 40 TABLET, DELAYED RELEASE ORAL at 07:15

## 2019-01-01 RX ADMIN — MORPHINE SULFATE 2 MG: 2 INJECTION, SOLUTION INTRAMUSCULAR; INTRAVENOUS at 11:40

## 2019-01-01 RX ADMIN — BUMETANIDE 2 MG: 0.25 INJECTION INTRAMUSCULAR; INTRAVENOUS at 18:08

## 2019-01-01 RX ADMIN — GUAIFENESIN 400 MG: 100 SOLUTION ORAL at 23:57

## 2019-01-01 RX ADMIN — DEXMEDETOMIDINE HYDROCHLORIDE 0.7 MCG/KG/HR: 100 INJECTION, SOLUTION, CONCENTRATE INTRAVENOUS at 17:04

## 2019-01-01 RX ADMIN — Medication 10 ML: at 21:49

## 2019-01-01 RX ADMIN — ASPIRIN 81 MG 81 MG: 81 TABLET ORAL at 09:26

## 2019-01-01 RX ADMIN — AMIODARONE HYDROCHLORIDE 1 MG/MIN: 50 INJECTION, SOLUTION INTRAVENOUS at 20:43

## 2019-01-01 RX ADMIN — ENOXAPARIN SODIUM 60 MG: 60 INJECTION SUBCUTANEOUS at 20:03

## 2019-01-01 RX ADMIN — PHENYLEPHRINE HYDROCHLORIDE 20 MCG/MIN: 10 INJECTION INTRAVENOUS at 14:19

## 2019-01-01 RX ADMIN — GUAIFENESIN 400 MG: 100 SOLUTION ORAL at 18:32

## 2019-01-01 RX ADMIN — INSULIN GLARGINE 20 UNITS: 100 INJECTION, SOLUTION SUBCUTANEOUS at 20:30

## 2019-01-01 RX ADMIN — MUPIROCIN: 20 OINTMENT TOPICAL at 19:27

## 2019-01-01 RX ADMIN — BUMETANIDE 2 MG: 0.25 INJECTION INTRAMUSCULAR; INTRAVENOUS at 10:04

## 2019-01-01 RX ADMIN — MAGNESIUM OXIDE TAB 400 MG (241.3 MG ELEMENTAL MG) 400 MG: 400 (241.3 MG) TAB at 17:02

## 2019-01-01 RX ADMIN — SODIUM CHLORIDE 7.7 UNITS/HR: 9 INJECTION, SOLUTION INTRAVENOUS at 12:51

## 2019-01-01 RX ADMIN — METHYLPREDNISOLONE SODIUM SUCCINATE 40 MG: 40 INJECTION, POWDER, FOR SOLUTION INTRAMUSCULAR; INTRAVENOUS at 06:39

## 2019-01-01 RX ADMIN — DEXMEDETOMIDINE HYDROCHLORIDE 0.2 MCG/KG/HR: 100 INJECTION, SOLUTION, CONCENTRATE INTRAVENOUS at 17:52

## 2019-01-01 RX ADMIN — METOLAZONE 2.5 MG: 2.5 TABLET ORAL at 09:53

## 2019-01-01 RX ADMIN — MORPHINE SULFATE 2 MG: 2 INJECTION, SOLUTION INTRAMUSCULAR; INTRAVENOUS at 19:16

## 2019-01-01 RX ADMIN — MUPIROCIN 1 G: 20 OINTMENT TOPICAL at 08:56

## 2019-01-01 RX ADMIN — Medication 2 G: at 07:20

## 2019-01-01 RX ADMIN — MORPHINE SULFATE 2 MG: 2 INJECTION, SOLUTION INTRAMUSCULAR; INTRAVENOUS at 04:03

## 2019-01-01 RX ADMIN — NYSTATIN 500000 UNITS: 100000 SUSPENSION ORAL at 21:39

## 2019-01-01 RX ADMIN — DEXTROSE MONOHYDRATE 125 ML: 10 INJECTION, SOLUTION INTRAVENOUS at 22:21

## 2019-01-01 RX ADMIN — GUAIFENESIN 1200 MG: 600 TABLET, EXTENDED RELEASE ORAL at 21:35

## 2019-01-01 RX ADMIN — MIDAZOLAM HYDROCHLORIDE 8 MG/HR: 5 INJECTION, SOLUTION INTRAMUSCULAR; INTRAVENOUS at 00:28

## 2019-01-01 RX ADMIN — MORPHINE SULFATE 2 MG: 2 INJECTION, SOLUTION INTRAMUSCULAR; INTRAVENOUS at 21:16

## 2019-01-01 RX ADMIN — METHYLPREDNISOLONE SODIUM SUCCINATE 80 MG: 125 INJECTION, POWDER, FOR SOLUTION INTRAMUSCULAR; INTRAVENOUS at 05:35

## 2019-01-01 RX ADMIN — QUETIAPINE FUMARATE 50 MG: 25 TABLET ORAL at 08:53

## 2019-01-01 RX ADMIN — CASTOR OIL AND BALSAM, PERU: 788; 87 OINTMENT TOPICAL at 08:20

## 2019-01-01 RX ADMIN — CASTOR OIL AND BALSAM, PERU: 788; 87 OINTMENT TOPICAL at 18:20

## 2019-01-01 RX ADMIN — PIPERACILLIN AND TAZOBACTAM 3.38 G: 3; .375 INJECTION, POWDER, FOR SOLUTION INTRAVENOUS at 17:51

## 2019-01-01 RX ADMIN — SENNOSIDES, DOCUSATE SODIUM 1 TABLET: 50; 8.6 TABLET, FILM COATED ORAL at 09:17

## 2019-01-01 RX ADMIN — BUDESONIDE 500 MCG: 0.5 INHALANT RESPIRATORY (INHALATION) at 08:21

## 2019-01-01 RX ADMIN — MUPIROCIN: 20 OINTMENT TOPICAL at 08:37

## 2019-01-01 RX ADMIN — INSULIN LISPRO 3 UNITS: 100 INJECTION, SOLUTION INTRAVENOUS; SUBCUTANEOUS at 00:28

## 2019-01-01 RX ADMIN — METHYLPREDNISOLONE SODIUM SUCCINATE 20 MG: 40 INJECTION, POWDER, FOR SOLUTION INTRAMUSCULAR; INTRAVENOUS at 17:49

## 2019-01-01 RX ADMIN — Medication 10 ML: at 01:34

## 2019-01-01 RX ADMIN — SODIUM CHLORIDE 10 ML/HR: 900 INJECTION, SOLUTION INTRAVENOUS at 04:18

## 2019-01-01 RX ADMIN — ACETYLCYSTEINE 200 MG: 200 SOLUTION ORAL; RESPIRATORY (INHALATION) at 07:43

## 2019-01-01 RX ADMIN — METHYLPREDNISOLONE SODIUM SUCCINATE 80 MG: 125 INJECTION, POWDER, FOR SOLUTION INTRAMUSCULAR; INTRAVENOUS at 14:51

## 2019-01-01 RX ADMIN — MORPHINE SULFATE 2 MG: 2 INJECTION, SOLUTION INTRAMUSCULAR; INTRAVENOUS at 23:17

## 2019-01-01 RX ADMIN — DEXMEDETOMIDINE HYDROCHLORIDE 0.8 MCG/KG/HR: 100 INJECTION, SOLUTION, CONCENTRATE INTRAVENOUS at 11:26

## 2019-01-01 RX ADMIN — ARFORMOTEROL TARTRATE 15 MCG: 15 SOLUTION RESPIRATORY (INHALATION) at 07:28

## 2019-01-01 RX ADMIN — NYSTATIN 500000 UNITS: 100000 SUSPENSION ORAL at 09:11

## 2019-01-01 RX ADMIN — PIPERACILLIN AND TAZOBACTAM 3.38 G: 3; .375 INJECTION, POWDER, FOR SOLUTION INTRAVENOUS at 17:03

## 2019-01-01 RX ADMIN — BUDESONIDE 500 MCG: 0.5 INHALANT RESPIRATORY (INHALATION) at 20:10

## 2019-01-01 RX ADMIN — Medication 30 ML: at 08:00

## 2019-01-01 RX ADMIN — MUPIROCIN: 20 OINTMENT TOPICAL at 21:22

## 2019-01-01 RX ADMIN — MIDAZOLAM 1 MG: 1 INJECTION INTRAMUSCULAR; INTRAVENOUS at 12:47

## 2019-01-01 RX ADMIN — METHYLPREDNISOLONE SODIUM SUCCINATE 40 MG: 40 INJECTION, POWDER, FOR SOLUTION INTRAMUSCULAR; INTRAVENOUS at 06:10

## 2019-01-01 RX ADMIN — GUAIFENESIN 1200 MG: 600 TABLET, EXTENDED RELEASE ORAL at 10:54

## 2019-01-01 RX ADMIN — Medication 10 ML: at 07:54

## 2019-01-01 RX ADMIN — Medication 200 MCG/HR: at 10:49

## 2019-01-01 RX ADMIN — INSULIN LISPRO 3 UNITS: 100 INJECTION, SOLUTION INTRAVENOUS; SUBCUTANEOUS at 19:09

## 2019-01-01 RX ADMIN — CHLORHEXIDINE GLUCONATE 10 ML: 1.2 RINSE ORAL at 21:22

## 2019-01-01 RX ADMIN — GUAIFENESIN 400 MG: 100 SOLUTION ORAL at 00:15

## 2019-01-01 RX ADMIN — BUMETANIDE 1 MG: 0.25 INJECTION INTRAMUSCULAR; INTRAVENOUS at 21:52

## 2019-01-01 RX ADMIN — Medication 30 ML: at 08:36

## 2019-01-01 RX ADMIN — DEXTROSE MONOHYDRATE 65 ML/HR: 5 INJECTION, SOLUTION INTRAVENOUS at 05:00

## 2019-01-01 RX ADMIN — POTASSIUM CHLORIDE 20 MEQ: 400 INJECTION, SOLUTION INTRAVENOUS at 08:04

## 2019-01-01 RX ADMIN — PHENYLEPHRINE HYDROCHLORIDE 115 MCG/MIN: 10 INJECTION INTRAVENOUS at 14:28

## 2019-01-01 RX ADMIN — LORAZEPAM 2 MG: 2 INJECTION INTRAMUSCULAR; INTRAVENOUS at 18:01

## 2019-01-01 RX ADMIN — MORPHINE SULFATE 2 MG: 2 INJECTION, SOLUTION INTRAMUSCULAR; INTRAVENOUS at 20:36

## 2019-01-01 RX ADMIN — BUDESONIDE 500 MCG: 0.5 INHALANT RESPIRATORY (INHALATION) at 20:03

## 2019-01-01 RX ADMIN — MORPHINE SULFATE 2 MG: 2 INJECTION, SOLUTION INTRAMUSCULAR; INTRAVENOUS at 19:06

## 2019-01-01 RX ADMIN — MAGNESIUM OXIDE TAB 400 MG (241.3 MG ELEMENTAL MG) 400 MG: 400 (241.3 MG) TAB at 10:54

## 2019-01-01 RX ADMIN — Medication 10 ML: at 12:48

## 2019-01-01 RX ADMIN — Medication 10 ML: at 23:11

## 2019-01-01 RX ADMIN — FENTANYL CITRATE 2 MCG: 50 INJECTION, SOLUTION INTRAMUSCULAR; INTRAVENOUS at 19:32

## 2019-01-01 RX ADMIN — ASPIRIN 81 MG 81 MG: 81 TABLET ORAL at 08:20

## 2019-01-01 RX ADMIN — QUETIAPINE FUMARATE 25 MG: 25 TABLET ORAL at 12:32

## 2019-01-01 RX ADMIN — LORAZEPAM 2 MG: 2 INJECTION INTRAMUSCULAR; INTRAVENOUS at 20:09

## 2019-01-01 RX ADMIN — POTASSIUM BICARBONATE 20 MEQ: 782 TABLET, EFFERVESCENT ORAL at 17:02

## 2019-01-01 RX ADMIN — Medication 10 ML: at 05:26

## 2019-01-01 RX ADMIN — CHLORHEXIDINE GLUCONATE 10 ML: 1.2 RINSE ORAL at 21:48

## 2019-01-01 RX ADMIN — SODIUM CHLORIDE 2 UNITS/HR: 9 INJECTION, SOLUTION INTRAVENOUS at 20:41

## 2019-01-01 RX ADMIN — ALBUMIN (HUMAN) 250 ML: 12.5 INJECTION, SOLUTION INTRAVENOUS at 20:35

## 2019-01-01 RX ADMIN — BUDESONIDE 500 MCG: 0.5 INHALANT RESPIRATORY (INHALATION) at 09:22

## 2019-01-01 RX ADMIN — SODIUM CHLORIDE 1.7 UNITS/HR: 9 INJECTION, SOLUTION INTRAVENOUS at 02:00

## 2019-01-01 RX ADMIN — MIDAZOLAM HYDROCHLORIDE 6 MG/HR: 5 INJECTION, SOLUTION INTRAMUSCULAR; INTRAVENOUS at 11:21

## 2019-01-01 RX ADMIN — CHLORHEXIDINE GLUCONATE 10 ML: 1.2 RINSE ORAL at 10:24

## 2019-01-01 RX ADMIN — POTASSIUM CHLORIDE 20 MEQ: 29.8 INJECTION, SOLUTION INTRAVENOUS at 06:23

## 2019-01-01 RX ADMIN — PIPERACILLIN AND TAZOBACTAM 3.38 G: 3; .375 INJECTION, POWDER, FOR SOLUTION INTRAVENOUS at 10:28

## 2019-01-01 RX ADMIN — QUETIAPINE FUMARATE 50 MG: 25 TABLET ORAL at 08:36

## 2019-01-01 RX ADMIN — INSULIN GLARGINE 25 UNITS: 100 INJECTION, SOLUTION SUBCUTANEOUS at 21:17

## 2019-01-01 RX ADMIN — AMIODARONE HYDROCHLORIDE 400 MG: 200 TABLET ORAL at 08:59

## 2019-01-01 RX ADMIN — METHYLPREDNISOLONE SODIUM SUCCINATE 80 MG: 125 INJECTION, POWDER, FOR SOLUTION INTRAMUSCULAR; INTRAVENOUS at 06:42

## 2019-01-01 RX ADMIN — DOBUTAMINE IN DEXTROSE 5 MCG/KG/MIN: 200 INJECTION, SOLUTION INTRAVENOUS at 13:39

## 2019-01-01 RX ADMIN — METHYLPREDNISOLONE SODIUM SUCCINATE 80 MG: 125 INJECTION, POWDER, FOR SOLUTION INTRAMUSCULAR; INTRAVENOUS at 22:58

## 2019-01-01 RX ADMIN — POTASSIUM BICARBONATE 40 MEQ: 782 TABLET, EFFERVESCENT ORAL at 09:58

## 2019-01-01 RX ADMIN — OXYCODONE HYDROCHLORIDE 10 MG: 5 TABLET ORAL at 07:00

## 2019-01-01 RX ADMIN — ACETYLCYSTEINE 200 MG: 200 SOLUTION ORAL; RESPIRATORY (INHALATION) at 07:24

## 2019-01-01 RX ADMIN — SODIUM CHLORIDE 6 ML/HR: 900 INJECTION, SOLUTION INTRAVENOUS at 19:30

## 2019-01-01 RX ADMIN — LORAZEPAM 2 MG: 2 INJECTION INTRAMUSCULAR; INTRAVENOUS at 04:12

## 2019-01-01 RX ADMIN — ASPIRIN 81 MG 81 MG: 81 TABLET ORAL at 09:36

## 2019-01-01 RX ADMIN — DEXMEDETOMIDINE HYDROCHLORIDE 0.8 MCG/KG/HR: 100 INJECTION, SOLUTION, CONCENTRATE INTRAVENOUS at 02:37

## 2019-01-01 RX ADMIN — INSULIN LISPRO 3 UNITS: 100 INJECTION, SOLUTION INTRAVENOUS; SUBCUTANEOUS at 18:14

## 2019-01-01 RX ADMIN — DOBUTAMINE IN DEXTROSE 2 MCG/KG/MIN: 200 INJECTION, SOLUTION INTRAVENOUS at 08:19

## 2019-01-01 RX ADMIN — MAGNESIUM OXIDE TAB 400 MG (241.3 MG ELEMENTAL MG) 400 MG: 400 (241.3 MG) TAB at 09:17

## 2019-01-01 RX ADMIN — ALBUMIN HUMAN 12.5 G: 50 SOLUTION INTRAVENOUS at 11:15

## 2019-01-01 RX ADMIN — AMIODARONE HYDROCHLORIDE 400 MG: 200 TABLET ORAL at 10:54

## 2019-01-01 RX ADMIN — METHYLPREDNISOLONE SODIUM SUCCINATE 40 MG: 40 INJECTION, POWDER, FOR SOLUTION INTRAMUSCULAR; INTRAVENOUS at 17:29

## 2019-01-01 RX ADMIN — ARFORMOTEROL TARTRATE 15 MCG: 15 SOLUTION RESPIRATORY (INHALATION) at 19:17

## 2019-01-01 RX ADMIN — DEXMEDETOMIDINE HYDROCHLORIDE 0.9 MCG/KG/HR: 100 INJECTION, SOLUTION, CONCENTRATE INTRAVENOUS at 18:42

## 2019-01-01 RX ADMIN — DOBUTAMINE IN DEXTROSE 1 MCG/KG/MIN: 200 INJECTION, SOLUTION INTRAVENOUS at 03:24

## 2019-01-01 RX ADMIN — Medication 10 ML: at 23:01

## 2019-01-01 RX ADMIN — CASTOR OIL AND BALSAM, PERU: 788; 87 OINTMENT TOPICAL at 18:10

## 2019-01-01 RX ADMIN — HEPARIN SODIUM 5000 UNITS: 5000 INJECTION INTRAVENOUS; SUBCUTANEOUS at 12:41

## 2019-01-01 RX ADMIN — MIDAZOLAM HYDROCHLORIDE 8 MG/HR: 5 INJECTION, SOLUTION INTRAMUSCULAR; INTRAVENOUS at 22:37

## 2019-01-01 RX ADMIN — SODIUM CHLORIDE 40 MG: 9 INJECTION INTRAMUSCULAR; INTRAVENOUS; SUBCUTANEOUS at 20:43

## 2019-01-01 RX ADMIN — Medication 10 ML: at 13:24

## 2019-01-01 RX ADMIN — MINERAL SUPPLEMENT IRON 300 MG / 5 ML STRENGTH LIQUID 100 PER BOX UNFLAVORED 300 MG: at 09:00

## 2019-01-01 RX ADMIN — SERTRALINE HYDROCHLORIDE 100 MG: 50 TABLET ORAL at 09:13

## 2019-01-01 RX ADMIN — PHENYLEPHRINE HYDROCHLORIDE 80 MCG: 10 INJECTION INTRAVENOUS at 11:16

## 2019-01-01 RX ADMIN — Medication 10 ML: at 15:38

## 2019-01-01 RX ADMIN — MORPHINE SULFATE 2 MG: 2 INJECTION, SOLUTION INTRAMUSCULAR; INTRAVENOUS at 17:52

## 2019-01-01 RX ADMIN — MORPHINE SULFATE 2 MG: 2 INJECTION, SOLUTION INTRAMUSCULAR; INTRAVENOUS at 03:10

## 2019-01-01 RX ADMIN — GUAIFENESIN 400 MG: 100 SOLUTION ORAL at 18:26

## 2019-01-01 RX ADMIN — METHYLPREDNISOLONE SODIUM SUCCINATE 80 MG: 125 INJECTION, POWDER, FOR SOLUTION INTRAMUSCULAR; INTRAVENOUS at 16:12

## 2019-01-01 RX ADMIN — METHYLPREDNISOLONE SODIUM SUCCINATE 80 MG: 125 INJECTION, POWDER, FOR SOLUTION INTRAMUSCULAR; INTRAVENOUS at 21:36

## 2019-01-01 RX ADMIN — INSULIN GLARGINE 30 UNITS: 100 INJECTION, SOLUTION SUBCUTANEOUS at 20:13

## 2019-01-01 RX ADMIN — DEXMEDETOMIDINE HYDROCHLORIDE 0.9 MCG/KG/HR: 100 INJECTION, SOLUTION, CONCENTRATE INTRAVENOUS at 05:00

## 2019-01-01 RX ADMIN — ASPIRIN 81 MG CHEWABLE TABLET 81 MG: 81 TABLET CHEWABLE at 08:38

## 2019-01-01 RX ADMIN — ACETAMINOPHEN 650 MG: 325 TABLET, FILM COATED ORAL at 12:13

## 2019-01-01 RX ADMIN — QUETIAPINE FUMARATE 25 MG: 25 TABLET ORAL at 17:29

## 2019-01-01 RX ADMIN — Medication 10 ML: at 09:41

## 2019-01-01 RX ADMIN — METHYLPREDNISOLONE SODIUM SUCCINATE 80 MG: 125 INJECTION, POWDER, FOR SOLUTION INTRAMUSCULAR; INTRAVENOUS at 06:26

## 2019-01-01 RX ADMIN — ALBUMIN (HUMAN) 12.5 G: 12.5 INJECTION, SOLUTION INTRAVENOUS at 01:37

## 2019-01-01 RX ADMIN — HEPARIN SODIUM 5000 UNITS: 5000 INJECTION INTRAVENOUS; SUBCUTANEOUS at 01:22

## 2019-01-01 RX ADMIN — MORPHINE SULFATE 2 MG: 2 INJECTION, SOLUTION INTRAMUSCULAR; INTRAVENOUS at 05:20

## 2019-01-01 RX ADMIN — DEXTROSE MONOHYDRATE 65 ML/HR: 5 INJECTION, SOLUTION INTRAVENOUS at 13:22

## 2019-01-01 RX ADMIN — MORPHINE SULFATE 2 MG: 2 INJECTION, SOLUTION INTRAMUSCULAR; INTRAVENOUS at 08:06

## 2019-01-01 RX ADMIN — Medication 10 ML: at 14:00

## 2019-01-01 RX ADMIN — Medication 10 ML: at 05:43

## 2019-01-01 RX ADMIN — BUDESONIDE 500 MCG: 0.5 INHALANT RESPIRATORY (INHALATION) at 07:43

## 2019-01-01 RX ADMIN — SERTRALINE HYDROCHLORIDE 100 MG: 50 TABLET ORAL at 08:53

## 2019-01-01 RX ADMIN — DEXMEDETOMIDINE HYDROCHLORIDE 0.9 MCG/KG/HR: 100 INJECTION, SOLUTION, CONCENTRATE INTRAVENOUS at 18:34

## 2019-01-01 RX ADMIN — BUDESONIDE 500 MCG: 0.5 INHALANT RESPIRATORY (INHALATION) at 19:50

## 2019-01-01 RX ADMIN — Medication 2 MG: at 15:44

## 2019-01-01 RX ADMIN — CHLORHEXIDINE GLUCONATE 10 ML: 1.2 RINSE ORAL at 20:35

## 2019-01-01 RX ADMIN — Medication 10 ML: at 06:13

## 2019-01-01 RX ADMIN — HEPARIN SODIUM 5000 UNITS: 5000 INJECTION INTRAVENOUS; SUBCUTANEOUS at 12:39

## 2019-01-01 RX ADMIN — SODIUM CHLORIDE 2.8 UNITS/HR: 9 INJECTION, SOLUTION INTRAVENOUS at 03:17

## 2019-01-01 RX ADMIN — INSULIN LISPRO 2 UNITS: 100 INJECTION, SOLUTION INTRAVENOUS; SUBCUTANEOUS at 18:52

## 2019-01-01 RX ADMIN — AMIODARONE HYDROCHLORIDE 400 MG: 200 TABLET ORAL at 21:52

## 2019-01-01 RX ADMIN — NYSTATIN 500000 UNITS: 100000 SUSPENSION ORAL at 17:05

## 2019-01-01 RX ADMIN — SERTRALINE HYDROCHLORIDE 100 MG: 50 TABLET ORAL at 08:38

## 2019-01-01 RX ADMIN — ALBUTEROL SULFATE 2.5 MG: 2.5 SOLUTION RESPIRATORY (INHALATION) at 23:50

## 2019-01-01 RX ADMIN — LORAZEPAM 2 MG: 2 INJECTION INTRAMUSCULAR; INTRAVENOUS at 11:40

## 2019-01-01 RX ADMIN — POTASSIUM CHLORIDE 20 MEQ: 400 INJECTION, SOLUTION INTRAVENOUS at 07:31

## 2019-01-01 RX ADMIN — SENNOSIDES, DOCUSATE SODIUM 1 TABLET: 50; 8.6 TABLET, FILM COATED ORAL at 08:42

## 2019-01-01 RX ADMIN — INSULIN GLARGINE 38 UNITS: 100 INJECTION, SOLUTION SUBCUTANEOUS at 20:59

## 2019-01-01 RX ADMIN — Medication 10 ML: at 21:12

## 2019-01-01 RX ADMIN — CARVEDILOL 12.5 MG: 12.5 TABLET, FILM COATED ORAL at 17:50

## 2019-01-01 RX ADMIN — ACETYLCYSTEINE 200 MG: 200 SOLUTION ORAL; RESPIRATORY (INHALATION) at 20:43

## 2019-01-01 RX ADMIN — ALBUMIN (HUMAN) 12.5 G: 12.5 INJECTION, SOLUTION INTRAVENOUS at 06:03

## 2019-01-01 RX ADMIN — HEPARIN SODIUM 5000 UNITS: 5000 INJECTION INTRAVENOUS; SUBCUTANEOUS at 20:18

## 2019-01-01 RX ADMIN — INSULIN GLARGINE 25 UNITS: 100 INJECTION, SOLUTION SUBCUTANEOUS at 09:19

## 2019-01-01 RX ADMIN — MAGNESIUM SULFATE HEPTAHYDRATE 1 G: 1 INJECTION, SOLUTION INTRAVENOUS at 05:06

## 2019-01-01 RX ADMIN — LORAZEPAM 2 MG: 2 INJECTION INTRAMUSCULAR; INTRAVENOUS at 07:09

## 2019-01-01 RX ADMIN — SODIUM CHLORIDE 10 ML/HR: 450 INJECTION, SOLUTION INTRAVENOUS at 05:26

## 2019-01-01 RX ADMIN — ACETYLCYSTEINE 200 MG: 200 SOLUTION ORAL; RESPIRATORY (INHALATION) at 07:51

## 2019-01-01 RX ADMIN — WATER 500 MG: 1 INJECTION INTRAMUSCULAR; INTRAVENOUS; SUBCUTANEOUS at 09:37

## 2019-01-01 RX ADMIN — Medication 10 ML: at 06:42

## 2019-01-01 RX ADMIN — INSULIN GLARGINE 38 UNITS: 100 INJECTION, SOLUTION SUBCUTANEOUS at 09:09

## 2019-01-01 RX ADMIN — METHYLPREDNISOLONE SODIUM SUCCINATE 80 MG: 125 INJECTION, POWDER, FOR SOLUTION INTRAMUSCULAR; INTRAVENOUS at 06:48

## 2019-01-01 RX ADMIN — Medication 125 MCG/HR: at 10:24

## 2019-01-01 RX ADMIN — BUMETANIDE 1.5 MG: 0.25 INJECTION INTRAMUSCULAR; INTRAVENOUS at 13:37

## 2019-01-01 RX ADMIN — SENNOSIDES, DOCUSATE SODIUM 1 TABLET: 50; 8.6 TABLET, FILM COATED ORAL at 18:16

## 2019-01-01 RX ADMIN — ACETYLCYSTEINE 200 MG: 200 SOLUTION ORAL; RESPIRATORY (INHALATION) at 19:13

## 2019-01-01 RX ADMIN — GUAIFENESIN 1200 MG: 600 TABLET, EXTENDED RELEASE ORAL at 09:25

## 2019-01-01 RX ADMIN — Medication 30 ML: at 09:18

## 2019-01-01 RX ADMIN — SERTRALINE HYDROCHLORIDE 100 MG: 50 TABLET ORAL at 08:59

## 2019-01-01 RX ADMIN — CHLORHEXIDINE GLUCONATE 15 ML: 1.2 RINSE ORAL at 08:46

## 2019-01-01 RX ADMIN — SODIUM CHLORIDE 40 MG: 9 INJECTION INTRAMUSCULAR; INTRAVENOUS; SUBCUTANEOUS at 08:18

## 2019-01-01 RX ADMIN — SODIUM CHLORIDE 10 ML/HR: 450 INJECTION, SOLUTION INTRAVENOUS at 03:53

## 2019-01-01 RX ADMIN — ACETYLCYSTEINE 200 MG: 200 SOLUTION ORAL; RESPIRATORY (INHALATION) at 08:21

## 2019-01-01 RX ADMIN — Medication 10 ML: at 14:16

## 2019-01-01 RX ADMIN — Medication 10 ML: at 14:28

## 2019-01-01 RX ADMIN — Medication 10 ML: at 16:07

## 2019-01-01 RX ADMIN — POLYETHYLENE GLYCOL 3350 17 G: 17 POWDER, FOR SOLUTION ORAL at 09:17

## 2019-01-01 RX ADMIN — BUMETANIDE 2 MG: 0.25 INJECTION INTRAMUSCULAR; INTRAVENOUS at 09:51

## 2019-01-01 RX ADMIN — SODIUM CHLORIDE 4.3 UNITS/HR: 9 INJECTION, SOLUTION INTRAVENOUS at 10:31

## 2019-01-01 RX ADMIN — PRAVASTATIN SODIUM 40 MG: 40 TABLET ORAL at 21:07

## 2019-01-01 RX ADMIN — ARFORMOTEROL TARTRATE 15 MCG: 15 SOLUTION RESPIRATORY (INHALATION) at 19:20

## 2019-01-01 RX ADMIN — NYSTATIN 500000 UNITS: 100000 SUSPENSION ORAL at 17:52

## 2019-01-01 RX ADMIN — Medication 100 MCG/HR: at 08:09

## 2019-01-01 RX ADMIN — MINERAL SUPPLEMENT IRON 300 MG / 5 ML STRENGTH LIQUID 100 PER BOX UNFLAVORED 300 MG: at 08:36

## 2019-01-01 RX ADMIN — MIDAZOLAM HYDROCHLORIDE 8 MG/HR: 5 INJECTION, SOLUTION INTRAMUSCULAR; INTRAVENOUS at 07:57

## 2019-01-01 RX ADMIN — NYSTATIN 500000 UNITS: 100000 SUSPENSION ORAL at 09:04

## 2019-01-01 RX ADMIN — MUPIROCIN: 20 OINTMENT TOPICAL at 09:00

## 2019-01-01 RX ADMIN — SENNOSIDES, DOCUSATE SODIUM 1 TABLET: 50; 8.6 TABLET, FILM COATED ORAL at 17:49

## 2019-01-01 RX ADMIN — AMIODARONE HYDROCHLORIDE 400 MG: 200 TABLET ORAL at 08:41

## 2019-01-01 RX ADMIN — POLYETHYLENE GLYCOL 3350 17 G: 17 POWDER, FOR SOLUTION ORAL at 10:55

## 2019-01-01 RX ADMIN — POTASSIUM CHLORIDE 20 MEQ: 29.8 INJECTION, SOLUTION INTRAVENOUS at 06:02

## 2019-01-01 RX ADMIN — FERROUS SULFATE TAB 325 MG (65 MG ELEMENTAL FE) 325 MG: 325 (65 FE) TAB at 08:41

## 2019-01-01 RX ADMIN — DESMOPRESSIN ACETATE 18 MCG: 4 SOLUTION INTRAVENOUS at 15:11

## 2019-01-01 RX ADMIN — SERTRALINE HYDROCHLORIDE 100 MG: 50 TABLET ORAL at 08:19

## 2019-01-01 RX ADMIN — Medication 100 MCG/HR: at 06:09

## 2019-01-01 RX ADMIN — LORAZEPAM 2 MG: 2 INJECTION INTRAMUSCULAR; INTRAVENOUS at 23:37

## 2019-01-01 RX ADMIN — NYSTATIN 500000 UNITS: 100000 SUSPENSION ORAL at 12:48

## 2019-01-01 RX ADMIN — DEXMEDETOMIDINE HYDROCHLORIDE 0.9 MCG/KG/HR: 100 INJECTION, SOLUTION, CONCENTRATE INTRAVENOUS at 00:54

## 2019-01-01 RX ADMIN — METHYLPREDNISOLONE SODIUM SUCCINATE 40 MG: 40 INJECTION, POWDER, FOR SOLUTION INTRAMUSCULAR; INTRAVENOUS at 05:32

## 2019-01-01 RX ADMIN — ACETYLCYSTEINE 200 MG: 200 SOLUTION ORAL; RESPIRATORY (INHALATION) at 07:48

## 2019-01-01 RX ADMIN — ARFORMOTEROL TARTRATE 15 MCG: 15 SOLUTION RESPIRATORY (INHALATION) at 19:00

## 2019-01-01 RX ADMIN — ASPIRIN 81 MG 81 MG: 81 TABLET ORAL at 09:13

## 2019-01-01 RX ADMIN — CASTOR OIL AND BALSAM, PERU: 788; 87 OINTMENT TOPICAL at 16:23

## 2019-01-01 RX ADMIN — PRAVASTATIN SODIUM 40 MG: 40 TABLET ORAL at 21:39

## 2019-01-01 RX ADMIN — BUMETANIDE 1.5 MG: 0.25 INJECTION INTRAMUSCULAR; INTRAVENOUS at 05:32

## 2019-01-01 RX ADMIN — NYSTATIN 500000 UNITS: 100000 SUSPENSION ORAL at 21:19

## 2019-01-01 RX ADMIN — CHLORHEXIDINE GLUCONATE 10 ML: 1.2 RINSE ORAL at 09:53

## 2019-01-01 RX ADMIN — MORPHINE SULFATE 2 MG: 2 INJECTION, SOLUTION INTRAMUSCULAR; INTRAVENOUS at 20:31

## 2019-01-01 RX ADMIN — PHENYLEPHRINE HYDROCHLORIDE 120 MCG: 10 INJECTION INTRAVENOUS at 20:04

## 2019-01-01 RX ADMIN — POTASSIUM BICARBONATE 40 MEQ: 782 TABLET, EFFERVESCENT ORAL at 17:59

## 2019-01-01 RX ADMIN — PRAVASTATIN SODIUM 40 MG: 40 TABLET ORAL at 21:55

## 2019-01-01 RX ADMIN — AMIODARONE HYDROCHLORIDE 1 MG/MIN: 50 INJECTION, SOLUTION INTRAVENOUS at 03:04

## 2019-01-01 RX ADMIN — MIDAZOLAM HYDROCHLORIDE 2 MG/HR: 5 INJECTION, SOLUTION INTRAMUSCULAR; INTRAVENOUS at 06:23

## 2019-01-01 RX ADMIN — ACETYLCYSTEINE 200 MG: 200 SOLUTION ORAL; RESPIRATORY (INHALATION) at 19:23

## 2019-01-01 RX ADMIN — GUAIFENESIN 1200 MG: 600 TABLET, EXTENDED RELEASE ORAL at 21:19

## 2019-01-01 RX ADMIN — BUDESONIDE 500 MCG: 0.5 INHALANT RESPIRATORY (INHALATION) at 08:04

## 2019-01-01 RX ADMIN — MIDAZOLAM HYDROCHLORIDE 8 MG/HR: 5 INJECTION, SOLUTION INTRAMUSCULAR; INTRAVENOUS at 11:09

## 2019-01-01 RX ADMIN — MAGNESIUM OXIDE TAB 400 MG (241.3 MG ELEMENTAL MG) 400 MG: 400 (241.3 MG) TAB at 18:59

## 2019-01-01 RX ADMIN — DEXMEDETOMIDINE HYDROCHLORIDE 0.9 MCG/KG/HR: 100 INJECTION, SOLUTION, CONCENTRATE INTRAVENOUS at 03:15

## 2019-01-01 RX ADMIN — POTASSIUM BICARBONATE 20 MEQ: 782 TABLET, EFFERVESCENT ORAL at 18:29

## 2019-01-01 RX ADMIN — SODIUM CHLORIDE 10 ML/HR: 900 INJECTION, SOLUTION INTRAVENOUS at 21:17

## 2019-01-01 RX ADMIN — PIPERACILLIN AND TAZOBACTAM 3.38 G: 3; .375 INJECTION, POWDER, FOR SOLUTION INTRAVENOUS at 01:31

## 2019-01-01 RX ADMIN — SERTRALINE HYDROCHLORIDE 100 MG: 50 TABLET ORAL at 08:22

## 2019-01-01 RX ADMIN — FUROSEMIDE 40 MG: 10 INJECTION, SOLUTION INTRAMUSCULAR; INTRAVENOUS at 21:22

## 2019-01-01 RX ADMIN — Medication 2 G: at 22:27

## 2019-01-01 RX ADMIN — DOBUTAMINE IN DEXTROSE 1 MCG/KG/MIN: 200 INJECTION, SOLUTION INTRAVENOUS at 03:53

## 2019-01-01 RX ADMIN — WATER 500 MG: 1 INJECTION INTRAMUSCULAR; INTRAVENOUS; SUBCUTANEOUS at 10:33

## 2019-01-01 RX ADMIN — METHYLPREDNISOLONE SODIUM SUCCINATE 80 MG: 125 INJECTION, POWDER, FOR SOLUTION INTRAMUSCULAR; INTRAVENOUS at 06:14

## 2019-01-01 RX ADMIN — PRAVASTATIN SODIUM 40 MG: 40 TABLET ORAL at 21:45

## 2019-01-01 RX ADMIN — INSULIN LISPRO 2 UNITS: 100 INJECTION, SOLUTION INTRAVENOUS; SUBCUTANEOUS at 00:28

## 2019-01-01 RX ADMIN — VASOPRESSIN 0.01 UNITS/MIN: 20 INJECTION INTRAVENOUS at 17:29

## 2019-01-01 RX ADMIN — SODIUM CHLORIDE 1.7 UNITS/HR: 9 INJECTION, SOLUTION INTRAVENOUS at 06:58

## 2019-01-01 RX ADMIN — HEPARIN SODIUM 5000 UNITS: 5000 INJECTION INTRAVENOUS; SUBCUTANEOUS at 12:15

## 2019-01-01 RX ADMIN — BUMETANIDE 1 MG: 0.25 INJECTION INTRAMUSCULAR; INTRAVENOUS at 10:33

## 2019-01-01 RX ADMIN — GUAIFENESIN 400 MG: 100 SOLUTION ORAL at 22:00

## 2019-01-01 RX ADMIN — ACETAMINOPHEN 1000 MG: 10 INJECTION, SOLUTION INTRAVENOUS at 04:09

## 2019-01-01 RX ADMIN — ARFORMOTEROL TARTRATE 15 MCG: 15 SOLUTION RESPIRATORY (INHALATION) at 20:03

## 2019-01-01 RX ADMIN — HYDRALAZINE HYDROCHLORIDE 10 MG: 10 TABLET, FILM COATED ORAL at 08:36

## 2019-01-01 RX ADMIN — INSULIN LISPRO 3 UNITS: 100 INJECTION, SOLUTION INTRAVENOUS; SUBCUTANEOUS at 23:52

## 2019-01-01 RX ADMIN — POLYETHYLENE GLYCOL 3350 17 G: 17 POWDER, FOR SOLUTION ORAL at 09:54

## 2019-01-01 RX ADMIN — NYSTATIN 500000 UNITS: 100000 SUSPENSION ORAL at 08:52

## 2019-01-01 RX ADMIN — POTASSIUM BICARBONATE 20 MEQ: 782 TABLET, EFFERVESCENT ORAL at 09:12

## 2019-01-01 RX ADMIN — PRAVASTATIN SODIUM 40 MG: 40 TABLET ORAL at 21:36

## 2019-01-01 RX ADMIN — ACETYLCYSTEINE 200 MG: 200 SOLUTION ORAL; RESPIRATORY (INHALATION) at 19:31

## 2019-01-01 RX ADMIN — MAGNESIUM SULFATE HEPTAHYDRATE 1 G: 1 INJECTION, SOLUTION INTRAVENOUS at 07:40

## 2019-01-01 RX ADMIN — POTASSIUM CHLORIDE 20 MEQ: 400 INJECTION, SOLUTION INTRAVENOUS at 19:13

## 2019-01-01 RX ADMIN — MORPHINE SULFATE 2 MG: 2 INJECTION, SOLUTION INTRAMUSCULAR; INTRAVENOUS at 01:19

## 2019-01-01 RX ADMIN — INSULIN GLARGINE 38 UNITS: 100 INJECTION, SOLUTION SUBCUTANEOUS at 08:23

## 2019-01-01 RX ADMIN — PRAVASTATIN SODIUM 40 MG: 40 TABLET ORAL at 21:22

## 2019-01-01 RX ADMIN — SODIUM CHLORIDE 40 MG: 9 INJECTION INTRAMUSCULAR; INTRAVENOUS; SUBCUTANEOUS at 20:13

## 2019-01-01 RX ADMIN — MAGNESIUM OXIDE TAB 400 MG (241.3 MG ELEMENTAL MG) 400 MG: 400 (241.3 MG) TAB at 08:41

## 2019-01-01 RX ADMIN — SERTRALINE HYDROCHLORIDE 100 MG: 50 TABLET ORAL at 09:52

## 2019-01-01 RX ADMIN — SERTRALINE HYDROCHLORIDE 100 MG: 50 TABLET ORAL at 09:11

## 2019-01-01 RX ADMIN — Medication 30 ML: at 09:00

## 2019-01-01 RX ADMIN — DEXMEDETOMIDINE HYDROCHLORIDE 0.9 MCG/KG/HR: 100 INJECTION, SOLUTION, CONCENTRATE INTRAVENOUS at 17:00

## 2019-01-01 RX ADMIN — SODIUM CHLORIDE, POTASSIUM CHLORIDE, SODIUM LACTATE AND CALCIUM CHLORIDE: 600; 310; 30; 20 INJECTION, SOLUTION INTRAVENOUS at 10:30

## 2019-01-01 RX ADMIN — PREDNISONE 10 MG: 5 TABLET ORAL at 15:50

## 2019-01-01 RX ADMIN — METHYLPREDNISOLONE SODIUM SUCCINATE 80 MG: 125 INJECTION, POWDER, FOR SOLUTION INTRAMUSCULAR; INTRAVENOUS at 13:30

## 2019-01-01 RX ADMIN — HYDRALAZINE HYDROCHLORIDE 10 MG: 10 TABLET, FILM COATED ORAL at 23:51

## 2019-01-01 RX ADMIN — QUETIAPINE FUMARATE 25 MG: 25 TABLET ORAL at 08:05

## 2019-01-01 RX ADMIN — AMIODARONE HYDROCHLORIDE 400 MG: 200 TABLET ORAL at 18:16

## 2019-01-01 RX ADMIN — CHLORHEXIDINE GLUCONATE 10 ML: 1.2 RINSE ORAL at 20:24

## 2019-01-01 RX ADMIN — Medication 2 G: at 15:12

## 2019-01-01 RX ADMIN — HEPARIN SODIUM 5000 UNITS: 5000 INJECTION INTRAVENOUS; SUBCUTANEOUS at 20:14

## 2019-01-01 RX ADMIN — CHLORHEXIDINE GLUCONATE 10 ML: 1.2 RINSE ORAL at 09:07

## 2019-01-01 RX ADMIN — Medication 10 ML: at 13:35

## 2019-01-01 RX ADMIN — CHLORHEXIDINE GLUCONATE 10 ML: 1.2 RINSE ORAL at 20:43

## 2019-01-01 RX ADMIN — SODIUM CHLORIDE 11.3 UNITS/HR: 9 INJECTION, SOLUTION INTRAVENOUS at 11:26

## 2019-01-01 RX ADMIN — HEPARIN SODIUM 5000 UNITS: 5000 INJECTION INTRAVENOUS; SUBCUTANEOUS at 04:14

## 2019-01-01 RX ADMIN — NYSTATIN 500000 UNITS: 100000 SUSPENSION ORAL at 13:00

## 2019-01-01 RX ADMIN — GUAIFENESIN 1200 MG: 600 TABLET, EXTENDED RELEASE ORAL at 21:22

## 2019-01-01 RX ADMIN — ALBUMIN (HUMAN) 12.5 G: 12.5 INJECTION, SOLUTION INTRAVENOUS at 17:57

## 2019-01-01 RX ADMIN — GUAIFENESIN 400 MG: 100 SOLUTION ORAL at 18:07

## 2019-01-01 RX ADMIN — ARFORMOTEROL TARTRATE 15 MCG: 15 SOLUTION RESPIRATORY (INHALATION) at 20:42

## 2019-01-01 RX ADMIN — GLYCOPYRROLATE 0.2 MG: 0.2 INJECTION, SOLUTION INTRAMUSCULAR; INTRAVENOUS at 15:42

## 2019-01-01 RX ADMIN — HYDROMORPHONE HYDROCHLORIDE 1 MG: 1 INJECTION, SOLUTION INTRAMUSCULAR; INTRAVENOUS; SUBCUTANEOUS at 22:28

## 2019-01-01 RX ADMIN — ALBUTEROL SULFATE 2.5 MG: 2.5 SOLUTION RESPIRATORY (INHALATION) at 19:14

## 2019-01-01 RX ADMIN — DOBUTAMINE IN DEXTROSE 8 MCG/KG/MIN: 200 INJECTION, SOLUTION INTRAVENOUS at 09:40

## 2019-01-01 RX ADMIN — PROPOFOL 20 MCG/KG/MIN: 10 INJECTION, EMULSION INTRAVENOUS at 06:20

## 2019-01-01 RX ADMIN — Medication 10 ML: at 21:39

## 2019-01-01 RX ADMIN — Medication 10 ML: at 21:56

## 2019-01-01 RX ADMIN — GUAIFENESIN 400 MG: 200 SOLUTION ORAL at 01:53

## 2019-01-01 RX ADMIN — FERROUS SULFATE TAB 325 MG (65 MG ELEMENTAL FE) 325 MG: 325 (65 FE) TAB at 08:49

## 2019-01-01 RX ADMIN — Medication 10 ML: at 16:15

## 2019-01-01 RX ADMIN — CHLOROTHIAZIDE SODIUM 500 MG: 500 INJECTION, POWDER, LYOPHILIZED, FOR SOLUTION INTRAVENOUS at 05:48

## 2019-01-01 RX ADMIN — SODIUM CHLORIDE 3 ML/HR: 900 INJECTION, SOLUTION INTRAVENOUS at 06:30

## 2019-01-01 RX ADMIN — SODIUM CHLORIDE 10 ML/HR: 900 INJECTION, SOLUTION INTRAVENOUS at 17:04

## 2019-01-01 RX ADMIN — MORPHINE SULFATE 2 MG: 2 INJECTION, SOLUTION INTRAMUSCULAR; INTRAVENOUS at 04:10

## 2019-01-01 RX ADMIN — SODIUM CHLORIDE 10 ML/HR: 900 INJECTION, SOLUTION INTRAVENOUS at 18:17

## 2019-01-01 RX ADMIN — HYDROMORPHONE HYDROCHLORIDE 0.5 MG: 1 INJECTION, SOLUTION INTRAMUSCULAR; INTRAVENOUS; SUBCUTANEOUS at 17:07

## 2019-01-01 RX ADMIN — SODIUM CHLORIDE 40 MG: 9 INJECTION INTRAMUSCULAR; INTRAVENOUS; SUBCUTANEOUS at 21:02

## 2019-01-01 RX ADMIN — POTASSIUM CHLORIDE 20 MEQ: 750 TABLET, FILM COATED, EXTENDED RELEASE ORAL at 09:24

## 2019-01-01 RX ADMIN — Medication 30 ML: at 09:54

## 2019-01-01 RX ADMIN — METHYLPREDNISOLONE SODIUM SUCCINATE 80 MG: 125 INJECTION, POWDER, FOR SOLUTION INTRAMUSCULAR; INTRAVENOUS at 05:02

## 2019-01-01 RX ADMIN — METHYLPREDNISOLONE SODIUM SUCCINATE 20 MG: 40 INJECTION, POWDER, FOR SOLUTION INTRAMUSCULAR; INTRAVENOUS at 17:52

## 2019-01-01 RX ADMIN — Medication 30 ML: at 08:53

## 2019-01-01 RX ADMIN — ARFORMOTEROL TARTRATE 15 MCG: 15 SOLUTION RESPIRATORY (INHALATION) at 07:24

## 2019-01-01 RX ADMIN — NYSTATIN 500000 UNITS: 100000 SUSPENSION ORAL at 18:07

## 2019-01-01 RX ADMIN — NYSTATIN 500000 UNITS: 100000 SUSPENSION ORAL at 15:33

## 2019-01-01 RX ADMIN — MIDAZOLAM 2 MG: 1 INJECTION INTRAMUSCULAR; INTRAVENOUS at 19:33

## 2019-01-01 RX ADMIN — SODIUM CHLORIDE 40 MG: 9 INJECTION INTRAMUSCULAR; INTRAVENOUS; SUBCUTANEOUS at 20:35

## 2019-01-01 RX ADMIN — SERTRALINE HYDROCHLORIDE 100 MG: 50 TABLET ORAL at 08:54

## 2019-01-01 RX ADMIN — GUAIFENESIN 400 MG: 100 SOLUTION ORAL at 12:15

## 2019-01-01 RX ADMIN — STANDARDIZED SENNA CONCENTRATE AND DOCUSATE SODIUM 1 TABLET: 8.6; 5 TABLET ORAL at 17:03

## 2019-01-01 RX ADMIN — BUMETANIDE 2 MG: 0.25 INJECTION INTRAMUSCULAR; INTRAVENOUS at 01:51

## 2019-01-01 RX ADMIN — QUETIAPINE FUMARATE 50 MG: 25 TABLET ORAL at 17:49

## 2019-01-01 RX ADMIN — HEPARIN SODIUM 5000 UNITS: 5000 INJECTION INTRAVENOUS; SUBCUTANEOUS at 20:15

## 2019-01-01 RX ADMIN — INSULIN LISPRO 2 UNITS: 100 INJECTION, SOLUTION INTRAVENOUS; SUBCUTANEOUS at 18:26

## 2019-01-01 RX ADMIN — METOPROLOL TARTRATE 12.5 MG: 25 TABLET ORAL at 08:41

## 2019-01-01 RX ADMIN — INSULIN LISPRO 2 UNITS: 100 INJECTION, SOLUTION INTRAVENOUS; SUBCUTANEOUS at 00:03

## 2019-01-01 RX ADMIN — PRAVASTATIN SODIUM 40 MG: 40 TABLET ORAL at 21:47

## 2019-01-01 RX ADMIN — MIDAZOLAM 1 MG: 1 INJECTION INTRAMUSCULAR; INTRAVENOUS at 21:31

## 2019-01-01 RX ADMIN — MIDAZOLAM HYDROCHLORIDE 6 MG/HR: 5 INJECTION, SOLUTION INTRAMUSCULAR; INTRAVENOUS at 06:52

## 2019-01-01 RX ADMIN — BUMETANIDE 1.5 MG: 0.25 INJECTION INTRAMUSCULAR; INTRAVENOUS at 21:01

## 2019-01-01 RX ADMIN — Medication 2 G: at 11:02

## 2019-01-01 RX ADMIN — HEPARIN SODIUM 5000 UNITS: 5000 INJECTION INTRAVENOUS; SUBCUTANEOUS at 20:30

## 2019-01-01 RX ADMIN — INSULIN LISPRO 5 UNITS: 100 INJECTION, SOLUTION INTRAVENOUS; SUBCUTANEOUS at 12:36

## 2019-01-01 RX ADMIN — PRAVASTATIN SODIUM 40 MG: 40 TABLET ORAL at 21:00

## 2019-01-01 RX ADMIN — INSULIN LISPRO 2 UNITS: 100 INJECTION, SOLUTION INTRAVENOUS; SUBCUTANEOUS at 18:13

## 2019-01-01 RX ADMIN — MORPHINE SULFATE 2 MG: 2 INJECTION, SOLUTION INTRAMUSCULAR; INTRAVENOUS at 09:03

## 2019-01-01 RX ADMIN — GUAIFENESIN 400 MG: 100 SOLUTION ORAL at 17:02

## 2019-01-01 RX ADMIN — DOBUTAMINE IN DEXTROSE 2 MCG/KG/MIN: 200 INJECTION, SOLUTION INTRAVENOUS at 10:59

## 2019-01-01 RX ADMIN — MIDAZOLAM HYDROCHLORIDE 8 MG/HR: 5 INJECTION, SOLUTION INTRAMUSCULAR; INTRAVENOUS at 13:37

## 2019-01-01 RX ADMIN — QUETIAPINE FUMARATE 50 MG: 25 TABLET ORAL at 17:52

## 2019-01-01 RX ADMIN — FUROSEMIDE 40 MG: 10 INJECTION, SOLUTION INTRAMUSCULAR; INTRAVENOUS at 08:43

## 2019-01-01 RX ADMIN — DEXMEDETOMIDINE HYDROCHLORIDE 0.9 MCG/KG/HR: 100 INJECTION, SOLUTION, CONCENTRATE INTRAVENOUS at 14:45

## 2019-01-01 RX ADMIN — LORAZEPAM 2 MG: 2 INJECTION INTRAMUSCULAR; INTRAVENOUS at 23:21

## 2019-01-01 RX ADMIN — Medication 10 ML: at 21:01

## 2019-01-01 RX ADMIN — LORAZEPAM 2 MG: 2 INJECTION INTRAMUSCULAR; INTRAVENOUS at 09:43

## 2019-01-01 RX ADMIN — ACETAMINOPHEN 1000 MG: 10 INJECTION, SOLUTION INTRAVENOUS at 06:01

## 2019-01-01 RX ADMIN — INSULIN LISPRO 3 UNITS: 100 INJECTION, SOLUTION INTRAVENOUS; SUBCUTANEOUS at 00:03

## 2019-01-01 RX ADMIN — HUMAN INSULIN 14 UNITS: 100 INJECTION, SUSPENSION SUBCUTANEOUS at 18:52

## 2019-01-01 RX ADMIN — SODIUM CHLORIDE 10 ML/HR: 900 INJECTION, SOLUTION INTRAVENOUS at 18:38

## 2019-01-01 RX ADMIN — ACETYLCYSTEINE 200 MG: 200 SOLUTION ORAL; RESPIRATORY (INHALATION) at 08:59

## 2019-01-01 RX ADMIN — MORPHINE SULFATE 2 MG: 2 INJECTION, SOLUTION INTRAMUSCULAR; INTRAVENOUS at 02:32

## 2019-01-01 RX ADMIN — CASTOR OIL AND BALSAM, PERU: 788; 87 OINTMENT TOPICAL at 08:39

## 2019-01-01 RX ADMIN — ALBUMIN (HUMAN) 25 G: 0.25 INJECTION, SOLUTION INTRAVENOUS at 07:08

## 2019-01-01 RX ADMIN — PIPERACILLIN AND TAZOBACTAM 3.38 G: 3; .375 INJECTION, POWDER, FOR SOLUTION INTRAVENOUS at 09:51

## 2019-01-01 RX ADMIN — POTASSIUM BICARBONATE 40 MEQ: 782 TABLET, EFFERVESCENT ORAL at 09:00

## 2019-01-01 RX ADMIN — AMINOCAPROIC ACID 1 G/HR: 250 INJECTION, SOLUTION INTRAVENOUS at 13:24

## 2019-01-01 RX ADMIN — MIDAZOLAM HYDROCHLORIDE 8 MG/HR: 5 INJECTION, SOLUTION INTRAMUSCULAR; INTRAVENOUS at 15:12

## 2019-01-01 RX ADMIN — QUETIAPINE FUMARATE 50 MG: 25 TABLET ORAL at 17:03

## 2019-01-01 RX ADMIN — Medication 30 ML: at 08:08

## 2019-01-01 RX ADMIN — Medication 30 ML: at 09:20

## 2019-01-01 RX ADMIN — SODIUM CHLORIDE 4.9 UNITS/HR: 9 INJECTION, SOLUTION INTRAVENOUS at 02:59

## 2019-01-01 RX ADMIN — SENNOSIDES, DOCUSATE SODIUM 1 TABLET: 50; 8.6 TABLET, FILM COATED ORAL at 18:34

## 2019-01-01 RX ADMIN — NYSTATIN 500000 UNITS: 100000 SUSPENSION ORAL at 12:39

## 2019-01-01 RX ADMIN — LORAZEPAM 2 MG: 2 INJECTION INTRAMUSCULAR; INTRAVENOUS at 01:42

## 2019-01-01 RX ADMIN — INSULIN GLARGINE 15 UNITS: 100 INJECTION, SOLUTION SUBCUTANEOUS at 09:17

## 2019-01-01 RX ADMIN — PANTOPRAZOLE SODIUM 40 MG: 40 TABLET, DELAYED RELEASE ORAL at 08:49

## 2019-01-01 RX ADMIN — ACETYLCYSTEINE 200 MG: 200 SOLUTION ORAL; RESPIRATORY (INHALATION) at 08:32

## 2019-01-01 RX ADMIN — DEXTROSE MONOHYDRATE 65 ML/HR: 5 INJECTION, SOLUTION INTRAVENOUS at 18:07

## 2019-01-01 RX ADMIN — MIDAZOLAM HYDROCHLORIDE 6 MG/HR: 5 INJECTION, SOLUTION INTRAMUSCULAR; INTRAVENOUS at 17:05

## 2019-01-01 RX ADMIN — SODIUM CHLORIDE 10 ML/HR: 450 INJECTION, SOLUTION INTRAVENOUS at 07:35

## 2019-01-01 RX ADMIN — SODIUM CHLORIDE 40 MG: 9 INJECTION INTRAMUSCULAR; INTRAVENOUS; SUBCUTANEOUS at 08:05

## 2019-01-01 RX ADMIN — STANDARDIZED SENNA CONCENTRATE AND DOCUSATE SODIUM 1 TABLET: 8.6; 5 TABLET ORAL at 17:05

## 2019-01-01 RX ADMIN — Medication 30 ML: at 10:29

## 2019-01-01 RX ADMIN — NYSTATIN 500000 UNITS: 100000 SUSPENSION ORAL at 12:32

## 2019-01-01 RX ADMIN — GUAIFENESIN 400 MG: 100 SOLUTION ORAL at 12:03

## 2019-01-01 RX ADMIN — Medication 10 ML: at 15:40

## 2019-01-01 RX ADMIN — GUAIFENESIN 400 MG: 100 SOLUTION ORAL at 00:10

## 2019-01-01 RX ADMIN — HUMAN INSULIN 14 UNITS: 100 INJECTION, SUSPENSION SUBCUTANEOUS at 17:46

## 2019-01-01 RX ADMIN — ARFORMOTEROL TARTRATE 15 MCG: 15 SOLUTION RESPIRATORY (INHALATION) at 08:59

## 2019-01-01 RX ADMIN — SODIUM CHLORIDE 3.4 UNITS/HR: 9 INJECTION, SOLUTION INTRAVENOUS at 23:43

## 2019-01-01 RX ADMIN — PIPERACILLIN AND TAZOBACTAM 3.38 G: 3; .375 INJECTION, POWDER, FOR SOLUTION INTRAVENOUS at 18:07

## 2019-01-01 RX ADMIN — SODIUM CHLORIDE 40 MG: 9 INJECTION INTRAMUSCULAR; INTRAVENOUS; SUBCUTANEOUS at 08:24

## 2019-01-01 RX ADMIN — SUCCINYLCHOLINE CHLORIDE 5 MG: 20 INJECTION, SOLUTION INTRAMUSCULAR; INTRAVENOUS at 11:58

## 2019-01-01 RX ADMIN — DEXTROSE MONOHYDRATE 100 ML: 10 INJECTION, SOLUTION INTRAVENOUS at 08:00

## 2019-01-01 RX ADMIN — LORAZEPAM 2 MG: 2 INJECTION INTRAMUSCULAR; INTRAVENOUS at 22:05

## 2019-01-01 RX ADMIN — MORPHINE SULFATE 2 MG: 2 INJECTION, SOLUTION INTRAMUSCULAR; INTRAVENOUS at 17:12

## 2019-01-01 RX ADMIN — AMIODARONE HYDROCHLORIDE 0.5 MG/MIN: 50 INJECTION, SOLUTION INTRAVENOUS at 09:00

## 2019-01-01 RX ADMIN — CARVEDILOL 12.5 MG: 12.5 TABLET, FILM COATED ORAL at 09:32

## 2019-01-01 RX ADMIN — MORPHINE SULFATE 2 MG: 2 INJECTION, SOLUTION INTRAMUSCULAR; INTRAVENOUS at 17:51

## 2019-01-01 RX ADMIN — CHLORHEXIDINE GLUCONATE 10 ML: 1.2 RINSE ORAL at 20:39

## 2019-01-01 RX ADMIN — ACETYLCYSTEINE 200 MG: 200 SOLUTION ORAL; RESPIRATORY (INHALATION) at 07:35

## 2019-01-01 RX ADMIN — CHLOROTHIAZIDE SODIUM 500 MG: 500 INJECTION, POWDER, LYOPHILIZED, FOR SOLUTION INTRAVENOUS at 05:59

## 2019-01-01 RX ADMIN — POTASSIUM CHLORIDE 20 MEQ: 29.8 INJECTION, SOLUTION INTRAVENOUS at 07:24

## 2019-01-01 RX ADMIN — ACETYLCYSTEINE 200 MG: 200 SOLUTION ORAL; RESPIRATORY (INHALATION) at 10:10

## 2019-01-01 RX ADMIN — POTASSIUM CHLORIDE 20 MEQ: 400 INJECTION, SOLUTION INTRAVENOUS at 06:14

## 2019-01-01 RX ADMIN — Medication 10 ML: at 11:06

## 2019-01-01 RX ADMIN — PRAVASTATIN SODIUM 40 MG: 40 TABLET ORAL at 22:58

## 2019-01-01 RX ADMIN — ASPIRIN 81 MG 81 MG: 81 TABLET ORAL at 11:01

## 2019-01-01 RX ADMIN — CASTOR OIL AND BALSAM, PERU: 788; 87 OINTMENT TOPICAL at 09:54

## 2019-01-01 RX ADMIN — Medication: at 06:36

## 2019-01-01 RX ADMIN — CHLORHEXIDINE GLUCONATE 10 ML: 1.2 RINSE ORAL at 21:10

## 2019-01-01 RX ADMIN — BUDESONIDE 500 MCG: 0.5 INHALANT RESPIRATORY (INHALATION) at 08:14

## 2019-01-01 RX ADMIN — POTASSIUM CHLORIDE 40 MEQ: 750 TABLET, FILM COATED, EXTENDED RELEASE ORAL at 18:34

## 2019-01-01 RX ADMIN — BUMETANIDE 2 MG: 0.25 INJECTION INTRAMUSCULAR; INTRAVENOUS at 05:41

## 2019-01-01 RX ADMIN — Medication 200 MCG/HR: at 11:18

## 2019-01-01 RX ADMIN — ACETYLCYSTEINE 200 MG: 200 SOLUTION ORAL; RESPIRATORY (INHALATION) at 19:15

## 2019-01-01 RX ADMIN — SERTRALINE HYDROCHLORIDE 100 MG: 50 TABLET ORAL at 09:18

## 2019-01-01 RX ADMIN — METHYLPREDNISOLONE SODIUM SUCCINATE 80 MG: 125 INJECTION, POWDER, FOR SOLUTION INTRAMUSCULAR; INTRAVENOUS at 14:01

## 2019-01-01 RX ADMIN — Medication 50 MCG/HR: at 15:26

## 2019-01-01 RX ADMIN — Medication 10 ML: at 06:25

## 2019-01-01 RX ADMIN — MORPHINE SULFATE 2 MG: 2 INJECTION, SOLUTION INTRAMUSCULAR; INTRAVENOUS at 10:16

## 2019-01-01 RX ADMIN — DEXTROSE MONOHYDRATE 40 ML: 10 INJECTION, SOLUTION INTRAVENOUS at 15:45

## 2019-01-01 RX ADMIN — SODIUM CHLORIDE 3 ML/HR: 900 INJECTION, SOLUTION INTRAVENOUS at 11:45

## 2019-01-01 RX ADMIN — HYDROMORPHONE HYDROCHLORIDE 1 MG: 1 INJECTION, SOLUTION INTRAMUSCULAR; INTRAVENOUS; SUBCUTANEOUS at 23:36

## 2019-01-01 RX ADMIN — CHLORHEXIDINE GLUCONATE 10 ML: 1.2 RINSE ORAL at 20:48

## 2019-01-01 RX ADMIN — ACETYLCYSTEINE 200 MG: 200 SOLUTION ORAL; RESPIRATORY (INHALATION) at 18:15

## 2019-01-01 RX ADMIN — BUDESONIDE 500 MCG: 0.5 INHALANT RESPIRATORY (INHALATION) at 08:05

## 2019-01-01 RX ADMIN — PIPERACILLIN AND TAZOBACTAM 3.38 G: 3; .375 INJECTION, POWDER, FOR SOLUTION INTRAVENOUS at 01:33

## 2019-01-01 RX ADMIN — BUMETANIDE 1.5 MG: 0.25 INJECTION INTRAMUSCULAR; INTRAVENOUS at 15:10

## 2019-01-01 RX ADMIN — POTASSIUM BICARBONATE 20 MEQ: 782 TABLET, EFFERVESCENT ORAL at 18:18

## 2019-01-01 RX ADMIN — DOBUTAMINE IN DEXTROSE 1 MCG/KG/MIN: 200 INJECTION, SOLUTION INTRAVENOUS at 18:39

## 2019-01-01 RX ADMIN — ACETAMINOPHEN 1000 MG: 10 INJECTION, SOLUTION INTRAVENOUS at 12:45

## 2019-01-01 RX ADMIN — ACETAMINOPHEN 650 MG: 325 TABLET, FILM COATED ORAL at 17:36

## 2019-01-01 RX ADMIN — MINERAL SUPPLEMENT IRON 300 MG / 5 ML STRENGTH LIQUID 100 PER BOX UNFLAVORED 300 MG: at 08:08

## 2019-01-01 RX ADMIN — SUFENTANIL CITRATE 20 MCG: 50 INJECTION EPIDURAL; INTRAVENOUS at 11:16

## 2019-01-01 RX ADMIN — Medication 2 MG: at 15:46

## 2019-01-01 RX ADMIN — MIDAZOLAM HYDROCHLORIDE 5 MG/HR: 5 INJECTION, SOLUTION INTRAMUSCULAR; INTRAVENOUS at 21:50

## 2019-01-01 RX ADMIN — GUAIFENESIN 400 MG: 100 SOLUTION ORAL at 12:34

## 2019-01-01 RX ADMIN — AMIODARONE HYDROCHLORIDE 400 MG: 200 TABLET ORAL at 08:36

## 2019-01-01 RX ADMIN — BUMETANIDE 1.5 MG: 0.25 INJECTION INTRAMUSCULAR; INTRAVENOUS at 05:30

## 2019-01-01 RX ADMIN — BUDESONIDE 500 MCG: 0.5 INHALANT RESPIRATORY (INHALATION) at 07:24

## 2019-01-01 RX ADMIN — METHYLPREDNISOLONE SODIUM SUCCINATE 80 MG: 125 INJECTION, POWDER, FOR SOLUTION INTRAMUSCULAR; INTRAVENOUS at 21:06

## 2019-01-01 RX ADMIN — ALBUTEROL SULFATE 2.5 MG: 2.5 SOLUTION RESPIRATORY (INHALATION) at 07:35

## 2019-01-01 RX ADMIN — SODIUM CHLORIDE 9 ML/HR: 900 INJECTION, SOLUTION INTRAVENOUS at 07:33

## 2019-01-01 RX ADMIN — Medication 10 ML: at 01:23

## 2019-01-01 RX ADMIN — ARFORMOTEROL TARTRATE 15 MCG: 15 SOLUTION RESPIRATORY (INHALATION) at 09:23

## 2019-01-01 RX ADMIN — SODIUM CHLORIDE 40 MG: 9 INJECTION INTRAMUSCULAR; INTRAVENOUS; SUBCUTANEOUS at 08:06

## 2019-01-01 RX ADMIN — HEPARIN SODIUM 5000 UNITS: 5000 INJECTION INTRAVENOUS; SUBCUTANEOUS at 18:34

## 2019-01-01 RX ADMIN — POTASSIUM CHLORIDE 40 MEQ: 750 TABLET, FILM COATED, EXTENDED RELEASE ORAL at 18:22

## 2019-01-01 RX ADMIN — MUPIROCIN 1 G: 20 OINTMENT TOPICAL at 17:51

## 2019-01-01 RX ADMIN — SODIUM CHLORIDE 10 ML/HR: 900 INJECTION, SOLUTION INTRAVENOUS at 03:49

## 2019-01-01 RX ADMIN — BUDESONIDE 500 MCG: 0.5 INHALANT RESPIRATORY (INHALATION) at 07:15

## 2019-01-01 RX ADMIN — BUDESONIDE 500 MCG: 0.5 INHALANT RESPIRATORY (INHALATION) at 08:13

## 2019-01-01 RX ADMIN — FERROUS SULFATE TAB 325 MG (65 MG ELEMENTAL FE) 325 MG: 325 (65 FE) TAB at 08:36

## 2019-01-01 RX ADMIN — POTASSIUM CHLORIDE 20 MEQ: 29.8 INJECTION, SOLUTION INTRAVENOUS at 08:34

## 2019-01-01 RX ADMIN — NYSTATIN 500000 UNITS: 100000 SUSPENSION ORAL at 17:29

## 2019-01-01 RX ADMIN — ONDANSETRON 4 MG: 2 INJECTION INTRAMUSCULAR; INTRAVENOUS at 12:38

## 2019-01-01 RX ADMIN — ARFORMOTEROL TARTRATE 15 MCG: 15 SOLUTION RESPIRATORY (INHALATION) at 21:36

## 2019-01-01 RX ADMIN — CARVEDILOL 12.5 MG: 12.5 TABLET, FILM COATED ORAL at 08:55

## 2019-01-01 RX ADMIN — ACETYLCYSTEINE 200 MG: 200 SOLUTION ORAL; RESPIRATORY (INHALATION) at 18:59

## 2019-01-01 RX ADMIN — BUDESONIDE 500 MCG: 0.5 INHALANT RESPIRATORY (INHALATION) at 08:20

## 2019-01-01 RX ADMIN — POLYETHYLENE GLYCOL 3350 17 G: 17 POWDER, FOR SOLUTION ORAL at 08:28

## 2019-01-01 RX ADMIN — ASPIRIN 325 MG ORAL TABLET 325 MG: 325 PILL ORAL at 21:24

## 2019-01-01 RX ADMIN — CHLORHEXIDINE GLUCONATE 15 ML: 1.2 RINSE ORAL at 20:03

## 2019-01-01 RX ADMIN — POLYETHYLENE GLYCOL 3350 17 G: 17 POWDER, FOR SOLUTION ORAL at 08:53

## 2019-01-01 RX ADMIN — MAGNESIUM OXIDE TAB 400 MG (241.3 MG ELEMENTAL MG) 400 MG: 400 (241.3 MG) TAB at 18:24

## 2019-01-01 RX ADMIN — Medication 10 ML: at 21:06

## 2019-01-01 RX ADMIN — SODIUM CHLORIDE 7 UNITS/HR: 9 INJECTION, SOLUTION INTRAVENOUS at 12:38

## 2019-01-01 RX ADMIN — SUCCINYLCHOLINE CHLORIDE 5 MG: 20 INJECTION INTRAMUSCULAR; INTRAVENOUS at 11:58

## 2019-01-01 RX ADMIN — Medication 30 ML: at 08:09

## 2019-01-01 RX ADMIN — HEPARIN SODIUM 5000 UNITS: 5000 INJECTION INTRAVENOUS; SUBCUTANEOUS at 20:13

## 2019-01-01 RX ADMIN — ACETYLCYSTEINE 200 MG: 200 SOLUTION ORAL; RESPIRATORY (INHALATION) at 19:34

## 2019-01-01 RX ADMIN — ASPIRIN 81 MG 81 MG: 81 TABLET ORAL at 08:41

## 2019-01-01 RX ADMIN — SERTRALINE HYDROCHLORIDE 100 MG: 50 TABLET ORAL at 08:57

## 2019-01-01 RX ADMIN — SODIUM CHLORIDE 6.9 UNITS/HR: 9 INJECTION, SOLUTION INTRAVENOUS at 12:21

## 2019-01-01 RX ADMIN — CHLORHEXIDINE GLUCONATE 10 ML: 1.2 RINSE ORAL at 21:37

## 2019-01-01 RX ADMIN — NYSTATIN 500000 UNITS: 100000 SUSPENSION ORAL at 22:56

## 2019-01-01 RX ADMIN — ALBUTEROL SULFATE 2.5 MG: 2.5 SOLUTION RESPIRATORY (INHALATION) at 07:50

## 2019-01-01 RX ADMIN — Medication 30 ML: at 10:50

## 2019-01-01 RX ADMIN — SENNOSIDES, DOCUSATE SODIUM 1 TABLET: 50; 8.6 TABLET, FILM COATED ORAL at 09:39

## 2019-01-01 RX ADMIN — AMIODARONE HYDROCHLORIDE 400 MG: 200 TABLET ORAL at 08:38

## 2019-01-01 RX ADMIN — MUPIROCIN 1 G: 20 OINTMENT TOPICAL at 08:46

## 2019-01-01 RX ADMIN — GUAIFENESIN 1200 MG: 600 TABLET, EXTENDED RELEASE ORAL at 08:41

## 2019-01-01 RX ADMIN — SODIUM CHLORIDE 2.9 UNITS/HR: 9 INJECTION, SOLUTION INTRAVENOUS at 02:59

## 2019-01-01 RX ADMIN — Medication 10 ML: at 21:19

## 2019-01-01 RX ADMIN — AMIODARONE HYDROCHLORIDE 400 MG: 200 TABLET ORAL at 08:42

## 2019-01-01 RX ADMIN — INSULIN LISPRO 7 UNITS: 100 INJECTION, SOLUTION INTRAVENOUS; SUBCUTANEOUS at 14:15

## 2019-01-01 RX ADMIN — HEPARIN SODIUM 5000 UNITS: 5000 INJECTION INTRAVENOUS; SUBCUTANEOUS at 13:32

## 2019-01-01 RX ADMIN — ACETYLCYSTEINE 200 MG: 200 SOLUTION ORAL; RESPIRATORY (INHALATION) at 21:04

## 2019-01-01 RX ADMIN — METHYLPREDNISOLONE SODIUM SUCCINATE 40 MG: 40 INJECTION, POWDER, FOR SOLUTION INTRAMUSCULAR; INTRAVENOUS at 07:18

## 2019-01-01 RX ADMIN — METOPROLOL TARTRATE 5 MG: 5 INJECTION INTRAVENOUS at 18:36

## 2019-01-01 RX ADMIN — NYSTATIN 500000 UNITS: 100000 SUSPENSION ORAL at 18:48

## 2019-01-01 RX ADMIN — DEXTROSE MONOHYDRATE 75 ML: 10 INJECTION, SOLUTION INTRAVENOUS at 20:12

## 2019-01-01 RX ADMIN — CHLORHEXIDINE GLUCONATE 10 ML: 1.2 RINSE ORAL at 21:39

## 2019-01-01 RX ADMIN — MINERAL SUPPLEMENT IRON 300 MG / 5 ML STRENGTH LIQUID 100 PER BOX UNFLAVORED 300 MG: at 08:18

## 2019-01-01 RX ADMIN — POTASSIUM CHLORIDE 20 MEQ: 750 TABLET, FILM COATED, EXTENDED RELEASE ORAL at 18:28

## 2019-01-01 RX ADMIN — POTASSIUM BICARBONATE 40 MEQ: 782 TABLET, EFFERVESCENT ORAL at 17:52

## 2019-01-01 RX ADMIN — CASTOR OIL AND BALSAM, PERU: 788; 87 OINTMENT TOPICAL at 08:51

## 2019-01-01 RX ADMIN — PANTOPRAZOLE SODIUM 40 MG: 40 TABLET, DELAYED RELEASE ORAL at 06:55

## 2019-01-01 RX ADMIN — Medication 10 ML: at 05:32

## 2019-01-01 RX ADMIN — SODIUM CHLORIDE 40 MG: 9 INJECTION INTRAMUSCULAR; INTRAVENOUS; SUBCUTANEOUS at 08:53

## 2019-01-01 RX ADMIN — AMIODARONE HYDROCHLORIDE 1 MG/MIN: 50 INJECTION, SOLUTION INTRAVENOUS at 13:56

## 2019-01-01 RX ADMIN — PRAVASTATIN SODIUM 40 MG: 40 TABLET ORAL at 22:08

## 2019-01-01 RX ADMIN — ALBUTEROL SULFATE 2.5 MG: 2.5 SOLUTION RESPIRATORY (INHALATION) at 08:21

## 2019-01-01 RX ADMIN — BUPIVACAINE HYDROCHLORIDE 75 MG: 2.5 INJECTION, SOLUTION EPIDURAL; INFILTRATION; INTRACAUDAL; PERINEURAL at 11:57

## 2019-01-01 RX ADMIN — POTASSIUM CHLORIDE 20 MEQ: 750 TABLET, FILM COATED, EXTENDED RELEASE ORAL at 18:08

## 2019-01-01 RX ADMIN — SODIUM CHLORIDE 5 MG/HR: 900 INJECTION, SOLUTION INTRAVENOUS at 22:47

## 2019-01-01 RX ADMIN — NYSTATIN 500000 UNITS: 100000 SUSPENSION ORAL at 14:36

## 2019-01-01 RX ADMIN — DEXTROSE MONOHYDRATE 50 ML: 10 INJECTION, SOLUTION INTRAVENOUS at 16:29

## 2019-01-01 RX ADMIN — MORPHINE SULFATE 2 MG: 2 INJECTION, SOLUTION INTRAMUSCULAR; INTRAVENOUS at 14:45

## 2019-01-01 RX ADMIN — MORPHINE SULFATE 2 MG: 2 INJECTION, SOLUTION INTRAMUSCULAR; INTRAVENOUS at 09:25

## 2019-01-01 RX ADMIN — AMIODARONE HYDROCHLORIDE 400 MG: 200 TABLET ORAL at 21:47

## 2019-01-01 RX ADMIN — DEXMEDETOMIDINE HYDROCHLORIDE 0.8 MCG/KG/HR: 100 INJECTION, SOLUTION, CONCENTRATE INTRAVENOUS at 12:34

## 2019-01-01 RX ADMIN — HEPARIN SODIUM 5000 UNITS: 5000 INJECTION INTRAVENOUS; SUBCUTANEOUS at 18:53

## 2019-01-01 RX ADMIN — SODIUM CHLORIDE 40 MG: 9 INJECTION INTRAMUSCULAR; INTRAVENOUS; SUBCUTANEOUS at 20:23

## 2019-01-01 RX ADMIN — PANTOPRAZOLE SODIUM 40 MG: 40 TABLET, DELAYED RELEASE ORAL at 07:32

## 2019-01-01 RX ADMIN — ROCURONIUM BROMIDE 20 MG: 50 INJECTION, SOLUTION INTRAVENOUS at 20:18

## 2019-01-01 RX ADMIN — CHLORHEXIDINE GLUCONATE 10 ML: 1.2 RINSE ORAL at 09:12

## 2019-01-01 RX ADMIN — AMIODARONE HYDROCHLORIDE 0.5 MG/MIN: 50 INJECTION, SOLUTION INTRAVENOUS at 00:40

## 2019-01-01 RX ADMIN — SODIUM CHLORIDE 10 ML/HR: 450 INJECTION, SOLUTION INTRAVENOUS at 06:04

## 2019-01-01 RX ADMIN — INSULIN GLARGINE 25 UNITS: 100 INJECTION, SOLUTION SUBCUTANEOUS at 08:20

## 2019-01-01 RX ADMIN — PHENYLEPHRINE HYDROCHLORIDE 40 MCG/MIN: 10 INJECTION INTRAVENOUS at 11:25

## 2019-01-01 RX ADMIN — DEXMEDETOMIDINE HYDROCHLORIDE 0.9 MCG/KG/HR: 100 INJECTION, SOLUTION, CONCENTRATE INTRAVENOUS at 14:11

## 2019-01-01 RX ADMIN — HYDRALAZINE HYDROCHLORIDE 10 MG: 10 TABLET, FILM COATED ORAL at 10:03

## 2019-01-01 RX ADMIN — METHYLPREDNISOLONE SODIUM SUCCINATE 40 MG: 40 INJECTION, POWDER, FOR SOLUTION INTRAMUSCULAR; INTRAVENOUS at 17:26

## 2019-01-01 RX ADMIN — BUDESONIDE 500 MCG: 0.5 INHALANT RESPIRATORY (INHALATION) at 07:33

## 2019-01-01 RX ADMIN — DEXMEDETOMIDINE HYDROCHLORIDE 0.4 MCG/KG/HR: 100 INJECTION, SOLUTION, CONCENTRATE INTRAVENOUS at 01:32

## 2019-01-01 RX ADMIN — LORAZEPAM 2 MG: 2 INJECTION INTRAMUSCULAR; INTRAVENOUS at 11:44

## 2019-01-01 RX ADMIN — CASTOR OIL AND BALSAM, PERU: 788; 87 OINTMENT TOPICAL at 08:19

## 2019-01-01 RX ADMIN — Medication 2 G: at 20:19

## 2019-01-01 RX ADMIN — ACETYLCYSTEINE 200 MG: 200 SOLUTION ORAL; RESPIRATORY (INHALATION) at 19:09

## 2019-01-01 RX ADMIN — METHYLPREDNISOLONE SODIUM SUCCINATE 80 MG: 125 INJECTION, POWDER, FOR SOLUTION INTRAMUSCULAR; INTRAVENOUS at 13:33

## 2019-01-01 RX ADMIN — CALCIUM GLUCONATE 0.5 G: 94 INJECTION, SOLUTION INTRAVENOUS at 21:11

## 2019-01-01 RX ADMIN — DOBUTAMINE IN DEXTROSE 1 MCG/KG/MIN: 200 INJECTION, SOLUTION INTRAVENOUS at 01:31

## 2019-01-01 RX ADMIN — SODIUM CHLORIDE 40 MG: 9 INJECTION INTRAMUSCULAR; INTRAVENOUS; SUBCUTANEOUS at 20:49

## 2019-01-01 RX ADMIN — MAGNESIUM OXIDE TAB 400 MG (241.3 MG ELEMENTAL MG) 400 MG: 400 (241.3 MG) TAB at 17:36

## 2019-01-01 RX ADMIN — BUMETANIDE 1.5 MG: 0.25 INJECTION INTRAMUSCULAR; INTRAVENOUS at 14:00

## 2019-01-01 RX ADMIN — Medication 30 ML: at 08:21

## 2019-01-01 RX ADMIN — BUDESONIDE 500 MCG: 0.5 INHALANT RESPIRATORY (INHALATION) at 08:32

## 2019-01-01 RX ADMIN — DEXMEDETOMIDINE HYDROCHLORIDE 0.9 MCG/KG/HR: 100 INJECTION, SOLUTION, CONCENTRATE INTRAVENOUS at 14:22

## 2019-01-01 RX ADMIN — PHENYLEPHRINE HYDROCHLORIDE 40 MCG/MIN: 10 INJECTION INTRAVENOUS at 15:37

## 2019-01-01 RX ADMIN — CHLORHEXIDINE GLUCONATE 10 ML: 1.2 RINSE ORAL at 08:59

## 2019-01-01 RX ADMIN — NYSTATIN 500000 UNITS: 100000 SUSPENSION ORAL at 18:35

## 2019-01-01 RX ADMIN — Medication 2 MCG/MIN: at 11:31

## 2019-01-01 RX ADMIN — CHLORHEXIDINE GLUCONATE 10 ML: 1.2 RINSE ORAL at 08:45

## 2019-01-01 RX ADMIN — HEPARIN SODIUM 5000 UNITS: 5000 INJECTION INTRAVENOUS; SUBCUTANEOUS at 10:50

## 2019-01-01 RX ADMIN — SENNOSIDES,DOCUSATE SODIUM 1 TABLET: 8.6; 5 TABLET, FILM COATED ORAL at 21:57

## 2019-01-01 RX ADMIN — PIPERACILLIN AND TAZOBACTAM 3.38 G: 3; .375 INJECTION, POWDER, FOR SOLUTION INTRAVENOUS at 01:56

## 2019-01-01 RX ADMIN — DEXMEDETOMIDINE HYDROCHLORIDE 0.9 MCG/KG/HR: 100 INJECTION, SOLUTION, CONCENTRATE INTRAVENOUS at 09:47

## 2019-01-01 RX ADMIN — MORPHINE SULFATE 2 MG: 2 INJECTION, SOLUTION INTRAMUSCULAR; INTRAVENOUS at 22:27

## 2019-01-01 RX ADMIN — BUDESONIDE 500 MCG: 0.5 INHALANT RESPIRATORY (INHALATION) at 19:30

## 2019-01-01 RX ADMIN — MORPHINE SULFATE 2 MG: 2 INJECTION, SOLUTION INTRAMUSCULAR; INTRAVENOUS at 08:29

## 2019-01-01 RX ADMIN — ARFORMOTEROL TARTRATE 15 MCG: 15 SOLUTION RESPIRATORY (INHALATION) at 09:03

## 2019-01-01 RX ADMIN — ALBUMIN (HUMAN) 12.5 G: 12.5 INJECTION, SOLUTION INTRAVENOUS at 21:18

## 2019-01-01 RX ADMIN — DEXMEDETOMIDINE HYDROCHLORIDE 0.6 MCG/KG/HR: 100 INJECTION, SOLUTION, CONCENTRATE INTRAVENOUS at 21:44

## 2019-01-01 RX ADMIN — CASTOR OIL AND BALSAM, PERU: 788; 87 OINTMENT TOPICAL at 17:58

## 2019-01-01 RX ADMIN — Medication 10 ML: at 22:58

## 2019-01-01 RX ADMIN — PHENYLEPHRINE HYDROCHLORIDE 5 MCG/MIN: 10 INJECTION INTRAVENOUS at 19:35

## 2019-01-01 RX ADMIN — POTASSIUM CHLORIDE 20 MEQ: 400 INJECTION, SOLUTION INTRAVENOUS at 08:12

## 2019-01-01 RX ADMIN — MINERAL SUPPLEMENT IRON 300 MG / 5 ML STRENGTH LIQUID 100 PER BOX UNFLAVORED 300 MG: at 09:20

## 2019-01-01 RX ADMIN — SERTRALINE HYDROCHLORIDE 100 MG: 50 TABLET ORAL at 12:57

## 2019-01-01 RX ADMIN — ARFORMOTEROL TARTRATE 15 MCG: 15 SOLUTION RESPIRATORY (INHALATION) at 21:22

## 2019-01-01 RX ADMIN — FUROSEMIDE 20 MG: 10 INJECTION, SOLUTION INTRAMUSCULAR; INTRAVENOUS at 08:59

## 2019-01-01 RX ADMIN — HEPARIN SODIUM 5000 UNITS: 5000 INJECTION INTRAVENOUS; SUBCUTANEOUS at 18:20

## 2019-01-01 RX ADMIN — PIPERACILLIN AND TAZOBACTAM 3.38 G: 3; .375 INJECTION, POWDER, FOR SOLUTION INTRAVENOUS at 02:10

## 2019-01-01 RX ADMIN — SERTRALINE HYDROCHLORIDE 100 MG: 50 TABLET ORAL at 08:00

## 2019-01-01 RX ADMIN — ACETAMINOPHEN 1000 MG: 10 INJECTION, SOLUTION INTRAVENOUS at 22:28

## 2019-01-01 RX ADMIN — ASPIRIN 81 MG CHEWABLE TABLET 81 MG: 81 TABLET CHEWABLE at 08:55

## 2019-01-01 RX ADMIN — ACETYLCYSTEINE 200 MG: 200 SOLUTION ORAL; RESPIRATORY (INHALATION) at 09:03

## 2019-01-01 RX ADMIN — Medication 30 ML: at 12:59

## 2019-01-01 RX ADMIN — Medication 10 ML: at 22:08

## 2019-01-01 RX ADMIN — ASPIRIN 81 MG 81 MG: 81 TABLET ORAL at 09:18

## 2019-01-01 RX ADMIN — POTASSIUM CHLORIDE 20 MEQ: 400 INJECTION, SOLUTION INTRAVENOUS at 05:45

## 2019-01-01 RX ADMIN — PHENYLEPHRINE HYDROCHLORIDE 10 MCG/MIN: 10 INJECTION INTRAVENOUS at 00:22

## 2019-01-01 RX ADMIN — MORPHINE SULFATE 2 MG: 2 INJECTION, SOLUTION INTRAMUSCULAR; INTRAVENOUS at 04:34

## 2019-01-01 RX ADMIN — Medication 10 ML: at 14:25

## 2019-01-01 RX ADMIN — GUAIFENESIN 400 MG: 100 SOLUTION ORAL at 05:35

## 2019-01-01 RX ADMIN — DEXMEDETOMIDINE HYDROCHLORIDE 0.9 MCG/KG/HR: 100 INJECTION, SOLUTION, CONCENTRATE INTRAVENOUS at 08:05

## 2019-01-01 RX ADMIN — BUMETANIDE 2 MG: 0.25 INJECTION INTRAMUSCULAR; INTRAVENOUS at 18:05

## 2019-01-01 RX ADMIN — Medication 10 ML: at 07:16

## 2019-01-01 RX ADMIN — POTASSIUM CHLORIDE 20 MEQ: 29.8 INJECTION, SOLUTION INTRAVENOUS at 10:54

## 2019-01-01 RX ADMIN — Medication 10 ML: at 08:06

## 2019-01-01 RX ADMIN — ENOXAPARIN SODIUM 60 MG: 60 INJECTION SUBCUTANEOUS at 21:24

## 2019-01-01 RX ADMIN — PANTOPRAZOLE SODIUM 40 MG: 40 TABLET, DELAYED RELEASE ORAL at 06:31

## 2019-01-01 RX ADMIN — DEXMEDETOMIDINE HYDROCHLORIDE 0.4 MCG/KG/HR: 100 INJECTION, SOLUTION, CONCENTRATE INTRAVENOUS at 13:01

## 2019-01-01 RX ADMIN — MINERAL SUPPLEMENT IRON 300 MG / 5 ML STRENGTH LIQUID 100 PER BOX UNFLAVORED 300 MG: at 08:38

## 2019-01-01 RX ADMIN — PRAVASTATIN SODIUM 40 MG: 40 TABLET ORAL at 22:35

## 2019-01-01 RX ADMIN — ALBUTEROL SULFATE 2.5 MG: 2.5 SOLUTION RESPIRATORY (INHALATION) at 23:37

## 2019-01-01 RX ADMIN — DEXMEDETOMIDINE HYDROCHLORIDE 0.9 MCG/KG/HR: 100 INJECTION, SOLUTION, CONCENTRATE INTRAVENOUS at 16:06

## 2019-01-01 RX ADMIN — ALBUMIN (HUMAN) 250 ML: 12.5 INJECTION, SOLUTION INTRAVENOUS at 14:46

## 2019-01-01 RX ADMIN — SERTRALINE HYDROCHLORIDE 100 MG: 50 TABLET ORAL at 08:23

## 2019-01-01 RX ADMIN — DEXMEDETOMIDINE HYDROCHLORIDE 0.6 MCG/KG/HR: 100 INJECTION, SOLUTION, CONCENTRATE INTRAVENOUS at 10:14

## 2019-01-01 RX ADMIN — CASTOR OIL AND BALSAM, PERU: 788; 87 OINTMENT TOPICAL at 18:00

## 2019-01-01 RX ADMIN — ARFORMOTEROL TARTRATE 15 MCG: 15 SOLUTION RESPIRATORY (INHALATION) at 20:35

## 2019-01-01 RX ADMIN — POTASSIUM CHLORIDE 20 MEQ: 750 TABLET, FILM COATED, EXTENDED RELEASE ORAL at 10:54

## 2019-01-01 RX ADMIN — SERTRALINE HYDROCHLORIDE 100 MG: 50 TABLET ORAL at 10:24

## 2019-01-01 RX ADMIN — HEPARIN SODIUM 5000 UNITS: 5000 INJECTION INTRAVENOUS; SUBCUTANEOUS at 04:17

## 2019-01-01 RX ADMIN — METHYLPREDNISOLONE SODIUM SUCCINATE 80 MG: 125 INJECTION, POWDER, FOR SOLUTION INTRAMUSCULAR; INTRAVENOUS at 21:56

## 2019-01-01 RX ADMIN — HEPARIN SODIUM 5000 UNITS: 5000 INJECTION INTRAVENOUS; SUBCUTANEOUS at 09:18

## 2019-01-01 RX ADMIN — GUAIFENESIN 400 MG: 100 SOLUTION ORAL at 11:49

## 2019-01-01 RX ADMIN — CASTOR OIL AND BALSAM, PERU: 788; 87 OINTMENT TOPICAL at 09:52

## 2019-01-01 RX ADMIN — AMIODARONE HYDROCHLORIDE 400 MG: 200 TABLET ORAL at 21:35

## 2019-01-01 RX ADMIN — MAGNESIUM OXIDE TAB 400 MG (241.3 MG ELEMENTAL MG) 400 MG: 400 (241.3 MG) TAB at 18:05

## 2019-01-01 RX ADMIN — HYDRALAZINE HYDROCHLORIDE 10 MG: 10 TABLET, FILM COATED ORAL at 22:38

## 2019-01-01 RX ADMIN — SODIUM CHLORIDE: 900 INJECTION, SOLUTION INTRAVENOUS at 11:13

## 2019-01-01 RX ADMIN — ACETAMINOPHEN 1000 MG: 10 INJECTION, SOLUTION INTRAVENOUS at 18:27

## 2019-01-01 RX ADMIN — Medication 40 ML: at 19:09

## 2019-01-01 RX ADMIN — INSULIN LISPRO 10 UNITS: 100 INJECTION, SOLUTION INTRAVENOUS; SUBCUTANEOUS at 13:09

## 2019-01-01 RX ADMIN — BUMETANIDE 2 MG: 0.25 INJECTION INTRAMUSCULAR; INTRAVENOUS at 18:59

## 2019-01-01 RX ADMIN — ALBUTEROL SULFATE 2.5 MG: 2.5 SOLUTION RESPIRATORY (INHALATION) at 08:14

## 2019-01-01 RX ADMIN — MAGNESIUM OXIDE TAB 400 MG (241.3 MG ELEMENTAL MG) 400 MG: 400 (241.3 MG) TAB at 08:23

## 2019-01-01 RX ADMIN — ASPIRIN 81 MG CHEWABLE TABLET 81 MG: 81 TABLET CHEWABLE at 09:32

## 2019-01-01 RX ADMIN — BUMETANIDE 2 MG: 0.25 INJECTION INTRAMUSCULAR; INTRAVENOUS at 16:09

## 2019-01-01 RX ADMIN — Medication 10 ML: at 07:18

## 2019-01-01 RX ADMIN — ARFORMOTEROL TARTRATE 15 MCG: 15 SOLUTION RESPIRATORY (INHALATION) at 19:31

## 2019-01-01 RX ADMIN — PIPERACILLIN AND TAZOBACTAM 3.38 G: 3; .375 INJECTION, POWDER, FOR SOLUTION INTRAVENOUS at 10:08

## 2019-01-01 RX ADMIN — PHENYLEPHRINE HYDROCHLORIDE 40 MCG/MIN: 10 INJECTION INTRAVENOUS at 08:08

## 2019-01-01 RX ADMIN — ARFORMOTEROL TARTRATE 15 MCG: 15 SOLUTION RESPIRATORY (INHALATION) at 08:32

## 2019-01-01 RX ADMIN — ACETYLCYSTEINE 200 MG: 200 SOLUTION ORAL; RESPIRATORY (INHALATION) at 07:34

## 2019-01-01 RX ADMIN — Medication 10 ML: at 05:55

## 2019-01-01 RX ADMIN — DEXTROSE MONOHYDRATE 45 ML: 10 INJECTION, SOLUTION INTRAVENOUS at 08:04

## 2019-01-01 RX ADMIN — MORPHINE SULFATE 2 MG: 2 INJECTION, SOLUTION INTRAMUSCULAR; INTRAVENOUS at 01:24

## 2019-01-01 RX ADMIN — POTASSIUM CHLORIDE 20 MEQ: 400 INJECTION, SOLUTION INTRAVENOUS at 01:23

## 2019-01-01 RX ADMIN — METHYLPREDNISOLONE SODIUM SUCCINATE 80 MG: 125 INJECTION, POWDER, FOR SOLUTION INTRAMUSCULAR; INTRAVENOUS at 14:17

## 2019-01-01 RX ADMIN — NYSTATIN 500000 UNITS: 100000 SUSPENSION ORAL at 12:41

## 2019-01-01 RX ADMIN — MUPIROCIN: 20 OINTMENT TOPICAL at 11:18

## 2019-01-01 RX ADMIN — SODIUM CHLORIDE 40 MG: 9 INJECTION INTRAMUSCULAR; INTRAVENOUS; SUBCUTANEOUS at 20:44

## 2019-01-01 RX ADMIN — PIPERACILLIN AND TAZOBACTAM 3.38 G: 3; .375 INJECTION, POWDER, FOR SOLUTION INTRAVENOUS at 01:36

## 2019-01-01 RX ADMIN — METHYLPREDNISOLONE SODIUM SUCCINATE 80 MG: 125 INJECTION, POWDER, FOR SOLUTION INTRAMUSCULAR; INTRAVENOUS at 17:47

## 2019-01-01 RX ADMIN — BUDESONIDE 500 MCG: 0.5 INHALANT RESPIRATORY (INHALATION) at 21:36

## 2019-01-01 RX ADMIN — PRAVASTATIN SODIUM 40 MG: 40 TABLET ORAL at 21:06

## 2019-01-01 RX ADMIN — CHLORHEXIDINE GLUCONATE 10 ML: 1.2 RINSE ORAL at 09:52

## 2019-01-01 RX ADMIN — INSULIN GLARGINE 25 UNITS: 100 INJECTION, SOLUTION SUBCUTANEOUS at 08:28

## 2019-01-01 RX ADMIN — MIDAZOLAM HYDROCHLORIDE 8 MG/HR: 5 INJECTION, SOLUTION INTRAMUSCULAR; INTRAVENOUS at 18:52

## 2019-01-01 RX ADMIN — ALBUTEROL SULFATE 2.5 MG: 2.5 SOLUTION RESPIRATORY (INHALATION) at 08:40

## 2019-01-01 RX ADMIN — NYSTATIN 500000 UNITS: 100000 SUSPENSION ORAL at 08:05

## 2019-01-01 RX ADMIN — NYSTATIN 500000 UNITS: 100000 SUSPENSION ORAL at 17:58

## 2019-01-01 RX ADMIN — ASPIRIN 81 MG 81 MG: 81 TABLET ORAL at 08:23

## 2019-01-01 RX ADMIN — BUMETANIDE 1.5 MG: 0.25 INJECTION INTRAMUSCULAR; INTRAVENOUS at 14:12

## 2019-01-01 RX ADMIN — PRAVASTATIN SODIUM 40 MG: 40 TABLET ORAL at 22:29

## 2019-01-01 RX ADMIN — CHLORHEXIDINE GLUCONATE 10 ML: 1.2 RINSE ORAL at 20:58

## 2019-01-01 RX ADMIN — DOBUTAMINE IN DEXTROSE 1 MCG/KG/MIN: 200 INJECTION, SOLUTION INTRAVENOUS at 17:04

## 2019-01-01 RX ADMIN — POTASSIUM CHLORIDE 40 MEQ: 750 TABLET, FILM COATED, EXTENDED RELEASE ORAL at 09:18

## 2019-01-01 RX ADMIN — ARFORMOTEROL TARTRATE 15 MCG: 15 SOLUTION RESPIRATORY (INHALATION) at 07:16

## 2019-01-01 RX ADMIN — PHENYLEPHRINE HYDROCHLORIDE 275 MCG/MIN: 10 INJECTION INTRAVENOUS at 11:24

## 2019-01-01 RX ADMIN — ACETYLCYSTEINE 200 MG: 200 SOLUTION ORAL; RESPIRATORY (INHALATION) at 09:46

## 2019-01-01 RX ADMIN — MINERAL SUPPLEMENT IRON 300 MG / 5 ML STRENGTH LIQUID 100 PER BOX UNFLAVORED 300 MG: at 08:46

## 2019-01-01 RX ADMIN — PRAVASTATIN SODIUM 40 MG: 40 TABLET ORAL at 21:13

## 2019-01-01 RX ADMIN — BUMETANIDE 1 MG: 0.25 INJECTION INTRAMUSCULAR; INTRAVENOUS at 09:22

## 2019-01-01 RX ADMIN — MIDAZOLAM HYDROCHLORIDE 6 MG/HR: 5 INJECTION, SOLUTION INTRAMUSCULAR; INTRAVENOUS at 16:29

## 2019-01-01 RX ADMIN — LORAZEPAM 2 MG: 2 INJECTION INTRAMUSCULAR; INTRAVENOUS at 23:07

## 2019-01-01 RX ADMIN — PIPERACILLIN AND TAZOBACTAM 3.38 G: 3; .375 INJECTION, POWDER, FOR SOLUTION INTRAVENOUS at 18:20

## 2019-01-01 RX ADMIN — GUAIFENESIN 400 MG: 100 SOLUTION ORAL at 05:25

## 2019-01-01 RX ADMIN — CHLORHEXIDINE GLUCONATE 10 ML: 1.2 RINSE ORAL at 21:05

## 2019-01-01 RX ADMIN — Medication 10 ML: at 13:53

## 2019-01-01 RX ADMIN — QUETIAPINE FUMARATE 50 MG: 25 TABLET ORAL at 09:04

## 2019-01-01 RX ADMIN — SODIUM CHLORIDE 9 ML/HR: 900 INJECTION, SOLUTION INTRAVENOUS at 06:00

## 2019-01-01 RX ADMIN — LORAZEPAM 2 MG: 2 INJECTION INTRAMUSCULAR; INTRAVENOUS at 18:00

## 2019-01-01 RX ADMIN — METHYLPREDNISOLONE SODIUM SUCCINATE 40 MG: 40 INJECTION, POWDER, FOR SOLUTION INTRAMUSCULAR; INTRAVENOUS at 05:45

## 2019-01-01 RX ADMIN — Medication 10 ML: at 05:27

## 2019-01-01 RX ADMIN — BUDESONIDE 500 MCG: 0.5 INHALANT RESPIRATORY (INHALATION) at 07:30

## 2019-01-01 RX ADMIN — BUDESONIDE 500 MCG: 0.5 INHALANT RESPIRATORY (INHALATION) at 19:22

## 2019-01-01 RX ADMIN — NYSTATIN 500000 UNITS: 100000 SUSPENSION ORAL at 12:58

## 2019-01-01 RX ADMIN — BUDESONIDE 500 MCG: 0.5 INHALANT RESPIRATORY (INHALATION) at 06:42

## 2019-01-01 RX ADMIN — ALBUTEROL SULFATE 2.5 MG: 2.5 SOLUTION RESPIRATORY (INHALATION) at 18:15

## 2019-01-01 RX ADMIN — SODIUM CHLORIDE 3 ML/HR: 900 INJECTION, SOLUTION INTRAVENOUS at 05:00

## 2019-01-01 RX ADMIN — HEPARIN SODIUM 5000 UNITS: 5000 INJECTION INTRAVENOUS; SUBCUTANEOUS at 04:11

## 2019-01-01 RX ADMIN — NYSTATIN 500000 UNITS: 100000 SUSPENSION ORAL at 17:03

## 2019-01-01 RX ADMIN — HYDRALAZINE HYDROCHLORIDE 10 MG: 20 INJECTION INTRAMUSCULAR; INTRAVENOUS at 05:23

## 2019-01-01 RX ADMIN — AMIODARONE HYDROCHLORIDE 400 MG: 200 TABLET ORAL at 09:32

## 2019-01-01 RX ADMIN — SODIUM CHLORIDE 5 MG/HR: 900 INJECTION, SOLUTION INTRAVENOUS at 23:13

## 2019-01-01 RX ADMIN — BUDESONIDE 500 MCG: 0.5 INHALANT RESPIRATORY (INHALATION) at 07:06

## 2019-01-01 RX ADMIN — BUDESONIDE 500 MCG: 0.5 INHALANT RESPIRATORY (INHALATION) at 08:29

## 2019-01-01 RX ADMIN — SODIUM CHLORIDE 8.3 UNITS/HR: 9 INJECTION, SOLUTION INTRAVENOUS at 02:44

## 2019-01-01 RX ADMIN — BUMETANIDE 1 MG: 0.25 INJECTION INTRAMUSCULAR; INTRAVENOUS at 18:18

## 2019-01-01 RX ADMIN — Medication 30 ML: at 08:30

## 2019-01-01 RX ADMIN — CHLORHEXIDINE GLUCONATE 15 ML: 1.2 RINSE ORAL at 20:47

## 2019-01-01 RX ADMIN — BUMETANIDE 2 MG: 0.25 INJECTION INTRAMUSCULAR; INTRAVENOUS at 08:59

## 2019-01-01 RX ADMIN — PHENYLEPHRINE HYDROCHLORIDE 70 MCG/MIN: 10 INJECTION INTRAVENOUS at 17:38

## 2019-01-01 RX ADMIN — GUAIFENESIN 400 MG: 200 SOLUTION ORAL at 18:44

## 2019-01-01 RX ADMIN — ARFORMOTEROL TARTRATE 15 MCG: 15 SOLUTION RESPIRATORY (INHALATION) at 09:46

## 2019-01-01 RX ADMIN — CEFAZOLIN 2 G: 330 INJECTION, POWDER, FOR SOLUTION INTRAMUSCULAR; INTRAVENOUS at 11:45

## 2019-01-01 RX ADMIN — POTASSIUM CHLORIDE 20 MEQ: 29.8 INJECTION INTRAVENOUS at 07:53

## 2019-01-01 RX ADMIN — ALBUTEROL SULFATE 2.5 MG: 2.5 SOLUTION RESPIRATORY (INHALATION) at 19:50

## 2019-01-01 RX ADMIN — Medication 10 ML: at 23:06

## 2019-01-01 RX ADMIN — GUAIFENESIN 400 MG: 100 SOLUTION ORAL at 18:19

## 2019-01-01 RX ADMIN — METHYLPREDNISOLONE SODIUM SUCCINATE 80 MG: 125 INJECTION, POWDER, FOR SOLUTION INTRAMUSCULAR; INTRAVENOUS at 22:35

## 2019-01-01 RX ADMIN — INSULIN LISPRO 3 UNITS: 100 INJECTION, SOLUTION INTRAVENOUS; SUBCUTANEOUS at 03:00

## 2019-01-01 RX ADMIN — HEPARIN SODIUM 5000 UNITS: 5000 INJECTION INTRAVENOUS; SUBCUTANEOUS at 17:46

## 2019-01-01 RX ADMIN — MORPHINE SULFATE 2 MG: 2 INJECTION, SOLUTION INTRAMUSCULAR; INTRAVENOUS at 14:10

## 2019-01-01 RX ADMIN — POTASSIUM CHLORIDE 40 MEQ: 750 TABLET, FILM COATED, EXTENDED RELEASE ORAL at 08:57

## 2019-01-01 RX ADMIN — CHLORHEXIDINE GLUCONATE 10 ML: 1.2 RINSE ORAL at 21:51

## 2019-01-01 RX ADMIN — BUMETANIDE 1 MG: 0.25 INJECTION INTRAMUSCULAR; INTRAVENOUS at 13:14

## 2019-01-01 RX ADMIN — SERTRALINE HYDROCHLORIDE 100 MG: 50 TABLET ORAL at 10:54

## 2019-01-01 RX ADMIN — BUDESONIDE 500 MCG: 0.5 INHALANT RESPIRATORY (INHALATION) at 09:14

## 2019-01-01 RX ADMIN — SERTRALINE HYDROCHLORIDE 100 MG: 50 TABLET ORAL at 08:36

## 2019-01-01 RX ADMIN — LORAZEPAM 2 MG: 2 INJECTION INTRAMUSCULAR; INTRAVENOUS at 23:54

## 2019-01-01 RX ADMIN — MIDAZOLAM HYDROCHLORIDE 2 MG/HR: 5 INJECTION, SOLUTION INTRAMUSCULAR; INTRAVENOUS at 08:10

## 2019-01-01 RX ADMIN — OXYCODONE HYDROCHLORIDE 5 MG: 5 TABLET ORAL at 20:47

## 2019-01-01 RX ADMIN — MINERAL SUPPLEMENT IRON 300 MG / 5 ML STRENGTH LIQUID 100 PER BOX UNFLAVORED 300 MG: at 08:19

## 2019-01-01 RX ADMIN — NITROGLYCERIN 30 MCG/MIN: 20 INJECTION INTRAVENOUS at 19:05

## 2019-01-01 RX ADMIN — MORPHINE SULFATE 2 MG: 2 INJECTION, SOLUTION INTRAMUSCULAR; INTRAVENOUS at 00:41

## 2019-01-01 RX ADMIN — QUETIAPINE FUMARATE 50 MG: 25 TABLET ORAL at 18:20

## 2019-01-01 RX ADMIN — MINERAL SUPPLEMENT IRON 300 MG / 5 ML STRENGTH LIQUID 100 PER BOX UNFLAVORED 300 MG: at 08:24

## 2019-01-01 RX ADMIN — POTASSIUM CHLORIDE 20 MEQ: 750 TABLET, FILM COATED, EXTENDED RELEASE ORAL at 09:39

## 2019-01-01 RX ADMIN — POTASSIUM CHLORIDE 20 MEQ: 400 INJECTION, SOLUTION INTRAVENOUS at 07:14

## 2019-01-01 RX ADMIN — ARFORMOTEROL TARTRATE 15 MCG: 15 SOLUTION RESPIRATORY (INHALATION) at 21:04

## 2019-01-01 RX ADMIN — HEPARIN SODIUM 5000 UNITS: 5000 INJECTION INTRAVENOUS; SUBCUTANEOUS at 03:26

## 2019-01-01 RX ADMIN — PRAVASTATIN SODIUM 40 MG: 40 TABLET ORAL at 21:59

## 2019-01-01 RX ADMIN — INSULIN LISPRO 5 UNITS: 100 INJECTION, SOLUTION INTRAVENOUS; SUBCUTANEOUS at 09:14

## 2019-01-01 RX ADMIN — PIPERACILLIN AND TAZOBACTAM 3.38 G: 3; .375 INJECTION, POWDER, FOR SOLUTION INTRAVENOUS at 17:57

## 2019-01-01 RX ADMIN — CHLORHEXIDINE GLUCONATE 10 ML: 1.2 RINSE ORAL at 20:49

## 2019-01-01 RX ADMIN — INSULIN LISPRO 2 UNITS: 100 INJECTION, SOLUTION INTRAVENOUS; SUBCUTANEOUS at 13:14

## 2019-01-01 RX ADMIN — Medication 30 ML: at 09:10

## 2019-01-01 RX ADMIN — PROPOFOL 50 MCG/KG/MIN: 10 INJECTION, EMULSION INTRAVENOUS at 05:10

## 2019-01-01 RX ADMIN — PHENYLEPHRINE HYDROCHLORIDE 70 MCG/MIN: 10 INJECTION INTRAVENOUS at 23:26

## 2019-01-01 RX ADMIN — SERTRALINE HYDROCHLORIDE 100 MG: 50 TABLET ORAL at 09:04

## 2019-01-01 RX ADMIN — POTASSIUM BICARBONATE 20 MEQ: 782 TABLET, EFFERVESCENT ORAL at 08:24

## 2019-01-01 RX ADMIN — HYDRALAZINE HYDROCHLORIDE 10 MG: 20 INJECTION INTRAMUSCULAR; INTRAVENOUS at 10:14

## 2019-01-01 RX ADMIN — HUMAN INSULIN 15 UNITS: 100 INJECTION, SUSPENSION SUBCUTANEOUS at 17:10

## 2019-01-01 RX ADMIN — MORPHINE SULFATE 2 MG: 2 INJECTION, SOLUTION INTRAMUSCULAR; INTRAVENOUS at 06:51

## 2019-01-01 RX ADMIN — DOBUTAMINE IN DEXTROSE 1 MCG/KG/MIN: 200 INJECTION, SOLUTION INTRAVENOUS at 18:28

## 2019-01-01 RX ADMIN — PIPERACILLIN AND TAZOBACTAM 3.38 G: 3; .375 INJECTION, POWDER, FOR SOLUTION INTRAVENOUS at 02:16

## 2019-01-01 RX ADMIN — STANDARDIZED SENNA CONCENTRATE AND DOCUSATE SODIUM 1 TABLET: 8.6; 5 TABLET ORAL at 18:20

## 2019-01-01 RX ADMIN — BUMETANIDE 1.5 MG: 0.25 INJECTION INTRAMUSCULAR; INTRAVENOUS at 21:06

## 2019-01-01 RX ADMIN — ASPIRIN 81 MG 81 MG: 81 TABLET ORAL at 08:28

## 2019-01-01 RX ADMIN — LORAZEPAM 2 MG: 2 INJECTION INTRAMUSCULAR; INTRAVENOUS at 15:59

## 2019-01-01 RX ADMIN — LORAZEPAM 2 MG: 2 INJECTION INTRAMUSCULAR; INTRAVENOUS at 18:44

## 2019-01-01 RX ADMIN — POTASSIUM BICARBONATE 40 MEQ: 782 TABLET, EFFERVESCENT ORAL at 17:36

## 2019-01-01 RX ADMIN — PHENYLEPHRINE HYDROCHLORIDE 40 MCG: 10 INJECTION INTRAVENOUS at 21:18

## 2019-01-01 RX ADMIN — DEXMEDETOMIDINE HYDROCHLORIDE 0.9 MCG/KG/HR: 100 INJECTION, SOLUTION, CONCENTRATE INTRAVENOUS at 18:06

## 2019-01-01 RX ADMIN — Medication 100 MCG/HR: at 17:05

## 2019-01-01 RX ADMIN — DEXMEDETOMIDINE HYDROCHLORIDE 0.2 MCG/KG/HR: 100 INJECTION, SOLUTION, CONCENTRATE INTRAVENOUS at 02:16

## 2019-01-01 RX ADMIN — Medication 30 ML: at 09:11

## 2019-01-01 RX ADMIN — METHYLPREDNISOLONE SODIUM SUCCINATE 80 MG: 125 INJECTION, POWDER, FOR SOLUTION INTRAMUSCULAR; INTRAVENOUS at 22:08

## 2019-01-01 RX ADMIN — BUDESONIDE 500 MCG: 0.5 INHALANT RESPIRATORY (INHALATION) at 21:22

## 2019-01-01 RX ADMIN — Medication 30 ML: at 11:05

## 2019-01-01 RX ADMIN — POTASSIUM CHLORIDE 20 MEQ: 750 TABLET, FILM COATED, EXTENDED RELEASE ORAL at 18:59

## 2019-01-01 RX ADMIN — HEPARIN SODIUM 5000 UNITS: 5000 INJECTION INTRAVENOUS; SUBCUTANEOUS at 10:23

## 2019-01-01 RX ADMIN — AMIODARONE HYDROCHLORIDE 400 MG: 200 TABLET ORAL at 08:55

## 2019-01-01 RX ADMIN — INSULIN LISPRO 2 UNITS: 100 INJECTION, SOLUTION INTRAVENOUS; SUBCUTANEOUS at 05:30

## 2019-01-01 RX ADMIN — PIPERACILLIN AND TAZOBACTAM 3.38 G: 3; .375 INJECTION, POWDER, FOR SOLUTION INTRAVENOUS at 09:45

## 2019-01-01 RX ADMIN — MORPHINE SULFATE 2 MG: 2 INJECTION, SOLUTION INTRAMUSCULAR; INTRAVENOUS at 16:33

## 2019-01-01 RX ADMIN — CHLORHEXIDINE GLUCONATE 10 ML: 1.2 RINSE ORAL at 08:40

## 2019-01-01 RX ADMIN — POLYETHYLENE GLYCOL 3350 17 G: 17 POWDER, FOR SOLUTION ORAL at 09:38

## 2019-01-01 RX ADMIN — BUDESONIDE 500 MCG: 0.5 INHALANT RESPIRATORY (INHALATION) at 07:41

## 2019-01-01 RX ADMIN — DEXMEDETOMIDINE HYDROCHLORIDE 0.9 MCG/KG/HR: 100 INJECTION, SOLUTION, CONCENTRATE INTRAVENOUS at 22:48

## 2019-01-01 RX ADMIN — INSULIN LISPRO 7 UNITS: 100 INJECTION, SOLUTION INTRAVENOUS; SUBCUTANEOUS at 06:39

## 2019-01-01 RX ADMIN — INSULIN LISPRO 3 UNITS: 100 INJECTION, SOLUTION INTRAVENOUS; SUBCUTANEOUS at 12:47

## 2019-01-01 RX ADMIN — EPINEPHRINE 1 MCG/MIN: 1 INJECTION INTRAMUSCULAR; INTRAVENOUS; SUBCUTANEOUS at 14:22

## 2019-01-01 RX ADMIN — SERTRALINE HYDROCHLORIDE 100 MG: 50 TABLET ORAL at 09:36

## 2019-01-01 RX ADMIN — ROCURONIUM BROMIDE 50 MG: 10 INJECTION INTRAVENOUS at 11:16

## 2019-01-01 RX ADMIN — ALBUMIN (HUMAN) 12.5 G: 12.5 INJECTION, SOLUTION INTRAVENOUS at 01:09

## 2019-01-01 RX ADMIN — INSULIN GLARGINE 25 UNITS: 100 INJECTION, SOLUTION SUBCUTANEOUS at 21:18

## 2019-01-01 RX ADMIN — NYSTATIN 500000 UNITS: 100000 SUSPENSION ORAL at 21:06

## 2019-01-01 RX ADMIN — INSULIN LISPRO 3 UNITS: 100 INJECTION, SOLUTION INTRAVENOUS; SUBCUTANEOUS at 12:25

## 2019-01-01 RX ADMIN — HEPARIN SODIUM 5000 UNITS: 5000 INJECTION INTRAVENOUS; SUBCUTANEOUS at 20:12

## 2019-01-01 RX ADMIN — ROCURONIUM BROMIDE 20 MG: 10 INJECTION INTRAVENOUS at 12:48

## 2019-01-01 RX ADMIN — BUPIVACAINE HYDROCHLORIDE 75 MG: 2.5 INJECTION, SOLUTION EPIDURAL; INFILTRATION; INTRACAUDAL at 11:57

## 2019-01-01 RX ADMIN — Medication 10 ML: at 22:35

## 2019-01-01 RX ADMIN — SODIUM CHLORIDE 40 MG: 9 INJECTION INTRAMUSCULAR; INTRAVENOUS; SUBCUTANEOUS at 08:22

## 2019-01-01 RX ADMIN — LORAZEPAM 2 MG: 2 INJECTION INTRAMUSCULAR; INTRAVENOUS at 12:47

## 2019-01-01 RX ADMIN — MORPHINE SULFATE 2 MG: 2 INJECTION, SOLUTION INTRAMUSCULAR; INTRAVENOUS at 07:23

## 2019-01-01 RX ADMIN — SODIUM CHLORIDE 2.5 MG/HR: 900 INJECTION, SOLUTION INTRAVENOUS at 20:53

## 2019-01-01 RX ADMIN — SODIUM CHLORIDE 3 ML/HR: 900 INJECTION, SOLUTION INTRAVENOUS at 05:28

## 2019-01-01 RX ADMIN — NYSTATIN 500000 UNITS: 100000 SUSPENSION ORAL at 12:42

## 2019-01-01 RX ADMIN — QUETIAPINE FUMARATE 50 MG: 25 TABLET ORAL at 18:07

## 2019-01-01 RX ADMIN — POTASSIUM CHLORIDE 20 MEQ: 29.8 INJECTION, SOLUTION INTRAVENOUS at 22:55

## 2019-01-01 RX ADMIN — ACETYLCYSTEINE 200 MG: 200 SOLUTION ORAL; RESPIRATORY (INHALATION) at 08:29

## 2019-01-01 RX ADMIN — POTASSIUM CHLORIDE 20 MEQ: 29.8 INJECTION, SOLUTION INTRAVENOUS at 04:35

## 2019-01-01 RX ADMIN — MIDAZOLAM HYDROCHLORIDE 5 MG/HR: 5 INJECTION, SOLUTION INTRAMUSCULAR; INTRAVENOUS at 15:53

## 2019-01-01 RX ADMIN — DEXMEDETOMIDINE HYDROCHLORIDE 0.9 MCG/KG/HR: 100 INJECTION, SOLUTION, CONCENTRATE INTRAVENOUS at 02:38

## 2019-01-01 RX ADMIN — ARFORMOTEROL TARTRATE 15 MCG: 15 SOLUTION RESPIRATORY (INHALATION) at 07:06

## 2019-01-01 RX ADMIN — LORAZEPAM 2 MG: 2 INJECTION INTRAMUSCULAR; INTRAVENOUS at 18:43

## 2019-01-01 RX ADMIN — Medication 10 ML: at 21:38

## 2019-01-01 RX ADMIN — DEXMEDETOMIDINE HYDROCHLORIDE 0.9 MCG/KG/HR: 100 INJECTION, SOLUTION, CONCENTRATE INTRAVENOUS at 02:57

## 2019-01-01 RX ADMIN — SODIUM CHLORIDE 40 MG: 9 INJECTION INTRAMUSCULAR; INTRAVENOUS; SUBCUTANEOUS at 21:03

## 2019-01-01 RX ADMIN — ALBUTEROL SULFATE 2.5 MG: 2.5 SOLUTION RESPIRATORY (INHALATION) at 18:58

## 2019-01-01 RX ADMIN — INSULIN GLARGINE 30 UNITS: 100 INJECTION, SOLUTION SUBCUTANEOUS at 08:01

## 2019-01-01 RX ADMIN — SODIUM CHLORIDE 40 MG: 9 INJECTION INTRAMUSCULAR; INTRAVENOUS; SUBCUTANEOUS at 08:20

## 2019-01-01 RX ADMIN — FERROUS SULFATE TAB 325 MG (65 MG ELEMENTAL FE) 325 MG: 325 (65 FE) TAB at 09:18

## 2019-01-01 RX ADMIN — SENNOSIDES, DOCUSATE SODIUM 1 TABLET: 50; 8.6 TABLET, FILM COATED ORAL at 17:50

## 2019-01-01 RX ADMIN — DEXTROSE MONOHYDRATE 75 ML/HR: 5 INJECTION, SOLUTION INTRAVENOUS at 06:56

## 2019-01-01 RX ADMIN — BUMETANIDE 1 MG: 0.25 INJECTION INTRAMUSCULAR; INTRAVENOUS at 05:45

## 2019-01-01 RX ADMIN — LORAZEPAM 2 MG: 2 INJECTION INTRAMUSCULAR; INTRAVENOUS at 17:00

## 2019-01-01 RX ADMIN — ACETYLCYSTEINE 200 MG: 200 SOLUTION ORAL; RESPIRATORY (INHALATION) at 08:14

## 2019-01-01 RX ADMIN — MUPIROCIN: 20 OINTMENT TOPICAL at 18:22

## 2019-01-01 RX ADMIN — INSULIN GLARGINE 10 UNITS: 100 INJECTION, SOLUTION SUBCUTANEOUS at 08:40

## 2019-01-01 RX ADMIN — MUPIROCIN 1 G: 20 OINTMENT TOPICAL at 09:33

## 2019-01-01 RX ADMIN — ACETAMINOPHEN 650 MG: 325 TABLET, FILM COATED ORAL at 20:23

## 2019-01-01 RX ADMIN — AMIODARONE HYDROCHLORIDE 400 MG: 200 TABLET ORAL at 09:39

## 2019-01-01 RX ADMIN — POTASSIUM CHLORIDE 20 MEQ: 750 TABLET, FILM COATED, EXTENDED RELEASE ORAL at 19:26

## 2019-01-01 RX ADMIN — ARFORMOTEROL TARTRATE 15 MCG: 15 SOLUTION RESPIRATORY (INHALATION) at 08:04

## 2019-01-01 RX ADMIN — ACETYLCYSTEINE 200 MG: 200 SOLUTION ORAL; RESPIRATORY (INHALATION) at 20:21

## 2019-01-01 RX ADMIN — POTASSIUM CHLORIDE 40 MEQ: 750 TABLET, FILM COATED, EXTENDED RELEASE ORAL at 18:52

## 2019-01-01 RX ADMIN — BUMETANIDE 2 MG: 0.25 INJECTION INTRAMUSCULAR; INTRAVENOUS at 18:28

## 2019-01-01 RX ADMIN — SENNOSIDES,DOCUSATE SODIUM 1 TABLET: 8.6; 5 TABLET, FILM COATED ORAL at 21:12

## 2019-01-01 RX ADMIN — ARFORMOTEROL TARTRATE 15 MCG: 15 SOLUTION RESPIRATORY (INHALATION) at 21:09

## 2019-01-01 RX ADMIN — MAGNESIUM OXIDE TAB 400 MG (241.3 MG ELEMENTAL MG) 400 MG: 400 (241.3 MG) TAB at 10:28

## 2019-01-01 RX ADMIN — MINERAL SUPPLEMENT IRON 300 MG / 5 ML STRENGTH LIQUID 100 PER BOX UNFLAVORED 300 MG: at 08:53

## 2019-01-01 RX ADMIN — PRAVASTATIN SODIUM 40 MG: 40 TABLET ORAL at 21:12

## 2019-01-01 RX ADMIN — NYSTATIN 500000 UNITS: 100000 SUSPENSION ORAL at 08:54

## 2019-01-01 RX ADMIN — HYDRALAZINE HYDROCHLORIDE 10 MG: 10 TABLET, FILM COATED ORAL at 09:26

## 2019-01-01 RX ADMIN — GUAIFENESIN 400 MG: 100 SOLUTION ORAL at 17:36

## 2019-01-01 RX ADMIN — CHLORHEXIDINE GLUCONATE 10 ML: 1.2 RINSE ORAL at 11:18

## 2019-01-01 RX ADMIN — NYSTATIN 500000 UNITS: 100000 SUSPENSION ORAL at 17:26

## 2019-01-01 RX ADMIN — MUPIROCIN 1 G: 20 OINTMENT TOPICAL at 17:52

## 2019-01-01 RX ADMIN — GUAIFENESIN 1200 MG: 600 TABLET, EXTENDED RELEASE ORAL at 09:36

## 2019-01-01 RX ADMIN — INSULIN LISPRO 3 UNITS: 100 INJECTION, SOLUTION INTRAVENOUS; SUBCUTANEOUS at 06:13

## 2019-01-01 RX ADMIN — LORAZEPAM 2 MG: 2 INJECTION INTRAMUSCULAR; INTRAVENOUS at 03:25

## 2019-01-01 RX ADMIN — BUMETANIDE 2 MG: 0.25 INJECTION INTRAMUSCULAR; INTRAVENOUS at 08:40

## 2019-01-01 RX ADMIN — MAGNESIUM OXIDE TAB 400 MG (241.3 MG ELEMENTAL MG) 400 MG: 400 (241.3 MG) TAB at 09:39

## 2019-01-01 RX ADMIN — CHLORHEXIDINE GLUCONATE 10 ML: 1.2 RINSE ORAL at 08:06

## 2019-01-01 RX ADMIN — OXYCODONE HYDROCHLORIDE 5 MG: 5 TABLET ORAL at 21:15

## 2019-01-01 RX ADMIN — Medication 10 ML: at 09:33

## 2019-01-01 RX ADMIN — CHLORHEXIDINE GLUCONATE 10 ML: 1.2 RINSE ORAL at 11:01

## 2019-01-01 RX ADMIN — FUROSEMIDE 40 MG: 10 INJECTION, SOLUTION INTRAMUSCULAR; INTRAVENOUS at 21:15

## 2019-01-01 RX ADMIN — INSULIN LISPRO 3 UNITS: 100 INJECTION, SOLUTION INTRAVENOUS; SUBCUTANEOUS at 07:44

## 2019-01-01 RX ADMIN — CASTOR OIL AND BALSAM, PERU: 788; 87 OINTMENT TOPICAL at 18:50

## 2019-01-01 RX ADMIN — MIDAZOLAM HYDROCHLORIDE 8 MG/HR: 5 INJECTION, SOLUTION INTRAMUSCULAR; INTRAVENOUS at 05:39

## 2019-01-01 RX ADMIN — MINERAL SUPPLEMENT IRON 300 MG / 5 ML STRENGTH LIQUID 100 PER BOX UNFLAVORED 300 MG: at 11:07

## 2019-01-01 RX ADMIN — PHENOL 1 SPRAY: 1.5 LIQUID ORAL at 11:10

## 2019-01-01 RX ADMIN — MIDAZOLAM HYDROCHLORIDE 5 MG/HR: 5 INJECTION, SOLUTION INTRAMUSCULAR; INTRAVENOUS at 03:51

## 2019-01-01 RX ADMIN — ACETYLCYSTEINE 200 MG: 200 SOLUTION ORAL; RESPIRATORY (INHALATION) at 08:04

## 2019-01-01 RX ADMIN — WATER 500 MG: 1 INJECTION INTRAMUSCULAR; INTRAVENOUS; SUBCUTANEOUS at 10:15

## 2019-01-01 RX ADMIN — GUAIFENESIN 1200 MG: 600 TABLET, EXTENDED RELEASE ORAL at 09:39

## 2019-01-01 RX ADMIN — DEXMEDETOMIDINE HYDROCHLORIDE 1 MCG/KG/HR: 100 INJECTION, SOLUTION, CONCENTRATE INTRAVENOUS at 07:30

## 2019-01-01 RX ADMIN — POTASSIUM CHLORIDE 20 MEQ: 29.8 INJECTION INTRAVENOUS at 10:54

## 2019-01-01 RX ADMIN — GUAIFENESIN 400 MG: 100 SOLUTION ORAL at 12:04

## 2019-01-01 RX ADMIN — HYDRALAZINE HYDROCHLORIDE 10 MG: 10 TABLET, FILM COATED ORAL at 16:30

## 2019-01-01 RX ADMIN — ASPIRIN 81 MG 81 MG: 81 TABLET ORAL at 08:22

## 2019-01-01 RX ADMIN — CASTOR OIL AND BALSAM, PERU: 788; 87 OINTMENT TOPICAL at 19:14

## 2019-01-01 RX ADMIN — NYSTATIN 500000 UNITS: 100000 SUSPENSION ORAL at 17:57

## 2019-01-01 RX ADMIN — LORAZEPAM 2 MG: 2 INJECTION INTRAMUSCULAR; INTRAVENOUS at 02:01

## 2019-01-01 RX ADMIN — CASTOR OIL AND BALSAM, PERU: 788; 87 OINTMENT TOPICAL at 17:42

## 2019-01-01 RX ADMIN — METHYLPREDNISOLONE SODIUM SUCCINATE 40 MG: 40 INJECTION, POWDER, FOR SOLUTION INTRAMUSCULAR; INTRAVENOUS at 17:57

## 2019-01-01 RX ADMIN — CHLORHEXIDINE GLUCONATE 10 ML: 1.2 RINSE ORAL at 21:00

## 2019-01-01 RX ADMIN — PRAVASTATIN SODIUM 40 MG: 40 TABLET ORAL at 21:20

## 2019-01-01 RX ADMIN — MAGNESIUM SULFATE HEPTAHYDRATE 1 G: 1 INJECTION, SOLUTION INTRAVENOUS at 05:58

## 2019-01-01 RX ADMIN — SERTRALINE HYDROCHLORIDE 100 MG: 50 TABLET ORAL at 09:39

## 2019-01-01 RX ADMIN — CARVEDILOL 12.5 MG: 12.5 TABLET, FILM COATED ORAL at 17:49

## 2019-01-01 RX ADMIN — DOBUTAMINE IN DEXTROSE 1 MCG/KG/MIN: 200 INJECTION, SOLUTION INTRAVENOUS at 18:33

## 2019-01-01 RX ADMIN — MAGNESIUM OXIDE TAB 400 MG (241.3 MG ELEMENTAL MG) 400 MG: 400 (241.3 MG) TAB at 09:27

## 2019-01-01 RX ADMIN — MINERAL SUPPLEMENT IRON 300 MG / 5 ML STRENGTH LIQUID 100 PER BOX UNFLAVORED 300 MG: at 11:04

## 2019-01-01 RX ADMIN — GUAIFENESIN 400 MG: 100 SOLUTION ORAL at 14:43

## 2019-01-01 RX ADMIN — MINERAL SUPPLEMENT IRON 300 MG / 5 ML STRENGTH LIQUID 100 PER BOX UNFLAVORED 300 MG: at 09:10

## 2019-01-01 RX ADMIN — Medication 10 ML: at 17:05

## 2019-01-01 RX ADMIN — WATER 500 MG: 1 INJECTION INTRAMUSCULAR; INTRAVENOUS; SUBCUTANEOUS at 17:36

## 2019-01-01 RX ADMIN — SODIUM CHLORIDE 2 UNITS/HR: 9 INJECTION, SOLUTION INTRAVENOUS at 11:41

## 2019-01-01 RX ADMIN — Medication 200 MCG/HR: at 19:16

## 2019-01-01 RX ADMIN — CHLORHEXIDINE GLUCONATE 10 ML: 1.2 RINSE ORAL at 08:41

## 2019-01-01 RX ADMIN — ASPIRIN 81 MG 81 MG: 81 TABLET ORAL at 10:28

## 2019-01-01 RX ADMIN — SENNOSIDES, DOCUSATE SODIUM 1 TABLET: 50; 8.6 TABLET, FILM COATED ORAL at 18:53

## 2019-01-01 RX ADMIN — MORPHINE SULFATE 2 MG: 2 INJECTION, SOLUTION INTRAMUSCULAR; INTRAVENOUS at 22:04

## 2019-01-01 RX ADMIN — SERTRALINE HYDROCHLORIDE 100 MG: 50 TABLET ORAL at 08:08

## 2019-01-01 RX ADMIN — DEXMEDETOMIDINE HYDROCHLORIDE 0.9 MCG/KG/HR: 100 INJECTION, SOLUTION, CONCENTRATE INTRAVENOUS at 07:05

## 2019-01-01 RX ADMIN — CHLOROTHIAZIDE 500 MG: 250 SUSPENSION ORAL at 09:50

## 2019-01-01 RX ADMIN — LORAZEPAM 2 MG: 2 INJECTION INTRAMUSCULAR; INTRAVENOUS at 03:28

## 2019-01-01 RX ADMIN — OXYCODONE HYDROCHLORIDE 5 MG: 5 TABLET ORAL at 12:54

## 2019-01-01 RX ADMIN — AMIODARONE HYDROCHLORIDE 400 MG: 200 TABLET ORAL at 20:03

## 2019-01-01 RX ADMIN — Medication 10 ML: at 21:36

## 2019-01-01 RX ADMIN — SERTRALINE HYDROCHLORIDE 100 MG: 50 TABLET ORAL at 08:28

## 2019-01-01 RX ADMIN — BUMETANIDE 1 MG: 0.25 INJECTION INTRAMUSCULAR; INTRAVENOUS at 08:06

## 2019-01-01 RX ADMIN — BUMETANIDE 1.5 MG: 0.25 INJECTION INTRAMUSCULAR; INTRAVENOUS at 21:18

## 2019-01-01 RX ADMIN — PIPERACILLIN AND TAZOBACTAM 3.38 G: 3; .375 INJECTION, POWDER, FOR SOLUTION INTRAVENOUS at 09:42

## 2019-01-01 RX ADMIN — INSULIN LISPRO 3 UNITS: 100 INJECTION, SOLUTION INTRAVENOUS; SUBCUTANEOUS at 05:41

## 2019-01-01 RX ADMIN — MINERAL SUPPLEMENT IRON 300 MG / 5 ML STRENGTH LIQUID 100 PER BOX UNFLAVORED 300 MG: at 08:21

## 2019-01-01 RX ADMIN — POTASSIUM BICARBONATE 20 MEQ: 782 TABLET, EFFERVESCENT ORAL at 08:19

## 2019-01-01 RX ADMIN — FUROSEMIDE 40 MG: 10 INJECTION, SOLUTION INTRAMUSCULAR; INTRAVENOUS at 06:13

## 2019-01-01 RX ADMIN — ARFORMOTEROL TARTRATE 15 MCG: 15 SOLUTION RESPIRATORY (INHALATION) at 19:09

## 2019-01-01 RX ADMIN — Medication 20 ML: at 14:02

## 2019-01-01 RX ADMIN — DEXMEDETOMIDINE HYDROCHLORIDE 0.9 MCG/KG/HR: 100 INJECTION, SOLUTION, CONCENTRATE INTRAVENOUS at 08:13

## 2019-01-01 RX ADMIN — INSULIN LISPRO 2 UNITS: 100 INJECTION, SOLUTION INTRAVENOUS; SUBCUTANEOUS at 12:01

## 2019-01-01 RX ADMIN — MAGNESIUM OXIDE TAB 400 MG (241.3 MG ELEMENTAL MG) 400 MG: 400 (241.3 MG) TAB at 11:01

## 2019-01-01 RX ADMIN — SODIUM CHLORIDE 10 ML/HR: 450 INJECTION, SOLUTION INTRAVENOUS at 17:00

## 2019-01-01 RX ADMIN — OXYCODONE HYDROCHLORIDE 5 MG: 5 TABLET ORAL at 11:05

## 2019-01-01 RX ADMIN — DEXMEDETOMIDINE HYDROCHLORIDE 0.9 MCG/KG/HR: 100 INJECTION, SOLUTION, CONCENTRATE INTRAVENOUS at 23:13

## 2019-01-01 RX ADMIN — Medication 10 ML: at 12:46

## 2019-01-01 RX ADMIN — ASPIRIN 81 MG 81 MG: 81 TABLET ORAL at 08:30

## 2019-01-01 RX ADMIN — LORAZEPAM 2 MG: 2 INJECTION INTRAMUSCULAR; INTRAVENOUS at 04:31

## 2019-01-01 RX ADMIN — METHYLPREDNISOLONE SODIUM SUCCINATE 80 MG: 125 INJECTION, POWDER, FOR SOLUTION INTRAMUSCULAR; INTRAVENOUS at 12:41

## 2019-01-01 RX ADMIN — LORAZEPAM 2 MG: 2 INJECTION INTRAMUSCULAR; INTRAVENOUS at 05:24

## 2019-01-01 RX ADMIN — NYSTATIN 500000 UNITS: 100000 SUSPENSION ORAL at 17:49

## 2019-01-01 RX ADMIN — ARFORMOTEROL TARTRATE 15 MCG: 15 SOLUTION RESPIRATORY (INHALATION) at 09:14

## 2019-01-01 RX ADMIN — BUMETANIDE 2 MG: 0.25 INJECTION INTRAMUSCULAR; INTRAVENOUS at 09:47

## 2019-01-01 RX ADMIN — ASPIRIN 81 MG 81 MG: 81 TABLET ORAL at 08:00

## 2019-01-01 RX ADMIN — HEPARIN SODIUM 5000 UNITS: 5000 INJECTION INTRAVENOUS; SUBCUTANEOUS at 12:05

## 2019-01-01 RX ADMIN — ACETYLCYSTEINE 200 MG: 200 SOLUTION ORAL; RESPIRATORY (INHALATION) at 09:14

## 2019-01-01 RX ADMIN — SERTRALINE HYDROCHLORIDE 100 MG: 50 TABLET ORAL at 11:01

## 2019-01-01 RX ADMIN — BUDESONIDE 500 MCG: 0.5 INHALANT RESPIRATORY (INHALATION) at 21:04

## 2019-01-01 RX ADMIN — HEPARIN SODIUM 5000 UNITS: 5000 INJECTION INTRAVENOUS; SUBCUTANEOUS at 02:13

## 2019-01-01 RX ADMIN — ARFORMOTEROL TARTRATE 15 MCG: 15 SOLUTION RESPIRATORY (INHALATION) at 07:33

## 2019-01-01 RX ADMIN — DOBUTAMINE IN DEXTROSE 1 MCG/KG/MIN: 200 INJECTION, SOLUTION INTRAVENOUS at 05:45

## 2019-01-01 RX ADMIN — MELATONIN 3 MG: at 21:35

## 2019-01-01 RX ADMIN — BUMETANIDE 2 MG: 0.25 INJECTION INTRAMUSCULAR; INTRAVENOUS at 10:55

## 2019-01-01 RX ADMIN — METHYLPREDNISOLONE SODIUM SUCCINATE 80 MG: 125 INJECTION, POWDER, FOR SOLUTION INTRAMUSCULAR; INTRAVENOUS at 13:48

## 2019-01-01 RX ADMIN — LORAZEPAM 2 MG: 2 INJECTION INTRAMUSCULAR; INTRAVENOUS at 09:41

## 2019-01-01 RX ADMIN — DOBUTAMINE IN DEXTROSE 1 MCG/KG/MIN: 200 INJECTION, SOLUTION INTRAVENOUS at 18:24

## 2019-01-01 RX ADMIN — PIPERACILLIN AND TAZOBACTAM 3.38 G: 3; .375 INJECTION, POWDER, FOR SOLUTION INTRAVENOUS at 17:49

## 2019-01-01 RX ADMIN — STANDARDIZED SENNA CONCENTRATE AND DOCUSATE SODIUM 1 TABLET: 8.6; 5 TABLET ORAL at 09:53

## 2019-01-01 RX ADMIN — SODIUM CHLORIDE, SODIUM LACTATE, POTASSIUM CHLORIDE, AND CALCIUM CHLORIDE 25 ML/HR: 600; 310; 30; 20 INJECTION, SOLUTION INTRAVENOUS at 09:30

## 2019-01-01 RX ADMIN — ASPIRIN 81 MG 81 MG: 81 TABLET ORAL at 11:38

## 2019-01-01 RX ADMIN — NYSTATIN 500000 UNITS: 100000 SUSPENSION ORAL at 22:57

## 2019-01-01 RX ADMIN — ACETYLCYSTEINE 200 MG: 200 SOLUTION ORAL; RESPIRATORY (INHALATION) at 19:56

## 2019-01-01 RX ADMIN — PIPERACILLIN AND TAZOBACTAM 3.38 G: 3; .375 INJECTION, POWDER, FOR SOLUTION INTRAVENOUS at 17:42

## 2019-01-01 RX ADMIN — MAGNESIUM SULFATE HEPTAHYDRATE 1 G: 1 INJECTION, SOLUTION INTRAVENOUS at 05:32

## 2019-01-01 RX ADMIN — PIPERACILLIN AND TAZOBACTAM 3.38 G: 3; .375 INJECTION, POWDER, FOR SOLUTION INTRAVENOUS at 09:56

## 2019-01-01 RX ADMIN — MAGNESIUM OXIDE TAB 400 MG (241.3 MG ELEMENTAL MG) 400 MG: 400 (241.3 MG) TAB at 18:22

## 2019-01-01 RX ADMIN — DEXMEDETOMIDINE HYDROCHLORIDE 1 MCG/KG/HR: 100 INJECTION, SOLUTION, CONCENTRATE INTRAVENOUS at 00:00

## 2019-01-01 RX ADMIN — CHLORHEXIDINE GLUCONATE 10 ML: 1.2 RINSE ORAL at 08:01

## 2019-01-01 RX ADMIN — NYSTATIN 500000 UNITS: 100000 SUSPENSION ORAL at 17:43

## 2019-01-01 RX ADMIN — INSULIN LISPRO 3 UNITS: 100 INJECTION, SOLUTION INTRAVENOUS; SUBCUTANEOUS at 18:40

## 2019-01-01 RX ADMIN — MIDAZOLAM HYDROCHLORIDE 8 MG/HR: 5 INJECTION, SOLUTION INTRAMUSCULAR; INTRAVENOUS at 20:46

## 2019-01-01 RX ADMIN — SODIUM CHLORIDE: 900 INJECTION, SOLUTION INTRAVENOUS at 20:00

## 2019-01-01 RX ADMIN — ALBUMIN (HUMAN) 12.5 G: 12.5 INJECTION, SOLUTION INTRAVENOUS at 09:59

## 2019-01-01 RX ADMIN — SERTRALINE HYDROCHLORIDE 100 MG: 50 TABLET ORAL at 08:49

## 2019-01-01 RX ADMIN — MORPHINE SULFATE 2 MG: 2 INJECTION, SOLUTION INTRAMUSCULAR; INTRAVENOUS at 00:46

## 2019-01-01 RX ADMIN — STANDARDIZED SENNA CONCENTRATE AND DOCUSATE SODIUM 1 TABLET: 8.6; 5 TABLET ORAL at 08:54

## 2019-01-01 RX ADMIN — Medication 10 ML: at 14:10

## 2019-01-01 RX ADMIN — CHLORHEXIDINE GLUCONATE 10 ML: 1.2 RINSE ORAL at 08:38

## 2019-01-01 RX ADMIN — MORPHINE SULFATE 2 MG: 2 INJECTION, SOLUTION INTRAMUSCULAR; INTRAVENOUS at 16:48

## 2019-01-01 RX ADMIN — CHLORHEXIDINE GLUCONATE 15 ML: 1.2 RINSE ORAL at 09:55

## 2019-01-01 RX ADMIN — HEPARIN SODIUM 5000 UNITS: 5000 INJECTION INTRAVENOUS; SUBCUTANEOUS at 02:04

## 2019-01-01 RX ADMIN — LIDOCAINE HYDROCHLORIDE 100 MG: 20 INJECTION, SOLUTION EPIDURAL; INFILTRATION; INTRACAUDAL; PERINEURAL at 11:16

## 2019-01-01 RX ADMIN — SUFENTANIL CITRATE 0.2 MCG/KG/HR: 50 INJECTION EPIDURAL; INTRAVENOUS at 11:20

## 2019-01-01 RX ADMIN — Medication 10 ML: at 13:13

## 2019-01-01 RX ADMIN — PROTAMINE SULFATE 150 MG: 10 INJECTION, SOLUTION INTRAVENOUS at 14:26

## 2019-01-01 RX ADMIN — POLYETHYLENE GLYCOL 3350 17 G: 17 POWDER, FOR SOLUTION ORAL at 09:26

## 2019-01-01 RX ADMIN — ASPIRIN 81 MG 81 MG: 81 TABLET ORAL at 13:02

## 2019-01-01 RX ADMIN — SODIUM CHLORIDE 12.1 UNITS/HR: 9 INJECTION, SOLUTION INTRAVENOUS at 01:01

## 2019-01-01 RX ADMIN — BUMETANIDE 2 MG: 0.25 INJECTION INTRAMUSCULAR; INTRAVENOUS at 05:53

## 2019-01-01 RX ADMIN — MAGNESIUM OXIDE TAB 400 MG (241.3 MG ELEMENTAL MG) 400 MG: 400 (241.3 MG) TAB at 08:30

## 2019-01-01 RX ADMIN — PHENYLEPHRINE HYDROCHLORIDE 30 MCG/MIN: 10 INJECTION INTRAVENOUS at 17:33

## 2019-01-01 RX ADMIN — ONDANSETRON 4 MG: 2 INJECTION INTRAMUSCULAR; INTRAVENOUS at 02:03

## 2019-01-01 RX ADMIN — ALBUMIN (HUMAN) 12.5 G: 12.5 INJECTION, SOLUTION INTRAVENOUS at 22:11

## 2019-01-01 RX ADMIN — DEXMEDETOMIDINE HYDROCHLORIDE 0.6 MCG/KG/HR: 100 INJECTION, SOLUTION, CONCENTRATE INTRAVENOUS at 12:59

## 2019-01-01 RX ADMIN — MAGNESIUM OXIDE TAB 400 MG (241.3 MG ELEMENTAL MG) 400 MG: 400 (241.3 MG) TAB at 08:57

## 2019-01-01 RX ADMIN — ALBUTEROL SULFATE 2.5 MG: 2.5 SOLUTION RESPIRATORY (INHALATION) at 12:12

## 2019-01-01 RX ADMIN — DOBUTAMINE IN DEXTROSE 1 MCG/KG/MIN: 200 INJECTION, SOLUTION INTRAVENOUS at 08:08

## 2019-01-01 RX ADMIN — INSULIN LISPRO 5 UNITS: 100 INJECTION, SOLUTION INTRAVENOUS; SUBCUTANEOUS at 00:23

## 2019-01-01 RX ADMIN — BUDESONIDE 500 MCG: 0.5 INHALANT RESPIRATORY (INHALATION) at 09:03

## 2019-01-01 RX ADMIN — PHENYLEPHRINE HYDROCHLORIDE 40 MCG/MIN: 10 INJECTION INTRAVENOUS at 04:00

## 2019-01-01 RX ADMIN — SODIUM CHLORIDE 40 MG: 9 INJECTION INTRAMUSCULAR; INTRAVENOUS; SUBCUTANEOUS at 09:36

## 2019-01-01 RX ADMIN — ACETAMINOPHEN 650 MG: 325 TABLET, FILM COATED ORAL at 13:00

## 2019-01-01 RX ADMIN — SODIUM CHLORIDE 40 MG: 9 INJECTION INTRAMUSCULAR; INTRAVENOUS; SUBCUTANEOUS at 08:27

## 2019-01-01 RX ADMIN — SODIUM CHLORIDE 9 ML/HR: 900 INJECTION, SOLUTION INTRAVENOUS at 19:16

## 2019-01-01 RX ADMIN — Medication 200 MCG/HR: at 03:05

## 2019-01-01 RX ADMIN — BUDESONIDE 500 MCG: 0.5 INHALANT RESPIRATORY (INHALATION) at 07:29

## 2019-01-01 RX ADMIN — SENNOSIDES, DOCUSATE SODIUM 1 TABLET: 50; 8.6 TABLET, FILM COATED ORAL at 18:08

## 2019-01-01 RX ADMIN — METHYLPREDNISOLONE SODIUM SUCCINATE 80 MG: 125 INJECTION, POWDER, FOR SOLUTION INTRAMUSCULAR; INTRAVENOUS at 13:12

## 2019-01-01 RX ADMIN — NYSTATIN 500000 UNITS: 100000 SUSPENSION ORAL at 21:00

## 2019-01-01 RX ADMIN — INSULIN GLARGINE 25 UNITS: 100 INJECTION, SOLUTION SUBCUTANEOUS at 08:19

## 2019-01-01 RX ADMIN — SODIUM CHLORIDE 10 ML/HR: 900 INJECTION, SOLUTION INTRAVENOUS at 05:58

## 2019-01-01 RX ADMIN — INSULIN LISPRO 3 UNITS: 100 INJECTION, SOLUTION INTRAVENOUS; SUBCUTANEOUS at 01:21

## 2019-01-01 RX ADMIN — HEPARIN SODIUM 5000 UNITS: 5000 INJECTION INTRAVENOUS; SUBCUTANEOUS at 04:30

## 2019-01-01 RX ADMIN — DEXMEDETOMIDINE HYDROCHLORIDE 0.9 MCG/KG/HR: 100 INJECTION, SOLUTION, CONCENTRATE INTRAVENOUS at 22:36

## 2019-01-01 RX ADMIN — CHLORHEXIDINE GLUCONATE 15 ML: 1.2 RINSE ORAL at 08:56

## 2019-01-01 RX ADMIN — ACETYLCYSTEINE 200 MG: 200 SOLUTION ORAL; RESPIRATORY (INHALATION) at 08:05

## 2019-01-01 RX ADMIN — PANTOPRAZOLE SODIUM 40 MG: 40 TABLET, DELAYED RELEASE ORAL at 09:26

## 2019-01-01 RX ADMIN — POTASSIUM BICARBONATE 20 MEQ: 782 TABLET, EFFERVESCENT ORAL at 08:28

## 2019-01-01 RX ADMIN — METHYLPREDNISOLONE SODIUM SUCCINATE 40 MG: 40 INJECTION, POWDER, FOR SOLUTION INTRAMUSCULAR; INTRAVENOUS at 05:57

## 2019-01-01 RX ADMIN — SODIUM CHLORIDE 40 MG: 9 INJECTION INTRAMUSCULAR; INTRAVENOUS; SUBCUTANEOUS at 08:36

## 2019-01-01 RX ADMIN — BUDESONIDE 500 MCG: 0.5 INHALANT RESPIRATORY (INHALATION) at 19:09

## 2019-01-01 RX ADMIN — Medication 100 MCG/HR: at 22:32

## 2019-01-01 RX ADMIN — MORPHINE SULFATE 2 MG: 2 INJECTION, SOLUTION INTRAMUSCULAR; INTRAVENOUS at 10:29

## 2019-01-01 RX ADMIN — MAGNESIUM OXIDE TAB 400 MG (241.3 MG ELEMENTAL MG) 400 MG: 400 (241.3 MG) TAB at 18:34

## 2019-01-01 RX ADMIN — CHLORHEXIDINE GLUCONATE 10 ML: 1.2 RINSE ORAL at 08:23

## 2019-01-01 RX ADMIN — WATER 500 MG: 1 INJECTION INTRAMUSCULAR; INTRAVENOUS; SUBCUTANEOUS at 17:50

## 2019-01-01 RX ADMIN — SODIUM CHLORIDE 40 MG: 9 INJECTION INTRAMUSCULAR; INTRAVENOUS; SUBCUTANEOUS at 21:38

## 2019-01-01 RX ADMIN — LORAZEPAM 1 MG: 2 INJECTION INTRAMUSCULAR; INTRAVENOUS at 03:53

## 2019-01-01 RX ADMIN — DEXMEDETOMIDINE HYDROCHLORIDE 0.9 MCG/KG/HR: 100 INJECTION, SOLUTION, CONCENTRATE INTRAVENOUS at 10:45

## 2019-01-01 RX ADMIN — PHENYLEPHRINE HYDROCHLORIDE 80 MCG: 10 INJECTION INTRAVENOUS at 21:29

## 2019-01-01 RX ADMIN — BUMETANIDE 2 MG: 0.25 INJECTION INTRAMUSCULAR; INTRAVENOUS at 18:34

## 2019-01-01 RX ADMIN — HEPARIN SODIUM 5000 UNITS: 5000 INJECTION INTRAVENOUS; SUBCUTANEOUS at 04:57

## 2019-01-01 RX ADMIN — POTASSIUM CHLORIDE 20 MEQ: 400 INJECTION, SOLUTION INTRAVENOUS at 09:45

## 2019-01-01 RX ADMIN — HYDROMORPHONE HYDROCHLORIDE 0.5 MG: 1 INJECTION, SOLUTION INTRAMUSCULAR; INTRAVENOUS; SUBCUTANEOUS at 12:29

## 2019-01-01 RX ADMIN — METHYLPREDNISOLONE SODIUM SUCCINATE 80 MG: 125 INJECTION, POWDER, FOR SOLUTION INTRAMUSCULAR; INTRAVENOUS at 21:55

## 2019-01-01 RX ADMIN — Medication 10 ML: at 21:20

## 2019-01-01 RX ADMIN — ARFORMOTEROL TARTRATE 15 MCG: 15 SOLUTION RESPIRATORY (INHALATION) at 20:21

## 2019-01-01 RX ADMIN — ASPIRIN 81 MG 81 MG: 81 TABLET ORAL at 08:48

## 2019-01-01 RX ADMIN — INSULIN LISPRO 2 UNITS: 100 INJECTION, SOLUTION INTRAVENOUS; SUBCUTANEOUS at 06:48

## 2019-01-01 RX ADMIN — DEXMEDETOMIDINE HYDROCHLORIDE 0.9 MCG/KG/HR: 100 INJECTION, SOLUTION, CONCENTRATE INTRAVENOUS at 00:42

## 2019-01-01 RX ADMIN — MAGNESIUM SULFATE HEPTAHYDRATE 1 G: 1 INJECTION, SOLUTION INTRAVENOUS at 05:00

## 2019-01-01 RX ADMIN — ALBUMIN (HUMAN) 25 G: 0.25 INJECTION, SOLUTION INTRAVENOUS at 06:59

## 2019-01-01 RX ADMIN — DEXMEDETOMIDINE HYDROCHLORIDE 0.9 MCG/KG/HR: 100 INJECTION, SOLUTION, CONCENTRATE INTRAVENOUS at 21:45

## 2019-01-01 RX ADMIN — MAGNESIUM OXIDE TAB 400 MG (241.3 MG ELEMENTAL MG) 400 MG: 400 (241.3 MG) TAB at 18:28

## 2019-01-01 RX ADMIN — MORPHINE SULFATE 2 MG: 2 INJECTION, SOLUTION INTRAMUSCULAR; INTRAVENOUS at 01:33

## 2019-01-01 RX ADMIN — ARFORMOTEROL TARTRATE 15 MCG: 15 SOLUTION RESPIRATORY (INHALATION) at 20:10

## 2019-01-01 RX ADMIN — SODIUM CHLORIDE 40 MG: 9 INJECTION INTRAMUSCULAR; INTRAVENOUS; SUBCUTANEOUS at 08:52

## 2019-01-01 RX ADMIN — AMIODARONE HYDROCHLORIDE 400 MG: 200 TABLET ORAL at 20:57

## 2019-01-01 RX ADMIN — PROPOFOL 80 MG: 10 INJECTION, EMULSION INTRAVENOUS at 11:16

## 2019-01-01 RX ADMIN — NYSTATIN 500000 UNITS: 100000 SUSPENSION ORAL at 13:37

## 2019-01-01 RX ADMIN — DEXMEDETOMIDINE HYDROCHLORIDE 0.9 MCG/KG/HR: 100 INJECTION, SOLUTION, CONCENTRATE INTRAVENOUS at 06:30

## 2019-01-01 RX ADMIN — BUMETANIDE 1 MG: 0.25 INJECTION INTRAMUSCULAR; INTRAVENOUS at 20:35

## 2019-01-01 RX ADMIN — DEXMEDETOMIDINE HYDROCHLORIDE 0.9 MCG/KG/HR: 100 INJECTION, SOLUTION, CONCENTRATE INTRAVENOUS at 18:31

## 2019-01-01 RX ADMIN — CHLORHEXIDINE GLUCONATE 10 ML: 1.2 RINSE ORAL at 10:23

## 2019-01-01 RX ADMIN — MORPHINE SULFATE 2 MG: 2 INJECTION, SOLUTION INTRAMUSCULAR; INTRAVENOUS at 17:17

## 2019-01-01 RX ADMIN — PROPOFOL 10 MCG/KG/MIN: 10 INJECTION, EMULSION INTRAVENOUS at 16:37

## 2019-01-01 RX ADMIN — SODIUM CHLORIDE 40 MG: 9 INJECTION INTRAMUSCULAR; INTRAVENOUS; SUBCUTANEOUS at 09:12

## 2019-01-01 RX ADMIN — Medication 10 ML: at 14:49

## 2019-01-01 RX ADMIN — HYDROMORPHONE HYDROCHLORIDE 0.5 MG: 1 INJECTION, SOLUTION INTRAMUSCULAR; INTRAVENOUS; SUBCUTANEOUS at 22:11

## 2019-01-01 RX ADMIN — BACITRACIN 1 PACKET: 500 OINTMENT TOPICAL at 04:45

## 2019-01-01 RX ADMIN — CASTOR OIL AND BALSAM, PERU: 788; 87 OINTMENT TOPICAL at 17:53

## 2019-01-01 RX ADMIN — INSULIN LISPRO 3 UNITS: 100 INJECTION, SOLUTION INTRAVENOUS; SUBCUTANEOUS at 12:10

## 2019-01-01 RX ADMIN — SODIUM CHLORIDE 40 MG: 9 INJECTION INTRAMUSCULAR; INTRAVENOUS; SUBCUTANEOUS at 21:26

## 2019-01-01 RX ADMIN — ASPIRIN 81 MG 81 MG: 81 TABLET ORAL at 09:53

## 2019-01-01 RX ADMIN — MELATONIN 3 MG: at 21:22

## 2019-01-01 RX ADMIN — DEXMEDETOMIDINE HYDROCHLORIDE 0.4 MCG/KG/HR: 100 INJECTION, SOLUTION, CONCENTRATE INTRAVENOUS at 19:05

## 2019-01-01 RX ADMIN — MORPHINE SULFATE 2 MG: 2 INJECTION, SOLUTION INTRAMUSCULAR; INTRAVENOUS at 01:09

## 2019-01-01 RX ADMIN — Medication 30 ML: at 08:40

## 2019-01-01 RX ADMIN — LORAZEPAM 2 MG: 2 INJECTION INTRAMUSCULAR; INTRAVENOUS at 20:36

## 2019-01-01 RX ADMIN — PHENYLEPHRINE HYDROCHLORIDE 10 MCG/MIN: 10 INJECTION INTRAVENOUS at 06:33

## 2019-01-01 RX ADMIN — Medication 10 ML: at 14:04

## 2019-01-01 RX ADMIN — SERTRALINE HYDROCHLORIDE 100 MG: 50 TABLET ORAL at 08:41

## 2019-01-01 RX ADMIN — PROPOFOL 20 MCG/KG/MIN: 10 INJECTION, EMULSION INTRAVENOUS at 17:37

## 2019-01-01 RX ADMIN — BUDESONIDE 500 MCG: 0.5 INHALANT RESPIRATORY (INHALATION) at 19:00

## 2019-01-01 RX ADMIN — ACETYLCYSTEINE 200 MG: 200 SOLUTION ORAL; RESPIRATORY (INHALATION) at 07:41

## 2019-01-01 RX ADMIN — METHYLPREDNISOLONE SODIUM SUCCINATE 80 MG: 125 INJECTION, POWDER, FOR SOLUTION INTRAMUSCULAR; INTRAVENOUS at 00:15

## 2019-01-01 RX ADMIN — ARFORMOTEROL TARTRATE 15 MCG: 15 SOLUTION RESPIRATORY (INHALATION) at 19:56

## 2019-01-01 RX ADMIN — ARFORMOTEROL TARTRATE 15 MCG: 15 SOLUTION RESPIRATORY (INHALATION) at 19:22

## 2019-01-01 RX ADMIN — CARVEDILOL 12.5 MG: 12.5 TABLET, FILM COATED ORAL at 21:24

## 2019-01-01 RX ADMIN — METHYLPREDNISOLONE SODIUM SUCCINATE 80 MG: 125 INJECTION, POWDER, FOR SOLUTION INTRAMUSCULAR; INTRAVENOUS at 06:00

## 2019-01-01 RX ADMIN — INSULIN GLARGINE 40 UNITS: 100 INJECTION, SOLUTION SUBCUTANEOUS at 17:25

## 2019-01-01 RX ADMIN — SENNOSIDES, DOCUSATE SODIUM 1 TABLET: 50; 8.6 TABLET, FILM COATED ORAL at 08:59

## 2019-01-01 RX ADMIN — METHYLPREDNISOLONE SODIUM SUCCINATE 40 MG: 40 INJECTION, POWDER, FOR SOLUTION INTRAMUSCULAR; INTRAVENOUS at 14:24

## 2019-01-01 RX ADMIN — DEXMEDETOMIDINE HYDROCHLORIDE 0.9 MCG/KG/HR: 100 INJECTION, SOLUTION, CONCENTRATE INTRAVENOUS at 09:30

## 2019-01-01 RX ADMIN — MINERAL SUPPLEMENT IRON 300 MG / 5 ML STRENGTH LIQUID 100 PER BOX UNFLAVORED 300 MG: at 09:53

## 2019-01-01 RX ADMIN — CHLORHEXIDINE GLUCONATE 15 ML: 1.2 RINSE ORAL at 09:33

## 2019-01-01 RX ADMIN — HYDROMORPHONE HYDROCHLORIDE 1 MG: 1 INJECTION, SOLUTION INTRAMUSCULAR; INTRAVENOUS; SUBCUTANEOUS at 02:56

## 2019-01-01 RX ADMIN — BUDESONIDE 500 MCG: 0.5 INHALANT RESPIRATORY (INHALATION) at 19:56

## 2019-01-01 RX ADMIN — AMIODARONE HYDROCHLORIDE 400 MG: 200 TABLET ORAL at 21:22

## 2019-01-01 RX ADMIN — SODIUM CHLORIDE 40 MG: 9 INJECTION INTRAMUSCULAR; INTRAVENOUS; SUBCUTANEOUS at 21:00

## 2019-01-01 RX ADMIN — MORPHINE SULFATE 2 MG: 2 INJECTION, SOLUTION INTRAMUSCULAR; INTRAVENOUS at 18:52

## 2019-01-01 RX ADMIN — NYSTATIN 500000 UNITS: 100000 SUSPENSION ORAL at 08:19

## 2019-01-01 RX ADMIN — NYSTATIN 500000 UNITS: 100000 SUSPENSION ORAL at 13:53

## 2019-01-01 RX ADMIN — ACETYLCYSTEINE 200 MG: 200 SOLUTION ORAL; RESPIRATORY (INHALATION) at 20:35

## 2019-01-01 RX ADMIN — DEXMEDETOMIDINE HYDROCHLORIDE 0.9 MCG/KG/HR: 100 INJECTION, SOLUTION, CONCENTRATE INTRAVENOUS at 13:50

## 2019-01-01 RX ADMIN — MORPHINE SULFATE 2 MG: 2 INJECTION, SOLUTION INTRAMUSCULAR; INTRAVENOUS at 21:06

## 2019-01-01 RX ADMIN — BUMETANIDE 2 MG: 0.25 INJECTION INTRAMUSCULAR; INTRAVENOUS at 21:10

## 2019-01-01 RX ADMIN — NYSTATIN 500000 UNITS: 100000 SUSPENSION ORAL at 21:20

## 2019-01-01 RX ADMIN — BUDESONIDE 500 MCG: 0.5 INHALANT RESPIRATORY (INHALATION) at 21:09

## 2019-01-01 RX ADMIN — LORAZEPAM 2 MG: 2 INJECTION INTRAMUSCULAR; INTRAVENOUS at 02:23

## 2019-01-01 RX ADMIN — BACITRACIN 1 PACKET: 500 OINTMENT TOPICAL at 06:17

## 2019-01-01 RX ADMIN — Medication 20 ML: at 14:23

## 2019-01-01 RX ADMIN — CHLORHEXIDINE GLUCONATE 10 ML: 1.2 RINSE ORAL at 09:40

## 2019-01-01 RX ADMIN — MIDAZOLAM 1 MG: 1 INJECTION INTRAMUSCULAR; INTRAVENOUS at 11:49

## 2019-01-01 RX ADMIN — POTASSIUM CHLORIDE 20 MEQ: 750 TABLET, FILM COATED, EXTENDED RELEASE ORAL at 18:05

## 2019-01-01 RX ADMIN — CEFAZOLIN 2 G: 330 INJECTION, POWDER, FOR SOLUTION INTRAMUSCULAR; INTRAVENOUS at 14:45

## 2019-01-01 RX ADMIN — FERROUS SULFATE TAB 325 MG (65 MG ELEMENTAL FE) 325 MG: 325 (65 FE) TAB at 09:27

## 2019-01-01 RX ADMIN — CHLORHEXIDINE GLUCONATE 10 ML: 1.2 RINSE ORAL at 21:52

## 2019-01-01 RX ADMIN — ARFORMOTEROL TARTRATE 15 MCG: 15 SOLUTION RESPIRATORY (INHALATION) at 08:13

## 2019-01-01 RX ADMIN — ARFORMOTEROL TARTRATE 15 MCG: 15 SOLUTION RESPIRATORY (INHALATION) at 07:41

## 2019-01-01 RX ADMIN — ROCURONIUM BROMIDE 20 MG: 10 INJECTION, SOLUTION INTRAVENOUS at 20:18

## 2019-01-01 RX ADMIN — Medication 10 ML: at 06:39

## 2019-01-01 RX ADMIN — PROPOFOL 35 MCG/KG/MIN: 10 INJECTION, EMULSION INTRAVENOUS at 17:38

## 2019-01-01 RX ADMIN — GUAIFENESIN 400 MG: 100 SOLUTION ORAL at 13:33

## 2019-01-01 RX ADMIN — MIDAZOLAM HYDROCHLORIDE 6 MG/HR: 5 INJECTION, SOLUTION INTRAMUSCULAR; INTRAVENOUS at 01:30

## 2019-01-01 RX ADMIN — INSULIN LISPRO 7 UNITS: 100 INJECTION, SOLUTION INTRAVENOUS; SUBCUTANEOUS at 07:04

## 2019-01-01 RX ADMIN — METHYLPREDNISOLONE SODIUM SUCCINATE 80 MG: 125 INJECTION, POWDER, FOR SOLUTION INTRAMUSCULAR; INTRAVENOUS at 00:00

## 2019-01-01 RX ADMIN — BUDESONIDE 500 MCG: 0.5 INHALANT RESPIRATORY (INHALATION) at 07:44

## 2019-01-01 RX ADMIN — GUAIFENESIN 1200 MG: 600 TABLET, EXTENDED RELEASE ORAL at 08:48

## 2019-01-01 RX ADMIN — Medication 10 ML: at 06:19

## 2019-01-01 RX ADMIN — DEXMEDETOMIDINE HYDROCHLORIDE 0.9 MCG/KG/HR: 100 INJECTION, SOLUTION, CONCENTRATE INTRAVENOUS at 22:02

## 2019-01-01 RX ADMIN — LORAZEPAM 2 MG: 2 INJECTION INTRAMUSCULAR; INTRAVENOUS at 21:06

## 2019-01-01 RX ADMIN — NYSTATIN 500000 UNITS: 100000 SUSPENSION ORAL at 13:06

## 2019-01-01 RX ADMIN — MAGNESIUM OXIDE TAB 400 MG (241.3 MG ELEMENTAL MG) 400 MG: 400 (241.3 MG) TAB at 08:48

## 2019-01-01 RX ADMIN — ACETAMINOPHEN 650 MG: 325 TABLET, FILM COATED ORAL at 04:09

## 2019-01-01 RX ADMIN — Medication 100 MCG/HR: at 14:40

## 2019-01-01 RX ADMIN — CHLORHEXIDINE GLUCONATE 10 ML: 1.2 RINSE ORAL at 09:14

## 2019-01-01 RX ADMIN — MORPHINE SULFATE 2 MG: 2 INJECTION, SOLUTION INTRAMUSCULAR; INTRAVENOUS at 23:22

## 2019-01-01 RX ADMIN — INSULIN LISPRO 2 UNITS: 100 INJECTION, SOLUTION INTRAVENOUS; SUBCUTANEOUS at 18:33

## 2019-01-01 RX ADMIN — PIPERACILLIN AND TAZOBACTAM 3.38 G: 3; .375 INJECTION, POWDER, FOR SOLUTION INTRAVENOUS at 01:42

## 2019-01-01 RX ADMIN — Medication 125 MCG/HR: at 19:36

## 2019-01-01 RX ADMIN — GUAIFENESIN 1200 MG: 600 TABLET, EXTENDED RELEASE ORAL at 20:57

## 2019-01-01 RX ADMIN — Medication 30 ML: at 08:46

## 2019-01-01 RX ADMIN — GUAIFENESIN 400 MG: 100 SOLUTION ORAL at 06:00

## 2019-01-01 RX ADMIN — DEXTROSE MONOHYDRATE 45 ML: 10 INJECTION, SOLUTION INTRAVENOUS at 22:47

## 2019-01-01 RX ADMIN — LORAZEPAM 2 MG: 2 INJECTION INTRAMUSCULAR; INTRAVENOUS at 22:02

## 2019-01-01 RX ADMIN — Medication 10 ML: at 21:52

## 2019-01-01 RX ADMIN — CHLOROTHIAZIDE SODIUM 250 MG: 500 INJECTION, POWDER, LYOPHILIZED, FOR SOLUTION INTRAVENOUS at 08:16

## 2019-01-01 RX ADMIN — INSULIN LISPRO 3 UNITS: 100 INJECTION, SOLUTION INTRAVENOUS; SUBCUTANEOUS at 13:29

## 2019-01-01 RX ADMIN — ASPIRIN 81 MG 81 MG: 81 TABLET ORAL at 08:59

## 2019-01-01 RX ADMIN — Medication 10 ML: at 05:46

## 2019-01-01 RX ADMIN — ACETYLCYSTEINE 200 MG: 200 SOLUTION ORAL; RESPIRATORY (INHALATION) at 08:40

## 2019-01-01 RX ADMIN — POTASSIUM CHLORIDE 20 MEQ: 29.8 INJECTION, SOLUTION INTRAVENOUS at 06:59

## 2019-01-01 RX ADMIN — BUDESONIDE 500 MCG: 0.5 INHALANT RESPIRATORY (INHALATION) at 10:10

## 2019-01-01 RX ADMIN — NYSTATIN 500000 UNITS: 100000 SUSPENSION ORAL at 08:22

## 2019-01-01 RX ADMIN — AMIODARONE HYDROCHLORIDE 0.5 MG/MIN: 50 INJECTION, SOLUTION INTRAVENOUS at 11:49

## 2019-01-01 RX ADMIN — METHYLPREDNISOLONE SODIUM SUCCINATE 80 MG: 125 INJECTION, POWDER, FOR SOLUTION INTRAMUSCULAR; INTRAVENOUS at 15:37

## 2019-01-01 RX ADMIN — STANDARDIZED SENNA CONCENTRATE AND DOCUSATE SODIUM 1 TABLET: 8.6; 5 TABLET ORAL at 08:36

## 2019-01-01 RX ADMIN — HEPARIN SODIUM 5000 UNITS: 5000 INJECTION INTRAVENOUS; SUBCUTANEOUS at 11:49

## 2019-01-01 RX ADMIN — INSULIN LISPRO 2 UNITS: 100 INJECTION, SOLUTION INTRAVENOUS; SUBCUTANEOUS at 05:38

## 2019-01-01 RX ADMIN — BUDESONIDE 500 MCG: 0.5 INHALANT RESPIRATORY (INHALATION) at 07:34

## 2019-01-01 RX ADMIN — MIDAZOLAM 1 MG: 1 INJECTION INTRAMUSCULAR; INTRAVENOUS at 04:10

## 2019-01-01 RX ADMIN — Medication 10 ML: at 05:36

## 2019-01-01 RX ADMIN — SERTRALINE HYDROCHLORIDE 100 MG: 50 TABLET ORAL at 08:30

## 2019-01-01 RX ADMIN — BUDESONIDE 500 MCG: 0.5 INHALANT RESPIRATORY (INHALATION) at 19:14

## 2019-01-01 RX ADMIN — HEPARIN SODIUM 24000 UNITS: 1000 INJECTION, SOLUTION INTRAVENOUS; SUBCUTANEOUS at 12:42

## 2019-01-01 RX ADMIN — PROPOFOL 20 MCG/KG/MIN: 10 INJECTION, EMULSION INTRAVENOUS at 19:24

## 2019-01-01 RX ADMIN — Medication 10 ML: at 07:13

## 2019-01-01 RX ADMIN — MIDAZOLAM 2 MG: 1 INJECTION INTRAMUSCULAR; INTRAVENOUS at 19:30

## 2019-01-01 RX ADMIN — BUMETANIDE 1 MG: 0.25 INJECTION INTRAMUSCULAR; INTRAVENOUS at 21:39

## 2019-01-01 RX ADMIN — ARFORMOTEROL TARTRATE 15 MCG: 15 SOLUTION RESPIRATORY (INHALATION) at 08:14

## 2019-01-01 RX ADMIN — CHLORHEXIDINE GLUCONATE 10 ML: 1.2 RINSE ORAL at 21:02

## 2019-01-01 RX ADMIN — NYSTATIN 500000 UNITS: 100000 SUSPENSION ORAL at 17:37

## 2019-01-01 RX ADMIN — POTASSIUM CHLORIDE 20 MEQ: 400 INJECTION, SOLUTION INTRAVENOUS at 05:32

## 2019-01-01 RX ADMIN — INSULIN LISPRO 10 UNITS: 100 INJECTION, SOLUTION INTRAVENOUS; SUBCUTANEOUS at 12:23

## 2019-01-01 RX ADMIN — METHYLPREDNISOLONE SODIUM SUCCINATE 80 MG: 125 INJECTION, POWDER, FOR SOLUTION INTRAMUSCULAR; INTRAVENOUS at 05:43

## 2019-01-01 RX ADMIN — CASTOR OIL AND BALSAM, PERU: 788; 87 OINTMENT TOPICAL at 20:37

## 2019-01-01 RX ADMIN — METHYLPREDNISOLONE SODIUM SUCCINATE 40 MG: 40 INJECTION, POWDER, FOR SOLUTION INTRAMUSCULAR; INTRAVENOUS at 21:45

## 2019-01-01 RX ADMIN — ASPIRIN 81 MG 81 MG: 81 TABLET ORAL at 09:39

## 2019-01-01 RX ADMIN — INSULIN LISPRO 5 UNITS: 100 INJECTION, SOLUTION INTRAVENOUS; SUBCUTANEOUS at 17:09

## 2019-01-01 RX ADMIN — HEPARIN SODIUM 5000 UNITS: 5000 INJECTION INTRAVENOUS; SUBCUTANEOUS at 03:56

## 2019-01-01 RX ADMIN — BUMETANIDE 1 MG: 0.25 INJECTION INTRAMUSCULAR; INTRAVENOUS at 14:10

## 2019-01-01 RX ADMIN — QUETIAPINE FUMARATE 50 MG: 25 TABLET ORAL at 09:11

## 2019-01-01 RX ADMIN — BUDESONIDE 500 MCG: 0.5 INHALANT RESPIRATORY (INHALATION) at 20:00

## 2019-01-01 RX ADMIN — DEXMEDETOMIDINE HYDROCHLORIDE 0.9 MCG/KG/HR: 100 INJECTION, SOLUTION, CONCENTRATE INTRAVENOUS at 06:34

## 2019-01-01 RX ADMIN — INSULIN LISPRO 3 UNITS: 100 INJECTION, SOLUTION INTRAVENOUS; SUBCUTANEOUS at 13:31

## 2019-01-01 RX ADMIN — SERTRALINE HYDROCHLORIDE 100 MG: 50 TABLET ORAL at 09:12

## 2019-01-01 RX ADMIN — ALBUMIN (HUMAN) 12.5 G: 12.5 INJECTION, SOLUTION INTRAVENOUS at 02:50

## 2019-01-01 RX ADMIN — SODIUM CHLORIDE 10 ML/HR: 900 INJECTION, SOLUTION INTRAVENOUS at 15:26

## 2019-01-01 RX ADMIN — SODIUM CHLORIDE 40 MG: 9 INJECTION INTRAMUSCULAR; INTRAVENOUS; SUBCUTANEOUS at 09:00

## 2019-01-01 RX ADMIN — HYDROMORPHONE HYDROCHLORIDE 0.5 MG: 1 INJECTION, SOLUTION INTRAMUSCULAR; INTRAVENOUS; SUBCUTANEOUS at 02:55

## 2019-01-01 RX ADMIN — DOCUSATE SODIUM 100 MG: 100 CAPSULE, LIQUID FILLED ORAL at 18:44

## 2019-01-01 RX ADMIN — SODIUM CHLORIDE 40 MG: 9 INJECTION INTRAMUSCULAR; INTRAVENOUS; SUBCUTANEOUS at 08:00

## 2019-01-01 RX ADMIN — INSULIN GLARGINE 20 UNITS: 100 INJECTION, SOLUTION SUBCUTANEOUS at 11:30

## 2019-01-01 RX ADMIN — Medication 10 ML: at 14:24

## 2019-01-01 RX ADMIN — PROPOFOL 30 MCG/KG/MIN: 10 INJECTION, EMULSION INTRAVENOUS at 11:35

## 2019-01-01 RX ADMIN — MIDAZOLAM 1 MG: 1 INJECTION INTRAMUSCULAR; INTRAVENOUS at 15:57

## 2019-01-01 RX ADMIN — BUMETANIDE 1.5 MG: 0.25 INJECTION INTRAMUSCULAR; INTRAVENOUS at 21:00

## 2019-01-01 RX ADMIN — MORPHINE SULFATE 2 MG: 2 INJECTION, SOLUTION INTRAMUSCULAR; INTRAVENOUS at 12:22

## 2019-01-01 RX ADMIN — CHLORHEXIDINE GLUCONATE 10 ML: 1.2 RINSE ORAL at 20:44

## 2019-01-01 RX ADMIN — Medication 10 ML: at 06:08

## 2019-01-01 RX ADMIN — DOBUTAMINE IN DEXTROSE 1 MCG/KG/MIN: 200 INJECTION, SOLUTION INTRAVENOUS at 03:48

## 2019-01-01 RX ADMIN — METHYLPREDNISOLONE SODIUM SUCCINATE 20 MG: 40 INJECTION, POWDER, FOR SOLUTION INTRAMUSCULAR; INTRAVENOUS at 05:25

## 2019-01-01 RX ADMIN — Medication 10 ML: at 22:10

## 2019-01-01 RX ADMIN — CASTOR OIL AND BALSAM, PERU: 788; 87 OINTMENT TOPICAL at 08:06

## 2019-01-01 RX ADMIN — Medication 10 ML: at 14:44

## 2019-01-01 RX ADMIN — LORAZEPAM 2 MG: 2 INJECTION INTRAMUSCULAR; INTRAVENOUS at 11:27

## 2019-01-01 RX ADMIN — Medication 200 MCG/HR: at 02:25

## 2019-01-01 RX ADMIN — CHLORHEXIDINE GLUCONATE 10 ML: 1.2 RINSE ORAL at 09:55

## 2019-01-01 RX ADMIN — CASTOR OIL AND BALSAM, PERU: 788; 87 OINTMENT TOPICAL at 17:27

## 2019-01-01 RX ADMIN — Medication 30 ML: at 11:07

## 2019-01-01 RX ADMIN — STANDARDIZED SENNA CONCENTRATE AND DOCUSATE SODIUM 1 TABLET: 8.6; 5 TABLET ORAL at 18:35

## 2019-01-01 RX ADMIN — Medication 30 ML: at 09:36

## 2019-01-01 RX ADMIN — WATER 500 MG: 1 INJECTION INTRAMUSCULAR; INTRAVENOUS; SUBCUTANEOUS at 18:52

## 2019-01-01 RX ADMIN — BUDESONIDE 500 MCG: 0.5 INHALANT RESPIRATORY (INHALATION) at 08:40

## 2019-01-01 RX ADMIN — ARFORMOTEROL TARTRATE 15 MCG: 15 SOLUTION RESPIRATORY (INHALATION) at 07:50

## 2019-01-01 RX ADMIN — ACETAMINOPHEN 650 MG: 325 TABLET, FILM COATED ORAL at 06:07

## 2019-01-01 RX ADMIN — ASPIRIN 81 MG 81 MG: 81 TABLET ORAL at 08:19

## 2019-01-01 RX ADMIN — INSULIN GLARGINE 35 UNITS: 100 INJECTION, SOLUTION SUBCUTANEOUS at 20:43

## 2019-01-01 RX ADMIN — Medication 100 MCG/HR: at 06:40

## 2019-01-01 RX ADMIN — MINERAL SUPPLEMENT IRON 300 MG / 5 ML STRENGTH LIQUID 100 PER BOX UNFLAVORED 300 MG: at 09:12

## 2019-01-01 RX ADMIN — Medication 10 ML: at 03:50

## 2019-01-01 RX ADMIN — NYSTATIN 500000 UNITS: 100000 SUSPENSION ORAL at 09:13

## 2019-01-01 RX ADMIN — HEPARIN SODIUM 5000 UNITS: 5000 INJECTION INTRAVENOUS; SUBCUTANEOUS at 12:38

## 2019-01-01 RX ADMIN — POTASSIUM BICARBONATE 20 MEQ: 782 TABLET, EFFERVESCENT ORAL at 11:09

## 2019-01-01 RX ADMIN — Medication 10 ML: at 06:00

## 2019-01-01 RX ADMIN — GUAIFENESIN 400 MG: 100 SOLUTION ORAL at 06:04

## 2019-01-01 RX ADMIN — GUAIFENESIN 1200 MG: 600 TABLET, EXTENDED RELEASE ORAL at 20:44

## 2019-01-01 RX ADMIN — Medication 2 G: at 07:25

## 2019-01-01 RX ADMIN — GUAIFENESIN 1200 MG: 600 TABLET, EXTENDED RELEASE ORAL at 08:54

## 2019-01-01 RX ADMIN — SODIUM CHLORIDE 10 ML/HR: 900 INJECTION, SOLUTION INTRAVENOUS at 17:10

## 2019-01-01 RX ADMIN — CHLORHEXIDINE GLUCONATE 10 ML: 1.2 RINSE ORAL at 04:46

## 2019-01-01 RX ADMIN — METHYLPREDNISOLONE SODIUM SUCCINATE 80 MG: 125 INJECTION, POWDER, FOR SOLUTION INTRAMUSCULAR; INTRAVENOUS at 23:48

## 2019-01-01 RX ADMIN — CASTOR OIL AND BALSAM, PERU: 788; 87 OINTMENT TOPICAL at 08:40

## 2019-01-01 RX ADMIN — SODIUM CHLORIDE 40 MG: 9 INJECTION INTRAMUSCULAR; INTRAVENOUS; SUBCUTANEOUS at 09:04

## 2019-01-01 RX ADMIN — MUPIROCIN: 20 OINTMENT TOPICAL at 18:17

## 2019-01-01 RX ADMIN — LORAZEPAM 2 MG: 2 INJECTION INTRAMUSCULAR; INTRAVENOUS at 17:51

## 2019-01-01 RX ADMIN — SENNOSIDES, DOCUSATE SODIUM 1 TABLET: 50; 8.6 TABLET, FILM COATED ORAL at 09:26

## 2019-01-01 RX ADMIN — POTASSIUM CHLORIDE 20 MEQ: 29.8 INJECTION, SOLUTION INTRAVENOUS at 07:25

## 2019-01-01 RX ADMIN — MIDAZOLAM 1 MG: 1 INJECTION INTRAMUSCULAR; INTRAVENOUS at 00:59

## 2019-01-01 RX ADMIN — PROPOFOL 20 MCG/KG/MIN: 10 INJECTION, EMULSION INTRAVENOUS at 10:14

## 2019-01-01 RX ADMIN — SERTRALINE HYDROCHLORIDE 100 MG: 50 TABLET ORAL at 08:05

## 2019-01-01 RX ADMIN — BUMETANIDE 2 MG: 0.25 INJECTION INTRAMUSCULAR; INTRAVENOUS at 21:57

## 2019-01-01 RX ADMIN — DOBUTAMINE IN DEXTROSE 1 MCG/KG/MIN: 200 INJECTION, SOLUTION INTRAVENOUS at 18:22

## 2019-01-01 RX ADMIN — GUAIFENESIN 400 MG: 100 SOLUTION ORAL at 00:14

## 2019-01-01 RX ADMIN — BUDESONIDE 500 MCG: 0.5 INHALANT RESPIRATORY (INHALATION) at 19:40

## 2019-01-01 RX ADMIN — BUDESONIDE 500 MCG: 0.5 INHALANT RESPIRATORY (INHALATION) at 19:23

## 2019-01-01 RX ADMIN — METHYLPREDNISOLONE SODIUM SUCCINATE 80 MG: 125 INJECTION, POWDER, FOR SOLUTION INTRAMUSCULAR; INTRAVENOUS at 17:48

## 2019-01-01 RX ADMIN — MAGNESIUM OXIDE TAB 400 MG (241.3 MG ELEMENTAL MG) 400 MG: 400 (241.3 MG) TAB at 18:16

## 2019-01-01 RX ADMIN — DEXMEDETOMIDINE HYDROCHLORIDE 0.9 MCG/KG/HR: 100 INJECTION, SOLUTION, CONCENTRATE INTRAVENOUS at 12:44

## 2019-01-01 RX ADMIN — MUPIROCIN: 20 OINTMENT TOPICAL at 09:01

## 2019-01-01 RX ADMIN — CHLORHEXIDINE GLUCONATE 10 ML: 1.2 RINSE ORAL at 22:30

## 2019-01-01 RX ADMIN — SENNOSIDES, DOCUSATE SODIUM 1 TABLET: 50; 8.6 TABLET, FILM COATED ORAL at 08:55

## 2019-01-01 RX ADMIN — BUMETANIDE 2 MG: 0.25 INJECTION INTRAMUSCULAR; INTRAVENOUS at 09:40

## 2019-01-01 RX ADMIN — NYSTATIN 500000 UNITS: 100000 SUSPENSION ORAL at 21:59

## 2019-01-01 RX ADMIN — PANTOPRAZOLE SODIUM 40 MG: 40 TABLET, DELAYED RELEASE ORAL at 07:24

## 2019-01-01 RX ADMIN — INSULIN LISPRO 2 UNITS: 100 INJECTION, SOLUTION INTRAVENOUS; SUBCUTANEOUS at 08:13

## 2019-01-01 RX ADMIN — DEXMEDETOMIDINE HYDROCHLORIDE 0.8 MCG/KG/HR: 100 INJECTION, SOLUTION, CONCENTRATE INTRAVENOUS at 18:33

## 2019-01-01 RX ADMIN — CHLORHEXIDINE GLUCONATE 10 ML: 1.2 RINSE ORAL at 09:19

## 2019-01-01 RX ADMIN — ASPIRIN 81 MG 81 MG: 81 TABLET ORAL at 09:12

## 2019-01-01 RX ADMIN — MAGNESIUM OXIDE TAB 400 MG (241.3 MG ELEMENTAL MG) 400 MG: 400 (241.3 MG) TAB at 17:49

## 2019-01-01 RX ADMIN — METHYLPREDNISOLONE SODIUM SUCCINATE 20 MG: 40 INJECTION, POWDER, FOR SOLUTION INTRAMUSCULAR; INTRAVENOUS at 08:54

## 2019-01-01 RX ADMIN — NALOXONE HYDROCHLORIDE 0.4 MG: 0.4 INJECTION, SOLUTION INTRAMUSCULAR; INTRAVENOUS; SUBCUTANEOUS at 11:26

## 2019-01-01 RX ADMIN — MORPHINE SULFATE 2 MG: 2 INJECTION, SOLUTION INTRAMUSCULAR; INTRAVENOUS at 11:12

## 2019-01-01 RX ADMIN — GUAIFENESIN 400 MG: 100 SOLUTION ORAL at 06:19

## 2019-01-01 RX ADMIN — SODIUM CHLORIDE 2.5 MG/HR: 900 INJECTION, SOLUTION INTRAVENOUS at 07:31

## 2019-01-01 RX ADMIN — BUMETANIDE 1 MG: 0.25 INJECTION INTRAMUSCULAR; INTRAVENOUS at 05:25

## 2019-01-01 RX ADMIN — DEXMEDETOMIDINE HYDROCHLORIDE 0.4 MCG/KG/HR: 100 INJECTION, SOLUTION, CONCENTRATE INTRAVENOUS at 02:00

## 2019-01-01 RX ADMIN — HYDRALAZINE HYDROCHLORIDE 10 MG: 20 INJECTION INTRAMUSCULAR; INTRAVENOUS at 04:03

## 2019-01-01 RX ADMIN — ROCURONIUM BROMIDE 15 MG: 10 SOLUTION INTRAVENOUS at 11:35

## 2019-01-01 RX ADMIN — DESMOPRESSIN ACETATE 2 MCG: 4 SOLUTION INTRAVENOUS at 07:46

## 2019-10-19 PROBLEM — I20.0 UNSTABLE ANGINA (HCC): Status: ACTIVE | Noted: 2019-01-01

## 2019-10-19 PROBLEM — I24.9 ACS (ACUTE CORONARY SYNDROME) (HCC): Status: ACTIVE | Noted: 2019-01-01

## 2019-10-19 NOTE — PROGRESS NOTES
Spiritual Care Assessment/Progress Note [address] 
 
 
NAME: Jef Schilling      MRN: [id number] AGE: 68 y.o. SEX: male Amish Affiliation:   
Language:   
 
10/19/2019     Total Time (in minutes): 21 Spiritual Assessment begun in [address] [facility] [address] DEP through conversation with: 
  
    []Patient        [x] Family    [] Friend(s) Reason for Consult: Crisis, Evelyn Lick Spiritual beliefs: (Please include comment if needed) 
   [] Identifies with a christina tradition:     
   [] Supported by a christina community:        
   [] Claims no spiritual orientation:       
   [] Seeking spiritual identity:            
   [] Adheres to an individual form of spirituality:       
   [x] Not able to assess:                   
 
    
Identified resources for coping:  
   [] Prayer                           
   [] Music                  [] Guided Imagery [x] Family/friends                 [] Pet visits [] Devotional reading                         [] Unknown 
   [] Other:                                         
 
 
Interventions offered during this visit: (See comments for more details) Family/Friend(s): Affirmation of emotions/emotional suffering Plan of Care: 
 
 [] Support spiritual and/or cultural needs  
 [] Support AMD and/or advance care planning process    
 [] Support grieving process 
 [] Coordinate Rites and/or Rituals  
 [] Coordination with community clergy [] No spiritual needs identified at this time 
 [] Detailed Plan of Care below (See Comments)  [] Make referral to Music Therapy 
[] Make referral to Pet Therapy    
[] Make referral to Addiction services 
[] Make referral to [facility] 
[] Make referral to Spiritual Care Partner 
[] No future visits requested       
[x] Follow up visits as needed Responded to STEMI in ER. As pt was being attended to by medical staff had a brief conversation with pt's son.  Son declined  support at this time. Reinaldo Moncada will continue to offer support as needed. Chaplain Farmer MDiv, MS, [facility] 
[phone]

## 2019-10-19 NOTE — ED TRIAGE NOTES
Patient reports chest pain/pressure was in center of chest and down left arm. Pain began at 0600. Patient also with nausea. Patient called EMS around [id number]. EMS reports ST depression and elevation \"in lots of places.

## 2019-10-19 NOTE — PROGRESS NOTES
1843 - Cardiac Cath Lab Procedure Area Arrival Note: 
 
Shayy Bond arrived to Cardiac Cath Lab, Procedure Area. Patient identifiers verified with NAME and DATE OF BIRTH. Procedure verified with patient. Consent forms verified. Allergies verified. Patient informed of procedure and plan of care. Questions answered with review. Patient voiced understanding of procedure and plan of care. Patient on cardiac monitor, non-invasive blood pressure, SPO2 monitor. Placed on O2 @ 2 lpm via NC.  IV of NS on pump at 25 ml/hr. Patient status doing well with some problems : ACS/NSTEMI. Patient is A&Ox 4. Patient reports no discomfort at this time. Patient medicated during procedure with orders obtained and verified by Dr. Freddie Rousseau. Refer to patients Cardiac Cath Lab PROCEDURE REPORT for vital signs, assessment, status, and response during procedure, printed at end of case. Printed report on chart or scanned into chart.

## 2019-10-19 NOTE — PROGRESS NOTES
1935: TRANSFER - OUT REPORT: 
 
Verbal report given to Mojgan Tamayo RN (name) on Derek Palomares  being transferred to CCU (unit) for routine progression of care Report consisted of patients Situation, Background, Assessment and  
Recommendations(SBAR). Information from the following report(s) SBAR, ED Summary, Procedure Summary and MAR was reviewed with the receiving nurse. Lines:  
Peripheral IV 10/19/19 Right Hand (Active) Peripheral IV 10/19/19 Left Forearm (Active) Site Assessment Clean, dry, & intact 10/19/2019  6:33 PM  
Phlebitis Assessment 0 10/19/2019  6:33 PM  
Infiltration Assessment 0 10/19/2019  6:33 PM  
Dressing Status Clean, dry, & intact 10/19/2019  6:33 PM  
Dressing Type Transparent 10/19/2019  6:33 PM  
Hub Color/Line Status Pink;Flushed;Patent 10/19/2019  6:33 PM  
Action Taken Blood drawn 10/19/2019  6:33 PM  
  
 
Opportunity for questions and clarification was provided. Patient transported with: 
 Monitor O2 @ 2 liters Registered Nurse Tech

## 2019-10-19 NOTE — Clinical Note
Single view of the bilateral femoral artery obtained using power injection.  ABD AORTOGRAM WITH BILTERAL RUNOFF

## 2019-10-19 NOTE — H&P
Cardiology History and Physical 
 
 Date of  Admission: 10/19/2019 Admission type: Emergency Geneva Delcid is a 68 y.o. male admitted for ACS (acute coronary syndrome) ([facility] [address] [I24.9]. Patient complains of CP radiating to Lt elbow and neck and scapular area with SOB stating this am. This is his same angina. He had a stent placed in unknown vessel prior to  and in  another one in another vessel at [facility]. He follows at [facility] for his medications but he has not done cardiac evaluation there. He has felt more tired and SOB over last month. Tonight his CP accompanied deep ST depression throughout ekg. Patient Active Problem List  
 Diagnosis Date Noted  ACS (acute coronary syndrome) ([facility] [address] 10/19/2019 Priority: 1 - One No primary care provider on file. Past Medical History:  
Diagnosis Date  Diabetes ([facility] [address] [facility]  High cholesterol  Hypertension  MI, old Past Surgical History:  
Procedure Laterality Date  HX CORONARY STENT PLACEMENT    
 HX LUMBAR LAMINECTOMY l5-s1 History reviewed. No pertinent family history. Prior to Admission medications Not on File Allergies Allergen Reactions  Scallops Other (comments) Headache Review of Systems Review of Systems Except as noted in HPI, patient denies recent fever or chills, nausea, vomiting, diarrhea, hemoptysis, hematemesis, dysuria, myalgias, focal neurologic symptoms, ecchymosis, angioedema, odynophagia, dysphagia, sore throat, earache,rash, melena, hematochezia, depression, GERD, cold intolerance, petechia, bleeding gums, or significant weight loss. A comprehensive review of systems was negative except for that written in the HPI. Physical Exam 
 
Objective:  
 Physical Exam: 
24 hr VS reviewed, overall VSSAF Temp (24hrs), Av.4 °F (36.9 °C), Min:98.4 °F (36.9 °C), Max:98.4 °F (36.9 °C) Patient Vitals for the past 8 hrs: 
 Pulse 10/19/19 1925 86  
10/19/19 1912 86 10/19/19 1826 (!) 103 Patient Vitals for the past 8 hrs: 
 Resp 10/19/19 1925 12  
10/19/19 1912 16  
10/19/19 1826 18 Patient Vitals for the past 8 hrs: 
 BP  
10/19/19 1925 137/78  
10/19/19 1912 136/80  
10/19/19 1826 152/85 No intake or output data in the 24 hours ending 10/19/19 1937 General Appearance:   [x] well developed, well nourished,  [x]NAD. []     agitated   []     lethargic but arousable  []     obtunded ENT, Palate:    [x] WNL   []     dry palate/MM     [x]anicteric Respiratory:    [x]CTA bilateral  [x] rales  [] rhonchi  [] normal resp effort Cardiovascular:   [x] RRR   [] Irregular rate and rhythm  [] ectopy 
 [] Normal S1, S2   [x]  S3/S4  gallop noted 
 [] no murmur   [] murmur c/w: 
 [x]  no edema RLE:[]1+ [] 2+ []3+; LLE:[]1+ []2+ [] 3+ [x]  normal JVP   []     Elevated JVP [x] carotid upstroke nl 
 [x] abd aorta not palpated 
 [x] no stigmata of peripheral emboli GI:    [x] abd soft, non-distented,bowel sounds present, no                     organomegaly appreciated Skin: 
Neuro: 
 
  [x]  warm and dry []     cold extremities [x]  A/O x 3,  [x]     grossly non-focal  
 []  lethargic but arousable  [] obtunded Cardiographics Telemetry: normal sinus rhythm ECG: sinus rhythm with deep ST depression upto 4 mm depression in lateral leads 
 tracings Echocardiogram: Not done Labs:  
Recent Results (from the past 24 hour(s)) POC TROPONIN-I Collection Time: 10/19/19  6:31 PM  
Result Value Ref Range Troponin-I (POC) 1.32 (H) 0.00 - 0.08 ng/mL CBC WITH AUTOMATED DIFF Collection Time: 10/19/19  6:36 PM  
Result Value Ref Range WBC 6.3 4.1 - 11.1 K/uL  
 RBC 3.25 (L) 4.10 - 5.70 M/uL  
 HGB 10.9 (L) 12.1 - 17.0 g/dL HCT 33.3 (L) 36.6 - 50.3 % .5 (H) 80.0 - 99.0 FL  
 MCH 33.5 26.0 - 34.0 PG  
 MCHC 32.7 30.0 - 36.5 g/dL  
 RDW 13.0 11.5 - 14.5 % PLATELET 139 375 - 836 K/uL  MPV 10.3 8.9 - 12.9 FL  
 NRBC 0.0 0  WBC ABSOLUTE NRBC 0.00 0.00 - 0.01 K/uL NEUTROPHILS 60 32 - 75 % LYMPHOCYTES 23 12 - 49 % MONOCYTES 10 5 - 13 % EOSINOPHILS 6 0 - 7 % BASOPHILS 1 0 - 1 % IMMATURE GRANULOCYTES 0 0.0 - 0.5 % ABS. NEUTROPHILS 3.8 1.8 - 8.0 K/UL  
 ABS. LYMPHOCYTES 1.5 0.8 - 3.5 K/UL  
 ABS. MONOCYTES 0.6 0.0 - 1.0 K/UL  
 ABS. EOSINOPHILS 0.4 0.0 - 0.4 K/UL  
 ABS. BASOPHILS 0.0 0.0 - 0.1 K/UL  
 ABS. IMM. GRANS. 0.0 0.00 - 0.04 K/UL  
 DF AUTOMATED PROTHROMBIN TIME + INR Collection Time: 10/19/19  6:36 PM  
Result Value Ref Range INR 1.0 0.9 - 1.1 Prothrombin time 10.4 9.0 - 11.1 sec METABOLIC PANEL, BASIC Collection Time: 10/19/19  6:36 PM  
Result Value Ref Range Sodium 139 136 - 145 mmol/L Potassium 4.7 3.5 - 5.1 mmol/L Chloride 106 97 - 108 mmol/L  
 CO2 26 21 - 32 mmol/L Anion gap 7 5 - 15 mmol/L Glucose 130 (H) 65 - 100 mg/dL BUN 28 (H) 6 - 20 MG/DL Creatinine 1.32 (H) 0.70 - 1.30 MG/DL  
 BUN/Creatinine ratio 21 (H) 12 - 20 GFR est AA >60 >60 ml/min/1.73m2 GFR est non-AA 53 (L) >60 ml/min/1.73m2 Calcium 9.1 8.5 - 10.1 MG/DL  
SAMPLES BEING HELD Collection Time: 10/19/19  6:36 PM  
Result Value Ref Range SAMPLES BEING HELD 1RED COMMENT Add-on orders for these samples will be processed based on acceptable specimen integrity and analyte stability, which may vary by analyte. Assessment: 
 
 Assessment:  
  
Active Problems: 
  ACS (acute coronary syndrome) ([facility] [address] (10/19/2019) CAD Previous stents HTN; uncontrolled Smoking abuse Plan: To cath lab for ongoing ischemia and ischemic changes on ekg Signed By: Ramo Rendon MD   
 October 19, 2019

## 2019-10-19 NOTE — ED PROVIDER NOTES
68 y.o. male with past medical history significant for diabetes, high cholesterol, hypertension, MI and coronary artery disease who presents as a transfer from [facility] via EMS with chief complaint of CP. Pt states he has left sided substernal CP that is similair to his past heart attack. Pt's pain is no worse with exertion or movement and states there was no particular pattern for pain. Pt states his CP was alleviated briefly this morning before returning later. Pt denies SOB, nausea and diarrhea. Currently, pt has no CP. There are no other acute medical concerns at this time. Social hx: Current everyday tobacco smoker, EtOH use Note written by Vero Alvarez. Tori Faria, as dictated by Skyla Norman MD 6:27 PM 
 
 
The history is provided by the patient. No  was used. Past Medical History:  
Diagnosis Date  Diabetes ([address]  High cholesterol  Hypertension  MI, old Past Surgical History:  
Procedure Laterality Date  HX CORONARY STENT PLACEMENT    
 HX LUMBAR LAMINECTOMY l5-s1 History reviewed. No pertinent family history. Social History Socioeconomic History  Marital status: Not on file Spouse name: Not on file  Number of children: Not on file  Years of education: Not on file  Highest education level: Not on file Occupational History  Not on file Social Needs  Financial resource strain: Not on file  Food insecurity:  
  Worry: Not on file Inability: Not on file  Transportation needs:  
  Medical: Not on file Non-medical: Not on file Tobacco Use  Smoking status: Current Every Day Smoker  Smokeless tobacco: Never Used Substance and Sexual Activity  Alcohol use: Yes  Drug use: Not on file  Sexual activity: Not on file Lifestyle  Physical activity:  
  Days per week: Not on file Minutes per session: Not on file  Stress: Not on file Relationships  Social connections:  
  Talks on phone: Not on file Gets together: Not on file Attends [employer] service: Not on file Active member of club or organization: Not on file Attends meetings of clubs or organizations: Not on file Relationship status: Not on file  Intimate partner violence:  
  Fear of current or ex partner: Not on file Emotionally abused: Not on file Physically abused: Not on file Forced sexual activity: Not on file Other Topics Concern  Not on file Social History Narrative  Not on file ALLERGIES: Scallops Review of Systems Constitutional: Negative for chills and fever. Respiratory: Negative for shortness of breath. Cardiovascular: Positive for chest pain. Gastrointestinal: Negative for abdominal pain, constipation, diarrhea, nausea and vomiting. Neurological: Negative for dizziness and light-headedness. All other systems reviewed and are negative. Vitals:  
 10/19/19 1826 BP: 152/85 Pulse: (!) 103 Resp: 18 Temp: 98.4 °F (36.9 °C) SpO2: 100% Weight: 64.1 kg (141 lb 5 oz) Height: 5' 7\" (1.702 m) Physical Exam  
Constitutional: He appears well-developed and well-nourished. No distress. HENT:  
Head: Normocephalic and atraumatic. Eyes: Pupils are equal, round, and reactive to light. Conjunctivae are normal.  
Neck: Normal range of motion. Cardiovascular: Normal rate and regular rhythm. Pulmonary/Chest: Effort normal and breath sounds normal.  
Abdominal: Soft. He exhibits no distension. There is no tenderness. Musculoskeletal: Normal range of motion. Neurological: He is alert. Skin: Skin is dry. Capillary refill takes less than 2 seconds. Nursing note and vitals reviewed. Note written by Joseph Samayoa, as dictated by Veronika Aguayo MD 6:29 PM 
 
MDM Procedures CONSULT NOTE: 
6:26 PM Veronika Aguayo MD spoke with Dr. Adele Metz, Consult for Cardiology. Discussed available diagnostic tests and clinical findings. Dr. Satnam Bullock states he is on his way to evaluate pt. EKG shows diffuse, deep ST depressions with ST elevations in lead aVL Patient is being admitted to the hospital.  The results of their tests and reasons for their admission have been discussed with them and/or available family. They convey agreement and understanding for the need to be admitted and for their admission diagnosis. Consultation will be made now with the inpatient physician for hospitalization. 6:44 PM 
Patient to cath lab.

## 2019-10-20 NOTE — INTERDISCIPLINARY ROUNDS
IDR/SLIDR Summary Patient: Bassem Arvizu MRN: [id number]    Age: 68 y.o. YOB: 1950 Room/Bed: [address] [place] [address] Admit Diagnosis: ACS (acute coronary syndrome) ([address] [I24.9] Unstable angina (HCC) [I20.0]  Principal Diagnosis: <principal problem not specified>  
Goals: No chest pain, no hematoma Readmission: NO  Quality Measure: AMI 
VTE Prophylaxis: Chemical 
Influenza Vaccine screening completed? YES Pneumococcal Vaccine screening completed? YES Mobility needs: No   Nutrition plan:No 
Consults:Case Management Financial concerns:No  Escalated to CM? YES 
RRAT Score:    Interventions:H2H Testing due for pt today? NO 
LOS: 1 days Expected length of stay TBD days Discharge plan: TBD   PCP: No primary care provider on file. Transportation needs: No   
Days before discharge:two or more days before discharge Discharge disposition: Home Signed:  
 
Frances Powell 10/20/2019 
1:54 AM

## 2019-10-20 NOTE — PROGRESS NOTES
Problem: Infection - Risk of, Surgical Site Infection Goal: *Absence of surgical site infection signs and symptoms Outcome: Progressing Towards Goal 
  
Problem: Patient Education: Go to Patient Education Activity Goal: Patient/Family Education Outcome: Progressing Towards Goal 
  
Problem: Pain Goal: *Control of Pain Outcome: Progressing Towards Goal 
Goal: *PALLIATIVE CARE:  Alleviation of Pain Outcome: Progressing Towards Goal 
  
Problem: Patient Education: Go to Patient Education Activity Goal: Patient/Family Education Outcome: Progressing Towards Goal 
  
Problem: Falls - Risk of 
Goal: *Absence of Falls Description Document Eugene Arellano Fall Risk and appropriate interventions in the flowsheet. Outcome: Progressing Towards Goal 
Note:  
Fall Risk Interventions: 
  
 
  
 
Medication Interventions: Patient to call before getting OOB, Teach patient to arise slowly History of Falls Interventions: Door open when patient unattended, Investigate reason for fall Problem: Patient Education: Go to Patient Education Activity Goal: Patient/Family Education Outcome: Progressing Towards Goal 
  
Problem: Pressure Injury - Risk of 
Goal: *Prevention of pressure injury Description Document Earl Scale and appropriate interventions in the flowsheet. Outcome: Progressing Towards Goal 
Note:  
Pressure Injury Interventions: Activity Interventions: Increase time out of bed, Pressure redistribution bed/mattress(bed type) Problem: Patient Education: Go to Patient Education Activity Goal: Patient/Family Education Outcome: Progressing Towards Goal 
  
Problem: Patient Education: Go to Patient Education Activity Goal: Patient/Family Education Outcome: Progressing Towards Goal 
  
Problem: AMI: Day 1 Goal: Off Pathway (Use only if patient is Off Pathway) Outcome: Progressing Towards Goal 
Goal: Activity/Safety Outcome: Progressing Towards Goal 
 Goal: Consults, if ordered Outcome: Progressing Towards Goal 
Goal: Diagnostic Test/Procedures Outcome: Progressing Towards Goal 
Goal: Nutrition/Diet Outcome: Progressing Towards Goal 
Goal: Discharge Planning Outcome: Progressing Towards Goal 
Goal: Medications Outcome: Progressing Towards Goal 
Goal: Respiratory Outcome: Progressing Towards Goal 
Goal: Treatments/Interventions/Procedures Outcome: Progressing Towards Goal 
Goal: Psychosocial 
Outcome: Progressing Towards Goal 
Goal: *Eligible patient receives reperfusion therapy thrombolytics/PCI Outcome: Progressing Towards Goal 
Goal: *Optimal pain control at patient's stated goal 
Outcome: Progressing Towards Goal 
Goal: *Hemodynamically stable Outcome: Progressing Towards Goal 
Goal: *Received aspirin and beta-blocker within 24 hours of arrival unless contraindicated Outcome: Progressing Towards Goal 
  
Problem: AMI: Day 2 Goal: Off Pathway (Use only if patient is Off Pathway) Outcome: Progressing Towards Goal 
Goal: Activity/Safety Outcome: Progressing Towards Goal 
Goal: Consults, if ordered Outcome: Progressing Towards Goal 
Goal: Diagnostic Test/Procedures Outcome: Progressing Towards Goal 
Goal: Nutrition/Diet Outcome: Progressing Towards Goal 
Goal: Discharge Planning Outcome: Progressing Towards Goal 
Goal: Medications Outcome: Progressing Towards Goal 
Goal: Respiratory Outcome: Progressing Towards Goal 
Goal: Treatments/Interventions/Procedures Outcome: Progressing Towards Goal 
Goal: Psychosocial 
Outcome: Progressing Towards Goal 
Goal: *Optimal pain control at patient's stated goal 
Outcome: Progressing Towards Goal 
Goal: *Hemodynamically stable Outcome: Progressing Towards Goal 
Goal: *Demonstrates progressive activity Outcome: Progressing Towards Goal 
Goal: *Tolerating diet Outcome: Progressing Towards Goal 
  
Problem: AMI: Day 3 Goal: Off Pathway (Use only if patient is Off Pathway) Outcome: Progressing Towards Goal 
 Goal: Activity/Safety Outcome: Progressing Towards Goal 
Goal: Consults, if ordered Outcome: Progressing Towards Goal 
Goal: Diagnostic Test/Procedures Outcome: Progressing Towards Goal 
Goal: Nutrition/Diet Outcome: Progressing Towards Goal 
Goal: Discharge Planning Outcome: Progressing Towards Goal 
Goal: Medications Outcome: Progressing Towards Goal 
Goal: Respiratory Outcome: Progressing Towards Goal 
Goal: Treatments/Interventions/Procedures Outcome: Progressing Towards Goal 
Goal: Psychosocial 
Outcome: Progressing Towards Goal 
Goal: *Optimal pain control at patient's stated goal 
Outcome: Progressing Towards Goal 
Goal: *Hemodynamically stable Outcome: Progressing Towards Goal 
Goal: *Demonstrates progressive activity Outcome: Progressing Towards Goal 
Goal: *Tolerating diet Outcome: Progressing Towards Goal 
  
Problem: AMI: Day 4 Goal: Off Pathway (Use only if patient is Off Pathway) Outcome: Progressing Towards Goal 
Goal: Activity/Safety Outcome: Progressing Towards Goal 
Goal: Diagnostic Test/Procedures Outcome: Progressing Towards Goal 
Goal: Nutrition/Diet Outcome: Progressing Towards Goal 
Goal: Discharge Planning Outcome: Progressing Towards Goal 
Goal: Medications Outcome: Progressing Towards Goal 
Goal: Respiratory Outcome: Progressing Towards Goal 
Goal: Treatments/Interventions/Procedures Outcome: Progressing Towards Goal 
Goal: Psychosocial 
Outcome: Progressing Towards Goal 
Goal: *Optimal pain control at patient's stated goal 
Outcome: Progressing Towards Goal 
Goal: *Hemodynamically stable Outcome: Progressing Towards Goal 
Goal: *Demonstrates progressive activity Outcome: Progressing Towards Goal 
Goal: *Tolerating diet Outcome: Progressing Towards Goal 
  
Problem: AMI: Day 5 Goal: Off Pathway (Use only if patient is Off Pathway) Outcome: Progressing Towards Goal 
Goal: Activity/Safety Outcome: Progressing Towards Goal 
Goal: Diagnostic Test/Procedures Outcome: Progressing Towards Goal 
Goal: Nutrition/Diet Outcome: Progressing Towards Goal 
Goal: Discharge Planning Outcome: Progressing Towards Goal 
Goal: Medications Outcome: Progressing Towards Goal 
Goal: Treatments/Interventions/Procedures Outcome: Progressing Towards Goal 
Goal: Psychosocial 
Outcome: Progressing Towards Goal 
  
Problem: AMI: Discharge Outcomes Goal: *Demonstrates ability to perform prescribed activity without shortness of breath or discomfort Outcome: Progressing Towards Goal 
Goal: *Verbalizes understanding and describes prescribed diet Outcome: Progressing Towards Goal 
Goal: *Describes follow-up/return visits to physicians Outcome: Progressing Towards Goal 
Goal: *Describes home care/support arrangements established based on need Outcome: Progressing Towards Goal 
Goal: *Anxiety reduced or absent Outcome: Progressing Towards Goal 
Goal: *Understands and describes signs and symptoms to report to providers(Stroke Metric) Outcome: Progressing Towards Goal 
Goal: *Verbalizes name, dosage, time, side effects, and number of days to continue medications Outcome: Progressing Towards Goal

## 2019-10-20 NOTE — PROGRESS NOTES
Primary Nurse Nidhi Peterson RN and Vahid No RN performed a dual skin assessment on this patient No impairment noted Earl score is 22 Patient resting on Total Care bed.

## 2019-10-20 NOTE — PROGRESS NOTES
Cardiology Progress Note                                        Admit Date: 10/19/2019 Assessment/Plan:  
 
NSTEMI; CP is resolved CAD; severe 3 vs CAD; will consult Dr. Rosalba Veronica this am 
Ischemic cardiomyopathy; he is euvolemic now and will DC IVF; no diuretic yet Perez Dewitt is a 68 y.o. male with PROBLEM LIST: 
Patient Active Problem List  
 Diagnosis Date Noted  ACS (acute coronary syndrome) ([facility] 10/19/2019 Priority: 1 - One  
 Unstable angina ([facility] 10/19/2019 Subjective:  
 
Perez Dewitt reports none. Visit Vitals /67 Pulse 83 Temp 99.1 °F (37.3 °C) Resp 22 Ht 5' 7\" (1.702 m) Wt 128 lb 1.4 oz (58.1 kg) SpO2 98% BMI 20.06 kg/m² Intake/Output Summary (Last 24 hours) at 10/20/2019 8705 Last data filed at 10/20/2019 0400 Gross per 24 hour Intake 317.25 ml Output 300 ml Net 17.25 ml Objective:  
  
Physical Exam: 
HEENT: [place], EOMI Neck: No JVD,  No thyroidmegaly Resp: CTA bilaterally; No wheezes or rales CV: RRR s1s2 No murmur,  + s3 Abd:Soft, Nontender Ext: No edema Neuro: Alert and oriented; Nonfocal 
Skin: Warm, Dry, Intact Pulses: 2+ DP/PT/Rad Telemetry: normal sinus rhythm Current Facility-Administered Medications Medication Dose Route Frequency  nicotine (NICODERM CQ) 14 mg/24 hr patch 1 Patch  1 Patch TransDERmal DAILY  nitroglycerin (Tridil) 200 mcg/ml infusion  0-200 mcg/min IntraVENous TITRATE  
 0.9% sodium chloride infusion  25 mL/hr IntraVENous CONTINUOUS  
 sodium chloride (NS) flush 5-40 mL  5-40 mL IntraVENous Q8H  
 sodium chloride (NS) flush 5-40 mL  5-40 mL IntraVENous PRN  
 enoxaparin (LOVENOX) injection 60 mg  1 mg/kg SubCUTAneous Q12H  
 LORazepam (ATIVAN) injection 1 mg  1 mg IntraVENous Q6H PRN  
 zolpidem (AMBIEN) tablet 5 mg  5 mg Oral QHS PRN  
 nitroglycerin (NITROSTAT) tablet 0.4 mg  0.4 mg SubLINGual PRN  
 aspirin chewable tablet 81 mg  81 mg Oral DAILY  metoprolol (LOPRESSOR) injection 2.5 mg  2.5 mg IntraVENous Q6H PRN  
 carvedilol (COREG) tablet 12.5 mg  12.5 mg Oral BID WITH MEALS Data Review:  
Labs:   
Recent Results (from the past 24 hour(s)) POC TROPONIN-I Collection Time: 10/19/19  6:31 PM  
Result Value Ref Range Troponin-I (POC) 1.32 (H) 0.00 - 0.08 ng/mL CBC WITH AUTOMATED DIFF Collection Time: 10/19/19  6:36 PM  
Result Value Ref Range WBC 6.3 4.1 - 11.1 K/uL  
 RBC 3.25 (L) 4.10 - 5.70 M/uL  
 HGB 10.9 (L) 12.1 - 17.0 g/dL HCT 33.3 (L) 36.6 - 50.3 % .5 (H) 80.0 - 99.0 FL  
 MCH 33.5 26.0 - 34.0 PG  
 MCHC 32.7 30.0 - 36.5 g/dL  
 RDW 13.0 11.5 - 14.5 % PLATELET 299 791 - 066 K/uL MPV 10.3 8.9 - 12.9 FL  
 NRBC 0.0 0  WBC ABSOLUTE NRBC 0.00 0.00 - 0.01 K/uL NEUTROPHILS 60 32 - 75 % LYMPHOCYTES 23 12 - 49 % MONOCYTES 10 5 - 13 % EOSINOPHILS 6 0 - 7 % BASOPHILS 1 0 - 1 % IMMATURE GRANULOCYTES 0 0.0 - 0.5 % ABS. NEUTROPHILS 3.8 1.8 - 8.0 K/UL  
 ABS. LYMPHOCYTES 1.5 0.8 - 3.5 K/UL  
 ABS. MONOCYTES 0.6 0.0 - 1.0 K/UL  
 ABS. EOSINOPHILS 0.4 0.0 - 0.4 K/UL  
 ABS. BASOPHILS 0.0 0.0 - 0.1 K/UL  
 ABS. IMM. GRANS. 0.0 0.00 - 0.04 K/UL  
 DF AUTOMATED PROTHROMBIN TIME + INR Collection Time: 10/19/19  6:36 PM  
Result Value Ref Range INR 1.0 0.9 - 1.1 Prothrombin time 10.4 9.0 - 11.1 sec METABOLIC PANEL, BASIC Collection Time: 10/19/19  6:36 PM  
Result Value Ref Range Sodium 139 136 - 145 mmol/L Potassium 4.7 3.5 - 5.1 mmol/L Chloride 106 97 - 108 mmol/L  
 CO2 26 21 - 32 mmol/L Anion gap 7 5 - 15 mmol/L Glucose 130 (H) 65 - 100 mg/dL BUN 28 (H) 6 - 20 MG/DL Creatinine 1.32 (H) 0.70 - 1.30 MG/DL  
 BUN/Creatinine ratio 21 (H) 12 - 20 GFR est AA >60 >60 ml/min/1.73m2 GFR est non-AA 53 (L) >60 ml/min/1.73m2 Calcium 9.1 8.5 - 10.1 MG/DL  
SAMPLES BEING HELD Collection Time: 10/19/19  6:36 PM  
Result Value Ref Range SAMPLES BEING HELD 1RED COMMENT Add-on orders for these samples will be processed based on acceptable specimen integrity and analyte stability, which may vary by analyte. EKG, 12 LEAD, INITIAL Collection Time: 10/19/19  7:45 PM  
Result Value Ref Range Ventricular Rate 90 BPM  
 Atrial Rate 90 BPM  
 P-R Interval 170 ms QRS Duration 88 ms Q-T Interval 404 ms QTC Calculation (Bezet) 494 ms Calculated P Axis 60 degrees Calculated R Axis 50 degrees Calculated T Axis -33 degrees Diagnosis Normal sinus rhythm Possible Left atrial enlargement Left ventricular hypertrophy with repolarization abnormality Prolonged QT When compared with ECG of 19-OCT-2019 18:22, 
ST less depressed in Inferior leads ST no longer depressed in Anterior leads LIPID PANEL Collection Time: 10/19/19  8:24 PM  
Result Value Ref Range LIPID PROFILE Cholesterol, total 170 <200 MG/DL Triglyceride 54 <150 MG/DL  
 HDL Cholesterol 58 MG/DL  
 LDL, calculated 101.2 (H) 0 - 100 MG/DL VLDL, calculated 10.8 MG/DL  
 CHOL/HDL Ratio 2.9 0.0 - 5.0    
TROPONIN I Collection Time: 10/19/19  8:24 PM  
Result Value Ref Range Troponin-I, Qt. 2.22 (H) <0.05 ng/mL CBC WITH AUTOMATED DIFF Collection Time: 10/20/19  3:25 AM  
Result Value Ref Range WBC 7.6 4.1 - 11.1 K/uL  
 RBC 2.82 (L) 4.10 - 5.70 M/uL HGB 9.4 (L) 12.1 - 17.0 g/dL HCT 28.4 (L) 36.6 - 50.3 % .7 (H) 80.0 - 99.0 FL  
 MCH 33.3 26.0 - 34.0 PG  
 MCHC 33.1 30.0 - 36.5 g/dL  
 RDW 12.8 11.5 - 14.5 % PLATELET 785 993 - 092 K/uL MPV 10.1 8.9 - 12.9 FL  
 NRBC 0.0 0  WBC ABSOLUTE NRBC 0.00 0.00 - 0.01 K/uL NEUTROPHILS 66 32 - 75 % LYMPHOCYTES 18 12 - 49 % MONOCYTES 10 5 - 13 % EOSINOPHILS 5 0 - 7 % BASOPHILS 1 0 - 1 % IMMATURE GRANULOCYTES 0 0.0 - 0.5 % ABS. NEUTROPHILS 5.1 1.8 - 8.0 K/UL  
 ABS. LYMPHOCYTES 1.3 0.8 - 3.5 K/UL  
 ABS. MONOCYTES 0.7 0.0 - 1.0 K/UL ABS. EOSINOPHILS 0.4 0.0 - 0.4 K/UL  
 ABS. BASOPHILS 0.1 0.0 - 0.1 K/UL  
 ABS. IMM. GRANS. 0.0 0.00 - 0.04 K/UL  
 DF AUTOMATED METABOLIC PANEL, COMPREHENSIVE Collection Time: 10/20/19  3:25 AM  
Result Value Ref Range Sodium 135 (L) 136 - 145 mmol/L Potassium 4.3 3.5 - 5.1 mmol/L Chloride 103 97 - 108 mmol/L  
 CO2 25 21 - 32 mmol/L Anion gap 7 5 - 15 mmol/L Glucose 106 (H) 65 - 100 mg/dL BUN 29 (H) 6 - 20 MG/DL Creatinine 1.26 0.70 - 1.30 MG/DL  
 BUN/Creatinine ratio 23 (H) 12 - 20 GFR est AA >60 >60 ml/min/1.73m2 GFR est non-AA 56 (L) >60 ml/min/1.73m2 Calcium 8.8 8.5 - 10.1 MG/DL Bilirubin, total 0.4 0.2 - 1.0 MG/DL  
 ALT (SGPT) 19 12 - 78 U/L  
 AST (SGOT) 39 (H) 15 - 37 U/L Alk. phosphatase 52 45 - 117 U/L Protein, total 7.4 6.4 - 8.2 g/dL Albumin 3.4 (L) 3.5 - 5.0 g/dL Globulin 4.0 2.0 - 4.0 g/dL A-G Ratio 0.9 (L) 1.1 - 2.2    
TROPONIN I Collection Time: 10/20/19  3:25 AM  
Result Value Ref Range Troponin-I, Qt. 4.78 (H) <0.05 ng/mL

## 2019-10-20 NOTE — PROGRESS NOTES
TRANSFER - IN REPORT: 
 
0 Verbal report received from Urvashi(name) on Sunita Song  being received from cath lab(unit) for routine post - op Report consisted of patients Situation, Background, Assessment and  
Recommendations(SBAR). Information from the following report(s) SBAR, Kardex, ED Summary, Procedure Summary, Intake/Output, MAR and Cardiac Rhythm NSR was reviewed with the receiving nurse. Opportunity for questions and clarification was provided. Assessment completed upon patients arrival to unit and care assumed. 1945 Pt arrived to CCU from cath lab. Stable vital signs, resting quietly, right groin site without bleeding or swelling.  
 
0320 Pt reports \"gas pains\" on left side of chest and abdomen, EKG obtained and Nitro increased. No change to previous EKG. 0730 Bedside shift change report given to Cindy Hoff RN (oncoming nurse) by Blank Garvin RN and Josseline Kate (student) (offgoing nurse). Report included the following information SBAR, Kardex, ED Summary, Procedure Summary, Intake/Output, MAR, Recent Results, Med Rec Status and Cardiac Rhythm Sinus Rhythm.

## 2019-10-20 NOTE — CONSULTS
[facility] Consultation Subjective:  
  
Kenny Monreal is a 68 y.o. male who was referred for cardiac surgery evaluation of CAD by Dr. Kristine Zelaya. PMHx significant for previous MI with PCI on plavix, HTN, HLD, DM. Patient presented to the ED with substernal chest pain that radiated into his jaw and left arm similar to that of his previous MI. EKG showed diffuse ST depression. Associated with SOB and fatigue which has been worsening over several weeks. Ultimately, cardiac cath was done showing 3 vessel CAD and EF 25-30%. We are asked to consider CABG. Currently, the patient is pain free on nitroglycerin drip. No hx of CVA/TIA, Liver/kidney dysfunction, bleeding/clotting disorders, swallowing issues. Current smoker, social drinker. He is here with his son. Cardiac Testing Cardiac catheterization:  
Severe 3 vs CAD with LM disease Moderate to severe cardiomyopathy, ischemic, EF 25 to 30% Elevated LVEDP at 27 mmHg Patent LIMA for bypass graftging Abdominal aortic diseases ZHENG. moderate ECHO: 
Pending Past Medical History:  
Diagnosis Date  Diabetes ([address]  High cholesterol  Hypertension  MI, old Past Surgical History:  
Procedure Laterality Date  HX CORONARY STENT PLACEMENT    
 HX LUMBAR LAMINECTOMY l5-s1 Social History Tobacco Use  Smoking status: Current Every Day Smoker  Smokeless tobacco: Never Used Substance Use Topics  Alcohol use: Yes History reviewed. No pertinent family history. Prior to Admission medications Medication Sig Start Date End Date Taking? Authorizing Provider  
lisinopril (PRINIVIL, ZESTRIL) 40 mg tablet Take 40 mg by mouth daily. Indications: high blood pressure   Yes Provider, Historical  
pravastatin (PRAVACHOL) 40 mg tablet Take 40 mg by mouth nightly. Yes Provider, Historical  
clopidogrel (PLAVIX) 75 mg tab Take  by mouth daily.    Yes Provider, Historical  
 sertraline (ZOLOFT) 100 mg tablet Take 100 mg by mouth daily. Indications: Anxiousness associated with Depression   Yes Provider, Historical  
   
Allergies Allergen Reactions  Scallops Other (comments) Headache Review of Systems: A complete review of systems was completed and found to be negative with the exception of what is noted in the HPI Objective:  
 
VS:  
Visit Vitals /62 Pulse 82 Temp 98.8 °F (37.1 °C) Resp 25 Ht 5' 7\" (1.702 m) Wt 128 lb 1.4 oz (58.1 kg) SpO2 98% BMI 20.06 kg/m² Physical Exam:   
General appearance: alert, cooperative, no distress Head: normocephalic, without obvious abnormality; atraumatic Eyes: conjunctivae/corneas clear; EOM's intact. Nose: nares normal; no drainage. Neck: no carotid bruit and no JVD Lungs: clear to auscultation bilaterally Heart: regular rate and rhythm; no murmur Abdomen: soft, non-tender; bowel sounds normal 
Extremities: moves all extremities; no weakness. Skin: Skin color normal; No varicose veins or edema. Neurologic: Grossly normal   
 
Labs:  
Recent Labs 10/20/19 
0325 10/19/19 
1836 WBC 7.6 6.3 HGB 9.4* 10.9* HCT 28.4* 33.3*  
 180 * 139  
K 4.3 4.7 BUN 29* 28* CREA 1.26 1.32* * 130* INR  --  1.0 Diagnostics:  
PA and lateral: CXR Results  (Last 48 hours) 10/20/19 0828  XR CHEST PORT Final result Impression:  IMPRESSION: Mild-moderate interstitial pulmonary edema. Narrative:  EXAM: XR CHEST PORT INDICATION: preop cabg, acute coronary syndrome, unstable angina COMPARISON: None. FINDINGS: A portable AP radiograph of the chest was obtained at 0757 hours. There is mild to moderate interstitial pulmonary edema. No pneumothorax or  
pleural effusion is shown. Cardiac size is within normal limits. No mediastinal  
or hilar enlargement is evident. Carotid doppler: pending PFTS-FEV1: pending EKG:  
 Normal sinus rhythm Possible Left atrial enlargement Left ventricular hypertrophy with repolarization abnormality Prolonged QT When compared with ECG of 19-OCT-2019 18:22,  
ST less depressed in Inferior leads ST no longer depressed in Anterior leads Assessment:  
 
Active Problems: 
  ACS (acute coronary syndrome) ([facility] (10/19/2019) Unstable angina ([facility] (10/19/2019) Plan: 1. CAD: preop CABG, date TBD. Plavix washout. Last dose was Friday. ASA/Statin/BB, preop amio load. Nitro gtts/lovenox 2. NYHA Class IV Systolic Heart Failure: TTE pending. Coreg. Hold lasix/ace/arb for surgery. 3. HTN: cont coreg. Hold ACE 4. HLD: statin 5. Anemia: ? Chronic. Trend. May need to check stool occult, etc. If trending down Surgery is scheduled for TBD. STS Risk Calculator V2.81 - Discussed by surgeon with patient. Procedure: Isolated CAB CALCULATE Risk of Mortality:  2.317% Renal Failure:  2.702% Permanent Stroke:  1.766% Prolonged Ventilation:  10.273% DSW Infection:  0.098% Reoperation:  3.099% Morbidity or Mortality:  15.381% Short Length of Stay:  28.154% Long Length of Stay:  9.183% Signed By: CHELSEA Zamarripa October 20, 2019 Saw patient. History and films reviewed. Risks and benefits explained. Agrees with surgery. Patient seen by PA/NP and agree with above. Findings/Conditions: CAD Plan of Care: CABG Wednesday after washout Risk of morbidity and mortality - high Medical decision making - high complexity 1. CAD: plan for CABG 
  
2. NYHA Class II Systolic Heart Failure: TTE pending. Coreg. Hold lasix/ace/arb for surgery. 
  
3. HTN: cont coreg. Hold ACE 
  
4. HLD: statin 
  
5. Anemia: ? Chronic. Trend. May need to check stool occult, etc. If trending down

## 2019-10-20 NOTE — PROGRESS NOTES
0730- Bedside and Verbal shift change report given to Johan Alarcon RN and Aleksandar Molina (oncoming nurse) by Jagdish Jimenez RN and Yasmin oGod (offgoing nurse). Report included the following information SBAR, ED Summary, OR Summary, Procedure Summary, Intake/Output, MAR and Recent Results. 1130- Cardiac surgery came by to see patient and scheduled surgery for Wednesday. 1300- Patient had a small nosebleed. [address] was weaned down throughout the day and stopped at [address]- Bedside and Verbal shift change report given to Jagdish Jimenez RN and Yasmin Good (oncoming nurse) by Johan Alarcon RN and Aleksandar Molina (offgoing nurse). Report included the following information SBAR, ED Summary, OR Summary, Intake/Output and Recent Results.

## 2019-10-21 PROBLEM — I25.118 CORONARY ARTERY DISEASE OF NATIVE ARTERY OF NATIVE HEART WITH STABLE ANGINA PECTORIS (HCC): Status: ACTIVE | Noted: 2019-01-01

## 2019-10-21 NOTE — PROGRESS NOTES
Problem: Infection - Risk of, Surgical Site Infection Goal: *Absence of surgical site infection signs and symptoms Outcome: Progressing Towards Goal 
  
Problem: Patient Education: Go to Patient Education Activity Goal: Patient/Family Education Outcome: Progressing Towards Goal 
  
Problem: Pain Goal: *Control of Pain Outcome: Progressing Towards Goal 
Goal: *PALLIATIVE CARE:  Alleviation of Pain Outcome: Progressing Towards Goal 
  
Problem: Patient Education: Go to Patient Education Activity Goal: Patient/Family Education Outcome: Progressing Towards Goal 
  
Problem: Falls - Risk of 
Goal: *Absence of Falls Description Document Gricelda Marcos Fall Risk and appropriate interventions in the flowsheet. Outcome: Progressing Towards Goal 
Note:  
Fall Risk Interventions: 
  
 
  
 
Medication Interventions: Patient to call before getting OOB, Teach patient to arise slowly History of Falls Interventions: Door open when patient unattended, Investigate reason for fall, Room close to nurse's station Problem: Patient Education: Go to Patient Education Activity Goal: Patient/Family Education Outcome: Progressing Towards Goal 
  
Problem: Pressure Injury - Risk of 
Goal: *Prevention of pressure injury Description Document Earl Scale and appropriate interventions in the flowsheet. Outcome: Progressing Towards Goal 
Note:  
Pressure Injury Interventions: Activity Interventions: Increase time out of bed Nutrition Interventions: Document food/fluid/supplement intake Problem: Patient Education: Go to Patient Education Activity Goal: Patient/Family Education Outcome: Progressing Towards Goal 
  
Problem: Patient Education: Go to Patient Education Activity Goal: Patient/Family Education Outcome: Progressing Towards Goal 
  
Problem: AMI: Day 1 Goal: Off Pathway (Use only if patient is Off Pathway) Outcome: Progressing Towards Goal 
Goal: Activity/Safety Outcome: Progressing Towards Goal 
Goal: Consults, if ordered Outcome: Progressing Towards Goal 
Goal: Diagnostic Test/Procedures Outcome: Progressing Towards Goal 
Goal: Nutrition/Diet Outcome: Progressing Towards Goal 
Goal: Discharge Planning Outcome: Progressing Towards Goal 
Goal: Medications Outcome: Progressing Towards Goal 
Goal: Respiratory Outcome: Progressing Towards Goal 
Goal: Treatments/Interventions/Procedures Outcome: Progressing Towards Goal 
Goal: Psychosocial 
Outcome: Progressing Towards Goal 
Goal: *Eligible patient receives reperfusion therapy thrombolytics/PCI Outcome: Progressing Towards Goal 
Goal: *Optimal pain control at patient's stated goal 
Outcome: Progressing Towards Goal 
Goal: *Hemodynamically stable Outcome: Progressing Towards Goal 
Goal: *Received aspirin and beta-blocker within 24 hours of arrival unless contraindicated Outcome: Progressing Towards Goal 
  
Problem: AMI: Day 2 Goal: Off Pathway (Use only if patient is Off Pathway) Outcome: Progressing Towards Goal 
Goal: Activity/Safety Outcome: Progressing Towards Goal 
Goal: Consults, if ordered Outcome: Progressing Towards Goal 
Goal: Diagnostic Test/Procedures Outcome: Progressing Towards Goal 
Goal: Nutrition/Diet Outcome: Progressing Towards Goal 
Goal: Discharge Planning Outcome: Progressing Towards Goal 
Goal: Medications Outcome: Progressing Towards Goal 
Goal: Respiratory Outcome: Progressing Towards Goal 
Goal: Treatments/Interventions/Procedures Outcome: Progressing Towards Goal 
Goal: Psychosocial 
Outcome: Progressing Towards Goal 
Goal: *Optimal pain control at patient's stated goal 
Outcome: Progressing Towards Goal 
Goal: *Hemodynamically stable Outcome: Progressing Towards Goal 
Goal: *Demonstrates progressive activity Outcome: Progressing Towards Goal 
Goal: *Tolerating diet Outcome: Progressing Towards Goal 
  
Problem: AMI: Day 3 Goal: Off Pathway (Use only if patient is Off Pathway) Outcome: Progressing Towards Goal 
Goal: Activity/Safety Outcome: Progressing Towards Goal 
Goal: Consults, if ordered Outcome: Progressing Towards Goal 
Goal: Diagnostic Test/Procedures Outcome: Progressing Towards Goal 
Goal: Nutrition/Diet Outcome: Progressing Towards Goal 
Goal: Discharge Planning Outcome: Progressing Towards Goal 
Goal: Medications Outcome: Progressing Towards Goal 
Goal: Respiratory Outcome: Progressing Towards Goal 
Goal: Treatments/Interventions/Procedures Outcome: Progressing Towards Goal 
Goal: Psychosocial 
Outcome: Progressing Towards Goal 
Goal: *Optimal pain control at patient's stated goal 
Outcome: Progressing Towards Goal 
Goal: *Hemodynamically stable Outcome: Progressing Towards Goal 
Goal: *Demonstrates progressive activity Outcome: Progressing Towards Goal 
Goal: *Tolerating diet Outcome: Progressing Towards Goal 
  
Problem: AMI: Day 4 Goal: Off Pathway (Use only if patient is Off Pathway) Outcome: Progressing Towards Goal 
Goal: Activity/Safety Outcome: Progressing Towards Goal 
Goal: Diagnostic Test/Procedures Outcome: Progressing Towards Goal 
Goal: Nutrition/Diet Outcome: Progressing Towards Goal 
Goal: Discharge Planning Outcome: Progressing Towards Goal 
Goal: Medications Outcome: Progressing Towards Goal 
Goal: Respiratory Outcome: Progressing Towards Goal 
Goal: Treatments/Interventions/Procedures Outcome: Progressing Towards Goal 
Goal: Psychosocial 
Outcome: Progressing Towards Goal 
Goal: *Optimal pain control at patient's stated goal 
Outcome: Progressing Towards Goal 
Goal: *Hemodynamically stable Outcome: Progressing Towards Goal 
Goal: *Demonstrates progressive activity Outcome: Progressing Towards Goal 
Goal: *Tolerating diet Outcome: Progressing Towards Goal 
  
Problem: AMI: Day 5 Goal: Off Pathway (Use only if patient is Off Pathway) Outcome: Progressing Towards Goal 
Goal: Activity/Safety Outcome: Progressing Towards Goal 
 Goal: Diagnostic Test/Procedures Outcome: Progressing Towards Goal 
Goal: Nutrition/Diet Outcome: Progressing Towards Goal 
Goal: Discharge Planning Outcome: Progressing Towards Goal 
Goal: Medications Outcome: Progressing Towards Goal 
Goal: Treatments/Interventions/Procedures Outcome: Progressing Towards Goal 
Goal: Psychosocial 
Outcome: Progressing Towards Goal 
  
Problem: AMI: Discharge Outcomes Goal: *Demonstrates ability to perform prescribed activity without shortness of breath or discomfort Outcome: Progressing Towards Goal 
Goal: *Verbalizes understanding and describes prescribed diet Outcome: Progressing Towards Goal 
Goal: *Describes follow-up/return visits to physicians Outcome: Progressing Towards Goal 
Goal: *Describes home care/support arrangements established based on need Outcome: Progressing Towards Goal 
Goal: *Anxiety reduced or absent Outcome: Progressing Towards Goal 
Goal: *Understands and describes signs and symptoms to report to providers(Stroke Metric) Outcome: Progressing Towards Goal 
Goal: *Verbalizes name, dosage, time, side effects, and number of days to continue medications Outcome: Progressing Towards Goal

## 2019-10-21 NOTE — DISCHARGE INSTRUCTIONS
Smoking Cessation Program: This is a free, phone/text/email based, smoking cessation program. The program is individualized to meet each patient's needs. To enroll use the link - [email or web address] or text [place] to [phone] from any smart phone.

## 2019-10-21 NOTE — PROGRESS NOTES
BARBER Plan: 
  - Patient states he is 100% disabled  service connected with the [facility] PCP is updated to [address] at the [facility] 
  
 
Reason for Admission:   ACS (acute coronary syndrome) RRAT Score:     0 Plan for utilizing home health:      TBD - Patient 100% connected at [facility]; if patient not candidate for [facility] transfer, patient did provide [facility] form for [facility] and At [address] (if patient's address is out of [facility] geographic area) Current Advanced Directive/Advance Care Plan: Not on file Transition of Care Plan:                   
CM met with patient at bedside. Patient provided the following statements for initial assessment: 1. ADLs, self-care, orientation baseline - Patient is independent with ADLs and self-care without the need for medical equipment. Patient is alert and oriented during initial assessment. 2.  Social - Patient resides at updated address on file ([place], [place]) with his son. Patient's primary living is on the 2nd story of the home with 14 steps to ambulate. Patient does not drive at baseline. Patient's son, Kaleb Hdez, transports patient to his medical appointments. Patient's medical appointments must be scheduled around his son's employment time in order to have transportation to follow up appointments. 3.  Insurance - Patient states he is 100% service disabled service connected through the [facility].  Patient states he goes to the [facility] and sees Dr. Toshia Cortes as a PCP. Patient states that he does not have Medicare and does not have  due to his service disability. Possibility for patient to transfer his care to the [facility]. 
 
CM updated [facility] NP of patient's medical coverage through [facility].  CM updated [facility] lead, Joan Ye (p: [phone]), to provide confirmation of coverage prior to transfer. CM will continue to follow. Rayne Stovall, MPH Care Management Interventions PCP Verified by CM: Yes([facility]) Palliative Care Criteria Met (RRAT>21 & CHF Dx)?: No 
Mode of Transport at Discharge: Other (see comment)(Son, private car) Transition of Care Consult (CM Consult): Other(Cardiac Initial Assessment) MyChart Signup: No 
Discharge Durable Medical Equipment: No 
Health Maintenance Reviewed: Yes Physical Therapy Consult: Yes Occupational Therapy Consult: Yes Speech Therapy Consult: No 
Current Support Network: Relative's Home(Resides with son) Confirm Follow Up Transport: Family Plan discussed with Pt/Family/Caregiver: Yes Discharge Location Discharge Placement: [facility]

## 2019-10-21 NOTE — PROGRESS NOTES
Cardiology Progress Note                                        Admit Date: 10/19/2019 Assessment/Plan:  
 
CHF; acute systolic; he slipped into failure with congestion; responded to IV Lasix, which we will continue CAD; 3 vs; await CABG Ischemic cardiomyopathy; severe Mikala Alarcon is a 68 y.o. male with PROBLEM LIST: 
Patient Active Problem List  
 Diagnosis Date Noted  ACS (acute coronary syndrome) ([facility] 10/19/2019 Priority: 1 - One  Coronary artery disease of native artery of native heart with stable angina pectoris ([facility] 10/21/2019  [facility] 10/19/2019 Subjective:  
 
Mikala Alarcon reports dyspnea. Visit Vitals /64 Pulse 66 Temp 99.2 °F (37.3 °C) Resp 19 Ht 5' 7\" (1.702 m) Wt 132 lb 4.4 oz (60 kg) SpO2 98% BMI 20.72 kg/m² Intake/Output Summary (Last 24 hours) at 10/21/2019 1416 Last data filed at 10/21/2019 1763 Gross per 24 hour Intake 11.45 ml Output 2020 ml Net -2008.55 ml Objective:  
  
Physical Exam: 
HEENT: Perrla, EOMI Neck: No JVD,  No thyroidmegaly Resp: rales CV: RRR s1s2 No murmur no s3 Abd:Soft, Nontender Ext: No edema Neuro: Alert and oriented; Nonfocal 
Skin: Warm, Dry, Intact Pulses: 2+ DP/PT/Rad Telemetry: normal sinus rhythm Current Facility-Administered Medications Medication Dose Route Frequency  ondansetron (ZOFRAN) injection 4 mg  4 mg IntraVENous Q4H PRN  
 furosemide (LASIX) injection 40 mg  40 mg IntraVENous Q12H  
 nicotine (NICODERM CQ) 14 mg/24 hr patch 1 Patch  1 Patch TransDERmal DAILY  sodium chloride (NS) flush 5-40 mL  5-40 mL IntraVENous Q8H  
 sodium chloride (NS) flush 5-40 mL  5-40 mL IntraVENous PRN  
 amiodarone (CORDARONE) tablet 400 mg  400 mg Oral Q12H  chlorhexidine (PERIDEX) 0.12 % mouthwash 15 mL  15 mL Oral Q12H  mupirocin (BACTROBAN) 2 % ointment 1 g  1 g Both Nostrils BID  pravastatin (PRAVACHOL) tablet 40 mg  40 mg Oral QHS  docusate sodium (COLACE) capsule 100 mg  100 mg Oral DAILY PRN  
 nitroglycerin (Tridil) 200 mcg/ml infusion  0-200 mcg/min IntraVENous TITRATE  sodium chloride (NS) flush 5-40 mL  5-40 mL IntraVENous Q8H  
 sodium chloride (NS) flush 5-40 mL  5-40 mL IntraVENous PRN  
 enoxaparin (LOVENOX) injection 60 mg  1 mg/kg SubCUTAneous Q12H  
 LORazepam (ATIVAN) injection 1 mg  1 mg IntraVENous Q6H PRN  
 zolpidem (AMBIEN) tablet 5 mg  5 mg Oral QHS PRN  
 nitroglycerin (NITROSTAT) tablet 0.4 mg  0.4 mg SubLINGual PRN  
 aspirin chewable tablet 81 mg  81 mg Oral DAILY  metoprolol (LOPRESSOR) injection 2.5 mg  2.5 mg IntraVENous Q6H PRN  
 carvedilol (COREG) tablet 12.5 mg  12.5 mg Oral BID WITH MEALS Data Review:  
Labs:   
Recent Results (from the past 24 hour(s)) ECHO ADULT COMPLETE Collection Time: 10/20/19  2:41 PM  
Result Value Ref Range LA Volume 37.02 18 - 58 mL  
 LV E' Lateral Velocity 4.44 cm/s LV E' Septal Velocity 3.96 cm/s Tapse 1.70 1.5 - 2.0 cm Ao Root D 4.48 cm Aortic Valve Systolic Peak Velocity 374.11 cm/s Aortic Valve Area by Continuity of Peak Velocity 2.1 cm2 AoV PG 4.6 mmHg LVIDd 4.56 4.2 - 5.9 cm  
 LVPWd 79.225168250 (A) 0.6 - 1.0 cm LVIDs 3.80 cm IVSd 1.11 (A) 0.6 - 1.0 cm  
 LV ED Vol A2C 144.8 mL  
 LV ES Vol A4C 71.1 mL  
 LV ES Vol BP 93.510118160 (A) 22 - 58 mL  
 LVOT d 2.02 cm  
 LVOT Peak Velocity 69.93 cm/s LVOT Peak Gradient 2.0 mmHg MVA (PHT) 4.1 cm2  
 MV A Herman 78.72 cm/s  
 MV E Herman 61.26 cm/s  
 MV E/A 0.78 Left Atrium to Aortic Root Ratio [id number] RVIDd 2.10 cm  
 BP EF 34.7 (A) 55 - 100 % LV Ejection Fraction MOD 4C 28 % LV Ejection Fraction MOD 2C 38 % LA Vol 4C 26.86 18 - 58 mL  
 LA Vol 2C 36.95 18 - 58 mL  
 LA Area 4C 11.3 cm2 LV Mass .[id number] 88 - 224 g LV Mass AL Index 101.6 49 - 115 g/m2  E/E' lateral 13.80   
 E/E' septal 15.47   
 E/E' ratio (averaged) 14.63   
 LV ES Vol A2C 90.0 mL  
 LVES Vol Index BP 37.1 mL/m2 LV ED Vol A4C 99.2 mL  
 LVED Vol Index BP 75.0 mL/m2 Mitral Valve E Wave Deceleration Time 186.5 ms  
 Mitral Valve Pressure Half-time 54.1 ms Left Atrium Major Axis 2.95 cm Triscuspid Valve Regurgitation Peak Gradient 15.6 mmHg Aortic Regurgitant Pressure Half-time 506.5 cm Pulmonic Valve Max Velocity 83.35 cm/s LVOT SV 33.0 ml  
 LV ED Vol .4 67 - 155 ml  
 TR Max Velocity 197.44 cm/s  
 LA Vol Index 22.14 16 - 28 ml/m2 LA Vol Index 22.10 16 - 28 ml/m2 LA Vol Index 16.06 16 - 28 ml/m2 LVED Vol Index A4C 59.3 mL/m2 LVED Vol Index A2C 86.6 mL/m2 LVES Vol Index A4C 42.5 mL/m2 LVES Vol Index A2C 53.8 mL/m2 AV Cusp 1.71 cm  
 AR Max Herman 307.29 cm/s Left Ventricular End Diastolic Volume by Teichholz Method 38.232979950 mL Left Ventricular Fractional Shortening by 2D 64.349433527 % Mitral Valve Deceleration [place] 0.759680564871 AV Velocity Ratio 0.65 AV peak gradient 78.481718966 mmHg PV peak gradient 2.8 mmHg URINALYSIS W/ REFLEX CULTURE Collection Time: 10/20/19  3:35 PM  
Result Value Ref Range Color YELLOW/STRAW Appearance CLEAR CLEAR Specific gravity 1.010 1.003 - 1.030    
 pH (UA) 5.5 5.0 - 8.0 Protein 30 (A) NEG mg/dL Glucose NEGATIVE  NEG mg/dL Ketone TRACE (A) NEG mg/dL Bilirubin NEGATIVE  NEG Blood LARGE (A) NEG Urobilinogen 1.0 0.2 - 1.0 EU/dL Nitrites NEGATIVE  NEG Leukocyte Esterase NEGATIVE  NEG    
 UA:UC IF INDICATED CULTURE NOT INDICATED BY UA RESULT CNI    
 WBC 0-4 0 - 4 /hpf  
 RBC 5-10 0 - 5 /hpf Epithelial cells FEW FEW /lpf Bacteria NEGATIVE  NEG /hpf Hyaline cast 0-2 0 - 5 /lpf METABOLIC PANEL, BASIC Collection Time: 10/21/19  3:04 AM  
Result Value Ref Range Sodium 130 (L) 136 - 145 mmol/L Potassium 4.4 3.5 - 5.1 mmol/L Chloride 99 97 - 108 mmol/L  
 CO2 23 21 - 32 mmol/L  Anion gap 8 5 - 15 mmol/L  
 Glucose 118 (H) 65 - 100 mg/dL BUN 25 (H) 6 - 20 MG/DL Creatinine 1.45 (H) 0.70 - 1.30 MG/DL  
 BUN/Creatinine ratio 17 12 - 20 GFR est AA 58 (L) >60 ml/min/1.73m2 GFR est non-AA 48 (L) >60 ml/min/1.73m2 Calcium 8.8 8.5 - 10.1 MG/DL  
CBC W/O DIFF Collection Time: 10/21/19  3:04 AM  
Result Value Ref Range WBC 7.7 4.1 - 11.1 K/uL  
 RBC 2.79 (L) 4.10 - 5.70 M/uL HGB 9.5 (L) 12.1 - 17.0 g/dL HCT 28.3 (L) 36.6 - 50.3 % .4 (H) 80.0 - 99.0 FL  
 MCH 34.1 (H) 26.0 - 34.0 PG  
 MCHC 33.6 30.0 - 36.5 g/dL  
 RDW 12.5 11.5 - 14.5 % PLATELET 522 (L) 663 - 400 K/uL MPV 10.9 8.9 - 12.9 FL  
 NRBC 0.0 0  WBC ABSOLUTE NRBC 0.00 0.00 - 0.01 K/uL MAGNESIUM Collection Time: 10/21/19  3:04 AM  
Result Value Ref Range Magnesium 1.8 1.6 - 2.4 mg/dL HEMOGLOBIN A1C WITH EAG Collection Time: 10/21/19  3:04 AM  
Result Value Ref Range Hemoglobin A1c 5.3 4.2 - 6.3 % Est. average glucose 105 mg/dL NT-PRO BNP Collection Time: 10/21/19  3:04 AM  
Result Value Ref Range NT pro-BNP 15,013 (H) <125 PG/ML  
PTT Collection Time: 10/21/19  3:04 AM  
Result Value Ref Range aPTT 38.1 (H) 22.1 - 32.0 sec  
 aPTT, therapeutic range     58.0 - 77.0 SECS  
TSH 3RD GENERATION Collection Time: 10/21/19  3:04 AM  
Result Value Ref Range TSH 0.95 0.36 - 3.74 uIU/mL PREALBUMIN Collection Time: 10/21/19  3:04 AM  
Result Value Ref Range Prealbumin 21.1 20.0 - 40.0 mg/dL

## 2019-10-21 NOTE — PROGRESS NOTES
0730 Received verbal bedside report from Cong Delgado, student and Lenora Sanchez RN. Assumed care of the pt.  
 
1930 Bedside and Verbal shift change report given to Cholo Burger RN (oncoming nurse) by Yvette Samuels RN (offgoing nurse). Report included the following information SBAR, Kardex, ED Summary, Procedure Summary, Intake/Output, Recent Results and Cardiac Rhythm NSR.

## 2019-10-21 NOTE — PROGRESS NOTES
1930 Bedside shift change report given to Seth Boswell RN and Raymond Bean (student) (oncoming nurse) by Cuauhtemoc Roís RN and Solomon Zavaleta (student) (offgoing nurse). Report included the following information SBAR, Kardex, ED Summary, Procedure Summary, Intake/Output, MAR, Recent Results, Med Rec Status and Cardiac Rhythm NSR.  
 
2000 Pt up to chair with one person assist for line management. Activity tolerated well without c/o shortness of breath or dizziness. Post cath site clean, dry, and intact. To bedside commode, first bowel movement since arrival.  
 
2030 patient bathed, back to bed.  
 
0200 Pt having increased work of breathing, unable to lay flat. Lung sounds increasingly coarse, with crackles in bases, R worse than L. Cardiology paged. 0210 Dr. Emily Sauceda returned page. Orders received for one time dose 40 mg IV Lasix. 0330 Slight improvement in lung sounds. Pt laying in bed comfortably, no signs of distress. 0600 Dr. Zeferino Rankin at bedside. Pt having increased work of breathing. Orders received for one time dose 40 mg IV Lasix q12hrs. 0730 Bedside shift change report given to NORMA Urbina (oncoming nurse) by Seth Boswell RN and Raymond Bean (student) (offgoing nurse). Report included the following information SBAR, Kardex, ED Summary, Procedure Summary, Intake/Output, MAR, Recent Results, Med Rec Status and Cardiac Rhythm NSR.

## 2019-10-21 NOTE — CARDIO/PULMONARY
Cardiac Rehab: Per EMR, patient is a current smoker.  Smoking Cessation Program information placed on the AVS.   
Quin Pruitt RN

## 2019-10-21 NOTE — INTERDISCIPLINARY ROUNDS
IDR/SLIDR Summary Patient: Divina Perea MRN: [id number]    Age: 68 y.o. YOB: 1950 Room/Bed: [address] [place] [address] Admit Diagnosis: ACS (acute coronary syndrome) ([address] [I24.9] Unstable angina (HCC) [I20.0]  Principal Diagnosis: <principal problem not specified>  
Goals: No chest pain, no hematoma Readmission: NO  Quality Measure: AMI 
VTE Prophylaxis: Chemical 
Influenza Vaccine screening completed? YES Pneumococcal Vaccine screening completed? YES Mobility needs: No   Nutrition plan:No 
Consults:Case Management Financial concerns:No  Escalated to CM? YES 
RRAT Score:    Interventions:H2H Testing due for pt today? NO 
LOS: 1 days Expected length of stay TBD days Discharge plan: TBD   PCP: No primary care provider on file. Transportation needs: No   
Days before discharge:two or more days before discharge Discharge disposition: Home Signed:  
 
Delia Poole 10/20/2019 
1:54 AM

## 2019-10-22 PROBLEM — I50.20 SYSTOLIC HEART FAILURE (HCC): Status: ACTIVE | Noted: 2019-01-01

## 2019-10-22 NOTE — INTERDISCIPLINARY ROUNDS
IDR/SLIDR Summary Patient: Mikala Alarcon MRN: [id number]    Age: 68 y.o. YOB: 1950 Room/Bed: [address] [place] [address] Admit Diagnosis: [facility] [I24.9] Unstable angina (HCC) [I20.0]  Principal Diagnosis: Coronary artery disease of native artery of native heart with stable angina pectoris ([facility] Goals: No chest pain, no hematoma Readmission: NO  Quality Measure: AMI 
VTE Prophylaxis: Chemical 
Influenza Vaccine screening completed? YES Pneumococcal Vaccine screening completed? YES Mobility needs: No   Nutrition plan:No 
Consults:Case Management Financial concerns:No  Escalated to CM? YES 
RRAT Score:    Interventions:H2H Testing due for pt today? NO 
LOS: 3 days Expected length of stay TBD days Discharge plan: TBD   PCP: Izaiah Batista MD 
Transportation needs: No   
Days before discharge:two or more days before discharge Discharge disposition: Home Signed:  
 
Heri Frazier 10/22/2019 
1:54 AM

## 2019-10-22 NOTE — PROGRESS NOTES
Cardiology Progress Note                                        Admit Date: 10/19/2019 Assessment/Plan:  
 
CAD; severe 3 vs CAD; await CABG in am 
Ischemic cardiomyopathy; stable CHF; acute systolic; improving Americo Albert is a 68 y.o. male with PROBLEM LIST: 
Patient Active Problem List  
 Diagnosis Date Noted  ACS (acute coronary syndrome) ([facility] 10/19/2019 Priority: 1 - One  Systolic heart failure ([facility] 10/22/2019  Coronary artery disease of native artery of native heart with stable angina pectoris ([facility] 10/21/2019  Unstable angina ([facility] 10/19/2019 Subjective:  
 
Americo Albert reports none. Visit Vitals BP 91/59 Pulse 60 Temp 99.7 °F (37.6 °C) Resp 23 Ht 5' 7\" (1.702 m) Wt 135 lb 12.9 oz (61.6 kg) SpO2 95% BMI 21.27 kg/m² Intake/Output Summary (Last 24 hours) at 10/22/2019 1422 Last data filed at 10/22/2019 0400 Gross per 24 hour Intake  Output 1300 ml Net -1300 ml Objective:  
  
Physical Exam: 
HEENT: Perrla, EOMI Neck: No JVD,  No thyroidmegaly Resp: some rales CV: RRR s1s2 No murmur, + s3 Abd:Soft, Nontender Ext: No edema Neuro: Alert and oriented; Nonfocal 
Skin: Warm, Dry, Intact Pulses: 2+ DP/PT/Rad Telemetry: normal sinus rhythm Current Facility-Administered Medications Medication Dose Route Frequency  senna-docusate (PERICOLACE) 8.6-50 mg per tablet 1 Tab  1 Tab Oral BID  ondansetron (ZOFRAN) injection 4 mg  4 mg IntraVENous Q4H PRN  
 furosemide (LASIX) injection 40 mg  40 mg IntraVENous Q12H  
 sodium chloride (NS) flush 5-40 mL  5-40 mL IntraVENous PRN  
 sodium phosphate (FLEET'S) enema 1 Enema  1 Enema Rectal PRN  
 [START ON 10/23/2019] ceFAZolin (ANCEF) 2 g/20 mL in sterile water IV syringe  2 g IntraVENous ON CALL TO OR  
 nicotine (NICODERM CQ) 14 mg/24 hr patch 1 Patch  1 Patch TransDERmal DAILY  sodium chloride (NS) flush 5-40 mL  5-40 mL IntraVENous PRN  
  amiodarone (CORDARONE) tablet 400 mg  400 mg Oral Q12H  chlorhexidine (PERIDEX) 0.12 % mouthwash 15 mL  15 mL Oral Q12H  mupirocin (BACTROBAN) 2 % ointment 1 g  1 g Both Nostrils BID  pravastatin (PRAVACHOL) tablet 40 mg  40 mg Oral QHS  docusate sodium (COLACE) capsule 100 mg  100 mg Oral DAILY PRN  
 nitroglycerin (Tridil) 200 mcg/ml infusion  0-200 mcg/min IntraVENous TITRATE  sodium chloride (NS) flush 5-40 mL  5-40 mL IntraVENous Q8H  
 sodium chloride (NS) flush 5-40 mL  5-40 mL IntraVENous PRN  
 LORazepam (ATIVAN) injection 1 mg  1 mg IntraVENous Q6H PRN  
 zolpidem (AMBIEN) tablet 5 mg  5 mg Oral QHS PRN  
 nitroglycerin (NITROSTAT) tablet 0.4 mg  0.4 mg SubLINGual PRN  
 aspirin chewable tablet 81 mg  81 mg Oral DAILY  metoprolol (LOPRESSOR) injection 2.5 mg  2.5 mg IntraVENous Q6H PRN  
 carvedilol (COREG) tablet 12.5 mg  12.5 mg Oral BID WITH MEALS Data Review:  
Labs:   
Recent Results (from the past 24 hour(s)) METABOLIC PANEL, BASIC Collection Time: 10/22/19  4:37 AM  
Result Value Ref Range Sodium 138 136 - 145 mmol/L Potassium 3.5 3.5 - 5.1 mmol/L Chloride 106 97 - 108 mmol/L  
 CO2 26 21 - 32 mmol/L Anion gap 6 5 - 15 mmol/L Glucose 93 65 - 100 mg/dL BUN 22 (H) 6 - 20 MG/DL Creatinine 1.37 (H) 0.70 - 1.30 MG/DL  
 BUN/Creatinine ratio 16 12 - 20 GFR est AA >60 >60 ml/min/1.73m2 GFR est non-AA 51 (L) >60 ml/min/1.73m2 Calcium 7.2 (L) 8.5 - 10.1 MG/DL  
CBC W/O DIFF Collection Time: 10/22/19  4:37 AM  
Result Value Ref Range WBC 6.1 4.1 - 11.1 K/uL  
 RBC 2.58 (L) 4.10 - 5.70 M/uL HGB 8.7 (L) 12.1 - 17.0 g/dL HCT 26.5 (L) 36.6 - 50.3 % .7 (H) 80.0 - 99.0 FL  
 MCH 33.7 26.0 - 34.0 PG  
 MCHC 32.8 30.0 - 36.5 g/dL  
 RDW 12.5 11.5 - 14.5 % PLATELET 622 (L) 636 - 400 K/uL MPV 10.9 8.9 - 12.9 FL  
 NRBC 0.0 0  WBC ABSOLUTE NRBC 0.00 0.00 - 0.01 K/uL MAGNESIUM  
 Collection Time: 10/22/19  4:37 AM  
Result Value Ref Range Magnesium 1.6 1.6 - 2.4 mg/dL TYPE + CROSSMATCH Collection Time: 10/22/19  8:56 AM  
Result Value Ref Range Crossmatch Expiration 10/25/2019 ABO/Rh(D) O POSITIVE Antibody screen NEG Unit number [id number] Blood component type RC LR,2 Unit division 00 Status of unit ALLOCATED Crossmatch result Compatible Unit number [id number] Blood component type RC LR,1 Unit division 00 Status of unit ALLOCATED Crossmatch result Compatible POC G3 - PUL Collection Time: 10/22/19 12:14 PM  
Result Value Ref Range FIO2 (POC) 21 % pH (POC) 7.488 (H) 7.35 - 7.45    
 pCO2 (POC) 36.9 35.0 - 45.0 MMHG  
 pO2 (POC) 89 80 - 100 MMHG  
 HCO3 (POC) 28.0 (H) 22 - 26 MMOL/L  
 sO2 (POC) 98 (H) 92 - 97 % Base excess (POC) 5 mmol/L Site LEFT BRACHIAL Device: ROOM AIR Allens test (POC) YES Specimen type (POC) ARTERIAL Total resp.  rate 16

## 2019-10-22 NOTE — CARDIO/PULMONARY
Cardiac Rehab:  CABG educational folder given to Kerry Krishnan. Family at the bedside. Pathway Through Surgery updated. Educated using teach back method. Assessed patient's understanding of diagnosis and upcoming surgery. Reviewed general pre-op instructions including the need for thermometer and scale post-op, use of incentive spirometer, understanding of pain scale, and answered general post-surgery questions. Discussed instructions for family members during and immediately after surgery including, waiting areas, use of beeper and visiting hours in CVICU. Discussed risk factors for CAD to include the following: hyperlipidemia, hypertension, stres , and smoking. Discussed the benefits of smoking cessation and Smoking Cessation Program link added to AVS. Encouraged patient's consideration of participation in a Cardiac Rehab Program, after discharge, to assist with their risk modification and management. Lisa Morales (son) will provide transportation and his contact number is [phone]. Kerry Krishnan, and family, verbalized understanding with questions answered. Will continue to follow.  Rose Marie Leong RN

## 2019-10-22 NOTE — PROGRESS NOTES
[facility] ICU Progress Note Admit Date: 10/19/2019 Subjective:  
Pt seen with Dr. Jennifer Day. Denies any CP, SOB. Waiting on surgery tomorrow. T max 99.2F, NC 2L Objective:  
Vitals: 
Blood pressure 90/45, pulse 70, temperature 99.2 °F (37.3 °C), resp. rate 17, height 5' 7\" (1.702 m), weight 135 lb 12.9 oz (61.6 kg), SpO2 100 %. Temp (24hrs), Av °F (37.2 °C), Min:98 °F (36.7 °C), Max:99.2 °F (37.3 °C) EKG/Rhythm:  Sinus Rhythm in the 70s Oxygen Therapy: 
Oxygen Therapy O2 Sat (%): 100 % (10/22/19 1000) Pulse via Oximetry: 74 beats per minute (10/21/19 0400) O2 Device: Nasal cannula (10/22/19 0800) O2 Flow Rate (L/min): 2 l/min (10/22/19 0800) CXR:  
CXR Results  (Last 48 hours) 10/21/19 0503  XR CHEST PORT Final result Impression:  IMPRESSION: No change. Narrative:  EXAM: XR CHEST PORT INDICATION: Pulmonary congestion COMPARISON:  FINDINGS: A portable AP radiograph of the chest was obtained at 0 432 hours. The  
patient is on a cardiac monitor. There is interstitial lung disease without  
change. The cardiac and mediastinal contours and pulmonary vascularity are  
normal.  The bones and soft tissues are grossly within normal limits. Admission Weight: Last Weight Weight: 141 lb 5 oz (64.1 kg) Weight: 135 lb 12.9 oz (61.6 kg) Intake / Output / Drain: 
Current Shift: No intake/output data recorded. Last 24 hrs.:  
 
Intake/Output Summary (Last 24 hours) at 10/22/2019 1037 Last data filed at 10/22/2019 0400 Gross per 24 hour Intake  Output 1300 ml Net -1300 ml EXAM: 
General:  Sitting in bed. No acute distress Lungs:   Diminished bases bilat Incision:  No erythema, drainage or swelling. Heart:  Regular rate and rhythm, S1, S2 normal, no murmur, click, rub or gallop. Abdomen:   Soft, non-tender. Bowel sounds normal. No masses,  No organomegaly. Extremities:  No edema. PPP. Neurologic:  Gross motor and sensory apparatus intact. Labs:  
Recent Labs 10/22/19 
0437  10/19/19 
1836 WBC 6.1   < > 6.3 HGB 8.7*   < > 10.9* HCT 26.5*   < > 33.3*  
*   < > 180    < > 139  
K 3.5   < > 4.7 BUN 22*   < > 28* CREA 1.37*   < > 1.32* GLU 93   < > 130* INR  --   --  1.0  
 < > = values in this interval not displayed. Assessment:  
 
Principal Problem: 
  Coronary artery disease of native artery of native heart with stable angina pectoris ([facility] (10/21/2019) Active Problems: 
  ACS (acute coronary syndrome) ([facility] (10/19/2019) Unstable angina ([facility] (10/19/2019) Systolic heart failure ([facility] (10/22/2019) Plan/Recommendations/Medical Decision Makin. CAD with NSTEMI on this admission: preop CABG, tentative date for 10/23. Plavix washout. Last dose was Friday. ASA/Statin/BB, pre-op amio load. Pt refused lovenox - will d/c. NTG gtt as needed for CP (has been off since  afternoon) 
  
2. NYHA Class II Systolic Heart Failure: TTE showing EF of 25-30%. CXR showing pulmonary congestion. Trend pBNP - 15,000. Will continue BB, Lasix. Hold ACE I prior to surgery. Would like to insert PA catheter today, may be some issues w/ transfer to the [place] 
  
3. HTN: cont coreg, Lasix. Hold ACE 
  
4. HLD: Continue statin 
  
5. Anemia: May be Chronic. Cont to trend down. Check occult stool. No obvious signs of bleeding. D/c lovenox 6. Thrombocytopenia: mild, plts cont to trend down. D/c lovenox 7. Renal insufficiency: Cr trending back down, monitor w/ lasix  
  
Surgery is scheduled for 10/23 tentatively. The patient is a [place] patient and Case management is discussing possible transfer with the [place]. Consents signed and placed on chart Signed By: Ashwin Hannon NP

## 2019-10-22 NOTE — PROGRESS NOTES
2330: SBAR received from [facility].  
 
0000: Assessed patient. VSS. No needs at this time. Call bell within reach. 0730: Bedside and Verbal shift change report given to Ciara GREEN (oncoming nurse) by Boby Singh (offgoing nurse). Report included the following information SBAR, Kardex, Intake/Output, MAR and Recent Results.

## 2019-10-22 NOTE — PROGRESS NOTES
1930 Bedside shift change report given by Ciara GREEN (offgoing nurse). Report included the following information SBAR, Kardex, Procedure Summary, Intake/Output, MAR, Recent Results and Cardiac Rhythm NSR.  
 
2000 VSS on assessment, R Groin site clean without hematoma, pulses palpable. 2100 CHG bath completed, pt tolerated well 
 
2130 pt refused Lovenox injection, SCD's applied 2330 bedside report given to [address]

## 2019-10-22 NOTE — PROCEDURES
Central Line Procedure Note Indication: Invasive hemodynamic monitoring requested by cardiac surgery Risks, benefits, alternatives explained and patient agrees to proceed. Patient positioned in Trendelenburg. 7-Step Sterility Protocol followed (cap, mask sterile gown, sterile gloves, large sterile sheet, hand hygiene, 2% chlorhexidine for cutaneous antisepsis). 5 mL 1% Lidocaine placed at insertion site. The right internal jugular cannulated x 1 attempt utilizing the Seldinger technique and ultrasound guidance. Venous cannulation confirmed with column drop test.   
Catheter secured & Biopatch applied. Sterile throughout. Sterile Tegaderm placed. Patient without complaints. PAC placed without event. CXR pending.

## 2019-10-22 NOTE — PROGRESS NOTES
Problem: Infection - Risk of, Surgical Site Infection Goal: *Absence of surgical site infection signs and symptoms Outcome: Progressing Towards Goal 
  
Problem: Patient Education: Go to Patient Education Activity Goal: Patient/Family Education Outcome: Progressing Towards Goal 
  
Problem: Pain Goal: *Control of Pain Outcome: Progressing Towards Goal 
Goal: *PALLIATIVE CARE:  Alleviation of Pain Outcome: Progressing Towards Goal 
  
Problem: Patient Education: Go to Patient Education Activity Goal: Patient/Family Education Outcome: Progressing Towards Goal 
  
Problem: Falls - Risk of 
Goal: *Absence of Falls Description Document Tommy Olivares Fall Risk and appropriate interventions in the flowsheet. Outcome: Progressing Towards Goal 
Note:  
Fall Risk Interventions: 
  
 
  
 
Medication Interventions: Evaluate medications/consider consulting pharmacy, Patient to call before getting OOB History of Falls Interventions: Door open when patient unattended, Evaluate medications/consider consulting pharmacy Problem: Patient Education: Go to Patient Education Activity Goal: Patient/Family Education Outcome: Progressing Towards Goal 
  
Problem: Pressure Injury - Risk of 
Goal: *Prevention of pressure injury Description Document Earl Scale and appropriate interventions in the flowsheet. Outcome: Progressing Towards Goal 
Note:  
Pressure Injury Interventions: Activity Interventions: Increase time out of bed, Pressure redistribution bed/mattress(bed type) Nutrition Interventions: Document food/fluid/supplement intake Friction and Shear Interventions: Lift sheet, Minimize layers Problem: Patient Education: Go to Patient Education Activity Goal: Patient/Family Education Outcome: Progressing Towards Goal 
  
Problem: Patient Education: Go to Patient Education Activity Goal: Patient/Family Education Outcome: Progressing Towards Goal 
  
Problem: AMI: Day 3 
 Goal: Off Pathway (Use only if patient is Off Pathway) Outcome: Progressing Towards Goal 
Goal: Activity/Safety Outcome: Progressing Towards Goal 
Goal: Consults, if ordered Outcome: Progressing Towards Goal 
Goal: Diagnostic Test/Procedures Outcome: Progressing Towards Goal 
Goal: Nutrition/Diet Outcome: Progressing Towards Goal 
Goal: Discharge Planning Outcome: Progressing Towards Goal 
Goal: Medications Outcome: Progressing Towards Goal 
Goal: Respiratory Outcome: Progressing Towards Goal 
Goal: Treatments/Interventions/Procedures Outcome: Progressing Towards Goal 
Goal: Psychosocial 
Outcome: Progressing Towards Goal 
Goal: *Optimal pain control at patient's stated goal 
Outcome: Progressing Towards Goal 
Goal: *Hemodynamically stable Outcome: Progressing Towards Goal 
Goal: *Demonstrates progressive activity Outcome: Progressing Towards Goal 
Goal: *Tolerating diet Outcome: Progressing Towards Goal 
  
Problem: AMI: Discharge Outcomes Goal: *Demonstrates ability to perform prescribed activity without shortness of breath or discomfort Outcome: Progressing Towards Goal 
Goal: *Verbalizes understanding and describes prescribed diet Outcome: Progressing Towards Goal 
Goal: *Describes follow-up/return visits to physicians Outcome: Progressing Towards Goal 
Goal: *Describes home care/support arrangements established based on need Outcome: Progressing Towards Goal 
Goal: *Anxiety reduced or absent Outcome: Progressing Towards Goal 
Goal: *Understands and describes signs and symptoms to report to providers(Stroke Metric) Outcome: Progressing Towards Goal 
Goal: *Verbalizes name, dosage, time, side effects, and number of days to continue medications Outcome: Progressing Towards Goal 
  
Problem: Patient Education: Go to Patient Education Activity Goal: Patient/Family Education Outcome: Progressing Towards Goal 
  
Problem: Cath Lab Procedures: Discharge Outcomes Goal: *Stable cardiac rhythm Outcome: Progressing Towards Goal 
Goal: *Hemodynamically stable Outcome: Progressing Towards Goal 
Goal: *Optimal pain control at patient's stated goal 
Outcome: Progressing Towards Goal 
Goal: *Pulses palpable, skin color within defined limits, skin temperature warm Outcome: Progressing Towards Goal 
Goal: *Lungs clear or at baseline Outcome: Progressing Towards Goal 
Goal: *Demonstrates ability to perform prescribed activity without shortness of breath or discomfort Outcome: Progressing Towards Goal 
Goal: *Verbalizes home exercise program, activity guidelines, cardiac precautions Outcome: Progressing Towards Goal 
Goal: *Verbalizes understanding and describes prescribed diet Outcome: Progressing Towards Goal 
Goal: *Verbalizes understanding and describes medication purposes and frequencies Outcome: Progressing Towards Goal 
Goal: *Identifies cardiac risk factors Outcome: Progressing Towards Goal 
Goal: *No signs and symptoms of infection or wound complications Outcome: Progressing Towards Goal 
Goal: *Anxiety reduced or absent Outcome: Progressing Towards Goal 
Goal: *Verbalizes and demonstrates incision care Outcome: Progressing Towards Goal 
Goal: *Understands and describes signs and symptoms to report to providers(Stroke Metric) Outcome: Progressing Towards Goal 
Goal: *Describes follow-up/return visits to physicians Outcome: Progressing Towards Goal 
Goal: *Describes available resources and support systems Outcome: Progressing Towards Goal 
Goal: *Influenza immunization Outcome: Progressing Towards Goal 
Goal: *Pneumococcal immunization Outcome: Progressing Towards Goal

## 2019-10-22 NOTE — PROGRESS NOTES
BARBER Plan: - CM confirmed with [facility] patient's insurance coverage:  Medicare A & 70% covered through [address] updated CSS NP and CCU RN of same 1030 - CM received update from [facility] that patient is 70% covered through [facility] and has Medicare Part A.  CM updated CSS NP and CCU RN. Patient scheduled to have CABG tomorrow at [facility].  
 
1400 - CM updated patient on insurance coverage and benefits. Patient acknowledged. [facility] Refusal of Transfer form signed.  
 
Chiara Chavez, MPH

## 2019-10-22 NOTE — PROGRESS NOTES
NYHA class IV A/C systolic heart failure (EF 25%, NT pro-BNP 15,013) Acute on chronic kidney disease Mild pulmonary edema Mild chronic lung disease Trying to get lines placed Will decide on IABP in am  
 
EF 25%, NT pro-BNP elevated Hgb and platelets look good Creatinine a little elevated Moderate renal artery stenosis on cath Will run his pressures a little higher tomorrow on pump CXR - mild pulmonary edema Intake/Output Summary (Last 24 hours) at 10/22/2019 1714 Last data filed at 10/22/2019 1620 Gross per 24 hour Intake  Output 1350 ml Net -1350 ml Visit Vitals /73 Pulse 69 Temp 99.6 °F (37.6 °C) Resp 17 Ht 5' 7\" (1.702 m) Wt 135 lb 12.9 oz (61.6 kg) SpO2 95% BMI 21.27 kg/m² Risk of morbidity and mortality - high Medical decision making - high complexity Total critical care time - 30 minutes (CPT 77114)

## 2019-10-22 NOTE — PROGRESS NOTES
Cardiac Surgery Care Coordinator-  Met with Benedicto Madrigal, Introduced role of the Cardiac Surgery Care Coordinator. Reviewed plan of care and reviewed pre-op education. Discussed day of surgery expectations for the pt and family. He stated he lives with his son, but his son will not be here tomorrow. Inquired about a contact number, he stated he does not know his son's number. Reviewed importance of smoking cessation, Mr Neville Moseley stated he is not sure he wants to quit smoking. Reviewed proper use of the incentive spirometer, he is able to pull 1500cc with good effort. Reviewed material in the CABG educational folder including Cardiac Surgery Pathway. Reinforced sternal precautions and 5 lb weight restrictions. Encouraged Benedicto Madrigal to verbalize and offered emotional support.  Estela Peña RN

## 2019-10-22 NOTE — PROGRESS NOTES
0730 Received verbal bedside report from Osorio Garcia, [facility]. Assumed care of the pt. 
 
0745 Dr. Elizabeth De La Torre, cardiac surgery at bedside. Orders received to place swan today. 1500 Called anesthesia. MD states they are leaving in 30 mins and to call back. 310 [facility] for line placement. No answer. Will call again attempt again. 1636 Anesthesia called. States he will come up when he is available. 1925 Dr. Raissa Ocampo, anesthesia at bedside for swan placement. Pt tolerated well. 1940 Bedside and Verbal shift change report given to Malena Cervantes RN (oncoming nurse) by Nessa Rosales (offgoing nurse). Report included the following information SBAR, Kardex, Procedure Summary, Intake/Output, MAR, Recent Results and Cardiac Rhythm NSR, ST depression.

## 2019-10-23 PROBLEM — Z95.1 S/P CABG X 5: Status: ACTIVE | Noted: 2019-01-01

## 2019-10-23 NOTE — PROGRESS NOTES
0730 Bedside and Verbal shift change report given to Beth Guardado (oncoming nurse) by Coleen Graves (offgoing nurse). Report included the following information SBAR, Kardex, ED Summary, Procedure Summary, Intake/Output, MAR, Accordion and Recent Results. 0415 TRANSFER - OUT REPORT: 
 
Verbal report given to Mary Jane(name) philippe Bond  being transferred to Pre-op(unit) for routine progression of care Report consisted of patients Situation, Background, Assessment and  
Recommendations(SBAR). Information from the following report(s) SBAR, Kardex, ED Summary, Procedure Summary, Intake/Output, MAR, Accordion and Recent Results was reviewed with the receiving nurse. Lines:  
Double Lumen 10/22/19 Right Internal jugular (Active) Central Line Being Utilized Yes 10/23/2019  4:00 AM  
Criteria for Appropriate Use Hemodynamically unstable, requiring monitoring lines, vasopressors, or volume resuscitation 10/23/2019  4:00 AM  
Site Assessment Clean, dry, & intact 10/23/2019  4:00 AM  
Infiltration Assessment 0 10/23/2019  4:00 AM  
Affected Extremity/Extremities Color distal to insertion site pink (or appropriate for race); Pulses palpable 10/23/2019  4:00 AM  
Date of Last Dressing Change 10/22/19 10/23/2019  4:00 AM  
Dressing Status Clean, dry, & intact 10/23/2019  4:00 AM  
Dressing Type Disk with Chlorhexadine gluconate (CHG); Transparent 10/23/2019  4:00 AM  
Action Taken Open ports on tubing capped 10/23/2019  4:00 AM  
Proximal Hub Color/Line Status Brown;Flushed 10/23/2019  4:00 AM  
Positive Blood Return (Medial Site) Yes 10/23/2019  4:00 AM  
Distal Hub Color/Line Status White;Flushed 10/23/2019  4:00 AM  
Positive Blood Return (Lateral Site) Yes 10/23/2019  4:00 AM  
Alcohol Cap Used Yes 10/23/2019  4:00 AM  
   
Destiny Nicolasa Dual 10/22/19 Right Neck (Active) Central Line Being Utilized Yes 10/23/2019  4:00 AM  
Criteria for Appropriate Use Hemodynamically unstable, requiring monitoring lines, vasopressors, or volume resuscitation 10/23/2019  4:00 AM  
Learta Bolster Wave Form Appropriate wave forms; Rezeroed 10/23/2019  4:00 AM  
Learta Bolster Length(cm) 50 centimeters 10/23/2019  4:00 AM  
Site Assessment Clean, dry, & intact 10/23/2019  4:00 AM  
Dressing Status Clean, dry, & intact 10/23/2019  4:00 AM  
Dressing Type Disk with Chlorhexadine gluconate (CHG); Transparent 10/23/2019  4:00 AM  
Date of Last Dressing Change 10/22/19 10/23/2019  4:00 AM  
Proximal Line Status Intact and in place 10/23/2019  4:00 AM  
Distal Line Status Intact and in place 10/23/2019  4:00 AM  
Treatment Zeroed or re-zeroed 10/23/2019  4:00 AM  
   
Peripheral IV 10/19/19 Left Forearm (Active) Site Assessment Clean, dry, & intact 10/23/2019  4:00 AM  
Phlebitis Assessment 0 10/23/2019  4:00 AM  
Infiltration Assessment 0 10/23/2019  4:00 AM  
Dressing Status Clean, dry, & intact 10/23/2019  4:00 AM  
Dressing Type Transparent;Tape 10/23/2019  4:00 AM  
Hub Color/Line Status Pink;Flushed 10/23/2019  4:00 AM  
Action Taken Open ports on tubing capped 10/23/2019  4:00 AM  
Alcohol Cap Used Yes 10/23/2019  4:00 AM  
   
Peripheral IV 10/20/19 Right Forearm (Active) Site Assessment Clean, dry, & intact 10/23/2019  4:00 AM  
Phlebitis Assessment 0 10/23/2019  4:00 AM  
Infiltration Assessment 0 10/23/2019  4:00 AM  
Dressing Status Clean, dry, & intact 10/23/2019  4:00 AM  
Dressing Type Transparent;Tape 10/23/2019  4:00 AM  
Hub Color/Line Status Blue;Flushed 10/23/2019  4:00 AM  
Action Taken Open ports on tubing capped 10/23/2019  4:00 AM  
Alcohol Cap Used Yes 10/23/2019  4:00 AM  
  
 
Opportunity for questions and clarification was provided. Patient transported with: 
 Monitor O2 @ 2 liters Registered Nurse Tech

## 2019-10-23 NOTE — PROGRESS NOTES
Cardiac Surgery Care Coordinator- Met with Americo Albert and his nephew in pre-op holding. Reviewed role of the Cardiac Surgery Co- Nurse. Reviewed plan of care and discussed day of surgery expectations. Reviewed sternal precautions, pain scale and the importance of using the incentive spirometer after surgery. Encouraged Americo Albert to verbalize and emotional support given. Directed his nephew Starr Gomes to the main surgical waiting area. Will continue to follow for educational and emotional needs. Will update family throughout surgery. 1200- Provided update and offered emotional support. 1400- Met with nephew of Americo Albert, provided him with update. He is without questions or concerns at this time. Will continue to follow for educational and emotional needs. 1615- Met with Jerrica rolonew and Dr. Molina Delgadillo. Update given, Encouraged him to verbalize and offered emotional support. Reinforced Surgical waiting room instructions, family to wait in the main surgical waiting room until contacted by the nursing staff. 36- Escorted nephew to the fourth floor waiting room.

## 2019-10-23 NOTE — ANESTHESIA PROCEDURE NOTES
Arterial Line Placement Start time: 10/23/2019 10:05 AM 
End time: 10/23/2019 10:15 AM 
Performed by: Lilliam Gonzalez CRNA Authorized by: Neftaly Kate MD  
 
Pre-Procedure Indications:  Arterial pressure monitoring and blood sampling Preanesthetic Checklist: patient identified, risks and benefits discussed, anesthesia consent, site marked, patient being monitored, timeout performed and patient being monitored Timeout Time: 10:04 Procedure:  
Prep:  Chlorhexidine Seldinger Technique?: Yes Orientation:  Right Location:  Radial artery Catheter size:  20 G Number of attempts:  1 Cont Cardiac Output Sensor: Yes Assessment:  
Post-procedure:  Line secured and sterile dressing applied Patient Tolerance:  Patient tolerated the procedure well with no immediate complications

## 2019-10-23 NOTE — ANESTHESIA POSTPROCEDURE EVALUATION
Post-Anesthesia Evaluation and Assessment Patient: Herminia Colbert MRN: [id number]  SSN: [id number] YOB: 1950  Age: 68 y.o. Sex: male I have evaluated the patient and they are stable and ready for discharge from the PACU. Cardiovascular Function/Vital Signs Visit Vitals BP 95/55 Pulse 83 Temp 36.6 °C (97.8 °F) Resp 14 Ht 5' 7\" (1.702 m) Wt 60.6 kg (133 lb 9.6 oz) SpO2 100% BMI 20.92 kg/m² Patient is status post General anesthesia for Procedure(s): CORONARY ARTERY BYPASS GRAFT X5 WITH RIGHT AND LEFT EVH, YOUNG, CPB. MARISOL AND EPIAORTIC U/S BY DR. Gerhardt Pickler. Taal Helms Nausea/Vomiting: None Postoperative hydration reviewed and adequate. Pain: 
Pain Scale 1: Numeric (0 - 10) (10/23/19 0926) Pain Intensity 1: 0 (10/23/19 0926) Managed Neurological Status:  
Neuro Neurologic State: Pharmacologically induced (comment) (10/23/19 3644) Orientation Level: Unable to verbalize (10/23/19 5781) Cognition: Unable to assess (comment)(intubated and sedated) (10/23/19 5780)  
sedated Mental Status, Level of Consciousness: sedated Pulmonary Status:  
O2 Device: Other (comment) (10/23/19 0439) Adequate oxygenation and airway patent Complications related to anesthesia: None Post-anesthesia assessment completed. No concerns Signed By: Kaylen Saavedra MD   
 October 23, 2019 Procedure(s): CORONARY ARTERY BYPASS GRAFT X5 WITH RIGHT AND LEFT EVH, YOUNG, CPB. MARISOL AND EPIAORTIC U/S BY DR. Gerhardt Pickler. Tala Helms general 
 
<BSHSIANPOST> Vitals Value Taken Time BP Temp Pulse 81 10/23/2019  4:34 PM  
Resp 14 10/23/2019  4:34 PM  
SpO2 100 % 10/23/2019  4:34 PM  
Vitals shown include unvalidated device data.

## 2019-10-23 NOTE — BRIEF OP NOTE
BRIEF OP NOTE Pre-Op Diagnosis: CAD Post-Op Diagnosis: CAD Procedure:  
CABG x 5, LIMA to LAD, RSVG to [id number], RSVG to PDA Bilateral Greater Saphenous Vein EVH Surgeon: Julia Lewis MD 
 
Assistant(s): CHELSEA Blackwood Anesthesia: General  
 
Infusions/Support: Amiodarone, precedex, insulin, francis, dobut Estimated Blood Loss: 540 Cell Saver: 250 Specimens: * No specimens in log * Drains and pacing wires: 2 atrial wires, 1 bipolar ventricular wire, 3 rodolfo drains Complications: none Findings: CAD Implants: * No implants in log *

## 2019-10-23 NOTE — ANESTHESIA PROCEDURE NOTES
Pulmonary Artery Catheter placement Start time: 10/23/2019 11:01 AM 
End time: 10/23/2019 2:12 PM 
Performed by: Fidencio Burnett MD 
Authorized by: Fidencio Burnett MD  
 
Indications: central pressure monitoring Preanesthetic Checklist: patient identified, risks and benefits discussed, anesthesia consent, site marked, patient being monitored and timeout performed Pre-procedure: All elements of maximal sterile barrier technique followed? Yes   
2% Chlorhexidine for cutaneous antisepsis and Hand hygiene performed prior to catheter insertion Procedure:  
Prep:  Chlorhexidine Assessment:  
 
 
 
 
8 Fr CCO PA Catheter placed through existing MAC catheter and floated into the PA using waveform guidance.

## 2019-10-23 NOTE — PERIOP NOTES
TRANSFER - IN REPORT: 
 
Verbal report received from [facility]) on Benedicto Madrigal  being received from CCU(unit) for ordered procedure Report consisted of patients Situation, Background, Assessment and  
Recommendations(SBAR). Information from the following report(s) SBAR and Kardex was reviewed with the receiving nurse. Opportunity for questions and clarification was provided. Assessment completed upon patients arrival to unit and care assumed.

## 2019-10-23 NOTE — PROGRESS NOTES
1930 received bedside report from [address] in place @ 51, VSS 
 
2100 Pre-Procedure CHG bath completed 2140 CXR completed 
 
0000 pt NPO for surgery in the morning. 0415 CXR completed 0500 labs sent per order 0730 Bedside shift change report given to Navi Rosales Dr (oncoming nurse) by Tomer Felton RN (offgoing nurse). Report included the following information SBAR, Kardex, Procedure Summary, Intake/Output, MAR, Recent Results and Cardiac Rhythm NSR.

## 2019-10-23 NOTE — PROGRESS NOTES
Cardiology Progress Note                                        Admit Date: 10/19/2019 Assessment/Plan:  
 
CAD; severe 3 vs; to CABG this am 
Ischemic cardiomyopathy; on therapy CHF; improved; considering IABP vs Impella gold-operativley Benedicto Madrigal is a 68 y.o. male with PROBLEM LIST: 
Patient Active Problem List  
 Diagnosis Date Noted  ACS (acute [facility] syndrome) ([facility] 10/19/2019 Priority: 1 - One  Systolic heart failure ([facility] 10/22/2019  Coronary artery disease of native artery of native heart with stable angina pectoris ([facility] 10/21/2019  Unstable angina ([facility] 10/19/2019 Subjective:  
 
Benedicto Madrigal reports none. Visit Vitals /73 (BP 1 Location: Right arm, BP Patient Position: At rest) Pulse 67 Temp 98.6 °F (37 °C) Resp 17 Ht 5' 7\" (1.702 m) Wt 133 lb 9.6 oz (60.6 kg) SpO2 98% BMI 20.92 kg/m² Intake/Output Summary (Last 24 hours) at 10/23/2019 0930 Last data filed at 10/23/2019 0600 Gross per 24 hour Intake 63 ml Output 1500 ml Net -1437 ml Objective:  
  
Physical Exam: 
HEENT: Perrla, EOMI Neck: No JVD,  No thyroidmegaly Resp: rales CV: RRR s1s2 No murmur + s3 Abd:Soft, Nontender Ext: No edema Neuro: Alert and oriented; Nonfocal 
Skin: Warm, Dry, Intact Pulses: 2+ DP/PT/Rad Telemetry: normal sinus rhythm Current Facility-Administered Medications Medication Dose Route Frequency  senna-docusate (PERICOLACE) 8.6-50 mg per tablet 1 Tab  1 Tab Oral BID  
 0.9% sodium chloride infusion  6 mL/hr IntraVENous CONTINUOUS  
 DOBUTamine (DOBUTREX) 500 mg/250 mL (2,000 mcg/mL) infusion  0-10 mcg/kg/min IntraVENous TITRATE  magnesium sulfate 2 g/50 ml IVPB (premix or compounded)  2 g IntraVENous ONCE  
 nitroglycerin (Tridil) 200 mcg/ml infusion  16.5 mcg/min IntraVENous CONTINUOUS  
 potassium chloride 20 mEq in 50 ml IVPB  20 mEq IntraVENous ONCE  
  heparin (porcine) in 0.9% NaCl 30,000 unit/1,000 mL perfusion irrigation 50-1,000 mL  50-1,000 mL Other PRN  
 albumin human 5% (BUMINATE) solution 25 g  25 g IntraVENous ONCE  
 aminocaproic acid (AMICAR) 15 g in 0.9% sodium chloride 150 mL infusion  1 g/hr IntraVENous ONCE  
 dexmedeTOMidine (PRECEDEX) 400 mcg in 0.9% sodium chloride 100 mL infusion  0.1-0.9 mcg/kg/hr IntraVENous TITRATE  insulin regular (NOVOLIN R, HUMULIN R) 100 Units in 0.9% sodium chloride 100 mL infusion  1-50 Units/hr IntraVENous TITRATE  PHENYLephrine (NEOSYNEPHRINE) 10 mg in 0.9% sodium chloride 250 mL infusion (premix or compounded)   mcg/min IntraVENous TITRATE  PHENYLephrine (ANTHONY-SYNEPHRINE) 20 mg in 0.9% sodium chloride 250 mL infusion   mcg/min IntraVENous TITRATE  PHENYLephrine (ANTHONY-SYNEPHRINE) 30 mg in 0.9% sodium chloride 250 mL infusion   mcg/min IntraVENous TITRATE  niCARdipine (CARDENE) 25 mg in 0.9% sodium chloride 250 mL infusion  0-15 mg/hr IntraVENous TITRATE  amiodarone (CORDARONE) 375 mg/250 mL D5W infusion  0.5-1 mg/min IntraVENous CONTINUOUS  
 bacitracin (BACIIM) 50,000/1000ml NS irrigation bottle  50,000 Units Irrigation ONCE  
 amiodarone (CORDARONE) 150 mg in dextrose 5% 100 mL bolus infusion  150 mg IntraVENous ONCE  
 EPINEPHrine (ADRENALIN) 2 mg in 0.9% sodium chloride 250 mL infusion  1-10 mcg/min IntraVENous TITRATE  
 NOREPINephrine (LEVOPHED) 4 mg in dextrose 5% 250 mL infusion  0.5-16 mcg/min IntraVENous TITRATE  protamine injection 500 mg  500 mg IntraVENous ONCE  cardioplegia infusion   IntraVENous ONCE  cardioplegia infusion   IntraVENous ONCE  
 ondansetron (ZOFRAN) injection 4 mg  4 mg IntraVENous Q4H PRN  
 [Held by provider] furosemide (LASIX) injection 40 mg  40 mg IntraVENous Q12H  
 sodium chloride (NS) flush 5-40 mL  5-40 mL IntraVENous PRN  
 sodium phosphate (FLEET'S) enema 1 Enema  1 Enema Rectal PRN  
  nicotine (NICODERM CQ) 14 mg/24 hr patch 1 Patch  1 Patch TransDERmal DAILY  sodium chloride (NS) flush 5-40 mL  5-40 mL IntraVENous PRN  
 amiodarone (CORDARONE) tablet 400 mg  400 mg Oral Q12H  chlorhexidine (PERIDEX) 0.12 % mouthwash 15 mL  15 mL Oral Q12H  mupirocin (BACTROBAN) 2 % ointment 1 g  1 g Both Nostrils BID  pravastatin (PRAVACHOL) tablet 40 mg  40 mg Oral QHS  docusate sodium (COLACE) capsule 100 mg  100 mg Oral DAILY PRN  
 nitroglycerin (Tridil) 200 mcg/ml infusion  0-200 mcg/min IntraVENous TITRATE  sodium chloride (NS) flush 5-40 mL  5-40 mL IntraVENous Q8H  
 sodium chloride (NS) flush 5-40 mL  5-40 mL IntraVENous PRN  
 LORazepam (ATIVAN) injection 1 mg  1 mg IntraVENous Q6H PRN  
 zolpidem (AMBIEN) tablet 5 mg  5 mg Oral QHS PRN  
 nitroglycerin (NITROSTAT) tablet 0.4 mg  0.4 mg SubLINGual PRN  
 aspirin chewable tablet 81 mg  81 mg Oral DAILY  metoprolol (LOPRESSOR) injection 2.5 mg  2.5 mg IntraVENous Q6H PRN  
 carvedilol (COREG) tablet 12.5 mg  12.5 mg Oral BID WITH MEALS Data Review:  
Labs:   
Recent Results (from the past 24 hour(s)) POC G3 - PUL Collection Time: 10/22/19 12:14 PM  
Result Value Ref Range FIO2 (POC) 21 % pH (POC) 7.488 (H) 7.35 - 7.45    
 pCO2 (POC) 36.9 35.0 - 45.0 MMHG  
 pO2 (POC) 89 80 - 100 MMHG  
 HCO3 (POC) 28.0 (H) 22 - 26 MMOL/L  
 sO2 (POC) 98 (H) 92 - 97 % Base excess (POC) 5 mmol/L Site LEFT BRACHIAL Device: ROOM AIR Allens test (POC) YES Specimen type (POC) ARTERIAL Total resp. rate 16 POC VENOUS BLOOD GAS Collection Time: 10/22/19  9:31 PM  
Result Value Ref Range Device: NASAL CANNULA Flow rate (POC) 2 L/M  
 pH, venous (POC) 7.397 7.32 - 7.42    
 pCO2, venous (POC) 38.5 (L) 41 - 51 MMHG  
 pO2, venous (POC) 30 25 - 40 mmHg HCO3, venous (POC) 23.7 23.0 - 28.0 MMOL/L  
 sO2, venous (POC) 58 (L) 65 - 88 %  Base deficit, venous (POC) 1 mmol/L  
 Allens test (POC) N/A Total resp. rate 17 Site RAMON [facility] Specimen type (POC) MIXED VENOUS    
METABOLIC PANEL, BASIC Collection Time: 10/23/19  4:56 AM  
Result Value Ref Range Sodium 130 (L) 136 - 145 mmol/L Potassium 4.2 3.5 - 5.1 mmol/L Chloride 93 (L) 97 - 108 mmol/L  
 CO2 30 21 - 32 mmol/L Anion gap 7 5 - 15 mmol/L Glucose 108 (H) 65 - 100 mg/dL BUN 31 (H) 6 - 20 MG/DL Creatinine 1.67 (H) 0.70 - 1.30 MG/DL  
 BUN/Creatinine ratio 19 12 - 20 GFR est AA 49 (L) >60 ml/min/1.73m2 GFR est non-AA 41 (L) >60 ml/min/1.73m2 Calcium 8.8 8.5 - 10.1 MG/DL  
CBC W/O DIFF Collection Time: 10/23/19  4:56 AM  
Result Value Ref Range WBC 7.0 4.1 - 11.1 K/uL  
 RBC 2.87 (L) 4.10 - 5.70 M/uL HGB 9.8 (L) 12.1 - 17.0 g/dL HCT 29.2 (L) 36.6 - 50.3 % .7 (H) 80.0 - 99.0 FL  
 MCH 34.1 (H) 26.0 - 34.0 PG  
 MCHC 33.6 30.0 - 36.5 g/dL  
 RDW 12.2 11.5 - 14.5 % PLATELET 295 (L) 938 - 400 K/uL MPV 11.2 8.9 - 12.9 FL  
 NRBC 0.0 0  WBC ABSOLUTE NRBC 0.00 0.00 - 0.01 K/uL MAGNESIUM Collection Time: 10/23/19  4:56 AM  
Result Value Ref Range Magnesium 2.0 1.6 - 2.4 mg/dL IRON PROFILE Collection Time: 10/23/19  4:56 AM  
Result Value Ref Range Iron 34 (L) 35 - 150 ug/dL TIBC 333 250 - 450 ug/dL Iron % saturation 10 (L) 20 - 50 % FERRITIN Collection Time: 10/23/19  4:56 AM  
Result Value Ref Range Ferritin 221 26 - 388 NG/ML  
POC VENOUS BLOOD GAS Collection Time: 10/23/19  5:03 AM  
Result Value Ref Range Device: NASAL CANNULA Flow rate (POC) 2 L/M  
 pH, venous (POC) 7.387 7.32 - 7.42    
 pCO2, venous (POC) 41.3 41 - 51 MMHG  
 pO2, venous (POC) 26 25 - 40 mmHg HCO3, venous (POC) 24.9 23.0 - 28.0 MMOL/L  
 sO2, venous (POC) 46 (L) 65 - 88 % Base excess, venous (POC) 0 mmol/L Allens test (POC) N/A Total resp. rate 17 Site [facility]  Specimen type (POC) MIXED VENOUS

## 2019-10-23 NOTE — PROGRESS NOTES
1440 - TRANSFER - IN REPORT: 
 
Verbal report received from OR RN(name) on Cindy Cabrera  being received from OR(unit) for routine post - op Report consisted of patients Situation, Background, Assessment and  
Recommendations(SBAR). Information from the following report(s) SBAR, Kardex, OR Summary, Intake/Output, MAR, Accordion and Cardiac Rhythm Paced was reviewed with the receiving nurse. Opportunity for questions and clarification was provided. 1610 -Assessment completed upon patients arrival to unit and care assumed. 1613 - Noim 120 
 
1619 - Epi up to 5, Dobut up to 7, Nomi at 140. 
 
1622 - Epi down to 2 
 
1625 - Nomi down to 100, SBP at 133. 
 
1830 - Called cardiac surgery on call, Dr. Astrid Baird responded, updated him that chest tube output was 250 in the last 30 minutes, he will call Dr. Lolly Doyle. 1840 - dr. Lolly Doyle called back, update given, he will come to see patient. At this point, increasing pressor support is necessary: Vaso 0.03, Nomi at 200, Epi at 10, Dobut at 10, and cardiac index is at 1.4-1.5. 
1845 - CT output now at 300. 
 
1900 - Dr. Lolly Doyle in. CT output from the last hour was 450 ml total. Plan to call cardiac anethesia for bedside MARISOL. 1 - Dr. Henry Chan in to perform MARISOL, Rocuronium 20 mg and Versed 2 mg IV ordered for procedure. Dr. Lolly Doyle decided to bring patient back to OR for exploration, OR team called, Pharmacy informed. CHELSEA Christy at bedside to assist. Dr. Henry Chan and CRNA at bedside to assist with transport. 2000 - Transported to OR with Student RN, Night RN, Anesthesiologist, CRNA. Report given to Dr. Henry Chan on lines, drips, blood transfusions, IV access. 2015 - Bedside shift change report given to [address] [place] [address] (oncoming nurse) by Abdoul Franklin (offgoing nurse).  Report included the following information SBAR, Kardex, OR Summary, Procedure Summary, Intake/Output, MAR, Accordion, Recent Results and Alarm Parameters  and cardiac rhythm, SR, Paced.

## 2019-10-23 NOTE — ANESTHESIA PROCEDURE NOTES
MARISOL 
Date/Time: 10/23/2019 2:47 PM 
 
 
Procedure Details: probe placement, image aquisition & interpretation Risks and benefits discussed with the patient and plans are to proceed Procedure Note Performed by: Priyank Emanuel MD 
Authorized by: Priyank Emanuel MD  
 
 
Indications: assessment of ascending aorta and assessment of surgical repair Modalities: 2D, CF, CWD, PWD Probe Type: multiplane and epiaortic Insertion: atraumatic Patient Status: intubated and sedated Echocardiographic and Doppler Measurements Aorta  Size  Diam(cm)  Dissection PlaqueThick(mm)  Plaque Mobile Ascending normal  No 0-3 No  
 Arch normal  No >3 No  
 Descending normal  No >3 No  
 
 
 
 Valves  Annulus  Stenosis  Area/Grad  Regurg  Leaflet Morph  Leaflet Motion Aortic normal none  1+ normal normal  
 Mitral dilated none  1+ normal normal  
 Tricuspid normal none  1+ normal normal  
 
 
 
 Atria  Size  SEC (smoke)  Thrombus  Tumor  Device Rt Atrium normal No No No No  
 Lt Atrium dilated Yes No No No  
 
Interatrial Septum Morphology: normal 
 
Interventricular Septum Morphology: normal 
 
Ventricle  Cavity Size  Cavity Dimension Hypertrophy  Thrombus  Gloal FXN  EF  
 RV normal  No no    
 LV normal   No moderately impaired, severely impaired 25-30% Regional Function 
(1 = normal, 2 = mildly hypokinetic, 3 = severely hypokinetic, 4 = akinetic, 5 = dyskinetic) LAV - [place] ME LAV = 0  ME LAV = 90  ME LAV = 130 Basal Sept:3 Basal Ant:3 Basal Post:3 Mid Sept:3 Mid Ant:3 Mid Post:2 Apical Sept:3 Apical Ant:3 Basal Ant Sept:3 Basal Lat:3 Basal Inf:3 Mid Ant Sept:3 Mid Lat:3 Mid Inf:3 Apical Lat:3 Apical Inf:3   
 
 
Pericardium: normal 
 
Post Intervention Follow-up Study Ventricular Global Function: improved Ventricular Regional Function: unchanged Valve  Function  Regurgitation  Area Aortic no change Mitral no change Tricuspid no change Prosthetic Complications: None Comments: Epiaortic ultrasound: cannula and clamp sites free from plaque, several proximal 2mm calcified plaques

## 2019-10-23 NOTE — ANESTHESIA PREPROCEDURE EVALUATION
Relevant Problems No relevant active problems Anesthetic History No history of anesthetic complications Review of Systems / Medical History Patient summary reviewed, nursing notes reviewed and pertinent labs reviewed Pulmonary Smoker Neuro/Psych Within defined limits Cardiovascular Hypertension CHF Past MI, CAD and hyperlipidemia Exercise tolerance: <4 METS Comments: EF 25% GI/Hepatic/Renal 
  
 
Hepatitis: type C Liver disease Endo/Other Diabetes Other Findings Physical Exam 
 
Airway Mallampati: II 
TM Distance: 4 - 6 cm Neck ROM: normal range of motion Mouth opening: Normal 
 
 Cardiovascular Regular rate and rhythm,  S1 and S2 normal,  no murmur, click, rub, or gallop Rhythm: regular Rate: normal 
 
 
 
 Dental 
 
Dentition: Poor dentition and Full upper dentures Pulmonary Breath sounds clear to auscultation Abdominal 
GI exam deferred Other Findings Anesthetic Plan ASA: 4 Anesthesia type: general 
 
Monitoring Plan: Arterial line, BIS, CVP, [place] and MARISOL Post procedure ventilation Induction: Intravenous Anesthetic plan and risks discussed with: Patient

## 2019-10-23 NOTE — PROGRESS NOTES
Transitions of Care: 
 
 -Patient post-op day 0 s/p CABG 
 -[facility] patient, per previous notes, patient has Medicare Part A and has refused transfer to the [place]. The CM reviewed previous CM notes- per notes, patient was independent with ADLs at baseline and lives with his son in a second story home. Per notes, patient signed refusal form for transfer to the / [facility] team endorses that the patient has Medicare Part A. CM sent follow-up request to the [facility] team requesting Medicare number for follow-up services, etc. The CM will continue to follow, PT/OT evaluations pending.  RON Cobos

## 2019-10-23 NOTE — PROGRESS NOTES
11:30 - 12:45 (75)  
14:45 - 17:30 (180) 19:00 - 19:30 (30) 
20:00 - 20:30 (30) NYHA class IV A/C systolic heart failure (EF 25%, NT pro-BNP 15,013) Acute on chronic kidney disease Mild pulmonary edema Mild chronic lung disease 
  
11:30 - here for high risk intubation 11:45 - intubated; going over MARISOL 
 
12:00 - low EF in the 25% range; reasonable RV function 12:15 - at this point will hold off on IABP due to tortuous vessels; EF probably not low enough to warrant Impella at this point 12:30 - will plan on some Dobutamine when we come off bypass 14:45 - Dobuamine to come off bypass 15:00 - Epinephrine added 15:15 - going over TTE 
 
15:30 - EF looks better 15:45 - tracking down family 16:00 - family updated 16:15 - back upstairs; Dobutamine 5, Epinephrine 1 
 
16:30 - pressure soft - up on Dobutamine 16:45 - up on epinephrine 17:00 - re-floating PA catheter; CVP 5; will give pRBC 
 
17:15 - BP better 17:30 - BP soft again, adding vasopressin 19:00 - called to evaluate patient for bleeding and low cardiac index ; 800 cc out in 4 hours 19:15 - contacting anesthesia 20:00 - going over MARISOL 
 
20:15 - large clot compressing RA 
 
20:30 - heading to OR for emergency re-exploration Risk of morbidity and mortality - high Medical decision making - high complexity Total critical care time - 285 minutes (CPT 37293, 99292 x 8)

## 2019-10-24 NOTE — PROGRESS NOTES
NYHA class IV A/C systolic heart failure (EF 25%, NT pro-BNP 15,013) Acute on chronic kidney disease Mild pulmonary edema Mild chronic lung disease Remains intubated and sedated Hgb looks good Platelets a little low Able to wean off pressors overnight Remains on Dobutamine and nicardipine Creatinine on the mid 1's Bilirubin and other LFTs look good Little acidotic on ABG Addendum: 
 
Extubated Remains on Dobutamine Cardiac index and SVO2 look good CXR - mild pulmonary edema Intake/Output Summary (Last 24 hours) at 10/24/2019 1313 Last data filed at 10/24/2019 1200 Gross per 24 hour Intake 7732.84 ml Output 2802 ml Net 4930.84 ml Visit Vitals /66 (BP 1 Location: Right arm, BP Patient Position: At rest) Pulse 78 Temp 99.1 °F (37.3 °C) Resp 27 Ht 5' 7\" (1.702 m) Wt 149 lb 11.1 oz (67.9 kg) SpO2 100% BMI 23.45 kg/m² Risk of morbidity and mortality - high Medical decision making - high complexity Total critical care time - 30 minutes (CPT 41316)

## 2019-10-24 NOTE — PROGRESS NOTES
10/24/19 1025 Weaning Parameters Spontaneous Breathing Trial Complete Yes 
(pt.has cuff leak) Resp Rate Observed 20 Ve 10  RSBI 46

## 2019-10-24 NOTE — PROGRESS NOTES
Physical Therapy 10/24/2019 Orders received, acknowledged, and appreciated. Chart reviewed. Noted patient s/p CABG x 5 plus re-entry for mediastinal clot compressing RA and tamponade, now POD #1. At this time, patient remains intubated and sedated. Will follow peripherally. Thank you.  
Jimenez Shelton, PT, DPT

## 2019-10-24 NOTE — PROGRESS NOTES
2140: TRANSFER - IN REPORT: 
 
Verbal report received from RAMONA Fonseca and Ashlyn Galo (name) on Perez Dewitt  being received from OR (unit) for routine post - op Report consisted of patients Situation, Background, Assessment and  
Recommendations(SBAR). Information from the following report(s) SBAR, Kardex, OR Summary, Intake/Output, MAR, Med Rec Status, Cardiac Rhythm NSR and Alarm Parameters  was reviewed with the receiving nurse. Opportunity for questions and clarification was provided. Assessment completed upon patients arrival to unit and care assumed. Pt arrived on SIMV on the ventilator Rate 14, TV: 500, Fio2: 60%, PEEP: 5. Pt in NSR at a backup rate on pacemaker. Arrived on:  
Amio- 1 mg/min Dobuta- 5 mcg/kg/min Dex- 0.6 mcg/kg/hr Insulin 1.6 units/hr MIV- 50 ml/hr Orders received by Dr. Santos Reese to keep patient intubated overnight. No orders received to start diprivan, able to continue precedex gtt and give PRN versed. CHELSEA Galo at bedside, orders received: if Hgb is less than 8, give 1 unit PRBC and recheck H&H in one hour post transfusion. Orders to decrease 0.45% NS from 50 ml/hr to 10ml/hr KVO. Able to give 3 PRN albumin. 2211: CI: 1.6, 1 albumin given. 2215: H&H resulted 7/20.8, will give 1 unit PRBC per Asa CHELSEA Blackmon.  
B, giving 10 ml of D50. Attempted to call pt's son, Gaby Magana left to return phone call. 2220: ABG results: 7.34/38.4/226/-5/20.7/100% Mixed venous: 55. 
RT at bedside, Fio2 decreased from 60% to 40%. Pt placed on assist control on the ventilator. 0000: 1 unit PRBC complete, no transfusion reaction suspected. Will send repeat labs in 1 hour. Two 20 mEq KCl completed as well. 0140: Repeated H&H: 8.2/24. 7.  
 
0250: CI decreased from 2-2.1 to 1.6-1.7; CVP decreased from 16-17 to 11-2nd albumin infusing. 0400: Pt's son, Neda Suresh, on telephone updates given on patient's status. Opportunities for questions and concerns provided. Andra Dupree plans to visit the patient around 1600 today. 0500: Labs sent, 12 lead EKG performed; see pt's chart. 0730: Dr. Gina Carrera at bedside, orders to wean to extubate as able to. 
 
0800: Bedside and Verbal shift change report given to Gi Law RN (oncoming nurse) by Ramone Carroll (offgoing nurse).  Report included the following information SBAR, Kardex, OR Summary, Procedure Summary, Intake/Output, Accordion, Recent Results, Med Rec Status, Cardiac Rhythm NSR , Alarm Parameters  and Pre Procedure Checklist.

## 2019-10-24 NOTE — PROCEDURES
[address] PULMONARY FUNCTION TEST Name:  Sim Jo 
MR#:  [id number] :  1950 ACCOUNT #:  [de-identified] DATE OF SERVICE:  10/20/2019 INDICATION:  Preoperative evaluation. FINDINGS: 
 
SPIROMETRY:  Reduced FVC, reduced FEV1, normal FEV1/FVC ratio. IMPRESSION:  Moderate restrictive ventilatory defect based on spirometry. There is no evidence of expiratory airflow obstruction. Clinical correlation is recommended. Dahiana Bonilla MD 
 
 
[id number] 
D:  10/23/2019 15:47 
T:  10/24/2019 2:47 JOB #:  [id number]

## 2019-10-24 NOTE — ANESTHESIA POSTPROCEDURE EVALUATION
Post-Anesthesia Evaluation and Assessment Patient: Rosalba Gonzalez MRN: [id number]  SSN: [id number] YOB: 1950  Age: 68 y.o. Sex: male I have evaluated the patient and they are stable and ready for discharge from the PACU. Cardiovascular Function/Vital Signs Visit Vitals /66 (BP 1 Location: Right arm, BP Patient Position: At rest) Pulse 76 Temp 36.8 °C (98.2 °F) Resp 25 Ht 5' 7\" (1.702 m) Wt 67.9 kg (149 lb 11.1 oz) SpO2 100% BMI 23.45 kg/m² Patient is status post General anesthesia for Procedure(s): 
CARDIAC RE-ENTRY, MARISOL BY [facility]. Nausea/Vomiting: None Postoperative hydration reviewed and adequate. Pain: 
Pain Scale 1: Adult Nonverbal Pain Scale (10/24/19 0800) Pain Intensity 1: 0 (10/24/19 0800) Managed Neurological Status:  
Neuro Neurologic State: Drowsy; Pharmacologically induced (comment) (10/24/19 0800) Orientation Level: Unable to verbalize (10/24/19 0800) Cognition: Unable to assess (comment) (10/24/19 0800) Speech: Intubated (10/24/19 0800) Assessment L Pupil: Round;Sluggish (10/24/19 0800) Size L Pupil (mm): 2 (10/24/19 0800) Assessment R Pupil: Round;Sluggish (10/24/19 0800) Size R Pupil (mm): 2 (10/24/19 0800) LUE Motor Response: Purposeful (10/24/19 0800) LLE Motor Response: Purposeful (10/24/19 0800) RUE Motor Response: Purposeful (10/24/19 0800) RLE Motor Response: Purposeful (10/24/19 0800)  
sedated Mental Status, Level of Consciousness: sedated Pulmonary Status:  
O2 Device: Endotracheal tube (10/24/19 0800) Adequate oxygenation and airway patent Complications related to anesthesia: None Post-anesthesia assessment completed. No concerns Signed By: Clary Loja MD   
 October 24, 2019 Procedure(s): 
CARDIAC RE-ENTRY, MARISOL BY DR [facility] QUIGLEY. general 
 
<BSHSIANPOST> Vitals Value Taken Time BP Temp Pulse 75 10/24/2019 10:24 AM  
Resp 22 10/24/2019 10:24 AM  
 SpO2 100 % 10/24/2019 10:24 AM  
Vitals shown include unvalidated device data.

## 2019-10-24 NOTE — PROGRESS NOTES
\[facility] [place] [facility]\"" ICU Progress Note Admit Date: 10/19/2019 POD:  1 Day Post-Op Procedure:  Procedure(s): 1. CAB X 5 with EVH 2.  CARDIAC RE-ENTRY, MARISOL BY  [facility] Subjective:  
Pt seen with Dr. Katia Copeland. Tmax 99.6, intubated and sedated. On Dobutamine at 8, on and off Cardene and Nomi, Insulin, amiodarone gtts. Objective:  
Vitals: 
Blood pressure 128/66, pulse 80, temperature 99 °F (37.2 °C), resp. rate 25, height 5' 7\" (1.702 m), weight 149 lb 11.1 oz (67.9 kg), SpO2 98 %. Temp (24hrs), Av °F (36.7 °C), Min:96.1 °F (35.6 °C), Max:99.6 °F (37.6 °C) Hemodynamics: 
 CO: CO (l/min): 3.8 l/min CI: CI (l/min/m2): 2.2 l/min/m2 CVP: CVP (mmHg): 11 mmHg (10/24/19 1500) SVR: SVR (dyne*sec)/cm5: 1422 (dyne*sec)/cm5 (10/24/19 1100) PAP Systolic: PAP Systolic: 30 (98/10/28 7560) PAP Diastolic: PAP Diastolic: 15 (54/42/33 2163) PVR:   
 SV02: SVO2 (%): 47 %(post activity) (10/24/19 1500) SCV02:   
 
EKG/Rhythm:  Sinus rhythm in the 80s CT Output: 900 ml + 535 ml Ventilator: 
Ventilator Volumes Vt Set (ml): 500 ml (10/24/19 0952) Vt Exhaled (Machine Breath) (ml): 430 ml (10/24/19 0850) Vt Spont (ml): 438 ml (10/24/19 0952) Ve Observed (l/min): 13 l/min (10/24/19 0952) Oxygen Therapy: 
Oxygen Therapy O2 Sat (%): 98 % (10/24/19 1500) Pulse via Oximetry: 80 beats per minute (10/24/19 1500) O2 Device: Nasal cannula (10/24/19 1200) O2 Flow Rate (L/min): 4 l/min (10/24/19 1200) FIO2 (%): 40 % (10/24/19 0952) CXR:   
CXR Results  (Last 48 hours) 10/24/19 0503  XR CHEST PORT Final result Impression:  IMPRESSION: No significant change. Narrative:  EXAM:  XR CHEST PORT. INDICATION: Postop heart. COMPARISON: 10/23/2019. FINDINGS:   
A portable AP radiograph of the chest was obtained at 0413 hours. There are  
sternal sutures and mediastinal clips. Lines and tubes: The patient is on a cardiac monitor.   The endotracheal tube,  
 nasogastric tube, right jugular Weskan-Chavez catheter, mediastinal drain and left  
chest tube are all unchanged in position. Lungs: The lungs are clear. Pleura: There is no pneumothorax or pleural effusion. Mediastinum: The cardiac and mediastinal contours and pulmonary vascularity are  
normal.  
Bones and soft tissues: The bones and soft tissues are grossly within normal  
limits. 10/23/19 2152  XR CHEST PORT Final result Impression:  IMPRESSION:  
No significant interval change. Narrative:  PORTABLE CHEST RADIOGRAPH/S: 10/23/2019 9:52 PM  
   
Clinical history: Postop heart INDICATION:   post op heart COMPARISON: 10/23/2019 FINDINGS:  
AP portable upright view of the chest demonstrates a stable prominent  
cardiopericardial silhouette. The lungs are adequately expanded. Minimal  
interstitial opacity. Left-sided pleural drain unchanged. ET tube just inferior  
to the level of the clavicles. NG tube unchanged. . Mild basilar atelectasis. The  
osseous structures are unremarkable. Patient is on a cardiac monitor. 10/23/19 1700  XR CHEST PORT Final result Impression:  IMPRESSION: Status post median sternotomy with lines and tubes in satisfactory  
position. Narrative:  EXAM:  XR CHEST PORT. INDICATION: Postop heart. COMPARISON: 10/23/2019. FINDINGS:   
A portable AP radiograph of the chest was obtained at 1635 hours. There are new  
sternal sutures and mediastinal clips. Lines and tubes: The patient is on a cardiac monitor. The right jugular Weskan-Chavez catheter with tip projecting over the main pulmonary artery is  
unchanged. There is a new endotracheal tube with tip 3 to 4 cm above the prabhjot,  
a nasogastric tube coursing through the esophagus and a mediastinal drain and  
left chest tubes in place. Lungs: There is mild interstitial edema about the lungs and atelectasis at the  
lung bases. Pleura: There is no pneumothorax or pleural effusion. Mediastinum: The cardiac and mediastinal contours and pulmonary vascularity are  
normal.  
Bones and soft tissues: The bones and soft tissues are grossly within normal  
limits. 10/23/19 0516  XR CHEST PORT Final result Impression:  IMPRESSION:   
Pulmonary arterial catheter in appropriate position. Bibasilar fibrosis and  
volume loss. Narrative:  PORTABLE CHEST RADIOGRAPH/S: 10/23/2019 5:16 AM  
   
INDICATION: Pulmonary arterial catheter. COMPARISON: 10/22/2019, 10/21/2019, 10/20/2019. TECHNIQUE: Portable frontal semiupright radiograph/s of the chest.  
   
FINDINGS:   
A pulmonary arterial catheter via a right IJ sheath is in appropriate position  
in the main pulmonary artery. There is fibrosis and mild volume loss in the lung  
bases. The central airways are patent. No pneumothorax or pleural effusion. 10/22/19 2140  XR CHEST PORT Final result Impression:  IMPRESSION: Satisfactory [place]-Chavez placement. Narrative:  EXAM: Portable CXR. 2121 hours. INDICATION: [place]/ Central line placement FINDINGS:  
[place]-Chavez catheter has been placed via the right IJ with tip in the main  
pulmonary artery. There is no pneumothorax. Lungs are stable since yesterday  
including interstitial disease, without consolidation. Heart size is normal.  
There is no overt pulmonary edema. Admission Weight: Last Weight Weight: 141 lb 5 oz (64.1 kg) Weight: 149 lb 11.1 oz (67.9 kg) Intake / Output / Drain: 
Current Shift: 10/24 0701 - 10/24 1900 In: 459 [I.V.:459] Out: 525 [Urine:325; Drains:200] Last 24 hrs.:  
 
Intake/Output Summary (Last 24 hours) at 10/24/2019 1525 Last data filed at 10/24/2019 1500 Gross per 24 hour Intake 7732.84 ml Output 2987 ml Net 4745.84 ml EXAM: 
General:  Intubated and Sedated Lungs:   Clear to auscultation anteriorly. Incision:  No erythema, drainage or swelling. Heart:  Regular rate and rhythm, S1, S2 normal, no murmur, click, rub or gallop. Abdomen:   Soft, non-tender. Bowel sounds normal. No masses,  No organomegaly. Extremities:  No edema. PPP. Neurologic:  Gross motor and sensory apparatus intact. Labs:  
Recent Labs 10/24/19 
1448  10/24/19 
1024  10/24/19 
0522  10/23/19 
2146 WBC  --   --   --   --  6.6   < > 6.6 HGB  --   --   --   --  8.4*   < > 7.0* HCT  --   --   --   --  24.2*   < > 20.8* PLT  --   --   --   --  80*   < > 83* NA  --   --   --   --  134*   < > 138 K  --   --  4.8  --  5.4*   < > 3.5 BUN  --   --   --   --  23*   < > 24* CREA  --   --   --   --  1.49*   < > 1.49* GLU  --   --   --   --  102*   < > 74 GLUCPOC 98   < >  --    < >  --    < >  --   
INR  --   --   --   --   --   --  1.5*  
 < > = values in this interval not displayed. Assessment:  
 
Principal Problem: S/P CABG x 5 (10/23/2019) Overview: x 5, LIMA to LAD, RSVG to Diag1, OM2-OM3, RSVG to PDA Active Problems: 
  ACS (acute coronary syndrome) ([facility] [address] (10/19/2019) Unstable angina ([facility] [address] (10/19/2019) Coronary artery disease of native artery of native heart with stable angina pectoris ([address] (10/21/2019) Systolic heart failure ([facility] [address] (10/22/2019) Plan/Recommendations/Medical Decision Makin.  CAD with NSTEMI on this admission s/p CAB X 5 on 10/23 with return to the OR 10/23 for bleeding. ASA/Statin.  
  
2. NYHA Class II Systolic Heart Failure: TTE showing EF of 25-30%. Trend pBNP. On Dobutamine. No BB while on Dobutamine 
  
3.  HTN: On and off Nomi and Cardene. Dobutamine gtt. Will resume home medications when able.  
  
4.  HLD: Continue statin 
  
5. Anemia: May be Chronic. Cont to trend down. Check occult stool. No obvious signs of bleeding.  
  
6. Thrombocytopenia: mild, plts cont to trend down 
  
7. Renal insufficiency: Cr trending back down, monitor w/ lasix Signed By: Tiffany Cooper, NP

## 2019-10-24 NOTE — PROGRESS NOTES
Transitions of Care: 
 
 -PT/OT evaluations to assist in determining needs 
 -Home health choice received from previous CM- Freedom of Choice signed for [facility] as first choice, [facility] as second choice- referral sent via [employer] to [facility]. The CM received feedback from the [facility] team, Patient's Medicare Part A number: #[id number] CM will continue to follow. RON Foreman 
 
11:24 a.m.- [facility] cannot accept this patient- does not service patient's area, patient's second choice of previous Freedom of Choice form is [facility] [address] [facility] will send referral with Medicare Part A number to [facility] [employer] for review. 12:38 p.m.- [facility] can accept this patient for home health services pending progress with PT/OT and medical stability.  RON Foreman

## 2019-10-24 NOTE — OP NOTES
[address] [facility] 
OPERATIVE REPORT Name:  Dorcas English 
MR#:  [id number] :  1950 ACCOUNT #:  [de-identified] DATE OF SERVICE:  10/23/2019 PREOPERATIVE DIAGNOSIS:  Bleeding following coronary artery bypass graft. POSTOPERATIVE DIAGNOSIS:  Bleeding following coronary artery bypass graft. PROCEDURE PERFORMED:  Exploration for postoperative hemorrhage, chest (CPT code 06774). SURGEON:  Leonard Haile MD 
 
ASSISTANT:  CHELSEA Presley 
 
ANESTHESIA:  General endotracheal anesthesia. COMPLICATIONS:  None. SPECIMENS REMOVED:  None. IMPLANTS:  None. ESTIMATED BLOOD LOSS:  200 mL. PROCEDURE:  The patient is a very pleasant 33-year-old gentleman who underwent a previous CABG today, who developed hypotension requiring increasing pressor support and a low cardiac index. We performed a bedside MARISOL which showed a very specific clot impressing his right atrium. We then booked him for emergent reexploration. He was brought to the operating room, underwent general endotracheal anesthesia without complication. Prepped his chest in the usual sterile fashion. I opened the incision, removed the sternal wires, removed the clot, washed it out of the mediastinum, replaced the mediastinal wires and closed the subcutaneous tissue with Vicryl sutures. The patient tolerated the procedure well. I was present for the entire procedure. Angi Jarrett MD 
 
 
[id number]/ 
D:  10/24/2019 10:19 
T:  10/24/2019 12:15 JOB #:  [id number]

## 2019-10-24 NOTE — OP NOTES
[address]  
OPERATIVE REPORT Name:  Jose Camarena 
MR#:  [id number] :  1950 ACCOUNT #:  [de-identified] DATE OF SERVICE:  10/22/2019 PREOPERATIVE DIAGNOSES: 
1. Ejection fraction 25% (this is based on left heart catheterization performed on 10/19/2019). 2.  New York Heart Association Class IV acute-on-chronic systolic heart failure, starting approximately 2 days prior to surgery (note the patient had dyspnea at rest as well as an NT-proBNP of 15,013 two days prior to surgery all consistent with New York Heart Association Class IV acute-on-chronic systolic heart failure starting about 2 days prior to surgery). 3.  Last creatinine level before surgery was 1.67. 
4.  Symptoms of admission: A.  Non-ST-elevation myocardial infarction. B.  Congestive heart failure. 5.  Symptoms of surgery: A. Unstable angina. B.  Congestive heart failure. 6.  Previous myocardial infarction 4 days prior to surgery. 7.  Mild chronic lung disease (note the patient had an FEV1 of 1.87, which is only 66% predicted consistent with mild chronic lung disease). 8.  Current everyday tobacco smoker. 9.  Drinks 8 or more alcoholic beverages per week. 10.  Peripheral arterial disease (note the patient had a right ankle-brachial index of 0.88). 11.  Diabetes mellitus (requiring just oral diet therapy). 12.  Plavix prior to surgery (note the patient had a 5 day Plavix washout before cardiac surgery). 13.  Hypertension. 14.  Three-vessel coronary artery disease. 15.  60% left main stenosis. 16.  90% proximal left anterior descending stenosis. 17.  Urgent coronary artery bypass grafting due to severe disease. 18.  New York Heart Association class IV acute-on-chronic systolic heart failure starting 2 days prior to surgery. 19.  Echocardiogram showed mild aortic insufficiency and moderate mitral regurgitation.  
 
POSTOPERATIVE DIAGNOSES: 
 1. Ejection fraction 25% (this is based on left heart catheterization performed on 10/19/2019). 2.  New York Heart Association Class IV acute-on-chronic systolic heart failure, starting approximately 2 days prior to surgery (note the patient had dyspnea at rest as well as an NT-proBNP of 15,013 two days prior to surgery all consistent with New York Heart Association Class IV acute-on-chronic systolic heart failure starting about 2 days prior to surgery). 3.  Last creatinine level before surgery was 1.67. 
4.  Symptoms of admission: A.  Non-ST-elevation myocardial infarction. B.  Congestive heart failure. 5.  Symptoms of surgery: A. Unstable angina. B.  Congestive heart failure. 6.  Previous myocardial infarction 4 days prior to surgery. 7.  Mild chronic lung disease (note the patient had an FEV1 of 1.87, which is only 66% predicted consistent with mild chronic lung disease). 8.  Current everyday tobacco smoker. 9.  Drinks 8 or more alcoholic beverages per week. 10.  Peripheral arterial disease (note the patient had a right ankle-brachial index of 0.88). 11.  Diabetes mellitus (requiring just oral diet therapy). 12.  Plavix prior to surgery (note the patient had a 5 day Plavix washout before cardiac surgery). 13.  Hypertension. 14.  Three-vessel coronary artery disease. 15.  60% left main stenosis. 16.  90% proximal left anterior descending stenosis. 17.  Urgent coronary artery bypass grafting due to severe disease. 18.  New York Heart Association class IV acute-on-chronic systolic heart failure starting 2 days prior to surgery. 19.  Echocardiogram showed mild aortic insufficiency and moderate mitral regurgitation. PROCEDURES PERFORMED: 
1. CABG x5 (LIMA to LAD; saphenous vein graft to diagonal 1 to OM2 to OM3; saphenous vein graft to PDA). 2.  Endoscopic vein harvest, right lower extremity.  
 
SURGEON:  Monica Khan MD 
 
ASSISTANT:  CHELSEA Estrada 
 ; PA-C assistance was needed due to difficulty of procedure, dissection, and identification of pertinent anatomy throughout the case ANESTHESIA:  General endotracheal anesthesia. COMPLICATIONS:  None. SPECIMENS REMOVED:  None. IMPLANTS:  None. ESTIMATED BLOOD LOSS:  50 mL. PROCEDURE:  The patient is a very pleasant 68-year-old gentleman recently diagnosed with multivessel coronary artery disease and congestive heart failure. He is now being brought to the operating room for high-risk coronary artery bypass grafting. The patient was brought to the operating room, had a right radial A-line placed without complications, [place]-Chavez catheter placed without complications, general endotracheal anesthesia without complications. Chest, abdomen, and lower extremities were prepped and draped in the usual sterile fashion. A midline incision was made on the patient's sternum, cut, and dissected down along the sternal bone and the sternal bone with a sagittal saw and left pleural space was entered. During this portion of the procedure, endoscopic vein harvesting was performed on the right lower extremity without complication. Once the left pleural space was entered, the left internal mammary artery was carefully dissected off the chest wall, taking care to place proximal clips on each portion of the internal mammary branch. Once this was completely dissected off the chest wall, an appropriate dose of heparin was given. Three minutes passed. Two large distal clips were placed and the artery was cut off the chest wall. There was good flow at this stage. Next, the pericardium was opened and pursestring sutures were applied. Once the ACT was above 460, we performed an epiaortic ultrasound and did not identify any significant atherosclerotic plaque in the ascending aorta.   We then placed 2 pursestring sutures in the ascending aorta and we cannulated with a normal aortic perfuser, de-aired and hooked up to bypass circuit. I then made a small V in the pericardium and matured the left internal mammary artery to a length of appropriate size up to the left anterior descending artery. I then placed a pursestring in the right atrium, placed a 2-stage venous cannula, de-aired and hooked up to the bypass circuit. We went on with cardiopulmonary bypass, placed an antegrade cardioplegia needle, de-aired the cardioplegia line, and hooked up to the cannula, which was cross-clamped, and pulled back up again. We gave 750 mL of initial cardioplegia, however, required approximately 400 mL. We then dissected out the OM3 vessels with a 69 blade, open with a 75 blade, and then further extended the arteriotomy with forward and reverse Erickson. I then performed saphenous vein graft to OM2 anastomosis with 7-0 Prolene suture. We then made similar anastomosis to the OM2 and diagonal 1 vessel, then brought the vein graft length to appropriate size and similarly cut it. We then dissected out the PDA vessel with a 69 blade. We decided to further extend the arteriotomy with forward and reverse Erickson. We then performed saphenous graft to PDA anastomosis using a 7-0 Prolene suture. I then dissected out left anterior descending artery with a 69 blade. We decided to further extend the arteriotomy with forward and reverse Erickson. We then performed LIMA to LAD anastomosis with 8-0 Prolene suture. We then made 2 small nicks in the ascending aorta, used 4-punch x2 and performed proximal anastomosis in the right and left side grafts. Care was taken to de-air the ascending aorta before tying this down. We then brought the head of the bed up, pumped it down with the cross-clamp, pumped it back up again. As the temperature reached 36.2, the patient successfully came off the coronary artery bypass.   We placed the A and V wires, AC locators, and Zaid drains. Wires were used to close the sternum and Vicryl suture was used to close the subcutaneous tissue. I was present for the entire procedure. MD MARIANGEL Flowers/PREETHI_LUBNA/KHALIDA_ANCA 
D:  10/24/2019 10:12 
T:  10/24/2019 13:31 
JOB #:  [id number]

## 2019-10-24 NOTE — PERIOP NOTES
PT RECEIVED EMERGENTLY FROM CVICU FOR CARDIAC RE-ENTRY. Patient arrived to the operating room via unit bed. All wheels locked and patient transferred to the OR table with the assistance of four people. MTRE warming wrap present on OR table. Temp set at 37 C and monitored by perfusion. Unit # [id number]. Fluids:  
0.9 % Sodium Chloride: PRN irrigation Sterile water:  
1000 ml in splash basin for instrument care.

## 2019-10-24 NOTE — ANESTHESIA PROCEDURE NOTES
MARISOL 
Date/Time: 10/23/2019 7:55 PM 
Ordering Provider: Himanshu Stallings MD 
 
Procedure Details: probe placement, image aquisition & interpretation Site marked, Timeout performed, 19:55 Risks and benefits discussed with the patient and plans are to proceed Procedure Note Performed by: Josesito Marin MD 
Authorized by: Josesito Marin MD  
 
 
Indications: suspected pericardial effusion Modalities: 2D, CF, CWD, PWD Probe Type: multiplane Insertion: atraumatic Patient Status: intubated and sedated Echocardiographic and Doppler Measurements Aorta  Size  Diam(cm)  Dissection PlaqueThick(mm)  Plaque Mobile Ascending normal  No 0-3 No  
 Arch normal  No 0-3 Descending normal  No 0-3 No  
 
 
 
 Valves  Annulus  Stenosis  Area/Grad  Regurg  Leaflet Morph  Leaflet Motion Aortic normal none  1+ normal normal  
 Mitral normal none  1+ normal normal  
 Tricuspid normal none  0 normal normal  
 
 
 
 Atria  Size  SEC (smoke)  Thrombus  Tumor  Device Rt Atrium  No No No No  
 Lt Atrium normal No No No No  
 
Interatrial Septum Morphology: normal 
 
Interventricular Septum Morphology: normal 
 
Ventricle  Cavity Size  Cavity Dimension Hypertrophy  Thrombus  Gloal FXN  EF  
 RV   No no moderately impaired LV dilated   No severely impaired Regional Function 
(1 = normal, 2 = mildly hypokinetic, 3 = severely hypokinetic, 4 = akinetic, 5 = dyskinetic) LAV - [place] ME LAV = 0  ME LAV = 90  ME LAV = 130 Basal Sept:3 Basal Ant:2 Basal Post:3 Mid Sept:3 Mid Ant:1 Mid Post:2 Apical Sept:3 Apical Ant:1 Basal Ant Sept:3 Basal Lat:2 Basal Inf:3 Mid Ant Sept:2 Mid Lat:2 Mid Inf:3 Apical Lat:1 Apical Inf:3   
 
Diastolic function: Pre-intervension, RA severely compressed by external clot. Effusion: tamponade Post Intervention Follow-up Study Ventricular Global Function: improved Ventricular Regional Function: improved Valve  Function  Regurgitation  Area Aortic no change 1+ Mitral no change 1+ Tricuspid no change 0 Prosthetic

## 2019-10-24 NOTE — BRIEF OP NOTE
BRIEF OP NOTE Pre-Op Diagnosis: Post op bleeding/cardiac tamponade Post-Op Diagnosis: Post op bleeding/cardiac tamponade Procedure: Procedure(s): 
CARDIAC RE-ENTRY, EVACUATION OF MEDIASTINAL CLOT, CONTROL OF BLEEDING Surgeon: Jaziel Teixeira MD 
 
Assistant(s): CHELSEA Monroe Anesthesia: General  
 
Infusions/Support: Amiodarone, precedex, insulin, francis, vaso, epi, dobut, levo Estimated Blood Loss: 125 Cell Saver: 225 Specimens: * No specimens in log * Drains and pacing wires: 2 atrial wires, 1 bipolar ventricular wire, 3 rodolfo drains Complications: none Findings: Cardiac Tamponade, Post op bleeding Implants: * No implants in log *

## 2019-10-24 NOTE — ANESTHESIA PREPROCEDURE EVALUATION
Relevant Problems No relevant active problems Anesthetic History No history of anesthetic complications Review of Systems / Medical History Patient summary reviewed, nursing notes reviewed and pertinent labs reviewed Pulmonary Within defined limits Neuro/Psych Within defined limits Cardiovascular CHF: NYHA Classification IV 
 
CAD Exercise tolerance: <4 METS Comments: Large RA compression from effusion GI/Hepatic/Renal 
Within defined limits Endo/Other Diabetes: using insulin Other Findings Physical Exam 
 
Airway Mallampati: II 
TM Distance: > 6 cm Neck ROM: normal range of motion Mouth opening: Normal 
Intubated Cardiovascular Regular rate and rhythm,  S1 and S2 normal,  no murmur, click, rub, or gallop Dental 
No notable dental hx Pulmonary Decreased breath sounds Abdominal 
GI exam deferred Other Findings Anesthetic Plan ASA: 5 Anesthesia type: general 
 
Monitoring Plan: Arterial line, BIS, CVP, [place]-Chavez and MARISOL Induction: Intravenous Anesthetic plan and risks discussed with: Patient

## 2019-10-24 NOTE — PROGRESS NOTES
Orders received, acknowledged, and appreciated. Chart reviewed. Noted patient s/p CABG x 5 plus re-entry for mediastinal clot compressing RA and tamponade, now POD #1. At this time, patient remains intubated and sedated. Will follow peripherally in preparation for OT evaluation. Thank you.

## 2019-10-24 NOTE — DIABETES MGMT
[facility] Cardiac Surgery Progress Note Recommendations/ Comments:  Pt arrived to CVICU at [phone] at 10/23/19. Consider continuing insulin gtt for at least 48hrs post-op and eating 50% solid foods then, 
1) transition off gtt per [employer] Protocol 2) continue accu-checks and humalog correctional insulin ac & hs  
3) ADA/AHA diet as diet advanced 4) Pt with hx of DM on no DM related meds PTA. Will need to determine basal needs closer to transition. Insulin gtt should not be stopped until after 1604 on 10/25/19 to complete 48hr post-op time frame. Pt could receive transition as early as 26 if all criteria met per protocol. Currently on insulin gtt. Drip rate currently running at 0.6 units/hr. Pt has required 3.9 units in the past 6 hours. Chart reviewed on Cindy Cabrera. Patient is 68 y.o. male s/p CABG x 5 followed by re-entry for mediastinal clot evacuation and repair of SVG to PDA  - POD 1. Pt with documented hx of DM on no meds PTA. A1c:  
Lab Results Component Value Date/Time Hemoglobin A1c 5.3 10/21/2019 03:04 AM  
 
 
 
Recent Glucose Results:  
Lab Results Component Value Date/Time  (H) 10/24/2019 05:22 AM  
 GLU 93 10/24/2019 01:12 AM  
 GLU 74 10/23/2019 09:46 PM  
 GLUCPOC 103 (H) 10/24/2019 10:23 AM  
 GLUCPOC 98 10/24/2019 09:25 AM  
 GLUCPOC 99 10/24/2019 08:15 AM  
  
 
Lab Results Component Value Date/Time Creatinine 1.49 (H) 10/24/2019 05:22 AM  
 
Estimated Creatinine Clearance: 42.5 mL/min (A) (based on SCr of 1.49 mg/dL (H)). Active Orders Diet DIET NPO  
  
 
PO intake: No data found. Will continue to follow as needed. Thank you. Priscila Barrett RD, Diabetes Clinician Time spent: 5 minutes

## 2019-10-24 NOTE — PROGRESS NOTES
07:45- Bedside report received from [facility], [address] [place] [address]. Drips dual verified. Cardiac surgery at bedside, plans to wean vent to extubate. 08:45- HOB elevated to 45 degrees, pt coughing - HR dropped to 60, MAP 55, CI 2.0 - unable to gain appropriate capture with external pacer, Nomi gtt started at 20 mcg, precedex decreased 0.4mcg. Pt following commands, lifts head off bed, nods to questions appropriately 09:00- HR 65, MAP 65 - vent switched to SIMV, decreased rate 7. RR 20, pt's TV >300 
 
09:50- Pt placed on CPAP 
 
10:30- ABG pH 7.35/pCO2 36/pO2 144/HCO3 20 - Extubated to 4L NC Neuro assessment negative. Speech weak, strong non-productive cough. C/o sore throat, chest pain tolerable. 15:00- Pt placed in chair position in bed. Pt became hypotensive, asymptomatic - Nomi gtt restarted at 20 mcg to maintain MAP > 65 
 
17:00- Pt stood and assisted to chair with 2-assist. MAP SBP 60/30s, SVO2 22%, pt  dyspneic, denies dizziness - Nomi titrated up temporarily to regain pressure, O2 increased 6L. Pt recovered slowly. 19:00- Pt assisted back to bed, again BP and SVO2 dropped 20s - requiring Nomi gtt temporarily, moderated dyspnea, recovered slowly. 20:00- Bedside shift change report given to [facility], [address] [place] [address] (oncoming nurse) by Kathi Sevilla RN (offgoing nurse). Report included the following information SBAR, OR Summary, Intake/Output, MAR, Recent Results and Cardiac Rhythm NSR.

## 2019-10-25 NOTE — PROGRESS NOTES
Problem: Mobility Impaired (Adult and Pediatric) Goal: *Acute Goals and Plan of Care (Insert Text) Description FUNCTIONAL STATUS PRIOR TO ADMISSION: Patient was independent and active without use of DME.  
 
HOME SUPPORT PRIOR TO ADMISSION: The patient lived with his son, but did not require assist. Son provides transportation since his car is currently broken down. Physical Therapy Goals Initiated 10/25/2019 1. Patient will move from supine to sit and sit to supine, scoot up and down and roll side to side in bed with minimal assistance/contact guard assist within 5 days. 2.  Patient will perform sit to/from stand with minimal assistance/contact guard assist within 5 days. 3.  Patient will ambulate  feet with least restrictive assistive device and minimal assistance/contact guard assist within 5 days. 4.  Patient will ascend/descend 6 stairs with single handrail(s) with moderate assistance  within 5 days. 5.  Patient will perform cardiac exercises per protocol with minimal assistance/contact guard assist within 5 days. 6.  Patient will verbally and functionally recall 3/3 sternal precautions within 5 days. Outcome: Progressing Towards Goal 
  
PHYSICAL THERAPY EVALUATION Patient: Yadira Fuentes (89 y.o. male) Date: 10/25/2019 Primary Diagnosis: [facility] syndrome[facility] [I24.9] Unstable angina (HCC) [I20.0] Procedure(s) (LRB): 
CARDIAC RE-ENTRY, MARISOL BY DR Miguelito Paulino (N/A) 2 Days Post-Op Precautions:  Fall, Sternal 
 
 
ASSESSMENT Based on the objective data described below, the patient presents with global weakness, dyspnea with conversation, LE edema, newly enforced sternal precautions, and labile BP requiring Nomi. Patient's onset of fatigue occurred quickly- with just 1 LAQ, bilaterally.  Reviewed 5-lb weight lifting restrictions, use of bear for \"splinting\" while coughing/sneezing, continued use of incentive spirometer 10 x per hour, role of acute PT and typical mobility progression, cardiac exercises and frequency of performance, signs/symptoms of DVTs/pneumonia/sternal wound dehiscence, and importance of frequent mobilization outside of therapy sessions with nursing staff; will need reinforcement. Current Level of Function Impacting Discharge (mobility/balance): Unable to stand this date Functional Outcome Measure: The patient scored 20/100 on the Barthel Index outcome measure which is indicative of 80% ADL impairment. Other factors to consider for discharge: Gross debility Patient will benefit from skilled therapy intervention to address the above noted impairments. PLAN : 
Recommendations and Planned Interventions: bed mobility training, transfer training, gait training, therapeutic exercises, edema management/control, patient and family training/education and therapeutic activities Frequency/Duration: Patient will be followed by physical therapy:  daily to address goals. Recommendation for discharge: (in order for the patient to meet his/her long term goals) To be determined: Rehab This discharge recommendation: A follow-up discussion with the attending provider and/or case management is planned IF patient discharges home will need the following DME: to be determined (TBD) SUBJECTIVE:  
Patient stated I am just congested and too weak.  OBJECTIVE DATA SUMMARY:  
HISTORY:   
Past Medical History:  
Diagnosis Date Diabetes ([facility] High cholesterol Hypertension MI, old Past Surgical History:  
Procedure Laterality Date HX CORONARY ARTERY BYPASS GRAFT  10/23/2019  
 x 5, LIMA to LAD, RSVG to Diag1, OM2-OM3, RSVG to PDA HX CORONARY STENT PLACEMENT    
 HX LUMBAR LAMINECTOMY l5-s1 Personal factors and/or comorbidities impacting plan of care: PMH Home Situation Home Environment: Private residence # Steps to Enter: 4 Rails to Enter:  Yes 
 One/Two Story Residence: Two story # of Interior Steps: 15 Height of Each Step (in): 7 inches Interior Rails: Left Lift Chair Available: No 
Living Alone: No 
Support Systems: Child(pradip)(+ Son) Patient Expects to be Discharged to[de-identified] Private residence Current DME Used/Available at Home: None Tub or Shower Type: Tub/Shower combination EXAMINATION/PRESENTATION/DECISION MAKING:  
Critical Behavior: 
Neurologic State: Alert, Appropriate for age Orientation Level: Oriented to place, Oriented to person, Oriented to time, Oriented to situation Cognition: Follows commands Safety/Judgement: Decreased insight into deficits Hearing: Auditory Auditory Impairment: None Skin:  Sternotomy dressing intact Edema: B LE 
Range Of Motion: 
AROM: Generally decreased, functional 
  
  
  
  
  
  
  
Strength:   
Strength: Generally decreased, functional 
  
  
  
  
  
  
Tone & Sensation:  
Tone: Normal 
  
  
  
  
Sensation: Impaired(B foot neuropathy + restless leg syndrome ?) Coordination: 
Coordination: Generally decreased, functional 
Vision:  
Corrective Lenses: Glasses Functional Mobility: 
Bed Mobility: 
 
Supine to Sit: Bed Modified Balance:  
Sitting: Impaired; Without support Sitting - Static: Fair (occasional) Sitting - Dynamic: Fair (occasional) Standing: (Not attempted 2* patient's fatigue and SpO2) Functional Measure: 
Barthel Index: 
 
Bathin Bladder: 0 Bowels: 5 Groomin Dressin Feedin Mobility: 0 Stairs: 0 Toilet Use: 0 Transfer (Bed to Chair and Back): 0 Total: 20/100 The Barthel ADL Index: Guidelines 1. The index should be used as a record of what a patient does, not as a record of what a patient could do. 2. The main aim is to establish degree of independence from any help, physical or verbal, however minor and for whatever reason. 3. The need for supervision renders the patient not independent. 4. A patient's performance should be established using the best available evidence. Asking the patient, friends/relatives and nurses are the usual sources, but direct observation and common sense are also important. However direct testing is not needed. 5. Usually the patient's performance over the preceding 24-48 hours is important, but occasionally longer periods will be relevant. 6. Middle categories imply that the patient supplies over 50 per cent of the effort. 7. Use of aids to be independent is allowed. Leanna Rodriguez, Barthel, D.W. ([id number]). Functional evaluation: the Barthel Index. [address] [facility] (14)2. Tanner Lechuga george LEXI Killian, Shanda Camacho., Taty Gonzalez., [place], [address] [place] [address] Ave (1999). Measuring the change indisability after inpatient rehabilitation; comparison of the responsiveness of the Barthel Index and Functional [place] Measure. Journal of Neurology, Neurosurgery, and Psychiatry, 66(4), 674-913. Roselia Guido, N.J.A, CAMILO Martinez, & Vira Aguilera M.A. (2004.) Assessment of post-stroke quality of life in cost-effectiveness studies: The usefulness of the Barthel Index and the EuroQoL-5D. [facility], 13, 407-07 Based on the above components, the patient evaluation is determined to be of the following complexity level: MEDIUM Pain Rating: \"I am just too weak right now and congested. \" Activity Tolerance:  
Poor and desaturates with exertion and requires oxygen Please refer to the flowsheet for vital signs taken during this treatment. After treatment patient left in no apparent distress:  
Supine in bed, Call bell within reach and Side rails x 3 
 
COMMUNICATION/EDUCATION:  
The patients plan of care was discussed with: Occupational Therapist, Physician,  and Rehabilitation Attendant.  
 
Fall prevention education was provided and the patient/caregiver indicated understanding., Patient/family have participated as able in goal setting and plan of care. and Patient/family agree to work toward stated goals and plan of care. Thank you for this referral. 
Jt Bowles, PT, DPT Time Calculation: 19 mins

## 2019-10-25 NOTE — PROGRESS NOTES
08:00- Bedside report received from [facility], [address] [facility]. Drips dual verified. 12:30- SBP 160s, MAP 90s, SVO2 30s - Cardene gtt started 5 mg Pt nauseas, c/o thick sputum and post-nasal drainage - prn Zofran given 13:00- No relief from zofran, still dry heaves, coughing up tan mucous, desat 80% - Placed on midlfow at 12L 
 
13:45- Orders received for prn Compazine - 5mg given 14:00- Verbal order for new SBP parameters < 140 - Cardene gtt off 
 
16:00- Midflow weaned to 8L - O2 sats 98% Pt feeling much better, nausea improved. 17:15- Stable HDs at rest - Dobutamine decreased 6 mcg 18:00- SVO2 drops with minimal activity/tyrning in bed to low 40s. 20:00- Bedside shift change report given to Funmilayo Wan RN (oncoming nurse) by Ginny Estrada RN (offgoing nurse). Report included the following information SBAR, OR Summary, Intake/Output, MAR, Recent Results and Cardiac Rhythm NSR.

## 2019-10-25 NOTE — PROGRESS NOTES
Problem: Self Care Deficits Care Plan (Adult) Goal: *Acute Goals and Plan of Care (Insert Text) Description FUNCTIONAL STATUS PRIOR TO ADMISSION: Patient was independent and active without use of DME.  
 
HOME SUPPORT: The patient lived with his son but did not require assist for self-care tasks. Patient's son assisted him with transportation to the grocery store. Occupational Therapy Goals Initiated 10/25/2019 1. Patient will perform ADLs standing 5 mins without fatigue or LOB with moderate assistance within 5 day(s). 2.  Patient will perform lower body ADLs with moderate assistance within 5 day(s). 3.  Patient will perform gathering ADL items high and low 2/2 with moderate assistance within 5 day(s). 4.  Patient will perform toilet transfers with moderate assistance within 5 day(s). 5.  Patient will perform all aspects of toileting with moderate assistance within 5 day(s). 6.  Patient will participate in cardiac/sternal upper extremity therapeutic exercise/activities to increase independence with ADLs with minimal assistance for 5 minutes within 5 day(s). Outcome: Progressing Towards Goal 
 OCCUPATIONAL THERAPY EVALUATION Patient: Herman Stewart (48 y.o. male) Date: 10/25/2019 Primary Diagnosis: ACS (acute coronary syndrome) ([facility] [I24.9] Unstable angina (HCC) [I20.0] Procedure(s) (LRB): 
CARDIAC RE-ENTRY, MARISOL BY DR Alka Rodriges (N/A) 2 Days Post-Op Precautions: Fall, Sternal 
 
ASSESSMENT Based on the objective data described below, the patient presents with generalized weakness, decreased endurance, decreased activity tolerance, and fatigue s/p CABG x 5 plus re-entry for mediastinal clot compressing RA now POD 2 and with exploration of postoperative hemorrhage POD 1. Patient on 6L NC O2 today with O2 sats 92-88 % just with conversation and patient with SOB.  Patient declining OOB activity today however performing bilateral leg raises, bilateral arm raises, and scapular elevation in bed/chair position. Patient significantly below functional baseline of independence and will likely need rehab post discharge to maximize patient safety and independence with ADL transfers and tasks. Current Level of Function Impacting Discharge (ADLs/self-care): MAX A-TOTAL A all LB ADLs Functional Outcome Measure: The patient scored 20/100 on the Barthel Index outcome measure which is indicative of 80% ADL impairment. Other factors to consider for discharge: complicated post op course Patient will benefit from skilled therapy intervention to address the above noted impairments. PLAN : 
Recommendations and Planned Interventions: self care training, functional mobility training, therapeutic exercise, balance training, therapeutic activities, endurance activities, neuromuscular re-education, patient education, home safety training and family training/education Frequency/Duration: Patient will be followed by occupational therapy 5 times a week to address goals. Recommendation for discharge: (in order for the patient to meet his/her long term goals) Therapy 3 hours per day 5-7 days per week This discharge recommendation: 
Has been made in collaboration with the attending provider and/or case management IF patient discharges home will need the following DME: to be determined (TBD) SUBJECTIVE:  
Patient stated I am trying to name my bear after the [address] [employer].  OBJECTIVE DATA SUMMARY:  
HISTORY:  
Past Medical History:  
Diagnosis Date Diabetes ([facility] High cholesterol Hypertension MI, old Past Surgical History:  
Procedure Laterality Date HX CORONARY ARTERY BYPASS GRAFT  10/23/2019  
 x 5, LIMA to LAD, RSVG to Diag1, OM2-OM3, RSVG to PDA HX CORONARY STENT PLACEMENT    
 HX LUMBAR LAMINECTOMY l5-s1 Expanded or extensive additional review of patient history:  
 
Home Situation Home Environment: Private residence # Steps to Enter: 4 Rails to Enter: Yes One/Two Story Residence: Two story # of Interior Steps: 15 Height of Each Step (in): 7 inches Interior Rails: Left Lift Chair Available: No 
Living Alone: No 
Support Systems: Child(pradip)(+ Son) Patient Expects to be Discharged to[de-identified] Private residence Current DME Used/Available at Home: None Tub or Shower Type: Tub/Shower combination EXAMINATION OF PERFORMANCE DEFICITS: 
Cognitive/Behavioral Status: 
Neurologic State: Alert; Appropriate for age Orientation Level: Oriented to place;Oriented to person;Oriented to time;Oriented to situation Cognition: Follows commands Perception: Appears intact Perseveration: No perseveration noted Safety/Judgement: Decreased insight into deficits Skin: exposed areas grossly intact; chest tube, da silva cathter, [place], lines & leads Edema: BLE Hearing: Auditory Auditory Impairment: None Vision/Perceptual:   
    
    
    
  
    
    
Corrective Lenses: Glasses Range of Motion: BUE 
AROM: Generally decreased, functional(bilateral shoulders limited to ~90 degrees active flexion ) Strength: BUE Strength: Generally decreased, functional 
  
  
  
  
 
Coordination: 
Coordination: Generally decreased, functional 
Fine Motor Skills-Upper: Left Intact; Right Intact Gross Motor Skills-Upper: Left Intact; Right Intact Tone & Sensation: BUE Tone: Normal 
Sensation: Impaired(reports numbness/tingling in bilateral feet) Balance: 
Sitting: Impaired; Without support Sitting - Static: Fair (occasional) Sitting - Dynamic: Fair (occasional) Standing: (did not attempt 2* patient fatigue) ADL Assessment: 
Feeding: Setup;Supervision(infer 2* generalized weakness) Oral Facial Hygiene/Grooming: Supervision;Setup(infer seated 2* generalized weakness) Bathing: Maximum assistance(infer A for BLE and periarea) Upper Body Dressing: Minimum assistance(infer 2* weakness and decreased shoulder ROM) Lower Body Dressing: Total assistance(infer 2* generalized weakness ) Toileting: Total assistance(da silva; weakness) ADL Intervention and task modifications: The patient instructed and recalled 2/3 sternal precautions. Lower Body Dressing Assistance Socks: Total assistance (dependent) Cognitive Retraining Safety/Judgement: Decreased insight into deficits Increase activity tolerance for home, work, and sexual intercourse by pacing self with increasing the arm exercises, sitting duration, frequency OOB, walking, standing, and ADLs. Instructed and indicated understanding of s/s of too much activity, how to respond to s/s safely. Therapeutic Exercises:  
Patient completed 1 set x 2 reps scapular elevation and shoulder flexion. Functional Measure: 
Barthel Index: 
 
Bathin Bladder: 0 Bowels: 5 Groomin Dressin Feedin Mobility: 0 Stairs: 0 Toilet Use: 0 Transfer (Bed to Chair and Back): 0 Total: 20/100 The Barthel ADL Index: Guidelines 1. The index should be used as a record of what a patient does, not as a record of what a patient could do. 2. The main aim is to establish degree of independence from any help, physical or verbal, however minor and for whatever reason. 3. The need for supervision renders the patient not independent. 4. A patient's performance should be established using the best available evidence. Asking the patient, friends/relatives and nurses are the usual sources, but direct observation and common sense are also important. However direct testing is not needed. 5. Usually the patient's performance over the preceding 24-48 hours is important, but occasionally longer periods will be relevant. 6. Middle categories imply that the patient supplies over 50 per cent of the effort. 7. Use of aids to be independent is allowed. Bandar Turner, Barthel, D.W. ([id number]). Functional evaluation: the Barthel Index. [address] (14)2. LEXI Colindres, Monty Ortiz., Duy Sesay, [place], [address] (1999). Measuring the change indisability after inpatient rehabilitation; comparison of the responsiveness of the Barthel Index and Functional [place] Measure. Journal of Neurology, Neurosurgery, and Psychiatry, 66(4), 150-507. Carter Sandhoff, N.J.A, CAMILO Martinez, & Antonio Goodrich M.A. (2004.) Assessment of post-stroke quality of life in cost-effectiveness studies: The usefulness of the Barthel Index and the EuroQoL-5D. [facility], 13, 940-17 Occupational Therapy Evaluation Charge Determination History Examination Decision-Making LOW Complexity : Brief history review  HIGH Complexity : 5 or more performance deficits relating to physical, cognitive , or psychosocial skils that result in activity limitations and / or participation restrictions MEDIUM Complexity : Patient may present with comorbidities that affect occupational performnce. Miniml to moderate modification of tasks or assistance (eg, physical or verbal ) with assesment(s) is necessary to enable patient to complete evaluation Based on the above components, the patient evaluation is determined to be of the following complexity level: LOW Activity Tolerance:  
Fair, desaturates with exertion and requires oxygen and observed SOB with activity Please refer to the flowsheet for vital signs taken during this treatment. After treatment patient left in no apparent distress:   
Supine in bed and Call bell within reach COMMUNICATION/EDUCATION:  
The patients plan of care was discussed with: Physical Therapist and Registered Nurse.  
 
Home safety education was provided and the patient/caregiver indicated understanding., Patient/family have participated as able in goal setting and plan of care. and Patient/family agree to work toward stated goals and plan of care. This patients plan of care is appropriate for delegation to ALONSO. Thank you for this referral. 
Lucian Kaye Time Calculation: 19 mins

## 2019-10-25 NOTE — PROGRESS NOTES
2000: I agree with and have reviewed all of Arlene Teixeira [facility] student nurse's charting.

## 2019-10-25 NOTE — PROGRESS NOTES
NYHA class IV A/C systolic heart failure (EF 25%, NT pro-BNP 15,013) Acute on chronic kidney disease Mild pulmonary edema Mild chronic lung disease 
  
Remains on Dobutamine Hgb looks good Platelets a little low Creatinine in the mid 1's Bilirubin and other LFTs look good CXR - pulmonary edema Will add some diuretic May need dobutamine for a bit Addendum: 
 
PT/OT here to work with patient Increasing dyspnea Will increase diuretic to Bumex 2 mg IV BID Will add diuril or Bumex gtt if that fails Intake/Output Summary (Last 24 hours) at 10/25/2019 1123 Last data filed at 10/25/2019 1100 Gross per 24 hour Intake 2390.97 ml Output 1243 ml Net 1147.97 ml Visit Vitals /66 (BP 1 Location: Right arm, BP Patient Position: At rest) Pulse 85 Temp 99.6 °F (37.6 °C) Resp 18 Ht 5' 7\" (1.702 m) Wt 150 lb 12.7 oz (68.4 kg) SpO2 92% BMI 23.62 kg/m² Risk of morbidity and mortality - high Medical decision making - high complexity Total critical care time - 30 minutes (CPT 53261)

## 2019-10-25 NOTE — PROGRESS NOTES
Transitions of Care: 
 
 -PT/OT 
 -At [address] [place] [address] following for home health services if patient able to discharge home with home health 
 -Cardiac Surgery follow-up The CM participated in [facility] rounds, the patient is on dobutamine, not medically stable for discharge at this time- anticipate debility, PT/OT currently recommending IPR-will discuss further with Cardiac Surgery. The patient has Medicare Part A benefits. CM will continue to follow for transitions of care.  RON Dickens

## 2019-10-25 NOTE — PROGRESS NOTES
[facility] ICU Progress Note Admit Date: 10/19/2019 POD:  2 Days Post-Op Procedure:  Procedure(s): 
CARDIAC RE-ENTRY, MARISOL BY  [facility] Subjective:  
Pt seen with Dr. Blair Apgar. Tmax 100.4, on mid-flow 10L per NC. Dobutamine at 7. Complaints of nausea relieved with Compazine. Increased oxygen demands overnight. Objective:  
Vitals: 
Blood pressure 128/66, pulse 81, temperature 98.9 °F (37.2 °C), resp. rate 22, height 5' 7\" (1.702 m), weight 150 lb 12.7 oz (68.4 kg), SpO2 98 %. Temp (24hrs), Av.4 °F (37.4 °C), Min:98.9 °F (37.2 °C), Max:100.4 °F (38 °C) Hemodynamics: 
 CO: CO (l/min): 4.1 l/min CI: CI (l/min/m2): 2.4 l/min/m2 CVP: CVP (mmHg): 9 mmHg (10/25/19 1500) SVR: SVR (dyne*sec)/cm5: 1165 (dyne*sec)/cm5 (10/25/19 1200) PAP Systolic: PAP Systolic: 35 ( 4597) PAP Diastolic: PAP Diastolic: 11 ( 3010) PVR:   
 SV02: SVO2 (%): 61 % (10/25/19 1500) SCV02:   
 
EKG/Rhythm:  Sinus rhythm in the 80s CT Output: 330 + 140 last 24 hours Oxygen Therapy: 
Oxygen Therapy O2 Sat (%): 98 % (10/25/19 1500) Pulse via Oximetry: 81 beats per minute (10/25/19 1500) O2 Device: (midflow) (10/25/19 1400) O2 Flow Rate (L/min): 10 l/min (10/25/19 1400) FIO2 (%): 40 % (10/24/19 0579) CXR:  
CXR Results  (Last 48 hours) 10/25/19 0507  XR CHEST PORT Final result Impression:  IMPRESSION: Worsening of interstitial edema. Bibasilar patchy air space disease. Narrative:  INDICATION: Post-op heart. Portable AP semiupright view of the chest.  
   
Direct comparison made to prior chest x-ray dated 10/24/2019. Cardiomediastinal silhouette is stable. Median sternotomy wires are noted. There  
has been interval extubation and interval removal of the nasogastric tube. Tubes  
and lines are otherwise unchanged. There is worsening interstitial edema. There  
is bibasilar patchy air space disease. There is no pneumothorax.  No pleural  
 fluid is seen. 10/24/19 0503  XR CHEST PORT Final result Impression:  IMPRESSION: No significant change. Narrative:  EXAM:  XR CHEST PORT. INDICATION: Postop heart. COMPARISON: 10/23/2019. FINDINGS:   
A portable AP radiograph of the chest was obtained at 0413 hours. There are  
sternal sutures and mediastinal clips. Lines and tubes: The patient is on a cardiac monitor. The endotracheal tube,  
nasogastric tube, right jugular [place]-Chavez catheter, mediastinal drain and left  
chest tube are all unchanged in position. Lungs: The lungs are clear. Pleura: There is no pneumothorax or pleural effusion. Mediastinum: The cardiac and mediastinal contours and pulmonary vascularity are  
normal.  
Bones and soft tissues: The bones and soft tissues are grossly within normal  
limits. 10/23/19 2152  XR CHEST PORT Final result Impression:  IMPRESSION:  
No significant interval change. Narrative:  PORTABLE CHEST RADIOGRAPH/S: 10/23/2019 9:52 PM  
   
Clinical history: Postop heart INDICATION:   post op heart COMPARISON: 10/23/2019 FINDINGS:  
AP portable upright view of the chest demonstrates a stable prominent  
cardiopericardial silhouette. The lungs are adequately expanded. Minimal  
interstitial opacity. Left-sided pleural drain unchanged. ET tube just inferior  
to the level of the clavicles. NG tube unchanged. . Mild basilar atelectasis. The  
osseous structures are unremarkable. Patient is on a cardiac monitor. 10/23/19 1700  XR CHEST PORT Final result Impression:  IMPRESSION: Status post median sternotomy with lines and tubes in satisfactory  
position. Narrative:  EXAM:  XR CHEST PORT. INDICATION: Postop heart. COMPARISON: 10/23/2019. FINDINGS:   
A portable AP radiograph of the chest was obtained at 1635 hours. There are new  
sternal sutures and mediastinal clips. Lines and tubes: The patient is on a cardiac monitor. The right jugular [place]-Chavez catheter with tip projecting over the main pulmonary artery is  
unchanged. There is a new endotracheal tube with tip 3 to 4 cm above the prabhjot,  
a nasogastric tube coursing through the esophagus and a mediastinal drain and  
left chest tubes in place. Lungs: There is mild interstitial edema about the lungs and atelectasis at the  
lung bases. Pleura: There is no pneumothorax or pleural effusion. Mediastinum: The cardiac and mediastinal contours and pulmonary vascularity are  
normal.  
Bones and soft tissues: The bones and soft tissues are grossly within normal  
limits. Admission Weight: Last Weight Weight: 141 lb 5 oz (64.1 kg) Weight: 150 lb 12.7 oz (68.4 kg) Intake / Output / Drain: 
Current Shift: 10/25 0701 - 10/25 1900 In: 500.6 [I.V.:500.6] Out: 1015 [Urine:865; Drains:150] Last 24 hrs.:  
 
Intake/Output Summary (Last 24 hours) at 10/25/2019 1607 Last data filed at 10/25/2019 1500 Gross per 24 hour Intake 1969.59 ml Output 1653 ml Net 316.59 ml EXAM: 
General:  Lying in bed. No acute distress Lungs:   Crackles in the bases, decreased Incision:  No erythema, drainage or swelling. Heart:  Regular rate and rhythm, S1, S2 normal, no murmur, click, rub or gallop. Abdomen:   Soft, non-tender. Bowel sounds normal. No masses,  No organomegaly. +Nausea. +flatus, no BM yet Extremities:  No edema. PPP. Neurologic:  Gross motor and sensory apparatus intact. Labs:  
Recent Labs 10/25/19 
1522  10/25/19 
0429  10/23/19 
2146 WBC  --   --  9.5   < > 6.6 HGB  --   --  7.9*   < > 7.0* HCT  --   --  23.7*   < > 20.8* PLT  --   --  84*   < > 83* NA  --   --  132*   < > 138 K  --   --  4.4   < > 3.5 BUN  --   --  24*   < > 24* CREA  --   --  1.58*   < > 1.49* GLU  --   --  115*   < > 74 GLUCPOC 112*   < >  --    < >  --   
INR  --   --   --   --  1.5*  
 < > = values in this interval not displayed. Assessment:  
 
Principal Problem: S/P CABG x 5 (10/23/2019) Overview: x 5, LIMA to LAD, RSVG to Diag1, OM2-OM3, RSVG to PDA Active Problems: 
  [facility]acute [facility] syndrome[facility] (10/19/2019) Unstable angina ([facility] (10/19/2019) Coronary artery disease of native artery of native heart with stable angina pectoris ([facility] (10/21/2019) Systolic heart failure ([facility] (10/22/2019) Plan/Recommendations/Medical Decision Makin.  CAD with NSTEMI on this admission s/p CAB:  ASA/Statin. No BB while on Dobutamine.  
  
2. NYHA Class II Systolic Heart Failure: Pre-op TTE showing EF of 25-30%. 
  
3.  HTN: Currently on dobutamine. Will resume BB and other antihypertensives when appropriate 
  
4.  HLD: Continue statin 
  
5. Anemia-postoperative secondary to acute blood loss and hypervolemia: Above transfusion threshold. Daily CBC. 
  
6. Thrombocytopenia: mild, platelets stable today. Continue to monitor daily CBC. 
  
7. Renal insufficiency: Creatinine at 1.5 today. Avoid hypotension and nephrotoxic agents. 8. Atelectasis:  IS and TCDB. Increase activity. Wean oxygen to keep sat >92%. 9. Nutrition: Advance diet with caution as tolerated to Cardiac/Diabetic. Large stomach bubble on CXR noted. 
  
Dispo: PT/OT/Cardiac Rehab. Keep in CVICU today. Case management following Signed By: Lei Alcaraz NP

## 2019-10-25 NOTE — DIABETES MGMT
[facility] Cardiac Surgery Progress Note Recommendations/ Comments:  Pt arrived to CVICU at [phone] at 10/23/19. Consider continuing insulin gtt for at least 48hrs post-op and eating 50% solid foods then, 
1) transition off gtt per [employer] Protocol 2) continue accu-checks and humalog correctional insulin ac & hs  
3) ADA/AHA diet as diet advanced 4) Pt with hx of DM on no DM related meds PTA. Based on weight and current insulin needs, possible transition dose may be Lantus 10 to 12 units. Insulin gtt should not be stopped until after 1604 on 10/25/19 to complete 48hr post-op time frame. Pt could receive transition as early as 26 if all criteria met per protocol. Currently on insulin gtt running at 2.5 units/hr. Pt has required 11.2 units in the past 6 hours. Chart reviewed on Jef Schilling. Patient is 68 y.o. male s/p CABG x 5 followed by re-entry for mediastinal clot evacuation and repair of SVG to PDA  - POD 2. Pt with documented hx of DM on no meds PTA. A1c:  
Lab Results Component Value Date/Time Hemoglobin A1c 5.3 10/21/2019 03:04 AM  
 
 
 
Recent Glucose Results:  
Lab Results Component Value Date/Time  (H) 10/25/2019 04:29 AM  
 GLUCPOC 130 (H) 10/25/2019 10:55 AM  
 GLUCPOC 112 (H) 10/25/2019 08:49 AM  
 GLUCPOC 111 (H) 10/25/2019 07:17 AM  
  
 
Lab Results Component Value Date/Time Creatinine 1.58 (H) 10/25/2019 04:29 AM  
 
Estimated Creatinine Clearance: 40.1 mL/min (A) (based on SCr of 1.58 mg/dL (H)). Active Orders Diet DIET CLEAR LIQUID No Conc. Sweets PO intake: No data found. Will continue to follow as needed. Thank you. Monique Hopson MS, RN, CDE Time spent: 8 minutes

## 2019-10-25 NOTE — OP NOTES
[address]  
OPERATIVE REPORT Name:  Linnea Motley 
MR#:  [id number] :  1950 ACCOUNT #:  [de-identified] DATE OF SERVICE:  10/23/2019 PREOPERATIVE DIAGNOSIS:  Bleeding following coronary artery bypass graft. POSTOPERATIVE DIAGNOSIS:  Bleeding following coronary artery bypass graft. PROCEDURE PERFORMED:  Exploration for postoperative hemorrhage, chest (CPT code 98192). SURGEON:  Bandar Santoyo MD 
 
ASSISTANT:  CHELSEA West 
; SOREN assistance was needed due to difficulty of procedure, dissection, and identification of pertinent anatomy throughout the case ANESTHESIA:  General endotracheal anesthesia. COMPLICATIONS:  None. SPECIMENS REMOVED:  None. IMPLANTS:  None. ESTIMATED BLOOD LOSS:  200 mL. PROCEDURE:  The patient is a very pleasant 42-year-old gentleman who underwent a previous CABG today, who developed hypotension requiring increasing pressor support and a low cardiac index. We performed a bedside MARISOL which showed a very specific clot impressing his right atrium. We then booked him for emergent reexploration. He was brought to the operating room, underwent general endotracheal anesthesia without complication. Prepped his chest in the usual sterile fashion. I opened the incision, removed the sternal wires, removed the clot, washed it out of the mediastinum, replaced the mediastinal wires and closed the subcutaneous tissue with Vicryl sutures. The patient tolerated the procedure well. I was present for the entire procedure. Una Scott MD 
 
 
SF/[id number]/ 
D:  10/24/2019 10:19 
T:  10/24/2019 12:15 JOB #:  [id number]

## 2019-10-25 NOTE — PROGRESS NOTES
1930: Bedside and Verbal shift change report given to [facility] and SN Barbara (oncoming nurse) by Raúl Godoy RN (offgoing nurse). Report included the following information SBAR, Kardex, OR Summary, Intake/Output, MAR, Cardiac Rhythm sinus rhythm and Alarm Parameters . 0137: Urine output less than 30 mL per hour, PAD decreased from 11-12 to 5-7, CVP decreased from 11-12 to 7, 1 Albumin given. 0300: Adequate PAD 11-12, CVP 11, urine output 75 mL for last hour. 0730: Bedside and Verbal shift change report given to Raúl Godoy RN (oncoming nurse) by [facility], RN and SN Barbara (offgoing nurse). Report included the following information SBAR, Kardex, Intake/Output, MAR, Cardiac Rhythm Sinus rhythm  and Alarm Parameters .

## 2019-10-26 NOTE — PROGRESS NOTES
[facility] ICU Progress Note Admit Date: 10/19/2019 POD:  3 Days Post-Op Procedure:  Procedure(s): 
CARDIAC RE-ENTRY, MARISOL BY  [facility] Subjective:  
Pt seen with Dr. Nallely Oliveira. Tmax 99.6 on mid-flow 15L per NC. Dobutamine down to 2. Still with cough productive of clear sputum Objective:  
Vitals: 
Blood pressure 128/66, pulse 67, temperature 98.3 °F (36.8 °C), resp. rate (!) 31, height 5' 7\" (1.702 m), weight 145 lb 12.8 oz (66.1 kg), SpO2 98 %. Temp (24hrs), Av.1 °F (37.3 °C), Min:98.3 °F (36.8 °C), Max:99.6 °F (37.6 °C) EKG/Rhythm:  Sinus rhythm in the 80s Oxygen Therapy: 
Oxygen Therapy O2 Sat (%): 98 % (10/26/19 08) Pulse via Oximetry: 67 beats per minute (10/26/19 08) O2 Device: Other (comment)(midflow) (10/26/19 08) O2 Flow Rate (L/min): 15 l/min (10/26/19 08) FIO2 (%): 40 % (10/24/19 0952) CXR:  
CXR Results  (Last 48 hours) 10/26/19 0518  XR CHEST PORT Final result Impression:  IMPRESSION: Improved aeration the left lung base. Persistent interstitial  
opacities in the right lung may reflect asymmetric edema or infection. Narrative:  EXAM: XR CHEST PORT INDICATION: postop heart COMPARISON: 10/25/2019 FINDINGS: A portable AP radiograph of the chest was obtained at 412 hours. Patient's on a cardiac monitor. Median sternotomy changes are noted with a right IJ [place]-Chavez catheter and the mediastinal and left pleural drain. . Persistent  
interstitial opacities are noted in the right lung with some improved aeration  
the left lung base. No pneumothorax. [facility] Heart size is mildly enlarged. .  The bones  
and soft tissues are grossly within normal limits. 10/25/19 5337  XR CHEST PORT Final result Impression:  IMPRESSION: Worsening of interstitial edema. Bibasilar patchy air space disease. Narrative:  INDICATION: Post-op heart.    
   
Portable AP semiupright view of the chest.  
   
 Direct comparison made to prior chest x-ray dated 10/24/2019. Cardiomediastinal silhouette is stable. Median sternotomy wires are noted. There  
has been interval extubation and interval removal of the nasogastric tube. Tubes  
and lines are otherwise unchanged. There is worsening interstitial edema. There  
is bibasilar patchy air space disease. There is no pneumothorax. No pleural  
fluid is seen. Admission Weight: Last Weight Weight: 141 lb 5 oz (64.1 kg) Weight: 145 lb 12.8 oz (66.1 kg) Intake / Output / Drain: 
Current Shift: 10/26 0701 - 10/26 1900 In: -  
Out: 61 [Urine:50; Drains:10] Last 24 hrs.:  
 
Intake/Output Summary (Last 24 hours) at 10/26/2019 2990 Last data filed at 10/26/2019 0800 Gross per 24 hour Intake 1328.73 ml Output 3050 ml Net -1721.27 ml EXAM: 
General:  Lying in bed. No acute distress Lungs:   Crackles in the bases, decreased Incision:  No erythema, drainage or swelling. Heart:  Regular rate and rhythm, S1, S2 normal, no murmur, click, rub or gallop. Abdomen:   Soft, non-tender. Bowel sounds normal. No masses,  No organomegaly. +Nausea. +flatus, no BM yet Extremities:  No edema. PPP. Neurologic:  Gross motor and sensory apparatus intact. Labs:  
Recent Labs 10/26/19 
2111  10/26/19 
1041  10/23/19 
2146 WBC  --   --  11.0   < > 6.6 HGB  --   --  7.5*   < > 7.0* HCT  --   --  22.5*   < > 20.8* PLT  --   --  94*   < > 83* NA  --   --  132*   < > 138 K  --   --  4.3   < > 3.5 BUN  --   --  25*   < > 24* CREA  --   --  1.59*   < > 1.49* GLU  --   --  92   < > 74 GLUCPOC 129*   < > 92   < >  --   
INR  --   --   --   --  1.5*  
 < > = values in this interval not displayed. Assessment:  
 
Principal Problem: S/P CABG x 5 (10/23/2019) Overview: x 5, LIMA to LAD, RSVG to Diag1, OM2-OM3, RSVG to PDA Active Problems: 
  [facility] (10/19/2019) Unstable angina ([facility] (10/19/2019) Coronary artery disease of native artery of native heart with stable angina pectoris ([facility] (10/21/2019) Systolic heart failure ([facility] (10/22/2019) Plan/Recommendations/Medical Decision Makin.  CAD with NSTEMI on this admission s/p CAB:  ASA/Statin. No BB while on Dobutamine.  
  
2. NYHA Class II Systolic Heart Failure: Pre-op TTE showing EF of 25-30%. 
  
3.  HTN: Currently on dobutamine. Will resume BB and other antihypertensives when appropriate 
  
4.  HLD: Continue statin 
  
5. Anemia-postoperative secondary to acute blood loss and hypervolemia: Above transfusion threshold. Daily CBC. 
  
6. Thrombocytopenia: mild, platelets up today. Continue to monitor daily CBC. 
  
7. Renal insufficiency: Creatinine at 1.6 today. Avoid hypotension and nephrotoxic agents. 8. Respiratory insufficiency: Primary issue at this point. Right lung with worse diffuse pattern. Will try mucinex to assist expectoration. IS and TCDB. Increase activity. Wean oxygen to keep sat >92%. 9. Nutrition: Advance diet with caution as tolerated to Cardiac/Diabetic.  
  
Dispo: PT/OT/Cardiac Rehab. Keep in CVICU today. Case management following Signed By: Enma Ardon PA-C

## 2019-10-26 NOTE — PROGRESS NOTES
1940: Bedside and Verbal shift change report given to Lucy Clark RN (oncoming nurse) by Kevin aDo RN (offgoing nurse). Report included the following information SBAR, ED Summary, Procedure Summary, Intake/Output, MAR, Recent Results, Cardiac Rhythm SR and Alarm Parameters . 2026: Pt's , Cardene gtt started at 5mg/hr 2053: Pt's , Cardene weaned down to 2.5mg/hr 2300: Pt unable to maintain O2 sats >92% on 8L midflow NC, FiO2 increased to 12L  
 
2325: Pt still has O2 sats <92%, midflow increased to 14L 
 
0018: Pt's CI 2.9 and MAP 74, Dobutamine weaned down to 5mcg/kg/min 
 
0309: Pt's CI 2.7 and MAP 76, Dobutamine weaned down to 4mcg/kg/min 
 
0428: Pt's CI 2.9 and MAP 74, Dobutamine weaned down to 3mcg/kg/min 
 
0519: Pt's CI 2.6 and MAP 74, Dobutamine weaned down to 2mcg/kg/min 
 
0700: Pt stood to standing scale with 2 RN's for assistance, SBP down to 60s, MAPs to 40s, pt assisted back to bed. BP recovered 1735: Bedside and Verbal shift change report given to Rose Jo RN (oncoming nurse) by Lucy Clark RN (offgoing nurse). Report included the following information SBAR, ED Summary, OR Summary, Procedure Summary, Intake/Output, MAR, Recent Results, Cardiac Rhythm SR and Alarm Parameters .

## 2019-10-26 NOTE — PROGRESS NOTES
Cm received a page from the [facility] [address] (P: [phone] ext [phone], Fax: [phone]) and they need the Refusal of Transfer to [address] [place] [address] signed since the patient doesn't want to be transferred to the [place]. Cm attempted to have the patient sign the document but  He was sleeping. One of the staff nurses stated the patient just fell asleep and they would get the patient to sign the form and would fax the document when the patient woke up.

## 2019-10-26 NOTE — PROGRESS NOTES
Physical Therapy 10/26/19 Chart reviewed and attempted PT session x 2. On first attempt, pt just transferred to chair and wanted to rest. When attempted 2nd time, pt had just returned to bed. RN aware. Will follow-up as schedule permits. Thank you.  
Deshaun Beth, PT, DPT

## 2019-10-27 NOTE — PROGRESS NOTES
Problem: Mobility Impaired (Adult and Pediatric) Goal: *Acute Goals and Plan of Care (Insert Text) Description FUNCTIONAL STATUS PRIOR TO ADMISSION: Patient was independent and active without use of DME.  
 
HOME SUPPORT PRIOR TO ADMISSION: The patient lived with his son, but did not require assist. Son provides transportation since his car is currently broken down. Physical Therapy Goals Initiated 10/25/2019 1. Patient will move from supine to sit and sit to supine, scoot up and down and roll side to side in bed with minimal assistance/contact guard assist within 5 days. 2.  Patient will perform sit to/from stand with minimal assistance/contact guard assist within 5 days. 3.  Patient will ambulate  feet with least restrictive assistive device and minimal assistance/contact guard assist within 5 days. 4.  Patient will ascend/descend 6 stairs with single handrail(s) with moderate assistance  within 5 days. 5.  Patient will perform cardiac exercises per protocol with minimal assistance/contact guard assist within 5 days. 6.  Patient will verbally and functionally recall 3/3 sternal precautions within 5 days. Outcome: Progressing Towards Goal 
 
PHYSICAL THERAPY TREATMENT Patient: Herman Stewart (34 y.o. male) Date: 10/27/2019 Diagnosis: ACS (acute coronary syndrome) ([facility] [address] [I24.9] Unstable angina (HCC) [I20.0] S/P CABG x 5 Procedure(s) (LRB): 
CARDIAC RE-ENTRY, MARISOL BY DR Alka Rodriges (N/A) 4 Days Post-Op Precautions: Fall, Sternal 
Chart, physical therapy assessment, plan of care and goals were reviewed. ASSESSMENT Patient continues with skilled PT services and is slowly progressing towards goals. Received patient on hi-flow 40L per NC. Patient presenting with a productive cough (using yanker to assist with sputum management throughout session).  Patient still appearing dyspneic at rest and with minimal-level of exertion (SpO2 decreasing to 83% on hi-flow NC with transfer - recovered to 94% with 5 minutes of seated rest + pursed-lip breathing instruction). Overall, patient requires step-by-step instruction for functional tasks and upmost moderate verbal cuing for functional adherence to sternal precautions. In terms of physical assistance, patient requires upmost minimal-moderate assist x 2. Continue to anticipate extensive rehabilitation needs as patient is significantly below his functional and respiratory baseline (no home oxygen, ambulation without an AD). Current Level of Function Impacting Discharge (mobility/balance): A x 2, remains in CVICU on hi-flow NC Other factors to consider for discharge: Newly enforced sternal precautions, previously independent without DME PLAN : 
Patient continues to benefit from skilled intervention to address the above impairments. Continue treatment per established plan of care. to address goals. Recommendation for discharge: (in order for the patient to meet his/her long term goals) Therapy 3 hours per day 5-7 days per week This discharge recommendation: A follow-up discussion with the attending provider and/or case management is planned IF patient discharges home will need the following DME: to be determined (TBD) SUBJECTIVE:  
Patient stated I'm congested and so tired.  OBJECTIVE DATA SUMMARY:  
Critical Behavior: 
Neurologic State: Appropriate for age, Alert Orientation Level: Oriented X4 Cognition: Appropriate decision making, Appropriate for age attention/concentration, Appropriate safety awareness, Follows commands Safety/Judgement: Decreased insight into deficits Functional Mobility Training: 
Bed Mobility: 
  
Supine to Sit: Bed Modified Transfers: 
Sit to Stand: Assist x2;Minimum assistance Stand to Sit: Assist x2;Minimum assistance; Moderate assistance Bed to Chair: Assist x2;Minimum assistance; Self-rated Precious RPE 8/10 Balance: Sitting: Impaired; Without support Sitting - Static: Good (unsupported) Sitting - Dynamic: Fair (occasional) Standing: Impaired; With support Standing - Static: Fair;Constant support Standing - Dynamic : Poor;Constant support Therapeutic Exercises:  
Patient instructed on the benefits and demonstrated cardiac exercises while seated in chair with concurrent demonstration and VCs for pacing. Instructed and indicated understanding on how to progress reps, sets against gravity, working up to 5 lbs, standing and so on based on surgeon clearance for more weight in prep for basic and instrumental ADLs. Instruction on the use of household items in place of weights as needed. CARDIAC EXERCISE Sets Reps Active Active Assist   
Passive Self ROM Comments Shoulder flexion  1   5  ?                            ?                             ?                             ?                                
Shoulder abduction  1    ?                             ?                             ?                             ?                                
Scapular elevation  1   10 ?                             ?                              ?                             ?                              Rest break needed after x 5 repetitions Scapular retraction  1  5 ?                             ?                             ?                             ?                             VCs for pacing Trunk rotation  1    ?                             ?                             ?                             ?                                
Trunk sidebending  1    ?                             ?                              ?                             ?                                      
  
 
Pain Ratin/10 sternotomy pain Activity Tolerance:  
Fair, desaturates with exertion and requires oxygen, requires frequent rest breaks and observed SOB with activity Please refer to the flowsheet for vital signs taken during this treatment. After treatment patient left in no apparent distress:  
Sitting in chair and Call bell within reach COMMUNICATION/COLLABORATION:  
The patients plan of care was discussed with: Registered Nurse and Rehabilitation Attendant She Osborne PT, DPT Time Calculation: 23 mins

## 2019-10-27 NOTE — PROGRESS NOTES
1945: Bedside shift change report given to Nick, Dmitriy Heller Student Nurse (oncoming nurse) by Zamzam Pope RN (offgoing nurse). Report included the following information SBAR, Kardex, OR Summary, Procedure Summary, Intake/Output, MAR, Recent Results, Cardiac Rhythm NSR and Alarm Parameters . Gtts verified. Assumed care of patient 2250: Cardene gtt started at 5mg/hr for systolic greater than 939 
 
2307: Increased O2 to 15 L/min for sats in the 80s. 
 
2350: Patient complaining of SOB. Sats 85-88&% on 15L/min. Increasing crackles in R posterior lung bases. Gave PRN albuterol nebulizer treatment 2352: Paged Dr. Constanza Stack [phone]: Paged Dr. Constanza Stack [phone]: Dr. Constanza Stack updated on patient status; orders to obtain ABG. ABG sampled- see results. Patient started on Hi-Flow at 30L/min and 90%. Orders received to give 1 time dose 2mg Bumex now. Sats currently 96-98% 0702: Sats less than 92%- HiFlow increased 40L/min and 100% 0630: Attempted to bathe patient- he refused at this time. Patient educated on why a bath is important and encouraged to participate- still refusing. Will attempt again in 30 minutes. 0700: Patient still refused bathing. Will notify day shift RN and encourage bath during day shift  
 
0750 Bedside shift change report given to Zamzam Pope RN (oncoming nurse) by NORMA Romero and Damaris Meza Student Nurse (offgoing nurse). Report included the following information SBAR, Intake/Output, MAR, Recent Results, Cardiac Rhythm NSR and Alarm Parameters .

## 2019-10-27 NOTE — PROGRESS NOTES
Placed pt on 90%, 30L HFNC due to PaO2 being 52. Sats 96, no distress noted, will continue to monitor

## 2019-10-27 NOTE — PROGRESS NOTES
[facility] ICU Progress Note Admit Date: 10/19/2019 POD:  4 Days Post-Op Procedure:  Procedure(s): 
CARDIAC RE-ENTRY, MARISOL BY  [facility] Subjective:  
Pt seen with Dr. Tad Cherry. Tmax 99, on hi-flow 40L per NC. Dobutamine down to 2. Still with cough productive of clear sputum Objective:  
Vitals: 
Blood pressure 122/66, pulse 77, temperature 98.8 °F (37.1 °C), resp. rate 14, height 5' 7\" (1.702 m), weight 149 lb 7.6 oz (67.8 kg), SpO2 99 %. Temp (24hrs), Av.7 °F (37.1 °C), Min:98.2 °F (36.8 °C), Max:99 °F (37.2 °C) EKG/Rhythm:  Sinus rhythm in the 80s Oxygen Therapy: 
Oxygen Therapy O2 Sat (%): 99 % (10/27/19 09) Pulse via Oximetry: 77 beats per minute (10/27/19 0930) O2 Device: Hi flow nasal cannula (10/27/19 0800) O2 Flow Rate (L/min): 40 l/min (10/27/19 0930) FIO2 (%): 70 % (10/27/19 09) CXR:  
CXR Results  (Last 48 hours) 10/27/19 0445  XR CHEST PORT Final result Impression:  IMPRESSION:   
Increasing pulmonary edema. Trace right pleural effusion. Narrative:  PORTABLE CHEST RADIOGRAPH/S: 10/27/2019 4:45 AM  
   
INDICATION: Postop CABG. COMPARISON: 10/26/2019, 10/25/2019, 10/23/2019, 10/21/2019 TECHNIQUE: Portable frontal semiupright radiograph/s of the chest.  
   
FINDINGS:   
Pulmonary edema has gradually increased from 10/21/2019. There is a trace right  
pleural effusion. Post CABG. A pulmonary arterial catheter via right IJ sheath,  
mediastinal drains, and left pleural drain are in appropriate position. 10/26/19 0518  XR CHEST PORT Final result Impression:  IMPRESSION: Improved aeration the left lung base. Persistent interstitial  
opacities in the right lung may reflect asymmetric edema or infection. Narrative:  EXAM: XR CHEST PORT INDICATION: postop heart COMPARISON: 10/25/2019 FINDINGS: A portable AP radiograph of the chest was obtained at 412 hours. Patient's on a cardiac monitor. Median sternotomy changes are noted with a right IJ Mckinney-Chavez catheter and the mediastinal and left pleural drain. . Persistent  
interstitial opacities are noted in the right lung with some improved aeration  
the left lung base. No pneumothorax. Joanne Day Heart size is mildly enlarged. .  The bones  
and soft tissues are grossly within normal limits. Admission Weight: Last Weight Weight: 141 lb 5 oz (64.1 kg) Weight: 149 lb 7.6 oz (67.8 kg) Intake / Output / Drain: 
Current Shift: 10/27 0701 - 10/27 1900 In: -  
Out: 180 [Urine:140; Drains:40] Last 24 hrs.:  
 
Intake/Output Summary (Last 24 hours) at 10/27/2019 4377 Last data filed at 10/27/2019 0800 Gross per 24 hour Intake 1815.29 ml Output 2760 ml Net -944.71 ml EXAM: 
General:  Lying in bed. No acute distress Lungs:   Crackles in the bases, decreased Incision:  No erythema, drainage or swelling. Heart:  Regular rate and rhythm, S1, S2 normal, no murmur, click, rub or gallop. Abdomen:   Soft, non-tender. Bowel sounds normal. No masses,  No organomegaly. +Nausea. +flatus, no BM yet Extremities:  No edema. PPP. Neurologic:  Gross motor and sensory apparatus intact. Labs:  
Recent Labs 10/27/19 
3616  10/27/19 
0417 WBC  --   --  10.7 HGB  --   --  7.2*  7.2* HCT  --   --  21.7*  21.5* PLT  --   --  123* NA  --   --  130* K  --   --  3.9 BUN  --   --  28* CREA  --   --  1.52* GLU  --   --  100 GLUCPOC 101*   < >  --   
 < > = values in this interval not displayed. Assessment:  
 
Principal Problem: S/P CABG x 5 (10/23/2019) Overview: x 5, LIMA to LAD, RSVG to Diag1, OM2-OM3, RSVG to PDA Active Problems: 
  [facility]acute [facility] syndrome[facility] ([facility] (10/19/2019) Unstable angina ([facility] (10/19/2019) Coronary artery disease of native artery of native heart with stable angina pectoris ([facility] [address] (10/21/2019) Systolic heart failure ([facility] [address] (10/22/2019) Plan/Recommendations/Medical Decision Makin.  CAD with NSTEMI on this admission s/p CAB:  ASA/Statin. No BB while on Dobutamine.  
  
2. NYHA Class II Systolic Heart Failure: Pre-op TTE showing EF of 25-30%. 
  
3.  HTN: Currently on dobutamine. Will resume BB and other antihypertensives when appropriate 
  
4.  HLD: Continue statin 
  
5. Anemia-postoperative secondary to acute blood loss and hypervolemia: Will transfuse PRBC today. 
  
6. Thrombocytopenia: mild, platelets up today. Continue to monitor daily CBC. 
  
7. Renal insufficiency: Creatinine at 1.5 today. Avoid hypotension and nephrotoxic agents. He continues with bumex 2IV BID. Overall negative but worsening CXR. 8. Respiratory insufficiency: Primary issue at this point. Right lung with worse diffuse pattern. Will try mucinex to assist expectoration. IS and TCDB. Increase activity. Wean oxygen to keep sat >92%. 9. Nutrition: Advance diet with caution as tolerated to Cardiac/Diabetic.  
  
Dispo: PT/OT/Cardiac Rehab. Keep in CVICU today. Case management following Signed By: Camilo Stein PA-C

## 2019-10-27 NOTE — PROGRESS NOTES
Problem: Pain Goal: *Control of Pain Outcome: Progressing Towards Goal 
Note:  
Patient reports 0/10 pain. Pain assessed Q4 Goal: *PALLIATIVE CARE:  Alleviation of Pain Outcome: Progressing Towards Goal 
Note:  
Pain interventions provided as needed Problem: Patient Education: Go to Patient Education Activity Goal: Patient/Family Education Outcome: Progressing Towards Goal 
Note:  
Patient provided education throughout shift Problem: CABG: Post-Op Day 1 Goal: Off Pathway (Use only if patient is Off Pathway) Outcome: Resolved/Met Goal: Activity/Safety Outcome: Resolved/Met Goal: Consults, if ordered Outcome: Resolved/Met Goal: Diagnostic Test/Procedures Outcome: Resolved/Met Goal: Nutrition/Diet Outcome: Resolved/Met Goal: Medications Outcome: Resolved/Met Goal: Respiratory Outcome: Resolved/Met Goal: Treatments/Interventions/Procedures Outcome: Resolved/Met Goal: Psychosocial 
Outcome: Resolved/Met Goal: *Hemodynamically stable without vasoactive medications Outcome: Resolved/Met Goal: *Lungs clear or at baseline Outcome: Resolved/Met Goal: *Mechanical ventilation discontinued Outcome: Resolved/Met Goal: *Optimal pain control at patient's stated goal 
Outcome: Resolved/Met Goal: *Demonstrates progressive activity Outcome: Resolved/Met Goal: *Tolerating diet Outcome: Resolved/Met Goal: *Level of consciousness returns to baseline Outcome: Resolved/Met Problem: CABG: Post-Op Day 3 Goal: Activity/Safety Outcome: Not Progressing Towards Goal 
Note:  
Patient not currently tolerating increasing activity levels Goal: Nutrition/Diet Outcome: Not Progressing Towards Goal 
Note:  
Poor appetite Goal: Medications Outcome: Progressing Towards Goal 
Note:  
Medications given per provider orders Goal: Respiratory Outcome: Not Progressing Towards Goal 
Note:  
Patient currently has increased oxygen requirements to keep O2 sats above 92% Goal: Treatments/Interventions/Procedures Outcome: Progressing Towards Goal 
Note:  
Treatments/interventions and procedures per provider orders Goal: Psychosocial 
Outcome: Progressing Towards Goal 
Note:  
Patient provided with emotional support and education throughout shift Goal: *Hemodynamically stable Outcome: Progressing Towards Goal 
Note:  
Patient currently hemodynamically stable Goal: *Lungs clear or at baseline Outcome: Not Progressing Towards Goal 
Note:  
Lungs coarse with crackles in posterior R upper lobe. Diminished in the bases Goal: *Optimal pain control at patient's stated goal 
Outcome: Progressing Towards Goal 
Note:  
Patient pain currently 0/10 Goal: *Incisions without signs and symptoms of wound complication Outcome: Progressing Towards Goal 
Note:  
No s/sx of wound complication at incisional sites. Goal: *Demonstrates progressive activity Outcome: Not Progressing Towards Goal 
Note:  
Patient does not tolerate progressive activity Goal: *Tolerating diet Outcome: Not Progressing Towards Goal 
Note:  
Appetite remains poor Problem: Infection - Risk of, Central Venous Catheter-Associated Bloodstream Infection Goal: *Absence of infection signs and symptoms Outcome: Progressing Towards Goal 
Note:  
Patient afebrile, with no s/sx of infection

## 2019-10-27 NOTE — PROGRESS NOTES
NYHA class IV A/C systolic heart failure (EF 25%, NT pro-BNP 15,013) Acute on chronic kidney disease Mild pulmonary edema Mild chronic lung disease Remains on high flow oxygen Remains on Dobutamine Cardiac index in the 2's Hgb a little low Getting 1 pRBC now Platelets a little low Nutrition likely to be a problem due to lack of teeth Creatinine in the mid 1's Bilirubin and other LFTs look good Will keep trying to diurese for now May need to add BiPAP or diuril later on CXR - pulmonary edema Intake/Output Summary (Last 24 hours) at 10/27/2019 1650 Last data filed at 10/27/2019 1600 Gross per 24 hour Intake 1294.83 ml Output 2450 ml Net -1155.17 ml Visit Vitals /66 (BP 1 Location: Right arm, BP Patient Position: At rest) Pulse 75 Temp 98.5 °F (36.9 °C) Resp 29 Ht 5' 7\" (1.702 m) Wt 149 lb 7.6 oz (67.8 kg) SpO2 94% BMI 23.41 kg/m² Risk of morbidity and mortality - high Medical decision making - high complexity Total critical care time - 30 minutes (CPT 26981)

## 2019-10-28 NOTE — PROGRESS NOTES
05:45 - 07:00 (75) NYHA class IV A/C systolic heart failure (EF 25%, NT pro-BNP 15,013) Acute on chronic kidney disease Mild pulmonary edema Mild chronic lung disease 
  
05:45 - called to see patient for hypoxia; ABG 7.45 / 30 / 55 / 21 / -3  
 
06:00 - work of breathing increased; placed on BiPAP 
 
06:15 - giving Bumex 2 mg IV and diuril 500 mg IV; pulmonary edema looks worse 06:30 - Hgb improved after pRBC; platelets look good; creatinine a little better; WBCs look ok 06:45 - oxygen saturation improved to 99% CXR - moderate pulmonary edema Intake/Output Summary (Last 24 hours) at 10/28/2019 1612 Last data filed at 10/28/2019 1400 Gross per 24 hour Intake 1306.15 ml Output 3795 ml Net -2488.85 ml Visit Vitals /68 Pulse 71 Temp 98 °F (36.7 °C) Resp 22 Ht 5' 7\" (1.702 m) Wt 149 lb 0.5 oz (67.6 kg) SpO2 100% BMI 23.34 kg/m² Risk of morbidity and mortality - high Medical decision making - high complexity Total critical care time - 30 minutes (CPT 65889)

## 2019-10-28 NOTE — DIABETES MGMT
[facility] Cardiac Surgery Progress Note Recommendations/ Comments: Consider continuing insulin gtt until eating 50% solid foods then, 
1) transition off gtt per [employer] Protocol 2) continue accu-checks and humalog correctional insulin ac & hs  
3) ADA/AHA diet as diet advanced 4) Pt with hx of DM on no DM related meds PTA. Based on weight and current insulin needs, possible transition dose may be Lantus 10 to 12 units, however patient is currently requiring low amounts of insulin on the drip. Insulin gtt should not be stopped until after 1604 on 10/25/19 to complete 48hr post-op time frame. Pt could receive transition as early as 26 if all criteria met per protocol. Currently on insulin gtt running at 1.3 units/hr. Pt has required 4.3 units in the past 6 hours. Chart reviewed on [facility] Albert. Patient is 68 y.o. male s/p CABG x 5 followed by re-entry for mediastinal clot evacuation and repair of SVG to PDA  - POD 5. Pt with documented hx of DM on no meds PTA. A1c:  
Lab Results Component Value Date/Time Hemoglobin A1c 5.3 10/21/2019 03:04 AM  
 
 
 
Recent Glucose Results:  
Lab Results Component Value Date/Time GLU 85 10/28/2019 03:11 AM  
 GLUCPOC 156 (H) 10/28/2019 11:14 AM  
 GLUCPOC 112 (H) 10/28/2019 09:10 AM  
 GLUCPOC 112 (H) 10/28/2019 08:06 AM  
  
 
Lab Results Component Value Date/Time Creatinine 1.41 (H) 10/28/2019 03:11 AM  
 
Estimated Creatinine Clearance: 44.9 mL/min (A) (based on SCr of 1.41 mg/dL (H)). Active Orders Diet DIET MECHANICAL SOFT  
  
 
PO intake:  
Patient Vitals for the past 72 hrs: 
 % Diet Eaten 10/26/19 1800 25 % 10/26/19 1300 0 % 10/26/19 1000 10 % 10/25/19 1700 0 % 10/25/19 1200 0 % Will continue to follow as needed. Thank you. Vivek Alvarado, MS, RN, CDE Time spent: 8 minutes

## 2019-10-28 NOTE — PROGRESS NOTES
Problem: Mobility Impaired (Adult and Pediatric) Goal: *Acute Goals and Plan of Care (Insert Text) Description FUNCTIONAL STATUS PRIOR TO ADMISSION: Patient was independent and active without use of DME.  
 
HOME SUPPORT PRIOR TO ADMISSION: The patient lived with his son, but did not require assist. Son provides transportation since his car is currently broken down. Physical Therapy Goals Initiated 10/25/2019 1. Patient will move from supine to sit and sit to supine, scoot up and down and roll side to side in bed with minimal assistance/contact guard assist within 5 days. 2.  Patient will perform sit to/from stand with minimal assistance/contact guard assist within 5 days. 3.  Patient will ambulate  feet with least restrictive assistive device and minimal assistance/contact guard assist within 5 days. 4.  Patient will ascend/descend 6 stairs with single handrail(s) with moderate assistance  within 5 days. 5.  Patient will perform cardiac exercises per protocol with minimal assistance/contact guard assist within 5 days. 6.  Patient will verbally and functionally recall 3/3 sternal precautions within 5 days. Outcome: Progressing Towards Goal 
 PHYSICAL THERAPY TREATMENT Patient: Clifford Pollard (2 y.o. male) Date: 10/28/2019 Diagnosis: [facility] syndrome[facility] [I24.9] Unstable angina (HCC) [I20.0] S/P CABG x 5 Procedure(s) (LRB): 
CARDIAC RE-ENTRY, MARISOL BY DR Anita Pascual (N/A) 5 Days Post-Op Precautions: Fall, Sternal 
Chart, physical therapy assessment, plan of care and goals were reviewed. ASSESSMENT Patient continues with skilled PT services and is progressing towards goals. Patient with slow progression towards acute PT goals, mostly limited by respiratory status at this time (currently on 40L high flow).  Resting sats at 99% and RR 13, however with static standing x30 sec, followed by side steps to the chair, patient with sats at 84% upon sitting in chair. Declined to 81% at lowest, before recovering to 90% with 4 minutes seated rest with focus on pursed lip breathing. Tactile cues for seated posture to improve oxygenation and recovery. Currently min Ax2, and if unable to progress to mod I level for mobility on room air, will need inpatient rehab. Continue to assess as medical status improves. Current Level of Function Impacting Discharge (mobility/balance): min Ax2, limited to bed>chair transfers due to respiratory status (desat to 83% on 40L high flow with stand and side step to chair) Other factors to consider for discharge: lives with son, previously independent, medical complexities PLAN : 
Patient continues to benefit from skilled intervention to address the above impairments. Continue treatment per established plan of care. to address goals. Recommendation for discharge: (in order for the patient to meet his/her long term goals) Therapy 3 hours per day 5-7 days per week if unable to progress to mod I on room air This discharge recommendation: 
Has not yet been discussed the attending provider and/or case management IF patient discharges home will need the following DME: none SUBJECTIVE:  
Patient stated I'm fine when I'm sleeping.  OBJECTIVE DATA SUMMARY:  
Critical Behavior: 
Neurologic State: Alert Orientation Level: Oriented X4 Cognition: Appropriate decision making, Appropriate for age attention/concentration, Appropriate safety awareness, Follows commands, Recognition of people/places Safety/Judgement: Decreased insight into deficits Functional Mobility Training: 
Bed Mobility: 
Supine to Sit: Bed Modified Transfers: 
Sit to Stand: Minimum assistance;Assist x2 Stand to Sit: Minimum assistance;Assist x2 Bed to Chair: Minimum assistance;Assist x2 Balance: 
Sitting: Impaired; Without support Sitting - Static: Good (unsupported) Sitting - Dynamic: Fair (occasional) Standing: Impaired; Without support Standing - Static: Fair Standing - Dynamic : Fair Therapeutic Exercises:  
Pursed lip breathing Pain Rating: 
Denied pain Activity Tolerance:  
Poor, desaturates with exertion and requires oxygen, requires frequent rest breaks and observed SOB with activity Please refer to the flowsheet for vital signs taken during this treatment. After treatment patient left in no apparent distress:  
Sitting in chair and Call bell within reach COMMUNICATION/COLLABORATION:  
The patients plan of care was discussed with: Occupational Therapist and Registered Nurse Kanwal Leong PT, DPT Time Calculation: 16 mins

## 2019-10-28 NOTE — PROGRESS NOTES
0800 Bedside shift change report given to Eleni Dietz (oncoming nurse) by Dileep Conn (offgoing nurse). Report included the following information SBAR, MAR and Cardiac Rhythm NSR.  
 
0915 ABG obtained, pO2 224% 0950 Pt weaned to hi flow at 40L, pt sat >95%. 1130 Per Pablo Hernandez NP swan and arterial line discontinued. 1200 Pt refused lunch  
 
1530 Bedside shift change report given to JACKELINE (oncoming nurse) by Eleni Dietz (offgoing nurse). Report included the following information SBAR, Intake/Output and Cardiac Rhythm NSR.

## 2019-10-28 NOTE — PROGRESS NOTES
[facility] ICU Progress Note Admit Date: 10/19/2019 POD:  5 Days Post-Op Procedure:  Procedure(s): 
CARDIAC RE-ENTRY, MARISOL BY  [facility]    
 
CABG x 5, LIMA to LAD, RSVG to [id number], RSVG to PDA Subjective:  
Pt seen with Dr. Jesus Browning. Pt on bipap mask, remains on  3 mcg. Objective:  
Vitals: 
Blood pressure 117/71, pulse 73, temperature 98.2 °F (36.8 °C), resp. rate 20, height 5' 7\" (1.702 m), weight 149 lb 0.5 oz (67.6 kg), SpO2 97 %. Temp (24hrs), Av.5 °F (36.9 °C), Min:98.2 °F (36.8 °C), Max:98.7 °F (37.1 °C) Hemodynamics: 
 CO: CO (l/min): 4.6 l/min CI: CI (l/min/m2): 2.7 l/min/m2 CVP: CVP (mmHg): 4 mmHg (10/28/19 0800) SVR: SVR (dyne*sec)/cm5: 1200 (dyne*sec)/cm5 (10/28/19 0000) PAP Systolic: PAP Systolic: 31 (97/98/03 8788) PAP Diastolic: PAP Diastolic: 23 ( 5989) PVR:   
 SV02: SVO2 (%): 63 % (10/28/19 0600) SCV02:   
 
EKG/Rhythm:  SR 
 
CT Output: +110 ml Oxygen Therapy: 
Oxygen Therapy O2 Sat (%): 97 % (10/28/19 0800) Pulse via Oximetry: 73 beats per minute (10/28/19 0800) O2 Device: BIPAP (10/28/19 05) O2 Flow Rate (L/min): 50 l/min (10/28/19 0400) O2 Temperature: 87.8 °F (31 °C) (10/27/19 1335) FIO2 (%): 100 % (10/28/19 0546) CXR:  
CXR Results  (Last 48 hours) 10/28/19 0448  XR CHEST PORT Final result Impression:  IMPRESSION:   
Pulmonary edema and small right pleural effusion. Narrative:  PORTABLE CHEST RADIOGRAPH/S: 10/28/2019 4:48 AM  
   
INDICATION: Post CABG. COMPARISON: 10/27/2019, 10/24/2019. TECHNIQUE: Portable frontal semiupright radiograph/s of the chest.  
   
FINDINGS:   
Interstitial edema and a small right pleural effusion are stable from 10/27/2019, but increased from 10/25/2019. There is more focal, alveolar edema  
in the midlungs bilaterally. The central airways are patent. No pneumothorax. A  
pulmonary arterial catheter via right IJ sheath, mediastinal drains, and left pleural drain are in place. Post CABG. 10/27/19 0445  XR CHEST PORT Final result Impression:  IMPRESSION:   
Increasing pulmonary edema. Trace right pleural effusion. Narrative:  PORTABLE CHEST RADIOGRAPH/S: 10/27/2019 4:45 AM  
   
INDICATION: Postop CABG. COMPARISON: 10/26/2019, 10/25/2019, 10/23/2019, 10/21/2019 TECHNIQUE: Portable frontal semiupright radiograph/s of the chest.  
   
FINDINGS:   
Pulmonary edema has gradually increased from 10/21/2019. There is a trace right  
pleural effusion. Post CABG. A pulmonary arterial catheter via right IJ sheath,  
mediastinal drains, and left pleural drain are in appropriate position. Admission Weight: Last Weight Weight: 141 lb 5 oz (64.1 kg) Weight: 149 lb 0.5 oz (67.6 kg) Intake / Justice Byron / Maki Galindoom: 
Current Shift: 10/28 0701 - 10/28 1900 In: 16 [I.V.:16] Out: 660 [Urine:650; Drains:10] Last 24 hrs.:  
 
Intake/Output Summary (Last 24 hours) at 10/28/2019 4578 Last data filed at 10/28/2019 0800 Gross per 24 hour Intake 1407.11 ml Output 3515 ml Net -2107.89 ml EXAM: 
General:  Pleasant, resting comfortably Lungs:   Diminished throughout. Incision:  Dsg C/D/I. Heart:  Regular rate and rhythm, S1, S2 normal, no murmur, click, rub or gallop. Abdomen:   Soft, non-tender. Bowel sounds hypoactive. No masses,  No organomegaly. Extremities:  No edema. Palpable pulses bilat Neurologic:  Gross motor and sensory apparatus intact. Labs:  
Recent Labs 10/28/19 
7550  10/28/19 
8534 WBC  --   --  9.5 HGB  --   --  8.7* HCT  --   --  26.0*  
PLT  --   --  151 NA  --   --  130* K  --   --  3.9 BUN  --   --  29* CREA  --   --  1.41* GLU  --   --  85 GLUCPOC 112*   < >  --   
 < > = values in this interval not displayed. Assessment:  
 
Principal Problem: S/P CABG x 5 (10/23/2019) Overview: x 5, LIMA to LAD, RSVG to Diag1, OM2-OM3, RSVG to PDA Active Problems: 
  ACS (acute coronary syndrome) ([facility].) (10/19/2019) Unstable angina ([facility] 75.) (10/19/2019) Coronary artery disease of native artery of native heart with stable angina pectoris [facility] (10/21/2019) Systolic heart failure ([facility] 75.) (10/22/2019) Plan/Recommendations/Medical Decision Makin. S/p CABG: on ASA, statin. No BB while on dobutamine gtt. Start scheduled hydralazine 2. Acute on chronic systolic CHF, NYHA Class II on admit: EF 35-40% preop. Remains on  - wean today. Cont diuretics. No BB/ACE/ARB/AA until vitals/kidney function allows. 3. Acute postop respiratory failure, acute pulm edema, effusions: cont bipap mask for now - monitor ABG's. Cont bumex, diuril added by Dr. Laurie Ashton. Start scheduled nebs. Cont mucinex, pulm toileting. 4. Renal insufficiency: Cr improved today, can begin slow wean of dobutamine. Monitor w/ diuresis. Keep Devlin for strict I/O's  
 
5. Anemia: had preop, monitor H&H. Received 1 unit PRBCs 10/27/19. Iron panel sent preop - iron, iron sat low. Start PO iron 6. Thrombocytopenia: had some preop, 151K today. Monitor on ASA, protonix 7. Hx of HTN: currently on dobutamine. Start scheduled hydralazine 8. HLD: on pravastatin 9. Constipation: +flatus, no BM yet. Cont pericolace, miralax, prn suppository 10. Current smoker: smoking cessation education. Cont pulm toileting. Nicotine patch if needed 11. Nutrition: see if pt able to eat off bipap and re place bipap mask when complete. If not, will need alternate nutrition Dispo: PT/OT as able. D/c CT's today. Remain in CVI until resp status more stable Signed By: Faith Ramirez NP

## 2019-10-28 NOTE — PROGRESS NOTES
1500: Bedside and Verbal shift change report given to NORMA VIVEROS (oncoming nurse) by Kamla Hyatt RN (offgoing nurse). Report included the following information SBAR, Kardex, Intake/Output, MAR, Recent Results, Med Rec Status and Cardiac Rhythm NSR.  
1930: Bedside and Verbal shift change report given to Carola Obrien RN (oncoming nurse) by NORMA VIVEROS (offgoing nurse). Report included the following information SBAR, Kardex, Intake/Output, MAR, Recent Results, Med Rec Status and Cardiac Rhythm NSR.

## 2019-10-28 NOTE — PROGRESS NOTES
2000 assumed care of patient from [facility] 
 
[id number] DRIVER noted, POX decreasing 82-84 with activity and sv02 decrease <40. Recovers within 10-15 minutes 0400 POX 83% at rest. highflow increased to 50L/80% 0415 No change to POX hightflow increase to 50L/90% 3452-7047 dyspnea at rest increase, lung sounds coarse/crackles in bases. POX 84-86%. ABG obtained. PH 7.453 c02 38.3 p02 55 base deficit -3 bicarb 21.3, sat 90%. PRN hydralazine given for SBP >150 
 
0535 Dr. Alison Palacios made aware of abg result and pulmonary status. Gave telephone order to give diuril 500mg Iv x1 now, bumex 2mg IV x1 now, and place patient on bipap with settings per RT 
 
0600 Patient on bipap, sats 100%, labor of breathing improved and sv02 trending up>60 
 
0745 Bedside and Verbal shift change report given to [address] [place] [address] (oncoming nurse) by Mavis Arguelles RN (offgoing nurse). Report included the following information SBAR, Kardex, MAR, Recent Results and Cardiac Rhythm NSR. Problem: Infection - Risk of, Surgical Site Infection Goal: *Absence of surgical site infection signs and symptoms Outcome: Progressing Towards Goal 
  
Problem: Pain Goal: *Control of Pain Outcome: Progressing Towards Goal 
  
Problem: Falls - Risk of 
Goal: *Absence of Falls Description Document Mar[place]ie [place] Fall Risk and appropriate interventions in the flowsheet. Outcome: Progressing Towards Goal 
Note:  
Fall Risk Interventions: 
Mobility Interventions: Communicate number of staff needed for ambulation/transfer Mentation Interventions: Evaluate medications/consider consulting pharmacy Medication Interventions: Evaluate medications/consider consulting pharmacy Elimination Interventions: Patient to call for help with toileting needs History of Falls Interventions: Evaluate medications/consider consulting pharmacy Problem: Pressure Injury - Risk of 
Goal: *Prevention of pressure injury Description Document Earl Scale and appropriate interventions in the flowsheet. Outcome: Progressing Towards Goal 
Note:  
Pressure Injury Interventions: 
Sensory Interventions: Assess changes in LOC, Keep linens dry and wrinkle-free, Maintain/enhance activity level, Minimize linen layers, Turn and reposition approx. every two hours (pillows and wedges if needed) Activity Interventions: Pressure redistribution bed/mattress(bed type), Increase time out of bed Mobility Interventions: Pressure redistribution bed/mattress (bed type), Turn and reposition approx. every two hours(pillow and wedges) Nutrition Interventions: Document food/fluid/supplement intake, Discuss nutritional consult with provider, Offer support with meals,snacks and hydration Friction and Shear Interventions: Lift sheet, Minimize layers Problem: AMI: Discharge Outcomes Goal: *Demonstrates ability to perform prescribed activity without shortness of breath or discomfort Outcome: Progressing Towards Goal 
Goal: *Verbalizes understanding and describes prescribed diet Outcome: Progressing Towards Goal 
Goal: *Anxiety reduced or absent Outcome: Progressing Towards Goal 
  
Problem: CABG: Post-Op Day 4 Goal: Diagnostic Test/Procedures Outcome: Progressing Towards Goal 
  
Problem: Infection - Risk of, Central Venous Catheter-Associated Bloodstream Infection Goal: *Absence of infection signs and symptoms Outcome: Progressing Towards Goal 
  
Problem: Patient Education: Go to Patient Education Activity Goal: Patient/Family Education Outcome: Progressing Towards Goal

## 2019-10-28 NOTE — PROGRESS NOTES
NUTRITION COMPLETE ASSESSMENT 
 
RECOMMENDATIONS:  
1. Encourage PO intake with meals as able when off Bipap - please continue to document % meals consumed 
 - use Ensure Shakes to give medications 2. If unable to tolerate at least 50% meals and 2 supplements/day in 1-2 days consider DHT for enteral feeds: 
 - DHT can be in placed and still use Bipap 
 - see below for goal tube feeds if needed 3. Consider checking Phos. Continue to monitor Mg with repletion PRN. Interventions/Plan:  
Food/Nutrient Delivery:    Commercial supplement(Ensure Enlive TID) Feeding assistance at [employer] needed) Nutrition Education:    
 
Assessment:  
Reason for Assessment: LOS and At Nutrition Risk Diet: mechanical soft Supplements: Ensure Enlive 1x/day Nutritionally Significant Medications: [x] Reviewed & Includes: amiodarone, bumex, iron, SSI, mag-ox, mag sulfate, protonix, miralax, KCl, pericolace, zoloft, dobutamine, cardene Meal Intake:  
Patient Vitals for the past 100 hrs: 
 % Diet Eaten 10/26/19 1800 25 % 10/26/19 1300 0 % 10/26/19 1000 10 % 10/25/19 1700 0 % 10/25/19 1200 0 % 10/25/19 0800 0 % Current Hospitalization:  
Appetite: Poor PO Ability: Independent Average po intake: Average supplements intake:    
  
Subjective: Pt sleeping soundly on bipap. Did not wake to name. Objective: 
Pt admitted for acute coronary syndrome. PMHx: DM, high cholesterol, HTN. S/p CABG x 5 on 10/23. Extubated on 10/24 with some pulmonary edema noted. Placed on Bipap today. IV diuresis noted. Hyponatremia and hypomagnesemia (repleted). Some nausea post-op managed with anti-emetics but appetite remains poor. Per NP notes may need to consider nutrition support if PO intake remains poor with current respiratory status along with poor dentition. Mechanical soft diet in place. Hx of DM but A1c on 10/21/19 was 5.3%. Will increase supplements to Ensure Enlive TID (1050kcal, 60g protein). If nutrition support needed can place Dobbhoff tube and still use Bipap. Recommend goal EN of: Osmolite 1.5 @ 50ml/hr + 30ml flush q4hr. Provides: 1200ml, 1800kcal, 75g protein, 912ml free fluid + 180ml flush = 1092ml ffluid. Meets 100% energy and 93% protein needs. No wt hx available. Will follow for PO intake, respiratory status, and pt interview as able. Estimated Nutrition Needs:  
Kcals/day: 3014 Kcals/day(1658-1795kcal) Protein: 81 g(81-95g (1.2-1.4g/kg)) Fluid: 1700 ml(1ml/kcal or per cardio) Based On: [place] Jeor(x 1.2-1.3) Weight Used: Actual wt(67.6kg) Pt expected to meet estimated nutrient needs:  []   Yes     []  No [x] Unable to predict at this time Nutrition Diagnosis:  
1. Inadequate protein-energy intake related to poor appetite as evidenced by less than 50% meals consumed; some nasuea Goals:   
 Consumption of at least 50% meals and 2 supplements per day in 1-2 days or start of nutrition support Monitoring & Evaluation: - Total energy intake, Liquid meal replacement, Protein intake - Weight/weight change Previous Nutrition Goals Met:   N/A Previous Recommendations:    N/A Education & Discharge Needs: 
 [x] None Identified 
 [] Identified and addressed [x] Participated in care plan, discharge planning, and/or interdisciplinary rounds Cultural, Yazidi and ethnic food preferences identified: None Skin Integrity: [x]Intact  []Other Edema: [x]None []Other Last BM: 10/27 Food Allergies: []None [x]Other: scallops Diet Restrictions: Cultural/Holiness Preference(s): None Anthropometrics:   
Weight Loss Metrics 10/28/2019 Today's Wt 149 lb 0.5 oz  
BMI 23.34 kg/m2 Weight Source: Bed Height: 5' 7\" (170.2 cm), Body mass index is 23.34 kg/m². IBW : 72.6 kg (160 lb), % IBW (Calculated): 93.14 % 
 ,   
 
Labs:   
Lab Results Component Value Date/Time  Sodium 130 (L) 10/28/2019 03:11 AM  
 Potassium 3.9 10/28/2019 03:11 AM  
 Chloride 97 10/28/2019 03:11 AM  
 CO2 25 10/28/2019 03:11 AM  
 Glucose 85 10/28/2019 03:11 AM  
 BUN 29 (H) 10/28/2019 03:11 AM  
 Creatinine 1.41 (H) 10/28/2019 03:11 AM  
 Calcium 7.9 (L) 10/28/2019 03:11 AM  
 Magnesium 1.5 (L) 10/28/2019 03:11 AM  
 Albumin 3.1 (L) 10/26/2019 05:11 AM  
 
Lab Results Component Value Date/Time Hemoglobin A1c 5.3 10/21/2019 03:04 AM  
 
 
Dc Pham RD [facility], Pager #[phone] or [phone]

## 2019-10-28 NOTE — PROGRESS NOTES
Problem: Self Care Deficits Care Plan (Adult) Goal: *Acute Goals and Plan of Care (Insert Text) Description FUNCTIONAL STATUS PRIOR TO ADMISSION: Patient was independent and active without use of DME.  
 
HOME SUPPORT: The patient lived with his son but did not require assist for self-care tasks. Patient's son assisted him with transportation to the grocery store. Occupational Therapy Goals Initiated 10/25/2019 1. Patient will perform ADLs standing 5 mins without fatigue or LOB with moderate assistance within 5 day(s). 2.  Patient will perform lower body ADLs with moderate assistance within 5 day(s). 3.  Patient will perform gathering ADL items high and low 2/2 with moderate assistance within 5 day(s). 4.  Patient will perform toilet transfers with moderate assistance within 5 day(s). 5.  Patient will perform all aspects of toileting with moderate assistance within 5 day(s). 6.  Patient will participate in cardiac/sternal upper extremity therapeutic exercise/activities to increase independence with ADLs with minimal assistance for 5 minutes within 5 day(s). Outcome: Progressing Towards Goal 
 OCCUPATIONAL THERAPY TREATMENT Patient: Derek Palomares (46 y.o. male) Date: 10/28/2019 Diagnosis: ACS (acute coronary syndrome) ([facility] [I24.9] Unstable angina (HCC) [I20.0] S/P CABG x 5 Procedure(s) (LRB): 
CARDIAC RE-ENTRY, MARISOL BY DR Yen Gonzalez (N/A) 5 Days Post-Op Precautions: Fall, Sternal 
Chart, occupational therapy assessment, plan of care, and goals were reviewed. ASSESSMENT Patient continues with skilled OT services and is progressing towards goals.   Patient continues to be limited by generalized weakness, decreased activity tolerance, fatigue, and endurance on 40L hi-flow NC with O2 sats dropping from 95 to 82% following bed > chair transfer today with RR 22-32, patient requiring verbal cues for pursed lip breathing, and requiring increased time to recover (~2 minutes). Patient aware of sternal precautions however needs continued education on application with ADL tasks. Patient will continue to benefit from OT services to maximize patient safety and independence with ADL transfers and tasks. Current Level of Function Impacting Discharge (ADLs): MOD A LB ADLs Other factors to consider for discharge: respiratory status PLAN : 
Patient continues to benefit from skilled intervention to address the above impairments. Continue treatment per established plan of care. to address goals. Recommend with staff: OOB x 3 in chair for meals, BUE cardiac exercises Recommend next OT session: standing ADLs Recommendation for discharge: (in order for the patient to meet his/her long term goals) Therapy 3 hours per day 5-7 days per week This discharge recommendation: 
Has been made in collaboration with the attending provider and/or case management IF patient discharges home will need the following DME: to be determined (TBD) SUBJECTIVE:  
Patient stated I need a minute (requiring increased time for deep breathing).  OBJECTIVE DATA SUMMARY:  
Cognitive/Behavioral Status: 
Neurologic State: Alert Orientation Level: Oriented X4 Cognition: Appropriate for age attention/concentration Perception: Appears intact Perseveration: No perseveration noted Safety/Judgement: Insight into deficits Functional Mobility and Transfers for ADLs: 
Bed Mobility: 
Supine to Sit: Bed Modified Transfers: 
Sit to Stand: Minimum assistance;Assist x2 Bed to Chair: Minimum assistance;Assist x2 Balance: 
Sitting: Impaired; Without support Sitting - Static: Good (unsupported) Sitting - Dynamic: Fair (occasional) Standing: Impaired; Without support Standing - Static: Fair Standing - Dynamic : Fair ADL Intervention: 
  
 
Grooming Washing Face: Supervision;Set-up Cognitive Retraining Safety/Judgement: Insight into deficits The patient instructed and demonstrated 3/3 sternal precautions. Increase activity tolerance for home, work, and sexual intercourse by pacing self with increasing the arm exercises, sitting duration, frequency OOB, walking, standing, and ADLs. Instructed and indicated understanding of s/s of too much activity, how to respond to s/s safely. Activity Tolerance:  
Poor and desaturates with exertion and requires oxygen Please refer to the flowsheet for vital signs taken during this treatment. After treatment patient left in no apparent distress:  
Sitting in chair and Call bell within reach COMMUNICATION/COLLABORATION:  
The patients plan of care was discussed with: Physical Therapist and Registered Nurse Leonardo Lazcano Time Calculation: 26 mins

## 2019-10-29 NOTE — PROGRESS NOTES
Chart reviewed. Per RN, hold OT this AM and f/u later today as appropriate. Patient with respiratory distress overnight requiring biPAP. Will follow up as able. Thank you.

## 2019-10-29 NOTE — PROGRESS NOTES
[facility] ICU Progress Note Admit Date: 10/19/2019 POD:  6 Days Post-Op Procedure:  Procedure(s): 
CARDIAC RE-ENTRY, MARISOL BY  [facility]    
 
CABG x 5, LIMA to LAD, RSVG to [id number], RSVG to PDA Subjective:  
Pt seen with Dr. Dorota Roblero. Pt on bipap mask, remains on  1 mcg. Afebrile. Objective:  
Vitals: 
Blood pressure 118/73, pulse 85, temperature 98.6 °F (37 °C), resp. rate (!) 31, height 5' 7\" (1.702 m), weight 140 lb 9.6 oz (63.8 kg), SpO2 93 %. Temp (24hrs), Av.4 °F (36.9 °C), Min:98 °F (36.7 °C), Max:98.6 °F (37 °C) EKG/Rhythm:  SR Oxygen Therapy: 
Oxygen Therapy O2 Sat (%): 93 % (10/29/19 0700) Pulse via Oximetry: 73 beats per minute (10/28/19 1914) O2 Device: Hi flow nasal cannula (10/29/19 0400) O2 Flow Rate (L/min): 40 l/min (10/29/19 0400) O2 Temperature: 87.8 °F (31 °C) (10/27/19 1335) FIO2 (%): 80 % (10/29/19 0400) CXR:  
CXR Results  (Last 48 hours) 10/28/19 0448  XR CHEST PORT Final result Impression:  IMPRESSION:   
Pulmonary edema and small right pleural effusion. Narrative:  PORTABLE CHEST RADIOGRAPH/S: 10/28/2019 4:48 AM  
   
INDICATION: Post CABG. COMPARISON: 10/27/2019, 10/24/2019. TECHNIQUE: Portable frontal semiupright radiograph/s of the chest.  
   
FINDINGS:   
Interstitial edema and a small right pleural effusion are stable from 10/27/2019, but increased from 10/25/2019. There is more focal, alveolar edema  
in the midlungs bilaterally. The central airways are patent. No pneumothorax. A  
pulmonary arterial catheter via right IJ sheath, mediastinal drains, and left  
pleural drain are in place. Post CABG. Admission Weight: Last Weight Weight: 141 lb 5 oz (64.1 kg) Weight: 140 lb 9.6 oz (63.8 kg) Intake / Output / Drain: 
Current Shift: No intake/output data recorded. Last 24 hrs.:  
 
Intake/Output Summary (Last 24 hours) at 10/29/2019 403 Last data filed at 10/29/2019 0743 Gross per 24 hour Intake 907.8 ml Output 3485 ml Net -2577.2 ml EXAM: 
General:  Resting comfortably. No acute distress Lungs:   Scattered crackles Incision:  Midsternal incision with no significant erythema, drainage, or dehiscence. Heart:  Regular rate and rhythm, S1, S2 normal, no murmur, click, rub or gallop. Abdomen:   Soft, non-tender. Bowel sounds hypoactive. No masses,  No organomegaly. +BM 10/26 Extremities:  +1 pretibial edema. Palpable pulses bilat Neurologic:  Gross motor and sensory apparatus intact. Labs:  
Recent Labs 10/29/19 
3936  10/29/19 
0410 WBC  --   --  10.4 HGB  --   --  10.0* HCT  --   --  29.5* PLT  --   --  183 NA  --   --  128* K  --   --  3.9 BUN  --   --  29* CREA  --   --  1.41* GLU  --   --  95  
GLUCPOC 106*   < >  --   
 < > = values in this interval not displayed. Assessment:  
 
Principal Problem: S/P CABG x 5 (10/23/2019) Overview: x 5, LIMA to LAD, RSVG to Diag1, OM2-OM3, RSVG to PDA Active Problems: 
  ACS (acute coronary syndrome) ([facility] (10/19/2019) Unstable angina ([facility] (10/19/2019) Coronary artery disease of native artery of native heart with stable angina pectoris ([facility] (10/21/2019) Systolic heart failure ([facility] (10/22/2019) Plan/Recommendations/Medical Decision Makin. S/p CABG: on ASA, statin. No BB while on dobutamine gtt. Continue scheduled hydralazine 2. Acute on chronic systolic CHF, NYHA Class II on admit: EF 35-40% preop. Remains on dobutamine. Continue Bumex and add Diuril. No BB/ACE/ARB/AA until vitals/kidney function allows. 3. Acute postop respiratory failure, acute pulm edema, effusions: cont bipap mask for now - monitor ABG's. Cont bumex, diuril. Continue scheduled nebs. Cont mucinex, pulm toileting. 4. Renal insufficiency: Cr improved today, can begin slow wean of dobutamine. Monitor w/ diuresis. Keep Devlin for strict I/O's  
 
5. Anemia: had preop, monitor H&H. Received 1 unit PRBCs 10/27/19. Iron panel sent preop - iron, iron sat low. Continue PO iron 6. Thrombocytopenia: Resolved. Continue to monitor daily CBC 7. Hx of HTN: currently on dobutamine. Continue scheduled hydralazine 8. HLD: on pravastatin 9. Constipation: +BM on 10/26. Cont pericolace, miralax, prn suppository 10. Current smoker: smoking cessation education. Cont pulm toileting. Nicotine patch if needed 11. Nutrition: see if pt able to eat off bipap and re place bipap mask when complete. If not, will need alternate nutrition Dispo: PT/OT as able. Remain in CVI until resp status more stable Signed By: Tobin Lewis NP

## 2019-10-29 NOTE — PROGRESS NOTES
Physical Therapy 10/29/2019 Chart reviewed. Per RN, hold PT this AM and f/u later today as appropriate. Patient with respiratory distress overnight requiring biPAP. Kanwal Soto Brochure, PT, DPT

## 2019-10-29 NOTE — PROGRESS NOTES
Transitions of Care: 
 
 -Continued PT/OT 
 -[facility] patient, has declined transfer to [facility] 
 -Has Medicare Part A The CM spoke with Cardiac Surgery NP- patient still having Oxygen needs, on dobutamine, patient to remain in CVICU at this time due to respiratory status. Anticipate IPR needs, patient only has Medicare Part A- may be a barrier for identified IPR. Cardiac Surgery to hold referral at this time and monitor for progress pending respiratory status. At [address] has accepted for home health services. CM continues to follow- will await further input from therapy and monitor medical progress.  RON Mdcaniel

## 2019-10-29 NOTE — PROGRESS NOTES
NYHA class IV A/C systolic heart failure (EF 25%, NT pro-BNP 15,013) Acute on chronic kidney disease Mild pulmonary edema Mild chronic lung disease More dyspnea and fatigue this am 
 
Giving Bumex and diuril Creatinine look good Intermittent BiPAP Hgb and platelets look good Bilurubin and other LFTs reasonable NT pro-BNP remains elevated ABG looks good Remains on Dobutamine Addendum: 
 
Repeat creatinine up a bit Will monitor CXR - pulmonary edema Intake/Output Summary (Last 24 hours) at 10/29/2019 1553 Last data filed at 10/29/2019 1400 Gross per 24 hour Intake 981.57 ml Output 2875 ml Net -1893.43 ml Visit Vitals /55 Pulse 86 Temp 98.4 °F (36.9 °C) Resp (!) 32 Ht 5' 7\" (1.702 m) Wt 140 lb 9.6 oz (63.8 kg) SpO2 (!) 87% BMI 22.02 kg/m² Risk of morbidity and mortality - high Medical decision making - high complexity Total critical care time - 30 minutes (CPT 39307)

## 2019-10-29 NOTE — PROGRESS NOTES
Problem: Mobility Impaired (Adult and Pediatric) Goal: *Acute Goals and Plan of Care (Insert Text) Description FUNCTIONAL STATUS PRIOR TO ADMISSION: Patient was independent and active without use of DME.  
 
HOME SUPPORT PRIOR TO ADMISSION: The patient lived with his son, but did not require assist. Son provides transportation since his car is currently broken down. Physical Therapy Goals Initiated 10/25/2019 1. Patient will move from supine to sit and sit to supine, scoot up and down and roll side to side in bed with minimal assistance/contact guard assist within 5 days. 2.  Patient will perform sit to/from stand with minimal assistance/contact guard assist within 5 days. 3.  Patient will ambulate  feet with least restrictive assistive device and minimal assistance/contact guard assist within 5 days. 4.  Patient will ascend/descend 6 stairs with single handrail(s) with moderate assistance  within 5 days. 5.  Patient will perform cardiac exercises per protocol with minimal assistance/contact guard assist within 5 days. 6.  Patient will verbally and functionally recall 3/3 sternal precautions within 5 days. Outcome: Progressing Towards Goal 
 PHYSICAL THERAPY TREATMENT Patient: Codey Shi (49 y.o. male) Date: 10/29/2019 Diagnosis: ACS (acute coronary syndrome) ([facility] [I24.9] Unstable angina (H[facility]) [I20.0] S/P CABG x 5 Procedure(s) (LRB): 
CARDIAC RE-ENTRY, MARISOL BY DR Gaudencio Ferro (N/A) 6 Days Post-Op Precautions: Fall, Sternal 
Chart, physical therapy assessment, plan of care and goals were reviewed. ASSESSMENT Patient continues with skilled PT services and is progressing towards goals. Progress slow and limited by patient's respiratory status. Seen on high flow (40L at 80%) today. With supine>sit EOB, patient c/o dizziness, BP 86/50 and sats dropped quickly to 81%. Symptoms did not improve and RR climbed to 36 therefore returned to supine.  Required 10 min to recover sats to 90%. Encouraged supine LE exercises as patient's mobility greatly limited by resp status at this time. Current Level of Function Impacting Discharge (mobility/balance): min-mod Ax1 for bed mobility, not tolerating minimal mobility due to respiratory status and high O2 requirements Other factors to consider for discharge: lives alone, sternal precautions PLAN : 
Patient continues to benefit from skilled intervention to address the above impairments. Continue treatment per established plan of care. to address goals. Recommendation for discharge: (in order for the patient to meet his/her long term goals) Therapy 3 hours per day 5-7 days per week This discharge recommendation: 
Has been made in collaboration with the attending provider and/or case management IF patient discharges home will need the following DME: none SUBJECTIVE:  
Patient stated Jessie Cuellar is the only freedom I have.  OBJECTIVE DATA SUMMARY:  
Patient mobilized on continuous portable monitor/telemetry. Critical Behavior: 
Neurologic State: Alert Orientation Level: Oriented X4 Cognition: Appropriate for age attention/concentration Safety/Judgement: Insight into deficits Functional Mobility Training: 
 
Cardiac diagnosis intervention: The patient stated 0 /3 sternal precautions when prompted. Reviewed the \"3 Ps\" with patient. The patient required max cues to maintain sternal precautions during functional activity. Bed Mobility: 
Supine to Sit: Minimum assistance; Moderate assistance;Assist x2; Additional time Balance: 
Sitting: Impaired; Without support Sitting - Static: Good (unsupported); Fair (occasional) Pain Rating: 
Denied pain Activity Tolerance:  
Poor, requires frequent rest breaks and observed SOB with activity Please refer to the flowsheet for vital signs taken during this treatment. After treatment patient left in no apparent distress:  
Supine in bed and Call bell within reach COMMUNICATION/COLLABORATION:  
The patients plan of care was discussed with: Registered Nurse Kanwal Chaparro, PT, DPT Time: 15 minutes

## 2019-10-29 NOTE — PROGRESS NOTES
2000 Assumed care of patient from [address] 
 
2115 offered ensure supplement, patient delined. Provided education on importance of nutrition for post op recovery. Patient also refusing SCD stating the cant sleep with them on. Educated provided on DVT prophylaxis but patient continues to refuse  
 
2250 %, fi02 decreased to 80% 2330 POX 95-96% at rest on highflow 40L/80%. Decreases to 84-85% with activity and takes 5-10 mintues to recover 0405 PRN hydralazine given for  
 
0450 PAtient sitting up straight in bed, resp rate 34-36. POX 79-80% on high flow 40L/100%. Placed on bipap @ originial settings. 0500 POX 98% and patient resting comfortably, respirations regular and <20 
 
0520 Dr. Arlette Najera made aware and gave order to give bumex 2mg IV x1 now and diuril 500mg IV x1 now 
 
0550 Condom catheter applied and patient given IV diuril and bumex doses 0700 voided 375ml clear yellow urine post bumex/diuril 
 
0800 Bedside and Verbal shift change report given to 1200 [facility] (oncoming nurse) by Glen Case (offgoing nurse). Report included the following information SBAR, Kardex, MAR, Recent Results and Cardiac Rhythm NSR. Problem: Falls - Risk of 
Goal: *Absence of Falls Description Document Betty Velázquez Fall Risk and appropriate interventions in the flowsheet. Outcome: Progressing Towards Goal 
Note:  
Fall Risk Interventions: 
Mobility Interventions: Communicate number of staff needed for ambulation/transfer Mentation Interventions: Evaluate medications/consider consulting pharmacy Medication Interventions: Evaluate medications/consider consulting pharmacy Elimination Interventions: Urinal in reach, Toileting schedule/hourly rounds, Patient to call for help with toileting needs History of Falls Interventions: Consult care management for discharge planning, Door open when patient unattended, Evaluate medications/consider consulting pharmacy Problem: CABG: Post-Op Day 5 
 Goal: Treatments/Interventions/Procedures Outcome: Progressing Towards Goal 
  
Problem: Infection - Risk of, Central Venous Catheter-Associated Bloodstream Infection Goal: *Absence of infection signs and symptoms Outcome: Progressing Towards Goal 
  
Problem: Tissue Perfusion - Cardiopulmonary, Altered Goal: *Optimize tissue perfusion Outcome: Progressing Towards Goal 
Goal: *Absence of hypoxia Outcome: Progressing Towards Goal 
  
Problem: CABG: Post-Op Day 4 Goal: Off Pathway (Use only if patient is Off Pathway) Outcome: Resolved/Not Met 
Goal: Activity/Safety Outcome: Resolved/Not Met 
Goal: Diagnostic Test/Procedures Outcome: Resolved/Not Met 
Goal: Nutrition/Diet Outcome: Resolved/Not Met 
Goal: Medications Outcome: Resolved/Not Met 
Goal: Discharge Planning Outcome: Resolved/Not Met 
Goal: Respiratory Outcome: Resolved/Not Met 
Goal: Treatments/Interventions/Procedures Outcome: Resolved/Not Met 
Goal: Psychosocial 
Outcome: Resolved/Not Met 
Goal: *Hemodynamically stable Outcome: Resolved/Not Met 
Goal: *Lungs clear or at baseline Outcome: Resolved/Not Met 
Goal: *Optimal pain control at patient's stated goal 
Outcome: Resolved/Not Met 
Goal: *Incisions without signs and symptoms of wound complication Outcome: Resolved/Not Met 
Goal: *Demonstrates progressive activity Outcome: Resolved/Not Met 
Goal: *Tolerating diet Outcome: Resolved/Not Met

## 2019-10-29 NOTE — PROGRESS NOTES
NUTRITION brief 1. DHT placement today with start of tube feeds since minimal intake for 6+ days - Osmolite 1.5 @ 20ml/hr advanced 15ml/hr q8hr to goal of  50ml/hr + 30ml flush q4hr 
 - once tolerating at goal will switch to nocturnal schedule 2. Continue to assist and encourage PO intake with meals 
 - use Ensure and Boost Pudding to give meds 
 - likely will do better with smaller amounts of food more frequently to help with fatigue/breathing 3. Add daily MVI Diet: mechanical soft Supplements: Ensure Enlive TID Meal intake:  
Patient Vitals for the past 168 hrs: 
 % Diet Eaten 10/28/19 1500 0 % 10/26/19 1800 25 % 10/26/19 1300 0 % 10/26/19 1000 10 % 10/25/19 1700 0 % 10/25/19 1200 0 % 10/25/19 0800 0 % Pt visited today and spoke with RN. On hi flow during the day and bipap at night. Fatigue with eating likely a major facotr. PO intake today remains minimal with difficulty breathing. Consumed only 1 Ensure shake today (350kcal, 20g protein). Pt very tired during visit but notes doing better with soft foods like applesauce and grits. Will add soup with each tray in mug, Boost Pudding (vanilla) - 240kcal 7g protein, and will continue Ensure. Briefly discussed with pt the potential need for tube feeds if oral intake remains minimal to help with recovery. Recommend DHT placement today with start of tube feeds since minimal intake for 6+ days. Can still use Bipap with Dobbhoff tube. Recommend EN with: Osmolite 1.5 @ 20ml/hr advanced 15ml/hr q8hr to goal of  50ml/hr + 30ml flush q4hr. Provides: 1200ml, 1800kcal, 75g protein, 912ml free fluid + 180ml flush = 1092ml ffluid. Meets 100% energy and 93% protein needs. Once tolerating at goal rate will switch to nocturnal feeds to encourage PO intake with meals. See full assessment from yesterday for additional details. Estimated Nutrition Needs:  
Kcals/day: 1243 Kcals/day(1658-1795kcal) Protein: 81 g(81-95g (1.2-1.4g/kg)) Fluid: 1700 ml(1ml/kcal or per cardio) Based On: Keny Rose(x 1.2-1.3) Weight Used: Actual wt(67.6kg) Mayra Lima, RD [address] [place] , Pager #[phone] or [phone]

## 2019-10-29 NOTE — DIABETES MGMT
[facility] Cardiac Surgery Progress Note Recommendations/ Comments: Consider continuing insulin gtt until eating 50% solid foods then, 
1) transition off gtt per [employer] Protocol 2) continue accu-checks and humalog correctional insulin ac & hs  
3) ADA/AHA diet as diet advanced 4) Pt with hx of DM on no DM related meds PTA. Insulin gtt should not be stopped until after 1604 on 10/25/19 to complete 48hr post-op time frame. Pt could receive transition as early as 26 if all criteria met per protocol. Currently on insulin gtt. At 0852 BG 89 mg/dL, rate 0.1 units/hr. Received 3 units in the past 6 hours Chart reviewed on Yuki Carrera. Patient is 68 y.o. male s/p CABG x 5 followed by re-entry for mediastinal clot evacuation and repair of SVG to PDA  - POD 6. Pt with documented hx of DM on no meds PTA. Anemia - A1c inaccurate A1c:  
Lab Results Component Value Date/Time Hemoglobin A1c 5.3 10/21/2019 03:04 AM  
 
 
 
Recent Glucose Results:  
Lab Results Component Value Date/Time GLU 95 10/29/2019 04:10 AM  
 GLUCPOC 89 10/29/2019 08:52 AM  
 GLUCPOC 106 (H) 10/29/2019 06:20 AM  
 GLUCPOC 108 (H) 10/29/2019 05:15 AM  
  
 
Lab Results Component Value Date/Time Creatinine 1.41 (H) 10/29/2019 04:10 AM  
 
Estimated Creatinine Clearance: 43.4 mL/min (A) (based on SCr of 1.41 mg/dL (H)). Active Orders Diet DIET MECHANICAL SOFT  
  
 
PO intake:  
Patient Vitals for the past 72 hrs: 
 % Diet Eaten 10/28/19 1500 0 % 10/26/19 1800 25 % 10/26/19 1300 0 % 10/26/19 1000 10 % Will continue to follow as needed. Thank you. Wilbur Cheadle RN, CDE Time spent: 4 minutes

## 2019-10-29 NOTE — PROGRESS NOTES
0745: Bedside shift change report given to Nick, Dmitriy Heller Student RN (oncoming nurse) by Mary Sky RN (offgoing nurse). Report included the following information SBAR, Intake/Output, Recent Results, Cardiac Rhythm NSR and Alarm Parameters . 0900: RRT at bedside. Attempting to take patient off BiPAP- placing patient on HiFlow at 40L and 80%.  
 
1321: Critical value BG obtained: 26. Rechecked per protocol, result 111, final result 106. Metabolic panel sent per protocol. Patient not symptomatic, alert and oriented x4 
 
1536: Luana Marinelli, NP on phone and updated on patient status. Orders received to stop Dobutamine and continue HiFlow O2 nasal cannula during the day and place patient on BiPAP at night. 1650: Dobhoff placed, auscultated air bolus, KUB ordered 1705: XRAY at bedside  
 
1900: BG 70. Insulin paused. 4 OZ juice given. Will recheck in 15 minutes 1915: BG rechecked- 82. Insulin gtt restarted per [employer] 2000: Bedside shift change report given to Mariposa Valentine RN (oncoming nurse) by NORMA Romero and Sree Odonnell Student Nurse (offgoing nurse). Report included the following information SBAR, Kardex, Procedure Summary, Intake/Output, MAR, Recent Results, Cardiac Rhythm NSR and Alarm Parameters .

## 2019-10-30 NOTE — PROGRESS NOTES
0252: Bedside shift change report given to Kristian Nyhan, RN (oncoming nurse) by Jose Zapien RN (offgoing nurse). Report included the following information SBAR, OR Summary, Intake/Output, MAR, Recent Results, Cardiac Rhythm NSR and Alarm Parameters . Assumed care of patient Assessment performed. 0836: sO2 83-85. High flow to 40L and 100%. [id number] Niño NP at bedside and updated on patient status. Orders to stop insulin drip, don't give bolus lantus dose and start patient on Q6h blood sugar checks. 1030: sO2 97%, high flow to 40L and 80%. 1830: sO2 86-88%, high flow to 100%. 2000: Bedside shift change report given to Jose Zapien RN (oncoming nurse) by Kristian Nyhan, RN (offgoing nurse). Report included the following information SBAR, Intake/Output, MAR, Cardiac Rhythm NSR and Alarm Parameters . Shift Summary: patient remained on hi flow throughout day due to poor oxygenation. Patient still has poor PO intake due to decreased appetite. Unable to work with PT/OT due to severe increased work of breathing with any activity.

## 2019-10-30 NOTE — PROGRESS NOTES
2000: Report received from Jed Mcfadden RN. Insulin and MIV drips verified. Pt in bed sleeping, awakens to voice, calm and cooperative, and on hiflow NC 40L and 80% FiO2 sating mid 90s. Dobhoff placed in L nare today to increase pt's oral intake, rate 20mL/h w/ q4h 30mL flushes. Plan to progress TF q8h (TF started at 1900) by 15 mL for goal rate 50 mL/h. 
 
2146: Pt voided 400 mL clear, yellow, & odorless urine and was incontinent stool. 2210: Placed pt on BiPAP - IPAP 12, EPAP 5, RR 4, and FiO2 70%. 0134: Pt sating %, weaned BiPAP FiO2 to 60%. 0345: Increased tube feed rate to 35 mL/h. 
 
0500: Pt requesting to take off BiPAP, hiflow NC applied. 40L & 80% FiO2. 
 
0508: Pt desated to high 70s, pt urinating in urinal. Increased FiO2 to 100%. 0510: Pt sating 94% on 40L & 100% FiO2. 
 
0525: Pt sating %, weaned to 90% FiO2 & 40L. 
 
0729: PT sating 97%, weaned to 80% FiO2 & 40L. 
 
0745: Bedside and Verbal shift change report given to Amor Cruz RN. Report included the following information SBAR, Intake/Output, MAR, Recent Results, Cardiac Rhythm NSR and Alarm Parameters . Problem: Falls - Risk of 
Goal: *Absence of Falls Description Document Sammy Avila Fall Risk and appropriate interventions in the flowsheet. Outcome: Progressing Towards Goal 
Note:  
Fall Risk Interventions: 
Mobility Interventions: Communicate number of staff needed for ambulation/transfer, Patient to call before getting OOB, PT Consult for assist device competence, PT Consult for mobility concerns, Strengthening exercises (ROM-active/passive) Mentation Interventions: Adequate sleep, hydration, pain control, Door open when patient unattended, Evaluate medications/consider consulting pharmacy, Increase mobility, More frequent rounding, Reorient patient, Room close to nurse's station, Toileting rounds, Update white board Medication Interventions: Evaluate medications/consider consulting pharmacy, Patient to call before getting OOB, Teach patient to arise slowly Elimination Interventions: Call light in reach, Patient to call for help with toileting needs, Toileting schedule/hourly rounds, Urinal in reach History of Falls Interventions: Consult care management for discharge planning, Evaluate medications/consider consulting pharmacy Problem: Pressure Injury - Risk of 
Goal: *Prevention of pressure injury Description Document Earl Scale and appropriate interventions in the flowsheet. Outcome: Progressing Towards Goal 
Note:  
Pressure Injury Interventions: 
Sensory Interventions: Assess changes in LOC, Check visual cues for pain, Keep linens dry and wrinkle-free, Maintain/enhance activity level, Minimize linen layers, Pressure redistribution bed/mattress (bed type), Turn and reposition approx. every two hours (pillows and wedges if needed) Moisture Interventions: Absorbent underpads, Apply protective barrier, creams and emollients, Check for incontinence Q2 hours and as needed, Limit adult briefs, Minimize layers, Maintain skin hydration (lotion/cream) Activity Interventions: Increase time out of bed, Pressure redistribution bed/mattress(bed type), PT/OT evaluation Mobility Interventions: Pressure redistribution bed/mattress (bed type), PT/OT evaluation, Turn and reposition approx. every two hours(pillow and wedges) Nutrition Interventions: Document food/fluid/supplement intake, Offer support with meals,snacks and hydration Friction and Shear Interventions: Lift sheet, Minimize layers Problem: AMI: Discharge Outcomes Goal: *Demonstrates ability to perform prescribed activity without shortness of breath or discomfort Outcome: Progressing Towards Goal 
Goal: *Anxiety reduced or absent Outcome: Progressing Towards Goal 
  
Problem: CABG: Discharge Outcomes Goal: *Hemodynamically stable Outcome: Progressing Towards Goal 
Note: No pressors or inotropes. Francitas rosie catheter d/c'd 10/29/19. Goal: *Stable cardiac rhythm Outcome: Progressing Towards Goal 
Note:  
NSR, no ectopy noted. Goal: *Lungs clear or at baseline Outcome: Progressing Towards Goal 
Note:  
Coarse breath sounds. Mucinex PO. Encouraging IS use. Goal: *Optimal pain control at patient's stated goal 
Outcome: Progressing Towards Goal 
Note:  
Pt denies pain. PRNs in [facility] Goal: *Weight  is stable Outcome: Progressing Towards Goal 
Goal: *No signs and symptoms of infection or wound complications Outcome: Progressing Towards Goal 
Note:  
Incisions CDI. Goal: *Anxiety reduced or absent Outcome: Progressing Towards Goal 
Note:  
Calm and cooperative. Sleepy. Goal: *Influenza immunization Outcome: Progressing Towards Goal 
Goal: *Pneumococcal immunization Outcome: Progressing Towards Goal 
  
Problem: Infection - Risk of, Central Venous Catheter-Associated Bloodstream Infection Goal: *Absence of infection signs and symptoms Outcome: Progressing Towards Goal 
  
Problem: Tissue Perfusion - Cardiopulmonary, Altered Goal: *Optimize tissue perfusion Outcome: Progressing Towards Goal 
Goal: *Absence of hypoxia Outcome: Progressing Towards Goal 
  
Problem: Nutrition Deficit Goal: *Optimize nutritional status Outcome: Progressing Towards Goal 
Note:  
Tube feeds, progressing rate q8h. Mechanical soft diet. Problem: AMI: Discharge Outcomes Goal: *Verbalizes understanding and describes prescribed diet Outcome: Not Progressing Towards Goal 
Goal: *Describes follow-up/return visits to physicians Outcome: Not Progressing Towards Goal 
Goal: *Describes home care/support arrangements established based on need Outcome: Not Progressing Towards Goal 
Goal: *Understands and describes signs and symptoms to report to providers(Stroke Metric) Outcome: Not Progressing Towards Goal 
Goal: *Verbalizes name, dosage, time, side effects, and number of days to continue medications Outcome: Not Progressing Towards Goal 
  
Problem: CABG: Discharge Outcomes Goal: *Demonstrates ability to perform prescribed activity without shortness of breath or discomfort Outcome: Not Progressing Towards Goal 
Goal: *Verbalizes home exercise program, activity guidelines, cardiac precautions Outcome: Not Progressing Towards Goal 
Goal: *Verbalizes understanding and describes prescribed diet Outcome: Not Progressing Towards Goal 
Goal: *Verbalizes name, dosage, time, side effects, and number of days to continue medications Outcome: Not Progressing Towards Goal 
Goal: *Verbalizes and demonstrates incision care Outcome: Not Progressing Towards Goal 
Goal: *Understands and describes signs and symptoms to report to providers(Stroke Metric) Outcome: Not Progressing Towards Goal 
Goal: *Describes follow-up/return visits to physicians Outcome: Not Progressing Towards Goal 
Goal: *Describes available resources and support systems Outcome: Not Progressing Towards Goal 
Goal: *Expresses feelings about diagnosis and surgery Outcome: Not Progressing Towards Goal

## 2019-10-30 NOTE — PROGRESS NOTES
NYHA class IV A/C systolic heart failure (EF 25%, NT pro-BNP 15,013) Acute on chronic kidney disease Mild pulmonary edema Mild chronic lung disease Acute on chronic hypoxic respiratory failure Creatinine bumped a bit today Likely from aggressive diuresis Will hold diuril Hgb and platelets look good Bilirubin and other LFTs look good NT pro-BNP remains elevated Not eating well Dobhoff placed Needs to work with PT/OT some CXR - pulmonary edema Intake/Output Summary (Last 24 hours) at 10/30/2019 1500 Last data filed at 10/30/2019 1400 Gross per 24 hour Intake 1681.24 ml Output 2475 ml Net -793.76 ml Visit Vitals /62 Pulse 77 Temp 98.9 °F (37.2 °C) Resp 27 Ht 5' 7\" (1.702 m) Wt 137 lb 11.2 oz (62.5 kg) SpO2 (!) 88% BMI 21.57 kg/m² Risk of morbidity and mortality - high Medical decision making - high complexity Total critical care time - 30 minutes (CPT 36137)

## 2019-10-30 NOTE — PROGRESS NOTES
Physical Therapy 10/30/2019 Chart reviewed. Initially received clearance from bedside RN to see patient. However, upon entrance into room, patient's SpO2 83-84% on High Flow NC (40 LPM, FiO2 80%) in bed with HOB elevated. Bedside RN requesting time for respiratory recovery. Will follow up later as able/appropriate. Thank you.  
Zay Barnes, PT, DPT

## 2019-10-30 NOTE — DIABETES MGMT
[facility] Cardiac Surgery Progress Note Recommendations/ Comments: Insulin gtt stopped this AM; no transitional Lantus required. BG at 1135  mg/dL 
TF via Dobhoff; Osmolite @ 35, goal 50 May require basal Lantus while on TF. Weight-based dose of Lantus is 13 units daily Current hospital DM medication: 
Lispro normal sensitivity correction scale Patient is 68 y.o. male s/p CABG x 5 followed by re-entry for mediastinal clot evacuation and repair of SVG to PDA  - POD 7. Pt with documented hx of DM on no meds PTA. Anemia - A1c inaccurate A1c:  
Lab Results Component Value Date/Time Hemoglobin A1c 5.3 10/21/2019 03:04 AM  
 
 
 
Recent Glucose Results:  
Lab Results Component Value Date/Time  (H) 10/30/2019 03:37 AM  
 GLUCPOC 185 (H) 10/30/2019 11:53 AM  
 GLUCPOC 112 (H) 10/30/2019 07:00 AM  
 GLUCPOC 139 (H) 10/30/2019 05:56 AM  
  
 
Lab Results Component Value Date/Time Creatinine 1.77 (H) 10/30/2019 03:37 AM  
 
Estimated Creatinine Clearance: 33.8 mL/min (A) (based on SCr of 1.77 mg/dL (H)). Active Orders Diet DIET MECHANICAL SOFT  
  
 
PO intake:  
Patient Vitals for the past 72 hrs: 
 % Diet Eaten 10/30/19 1000 0 % 10/28/19 1500 0 % Will continue to follow as needed. Thank you. Dony Davey RN, CDE Time spent: 4 minutes

## 2019-10-30 NOTE — PROGRESS NOTES
Transitions of Care: 
 
 -TBD pending patient's medical POC and progress 
 -Continued PT/OT The CM received notice from UR that the [facility] is requesting another copy of the [place] Refusal form- previous form completed by previous CM, patient endorsed not wanting to transfer to the [place], the patient has Medicare Part A verified by [facility] team.  
 
The CM met with the patient at bedside along with co-worker Zelda De Leon presented additional [facility] Refusal of Transfer form, patient asked for his glasses and he read the form out-loud with  present- the patient verbalized understanding of risks and benefits of transfer (if patient was medically stable for a transfer) and patient verbalized that he wants to continue his care at [facility]. The patient signed the form, RN reported patient is alert and oriented. The CM faxed the refusal to transfer form the the [place] directly (f: [phone], [phone] extension [phone]). The CM notified UR- patient has Medicare Part A, CM inquiring if this can be added to the patient's facesheet.  RON Mari

## 2019-10-30 NOTE — PROGRESS NOTES
Chart reviewed. Initially received clearance from bedside RN to see patient. However, upon entrance into room, patient's SpO2 83-84% on High Flow NC (40 LPM, FiO2 80%) in bed with HOB elevated. Bedside RN requesting time for respiratory recovery. Will follow up later as able/appropriate. Thank you.

## 2019-10-30 NOTE — PROGRESS NOTES
[facility] ICU Progress Note Admit Date: 10/19/2019 POD:  7 Days Post-Op Procedure:  Procedure(s): 
CARDIAC RE-ENTRY, MARISOL BY  [facility]  [facility]    
 
CABG x 5, LIMA to LAD, RSVG to [id number], RSVG to PDA Subjective:  
Pt seen with Dr. Lolly Doyle. Pt on bipap mask some overnight but back on high flow now. Afebrile. Unable to tolerate much activity without respiratory decompensation. Dobhoff placed yesterday and has tolerated feedings. Objective:  
Vitals: 
Blood pressure 115/61, pulse 78, temperature 98.1 °F (36.7 °C), resp. rate 25, height 5' 7\" (1.702 m), weight 137 lb 11.2 oz (62.5 kg), SpO2 93 %. Temp (24hrs), Av.2 °F (36.8 °C), Min:97.9 °F (36.6 °C), Max:98.6 °F (37 °C) EKG/Rhythm:  SR in the 76s Oxygen Therapy: 
Oxygen Therapy O2 Sat (%): 93 % (10/30/19 0800) Pulse via Oximetry: 85 beats per minute (10/29/19 2305) O2 Device: Hi flow nasal cannula; Heated (10/30/19 0729) O2 Flow Rate (L/min): 40 l/min (10/30/19 0729) O2 Temperature: 87.8 °F (31 °C) (10/27/19 1335) FIO2 (%): 80 % (10/30/19 0729) CXR:  
CXR Results  (Last 48 hours) 10/30/19 3250  XR CHEST PORT Final result Impression:  IMPRESSION: No significant change in diffuse interstitial prominence reflecting  
an element of interstitial edema superimposed upon chronic changes. Narrative:  EXAM:  XR CHEST PORT INDICATION:  interstitial edema COMPARISON:  10/29/2019 FINDINGS: A portable AP radiograph of the chest was obtained at 336 hours. Right IJ catheter tip overlies right brachiocephalic SVC junction. Dobbhoff tube  
overlies the abdomen. .  Diffuse interstitial prominence is stable. Joe Octave Cardiomegaly is stable. .   
   
  
 10/29/19 8083  XR CHEST PORT Final result Impression:  IMPRESSION:  
1. Continued evidence of congestive cardiac failure/pulmonary edema. Presence of  
bilateral pleural effusions. 2. Interval removal of the [place]-Chavez catheter as described above. Narrative:  Portable chest 2 views dated 10/29/2019 Comparison chest dated 10/28/2019 History is interstitial edema 2 portable AP views of the chest were obtained. The cardiac silhouette continues  
to be enlarged. There continues to be abnormal prominence of the pulmonary  
interstitial markings with the presence of Kerley B lines. This is indicative of  
congestive change/pulmonary edema. There is blunting of both costophrenic  
angles. This is compatible with the presence of bilateral pleural effusions. There has been interval removal of the [place]-Chavez catheter. The sheath remains. Admission Weight: Last Weight Weight: 141 lb 5 oz (64.1 kg) Weight: 137 lb 11.2 oz (62.5 kg) Intake / Output / Drain: 
Current Shift: 10/30 0701 - 10/30 1900 In: -  
Out: 400 [Urine:400] Last 24 hrs.:  
 
Intake/Output Summary (Last 24 hours) at 10/30/2019 9310 Last data filed at 10/30/2019 1366 Gross per 24 hour Intake 1266.02 ml Output 2600 ml Net -1333.98 ml EXAM: 
General:  Resting comfortably. No acute distress Lungs:   Scattered crackles Incision:  Midsternal incision with no significant erythema, drainage, or dehiscence. Heart:  Regular rate and rhythm, S1, S2 normal, no murmur, click, rub or gallop. Abdomen:   Soft, non-tender. Bowel sounds hypoactive. No masses,  No organomegaly. +BM 10/29 Extremities:  trace edema. Palpable pulses bilat Neurologic:  Gross motor and sensory apparatus intact. Labs:  
Recent Labs 10/30/19 
0700  10/30/19 
4239 WBC  --   --  10.7 HGB  --   --  9.7* HCT  --   --  28.5* PLT  --   --  210 NA  --   --  127* K  --   --  3.3*  
BUN  --   --  41* CREA  --   --  1.77* GLU  --   --  123* GLUCPOC 112*   < > 132*  
 < > = values in this interval not displayed. Assessment:  
 
Principal Problem: S/P CABG x 5 (10/23/2019) Overview: x 5, LIMA to LAD, RSVG to Diag1, OM2-OM3, RSVG to PDA Active Problems: 
  ACS (acute coronary syndrome) ([facility] (10/19/2019) [facility] (10/19/2019) Coronary artery disease of native artery of native heart with stable angina pectoris ([facility] (10/21/2019) Systolic heart failure ([facility] (10/22/2019) Plan/Recommendations/Medical Decision Makin. S/p CABG: on ASA, statin. Dobutamine off 10/30. Will stop scheduled hydralazine to allow BP for BB. 2. Acute on chronic systolic CHF, NYHA Class II on admit: EF 35-40% preop. Dobutamine off 10/30. Will start BB today. No ACE/ARB/AA until vitals/kidney function allows. 3. Acute postop respiratory failure, acute pulm edema, effusions: cont bipap mask as needed for work of breathing - monitor ABG's. pBNP still 25,000. Cont bumex but will stop Diuril today due to renal function. Continue scheduled nebs. Cont mucinex, pulm toileting. 4. Renal insufficiency: Creatinine worse at 1.77 today. Will back off of diuretics today and continue to monitor BMP. Keep Devlin for strict I/O's  
 
5. Anemia: had preop, monitor H&H. Received 1 unit PRBCs 10/27/19. Iron panel sent preop - iron, iron sat low. Continue PO iron 6. Thrombocytopenia: Resolved. Continue to monitor daily CBC 7. Hx of HTN: Controlled on current medications. Will stop scheduled Hydralazine to allow BP to add BB. 8. HLD: Continue pravastatin 9. Constipation: +BM on 10/29. Cont pericolace, miralax, prn suppository 10. Current smoker: smoking cessation education. Cont pulm toileting. Nicotine patch if needed 11. Nutrition: Appreciate Dietician recommendations. Dobhoff placed 10/30 and Enteral feedings started. Currently at 35 ml/hr and advancing for a goal of 50 ml/hr. Continue PO intake as able. 12. DVT/GI Prophylaxis:  Add SQ Heparin, PPI Dispo: PT/OT as able. Remain in CVI until resp status more stable Signed By: Diandra Chaparro NP

## 2019-10-31 NOTE — PROGRESS NOTES
[facility] ICU Progress Note Admit Date: 10/19/2019 POD:  8 Days Post-Op Procedure:  Procedure(s): 
CARDIAC RE-ENTRY, MARISOL BY  [facility]    
 
CABG x 5, LIMA to LAD, RSVG to [id number], RSVG to PDA Subjective:  
Pt seen with Dr. Gigi Hurst. Tmax 99.4, continues on high flow oxygen. Unable to tolerate much activity without respiratory decompensation. Objective:  
Vitals: 
Blood pressure 129/55, pulse 79, temperature 98.9 °F (37.2 °C), resp. rate 29, height 5' 7\" (1.702 m), weight 135 lb 1.6 oz (61.3 kg), SpO2 92 %. Temp (24hrs), Av.6 °F (37 °C), Min:98.1 °F (36.7 °C), Max:99.4 °F (37.4 °C) EKG/Rhythm:  SR in the 76s Oxygen Therapy: 
Oxygen Therapy O2 Sat (%): 92 % (10/31/19 1200) Pulse via Oximetry: 75 beats per minute (10/30/19 1920) O2 Device: Hi flow nasal cannula (10/31/19 1200) O2 Flow Rate (L/min): 40 l/min (10/31/19 1200) O2 Temperature: 89.6 °F (32 °C) (10/31/19 9204) FIO2 (%): 100 % (10/31/19 1200) CXR:  
CXR Results  (Last 48 hours) 10/31/19 0511  XR CHEST PORT Final result Impression:  IMPRESSION:   
Stable pulmonary edema. Narrative:  PORTABLE CHEST RADIOGRAPH/S: 10/31/2019 5:11 AM  
   
INDICATION: Interstitial edema. COMPARISON: 10/23/2019-10/30/2019. TECHNIQUE: Portable frontal semiupright radiograph/s of the chest.  
   
FINDINGS:   
Interstitial edema is stable. A right IJ sheath, feeding tube, and epicardial  
pacing wires remain in place. Post CABG. The central airways are patent. No  
pneumothorax and no large pleural effusion. 10/30/19 0439  XR CHEST PORT Final result Impression:  IMPRESSION: No significant change in diffuse interstitial prominence reflecting  
an element of interstitial edema superimposed upon chronic changes. Narrative:  EXAM:  XR CHEST PORT INDICATION:  interstitial edema COMPARISON:  10/29/2019 FINDINGS: A portable AP radiograph of the chest was obtained at 336 hours. Right IJ catheter tip overlies right brachiocephalic SVC junction. Dobbhoff tube  
overlies the abdomen. .  Diffuse interstitial prominence is stable. Lyudmila Savant Cardiomegaly is stable. .   
   
  
  
 
 
Admission Weight: Last Weight Weight: 141 lb 5 oz (64.1 kg) Weight: 135 lb 1.6 oz (61.3 kg) Intake / Output / Drain: 
Current Shift: 10/31 0701 - 10/31 1900 In: 287.7 [P.O.:120; I.V.:62.7] Out: 450 [Urine:450] Last 24 hrs.:  
 
Intake/Output Summary (Last 24 hours) at 10/31/2019 1405 Last data filed at 10/31/2019 1200 Gross per 24 hour Intake 1832.67 ml Output 1925 ml Net -92.33 ml EXAM: 
General:  Resting comfortably. No acute distress Lungs:   Scattered crackles Incision:  Midsternal incision with no significant erythema, drainage, or dehiscence. Heart:  Regular rate and rhythm, S1, S2 normal, no murmur, click, rub or gallop. Abdomen:   Soft, non-tender. Bowel sounds hypoactive. No masses,  No organomegaly. +BM 10/31 Extremities:  trace edema. Palpable pulses bilat Neurologic:  Gross motor and sensory apparatus intact. Labs:  
Recent Labs 10/31/19 
1230  10/31/19 
0325 WBC  --   --  10.3 HGB  --   --  9.3* HCT  --   --  27.7*  
PLT  --   --  228 NA  --   --  127* K  --   --  4.2 BUN  --   --  48* CREA  --   --  1.97* GLU  --   --  170* GLUCPOC 207*   < >  --   
 < > = values in this interval not displayed. Assessment:  
 
Principal Problem: S/P CABG x 5 (10/23/2019) Overview: x 5, LIMA to LAD, RSVG to Diag1, OM2-OM3, RSVG to PDA Active Problems: 
  ACS [facility]acute coronary syndrome) ([facility] (10/19/2019) Unstable angina ([facility] (10/19/2019) Coronary artery disease of native artery of native heart with stable angina pectoris ([facility] (10/21/2019) Systolic heart failure ([facility] (10/22/2019) Plan/Recommendations/Medical Decision Making: 1. S/p CABG: on ASA, statin. Will stop scheduled hydralazine to allow BP for BB. 2. Acute on chronic systolic CHF, NYHA Class II on admit: EF 35-40% preop. Dobutamine restarted today to assist diuresis. No BB/ACE/ARB/AA until vitals/kidney function allows. 3. Acute postop respiratory failure, acute pulm edema, effusions: cont bipap mask as needed for work of breathing - monitor ABG's. pBNP still 19,000. Cont bumex but monitor renal function. Add Prednisone daily. Continue scheduled nebs. Cont mucinex, pulm toileting. 4. Renal insufficiency: Creatinine worse at 1.97 today. Will continue to diurese today and continue to monitor renal function. Keep Devlin for strict I/O's  
 
5. Anemia: had preop, stable H&H. Received 1 unit PRBCs 10/27/19. Iron panel sent preop - iron, iron sat low. Continue PO iron 6. Thrombocytopenia: Resolved. Continue to monitor daily CBC 7. Hx of HTN: Controlled on current medications. Dobutamine added back today and BB stopped. Has PRN Hydralazine. 8. HLD: Continue pravastatin 9. Constipation: Resolved. Having regular BM. Cont pericolace, miralax, prn suppository 10. Current smoker: smoking cessation education. Cont pulm toileting. Nicotine patch if needed 11. Nutrition: Appreciate Dietician recommendations. Dobhoff placed 10/30 and Enteral feedings started. Transition to nocturnal feedings to allow for increased PO intake. Continue PO intake as able. 12. DVT/GI Prophylaxis:  SQ Heparin, PPI Dispo: PT/OT as able. Remain in CVI until resp status more stable Signed By: Stefanie Koroma NP

## 2019-10-31 NOTE — DIABETES MGMT
[facility] Cardiac Surgery Progress Note Recommendations/ Comments: Insulin gtt stopped 10/30/2019; no transitional Lantus required. BG's ranged 170 mg/dL - 219 mg/dL since transition Has been on continuous TF via Dobhoff Lantus 10 units daily begun today Per Nutrition note plan to change to nocturnal TF Osmolite 1.5 @ 75 from 7PM - 11AM 
Received Lantus 10 units at 0840 today Observe BG response to addition of Lantus and change in timing of TF. PO intake has been low. May need to change timing of Lantus to evening or start NPH at time of TF depending on BG results overnight tonight and tomorrow AM. Current hospital DM medication: 
Lantus 10 units each AM 
Lispro normal sensitivity correction scale Patient is 68 y.o. male s/p CABG x 5 followed by re-entry for mediastinal clot evacuation and repair of SVG to PDA  - POD 8. Pt with documented hx of DM on no meds PTA. Anemia - A1c inaccurate A1c:  
Lab Results Component Value Date/Time Hemoglobin A1c 5.3 10/21/2019 03:04 AM  
 
 
 
Recent Glucose Results:  
Lab Results Component Value Date/Time  (H) 10/31/2019 03:25 AM  
 GLUCPOC 194 (H) 10/31/2019 05:16 AM  
 GLUCPOC 219 (H) 10/30/2019 11:56 PM  
 GLUCPOC 183 (H) 10/30/2019 06:13 PM  
  
 
Lab Results Component Value Date/Time Creatinine 1.97 (H) 10/31/2019 03:25 AM  
 
Estimated Creatinine Clearance: 29.8 mL/min (A) (based on SCr of 1.97 mg/dL (H)). Active Orders Diet DIET MECHANICAL SOFT  
  
 
PO intake:  
Patient Vitals for the past 72 hrs: 
 % Diet Eaten 10/30/19 1000 0 % 10/28/19 1500 0 % Will continue to follow as needed. Thank you. Candida Rivera RN, CDE Time spent: 5 minutes

## 2019-10-31 NOTE — PROGRESS NOTES
Problem: Mobility Impaired (Adult and Pediatric) Goal: *Acute Goals and Plan of Care (Insert Text) Description FUNCTIONAL STATUS PRIOR TO ADMISSION: Patient was independent and active without use of DME.  
 
HOME SUPPORT PRIOR TO ADMISSION: The patient lived with his son, but did not require assist. Son provides transportation since his car is currently broken down. Physical Therapy Goals Initiated 10/25/2019 1. Patient will move from supine to sit and sit to supine, scoot up and down and roll side to side in bed with minimal assistance/contact guard assist within 5 days. 2.  Patient will perform sit to/from stand with minimal assistance/contact guard assist within 5 days. 3.  Patient will ambulate  feet with least restrictive assistive device and minimal assistance/contact guard assist within 5 days. 4.  Patient will ascend/descend 6 stairs with single handrail(s) with moderate assistance  within 5 days. 5.  Patient will perform cardiac exercises per protocol with minimal assistance/contact guard assist within 5 days. 6.  Patient will verbally and functionally recall 3/3 sternal precautions within 5 days. Outcome: Progressing Towards Goal 
PHYSICAL THERAPY TREATMENT Patient: Conrad Fabry (88 y.o. male) Date: 10/31/2019 Diagnosis: ACS (acute coronary syndrome) ([facility] [address] [I24.9] Unstable angina (HCC) [I20.0] S/P CABG x 5 Procedure(s) (LRB): 
CARDIAC RE-ENTRY, MARISOL BY DR Twan Erickson (N/A) 8 Days Post-Op Precautions: Fall, Sternal 
Chart, physical therapy assessment, plan of care and goals were reviewed. ASSESSMENT Patient continues with skilled PT services and is progressing towards goals. Patient received supine in bed, asleep but easily woken and agreeable to therapy. Currently on 40 L high flow at 100% FiO2. With gentle supine exercises he was able to maintain sats 88-94% and RR ranging 22-36.  Sat EOB x7 minutes with moderate assist-CGA and maintained VS. C/o back pain while sitting EOB. Was able to stand pivot to chair with sats stable initially but dipping to 82% at lowest. Recovered within 2.5 minutes of sitting in chair which is a great improvement from previous session seated EOB (desat to 79% with 10 minutes to recover). Anticipate extensive inpatient rehab needs, possibly inpatient pulmonary rehab at d/c. Current Level of Function Impacting Discharge (mobility/balance): min Ax2 Other factors to consider for discharge: current smoker, lives alone PLAN : 
Patient continues to benefit from skilled intervention to address the above impairments. Continue treatment per established plan of care. to address goals. Recommendation for discharge: (in order for the patient to meet his/her long term goals) Therapy 3 hours per day 5-7 days per week This discharge recommendation: 
Has not yet been discussed the attending provider and/or case management IF patient discharges home will need the following DME: none SUBJECTIVE:  
Patient stated I said I will in a minute.  OBJECTIVE DATA SUMMARY:  
Patient mobilized on continuous portable monitor/telemetry. Critical Behavior: 
Neurologic State: Drowsy Orientation Level: Oriented X4 Cognition: Follows commands Safety/Judgement: Insight into deficits Functional Mobility Training: 
 
Cardiac diagnosis intervention: The patient stated 0 /3 sternal precautions when prompted. Reviewed the \"3 Ps\" with patient. The patient required min cues to maintain sternal precautions during functional activity. Bed Mobility: 
Supine to Sit: Moderate assistance;Assist x1 Transfers: 
Sit to Stand: Minimum assistance;Assist x2 Stand to Sit: Minimum assistance;Assist x2 Bed to Chair: Minimum assistance;Assist x2 Balance: 
Sitting: Impaired; Without support Sitting - Static: Good (unsupported); Fair (occasional) Sitting - Dynamic: Fair (occasional) Standing: Impaired; Without support Standing - Static: Fair Standing - Dynamic : Poor Therapeutic Exercises:  
  
CARDIAC EXERCISE Sets Reps Active Active Assist  
Passive Self ROM Comments Shoulder flexion 1 5 ?                                            ?                                            ?                                            ?                                              
Scapular elevation 1 5 ?                                            ?                                            ?                                            ?                                              
Scapular retraction 1 5 ?                                            ?                                            ?                                            ?                                              
Supine heel slides 1x5 each Pain Rating: 
Pain from frequent coughing at sternotomy site and back pain, did not rate, RN aware Activity Tolerance:  
Good, desaturates with exertion and requires oxygen, requires frequent rest breaks and observed SOB with activity Please refer to the flowsheet for vital signs taken during this treatment. After treatment patient left in no apparent distress:  
Sitting in chair and Call bell within reach COMMUNICATION/COLLABORATION:  
The patients plan of care was discussed with: Occupational Therapist and Registered Nurse Kanwal Sharpe, PT, DPT Time Calculation: 27 mins

## 2019-10-31 NOTE — PROGRESS NOTES
1945: Report received from [facility] - JAILYN, RN. MIV drip verified. Pt in bed, sleeping, awakens to voice, calm and cooperative, and on hiflow NC 40L and 100% FiO2 sating mid-high 90s. Tube feeds infusing through dobhoff, rate @ goal. Plan to place BiPAP HS. 
 
2100: Pt incontinent stool, medium loose brown. Pt assisted w/ turns. Tachypnea (22-26), DRIVER and sats dropped to 86-88% when flat in bed. Pt recovers to low 90s w/in 2-5 mins. 2200: Pt sleeping, BiPAP applied (settings: IPAP 12 / EPAP 5 / RR 4 / FiO2 60%). Pt tachypnea, low-mid 20s, but taking Vt 700s. 0515: Placed pt on Hiflow NC, 40L & 100% FiO2. 
 
0537: Pt sating 96%, weaned hiflow NC, 40L & 90% FiO2. 
 
0745: Bedside and Verbal shift change report given to Heath Jeff RN. Report included the following information SBAR, Intake/Output, MAR, Recent Results, Cardiac Rhythm NSR and Alarm Parameters . Problem: Falls - Risk of 
Goal: *Absence of Falls Description Document [facility] Fall Risk and appropriate interventions in the flowsheet. Outcome: Progressing Towards Goal 
Note:  
Fall Risk Interventions: 
Mobility Interventions: Communicate number of staff needed for ambulation/transfer, Patient to call before getting OOB, PT Consult for mobility concerns, Strengthening exercises (ROM-active/passive) Mentation Interventions: Adequate sleep, hydration, pain control, Eyeglasses and hearing aids, Increase mobility, More frequent rounding, Reorient patient, Room close to nurse's station, Toileting rounds, Update white board Medication Interventions: Evaluate medications/consider consulting pharmacy, Patient to call before getting OOB, Teach patient to arise slowly Elimination Interventions: Call light in reach, Patient to call for help with toileting needs, Toileting schedule/hourly rounds History of Falls Interventions: Evaluate medications/consider consulting pharmacy Problem: Pressure Injury - Risk of 
Goal: *Prevention of pressure injury Description Document Earl Scale and appropriate interventions in the flowsheet. Outcome: Progressing Towards Goal 
Note:  
Pressure Injury Interventions: 
Sensory Interventions: Assess changes in LOC, Check visual cues for pain, Float heels, Keep linens dry and wrinkle-free, Maintain/enhance activity level, Minimize linen layers, Pressure redistribution bed/mattress (bed type), Turn and reposition approx. every two hours (pillows and wedges if needed) Moisture Interventions: Absorbent underpads, Apply protective barrier, creams and emollients, Check for incontinence Q2 hours and as needed, Maintain skin hydration (lotion/cream), Minimize layers Activity Interventions: Increase time out of bed, Pressure redistribution bed/mattress(bed type), PT/OT evaluation Mobility Interventions: Pressure redistribution bed/mattress (bed type), Turn and reposition approx. every two hours(pillow and wedges) Nutrition Interventions: Document food/fluid/supplement intake, Offer support with meals,snacks and hydration Friction and Shear Interventions: Lift sheet, Minimize layers Problem: AMI: Discharge Outcomes Goal: *Anxiety reduced or absent Outcome: Progressing Towards Goal 
  
Problem: CABG: Post-Op Day 5 Goal: Off Pathway (Use only if patient is Off Pathway) Outcome: Progressing Towards Goal 
Note: POD 7 s/p CABGx5 Pt calm and cooperative. Flat affect. Decreased motivation to partake in physical activity. PT/OT following. Sternal precautions. Pt shows disinterest in getting out of the bed and being apart of daily activities. Pt able to turn in bed w/o assistance. Pt sometimes tolerates chair position, O2 sats decrease when sitting up. Pt becomes DRIVER sats 86-88%. Daily CXR. Daily labs - trending CBC, CMP, and proBNP. Tube feeds through dobhoff - @ goal. Pt has mechanical soft and supplemental diet. Poor PO intake. Pt swallows pills one at time. Pt on Hiflow during the day, 40L and 100% FiO2 and BiPAP HS - 60% FiO2. Pt sats mid 90s. DRIVER. Tachypnea, 20-26. Coarse, crackles heard when auscultated. Pt had weak cough, no sputum collected on my time. Diuresed. Breathing treatments. Pt incontinent stool. Voids in urinal - pt being diuresed. Problem: CABG: Discharge Outcomes Goal: *Hemodynamically stable Outcome: Progressing Towards Goal 
Note: No pressors or inotropes. Goal: *Stable cardiac rhythm Outcome: Progressing Towards Goal 
Note:  
NSR. Goal: *Optimal pain control at patient's stated goal 
Outcome: Progressing Towards Goal 
Note:  
Denies pain. Goal: *Weight  is stable Outcome: Progressing Towards Goal 
Goal: *Anxiety reduced or absent Outcome: Progressing Towards Goal 
  
Problem: Nutrition Deficit Goal: *Optimize nutritional status Outcome: Progressing Towards Goal 
  
Problem: AMI: Discharge Outcomes Goal: *Demonstrates ability to perform prescribed activity without shortness of breath or discomfort Outcome: Not Progressing Towards Goal 
Goal: *Verbalizes understanding and describes prescribed diet Outcome: Not Progressing Towards Goal 
Goal: *Describes follow-up/return visits to physicians Outcome: Not Progressing Towards Goal 
Goal: *Describes home care/support arrangements established based on need Outcome: Not Progressing Towards Goal 
Goal: *Understands and describes signs and symptoms to report to providers(Stroke Metric) Outcome: Not Progressing Towards Goal 
Goal: *Verbalizes name, dosage, time, side effects, and number of days to continue medications Outcome: Not Progressing Towards Goal

## 2019-10-31 NOTE — PROGRESS NOTES
Bedside SBAR report received from [facility]. Labs and plan of care reviewed and gtts verified at this time. 1400: PT and OT into work with patient. Veronicanet OOB in chair with hi flow. Tolerating sitting up. Will continue to monitor.

## 2019-10-31 NOTE — PROGRESS NOTES
NYHA class IV A/C systolic heart failure (EF 25%, NT pro-BNP 15,013) Acute on chronic kidney disease Mild pulmonary edema Mild chronic lung disease Acute on chronic hypoxic respiratory failure Hypoxia continues to be an issue Hgb and platelets look good Creatinine bumped a bit Re-starting dobutamine Bilirubin and other LFTs look good NT pro-BNP coming down Still in high flow / BiPAP May start some steroids today CXR - pulmonary edema Intake/Output Summary (Last 24 hours) at 10/31/2019 1118 Last data filed at 10/31/2019 4930 Gross per 24 hour Intake 2341.01 ml Output 2125 ml Net 216.01 ml Visit Vitals /58 Pulse 70 Temp 98.2 °F (36.8 °C) Resp 27 Ht 5' 7\" (1.702 m) Wt 135 lb 1.6 oz (61.3 kg) SpO2 94% BMI 21.16 kg/m² Risk of morbidity and mortality - high Medical decision making - high complexity Total critical care time - 30 minutes (CPT 92230)

## 2019-10-31 NOTE — PROGRESS NOTES
NUTRITION Recommendations: 1. Switch to nocturnal tube feed schedule: - Osmolite 1.5 @ 75ml/hr x 16hrs (7p to 11a) + 30ml flush q4hr during feeds 2. Continue to encourage and assist with meals. Focus on high baldomero/high protein supplements. 3. If oral intake remains poor may benefit from appetite stimulant (?remeron) Diet: mechanical soft Supplements: Ensure Enlive TID, Boost Pudding BID Tube feeds: Osmolite 1.5 @ 50ml/hr + 30ml flush q4hr Chart reviewed for PO and tube feed check. Remains on Bipap/hi flow. Diuresis adjsuted with worsening Cr. DHT placed yesterday (10/30) with tube feeds advanced to goal rate of 50ml/hr. Tolerating well with no issues noted. Oral intake remains minimal but will adjust to nocturnal schedule to try and encourage PO intake with meals. Current tube feeds (and above schedule) provide same nutrition with: 1200ml, 1800kcal, 75g protein, 912ml free fluid + 120ml flush + PO. Meets 100% energy and 93% protein needs. Pt with underlying depression noted, consider trial of remeron as appetite stimulant if oral intake remains poor even after respiratory status improved. Constipation resolved with BM on 10/30, bowel regimen in place. Adjusted as needed with some medium loose stools last night. DTC following for insulin: may need basal lantus while on tube feeds with recommendations for 13 units per day. See previous RD notes for additional details, goals and monitoring/evaluation. Estimated Nutrition Needs:  
Kcals/day: 1014 Kcals/day(1658-1795kcal) Protein: 81 g(81-95g (1.2-1.4g/kg)) Fluid: 1700 ml(1ml/kcal or per cardio) Based On: [place] [facility] Jeor(x 1.2-1.3) Weight Used: Actual wt(67.6kg) Teodoro Navarro, RD [address] [place] , Pager #[phone] or [phone]

## 2019-10-31 NOTE — PROGRESS NOTES
1630:  Bedside report received from [facility], assumed care of pt. 
 
1900: Tube feed started per orders. 2000: Bedside shift change report given to Horacio Gonzalez (oncoming nurse) by Erick Morgan (offgoing nurse). Report included the following information SBAR, Kardex, MAR, Recent Results and Cardiac Rhythm NSR.

## 2019-11-01 NOTE — PROGRESS NOTES
Physical Therapy 11/1/2019 Chart reviewed. Spoke with RN, who stated patient switched from high flow to BiPAP ~11 am due to labored breathing with RR in 40's while on 40 L at 100%. PT to bedside and observed patient with labored breathing at rest, RR 38, and strong accessory muscle use. Hold PT at this time due to respiratory status at rest while on BiPAP. F/u later as able/apporpriate. Thank you.  
 
Kanwal Acosta, PT, DPT

## 2019-11-01 NOTE — PROGRESS NOTES
NYHA class IV A/C systolic heart failure (EF 25%, NT pro-BNP 15,013) Acute on chronic kidney disease Mild pulmonary edema Mild chronic lung disease Acute on chronic hypoxic respiratory failure PO2 looks better this am  
 
Alkalotic on ABG Will switch Bumex to diamox Hgb and platelets look good Creatinine in 2 range Bilirubin and other LFTs look good NT pro-BNP coming down Continues on prednisone CXR - pulmonary edema slowly improving Intake/Output Summary (Last 24 hours) at 11/1/2019 0827 Last data filed at 11/1/2019 0700 Gross per 24 hour Intake 1646.87 ml Output 1900 ml Net -253.13 ml Visit Vitals /61 Pulse 75 Temp 98.6 °F (37 °C) Resp 24 Ht 5' 7\" (1.702 m) Wt 127 lb 6.4 oz (57.8 kg) SpO2 100% BMI 19.95 kg/m² Risk of morbidity and mortality - high Medical decision making - high complexity Total critical care time - 30 minutes (CPT 72664)

## 2019-11-01 NOTE — PROGRESS NOTES
0730: Bedside and Verbal shift change report given to NORMA VIVEROS (oncoming nurse) by NORMA Quintero (offgoing nurse). Report included the following information SBAR, Kardex, Intake/Output, MAR, Recent Results, Med Rec Status and Cardiac Rhythm NSR. 
0830: Orders to change diuretic to diomox d/t alkalosis. 1000: Pt placed back on BiPAP d/t increased work of breathing with respiratory rate in the 30s-40s. Giovanni Flores NP aware. 
1300: Family members at bedside. Update given. 1550: Patient's son called up to unit. Update given. 1430: Giovanni Flores NP at bedside. Mentioned increasing dobutamine d/t decreased urine output. No orders received at this time. 1800: Pt has no appetite throughout the day and refusing all meals and ensure. Pt states, \"it's hard to swallow\". Will mention potentially getting speech/swallow eval tomorrow morning to night shift RN. 1930: Bedside and Verbal shift change report given to NORMA Quintero (oncoming nurse) by NORMA VIVEROS (offgoing nurse). Report included the following information SBAR, Kardex, Intake/Output, MAR, Recent Results, Med Rec Status and Cardiac Rhythm NSR.

## 2019-11-01 NOTE — PROGRESS NOTES
hospitals ICU Progress Note Admit Date: 10/19/2019 POD:  9 Days Post-Op Procedure:  Procedure(s): 
CARDIAC RE-ENTRY, MARISOL BY  [facility]    
 
CABG x 5, LIMA to LAD, RSVG to [id number], RSVG to PDA Subjective:  
Pt seen with Dr. Giorgi Roblero. Dobutamine at [address] Afebrile, continues on high flow oxygen with some time on BiPaP at night. Unable to tolerate much activity without respiratory decompensation. Objective:  
Vitals: 
Blood pressure 132/61, pulse 75, temperature 98.6 °F (37 °C), resp. rate 24, height 5' 7\" (1.702 m), weight 127 lb 6.4 oz (57.8 kg), SpO2 100 %. Temp (24hrs), Av.6 °F (37 °C), Min:98.3 °F (36.8 °C), Max:98.9 °F (37.2 °C) EKG/Rhythm:  SR in the 76s Oxygen Therapy: 
Oxygen Therapy O2 Sat (%): 100 % (19) Pulse via Oximetry: 74 beats per minute (19) O2 Device: Heated; Hi flow nasal cannula (19) O2 Flow Rate (L/min): 45 l/min (19) O2 Temperature: 87.8 °F (31 °C) (19) FIO2 (%): 95 % (19) CXR:  
CXR Results  (Last 48 hours) 19 0531  XR CHEST PORT Final result Impression:  IMPRESSION:   
Interstitial edema superimposed on chronic fibrosis. Narrative:  PORTABLE CHEST RADIOGRAPH/S: 2019 5:31 AM  
   
INDICATION: Interstitial edema. COMPARISON: 10/31/2019, 10/30/2019, 10/29/2019, 10/28/2019, 10/25/2019,  
10/23/2019, 10/21/2019, 10/20/2019. TECHNIQUE: Portable frontal semiupright radiograph/s of the chest.  
   
FINDINGS:   
Interstitial edema is stable. It is likely superimposed on fibrosis, when  
compared to 10/20/2019. On 10/20/2019, there was volume loss and probable  
honeycombing in the lung bases, with emphysema in the upper lobes, but no  
peripheral septal lines in the upper lobes. A feeding tube and right IJ sheath  
are in place. The central airways are patent. Post CABG. No pneumothorax and no  
pleural effusion. 10/31/19 0511  XR CHEST PORT Final result Impression:  IMPRESSION:   
Stable pulmonary edema. Narrative:  PORTABLE CHEST RADIOGRAPH/S: 10/31/2019 5:11 AM  
   
INDICATION: Interstitial edema. COMPARISON: 10/23/2019-10/30/2019. TECHNIQUE: Portable frontal semiupright radiograph/s of the chest.  
   
FINDINGS:   
Interstitial edema is stable. A right IJ sheath, feeding tube, and epicardial  
pacing wires remain in place. Post CABG. The central airways are patent. No  
pneumothorax and no large pleural effusion. Admission Weight: Last Weight Weight: 141 lb 5 oz (64.1 kg) Weight: 127 lb 6.4 oz (57.8 kg) Intake / Output / Drain: 
Current Shift: No intake/output data recorded. Last 24 hrs.:  
 
Intake/Output Summary (Last 24 hours) at 11/1/2019 1331 Last data filed at 11/1/2019 0700 Gross per 24 hour Intake 1646.87 ml Output 1900 ml Net -253.13 ml EXAM: 
General:  Resting comfortably. No acute distress Lungs:   Scattered crackles throughout Incision:  Midsternal incision with no significant erythema, drainage, or dehiscence. Heart:  Regular rate and rhythm, S1, S2 normal, no murmur, click, rub or gallop. Abdomen:   Soft, non-tender. Bowel sounds hypoactive. No masses,  No organomegaly. +BM 10/31 Extremities:  trace edema. Palpable pulses bilat Neurologic:  Gross motor and sensory apparatus intact. Labs:  
Recent Labs 11/01/19 
0720 11/01/19 
0424 WBC  --  12.5* HGB  --  9.2* HCT  --  28.1*  
PLT  --  253 NA  --  128* K  --  4.5 BUN  --  53* CREA  --  1.94* GLU  --  102* GLUCPOC 204*  --   
 
 
 Assessment:  
 
Principal Problem: S/P CABG x 5 (10/23/2019) Overview: x 5, LIMA to LAD, RSVG to Diag1, OM2-OM3, RSVG to PDA Active Problems: 
  ACS (acute coronary syndrome) ([facility] [address] (10/19/2019) Unstable angina ([facility] [address] (10/19/2019) Coronary artery disease of native artery of native heart with stable angina pectoris ([facility] [address] (10/21/2019) Systolic heart failure ([facility] (10/22/2019) Plan/Recommendations/Medical Decision Makin. S/p CABG: on ASA, statin. No BB while on Dobutamine. 2. Acute on chronic systolic CHF, NYHA Class II on admit: EF 35-40% preop. Dobutamine to assist diuresis. No BB/ACE/ARB/AA until vitals/kidney function allows. 3. Acute postop respiratory failure, acute pulm edema, effusions with chronic fibrosis per CXR: cont bipap mask as needed for work of breathing - monitor ABG's. pBNP still 18.4k. Alkalotic on ABG-switch Bumex to Diamox. Amiodarone stopped. Continue Prednisone daily. Continue scheduled nebs. Cont mucinex, pulm toileting. 4. Renal insufficiency: Creatinine stable at 1.9 today. Will continue to diurese today but switch Bumex to Diamox. Discontinue da silva-can use condom catheter. 5. Anemia: had preop, stable H&H. Received 1 unit PRBCs 10/27/19. Iron panel sent preop - iron, iron sat low. Continue PO iron 6. Thrombocytopenia: Resolved. Continue to monitor daily CBC 7. Hx of HTN: Controlled on current medications. Dobutamine added back 10/31 and BB stopped. Has PRN Hydralazine. 8. HLD: Continue pravastatin 9. Leukocytosis: likely from steroids. Will discontinue da silva. Monitor CBC daily 10. Constipation: Resolved. Having regular BM. Cont pericolace, miralax, prn suppository 11. Current smoker: smoking cessation education. Cont pulm toileting. Nicotine patch if needed 12. Nutrition: Appreciate Dietician recommendations. Dobhoff placed 10/30 and Enteral feedings started. Transitioned to nocturnal feedings to allow for increased PO intake. Continue PO intake as able. Preop A1c 5.3 with no DM history, now with hyperglycemia. Likely due to steroids and tube feedings. Increase lantus to 15 units daily and continue SSI with normal sensitivity. Appreciate DTS recommendations. 13. DVT/GI Prophylaxis:  SQ Heparin, PPI Dispo: PT/OT as able. Remain in CVI until resp status more stable Signed By: Tobin Lewis NP

## 2019-11-01 NOTE — DIABETES MGMT
[facility] Cardiac Surgery Progress Note Recommendations/ Comments: Insulin gtt stopped 10/30/2019; no transitional Lantus required. BG's 151 mg/dL - 322 mg/dL and received 9 units of lispro correction in past 24 hours BG's > 170 since transition. Had been on continuous TF Prednisone 10 mg each AM begun yesterday TF changed to nocturnal last night (10-) Osmolite 1.5 @75 mL/hr from 7 PM to 11 AM (228 carbs) Lantus increased to 15 units daily today and was given this AM 
PO intake very minimal 
Elevated creatinine Spoke with Bhavna Kruger NP regarding change to NPH BID to address both steroid and nocturnal TF If appropriate D/C Lantus NPH 6 units each AM linked with Prednisone - start tomorrow AM (11-2-2019) NPH 14 units at 6 PM timed with TF. Hold if TF held or d/c'd Patient is 68 y.o. male s/p CABG x 5 followed by re-entry for mediastinal clot evacuation and repair of SVG to PDA  - POD 9. Pt with documented hx of DM on no meds PTA. Anemia - A1c inaccurate A1c:  
Lab Results Component Value Date/Time Hemoglobin A1c 5.3 10/21/2019 03:04 AM  
 
 
 
Recent Glucose Results:  
Lab Results Component Value Date/Time  (H) 11/01/2019 04:24 AM  
 GLUCPOC 204 (H) 11/01/2019 07:20 AM  
 GLUCPOC 251 (H) 11/01/2019 01:18 AM  
 GLUCPOC 322 (H) 10/31/2019 09:23 PM  
  
 
Lab Results Component Value Date/Time Creatinine 1.94 (H) 11/01/2019 04:24 AM  
 
Estimated Creatinine Clearance: 28.6 mL/min (A) (based on SCr of 1.94 mg/dL (H)). Active Orders Diet DIET MECHANICAL SOFT  
  
 
PO intake:  
Patient Vitals for the past 72 hrs: 
 % Diet Eaten 10/31/19 2000 0 % 10/31/19 1000 5 % 10/30/19 1000 0 % Will continue to follow as needed. Thank you. Aaron Palomares RN, CDE Time spent: 10 minutes

## 2019-11-01 NOTE — ROUTINE PROCESS
20:00- Bedside report received from [facility], [employer]. Pt assisted back to bed with 2-assist, unsteady gait. O2 sats dropped 82%, slowly recovered with rest to > 93%. Pt on Hiflow 40L 100% FIO2.  
 
22:30- Pt place on BIPAP  
 
23:30- Pt requesting BIPAP mask off stating it is too uncomfortable, explained benefits of BIPAP and offered alternative scuba mask - pt declined and placed back on Hiflow NC 
 
02:00- Sats > 95% at rest - decreased Hiflow to 40L 95% 04:00- After movement in bed following chest x-ray, pt's O2 sats remained in low 80s unable to recover on own - explained the need for BIPAP, pt placed back on BIPAP  
 
06:00- Pt called out to have BIPAP removed. AM care/chg bath provided. Dyspneic with turning in bed, O2 sats in 80s on Hiflow 40L, 100% 07:00- RT paged for daily ABG pH 7.6/ pCO2 32/ pO2 141/ HCO3 31.5/ BE 10 
 
08:00- Bedside shift change report given to JACKELINE RN (oncoming nurse) by Raúl Godoy RN (offgoing nurse). Report included the following information SBAR, OR Summary, Intake/Output, MAR, Recent Results and Cardiac Rhythm NSR.

## 2019-11-01 NOTE — PROGRESS NOTES
Transitions of Care: 
 
 -Patient is slow to make gains, continues to be on high flow Oxygen, to remain in CVICU due to respiratory status 
 -Continue PT/OT The CM sent follow-up to Utilization Review and [facility] team- patient signed refusal to transfer form x2, CM wanted to make sure UR and [facility] team also have the patient's Medicare Part A on file- CM provided UR team and [facility] team with Medicare Part A number and product to be reflected for billing. CM will ask patient if he would like to be screened for Medicaid as additional support.   RON Mcdaniel

## 2019-11-01 NOTE — PROGRESS NOTES
Problem: Falls - Risk of 
Goal: *Absence of Falls Description Document Gio Jimenes Fall Risk and appropriate interventions in the flowsheet. Outcome: Progressing Towards Goal 
Note:  
Fall Risk Interventions: 
Mobility Interventions: Assess mobility with egress test, Bed/chair exit alarm, Communicate number of staff needed for ambulation/transfer, Mechanical lift, OT consult for ADLs, Patient to call before getting OOB, PT Consult for mobility concerns, PT Consult for assist device competence, Strengthening exercises (ROM-active/passive) Mentation Interventions: Adequate sleep, hydration, pain control, Bed/chair exit alarm, Evaluate medications/consider consulting pharmacy, Door open when patient unattended, Increase mobility, More frequent rounding, Reorient patient, Toileting rounds Medication Interventions: Assess postural VS orthostatic hypotension, Evaluate medications/consider consulting pharmacy, Bed/chair exit alarm, Patient to call before getting OOB, Teach patient to arise slowly, Utilize gait belt for transfers/ambulation Elimination Interventions: Bed/chair exit alarm, Call light in reach, Patient to call for help with toileting needs, Stay With Me (per policy), Urinal in reach, Toileting schedule/hourly rounds History of Falls Interventions: Consult care management for discharge planning, Vital signs minimum Q4HRs X 24 hrs (comment for end date) Problem: Patient Education: Go to Patient Education Activity Goal: Patient/Family Education Outcome: Progressing Towards Goal 
  
Problem: Pressure Injury - Risk of 
Goal: *Prevention of pressure injury Description Document Earl Scale and appropriate interventions in the flowsheet.  
Outcome: Progressing Towards Goal 
Note:  
Pressure Injury Interventions: 
Sensory Interventions: Assess changes in LOC, Avoid rigorous massage over bony prominences, Check visual cues for pain, Discuss PT/OT consult with provider, Float heels, Keep linens dry and wrinkle-free, Maintain/enhance activity level, Minimize linen layers, Monitor skin under medical devices, Pressure redistribution bed/mattress (bed type), Turn and reposition approx. every two hours (pillows and wedges if needed) Moisture Interventions: Absorbent underpads, Minimize layers Activity Interventions: Increase time out of bed, Pressure redistribution bed/mattress(bed type), PT/OT evaluation, Assess need for specialty bed Mobility Interventions: Assess need for specialty bed, Float heels, HOB 30 degrees or less, Pressure redistribution bed/mattress (bed type), PT/OT evaluation, Turn and reposition approx. every two hours(pillow and wedges) Nutrition Interventions: Document food/fluid/supplement intake, Offer support with meals,snacks and hydration Friction and Shear Interventions: Apply protective barrier, creams and emollients, Foam dressings/transparent film/skin sealants, Lift sheet, Lift team/patient mobility team, Minimize layers, Transferring/repositioning devices Problem: Patient Education: Go to Patient Education Activity Goal: Patient/Family Education Outcome: Progressing Towards Goal 
  
Problem: CABG: Discharge Outcomes Goal: *Hemodynamically stable Outcome: Progressing Towards Goal 
Goal: *Verbalizes understanding and describes prescribed diet Outcome: Progressing Towards Goal 
Goal: *No signs and symptoms of infection or wound complications Outcome: Progressing Towards Goal 
  
Problem: Infection - Risk of, Central Venous Catheter-Associated Bloodstream Infection Goal: *Absence of infection signs and symptoms Outcome: Progressing Towards Goal 
  
Problem: Patient Education: Go to Patient Education Activity Goal: Patient/Family Education Outcome: Progressing Towards Goal

## 2019-11-01 NOTE — PROGRESS NOTES
Chart reviewed. PT spoke with RN, who stated patient switched from high flow to BiPAP ~11 am due to labored breathing with RR in 40's while on 40 L at 100%. OT discussed with RN again and RN reporting patient with continued respiratory issues and requesting to hold OT at this time. Will follow up for OT intervention as able. Thank you.

## 2019-11-02 NOTE — CONSULTS
[facility] Pulmonary, Critical Care, and Sleep Medicine Initial Patient Consult Name: Rod Sanchez MRN: [id number] : 1950 Hospital: [facility]  Date: 2019 IMPRESSION:  
· S/p 5V CABG 10/19/19 for ACS  With reexploration 10/23 for mediastinal hematoma · Acute resp failure- baseline fibrotic ILD ( etiology not clear- this is a new dx but UIP/IPF in DDX) with superimposed edema but I cannot r/o superimposed diffuse alveolar damage from blood/blood products. Amio pulm toxicty in DDX but not clear from [facility] review when he was on it. 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  
  
RECOMMENDATIONS:  
· Agree with continued diuresis · Would check ESR · Change pred to IV solumedrol 80 mg every 6 hours · Alternate HFNC ( 4 hours) with bipap ( 1 hour- to offload insp muscles) · Needs workup for ILD at some point ( autoimmune panel etc) but will wait until out of acute window. Pt is acutely ill. Subjective: This patient has been seen and evaluated at the request of Dr. David Pereyra for SOB. Patient is a 68 y.o. male smoker who presented with USA/ACS 10/19 and cath with 3V dz EF 25%. Taken to OR 10/19 and had 5 V CABG with reexploration 10/23 for mediastinal hematoma. Last PRBCs 10/27. Pt since extubation 10/24 has been tenuous with inc WOB. And now on HFNC. Apparently was on amiodarone until recently. Pt with mod WOB on HFNC on exam with coarse rales. Past Medical History:  
Diagnosis Date  Diabetes ([address]  High cholesterol  Hypertension  MI, old Past Surgical History:  
Procedure Laterality Date  HX CORONARY ARTERY BYPASS GRAFT  10/23/2019  
 x 5, LIMA to LAD, RSVG to Diag1, OM2-OM3, RSVG to PDA  HX CORONARY STENT PLACEMENT    
 HX LUMBAR LAMINECTOMY l5-s1 Prior to Admission medications Medication Sig Start Date End Date Taking? Authorizing Provider  
lisinopril (PRINIVIL, ZESTRIL) 40 mg tablet Take 40 mg by mouth daily. Indications: high blood pressure   Yes Provider, Historical  
pravastatin (PRAVACHOL) 40 mg tablet Take 40 mg by mouth nightly. Yes Provider, Historical  
clopidogrel (PLAVIX) 75 mg tab Take  by mouth daily. Yes Provider, Historical  
sertraline (ZOLOFT) 100 mg tablet Take 100 mg by mouth daily. Indications: Anxiousness associated with Depression   Yes Provider, Historical  
 
Allergies Allergen Reactions  Scallops Other (comments) Headache Social History Tobacco Use  Smoking status: Current Every Day Smoker  Smokeless tobacco: Never Used Substance Use Topics  Alcohol use: Yes History reviewed. No pertinent family history. Current Facility-Administered Medications Medication Dose Route Frequency  acetaZOLAMIDE (DIAMOX) 500 mg in sterile water (preservative free) 5 mL injection  500 mg IntraVENous BID  insulin NPH (NOVOLIN N, HUMULIN N) injection 14 Units  14 Units SubCUTAneous Q24H  predniSONE (DELTASONE) tablet 10 mg  10 mg Oral DAILY WITH BREAKFAST And  
 insulin NPH (NOVOLIN N, HUMULIN N) injection 6 Units  6 Units SubCUTAneous Q24H  potassium chloride SR (KLOR-CON 10) tablet 40 mEq  40 mEq Oral BID  insulin lispro (HUMALOG) injection   SubCUTAneous Q6H  
 heparin (porcine) injection 5,000 Units  5,000 Units SubCUTAneous K1E  
 multivit-folic acid-herbal 814 (WELLESSE PLUS) oral liquid 30 mL  30 mL Per NG tube DAILY  arformoterol (BROVANA) neb solution 15 mcg  15 mcg Nebulization BID RT And  
 budesonide (PULMICORT) 500 mcg/2 ml nebulizer suspension  500 mcg Nebulization BID RT  
 ferrous sulfate tablet 325 mg  1 Tab Oral DAILY WITH BREAKFAST  guaiFENesin ER (MUCINEX) tablet 1,200 mg  1,200 mg Oral Q12H  sertraline (ZOLOFT) tablet 100 mg  100 mg Oral DAILY  sodium chloride (NS) flush 5-40 mL  5-40 mL IntraVENous Q8H  
 0.45% sodium chloride infusion  10 mL/hr IntraVENous CONTINUOUS  
 0.9% sodium chloride infusion  9 mL/hr IntraVENous CONTINUOUS  
 aspirin chewable tablet 81 mg  81 mg Oral DAILY  chlorhexidine (PERIDEX) 0.12 % mouthwash 10 mL  10 mL Oral Q12H  
 magnesium oxide (MAG-OX) tablet 400 mg  400 mg Oral BID  senna-docusate (PERICOLACE) 8.6-50 mg per tablet 1 Tab  1 Tab Oral BID  polyethylene glycol (MIRALAX) packet 17 g  17 g Oral DAILY  pantoprazole (PROTONIX) tablet 40 mg  40 mg Oral ACB  niCARdipine (CARDENE) 25 mg in 0.9% sodium chloride 250 mL infusion  0-15 mg/hr IntraVENous TITRATE  DOBUTamine (DOBUTREX) 500 mg/250 mL (2,000 mcg/mL) infusion  0-10 mcg/kg/min IntraVENous TITRATE  nicotine (NICODERM CQ) 14 mg/24 hr patch 1 Patch  1 Patch TransDERmal DAILY  pravastatin (PRAVACHOL) tablet 40 mg  40 mg Oral QHS Review of Systems: 
Review of systems not obtained due to patient factors. Objective:  
Vital Signs:   
Visit Vitals /65 Pulse 85 Temp 97.6 °F (36.4 °C) Resp 27 Ht 5' 7\" (1.702 m) Wt 59 kg (130 lb) SpO2 100% BMI 20.36 kg/m² O2 Device: BIPAP  
O2 Flow Rate (L/min): 40 l/min Temp (24hrs), Av.8 °F (36.6 °C), Min:97.3 °F (36.3 °C), Max:98.4 °F (36.9 °C) Intake/Output:  
Last shift:      No intake/output data recorded. Last 3 shifts: 10/31 1901 -  0700 In: 3089.8 [P.O.:420; I.V.:424.8] Out: 2625 [Urine:2625] Intake/Output Summary (Last 24 hours) at 2019 1254 Last data filed at 2019 0700 Gross per 24 hour Intake 1479.2 ml Output 700 ml Net 779.2 ml Physical Exam:  
General:  Alert, with mild- mod WOB on HFNC. Head:  Normocephalic, without obvious abnormality, atraumatic. Eyes:  Conjunctivae/corneas clear. Nose: Nares normal. Septum midline. Neck: Supple, symmetrical, trachea midline Lungs:   Coarse rales b/l no wheeze Heart:  Regular rate and rhythm, S1, S2 normal, no murmur, click, rub or gallop. Abdomen:   Soft, non-tender. Bowel sounds normal. No masses,  No organomegaly. Extremities: Extremities normal, atraumatic, no cyanosis or edema. Pulses: 2+ and symmetric all extremities. Skin: Skin color, texture, turgor normal. No rashes or lesions Data review:  
 
Recent Results (from the past 24 hour(s)) GLUCOSE, POC Collection Time: 11/01/19  6:44 PM  
Result Value Ref Range Glucose (POC) 172 (H) 65 - 100 mg/dL Performed by Juan Berg, POC Collection Time: 11/01/19 11:32 PM  
Result Value Ref Range Glucose (POC) 198 (H) 65 - 100 mg/dL Performed by Shayy Vargas POC G3 - PUL Collection Time: 11/02/19  4:12 AM  
Result Value Ref Range FIO2 (POC) 90 % pH (POC) 7.414 7.35 - 7.45    
 pCO2 (POC) 31.3 (L) 35.0 - 45.0 MMHG  
 pO2 (POC) 70 (L) 80 - 100 MMHG  
 HCO3 (POC) 20.0 (L) 22 - 26 MMOL/L  
 sO2 (POC) 94 92 - 97 % Base deficit (POC) 5 mmol/L Site RIGHT RADIAL Device: High Flow Nasal Cannula Flow rate (POC) 40 L/M Allens test (POC) YES Specimen type (POC) ARTERIAL Total resp. rate 28 METABOLIC PANEL, COMPREHENSIVE Collection Time: 11/02/19  4:29 AM  
Result Value Ref Range Sodium 131 (L) 136 - 145 mmol/L Potassium 4.6 3.5 - 5.1 mmol/L Chloride 100 97 - 108 mmol/L  
 CO2 24 21 - 32 mmol/L Anion gap 7 5 - 15 mmol/L Glucose 221 (H) 65 - 100 mg/dL BUN 54 (H) 6 - 20 MG/DL Creatinine 1.75 (H) 0.70 - 1.30 MG/DL  
 BUN/Creatinine ratio 31 (H) 12 - 20 GFR est AA 47 (L) >60 ml/min/1.73m2 GFR est non-AA 39 (L) >60 ml/min/1.73m2 Calcium 9.1 8.5 - 10.1 MG/DL Bilirubin, total 0.6 0.2 - 1.0 MG/DL  
 ALT (SGPT) 41 12 - 78 U/L  
 AST (SGOT) 51 (H) 15 - 37 U/L Alk. phosphatase 131 (H) 45 - 117 U/L Protein, total 7.4 6.4 - 8.2 g/dL Albumin 2.9 (L) 3.5 - 5.0 g/dL Globulin 4.5 (H) 2.0 - 4.0 g/dL A-G Ratio 0.6 (L) 1.1 - 2.2    
CBC W/O DIFF Collection Time: 11/02/19  4:29 AM  
Result Value Ref Range WBC 16.6 (H) 4.1 - 11.1 K/uL  
 RBC 3.07 (L) 4.10 - 5.70 M/uL HGB 9.7 (L) 12.1 - 17.0 g/dL HCT 29.8 (L) 36.6 - 50.3 % MCV 97.1 80.0 - 99.0 FL  
 MCH 31.6 26.0 - 34.0 PG  
 MCHC 32.6 30.0 - 36.5 g/dL  
 RDW 14.8 (H) 11.5 - 14.5 % PLATELET 147 644 - 610 K/uL MPV 10.5 8.9 - 12.9 FL  
 NRBC 0.0 0  WBC ABSOLUTE NRBC 0.00 0.00 - 0.01 K/uL MAGNESIUM Collection Time: 11/02/19  4:29 AM  
Result Value Ref Range Magnesium 2.5 (H) 1.6 - 2.4 mg/dL NT-PRO BNP Collection Time: 11/02/19  4:29 AM  
Result Value Ref Range NT pro-BNP 12,714 (H) <125 PG/ML  
GLUCOSE, POC Collection Time: 11/02/19  8:08 AM  
Result Value Ref Range Glucose (POC) 200 (H) 65 - 100 mg/dL Performed by [facility]: 
I have personally reviewed the patients radiographs and have reviewed the reports: PCXR 10/20 with basilar reticulation/fibrosis CXR 11/2- CXR with low lung volumes and superimposed GGO on chronic ILD Genetta Cowden, MD

## 2019-11-02 NOTE — PROGRESS NOTES
20:00- Bedside report received from [facility]. Drips dual verified. Pt resting in bed, NSR, BIPAP 12/6, FIO2 60%, O2 sats 100%. Pt without complaints, requesting to sleep. 23:00- Bedpan provided for watery BM. Discussed nutrition and trying to eat more. Pt states he is unable to eat due to difficulty swallowing and lack of appetite. Will notify provider in am.  
 
01:00- Pt requesting break from BIPAP - pt placed on Hiflow 40L 90% 02:00- Pt incontinent of urine due to urgency. Chg bath completed/linen changed. Condom cath placed. Refuses BIPAP at this time. O2 sats remained > 88% during bath on Hiflow, pt feels his work of breathing is improving. 04:00- Chest x-ray completed, increase work of breathing and O2 sats 77% post turning in bed - placed back on BIPAP  
 
08:00- Bedside shift change report given to Omayra Ann RN (oncoming nurse) by Omayra Ann RN (offgoing nurse). Report included the following information SBAR, OR Summary, Intake/Output, MAR, Recent Results and Cardiac Rhythm NSR.

## 2019-11-02 NOTE — PROGRESS NOTES
[facility] ICU Progress Note Admit Date: 10/19/2019 POD:  10 Days Post-Op Procedure:  Procedure(s): 
CARDIAC RE-ENTRY, MARISOL BY  [facility]  [facility]    
 
CABG x 5, LIMA to LAD, RSVG to [id number], RSVG to PDA Subjective:  
Pt seen with Dr. Kenny Whitt. Dobutamine at [address] Afebrile, currently on Bipap vs hi-flow NC. Unable to tolerate much activity without respiratory decompensation. Objective:  
Vitals: 
Blood pressure 129/65, pulse 85, temperature 97.6 °F (36.4 °C), resp. rate 27, height 5' 7\" (1.702 m), weight 130 lb (59 kg), SpO2 100 %. Temp (24hrs), Av.8 °F (36.6 °C), Min:97.3 °F (36.3 °C), Max:98.4 °F (36.9 °C) EKG/Rhythm:  SR in the 76s Oxygen Therapy: 
Oxygen Therapy O2 Sat (%): 100 % (19 1000) Pulse via Oximetry: 87 beats per minute (19 0355) O2 Device: BIPAP (19 0716) O2 Flow Rate (L/min): 40 l/min (19 0355) O2 Temperature: 87.8 °F (31 °C) (19 0813) FIO2 (%): 60 % (19 0716) CXR:  
CXR Results  (Last 48 hours) 19 0501  XR CHEST PORT Final result Impression:  IMPRESSION:  
Stable interstitial edema pattern. .  . Narrative:  INDICATION:  interstitial edema EXAM: Chest single view. COMPARISON: 2019. FINDINGS: A single frontal view of the chest at 0412 hours shows stable  
interstitial prominence. Right IJ catheter sheath stable. Feeding tube stable although its tip is not seen. The heart, mediastinum and pulmonary vasculature  
are stable status post median sternotomy with mild enlargement . The bony  
thorax is unremarkable for age. .  
   
  
 19 0531  XR CHEST PORT Final result Impression:  IMPRESSION:   
Interstitial edema superimposed on chronic fibrosis. Narrative:  PORTABLE CHEST RADIOGRAPH/S: 2019 5:31 AM  
   
INDICATION: Interstitial edema. COMPARISON: 10/31/2019, 10/30/2019, 10/29/2019, 10/28/2019, 10/25/2019,  
10/23/2019, 10/21/2019, 10/20/2019. TECHNIQUE: Portable frontal semiupright radiograph/s of the chest.  
   
FINDINGS:   
Interstitial edema is stable. It is likely superimposed on fibrosis, when  
compared to 10/20/2019. On 10/20/2019, there was volume loss and probable  
honeycombing in the lung bases, with emphysema in the upper lobes, but no  
peripheral septal lines in the upper lobes. A feeding tube and right IJ sheath  
are in place. The central airways are patent. Post CABG. No pneumothorax and no  
pleural effusion. Admission Weight: Last Weight Weight: 141 lb 5 oz (64.1 kg) Weight: 130 lb (59 kg) Intake / Output / Drain: 
Current Shift: No intake/output data recorded. Last 24 hrs.:  
 
Intake/Output Summary (Last 24 hours) at 11/2/2019 1238 Last data filed at 11/2/2019 0700 Gross per 24 hour Intake 1479.2 ml Output 700 ml Net 779.2 ml EXAM: 
General:  Resting comfortably. No acute distress Lungs:   Scattered crackles throughout Incision:  Midsternal incision with no significant erythema, drainage, or dehiscence. Heart:  Regular rate and rhythm, S1, S2 normal, no murmur, click, rub or gallop. Abdomen:   Soft, non-tender. Bowel sounds hypoactive. No masses,  No organomegaly. +BM 11/1 Extremities:  trace edema. Palpable pulses bilat Neurologic:  Gross motor and sensory apparatus intact. Labs:  
Recent Labs 11/02/19 
4679 11/02/19 
7701 WBC  --  16.6* HGB  --  9.7* HCT  --  29.8* PLT  --  290 NA  --  131* K  --  4.6 BUN  --  54* CREA  --  1.75* GLU  --  221* GLUCPOC 200*  --   
 
 
 Assessment:  
 
Principal Problem: S/P CABG x 5 (10/23/2019) Overview: x 5, LIMA to LAD, RSVG to Diag1, OM2-OM3, RSVG to PDA Active Problems: 
  ACS (acute coronary syndrome) ([facility] [address] (10/19/2019) Unstable angina ([facility] [address] (10/19/2019) Coronary artery disease of native artery of native heart with stable angina pectoris ([facility] [address] (10/21/2019) [facility] failure [facility] (10/22/2019) Plan/Recommendations/Medical Decision Makin. S/p CABG: on ASA, statin. No BB while on Dobutamine. 2. Acute on chronic systolic CHF, NYHA Class II on admit: EF 35-40% preop. Dobutamine to assist diuresis. No BB/ACE/ARB/AA until vitals/kidney function allows. 3. Acute postop respiratory failure, acute pulm edema, effusions with chronic fibrosis per CXR: cont bipap mask as needed for work of breathing - monitor ABG's. pBNP improving. On Diamox. Amiodarone stopped. Continue Prednisone daily. Continue scheduled nebs. Cont mucinex, pulm toileting. Pulm consult per Dr. Iraida Mckeon. 4. Renal insufficiency: Creatinine stable at 1.75 today. Will continue to diurese today with Diamox. Discontinue da silva-can use condom catheter. 5. Anemia: had preop, stable H&H. Received 1 unit PRBCs 10/27/19. Iron panel sent preop - iron, iron sat low. Continue PO iron, change to liquid due to swallow concern. 6. Thrombocytopenia: Resolved. Continue to monitor daily CBC 7. Hx of HTN: Controlled on current medications. Dobutamine added back 10/31 and BB stopped. Has PRN Hydralazine. 8. HLD: Continue pravastatin 9. Leukocytosis: likely from steroids. Will discontinue da silva. Monitor CBC daily 10. Constipation: Resolved. Having loose BM. Hold pericolace, miralax until resolves 11. Current smoker: smoking cessation education. Cont pulm toileting. Nicotine patch if needed 12. Nutrition: Appreciate Dietician recommendations. Dobhoff placed 10/30 and Enteral feedings started. Transitioned to nocturnal feedings to allow for increased PO intake, but pt is not eating much. Preop A1c 5.3 with no DM history, now with hyperglycemia. Likely due to steroids and tube feedings. NPH per [facility] recs 13.  DVT/GI Prophylaxis:  SQ Heparin, PPI 
 
 Dispo: PT/OT as able. Remain in CVI until resp status more stable Signed By: CHELSEA Meng

## 2019-11-03 NOTE — PROGRESS NOTES
NYHA class IV A/C systolic heart failure (EF 25%, NT pro-BNP 15,013) Acute on chronic kidney disease Mild pulmonary edema Mild chronic lung disease Acute on chronic hypoxic respiratory failure Started on high dose steroids Blood sugars running fairly high Starting insulin gtt Hgb and platelets look good Creatinine remains in the 2 range Bilirubin and other LFTs look good NT pro-BNP up a a bit Remains on hi-flow / [place] CXR - mild pulmonary edema Intake/Output Summary (Last 24 hours) at 11/3/2019 1320 Last data filed at 11/3/2019 1200 Gross per 24 hour Intake 2554.03 ml Output 1550 ml Net 1004.03 ml Visit Vitals /67 (BP 1 Location: Left arm, BP Patient Position: At rest) Pulse 93 Temp 97.7 °F (36.5 °C) Resp 30 Ht 5' 7\" (1.702 m) Wt 129 lb 6.4 oz (58.7 kg) SpO2 (!) 89% BMI 20.27 kg/m² Risk of morbidity and mortality - high Medical decision making - high complexity Total critical care time - 30 minutes (CPT 66250)

## 2019-11-03 NOTE — PROGRESS NOTES
20:00- Bedside report received from [address]. Drips dual verified. Pt resting in bed, without complaints on Hiflow 35L 60% - O2 sats 94% 23:30- Pt placed on BIPAP 12/5, 60% - O2 sats 100% Incontinence care provided. 00:00- Switched back to Hiflow 35L, 65% - O2 sats 92% 01:30- O2 sats 88%, pt sleeping - Hiflow increased 40L, 80% to maintain sats > 93% 02:00- The beginning of Daylight Saving Time occurred at 0200 hrs. Documentation of patient care and medications administered is done with respect to the time change. 03:45- Pt has become more talkative/alert throughout shift. 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 C/o chronic low back pain, pt repositioned. O2 sats 88-92%, attempted and encouraged use of BIPAP, educated on the benefits, pt refused. Hiflow increased 40L, 85% 04:00- Pt very comfortable and resting at this time, discussed with x-ray tech who will come back around 7:00 to obtain CXR 
 
07:30- Bedside shift change report given to [facility], RN (oncoming nurse) by Lorene Tello RN (offgoing nurse). Report included the following information SBAR, ED Summary, OR Summary, Intake/Output, MAR, Recent Results and Cardiac Rhythm NSR. Again discussed the use of BIPAP, and Pulmonologist's recommendations. Pt states \"it is harder for me to breath with the masks\". Adan Plaster Adan Plaster \"the masks is an absolute no, I don't need it\". .. \"I need something for my whole body like PT\". Attempted to explain BIPAP purpose more and offered prn franci to alleviate discomfort, still refused at this time.

## 2019-11-03 NOTE — PROGRESS NOTES
[facility] ICU Progress Note Admit Date: 10/19/2019 Procedure:  Procedure(s): 
CARDIAC RE-ENTRY, MARISOL BY  [facility]    
 
CABG x 5, LIMA to LAD, RSVG to [id number], RSVG to PDA 10/23/19 Subjective:  
Pt seen with Dr. Jim Tracy. Dobutamine at [address] Afebrile, currently on hi-flow NC. More interactive, refusing BIPAP Objective:  
Vitals: 
Blood pressure 130/57, pulse 90, temperature 98 °F (36.7 °C), resp. rate 28, height 5' 7\" (1.702 m), weight 129 lb 6.4 oz (58.7 kg), SpO2 95 %. Temp (24hrs), Av °F (36.7 °C), Min:97.7 °F (36.5 °C), Max:98.3 °F (36.8 °C) EKG/Rhythm:  SR in the 76s Oxygen Therapy: 
Oxygen Therapy O2 Sat (%): 95 % (19 0700) Pulse via Oximetry: 85 beats per minute (19 2333) O2 Device: Hi flow nasal cannula (19 0731) O2 Flow Rate (L/min): 40 l/min (19 0400) O2 Temperature: 87.8 °F (31 °C) (19 1923) FIO2 (%): 85 % (19 0400) CXR:  
CXR Results  (Last 48 hours) 19 0501  XR CHEST PORT Final result Impression:  IMPRESSION:  
Stable interstitial edema pattern. .  . Narrative:  INDICATION:  interstitial edema EXAM: Chest single view. COMPARISON: 2019. FINDINGS: A single frontal view of the chest at 0412 hours shows stable  
interstitial prominence. Right IJ catheter sheath stable. Feeding tube stable although its tip is not seen. The heart, mediastinum and pulmonary vasculature  
are stable status post median sternotomy with mild enlargement . The bony  
thorax is unremarkable for age. .  
   
  
  
 
 
Admission Weight: Last Weight Weight: 141 lb 5 oz (64.1 kg) Weight: 129 lb 6.4 oz (58.7 kg) Intake / Output / Drain: 
Current Shift: No intake/output data recorded. Last 24 hrs.:  
 
Intake/Output Summary (Last 24 hours) at 11/3/2019 0815 Last data filed at 11/3/2019 0600 Gross per 24 hour Intake 1921.4 ml Output 1150 ml Net 771.4 ml EXAM: 
 General:  Resting comfortably. No acute distress Lungs:   Scattered crackles throughout Incision:  Midsternal incision with no significant erythema, drainage, or dehiscence. Heart:  Regular rate and rhythm, S1, S2 normal, no murmur, click, rub or gallop. Abdomen:   Soft, non-tender. Bowel sounds hypoactive. No masses,  No organomegaly. +BM  Extremities:  trace edema. Palpable pulses bilat Neurologic:  Gross motor and sensory apparatus intact. Labs:  
Recent Labs 19 
3098  19 
0345 WBC  --   --  15.2* HGB  --   --  9.5* HCT  --   --  29.6* PLT  --   --  301 NA  --   --  132* K  --   --  4.4 BUN  --   --  61* CREA  --   --  1.91* GLU  --   --  263* GLUCPOC 342*   < >  --   
 < > = values in this interval not displayed. Assessment:  
 
Principal Problem: S/P CABG x 5 (10/23/2019) Overview: x 5, LIMA to LAD, RSVG to Diag1, OM2-OM3, RSVG to PDA Active Problems: 
  ACS (acute coronary syndrome) ([facility] [address] (10/19/2019) Unstable angina ([facility] [address] (10/19/2019) Coronary artery disease of native artery of native heart with stable angina pectoris ([facility] [address] (10/21/2019) Systolic heart failure ([facility] [address] (10/22/2019) Plan/Recommendations/Medical Decision Makin. S/p CABG: on ASA, statin. No BB while on Dobutamine. 2. Acute on chronic systolic CHF, NYHA Class II on admit: EF 35-40% preop. Dobutamine to assist diuresis. No BB/ACE/ARB/AA until vitals/kidney function allows. 3. Acute postop respiratory failure, acute pulm edema, effusions with chronic fibrosis per CXR: refusing bipap mask - monitor ABG's. pBNP worse today. On Diamox. Amiodarone stopped. Solumedrol per pulm. Continue scheduled nebs. Cont mucinex, pulm toileting. 4. Renal insufficiency: Creatinine at baseline.  Will continue to diurese today with Diamox. Discontinue da silva-can use condom catheter. 5. Anemia: had preop, stable H&H. Received 1 unit PRBCs 10/27/19. Iron panel sent preop - iron, iron sat low. Continue PO iron, change to liquid due to swallow concern. 6. Thrombocytopenia: Resolved. Continue to monitor daily CBC 7. Hx of HTN: Controlled on current medications. Dobutamine added back 10/31 and BB stopped. Has PRN Hydralazine. 8. HLD: Continue pravastatin 9. Leukocytosis: likely from steroids. Monitor CBC daily 10. Constipation: Resolved. Having loose BM. Hold pericolace, miralax until resolves 11. Current smoker: smoking cessation education. Cont pulm toileting. Nicotine patch if needed 12. Nutrition: Appreciate Dietician recommendations. Dobhoff placed 10/30 and Enteral feedings started. Transitioned to nocturnal feedings to allow for increased PO intake, but pt is not eating much. 13. Hyperglycemia: Preop A1c 5.3 with no DM history, now with hyperglycemia. Likely due to steroids and tube feedings. Restart insulin ggt 14. DVT/GI Prophylaxis:  SQ Heparin, PPI Dispo: PT/OT as able. Remain in CVI until resp status more stable Signed By: CHELSEA Khanna

## 2019-11-03 NOTE — PROGRESS NOTES
Problem: Falls - Risk of 
Goal: *Absence of Falls Description Document Sammy Avila Fall Risk and appropriate interventions in the flowsheet. Outcome: Progressing Towards Goal 
Note:  
Fall Risk Interventions: 
Mobility Interventions: Assess mobility with egress test, Bed/chair exit alarm, Communicate number of staff needed for ambulation/transfer, OT consult for ADLs, PT Consult for mobility concerns, Patient to call before getting OOB, Strengthening exercises (ROM-active/passive) Mentation Interventions: Adequate sleep, hydration, pain control, Bed/chair exit alarm, Evaluate medications/consider consulting pharmacy, Door open when patient unattended, Increase mobility, More frequent rounding, Reorient patient, Room close to nurse's station, Toileting rounds Medication Interventions: Assess postural VS orthostatic hypotension, Bed/chair exit alarm, Evaluate medications/consider consulting pharmacy, Teach patient to arise slowly, Patient to call before getting OOB, Utilize gait belt for transfers/ambulation Elimination Interventions: Bed/chair exit alarm, Call light in reach, Patient to call for help with toileting needs, Stay With Me (per policy), Toilet paper/wipes in reach, Toileting schedule/hourly rounds History of Falls Interventions: Investigate reason for fall, Room close to nurse's station, Utilize gait belt for transfer/ambulation Problem: Patient Education: Go to Patient Education Activity Goal: Patient/Family Education Outcome: Progressing Towards Goal 
  
Problem: Pressure Injury - Risk of 
Goal: *Prevention of pressure injury Description Document Earl Scale and appropriate interventions in the flowsheet. Outcome: Progressing Towards Goal 
Note:  
Pressure Injury Interventions: 
Sensory Interventions: Float heels, Turn and reposition approx. every two hours (pillows and wedges if needed) Moisture Interventions: Absorbent underpads, Maintain skin hydration (lotion/cream), Minimize layers, Moisture barrier, Apply protective barrier, creams and emollients Activity Interventions: Assess need for specialty bed, Increase time out of bed, Pressure redistribution bed/mattress(bed type), PT/OT evaluation Mobility Interventions: Assess need for specialty bed, HOB 30 degrees or less, Float heels, Pressure redistribution bed/mattress (bed type), PT/OT evaluation, Turn and reposition approx. every two hours(pillow and wedges) Nutrition Interventions: Document food/fluid/supplement intake, Offer support with meals,snacks and hydration, Discuss nutritional consult with provider Friction and Shear Interventions: Apply protective barrier, creams and emollients, Foam dressings/transparent film/skin sealants, Lift sheet, Lift team/patient mobility team, Minimize layers, Transfer aides:transfer board/Quirino lift/ceiling lift, Transferring/repositioning devices Problem: Patient Education: Go to Patient Education Activity Goal: Patient/Family Education Outcome: Progressing Towards Goal 
  
Problem: CABG: Discharge Outcomes Goal: *Hemodynamically stable Outcome: Progressing Towards Goal 
Goal: *Verbalizes home exercise program, activity guidelines, cardiac precautions Outcome: Progressing Towards Goal 
Goal: *Verbalizes understanding and describes prescribed diet Outcome: Progressing Towards Goal 
Goal: *Weight  is stable Outcome: Progressing Towards Goal 
Goal: *No signs and symptoms of infection or wound complications Outcome: Progressing Towards Goal 
Goal: *Anxiety reduced or absent Outcome: Progressing Towards Goal 
  
Problem: Infection - Risk of, Central Venous Catheter-Associated Bloodstream Infection Goal: *Absence of infection signs and symptoms Outcome: Progressing Towards Goal 
  
Problem: Patient Education: Go to Patient Education Activity Goal: Patient/Family Education Outcome: Progressing Towards Goal

## 2019-11-03 NOTE — PROGRESS NOTES
1028: Sats 85% on 90% & 40L with increased work of breathing; pt agreeable to bipap at this time. Sats 97% with Bipap. 1215: ; Dr. Alison Palacios notified; orders for insulin gtt. Pt requesting to be taken off bipap; applied Hi Flow 50L 100%.

## 2019-11-03 NOTE — CONSULTS
[facility] Name: Rick He MRN: [id number] : 1950 Hospital: [place] [address] Date: 11/3/2019 IMPRESSION:  
· S/p 5V CABG 10/19/19 for ACS  With reexploration 10/23 for mediastinal hematoma · Acute resp failure- baseline fibrotic ILD ( etiology not clear- this is a new dx but UIP/IPF in DDX) with superimposed edema but I cannot r/o superimposed diffuse alveolar damage from blood/blood products. Amio pulm toxicty in DDX but not clear from [facility] review when he was on it. 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  
  
RECOMMENDATIONS:  
· Continue solumedrol · I spoke to the pt about the importance of complying with NIV · Too unstable to BAL · Continue diuresis · I suspect a pneumonitis ( ? amio ) superimposed on his fibrotic ILD · Alternate HFNC ( 4 hours) with bipap ( 1 hour- to offload insp muscles) · Needs workup for ILD at some point ( autoimmune panel etc) but will wait until out of acute window. Pt is acutely ill. Subjective: This patient has been seen and evaluated at the request of Dr. Barbra Encinas for SOB. Patient is a 68 y.o. male smoker who presented with USA/ACS 10/19 and cath with 3V dz EF 25%. Taken to OR 10/19 and had 5 V CABG with reexploration 10/23 for mediastinal hematoma. Last PRBCs 10/27. Pt since extubation 10/24 has been tenuous with inc WOB. And now on HFNC. Apparently was on amiodarone until recently. Pt with mod WOB on HFNC on exam with coarse rales. Past Medical History:  
Diagnosis Date  Diabetes ([address]  High cholesterol  Hypertension  MI, old Past Surgical History:  
Procedure Laterality Date  HX CORONARY ARTERY BYPASS GRAFT  10/23/2019  
 x 5, LIMA to LAD, RSVG to Diag1, OM2-OM3, RSVG to PDA  HX CORONARY STENT PLACEMENT    
 HX LUMBAR LAMINECTOMY l5-s1 Prior to Admission medications Medication Sig Start Date End Date Taking? Authorizing Provider  
lisinopril (PRINIVIL, ZESTRIL) 40 mg tablet Take 40 mg by mouth daily. Indications: high blood pressure   Yes Provider, Historical  
pravastatin (PRAVACHOL) 40 mg tablet Take 40 mg by mouth nightly. Yes Provider, Historical  
clopidogrel (PLAVIX) 75 mg tab Take  by mouth daily. Yes Provider, Historical  
sertraline (ZOLOFT) 100 mg tablet Take 100 mg by mouth daily. Indications: Anxiousness associated with Depression   Yes Provider, Historical  
 
Allergies Allergen Reactions  Scallops Other (comments) Headache Social History Tobacco Use  Smoking status: Current Every Day Smoker  Smokeless tobacco: Never Used Substance Use Topics  Alcohol use: Yes History reviewed. No pertinent family history. Current Facility-Administered Medications Medication Dose Route Frequency  sodium chloride 0.9 % bolus infusion  10 mL/hr IntraVENous CONTINUOUS  
 methylPREDNISolone (PF) (Solu-MEDROL) injection 80 mg  80 mg IntraVENous Q6H  
 ferrous sulfate 300 mg (60 mg iron)/5 mL oral syrup 300 mg  300 mg Per NG tube DAILY WITH BREAKFAST  pantoprazole (PROTONIX) 40 mg in sodium chloride 0.9% 10 mL injection  40 mg IntraVENous DAILY  potassium bicarb-citric acid (EFFER-K) tablet 40 mEq  40 mEq Oral BID  acetaZOLAMIDE (DIAMOX) 500 mg in sterile water (preservative free) 5 mL injection  500 mg IntraVENous BID  insulin NPH (NOVOLIN N, HUMULIN N) injection 14 Units  14 Units SubCUTAneous Q24H  
 insulin NPH (NOVOLIN N, HUMULIN N) injection 6 Units  6 Units SubCUTAneous Q24H  
 insulin lispro (HUMALOG) injection   SubCUTAneous Q6H  
 heparin (porcine) injection 5,000 Units  5,000 Units SubCUTAneous U0T  
 multivit-folic acid-herbal 753 (WELLESSE PLUS) oral liquid 30 mL  30 mL Per NG tube DAILY  arformoterol (BROVANA) neb solution 15 mcg  15 mcg Nebulization BID RT  And  
  budesonide (PULMICORT) 500 mcg/2 ml nebulizer suspension  500 mcg Nebulization BID RT  
 guaiFENesin ER (MUCINEX) tablet 1,200 mg  1,200 mg Oral Q12H  sertraline (ZOLOFT) tablet 100 mg  100 mg Oral DAILY  sodium chloride (NS) flush 5-40 mL  5-40 mL IntraVENous Q8H  
 0.45% sodium chloride infusion  10 mL/hr IntraVENous CONTINUOUS  
 aspirin chewable tablet 81 mg  81 mg Oral DAILY  chlorhexidine (PERIDEX) 0.12 % mouthwash 10 mL  10 mL Oral Q12H  
 magnesium oxide (MAG-OX) tablet 400 mg  400 mg Oral BID  senna-docusate (PERICOLACE) 8.6-50 mg per tablet 1 Tab  1 Tab Oral BID  polyethylene glycol (MIRALAX) packet 17 g  17 g Oral DAILY  niCARdipine (CARDENE) 25 mg in 0.9% sodium chloride 250 mL infusion  0-15 mg/hr IntraVENous TITRATE  DOBUTamine (DOBUTREX) 500 mg/250 mL (2,000 mcg/mL) infusion  0-10 mcg/kg/min IntraVENous TITRATE  nicotine (NICODERM CQ) 14 mg/24 hr patch 1 Patch  1 Patch TransDERmal DAILY  pravastatin (PRAVACHOL) tablet 40 mg  40 mg Oral QHS Review of Systems: 
Review of systems not obtained due to patient factors. Objective:  
Vital Signs:   
Visit Vitals /67 (BP 1 Location: Left arm, BP Patient Position: At rest) Pulse 93 Temp 97.7 °F (36.5 °C) Resp 30 Ht 5' 7\" (1.702 m) Wt 58.7 kg (129 lb 6.4 oz) SpO2 (!) 89% BMI 20.27 kg/m² O2 Device: Hi flow nasal cannula O2 Flow Rate (L/min): 50 l/min Temp (24hrs), Av °F (36.7 °C), Min:97.7 °F (36.5 °C), Max:98.3 °F (36.8 °C) Intake/Output:  
Last shift:      701 - 1900 In: 807.6 [I.V.:132.6] Out: 400 [Urine:400] Last 3 shifts: 1901 - 700 In: 3113 [P.O.:450; I.V.:413] Out: 1550 [EOJSK:1041] Intake/Output Summary (Last 24 hours) at 11/3/2019 1212 Last data filed at 11/3/2019 1200 Gross per 24 hour Intake 2554.03 ml Output 1550 ml Net 1004.03 ml Physical Exam:  
General:  Alert, with mild- mod WOB on HFNC. Head:  Normocephalic, without obvious abnormality, atraumatic. Eyes:  Conjunctivae/corneas clear. Nose: Nares normal. Septum midline. Neck: Supple, symmetrical, trachea midline Lungs:   Coarse rales b/l no wheeze Heart:  Regular rate and rhythm, S1, S2 normal, no murmur, click, rub or gallop. Abdomen:   Soft, non-tender. Bowel sounds normal. No masses,  No organomegaly. Extremities: Extremities normal, atraumatic, no cyanosis or edema. Pulses: 2+ and symmetric all extremities. Skin: Skin color, texture, turgor normal. No rashes or lesions Data review:  
 
Recent Results (from the past 24 hour(s)) GLUCOSE, POC Collection Time: 11/02/19  2:03 PM  
Result Value Ref Range Glucose (POC) 308 (H) 65 - 100 mg/dL Performed by Sandrita Abad SED RATE (ESR) Collection Time: 11/02/19  2:15 PM  
Result Value Ref Range Sed rate, automated 70 (H) 0 - 20 mm/hr RHEUMATOID FACTOR, QT Collection Time: 11/02/19  2:15 PM  
Result Value Ref Range Rheumatoid factor <10 <15 IU/mL GLUCOSE, POC Collection Time: 11/02/19  5:58 PM  
Result Value Ref Range Glucose (POC) 224 (H) 65 - 100 mg/dL Performed by Sandrita Abad GLUCOSE, POC Collection Time: 11/02/19 11:47 PM  
Result Value Ref Range Glucose (POC) 276 (H) 65 - 100 mg/dL Performed by [employer] METABOLIC PANEL, COMPREHENSIVE Collection Time: 11/03/19  3:45 AM  
Result Value Ref Range Sodium 132 (L) 136 - 145 mmol/L Potassium 4.4 3.5 - 5.1 mmol/L Chloride 103 97 - 108 mmol/L  
 CO2 22 21 - 32 mmol/L Anion gap 7 5 - 15 mmol/L Glucose 263 (H) 65 - 100 mg/dL BUN 61 (H) 6 - 20 MG/DL Creatinine 1.91 (H) 0.70 - 1.30 MG/DL  
 BUN/Creatinine ratio 32 (H) 12 - 20 GFR est AA 42 (L) >60 ml/min/1.73m2 GFR est non-AA 35 (L) >60 ml/min/1.73m2 Calcium 9.3 8.5 - 10.1 MG/DL  Bilirubin, total 0.5 0.2 - 1.0 MG/DL  
 ALT (SGPT) 34 12 - 78 U/L  
 AST (SGOT) 34 15 - 37 U/L Alk. phosphatase 133 (H) 45 - 117 U/L Protein, total 7.6 6.4 - 8.2 g/dL Albumin 2.9 (L) 3.5 - 5.0 g/dL Globulin 4.7 (H) 2.0 - 4.0 g/dL A-G Ratio 0.6 (L) 1.1 - 2.2    
CBC W/O DIFF Collection Time: 11/03/19  3:45 AM  
Result Value Ref Range WBC 15.2 (H) 4.1 - 11.1 K/uL  
 RBC 3.05 (L) 4.10 - 5.70 M/uL HGB 9.5 (L) 12.1 - 17.0 g/dL HCT 29.6 (L) 36.6 - 50.3 % MCV 97.0 80.0 - 99.0 FL  
 MCH 31.1 26.0 - 34.0 PG  
 MCHC 32.1 30.0 - 36.5 g/dL  
 RDW 14.7 (H) 11.5 - 14.5 % PLATELET 363 632 - 595 K/uL MPV 10.5 8.9 - 12.9 FL  
 NRBC 0.0 0  WBC ABSOLUTE NRBC 0.00 0.00 - 0.01 K/uL MAGNESIUM Collection Time: 11/03/19  3:45 AM  
Result Value Ref Range Magnesium 2.8 (H) 1.6 - 2.4 mg/dL NT-PRO BNP Collection Time: 11/03/19  3:45 AM  
Result Value Ref Range NT pro-BNP 16,199 (H) <125 PG/ML  
GLUCOSE, POC Collection Time: 11/03/19  6:33 AM  
Result Value Ref Range Glucose (POC) 372 (H) 65 - 100 mg/dL Performed by Klickitat Opal GLUCOSE, POC Collection Time: 11/03/19  6:35 AM  
Result Value Ref Range Glucose (POC) 342 (H) 65 - 100 mg/dL Performed by Jennifer Opal GLUCOSE, POC Collection Time: 11/03/19 12:03 PM  
Result Value Ref Range Glucose (POC) 399 (H) 65 - 100 mg/dL Performed by Jimy [facility]: 
I have personally reviewed the patients radiographs and have reviewed the reports: PCXR 10/20 with basilar reticulation/fibrosis CXR 11/2- CXR with low lung volumes and superimposed GGO on chronic ILD Miladis Castellon MD

## 2019-11-04 NOTE — DIABETES MGMT
[facility] Cardiac Surgery Progress Note Recommendations/ Comments: BG's spiked yesterday (11-3-2019) to 342 mg/dL - 439 mg/dL. Insulin gtt re-started 11/3/2019 at 1200 Steroids Solu medrol 80 mg/dL Q 6 hours. Nocturnal TF Osmolite 1.5 7 PM - 11 AM 
PO intake during the day very low Renal  
 
On insulin gtt. At 1450 BG 88 mg/dL, rate 3 units/hr. Received 42.3 units in the past 6 hours. Hourly rates have varied from 3 units to 10.2 units For transition Dose will depend on steroids at the time of transition. May do best with NPH BID to address both steroids and overnight TF Will give recommendations closer to transition Was on NPH 6 units each AM and 14 units each PM prior to starting gtt; contorl worsened with addition of Solu Medrol Patient is 68 y.o. male s/p CABG x 5 followed by re-entry for mediastinal clot evacuation and repair of SVG to PDA  - POD 12.  
Pt with documented hx of DM on no meds PTA. Anemia - A1c inaccurate A1c:  
Lab Results Component Value Date/Time Hemoglobin A1c 5.3 10/21/2019 03:04 AM  
 
 
 
Recent Glucose Results:  
Lab Results Component Value Date/Time  (H) 11/04/2019 04:05 AM  
 GLUCPOC 88 11/04/2019 02:49 PM  
 GLUCPOC 112 (H) 11/04/2019 01:54 PM  
 GLUCPOC 112 (H) 11/04/2019 12:20 PM  
  
 
Lab Results Component Value Date/Time Creatinine 1.56 (H) 11/04/2019 04:05 AM  
 
Estimated Creatinine Clearance: 36.7 mL/min (A) (based on SCr of 1.56 mg/dL (H)). Active Orders Diet DIET MECHANICAL SOFT  
  
 
PO intake:  
Patient Vitals for the past 72 hrs: 
 % Diet Eaten 11/04/19 1400 10 % 11/04/19 1030 10 % 11/02/19 1800 0 % Will continue to follow as needed. Thank you. Jayleen Velasquez RN, CDE Time spent: 10 minutes

## 2019-11-04 NOTE — PROGRESS NOTES
[facility] ICU Progress Note Admit Date: 10/19/2019 Procedure:  Procedure(s): 
CARDIAC RE-ENTRY, MARISOL BY  [facility]    
 
CABG x 5, LIMA to LAD, RSVG to [id number], RSVG to PDA 10/23/19 Subjective:  
Pt seen with Dr. Alison Palacios. Dobutamine at [address] Afebrile, currently on hi-flow NC. Sleepy, refusing BIPAP Objective:  
Vitals: 
Blood pressure 131/71, pulse 83, temperature 98 °F (36.7 °C), resp. rate 26, height 5' 7\" (1.702 m), weight 131 lb 12.8 oz (59.8 kg), SpO2 92 %. Temp (24hrs), Av.9 °F (36.6 °C), Min:97.7 °F (36.5 °C), Max:98.1 °F (36.7 °C) EKG/Rhythm:  SR in the 80s Oxygen Therapy: 
Oxygen Therapy O2 Sat (%): 92 % (19 0900) Pulse via Oximetry: 84 beats per minute (19 0745) O2 Device: Hi flow nasal cannula (19 08) O2 Flow Rate (L/min): 40 l/min (19 08) O2 Temperature: 87.6 °F (30.9 °C) (19 0745) FIO2 (%): 80 % (19 08) CXR:  
CXR Results  (Last 48 hours) 19 0528  XR CHEST PORT Final result Impression:  IMPRESSION: No significant interval change with continued evidence of congestive  
contrast/pulmonary edema. Narrative:  Portable chest single view dated 2019 Comparison chest dated 11/3/2019 History is interstitial edema A single frontal view of the chest is obtained. The cardiac silhouette continues  
to be enlarged. There continues to be abnormal prominence of the pulmonary  
interstitial markings. This is compatible with congestive change/pulmonary  
edema. The feeding tube is unchanged in position. The IJ introducer sheath on  
the right is again noted 19 0738  XR CHEST PORT Final result Impression:  Impression: No significant interval change. Narrative:  Chest portable AP History: Interstitial edema Comparison: 2019 Findings: An enteric tube is in place. A right IJ catheter is unchanged. Cardiac monitoring leads overlie the chest. Tubing overlies the left upper  
hemithorax. The heart is mildly enlarged, but unchanged. There is unchanged mild  
edema. Admission Weight: Last Weight Weight: 141 lb 5 oz (64.1 kg) Weight: 131 lb 12.8 oz (59.8 kg) Intake / Output / Drain: 
Current Shift: No intake/output data recorded. Last 24 hrs.:  
 
Intake/Output Summary (Last 24 hours) at 2019 2827 Last data filed at 2019 0700 Gross per 24 hour Intake 2347.86 ml Output 1800 ml Net 547.86 ml EXAM: 
General:  Resting comfortably. No acute distress Lungs:   Scattered crackles throughout Incision:  Midsternal incision with no significant erythema, drainage, or dehiscence. Heart:  Regular rate and rhythm, S1, S2 normal, no murmur, click, rub or gallop. Abdomen:   Soft, non-tender. Bowel sounds hypoactive. No masses,  No organomegaly. +BM 11/3 Extremities:  No edema. Palpable pulses bilat Neurologic:  Gross motor and sensory apparatus intact. Labs:  
Recent Labs 19 
0840  19 
0405 WBC  --   --  23.8* HGB  --   --  9.4* HCT  --   --  28.9*  
PLT  --   --  306 NA  --   --  137 K  --   --  3.7 BUN  --   --  62* CREA  --   --  1.56* GLU  --   --  118* GLUCPOC 124*   < >  --   
 < > = values in this interval not displayed. Assessment:  
 
Principal Problem: S/P CABG x 5 (10/23/2019) Overview: x 5, LIMA to LAD, RSVG to Diag1, OM2-OM3, RSVG to PDA Active Problems: 
  ACS (acute coronary syndrome) ([facility] (10/19/2019) Unstable angina ([facility] (10/19/2019) Coronary artery disease of native artery of native heart with stable [facility]ina pectoris [facility] (10/21/2019) Systolic heart failure ([facility] (10/22/2019) Plan/Recommendations/Medical Decision Makin. S/p CABG: on ASA, statin. No BB while on Dobutamine. 2. Acute on chronic systolic CHF, NYHA Class II on admit: EF 35-40% preop. Dobutamine to assist diuresis. No BB/ACE/ARB/AA until vitals/kidney function allows. 3. Acute postop respiratory failure, acute pulm edema, effusions with chronic fibrosis per CXR: refusing bipap mask - monitor ABG's. pBNP worse today. On Diamox. Amiodarone stopped. Solumedrol per pulm. Continue scheduled nebs. Cont mucinex, pulm toileting. 4. Renal insufficiency: Creatinine at baseline. Will continue to diurese today with Diamox. Discontinue da silva-can use condom catheter. 5. Anemia: had preop, stable H&H. Received 1 unit PRBCs 10/27/19. Iron panel sent preop - iron, iron sat low. Continue PO iron, change to liquid due to swallow concern. 6. Thrombocytopenia: Resolved. Continue to monitor daily CBC 7. Hx of HTN: Controlled on current medications. Dobutamine added back 10/31 and BB stopped. Has PRN Hydralazine. 8. HLD: Continue pravastatin 9. Leukocytosis: likely from steroids. Monitor CBC daily 10. Constipation: Resolved. Having loose BM. Hold pericolace, miralax until resolves 11. Current smoker: smoking cessation education. Cont pulm toileting. Nicotine patch if needed 12. Nutrition: Appreciate Dietician recommendations. Dobhoff placed 10/30 and Enteral feedings started. Transitioned to nocturnal feedings to allow for increased PO intake, but pt is not eating much. 13. Hyperglycemia: Preop A1c 5.3 with no DM history, now with hyperglycemia. Likely due to steroids and tube feedings. Continue insulin ggt 14. DVT/GI Prophylaxis:  SQ Heparin, PPI Dispo: PT/OT as able. Remain in CVI until resp status more stable Signed By: Francine Fitzpatrick NP

## 2019-11-04 NOTE — PROGRESS NOTES
0800 - Report received on Mr. Jean Claude Valentino from [address] [facility]. He is currently sleeping, has high flow nasal cannula, 40L and 80%. O2 sats currently at 97%. 1100 - Turned to the side to check for bathroom needs and possible change to draw sheet. Patient very short of breath, sats dropped to 85% and stayed there for 45 minutes. Increased high flow to 100% for now. 1200 - PT in to see patient. FIO2 stayed at 100% to help him oxygenate during activity. His sats dropped to 81-83% while sitting on the side of the bed. No ambulation attempted at this time. 1315 - O2 sats improved to 97%. High flow being weaned currently to keep sats greater than 92% as ordered by Pulmonology. 1400 - O2sats at 90%, High flow increased to 90% and 50LPM. 
 
1445 - O2sats up to 93%, maintained on 90% and 50LPM. 
 
1530 - High flow decreased to 40LPM and 80%, O2sats 97-99%. 1600 - O2 sats staying at 97%, decreased high flow to 30 LPM. 
 
1633 - O2sats down to 89%, increased high flow back up to 40LPM. 
 
1800 - O2sats at 90% after eating and some talking, high flow increased to 90%. 2000 - Bedside shift change report given to [address] [place] [address] (oncoming nurse) by Iram Michelle RN (offgoing nurse). Report included the following information SBAR, Kardex, Intake/Output, MAR, Accordion, Recent Results and Cardiac Rhythm NSR.

## 2019-11-04 NOTE — PROGRESS NOTES
1945: Report received from [facility] RNs. Gtts checked and verified. Shift Summary: No acute issues overnight. Patient remains very flat and withdrawn; refuses certain aspects of care at times - baths, turns, etc. However, patient pleasant in this refusal and states \"I just want to be comfortable. I'm finally comfortable. That's all I want. \" Patient continues to refuse BIPAP; patient explained he understood the risk of not wearing it and he would notify RN if his breathing \"got that bad. \" HFNC weaned to 40L/80%. Patient requesting his AM ABG be done \"during the day. \" High insulin gtt requirements with steroids and nocturnal tube feeds. 0745: Bedside shift change report given to [address] [facility] (oncoming nurse) by Sharona Abdalla (offgoing nurse). Report included the following information SBAR, Intake/Output, MAR, Recent Results and Cardiac Rhythm NSR.

## 2019-11-04 NOTE — PROGRESS NOTES
NYHA class IV A/C systolic heart failure (EF 25%, NT pro-BNP 15,013) Acute on chronic kidney disease Mild pulmonary edema Mild chronic lung disease Acute on chronic hypoxic respiratory failure 
  
Continues high dose steroid Tx WBCs bumped a bit likely from steroids Hgb and platelets look good Creatinine improved Will increase diuretics Bilirubin and other LFTs look good NT pro-BNP about the same Remains on high flow / BiPAP CXR - pulmonary edema Intake/Output Summary (Last 24 hours) at 11/4/2019 1211 Last data filed at 11/4/2019 1000 Gross per 24 hour Intake 2158.78 ml Output 1400 ml Net 758.78 ml Visit Vitals /68 Pulse 88 Temp 98 °F (36.7 °C) Resp 28 Ht 5' 7\" (1.702 m) Wt 131 lb 12.8 oz (59.8 kg) SpO2 (!) 84% BMI 20.64 kg/m² Risk of morbidity and mortality - high Medical decision making - high complexity Total critical care time - 30 minutes (CPT 28511)

## 2019-11-04 NOTE — PROGRESS NOTES
NUTRITION COMPLETE ASSESSMENT 
 
RECOMMENDATIONS:  
1. adjust tube feed formula to diabetic formula to help with BG mgmt: - Glucerna 1.2 @ 100ml/hr x 15hrs (7p to 10a) + 30ml flush q4hr during feeds 2. Encourage PO intake with meals as able when off Bipap - please continue to document % meals consumed 
 - use Ensure Shakes to give medications 3. Consider trial of remeron as appetite stimulant Interventions/Plan:  
Food/Nutrient Delivery:    Commercial supplement(Ensure Enlive TID) Feeding assistance at [employer] needed)   Modify rate, concentration, composition, and schedule Nutrition Education:    
 
Assessment:  
Reason for Assessment: Reassessment Diet: mechanical soft Supplements: Ensure Enlive 3x/day Tube feeds: Osmolite 1.5 @ 75ml/hr x 16hrs (7p to 11a) + 30ml flush q4hr during feeds Nutritionally Significant Medications: [x] Reviewed & Includes: iron, SSI, mag-ox, solu-medrol, liquid MVI, protonix, miralax, potassium bicarb, KCl, pericolace, zoloft; DRIPS: dobutamine, cardene Meal Intake:  
Patient Vitals for the past 100 hrs: 
 % Diet Eaten 11/04/19 1400 10 % 11/04/19 1030 10 % 11/02/19 1800 0 % 11/01/19 1200 0 % 11/01/19 0800 0 % 10/31/19 2000 0 % Current Hospitalization:  
Appetite: Poor PO Ability: Independent Average po intake:Less than 25% Average supplements intake:    
  
Subjective: Pt sleeping did not wake to name. Drank some of Ensure at bedside today with encouragement per RN reports. Objective: 
Pt admitted for acute coronary syndrome. PMHx: DM, high cholesterol, HTN. S/p CABG x 5 on 10/23. Extubated on 10/24 with some pulmonary edema noted. Remains in hi flow O2/Bipap. Increase in diuresis noted. Hyponatremia and hypomagnesemia (repleted). BUN/Cr trending up. Poor PO intake post-op with limited appetite and some trouble swallowing.  May benefit from speech eval. DHT placed on 10/30 and tube feeds advanced to nocturnal feeds last week. Oral intake remains minimal (see above). Pt with underlying depression noted, consider trial of remeron as appetite stimulant. DTC following for insulin mgmt with BG running on high on tube feeds and steroids for breathing. Insulin drip restarted. HX of DM but last A1c 5%. Will adjust tube feed formula to diabetic formula to help with BG mgmt: Glucerna 1.2 @ 95ml/hr x 15hrs (7p to 10a) + 30ml flush q4hr during feeds. Provides: 1425ml, 1710kcal, 86g protein, 164g CHO,  1148ml free fluid + flush. Will also switch oral nutrition supplements to Glucerna TID for low CHO content (660kcal, 30g protein). Will follow for PO intake, respiratory status, renal fx, and BG control. Estimated Nutrition Needs:  
Kcals/day: 8467 Kcals/day(1658-1795kcal) Protein: 81 g(81-95g (1.2-1.4g/kg)) Fluid: 1700 ml(1ml/kcal or per cardio) Based On: [place] St Jeor(x 1.2-1.3) Weight Used: Actual wt(67.6kg) Pt expected to meet estimated nutrient needs:  [x]   Yes     []  No [] Unable to predict at this time Nutrition Diagnosis:  
1. Inadequate protein-energy intake related to poor appetite, swallowing difficulty as evidenced by less than 25% meals consumed; trou Goals:   
 EN continuing to meet at least 90% needs until oral intake meetin g>60% needs consistently Monitoring & Evaluation: - Total energy intake, Liquid meal replacement, Carbohydrate intake - Weight/weight change, Glucose profile Previous Nutrition Goals Met:   Yes Previous Recommendations:    Yes 
 
Education & Discharge Needs: 
 [x] None Identified 
 [] Identified and addressed [x] Participated in care plan, discharge planning, and/or interdisciplinary rounds Cultural, Sabianist and ethnic food preferences identified: None Skin Integrity: [x]Intact  []Other Edema: [x]None []Other Last BM: 11/4 Food Allergies: []None [x]Other: scallops Diet Restrictions: Cultural/Taoist Preference(s): None Anthropometrics:   
Weight Loss Metrics 11/4/2019 Today's Wt 131 lb 12.8 oz  
BMI 20.64 kg/m2 Weight Source: Bed Height: 5' 7\" (170.2 cm), Body mass index is 20.64 kg/m². IBW : 72.6 kg (160 lb), % IBW (Calculated): 93.14 % 
 ,   
 
Labs:   
Lab Results Component Value Date/Time Sodium 137 11/04/2019 04:05 AM  
 Potassium 3.7 11/04/2019 04:05 AM  
 Chloride 109 (H) 11/04/2019 04:05 AM  
 CO2 22 11/04/2019 04:05 AM  
 Glucose 118 (H) 11/04/2019 04:05 AM  
 BUN 62 (H) 11/04/2019 04:05 AM  
 Creatinine 1.56 (H) 11/04/2019 04:05 AM  
 Calcium 9.6 11/04/2019 04:05 AM  
 Magnesium 2.7 (H) 11/04/2019 04:05 AM  
 Albumin 2.8 (L) 11/04/2019 04:05 AM  
 
Lab Results Component Value Date/Time Hemoglobin A1c 5.3 10/21/2019 03:04 AM  
 
 
Jagdish Trujillo RD [facility], Pager #[phone] or [phone]

## 2019-11-04 NOTE — PROGRESS NOTES
Bedside and Verbal shift change report given to Brant Amado and [address] [place] [address] [phone] (oncoming nurse) by Gelacio Marrero (offgoing nurse). Report included the following information SBAR, Kardex, OR Summary, Procedure Summary, Intake/Output, MAR, Accordion, Recent Results, Med Rec Status and Cardiac Rhythm NSR Ismael Adam 1930 Bedside and Verbal shift change report given to Valorie Turner (oncoming nurse) by Stephanie Silver RN (offgoing nurse).  Report included the following information SBAR, Kardex, OR Summary, Procedure Summary, Intake/Output, MAR, Accordion, Recent Results, Med Rec Status and Cardiac Rhythm NSR

## 2019-11-04 NOTE — PROGRESS NOTES
Problem: Mobility Impaired (Adult and Pediatric) Goal: *Acute Goals and Plan of Care (Insert Text) Description FUNCTIONAL STATUS PRIOR TO ADMISSION: Patient was independent and active without use of DME.  
 
HOME SUPPORT PRIOR TO ADMISSION: The patient lived with his son, but did not require assist. Son provides transportation since his car is currently broken down. Physical Therapy Goals Initiated 11/4/2019- Goals downgraded due to prolonged need for high levels of O2 support limiting progress and mobility since evaluation 1. Patient will move from supine to sit and sit to supine , scoot up and down and roll side to side in bed with minimal assistance within 7 days. 2.  Patient will perform sit to/from stand with minimal assistance within 7 days. 3.  Patient will complete marching in place x10 with least restrictive assistive device, sats >92%, and minimal assistance within 7 days. 4.  Patient will perform cardiac exercises per protocol with minimal assistance, and proper pacing strategies + deep breathing within 7 days. 5.  Patient will verbally and functionally recall 3/3 sternal precautions within 7 days. Initiated 10/25/2019 1. Patient will move from supine to sit and sit to supine, scoot up and down and roll side to side in bed with minimal assistance/contact guard assist within 5 days. 2.  Patient will perform sit to/from stand with minimal assistance/contact guard assist within 5 days. 3.  Patient will ambulate  feet with least restrictive assistive device and minimal assistance/contact guard assist within 5 days. 4.  Patient will ascend/descend 6 stairs with single handrail(s) with moderate assistance  within 5 days. 5.  Patient will perform cardiac exercises per protocol with minimal assistance/contact guard assist within 5 days. 6.  Patient will verbally and functionally recall 3/3 sternal precautions within 5 days.  
 
11/4/2019 1420 by Homero Bates 
 Outcome: Progressing Towards Goal 
 PHYSICAL THERAPY TREATMENT: WEEKLY REASSESSMENT Patient: Mikala Alarcon (74 y.o. male) Date: 11/4/2019 Primary Diagnosis: ACS (acute coronary syndrome) ([facility] [address] [I24.9] Unstable angina (HCC) [I20.0] Procedure(s) (LRB): 
CARDIAC RE-ENTRY, MARISOL BY DR Ashley Gray (N/A) 12 Days Post-Op Precautions: Fall, Sternal(high O2 demands) ASSESSMENT Patient continues with skilled PT services and is progressing towards goals. Patient received supine in bed, agreeable to co-treatment with PT/OT. Resting on 40 L at 80% but with RN permission, increased O2 to 100%. RR 22-38 throughout session. Overall min-mod Ax2 for bed mobility. Patient remains with good abdominal strength but noted significant atrophy occurring in LEs. Patient c/o dizziness and difficulty visually focusing, reporting he felt as if his narcotic had affected him. O2 sats between 80-87% while seated EOB. Due to poor sitting balance, drowsiness while sitting EOB, and poor sats while seated EOB on max high flow, patient returned to supine (+ linen and surendra change due to condom cath leaking). Patient's progression toward goals since last assessment: Goals not met, continues to have high O2 demands not allowing mobility progression Current Level of Function Impacting Discharge (mobility/balance): min-mod A, desat on max high flow Functional Outcome Measure: The patient scored 0/28 on the Tinetti outcome measure which is indicative of high fall risk. Other factors to consider for discharge: previously independent, medically complex PLAN : 
Goals have been updated based on progression since last assessment. Patient continues to benefit from skilled intervention to address the above impairments.  
 
Recommendations and Planned Interventions: bed mobility training, transfer training, gait training, therapeutic exercises, neuromuscular re-education, edema management/control and patient and family training/education Frequency/Duration: Patient will be followed by physical therapy:  5 times a week to address goals. Recommendation for discharge: (in order for the patient to meet his/her long term goals) Therapy 3 hours per day 5-7 days per week, working towards tolerating This discharge recommendation: 
Has not yet been discussed the attending provider and/or case management IF patient discharges home will need the following DME: to be determined (TBD) SUBJECTIVE:  
Patient stated Too. ..many. ..questions.  OBJECTIVE DATA SUMMARY:  
HISTORY:   
Past Medical History:  
Diagnosis Date  Diabetes ([facility] [address]  High cholesterol  Hypertension  MI, old Past Surgical History:  
Procedure Laterality Date  HX CORONARY ARTERY BYPASS GRAFT  10/23/2019  
 x 5, LIMA to LAD, RSVG to Diag1, OM2-OM3, RSVG to PDA  HX CORONARY STENT PLACEMENT    
 HX LUMBAR LAMINECTOMY l5-s1 Personal factors and/or comorbidities impacting plan of care: [facility] Home Situation Home Environment: Private residence # Steps to Enter: 4 Rails to Enter: Yes One/Two Story Residence: Two story # of Interior Steps: 15 Height of Each Step (in): 7 inches Interior Rails: Left Lift Chair Available: No 
Living Alone: No 
Support Systems: Child(pradip)(+ Son) Patient Expects to be Discharged to[de-identified] Private residence Current DME Used/Available at Home: None Tub or Shower Type: Tub/Shower combination EXAMINATION/PRESENTATION/DECISION MAKING:  
Critical Behavior: 
Neurologic State: Drowsy Orientation Level: Oriented X4 Cognition: Decreased attention/concentration Safety/Judgement: Decreased insight into deficits, Decreased awareness of need for safety Hearing: Auditory Auditory Impairment: None Range Of Motion: 
AROM: Generally decreased, functional 
Strength:   
Strength: Generally decreased, functional 
Tone & Sensation:  
Tone: Normal 
Coordination: Coordination: Generally decreased, functional 
Functional Mobility: 
Bed Mobility:  
Supine to Sit: Minimum assistance; Moderate assistance;Assist x2 Sit to Supine: Moderate assistance;Assist x2 Scooting: Maximum assistance;Assist x2 Balance:  
Sitting: Impaired; Without support Sitting - Static: Fair (occasional); Poor (constant support) Sitting - Dynamic: Poor (constant support) Functional Measure: 
Tinetti test: 
 
Sitting Balance: 0 Arises: 0 Attempts to Rise: 0 Immediate Standing Balance: 0 Standing Balance: 0 Nudged: 0 Eyes Closed: 0 Turn 360 Degrees - Continuous/Discontinuous: 0 Turn 360 Degrees - Steady/Unsteady: 0 Sitting Down: 0 Balance Score: 0 Balance total score Indication of Gait: 0 
R Step Length/Height: 0 
L Step Length/Height: 0 
R Foot Clearance: 0 
L Foot Clearance: 0 Step Symmetry: 0 Step Continuity: 0 Path: 0 Trunk: 0 Walking Time: 0 Gait Score: 0 Gait total score Total Score: 0/28 Overall total score Tinetti Tool Score Risk of Falls 
<19 = High Fall Risk 19-24 = Moderate Fall Risk 25-28 = Low Fall Risk Tinetti ME. Performance-Oriented Assessment of Mobility Problems in Elderly Patients. [facility] 66; [id number]. (Scoring Description: PT Bulletin Feb. 10, 1993) Older adults: Trever Matthews et al, 2009; n = [address] [place] elderly evaluated with ABC, SELAM, ADL, and IADL) · Mean SELAM score for males aged 69-68 years = 26.21(3.40) · Mean SELAM score for females age 69-68 years = 25.16(4.30) · Mean SELAM score for males over 80 years = 23.29(6.02) · Mean SELAM score for females over 80 years = 17.20(8.32) Activity Tolerance:  
Poor, desaturates with exertion and requires oxygen, requires frequent rest breaks and observed SOB with activity Please refer to the flowsheet for vital signs taken during this treatment.  
 
After treatment patient left in no apparent distress:  
Supine in bed, Call bell within reach and Side rails x 3 
 
COMMUNICATION/EDUCATION:  
 The patients plan of care was discussed with: Occupational Therapist and Registered Nurse. Fall prevention education was provided and the patient/caregiver indicated understanding., Patient/family have participated as able in goal setting and plan of care. and Patient/family agree to work toward stated goals and plan of care. Thank you for this referral. 
Molly Tomas, PT, DPT Time Calculation: 24 mins

## 2019-11-04 NOTE — PROGRESS NOTES
Pulmonary In CVICU for management of post op hypoxemia following cabg. Pt with underlying ILD and pre op tanner with mod restrictive physiology On high flow 80% and 40L - RN reports that he refuses time on NIPPV. 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 
 BP Pulse Resp SpO2 Weight 19 0745    97 %   
19 0700 107/89 86 21 96 %   
19 0600 141/64 90 23 91 % 59.8 kg (131 lb 12.8 oz) 19 0500 118/81 88 28 93 %  Temp (24hrs), Av.9 °F (36.6 °C), Min:97.7 °F (36.5 °C), Max:98.1 °F (36.7 °C) Intake/Output Summary (Last 24 hours) at 2019 7273 Last data filed at 2019 0700 Gross per 24 hour Intake 2434.66 ml Output 1800 ml Net 634.66 ml No distress No accessory use Symmetrical chest expansion Rales RRR Soft Warm and dry Trace edema CXR - no change in ILD with edema Lab: 
Recent Labs 19 
0405 19 
0345 19 
7191 WBC 23.8* 15.2* 16.6* HGB 9.4* 9.5* 9.7*  301 290  132* 131*  
K 3.7 4.4 4.6 * 103 100 CO2 22 22 24 BUN 62* 61* 54* CREA 1.56* 1.91* 1.75* * 263* 221* CA 9.6 9.3 9.1 MG 2.7* 2.8* 2.5* TBILI 0.4 0.5 0.6 SGOT 46* 34 51* Impression · S/p 5V CABG 10/19/19 for ACS  With reexploration 10/23 for mediastinal hematoma · Acute resp failure- baseline fibrotic ILD ( etiology not clear- this is a new dx but UIP/IPF in DDX) with superimposed edema but I cannot r/o superimposed diffuse alveolar damage from blood/blood products. Amio pulm toxicty in DDX but not clear from [facility] review when he was on it. 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 
 
--receiving diamox --continue solumedrol 
--wean FiO2 by anna Marcos MD

## 2019-11-04 NOTE — PROGRESS NOTES
Problem: Self Care Deficits Care Plan (Adult) Goal: *Acute Goals and Plan of Care (Insert Text) Description FUNCTIONAL STATUS PRIOR TO ADMISSION: Patient was independent and active without use of DME.  
 
HOME SUPPORT: The patient lived with his son but did not require assist for self-care tasks. Patient's son assisted him with transportation to the grocery store. Occupational Therapy Goals Goals reviewed and continued as follows 10/31/2019 Initiated 10/25/2019 1. Patient will perform ADLs standing 5 mins without fatigue or LOB with moderate assistance within 5 day(s). 2.  Patient will perform lower body ADLs with moderate assistance within 5 day(s). 3.  Patient will perform gathering ADL items high and low 2/2 with moderate assistance within 5 day(s). 4.  Patient will perform toilet transfers with moderate assistance within 5 day(s). 5.  Patient will perform all aspects of toileting with moderate assistance within 5 day(s). 6.  Patient will participate in cardiac/sternal upper extremity therapeutic exercise/activities to increase independence with ADLs with minimal assistance for 5 minutes within 5 day(s). Outcome: Progressing Towards Goal 
 OCCUPATIONAL THERAPY TREATMENT Patient: Skip Sahni (18 y.o. male) Date: 11/4/2019 Diagnosis: ACS (acute coronary syndrome) ([facility] [address] [I24.9] Unstable angina (HCC) [I20.0] S/P CABG x 5 Procedure(s) (LRB): 
CARDIAC RE-ENTRY, MARISOL BY DR Suzy Alvarez (N/A) 12 Days Post-Op Precautions: Fall, Sternal(high O2 demands) Chart, occupational therapy assessment, plan of care, and goals were reviewed. ASSESSMENT Patient continues with skilled OT services and is slowly progressing towards goals.  However, patient continues to be significantly limited by generalized weakness and decreased endurance requiring 40L hi-flow N2, 100% FiO2 in session today with patient's O2 saturation dropping to 80% with supine > sit transfer and patient requiring several minutes for O2 to increase to 84% with verbal cues for utilizing PLB. Patient also reporting drowsiness with narcotics given to him today and unable to maintain eyes open EOB. Patient returned to supine after ~5 minutes seated EOB and requiring assistance for pericare and clothing management due to condom catheter leaking in bed (MAX A X 2 for rolling side to side; TOTAL A for pericare). Patient continues to present significantly below functional baseline. Current Level of Function Impacting Discharge (ADLs): MAX A LB ADLs Other factors to consider for discharge: O2 dependence; medical stability PLAN : 
Patient continues to benefit from skilled intervention to address the above impairments. Continue treatment per established plan of care. to address goals. Recommend with staff: bed in chair position as tolerated; OOB if O2 sats stable Recommend next OT session: EOB ADLs Recommendation for discharge: (in order for the patient to meet his/her long term goals) Therapy up to 5 days/week in SNF setting This discharge recommendation: 
Has not yet been discussed the attending provider and/or case management IF patient discharges home will need the following DME: to be determined (TBD) SUBJECTIVE:  
Patient stated I am not used to these narcotics.  OBJECTIVE DATA SUMMARY:  
Cognitive/Behavioral Status: 
Neurologic State: Drowsy Orientation Level: Oriented X4 Cognition: Decreased attention/concentration Perception: Appears intact Perseveration: No perseveration noted Safety/Judgement: Decreased insight into deficits; Decreased awareness of need for safety Functional Mobility and Transfers for ADLs: 
Bed Mobility: 
Supine to Sit: Minimum assistance; Moderate assistance;Assist x2 Sit to Supine: Moderate assistance;Assist x2 Scooting: Maximum assistance;Assist x2 Transfers: 
 Could not safety attempt this session Balance: 
Sitting: Impaired; Without support Sitting - Static: Fair (occasional); Poor (constant support) Sitting - Dynamic: Poor (constant support) ADL Intervention: Lower Body Dressing Assistance Socks: Total assistance (dependent) Cognitive Retraining Safety/Judgement: Decreased insight into deficits; Decreased awareness of need for safety Activity Tolerance:  
Poor, desaturates with exertion and requires oxygen, requires frequent rest breaks and observed SOB with activity Please refer to the flowsheet for vital signs taken during this treatment. After treatment patient left in no apparent distress:  
Supine in bed and Call bell within reach COMMUNICATION/COLLABORATION:  
The patients plan of care was discussed with: Physical Therapist and Registered Nurse Mary Valero Time Calculation: 25 mins

## 2019-11-05 NOTE — PROGRESS NOTES
Transitons of care: 
 participated in [facility] rounds today. Patient is presently on bipap and patient has declined Physical therapy at this time.

## 2019-11-05 NOTE — PROGRESS NOTES
[facility] ICU Progress Note Admit Date: 10/19/2019 Procedure:  Procedure(s): 
[facility]    
 
CABG x 5, LIMA to LAD, RSVG to [id number], RSVG to PDA 10/23/19 Subjective:  
Pt seen with Dr. Tee Oconnor. Dobutamine at 1mcg, insulin gtt. Afebrile, BIPAP vs HFNC. Pt refusing some therapy intermittently. TF's through [facility] Objective:  
Vitals: 
Blood pressure 134/62, pulse 91, temperature 97.2 °F (36.2 °C), resp. rate 25, height 5' 7\" (1.702 m), weight 128 lb 8 oz (58.3 kg), SpO2 97 %. Temp (24hrs), Av.3 °F (36.3 °C), Min:96.9 °F (36.1 °C), Max:97.9 °F (36.6 °C) EKG/Rhythm: SR in the 80s Oxygen Therapy: 
Oxygen Therapy O2 Sat (%): 97 % (19 0800) Pulse via Oximetry: 88 beats per minute (19) O2 Device: BIPAP (19) O2 Flow Rate (L/min): 40 l/min (19) O2 Temperature: 87.6 °F (30.9 °C) (19 0745) FIO2 (%): 70 % (19) CXR:  
CXR Results  (Last 48 hours) 19 05  XR CHEST PORT Final result Impression:  IMPRESSION: No significant change. Narrative:  INDICATION:  interstitial edema CABG x5 EXAM: CXR Portable. FINDINGS: Portable chest shows support lines/devices show no significant change  
since yesterday. There is no apparent pneumothorax. Lungs show interstitial  
edema. Left upper lobe bullous emphysema appearance remains. Heart size is  
stable. There is no sizable pleural effusion. 19 05  XR CHEST PORT Final result Impression:  IMPRESSION: No significant interval change with continued evidence of congestive  
contrast/pulmonary edema. Narrative:  Portable chest single view dated 2019 Comparison chest dated 11/3/2019 History is interstitial edema A single frontal view of the chest is obtained. The cardiac silhouette continues  
to be enlarged. There continues to be abnormal prominence of the pulmonary interstitial markings. This is compatible with congestive change/pulmonary  
edema. The feeding tube is unchanged in position. The IJ introducer sheath on  
the right is again noted Admission Weight: Last Weight Weight: 141 lb 5 oz (64.1 kg) Weight: 128 lb 8 oz (58.3 kg) Intake / Output / Drain: 
Current Shift: 11/05 0701 - 11/05 1900 In: 21 Out: - Last 24 hrs.:  
 
Intake/Output Summary (Last 24 hours) at 11/5/2019 4577 Last data filed at 11/5/2019 0800 Gross per 24 hour Intake 1301.9 ml Output 1100 ml Net 201.9 ml EXAM: 
General:  Groggy. No acute distress Lungs:   Scattered crackles throughout, diminished Incision:  Midsternal incision with no significant erythema, drainage, or dehiscence. Heart:  Regular rate and rhythm, S1, S2 normal, no murmur, click, rub or gallop. Abdomen:   Soft, non-tender. Bowel sounds hypoactive. No masses,  No organomegaly. +BM 11/4 Extremities:  No edema. Palpable pulses bilat Neurologic:  Gross motor and sensory apparatus intact. Labs:  
Recent Labs 11/05/19 
0820  11/05/19 
5781 WBC  --   --  23.5* HGB  --   --  9.7* HCT  --   --  31.0*  
PLT  --   --  332 NA  --   --  139 K  --   --  4.5 BUN  --   --  76* CREA  --   --  1.56* GLU  --   --  149* GLUCPOC 83   < >  --   
 < > = values in this interval not displayed. Assessment:  
 
Principal Problem: S/P CABG x 5 (10/23/2019) Overview: x 5, LIMA to LAD, RSVG to Diag1, OM2-OM3, RSVG to PDA Active Problems: 
  ACS (acute coronary syndrome) ([facility] [address] (10/19/2019) Unstable angina ([facility] [address] (10/19/2019) Coronary artery disease of native artery of native heart with stable angina pectoris ([facility] [address] (10/21/2019) Systolic heart failure ([facility] [address] (10/22/2019) Plan/Recommendations/Medical Decision Making: 1. S/p CABG: on ASA, statin. No BB while on Dobutamine. 2. Acute on chronic systolic CHF, NYHA Class II on admit: EF 35-40% preop. Dobutamine to assist diuresis/renal function. No BB/ACE/ARB/AA until vitals/kidney function allows. Trend pBNP - improved 3. Acute postop respiratory failure, acute pulm edema, effusions with chronic fibrosis per CXR: agreed to BIPAP mask as to avoid intubation, attempt HFNC during day and Bipap at night. On Diamox (alkalosis improving - hold). Amiodarone stopped. Solumedrol per pulm. Continue scheduled nebs. Cont mucinex, pulm toileting. Add mucomyst. Needs chest PT. Push diuretics as able 4. Renal insufficiency: Creatinine unchanged - cont dobutamine gtt. Hold diamox as pt is a little acidodic today, bumex/diuril per Fiser for diuresis. Closely monitor acid/base balance!! Condom cath in place. Consult renal to assist in volume, acid/base management. 5. Anemia: had preop, stable H&H. Iron panel sent preop - iron, iron sat low. Continue PO iron, change to liquid due to swallow concern. 6. Thrombocytopenia: Resolved. 7. Hx of HTN: Controlled on current medications. Dobutamine added back 10/31 and BB stopped. Has PRN Hydralazine. 8. HLD: Continue pravastatin 9. Leukocytosis: likely from steroids. Needs more aggressive pulm toileting, chest PT ordered. Obtain PICC, d/c cordis today, condom cath in place 10. Constipation: Resolved. Resume bowel meds 11. Current smoker: smoking cessation education. Cont pulm toileting. Nicotine patch ordered 12. Nutrition: Appreciate Dietician recommendations. Dobhoff placed 10/30 and Enteral feedings started. Too unstable from resp standpoint for PO diet, decrease TF's back to continuous at 50 ml/hr. Try and AVOID increased volume amounts 13. Hyperglycemia: Preop A1c 5.3 with no DM history, now with hyperglycemia. Likely due to steroids and tube feedings. Attempt to treat w/ Lantus, transition off gtt today 14. DVT/GI Prophylaxis: SCD's, SQ Heparin, PPI Dispo: PT/OT as able. Remain in CVI until resp status more stable. MAC has been in since surgery, order PICC today and d/c  
 
08:45 am - A wires removed without difficulty, V wires cut due to significant resistance met. Pt tolerated well Signed By: Remington Rocha, NP

## 2019-11-05 NOTE — PROGRESS NOTES
Physical Therapy 11/5/2019 Chart reviewed and per RN, patient on continuous BiPAP at this time with continued respiratory distress. Will follow up for PT intervention later as able. Thank you.   
 
Kanwal Painter, PT, DPT

## 2019-11-05 NOTE — PROGRESS NOTES
Chart reviewed and per RN, patient on continuous BiPAP at this time with continued respiratory distress. Will follow up for OT intervention later as able. Thank you.

## 2019-11-05 NOTE — PROGRESS NOTES
Problem: Patient Education: Go to Patient Education Activity Goal: Patient/Family Education Outcome: Progressing Towards Goal 
  
Problem: Pressure Injury - Risk of 
Goal: *Prevention of pressure injury Description Document Earl Scale and appropriate interventions in the flowsheet. Outcome: Progressing Towards Goal 
Note:  
Pressure Injury Interventions: 
Sensory Interventions: Avoid rigorous massage over bony prominences Moisture Interventions: Assess need for specialty bed, Limit adult briefs, Minimize layers Activity Interventions: Increase time out of bed, Pressure redistribution bed/mattress(bed type) Mobility Interventions: Float heels, Pressure redistribution bed/mattress (bed type) Nutrition Interventions: Document food/fluid/supplement intake Friction and Shear Interventions: Lift sheet, Minimize layers Problem: Patient Education: Go to Patient Education Activity Goal: Patient/Family Education Outcome: Progressing Towards Goal 
  
Problem: AMI: Discharge Outcomes Goal: *Describes home care/support arrangements established based on need Outcome: Progressing Towards Goal 
Goal: *Understands and describes signs and symptoms to report to providers(Stroke Metric) Outcome: Progressing Towards Goal 
  
Problem: CABG: Discharge Outcomes Goal: *Weight  is stable Outcome: Progressing Towards Goal 
Goal: *No signs and symptoms of infection or wound complications Outcome: Progressing Towards Goal 
Goal: *Anxiety reduced or absent Outcome: Progressing Towards Goal 
Goal: *Verbalizes and demonstrates incision care Outcome: Progressing Towards Goal

## 2019-11-05 NOTE — PROGRESS NOTES
2000 Bedside shift change report given to Tucker Thurman (oncoming nurse) by Sherrill Renner (offgoing nurse). Report included the following information SBAR, MAR and Cardiac Rhythm NSR.  
 
2010 FiO2 titrated up to 100% due to SPO2 <90% 2040 Pt's SPO2 consistently between 88-90%. Patient refused bipap stating \"Dont' even mention bipap to me\". He was educated on the need for a greater level of oxygen support and continued to refuse. Will continue to encourage bipap and monitor SPO2.  
 
2100 Dr. Magdaleno Saint pagejoy. Pt continues to refuse bipap with SPO2 between 84-88%. Accessory muscle use w/ labored breathing and RR 28-30. Low threshold for intubation. 2105 Explained to patient while he refuses bipap the next step would be to intubate. Pt is willing to wear bipap for 45 minutes. RT called. 2110 Pt on bipap. SPO2 98%, RR 28, breathing labored. 2200 RR 26-27, SPO2 %, pt appears to be more at ease. 0800 Bedside shift change report given to Vivienne (oncoming nurse) by Tucker Thurman (offgoing nurse). Report included the following information SBAR, MAR and Cardiac Rhythm NSR.

## 2019-11-05 NOTE — PROGRESS NOTES
Pulmonary In CVICU for management of post op hypoxemia following cabg. Pt with underlying ILD and pre op tanner with mod restrictive physiology 19-  Now wearing his bipap at 14/8 60% fio2 with osat of 93% currently. Has had good UOP response top diuretics today and -1400 mls last 24 hrs. He is awake and answers questions for the RN's. NGT in place and getting TF's at 50 ml/hr. Remains on dobutamine gtt. Patient Vitals for the past 4 hrs: 
 BP Pulse Resp SpO2  
19 1400 126/70 92 27 92 % 19 1300 148/78 91 30 93 % 19 1200 148/78 91 30 94 % 19 1100 153/68 90 28 (!) 88 % Temp (24hrs), Av.2 °F (36.2 °C), Min:96.9 °F (36.1 °C), Max:97.4 °F (36.3 °C) Intake/Output Summary (Last 24 hours) at 2019 1457 Last data filed at 2019 1330 Gross per 24 hour Intake 748.96 ml Output 1950 ml Net -1201.04 ml No distress No accessory use Symmetrical chest expansion Rales RRR Soft Warm and dry Trace edema CXR - no change in ILD with edema Lab: 
Recent Labs 19 
8801 19 
0405 19 
0345 WBC 23.5* 23.8* 15.2* HGB 9.7* 9.4* 9.5*  306 301  137 132* K 4.5 3.7 4.4 * 109* 103 CO2 23 22 22 BUN 76* 62* 61* CREA 1.56* 1.56* 1.91* * 118* 263* CA 9.5 9.6 9.3 MG 3.0* 2.7* 2.8* TBILI 0.3 0.4 0.5 SGOT 48* 46* 34 Impression · S/p 5V CABG 10/19/19 for ACS  With reexploration 10/23 for mediastinal hematoma · Acute resp failure- baseline fibrotic ILD (etiology not clear- this is a new dx but UIP/IPF in DDX) with superimposed pulmonary interstitial edema but I cannot r/o superimposed diffuse alveolar damage from blood/blood products. Amio pulm toxicty in DDX but not clear from [facility] review when he was on it. 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 --would continue bipap for the pos pressure in setting of pulmonary edema. If he is not tolerating the bipap or airway protection becomes an issue he may require intubation. Watch mental status closely. High risk for resp decompensation with acute issues on top of baseline fibrosis. ABG hypoxic but wnl otherwise this morning.  
--wean FiO2 by sats goal 90% --Follow CXR 
--continue solumedrol for acute ILD/TRALI/amio tox coverage. Will leave at current dose. Leukocytosis up suspect steroid related. --inotrope gtt and surgical mgmt per primary cardiac team 
 
35 mins CCT spent excluding any procedures performed. Moises Arthur PA-C. [facility] attending

## 2019-11-05 NOTE — CONSULTS
NEPHROLOGY CONSULT NOTE Patient: Una Martínez MRN: [id number]  PCP: Ramon Rich MD  
:     1950  Age:   68 y.o. Sex:  male Referring physician: Madelaine Martel MD 
Reason for consultation: 68 y.o. male with ACS (acute coronary syndrome) ([facility] [address] [I24.9] Unstable angina (HCC) [D70.3] complicated by ISAIAH Admission Date: 10/19/2019  6:17 PM  LOS: 17 days ASSESSMENT and PLAN :  
ISAIAH on CKD: 
- from ATN post CABG 
- appears to be stabilizing 
- d/c diamox 
- ok to give diuril and IV bumex now 
- increase bumex to 2mg IV TID and follow UOP 
- daily labs and strict I/Os CKD III: 
- baseline Cr 1.3 prior to CABG 
- likely from DM and HTN Hypervolemia: 
- diurese as above - will adjust diuretics based on response HFrE F: 
- last EF 35-40% on 10/20 
- on dobutamine drip per CTS 
 
CAD s/p CABG x 5 10/19 and reexploration 10/23 for hematoma Resp Failure: 
- 2/2 pulm edema 
- on BiPAP 
- increase diuretics as above Chronic Anemia: 
- hgb stable 
- cont to monitor DM2: 
- on insulin Protein Malnutrition: 
- has DHT in place 
- NPO now that he is on BiPAP Active Problems / Assessment AAActive  :  
Principal Problem: S/P CABG x 5 (10/23/2019) Overview: x 5, LIMA to LAD, RSVG to Diag1, OM2-OM3, RSVG to PDA Active Problems: 
  ACS (acute coronary syndrome) ([address] (10/19/2019) Unstable angina ([address] (10/19/2019) Coronary artery disease of native artery of native heart with stable angina pectoris ([address] (10/21/2019) Systolic heart failure ([address] (10/22/2019) Subjective: HPI: Una Martínez is a 68 y.o.  male who has been admitted to the hospital on 10/19 for chest pain. Found to have ACS and underwent CABG x 5 on 10/19 and reexploration and evacuation of a hematoma on 10/23. He appears to have CKD, baseline Cr around 1.3. He is having issues post op w/ resp failure and pulm edema.   He was being diuresed PRN w/ diamox for alkalosis and bumex. We are being asked to assist with diuretic management. His Cr is stable at 1.5 today. He is on BiPAP now. CXR shows continued pulm edema. He is scheduled to get diuril and bumex now. He cannot provide any history or ROS due to his resp status at this time. Past Medical Hx:  
Past Medical History:  
Diagnosis Date  Diabetes ([facility] [address]  High cholesterol  Hypertension  MI, old Past Surgical Hx: 
  
Past Surgical History:  
Procedure Laterality Date  HX CORONARY ARTERY BYPASS GRAFT  10/23/2019  
 x 5, LIMA to LAD, RSVG to Diag1, OM2-OM3, RSVG to PDA  HX CORONARY STENT PLACEMENT    
 HX LUMBAR LAMINECTOMY l5-s1 Medications: 
Prior to Admission medications Medication Sig Start Date End Date Taking? Authorizing Provider  
lisinopril (PRINIVIL, ZESTRIL) 40 mg tablet Take 40 mg by mouth daily. Indications: high blood pressure   Yes Provider, Historical  
pravastatin (PRAVACHOL) 40 mg tablet Take 40 mg by mouth nightly. Yes Provider, Historical  
clopidogrel (PLAVIX) 75 mg tab Take  by mouth daily. Yes Provider, Historical  
sertraline (ZOLOFT) 100 mg tablet Take 100 mg by mouth daily. Indications: Anxiousness associated with Depression   Yes Provider, Historical  
 
 
Allergies Allergen Reactions  Scallops Other (comments) Headache Social Hx:  reports that he has been smoking. He has never used smokeless tobacco. He reports that he drinks alcohol. History reviewed. No pertinent family history. Review of Systems: 
Unable to obtain due to patient's condition Objective:   
Vitals:   
Vitals:  
 11/05/19 0510 11/05/19 0600 11/05/19 0700 11/05/19 0800 BP:  131/58 132/69 134/62 Pulse:  88 90 91 Resp:  21 24 25 Temp:    97.2 °F (36.2 °C) SpO2:  97% 97% 97% Weight: 58.3 kg (128 lb 8 oz) Height:      
 
I&O's:  11/04 0701 - 11/05 0700 In: 1604.2 [P.O.:610; I.V.:499.2] Out: 1100 [Urine:1100] Visit Vitals /62 Pulse 91 Temp 97.2 °F (36.2 °C) Resp 25 Ht 5' 7\" (1.702 m) Wt 58.3 kg (128 lb 8 oz) SpO2 97% BMI 20.13 kg/m² Physical Exam: 
General:frail, in resp distress, on BiPAP HEENT: Eyes are PERRL. BIPAP in place Neck:Supple,no mass palpable Lungs : diffuse crackles b/l CVS: RRR, S1 S2 normal, No rub,  1-2+ LE edema Abdomen: Soft, Non tender, No hepatosplenomegaly, bowel sounds present Extremities: No cyanosis, No clubbing Skin: No rash or lesions. Neurologic: agitated, confused Psych: unable to assess : condom catheter in place Laboratory Results: 
 
Lab Results Component Value Date BUN 76 (H) 11/05/2019  11/05/2019  
 K 4.5 11/05/2019  (H) 11/05/2019 CO2 23 11/05/2019 Lab Results Component Value Date BUN 76 (H) 11/05/2019 BUN 62 (H) 11/04/2019 BUN 61 (H) 11/03/2019 BUN 54 (H) 11/02/2019 BUN 53 (H) 11/01/2019  
 K 4.5 11/05/2019  
 K 3.7 11/04/2019  
 K 4.4 11/03/2019  
 K 4.6 11/02/2019  
 K 4.5 11/01/2019 Lab Results Component Value Date WBC 23.5 (H) 11/05/2019 RBC 3.11 (L) 11/05/2019 HGB 9.7 (L) 11/05/2019 HCT 31.0 (L) 11/05/2019 MCV 99.7 (H) 11/05/2019 MCH 31.2 11/05/2019 RDW 15.2 (H) 11/05/2019  11/05/2019 No results found for: PTH, PHOS Urine dipstick:  
Lab Results Component Value Date/Time  Color YELLOW/STRAW 10/20/2019 03:35 PM  
 Appearance CLEAR 10/20/2019 03:35 PM  
 Specific gravity 1.010 10/20/2019 03:35 PM  
 pH (UA) 5.5 10/20/2019 03:35 PM  
 Protein 30 (A) 10/20/2019 03:35 PM  
 Glucose NEGATIVE  10/20/2019 03:35 PM  
 Ketone TRACE (A) 10/20/2019 03:35 PM  
 Bilirubin NEGATIVE  10/20/2019 03:35 PM  
 Urobilinogen 1.0 10/20/2019 03:35 PM  
 Nitrites NEGATIVE  10/20/2019 03:35 PM  
 Leukocyte Esterase NEGATIVE  10/20/2019 03:35 PM  
 Epithelial cells FEW 10/20/2019 03:35 PM  
 Bacteria NEGATIVE  10/20/2019 03:35 PM  
 WBC 0-4 10/20/2019 03:35 PM  
 RBC 5-10 10/20/2019 03:35 PM  
 
 
 
 
 
Thank you for allowing us to participate in the care of this patient. We will follow patient. Please dont hesitate to call with any questions Chintan Khan MD 
11/5/2019 [address] [place] [address] [facility] Phone - [phone] Fax - [phone] 
[email or web address]

## 2019-11-05 NOTE — DIABETES MGMT
[facility] Cardiac Surgery Progress Note Recommendations/ Comments: Insulin gtt re-started on 11-3-2019. Transitioning off of it today Steroids Solu medrol 80 mg/dL decreased frequency to Q 8 hours. Nocturnal TF Osmolite 1.5 changed to continuous, rate decreased to 50/hr Unstable for po due to respiratory Renal noted Transitioning from gtt today with Lantus 20 units at 1130 AM 
Noted BG's variable 52 mg/dL - 160 mg/dL on gtt and gtt rates widely variable from 0 to 13.4 Will follow closely Patient is 68 y.o. male s/p CABG x 5 followed by re-entry for mediastinal clot evacuation and repair of SVG to PDA  - POD 13.  
Pt with documented hx of DM on no meds PTA. Anemia - A1c inaccurate A1c:  
Lab Results Component Value Date/Time Hemoglobin A1c 5.3 10/21/2019 03:04 AM  
 
 
 
Recent Glucose Results:  
Lab Results Component Value Date/Time  (H) 11/05/2019 04:38 AM  
 GLUCPOC 265 (H) 11/05/2019 12:30 PM  
 GLUCPOC 83 11/05/2019 08:20 AM  
 GLUCPOC 52 (L) 11/05/2019 07:58 AM  
  
 
Lab Results Component Value Date/Time Creatinine 1.56 (H) 11/05/2019 04:38 AM  
 
Estimated Creatinine Clearance: 35.8 mL/min (A) (based on SCr of 1.56 mg/dL (H)). Active Orders Diet DIET MECHANICAL SOFT  
  
 
PO intake:  
Patient Vitals for the past 72 hrs: 
 % Diet Eaten 11/04/19 1400 10 % 11/04/19 1030 10 % 11/02/19 1800 0 % Will continue to follow as needed. Thank you. Krish Mathews RN, CDE Time spent: 8 minutes

## 2019-11-05 NOTE — PROGRESS NOTES
NYHA class IV A/C systolic heart failure (EF 25%, NT pro-BNP 15,013) Acute on chronic kidney disease Mild pulmonary edema Mild chronic lung disease Acute on chronic hypoxic respiratory failure 
  
Continues to have issues with oxygenation Refusing intubation Will accept BiPAP Renal following to help with diuretics Pulmonary following WBCs still up a bit likely from steroids Hgb and platelets look good Creatinine in the mid 1's - have some room for diuresis Bilirubin and other LFTs look good NT pro-BNP coming down Acidotic on ABG CXR - lung fields look a little better Intake/Output Summary (Last 24 hours) at 11/5/2019 1539 Last data filed at 11/5/2019 1330 Gross per 24 hour Intake 748.96 ml Output 1950 ml Net -1201.04 ml Visit Vitals /70 Pulse 92 Temp 97.2 °F (36.2 °C) Resp 27 Ht 5' 7\" (1.702 m) Wt 128 lb 8 oz (58.3 kg) SpO2 92% BMI 20.13 kg/m² Risk of morbidity and mortality - high Medical decision making - high complexity Total critical care time - 30 minutes (CPT 26738)

## 2019-11-05 NOTE — PROGRESS NOTES
This RT asked pt if he was certain when he mentioned he did not want BiPAP in any way shape or form, and pt sighed and said 'Yes. .I'm sure. Please stop bothering me about it. \" This RT acquiesced pt request, however sats fluctuate between 86-90%.

## 2019-11-06 NOTE — PROGRESS NOTES
Pulmonary Bipap dependent Desats quickly off bipap CXR unchanged diffuse reticular changes Euvolemic with diuresis On IV solumedrol Patient Vitals for the past 4 hrs: 
 BP Temp Pulse Resp SpO2  
19 1100   91 21 100 % 19 1000 116/73  91 20   
19 0915     100 % 19 0900 147/69  91 24 100 % 19 0800 114/68 97.8 °F (36.6 °C) 93 23 97 % Temp (24hrs), Av.2 °F (36.2 °C), Min:96.7 °F (35.9 °C), Max:97.8 °F (36.6 °C) Intake/Output Summary (Last 24 hours) at 2019 1159 Last data filed at 2019 1000 Gross per 24 hour Intake 1918.2 ml Output 3025 ml Net -1106.8 ml  
 
Mild distress on bipap No accessory use Symmetrical chest expansion Rales RRR no rubs Soft/NT no rigidity Warm and dry Trace edema No rashes CXR - no change in ILD with edema Lab: 
Recent Labs 19 
0321 19 
5916 19 
0405 WBC 21.1* 23.5* 23.8* HGB 10.0* 9.7* 9.4*  332 306  139 137  
K 4.9 4.5 3.7  109* 109* CO2 27 23 22 * 76* 62* CREA 2.28* 1.56* 1.56* * 149* 118* CA 9.4 9.5 9.6 MG 3.1* 3.0* 2.7* TBILI 0.3 0.3 0.4 SGOT 26 48* 46* Impression · S/p 5V CABG 10/19/19 for ACS  With reexploration 10/23 for mediastinal hematoma · Acute resp failure- baseline fibrotic ILD (etiology not clear- this is a new dx but UIP/IPF in DDX) with superimposed pulmonary interstitial edema but I cannot r/o superimposed diffuse alveolar damage from blood/blood products. Amio pulm toxicty in DDX but not clear from [facility] review when he was on it. 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 UIP/IPF) with superimposed DAD- this carries a very poor prognosis and is usually not responsive to steroids. · Treatment is supportive and would strongly consider palliative consult · Agree with continued diuresis. · CCP/RF negative, ESR 70 · D/w Dr. Karly Rodriguez 30 mins CCT spent excluding any procedures performed.

## 2019-11-06 NOTE — PROGRESS NOTES
Physical Therapy 11/6/2019 Chart reviewed. Patient has been requiring continuous BiPAP and quickly desats when mask removed. Observed in bed with RR ranging from 25-51 even with BiPAP after rolling in bed for hygiene with nursing. Attempted co-treatment with OT, and with encouragement he was shaking his head no, pointing thumbs down, and requesting to rest and had c/o of dry mouth. Discussed with his nurse and NP. Continue to follow as patient's tolerance allows. Thank you.  
 
Kanwal Smith, PT, DPT

## 2019-11-06 NOTE — PROGRESS NOTES
Chart reviewed and noted patient on continuous BiPap. Patient observed in bed with RR 40s-50s at rest. Attempted to see patient for OT intervention however patient shaking head no, putting thumb down, and declining OT at this time requesting to rest and with c/o dry mouth. Will follow up for OT intervention as able. Thank you.

## 2019-11-06 NOTE — PROGRESS NOTES
0745: Bedside and Verbal shift change report given to Alexa Ashby RN (oncoming nurse) by Shelia Nelson RN (offgoing nurse). Report included the following information SBAR, OR Summary, Procedure Summary, Intake/Output, MAR, Recent Results, Cardiac Rhythm SR and Alarm Parameters . 1100: Pt transferred to room CVI 34  
 
1515: PICC team at bedside to attempt to place PICC line 1715: Pt's son, Collette Nielsen, at bedside. He is requesting to schedule a meeting with father's treatment team regarding his father's prognosis and planning. Will pass this along to next shift to be brought up during rounds in the morning. 2000: Bedside and Verbal shift change report given to Shelia Nelson RN (oncoming nurse) by Alexa Ashby RN (offgoing nurse). Report included the following information SBAR, OR Summary, Procedure Summary, Intake/Output, MAR, Recent Results, Cardiac Rhythm SR and Alarm Parameters .

## 2019-11-06 NOTE — PROCEDURES
PICC Placement Note PRE-PROCEDURE VERIFICATION Correct Procedure: yes Correct Site:  yes Temperature: Temp: 97.2 °F (36.2 °C), Temperature Source: Temp Source: Axillary Recent Labs 11/06/19 
0321 * CREA 2.28*  WBC 21.1* Allergies: [employer] Education materials, including PICC Booklet and written information regarding central catheter related bloodstream infection and prevention given to patient. See Patient Education activity for further details. PROCEDURE DETAIL A double lumen power injectable PICC line was started for IV Dobutamine therapy. The following documentation is in addition to the PICC properties in the lines/airways flowsheet : 
Lot #: [id number] Xylocaine 1% used intradermally:  yes Total Catheter Length: 37 (cm) External Catheter Length: 0 (cm) Circumference: 24 (cm) Vein Selection for PICC: right basilic Central Line Bundle followed: yes Complication Related to Insertion: none The placement was verified by ECG technology. The PICC is on the right side and the tip overlies the superior vena cava. ECG verification documentation is on the patient's paper chart. Line is okay to use. Report to Yancy Mims. Honey Singh, ALYSHAN, RN, [facility]-BC  Vascular Access Team

## 2019-11-06 NOTE — PROGRESS NOTES
Nephrology Progress Note Herman Stewart Date of Admission : 10/19/2019 CC:  Follow up for ISAIAH on CKD, hypervolemia Assessment and Plan ISAIAH on CKD: 
- from ATN post CABG 
- Cr up today, voiding ok 
- cont bumex 2mg IV TID 
- metolazone x 1 this AM 
- daily labs and strict I/Os 
  
CKD III: 
- baseline Cr 1.3 prior to CABG 
- likely from DM and HTN 
  
Hypervolemia: 
- diurese as above 
  
HFrE F: 
- last EF 35-40% on 10/20 
- on dobutamine drip per CTS 
  
CAD s/p CABG x 5 10/19 and reexploration 10/23 for hematoma 
  
Resp Failure: 
- 2/2 pulm edema 
- on BiPAP 
- diuretics as above 
  
Chronic Anemia: 
- hgb stable 
- cont to monitor 
  
DM2: 
- on insulin 
  
Protein Malnutrition: 
- has DHT in place 
- NPO now that he is on BiPAP Interval History: 
Seen and examined. Net neg about 1.5 L. Cr up slightly. BP stable. Remains on BiPAP. Unable to obtain ROS at this time. Current Medications: all current  Medications have been eviewed in [facility] Review of Systems: Pertinent items are noted in HPI. Objective: 
Vitals:   
Vitals:  
 11/06/19 0407 11/06/19 0500 11/06/19 0600 11/06/19 0700 BP:  113/77 108/69 115/69 Pulse:  92 93 94 Resp:  22 23 24 Temp:      
SpO2:    95% Weight: 57.4 kg (126 lb 9.6 oz) Height:      
 
Intake and Output: 
No intake/output data recorded. 11/04 1901 - 11/06 0700 In: 2638.2 [P.O.:120; I.V.:643.2] Out: 1733 [PUVFT:3025] Physical Examination: 
Pt intubated     No 
General: Frail, in resp distress Neck:  Supple, no mass Resp:  Coarse breath sounds b/l CV:  RRR,  no murmur or rub, no LE edema GI:  Soft, NT, + Bowel sounds, no hepatosplenomegaly Neurologic:  confused Psych:             Unable to assess Skin:  No Rash :  Devlin in place 
 
[]    High complexity decision making was performed 
[]    Patient is at high-risk of decompensation with multiple organ involvement Lab Data Personally Reviewed: I have reviewed all the pertinent labs, microbiology data and radiology studies during assessment. Recent Labs 11/06/19 
0321 11/05/19 
2985 11/04/19 
0405  139 137  
K 4.9 4.5 3.7  109* 109* CO2 27 23 22 * 149* 118* * 76* 62* CREA 2.28* 1.56* 1.56* CA 9.4 9.5 9.6 MG 3.1* 3.0* 2.7* ALB 2.8* 2.8* 2.8* SGOT 26 48* 46* ALT 47 51 42 Recent Labs 11/06/19 
0321 11/05/19 
9873 11/04/19 
0405 WBC 21.1* 23.5* 23.8* HGB 10.0* 9.7* 9.4* HCT 31.2* 31.0* 28.9*  
 332 306 No results found for: SDES Lab Results Component Value Date/Time Culture result: HEAVY NORMAL RESPIRATORY TARIK 10/21/2019 12:08 PM  
 
Recent Results (from the past 24 hour(s)) GLUCOSE, POC Collection Time: 11/05/19  8:20 AM  
Result Value Ref Range Glucose (POC) 83 65 - 100 mg/dL Performed by Bob Arenas Collection Time: 11/05/19  8:20 AM  
Result Value Ref Range Glucose 83 mg/dL Insulin order 1.2 units/hour Insulin adminstered 1.2 units/hour Multiplier 0.054 Low target 100 mg/dL High target 140 mg/dL D50 order 0.0 ml  
 D50 administered 0.00 ml Minutes until next BG 60 min Order initials vf   
 Administered initials vf   
 GLSCOM Comments GLUCOSE, POC Collection Time: 11/05/19 12:30 PM  
Result Value Ref Range Glucose (POC) 265 (H) 65 - 100 mg/dL Performed by Agustina Carrillo, POC Collection Time: 11/05/19  6:36 PM  
Result Value Ref Range Glucose (POC) 213 (H) 65 - 100 mg/dL Performed by Agustina Carrillo, POC Collection Time: 11/05/19 11:48 PM  
Result Value Ref Range Glucose (POC) 228 (H) 65 - 100 mg/dL Performed by Thai Ortiz METABOLIC PANEL, COMPREHENSIVE Collection Time: 11/06/19  3:21 AM  
Result Value Ref Range Sodium 137 136 - 145 mmol/L Potassium 4.9 3.5 - 5.1 mmol/L  Chloride 101 97 - 108 mmol/L  
 CO2 27 21 - 32 mmol/L Anion gap 9 5 - 15 mmol/L Glucose 218 (H) 65 - 100 mg/dL  (H) 6 - 20 MG/DL Creatinine 2.28 (H) 0.70 - 1.30 MG/DL  
 BUN/Creatinine ratio 49 (H) 12 - 20 GFR est AA 34 (L) >60 ml/min/1.73m2 GFR est non-AA 28 (L) >60 ml/min/1.73m2 Calcium 9.4 8.5 - 10.1 MG/DL Bilirubin, total 0.3 0.2 - 1.0 MG/DL  
 ALT (SGPT) 47 12 - 78 U/L  
 AST (SGOT) 26 15 - 37 U/L Alk. phosphatase 172 (H) 45 - 117 U/L Protein, total 7.0 6.4 - 8.2 g/dL Albumin 2.8 (L) 3.5 - 5.0 g/dL Globulin 4.2 (H) 2.0 - 4.0 g/dL A-G Ratio 0.7 (L) 1.1 - 2.2    
CBC W/O DIFF Collection Time: 11/06/19  3:21 AM  
Result Value Ref Range WBC 21.1 (H) 4.1 - 11.1 K/uL  
 RBC 3.23 (L) 4.10 - 5.70 M/uL  
 HGB 10.0 (L) 12.1 - 17.0 g/dL HCT 31.2 (L) 36.6 - 50.3 % MCV 96.6 80.0 - 99.0 FL  
 MCH 31.0 26.0 - 34.0 PG  
 MCHC 32.1 30.0 - 36.5 g/dL  
 RDW 14.9 (H) 11.5 - 14.5 % PLATELET 794 852 - 695 K/uL MPV 11.4 8.9 - 12.9 FL  
 NRBC 0.0 0  WBC ABSOLUTE NRBC 0.00 0.00 - 0.01 K/uL MAGNESIUM Collection Time: 11/06/19  3:21 AM  
Result Value Ref Range Magnesium 3.1 (H) 1.6 - 2.4 mg/dL NT-PRO BNP Collection Time: 11/06/19  3:21 AM  
Result Value Ref Range NT pro-BNP 13,233 (H) <125 PG/ML  
PROCALCITONIN Collection Time: 11/06/19  3:21 AM  
Result Value Ref Range Procalcitonin 0.2 ng/mL GLUCOSE, POC Collection Time: 11/06/19  6:10 AM  
Result Value Ref Range Glucose (POC) 243 (H) 65 - 100 mg/dL Performed by Taylor Zimmerman MD 
[address] [place] [address], [facility] [place] [facility] Phone - [phone] Fax - [phone] 
www. [facility]

## 2019-11-06 NOTE — PROGRESS NOTES
Problem: Falls - Risk of 
Goal: *Absence of Falls Description Document Donata Carrel Fall Risk and appropriate interventions in the flowsheet. Outcome: Progressing Towards Goal 
Note:  
Fall Risk Interventions: 
Mobility Interventions: Communicate number of staff needed for ambulation/transfer Mentation Interventions: Adequate sleep, hydration, pain control, Evaluate medications/consider consulting pharmacy, More frequent rounding, Reorient patient Medication Interventions: Evaluate medications/consider consulting pharmacy Elimination Interventions: Toileting schedule/hourly rounds, Patient to call for help with toileting needs History of Falls Interventions: Evaluate medications/consider consulting pharmacy Problem: Pressure Injury - Risk of 
Goal: *Prevention of pressure injury Description Document Earl Scale and appropriate interventions in the flowsheet. Outcome: Progressing Towards Goal 
Note:  
Pressure Injury Interventions: 
Sensory Interventions: Assess need for specialty bed, Check visual cues for pain, Minimize linen layers, Pressure redistribution bed/mattress (bed type), Turn and reposition approx. every two hours (pillows and wedges if needed) Moisture Interventions: Apply protective barrier, creams and emollients, Limit adult briefs, Maintain skin hydration (lotion/cream) Activity Interventions: Assess need for specialty bed, Pressure redistribution bed/mattress(bed type) Mobility Interventions: Assess need for specialty bed, Pressure redistribution bed/mattress (bed type), Turn and reposition approx. every two hours(pillow and wedges) Nutrition Interventions: Document food/fluid/supplement intake, Discuss nutritional consult with provider Friction and Shear Interventions: Apply protective barrier, creams and emollients, Lift sheet Problem: CABG: Post-Op Day 5 Goal: Activity/Safety Outcome: Not Progressing Towards Goal 
Goal: Nutrition/Diet Outcome: Not Progressing Towards Goal 
Goal: Medications Outcome: Progressing Towards Goal 
Goal: Respiratory Outcome: Not Progressing Towards Goal 
Goal: Psychosocial 
Outcome: Progressing Towards Goal 
  
Problem: Infection - Risk of, Central Venous Catheter-Associated Bloodstream Infection Goal: *Absence of infection signs and symptoms Outcome: Progressing Towards Goal 
  
Problem: Nutrition Deficit Goal: *Optimize nutritional status Outcome: Progressing Towards Goal 
  
Problem: Nutrition Deficit Goal: *Optimize nutritional status Outcome: Progressing Towards Goal 
  
Problem: Gas Exchange - Impaired Goal: *Absence of hypoxia Outcome: Progressing Towards Goal

## 2019-11-06 NOTE — PROGRESS NOTES
NYHA class IV A/C systolic heart failure (EF 25%, NT pro-BNP 15,013) Acute on chronic kidney disease Mild pulmonary edema Mild chronic lung disease Acute on chronic hypoxic respiratory failure Still requiring BiPAP Getting more volume off with diuresis Continues on steroids Hgb and platelets look good Creatinine in the 2's Bilirubin and other LFTs look reasonabel Pro-calcitonin normal 
 
NT pro-BNP about the same Discussed with renal 
 
Discussed with pulmonary Oxygen saturation a little better CXR - pulmonary fibrosis; mild pulmonary edema Intake/Output Summary (Last 24 hours) at 11/6/2019 1216 Last data filed at 11/6/2019 1000 Gross per 24 hour Intake 1669.6 ml Output 3025 ml Net -1355.4 ml Visit Vitals /73 Pulse 91 Temp 97.8 °F (36.6 °C) Resp 21 Ht 5' 7\" (1.702 m) Wt 126 lb 9.6 oz (57.4 kg) SpO2 100% BMI 19.83 kg/m² Risk of morbidity and mortality - high Medical decision making - high complexity Total critical care time - 30 minutes (CPT 13438)

## 2019-11-06 NOTE — PROGRESS NOTES
[facility] ICU Progress Note Admit Date: 10/19/2019 Procedure:  Procedure(s): 
CARDIAC RE-ENTRY, MARISOL BY  [facility] -  S[facility]    
 
CABG x 5, LIMA to LAD, RSVG to [id number], RSVG to PDA 10/23/19 Subjective:  
Pt seen with Dr. Mona Miller. Dobutamine at [address] Afebrile, BIPAP vs HFNC. Pt refusing some therapy intermittently. TF's through [facility] Objective:  
Vitals: 
Blood pressure 116/73, pulse 91, temperature 97.8 °F (36.6 °C), resp. rate 20, height 5' 7\" (1.702 m), weight 126 lb 9.6 oz (57.4 kg), SpO2 100 %. Temp (24hrs), Av.2 °F (36.2 °C), Min:96.7 °F (35.9 °C), Max:97.8 °F (36.6 °C) EKG/Rhythm: SR in the 90s Oxygen Therapy: 
Oxygen Therapy O2 Sat (%): 100 % (19) Pulse via Oximetry: 91 beats per minute (19) O2 Device: BIPAP (19) O2 Flow Rate (L/min): 40 l/min (19) O2 Temperature: 87.6 °F (30.9 °C) (19 0745) FIO2 (%): 60 % (19) CXR:  
CXR Results  (Last 48 hours) 19  XR CHEST PORT Final result Impression:  IMPRESSION: No significant interval change. Narrative:  Portable chest single view dated 2019 Comparison chest dated 2019 History is interstitial edema. A single portable AP upright view of the chest was obtained. The cardiac  
silhouette continues to be enlarged. There continues to be evidence of  
interstitial edema. There has been no significant interval change. 19 0517  XR CHEST PORT Final result Impression:  IMPRESSION: No significant change. Narrative:  INDICATION:  interstitial edema CABG x5 EXAM: CXR Portable. FINDINGS: Portable chest shows support lines/devices show no significant change  
since yesterday. There is no apparent pneumothorax. Lungs show interstitial  
edema. Left upper lobe bullous emphysema appearance remains. Heart size is  
stable. There is no sizable pleural effusion. Admission Weight: Last Weight Weight: 141 lb 5 oz (64.1 kg) Weight: 126 lb 9.6 oz (57.4 kg) Intake / Output / Drain: 
Current Shift:  0701 - 0 In: 253.6 [I.V.:23.6] Out: - Last 24 hrs.:  
 
Intake/Output Summary (Last 24 hours) at 2019 1038 Last data filed at 2019 0900 Gross per 24 hour Intake 1906.4 ml Output 3025 ml Net -1118.6 ml EXAM: 
General:  Sleepy. No acute distress Lungs:   Scattered crackles throughout, diminished Incision:  Midsternal incision with no significant erythema, drainage, or dehiscence. Heart:  Regular rate and rhythm, S1, S2 normal, no murmur, click, rub or gallop. Abdomen:   Soft, non-tender. Bowel sounds hypoactive. No masses,  No organomegaly. +BM  Extremities:  No edema. Palpable pulses bilat Neurologic:  Gross motor and sensory apparatus intact. Labs:  
Recent Labs 19 
6820 19 
0321 WBC  --  21.1* HGB  --  10.0* HCT  --  31.2*  
PLT  --  317 NA  --  137 K  --  4.9 BUN  --  111* CREA  --  2.28* GLU  --  218* GLUCPOC 243*  --   
 
 
 Assessment:  
 
Principal Problem: S/P CABG x 5 (10/23/2019) Overview: x 5, LIMA to LAD, RSVG to Diag1, OM2-OM3, RSVG to PDA Active Problems: 
  ACS (acute coronary syndrome) ([facility] (10/19/2019) Unstable angina ([facility] (10/19/2019) Coronary artery disease of native artery of native heart with stable angina pectoris ([facility] (10/21/2019) Systolic heart failure ([facility] (10/22/2019) Plan/Recommendations/Medical Decision Makin. S/p CABG: on ASA, statin. No BB while on Dobutamine. 2. Acute on chronic systolic CHF, NYHA Class II on admit: EF 35-40% preop. Dobutamine to assist diuresis/renal function. No BB/ACE/ARB/AA until vitals/kidney function allows. Trend pBNP - worse today 3. Acute postop respiratory failure, acute pulm edema, effusions with chronic fibrosis per CXR: agreed to BIPAP mask as to avoid intubation, attempt HFNC during day and Bipap at night. Diuresis per Nephrology. Amiodarone stopped. Solumedrol per pulm. Continue scheduled nebs. Cont mucinex, pulm toileting. Add mucomyst. Needs chest PT. 4. Renal insufficiency: Creatinine worse today at 2.28- cont dobutamine gtt. Nephrology consulted and appreciate their recommendations. Bumex/diuril per Nephrology for diuresis. Closely monitor acid/base balance. Condom cath in place. 5. Anemia: had preop, stable H&H. Iron panel sent preop - iron, iron sat low. Continue PO iron, change to liquid due to swallow concern. 6. Thrombocytopenia: Resolved. 7. Hx of HTN: Controlled on current medications. Dobutamine added back 10/31 and BB stopped. Has PRN Hydralazine. 8. HLD: Continue pravastatin 9. Leukocytosis: likely from steroids. Needs more aggressive pulm toileting, chest PT ordered. Was unable to place PICC yesterday. Will try again today and DC Cordis when placed. Ccondom cath in place 10. Constipation: Resolved. Resume bowel meds as needed 11. Current smoker: smoking cessation education. Cont pulm toileting. Nicotine patch ordered 12. Nutrition: Appreciate Dietician recommendations. Dobhoff placed 10/30 and Enteral feedings started. Too unstable from resp standpoint for PO diet, TF's back to continuous at 50 ml/hr. Try and AVOID increased volume amounts 13. Hyperglycemia: Preop A1c 5.3 with no DM history, now with hyperglycemia. Likely due to steroids and tube feedings. DTS following. Lantus 20 mg BID with SSI. 14. DVT/GI Prophylaxis: SCD's, SQ Heparin, PPI Dispo: PT/OT as able. Remain in CVI until resp status more stable. MAC has been in since surgery, will attempt PICC again today.  
 
 
Signed By: Deep Pollock NP

## 2019-11-06 NOTE — PROGRESS NOTES
0800: Bedside shift change report given to Ciara RN and Lissa Arthur, RN  by Cleo Horn RN. Report included the following information SBAR, MAR and Cardiac Rhythm NSR  
 
0924: insulin gtt d/c'd per order-unable to chart in [facility] 
 
0940: PICC team at bedside, consent from pt obtained for procedure, unable to sign at this time 0945: RRT at bedside, attempted to place pt on heated high flow, sats dropped to 77 within 10 min. Placed back on Bipap 
 
0950: Dr. Mihir Hood at bedside, medications reviewed, plan of care discussed, orders received 
 
[phone]: pt now refusing to sign consent for PICC, will reassess 1510: Dale Belle NP called to update on pt status. Orders received 1515: Dr. Alen Frausto at bedside to assess and speak with pt. Orders received 
 
2000: Bedside shift change report given to Stan Welch RN (oncoming nurse) by Graham Zhong and Lissa Arthur RN (offgoing nurse). Report included the following information SBAR, MAR and Cardiac Rhythm NSR.

## 2019-11-06 NOTE — DIABETES MGMT
[facility] Cardiac Surgery Progress Note Recommendations/ Comments: Transitioned off of insulin gtt yesterday with Lantus 20 units at 1130 AM  
Since then BG's > 200 mg/dL. BG this  mg/dL at 0610. Received lispro correction 11 units since off the gtt. Noted while on the gtt rates varied widely from 2.8 units/hr - 22 units/hr. He received approx 120 units in 24 hours prior to coming off of the gtt Steroids Solu medrol 80 mg/dL Q 8 hours. Nocturnal TF Osmolite 1.5 are continuous, rate decreased to 50/hr Unstable for po due to respiratory Renal noted Discussed the following plan with Selena Wells NP Increase Lantus to 20 units Q 12 hours Insulin will need to be adjusted as steroids taper and/or TF changed Current hospital DM medications Lispro normal sensitivity correction scale Patient is 68 y.o. male s/p CABG x 5 followed by re-entry for mediastinal clot evacuation and repair of SVG to PDA  - POD 13.  
Pt with documented hx of DM on no meds PTA. Anemia - A1c inaccurate A1c:  
Lab Results Component Value Date/Time Hemoglobin A1c 5.3 10/21/2019 03:04 AM  
 
 
 
Recent Glucose Results:  
Lab Results Component Value Date/Time  (H) 11/06/2019 03:21 AM  
 GLUCPOC 243 (H) 11/06/2019 06:10 AM  
 GLUCPOC 228 (H) 11/05/2019 11:48 PM  
 GLUCPOC 213 (H) 11/05/2019 06:36 PM  
  
 
Lab Results Component Value Date/Time Creatinine 2.28 (H) 11/06/2019 03:21 AM  
 
Estimated Creatinine Clearance: 24.1 mL/min (A) (based on SCr of 2.28 mg/dL (H)). Active Orders Diet DIET NPO With Tube Feedings PO intake:  
Patient Vitals for the past 72 hrs: 
 % Diet Eaten 11/04/19 1400 10 % 11/04/19 1030 10 % Will continue to follow as needed. Thank you. Jayleen Velasquez RN, CDE Time spent: 8 minutes

## 2019-11-07 NOTE — PROGRESS NOTES
1930 - Bedside and Verbal shift change report given to Severino Dennis RN (oncoming nurse) by Jamir Norman RN (offgoing nurse). Report included the following information SBAR, Kardex, Intake/Output, MAR, Recent Results, Cardiac Rhythm Sinus Rhythm and Alarm Parameters . Medications verified and pt assessed. Pt resting in bed on BIPAP therapy. 0445 - Pt right mac dc'd d/t new right arm PICC line. 56 - Dr. Garrison Martel at bedside. Updated on pt condition. MD states to hold AM dose of bumex and MD will readjust diuretics for the day. 0730 - Bedside and Verbal shift change report given to Matthew Romero RN (oncoming nurse) by Severino Dennis RN (offgoing nurse). Report included the following information SBAR, Kardex, Intake/Output, MAR, Recent Results, Cardiac Rhythm Sinus Rhythm and Alarm Parameters .

## 2019-11-07 NOTE — PROGRESS NOTES
[facility] ICU Progress Note Admit Date: 10/19/2019 Procedure:  Procedure(s): 
CARDIAC RE-ENTRY, MARISOL BY  [facility]    
 
CABG x 5, LIMA to LAD, RSVG to Tsth6-BV5-WM4, RSVG to PDA 10/23/19 Subjective:  
Pt seen with Dr. Azalea Phoenix. Dobutamine at [address] Afebrile, has remained on BiPAP for the last several days. Today with trouble clearing his secretions and lacking the strength to move the mask. Risk of aspiration Objective:  
Vitals: 
Blood pressure 114/63, pulse 82, temperature 96.7 °F (35.9 °C), resp. rate 17, height 5' 7\" (1.702 m), weight 123 lb 12.8 oz (56.2 kg), SpO2 100 %. Temp (24hrs), Av.4 °F (36.3 °C), Min:96.7 °F (35.9 °C), Max:97.9 °F (36.6 °C) EKG/Rhythm: SR in the 80s Oxygen Therapy: 
Oxygen Therapy O2 Sat (%): 100 % (19 1600) Pulse via Oximetry: 97 beats per minute (19 0859) O2 Device: BIPAP (19 0859) O2 Flow Rate (L/min): 40 l/min (19 2000) O2 Temperature: 87.6 °F (30.9 °C) (19 0745) FIO2 (%): 80 % (19 1315) CXR:  
CXR Results  (Last 48 hours) 19 1201  XR CHEST PORT Final result Impression:  IMPRESSION:  
1. ET tube is in satisfactory position Narrative:  EXAM: XR CHEST PORT INDICATION: intubation COMPARISON: 2019 at [address] FINDINGS: A portable AP radiograph of the chest was obtained at 1138 hours. The  
patient is on a cardiac monitor. ET tube is in satisfactory position. PICC line  
overlies the SVC. Right IJ catheter has been removed. The interstitial edema  
like pattern is unchanged. Overall aeration is slightly improved. No  
pneumothorax. 19 0521  XR CHEST PORT Final result Impression:  IMPRESSION:  
1. Decrease in the interstitial edema like pattern Narrative:  EXAM: XR CHEST PORT INDICATION: interstitial edema COMPARISON: 2019 FINDINGS: A portable AP radiograph of the chest was obtained at 0408 hours.  The  
 patient is on a cardiac monitor. Right IJ catheter overlies the SVC. PICC line  
overlies the junction of right atrium and SVC. Feeding tube courses into the  
stomach and the tip appears to be near the first portion of the duodenum. Interstitial pattern suggesting edema has slightly decreased. Superimposed  
interstitial lung disease could also be contributing to the interstitial  
opacities. No pneumothorax. 11/06/19 0516  XR CHEST PORT Final result Impression:  IMPRESSION: No significant interval change. Narrative:  Portable chest single view dated 11/6/2019 Comparison chest dated 11/5/2019 History is interstitial edema. A single portable AP upright view of the chest was obtained. The cardiac  
silhouette continues to be enlarged. There continues to be evidence of  
interstitial edema. There has been no significant interval change. Admission Weight: Last Weight Weight: 141 lb 5 oz (64.1 kg) Weight: 123 lb 12.8 oz (56.2 kg) Intake / Output / Drain: 
Current Shift: 11/07 0701 - 11/07 1900 In: 80 Out: 745 [Urine:745] Last 24 hrs.:  
 
Intake/Output Summary (Last 24 hours) at 11/7/2019 1607 Last data filed at 11/7/2019 1600 Gross per 24 hour Intake 1117 ml Output 3420 ml Net -2303 ml EXAM: 
General:  Sleepy. Lungs:   Scattered crackles throughout, diminished Incision:  Midsternal incision with no significant erythema, drainage, or dehiscence. Heart:  Regular rate and rhythm, S1, S2 normal, no murmur, click, rub or gallop. Abdomen:   Soft, non-tender. Bowel sounds hypoactive. No masses,  No organomegaly. +BM 11/6 Extremities:  No edema. Palpable pulses bilat Neurologic:  Gross motor and sensory apparatus intact. Labs:  
Recent Labs 11/07/19 
1327  11/07/19 
0306 WBC  --   --  19.1* HGB  --   --  10.2*  
 HCT  --   --  30.9* PLT  --   --  301 NA  --   --  139 K  --   --  4.1 BUN  --   --  144* CREA  --   --  2.58* GLU  --   --  214* GLUCPOC 204*   < >  --   
 < > = values in this interval not displayed. Assessment:  
 
Principal Problem: S/P CABG x 5 (10/23/2019) Overview: x 5, LIMA to LAD, RSVG to Diag1, OM2-OM3, RSVG to PDA Active Problems: 
  ACS (acute coronary syndrome) ([facility] (10/19/2019) Unstable angina ([facility] (10/19/2019) Coronary artery disease of native artery of native heart with stable angina pectoris ([facility] (10/21/2019) Systolic heart failure ([facility] (10/22/2019) Plan/Recommendations/Medical Decision Makin. S/p CABG: on ASA, statin. No BB while on Dobutamine. 2. Acute on chronic systolic CHF, NYHA Class II on admit: EF 35-40% preop. Dobutamine to assist diuresis/renal function. No BB/ACE/ARB/AA until vitals/kidney function allows. Trend pBNP - worse today 3. Acute postop respiratory failure, acute pulm edema, effusions with chronic fibrosis per CXR: Re-intubated today for acute respiratory failure. Bronchoscopy per Pulmonary. Diuresis per Nephrology. Amiodarone had been stopped. Solumedrol per pulm. Continue scheduled nebs. Cont mucinex, pulm toileting, mucomyst. 
 
4. Renal insufficiency: Creatinine worse today at 2.5- cont dobutamine gtt. Nephrology following and appreciate their recommendations. Bumex per Nephrology for diuresis. Closely monitor acid/base balance. Devlin replaced today. 5. Anemia: had preop, stable H&H. Iron panel sent preop - iron, iron sat low. Continue iron supplement 6. Thrombocytopenia: Resolved. 7. Hx of HTN: Controlled on current medications. Dobutamine added back 10/31 and BB stopped. Has PRN Hydralazine. 8. HLD: Continue pravastatin 9. Leukocytosis: likely from steroids. Needs more aggressive pulm toileting, chest PT ordered. PICC placed  and MAC discontinued. Condom cath in place 10. Constipation: Resolved. Resume bowel meds as needed 11. Current smoker: smoking cessation education. Cont pulm toileting. Nicotine patch ordered 12. Nutrition: Appreciate Dietician recommendations. Dobhoff placed 10/30 and Enteral feedings started. Too unstable from resp standpoint for PO diet, TF's back to continuous at 50 ml/hr. Try and AVOID increased volume amounts 13. Hyperglycemia: Preop A1c 5.3 with no DM history, now with hyperglycemia. Likely due to steroids and tube feedings. DTS following. Lantus 25 mg BID with SSI. 14. DVT/GI Prophylaxis: SCD's, SQ Heparin, PPI Dispo: PT/OT as able. Remain in CVI until resp status more stable.   
 
Signed By: Susanne Leo NP

## 2019-11-07 NOTE — PROGRESS NOTES
0800: Bedside shift change report given to Noah Cortes RN (oncoming nurse) by Dane Flores RN (offgoing nurse). Report included the following information SBAR, ED Summary, OR Summary, Procedure Summary, Intake/Output, MAR, Accordion, Cardiac Rhythm NSR with rare PVCs and Alarm Parameters . Assumed care of patient, assessment performed. Patient drowsy but responds appropriately to voice. Patient remains on BiPAP. 0930: Patient working with PT/OT in the bed, practicing rolling from side to side, remains on BiPAP. 1030: Patient coughing on BiPAP, unable to remove mask, desat to 80s. Patient suctioned, recovered to 98%. Christine Gomes NP made aware - concerns escalated regarding if patient appropriate for BiPAP (difficulty managing airway/secretions, inability to remove BiPAP mask due to generalized weakness). [id number]: Discussed concerns about patient airway with Dr. Wilfredo Benson and Christine Gomes NP. Patient affirms he wants to be intubated - states \"yeah OK, put the tube in. \" RT getting ventilator set up, will page anesthesia. 1120: Dr. Thad Lim on phone, updated on need for patient intubation. Orders received. 1140: Dr. Thad Lim at bedside for intubation. 1152: Xray at bedside. 1215: Dr. Wilfredo Benson updated on patient status. New orders received. Devlin inserted. 1230: Dr. Matty Pena at bedside for bronchoscopy. Patient hypotensive, orders for 500cc NS bolus now. 1250: Christine Gomes NP updated on pt status, including continued gagging around ETT. Orders for precedex. 1800: Patient's son, Damion Carpio, at bedside. Opportunity for questions and emotional support provided. 2010: Bedside shift change report given to Dane Flores RN (oncoming nurse) by Noah Cortes RN (offgoing nurse). Report included the following information SBAR, ED Summary, OR Summary, Procedure Summary, Intake/Output, MAR, Recent Results, Cardiac Rhythm NSR and Alarm Parameters .

## 2019-11-07 NOTE — PROGRESS NOTES
Transitions of Care: 
 
 -Patient is now intubated -PT/OT continue to work with patient 
 -Disposition TBD at this time pending medical POC and progress The patient has been re-intubated, family contacts- GOVIND is the patient's son, Johnnie Marcelino ([phone]) and nephew Madeline Luevano also involved in patient's care ([phone]). CM continues to follow, disposition TBD at this time.  RON Joseph

## 2019-11-07 NOTE — PROGRESS NOTES
Problem: Falls - Risk of 
Goal: *Absence of Falls Description Document Flory Longoria Fall Risk and appropriate interventions in the flowsheet. Outcome: Progressing Towards Goal 
Note:  
Fall Risk Interventions: 
Mobility Interventions: Communicate number of staff needed for ambulation/transfer Mentation Interventions: Adequate sleep, hydration, pain control, Evaluate medications/consider consulting pharmacy Medication Interventions: Evaluate medications/consider consulting pharmacy Elimination Interventions: Call light in reach, Patient to call for help with toileting needs, Toileting schedule/hourly rounds History of Falls Interventions: Consult care management for discharge planning, Evaluate medications/consider consulting pharmacy, Room close to nurse's station Problem: Pressure Injury - Risk of 
Goal: *Prevention of pressure injury Description Document Earl Scale and appropriate interventions in the flowsheet. Outcome: Progressing Towards Goal 
Note:  
Pressure Injury Interventions: 
Sensory Interventions: Assess changes in LOC, Assess need for specialty bed, Avoid rigorous massage over bony prominences, Float heels, Minimize linen layers, Pressure redistribution bed/mattress (bed type), Turn and reposition approx. every two hours (pillows and wedges if needed) Moisture Interventions: Apply protective barrier, creams and emollients, Assess need for specialty bed, Check for incontinence Q2 hours and as needed, Limit adult briefs, Maintain skin hydration (lotion/cream), Minimize layers Activity Interventions: Assess need for specialty bed, Pressure redistribution bed/mattress(bed type) Mobility Interventions: Assess need for specialty bed, Pressure redistribution bed/mattress (bed type), Turn and reposition approx. every two hours(pillow and wedges) Nutrition Interventions: Document food/fluid/supplement intake, Discuss nutritional consult with provider Friction and Shear Interventions: Apply protective barrier, creams and emollients, Lift sheet, Minimize layers Problem: CABG: Post-Op Day 5 Goal: Off Pathway (Use only if patient is Off Pathway) Outcome: Progressing Towards Goal 
  
Problem: Infection - Risk of, Central Venous Catheter-Associated Bloodstream Infection Goal: *Absence of infection signs and symptoms Outcome: Progressing Towards Goal 
  
Problem: Nutrition Deficit Goal: *Optimize nutritional status Outcome: Progressing Towards Goal 
  
Problem: Nutrition Deficit Goal: *Optimize nutritional status Outcome: Progressing Towards Goal

## 2019-11-07 NOTE — ADT AUTH CERT NOTES
Patient Demographics Patient Name Ward Rodriguez 
[id number] Sex Male  
1950 Address [address] Phone [phone] (Home) CSN:  
[id number] Admit Date: Admit Time Room Bed Oct 19, 2019  6:17 PM 4336 [[id number]] 01 [[id number]] Attending Providers Provider Pager From To Antony Alvarado MD  10/19/19 10/19/19 Joel Moe MD  10/19/19 10/23/19 Zak Rodgers MD  10/23/19 Emergency Contact(s) None on File Utilization Reviews  
 
   
LOC:Acute Adult-Extended Stay (2019) by Natalie Brasher RN  
 
   
Review Status Review Entered In Primary 2019 13:01  
   
Criteria Review REVIEW SUMMARY 
  
Patient: Michael Kuhn Review Number: [id number] Review Status: In Primary 
  
Condition Specific: Yes 
  
Condition Level Of Care Code: CRITICAL Condition Level Of Care Description: Critical 
  
  
OUTCOMES Outcome Type: Primary 
  
  
  
REVIEW DETAILS 
  
Service Date: 2019 Admit Date: 10/19/2019 Product: Dafne Cyr Adult Subset: Extended Stay (Symptom or finding within 24h) 
  (Excludes PO medications unless noted) [X] Select Level of Care, One: 
            [X] INTERMEDIATE, One: 
            ~--Admin, IQ Admin Admin on 2019 01:01 PM--~ 
            19- 
             
            VS:   97.2- P- 91- R- 21- 116/68. O2 sat= 96% on Bipap. Abnormal labs: 
            Glucose= 218, 243, 230. BUN= 111. Cr= 2.28. BUN/Cr ratio= 49. GFR= 34. ALP= 172. Albumin= 2.8. Globulin= 4.2. WBC= 21.1. Hgb =10.0. Hct= 31.2. RDW= 14.9. Mg= 3.1. NT pro-BNP= 13,233. CXR=  No significant interval change. Attending / CSS Note: 
            Dobutamine at [address] Afebrile, BIPAP vs HFNC. Pt refusing some therapy intermittently. TF's through [address] Physical Exam: 
            General: Sleepy.  No acute distress Lungs:  Scattered crackles throughout, diminished Incision: Midsternal incision with no significant erythema, drainage, or dehiscence. Heart: Regular rate and rhythm, S1, S2 normal, no murmur, click, rub or gallop. Abdomen:           Soft, non-tender. Bowel sounds hypoactive. No masses,  No organomegaly. +BM 11/5 Extremities:          No edema. Palpable pulses bilat Neurologic:          Gross motor and sensory apparatus intact. Assessment/Plan: 
            1. S/p CABG: on ASA, statin. No BB while on Dobutamine. 2. Acute on chronic systolic CHF, NYHA Class II on admit: EF 35-40% preop. Dobutamine to assist diuresis/renal function. No BB/ACE/ARB/AA until vitals/kidney function allows. Trend pBNP - worse today 3. Acute postop respiratory failure, acute pulm edema, effusions with chronic fibrosis per CXR: agreed to BIPAP mask as to avoid intubation, attempt HFNC during day and Bipap at night. Diuresis per Nephrology. Amiodarone stopped. Solumedrol per pulm. Continue scheduled nebs. Cont mucinex, pulm toileting. Add mucomyst. Needs chest PT. 4. Renal insufficiency: Creatinine worse today at 2.28- cont dobutamine gtt. Nephrology consulted and appreciate their recommendations. Bumex/diuril per Nephrology for diuresis. Closely monitor acid/base balance. Condom cath in place. 5. Anemia: had preop, stable H&H. Iron panel sent preop - iron, iron sat low. Continue PO iron, change to liquid due to swallow concern. 6. Thrombocytopenia: Resolved. 7. Hx of HTN: Controlled on current medications. Dobutamine added back 10/31 and BB stopped. Has PRN Hydralazine. 8. HLD: Continue pravastatin 9. Leukocytosis: likely from steroids.  Needs more aggressive pulm toileting, chest PT ordered. Was unable to place PICC yesterday. Will try again today and DC Cordis when placed. Ccondom cath in place 10. Constipation: Resolved. Resume bowel meds as needed 11. Current smoker: smoking cessation education. Cont pulm toileting. Nicotine patch ordered 12. Nutrition: Appreciate Dietician recommendations. Dobhoff placed 10/30 and Enteral feedings started. Too unstable from resp standpoint for PO diet, TF's back to continuous at 50 ml/hr. Try and AVOID increased volume amounts 13. Hyperglycemia: Preop A1c 5.3 with no DM history, now with hyperglycemia. Likely due to steroids and tube feedings. DTS following. Lantus 20 mg BID with SSI. 14. DVT/GI Prophylaxis: SCD's, SQ Heparin, PPI Dispo: PT/OT as able. Remain in CVI until resp status more stable. MAC has been in since surgery, will attempt PICC again today. Nephrology Note: ISAIAH on CKD: 
            - from ATN post CABG 
            - Cr up today, voiding ok 
            - cont bumex 2mg IV TID 
            - metolazone x 1 this AM 
            - daily labs and strict I/Os CKD III: 
            - baseline Cr 1.3 prior to CABG 
            - likely from DM and HTN Hypervolemia: 
            - diurese as above HFrE F: 
            - last EF 35-40% on 10/20 
            - on dobutamine drip per CTS 
              
            CAD s/p CABG x 5 10/19 and reexploration 10/23 for hematoma Resp Failure: 
            - 2/2 pulm edema 
            - on BiPAP 
            - diuretics as above Chronic Anemia: 
            - hgb stable 
            - cont to monitor DM2: 
            - on insulin Protein Malnutrition: - has DHT in place 
            - NPO now that he is on BiPAP Pulmonary Note: 
            Bipap dependent Desats quickly off bipap CXR unchanged diffuse reticular changes Euvolemic with diuresis On IV solumedrol Mild distress on bipap No accessory use Symmetrical chest expansion Rales RRR no rubs Soft/NT no rigidity Warm and dry Trace edema No rashes Assessment: 
            ·S/p 5V CABG 10/19/19 for ACS  With reexploration 10/23 for mediastinal hematoma ·Acute resp failure- baseline fibrotic ILD (etiology not clear- this is a new dx but UIP/IPF in DDX) with superimposed pulmonary interstitial edema but I cannot r/o superimposed diffuse alveolar damage from blood/blood products. Amio pulm toxicty in DDX but not clear from [facility] review when he was on it. 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 UIP/IPF) with superimposed DAD- this carries a very poor prognosis and is usually not responsive to steroids. ·Treatment is supportive and would strongly consider palliative consult ·Agree with continued diuresis. ·CCP/RF negative, ESR 70 
            ·D/w Dr. Gigi Hurst Additional Orders: ICU/ IP. Glucose Q 6 hours. Continuous VS. Daily weights. PT/OT. CPT with postural drainage tid. Continuous oximetry. OOB with assist.  Reyes CABRAL. NG.  SCD's. Full code. Tube feedings. NPO. Daily CXR. Daily CBC/BMP/Mg/NT pro-BNP. Dobutamine gtt. ½ NS at 10 ml/hr. Insulin gtt. Solu-Medrol 80 mg iv Q 8 hours. Mucomycst nebs bid. Brovana/pulmicort nebs bid. Bumex 2 mg iv tid. ASA 81 mg po daily. Ferrous 300 mg/NG po daily. Heparin 5,000 units SQ  Q 8 hours. Lantus 20 units SQ  12 hours. Humalog SSI. Lidocaine patch. Mg-ox 400 mg po bid. MVI / ng daily. Nicotine patch. Protonix 40 mg iv daily. Miralax 17 G po daily. Pravachol 40 mg po Q hs.  Zoloft 100 mg po daily. Tylenol prn. Albuterol nebs Q 4 prn. Ca gluconate 1 G iv prn. Benadryl 25 mg po/iv Q 6 prn. Hydralazine 10 mg iv Q 6 hour prn for SBP > 140. Dilaudid 0.5 mg iv Q 2 prn. Zofran 4 mg iv Q 4 prn. Compazine 10 mg iv Q 6 prn. Oxycodone IR 5-10 mg po Q 4 prn. 
             
             
             
                [X] Partial responder, not clinically stable for discharge and requires continued stay, >= One: 
                    [X] Oxygen >= 40%(0.40) and, >= One: 
                    ~--Admin, IQ Admin Admin on 11- 01:00 PM--~ 
                    Pt on Bipap. 
                     
                     
                     
                        [X] Oxygen therapy adjustment at least 3x/24h and oximetry ~--Admin, IQ Admin Admin on 11- 01:01 PM--~ Pt being adjusted several times/day. 
                         
                         
                         
  
Version: InterQual® 2019 [facility]  © 2019 [employer] and/or one of its [place]. All Rights Reserved. CPT only © 2018 American Medical Association. All Rights Reserved.  
   
LOC:Acute Adult-Extended Stay (11/5/2019) by Felice Tomlinson RN  
 
   
Review Status Review Entered In Primary 11/5/2019 12:57  
   
Criteria Review REVIEW SUMMARY 
  
Patient: Harriett Pickard Review Number: [id number] Review Status:  In Primary 
  
Condition Specific: Yes 
  
 Condition Level Of Care Code: ACUTE Condition Level Of Care Description: Acute 
  
  
OUTCOMES Outcome Type: Primary 
  
  
  
REVIEW DETAILS 
  
Service Date: 2019 Admit Date: 10/19/2019 Product: Britney Mejias Adult Subset: Extended Stay (Symptom or finding within 24h) 
  (Excludes PO medications unless noted) [X] Select Level of Care, One: 
            [X] CRITICAL, One: 
                [X] Partial responder, not clinically stable for discharge and requires continued stay, >= One: 
                ~--Admin, IQ Admin Admin on 2019 12:54 PM--~ Admit Date: 10/19/2019 Procedure:  Procedure(s): 
                CARDIAC RE-ENTRY, MARISOL BY  [facility]    
                  CABG x 5, LIMA to LAD, RSVG to [id number], RSVG to PDA 10/23/19 
                  
                 Subjective: 
                Pt seen with Dr. Alison Palacios. Dobutamine at 1mcg, insulin gtt. Afebrile, BIPAP vs HFNC. Pt refusing some therapy intermittently. TF's through Dobhoff 
                  
                Objective: 
                Vitals: 
                Blood pressure 134/62, pulse 91, temperature 97.2 °F (36.2 °C), resp. rate 25, height 5' 7\" (1.702 m), weight 128 lb 8 oz (58.3 kg), SpO2 97 %. Temp (24hrs), Av.3 °F (36.3 °C), Min:96.9 °F (36.1 °C), Max:97.9 °F (36.6 °C) 
                  
                EKG/Rhythm: SR in the 80s EXAM: 
                General:           
                Groggy. No acute distress Lungs:             Scattered crackles throughout, diminished Incision:          Midsternal incision with no significant erythema, drainage, or dehiscence. Heart:              Regular rate and rhythm, S1, S2 normal, no murmur, click, rub or gallop. Abdomen:       Soft, non-tender. Bowel sounds hypoactive. No masses,  No organomegaly. +BM 11/4 Extremities:      No edema. Palpable pulses bilat Neurologic:      Gross motor and sensory apparatus intact. Assessment: 
                  
                Principal Problem: S/P CABG x 5 (10/23/2019) Overview: x 5, LIMA to LAD, RSVG to Diag1, OM2-OM3, RSVG to PDA 
                  
                Active Problems: 
                  ACS (acute coronary syndrome) ([facility] [address] (10/19/2019) 
                  
                  Unstable angina ([facility] [address] (10/19/2019) 
                  
                  Coronary artery disease of native artery of native heart with stable angina pectoris ([facility] [address] (10/21/2019) 
                  
                  Systolic heart failure ([facility] [address] (10/22/2019) 
                  
                  
                  
                 Plan/Recommendations/Medical Decision Makin. S/p CABG: on ASA, statin. No BB while on Dobutamine. 2. Acute on chronic systolic CHF, NYHA Class II on admit: EF 35-40% preop. Dobutamine to assist diuresis/renal function. No BB/ACE/ARB/AA until vitals/kidney function allows. Trend pBNP - improved 
                  
                3. Acute postop respiratory failure, acute pulm edema, effusions with chronic fibrosis per CXR: agreed to BIPAP mask as to avoid intubation, attempt HFNC during day and Bipap at night. On Diamox (alkalosis improving - hold). Amiodarone stopped. Solumedrol per pulm. Continue scheduled nebs. Cont mucinex, pulm toileting. Add mucomyst. Needs chest PT. Push diuretics as able 
                  
                4. Renal insufficiency: Creatinine unchanged - cont dobutamine gtt.  Hold diamox as pt is a little acidodic today, bumex/diuril per Jonatan for diuresis. Closely monitor acid/base balance!! Condom cath in place. Consult renal to assist in volume, acid/base management.   
                  
                5. Anemia: had preop, stable H&H. Iron panel sent preop - iron, iron sat low. Continue PO iron, change to liquid due to swallow concern.  
                  
                6. Thrombocytopenia: Resolved.  
                  
                7. Hx of HTN: Controlled on current medications. Dobutamine added back 10/31 and BB stopped. Has PRN Hydralazine. 
                  
                8. HLD: Continue pravastatin 
                  
                9. Leukocytosis: likely from steroids. Needs more aggressive pulm toileting, chest PT ordered. Obtain PICC, d/c cordis today, condom cath in place 
                  10. Constipation: Resolved. Resume bowel meds 
                  11. Current smoker: smoking cessation education. Cont pulm toileting. Nicotine patch ordered 
                  12. Nutrition: Appreciate Dietician recommendations. Dobhoff placed 10/30 and Enteral feedings started. Too unstable from resp standpoint for PO diet, decrease TF's back to continuous at 50 ml/hr. Try and AVOID increased volume amounts  
                  13. Hyperglycemia: Preop A1c 5.3 with no DM history, now with hyperglycemia. Likely due to steroids and tube feedings. Attempt to treat w/ Lantus, transition off gtt today 
                  14. DVT/GI Prophylaxis: SCD's, SQ Heparin, PPI 
                  
                Dispo: PT/OT as able. Remain in CVI until resp status more stable. MAC has been in since surgery, order PICC today and d/c  
                  
                08:45 am - A wires removed without difficulty, V wires cut due to significant resistance met. Pt tolerated well                 VS 
 97.2 P-91 R-25 134/62 on BIAP 60% FIO2 LABS 
                WBC 23.5 Hgb 9.7 Hct 31 Chloride 109 BUN 76 CReat 1.56 Mg 3 NT-BNP 89757 IMAGES 
                CXR- no change [X] Mechanical ventilation, discharge planning and, >= One: 
                    ~--Admin, IQ Admin Admin on 11- 12:57 PM--~ 
                    ICU BIPAP 
                    NS @ 10 Mucomyst nebs BID Brovana nebs BID Pulmicort nebs BID Dobutamine IV gtt Heparin 5000u sq q8 Dilaudid 0.5mg IV prn- given x1 
                    SQBS c SSI Insulin IV gtt [X] FiO2 > 50%(0.50) and > baseline 
  
Version: [employer] 2019 Rosy Alvarado  © 2019 [email or web address] and/or one of its [place]. All Rights Reserved. CPT only © 2018 American Medical Association. All Rights Reserved.  
   
LOC:Acute Adult-Extended Stay (11/4/2019) by Penny Hall RN  
 
   
Review Status Review Entered In Primary 11/4/2019 18:53  
   
Criteria Review REVIEW SUMMARY 
  
Patient: Soraya Marie Review Number: [id number] Review Status: In Primary 
  
Condition Specific: Yes 
  
Condition Level Of Care Code: ACUTE Condition Level Of Care Description: Acute 
  
  
OUTCOMES Outcome Type: Primary 
  
  
  
REVIEW DETAILS 
  
Service Date: 11/04/2019 Admit Date: 10/19/2019 Product: Brittnee Dago Adult Subset: Extended Stay (Symptom or finding within 24h) 
  (Excludes PO medications unless noted)         [X] Select Level of Care, One: 
            [X] CRITICAL, One: 
            ~--Admin, IQ Admin Admin on 11- 06:53 PM--~ 
 Vitals: 97.9, 88, 30, 127/88, 86% Hi Flow NC 40LPM 80% Fio2 ( increased to 50LPM 90% Fio2) 
            meds: 0.45% NaCl infusion 10mL/hr, tylenol 650mg PO X2, diamox 500mg IV BID, brovana neb BID, potasium pulmicort neb BID, ASA 81mg PO daily, dobutamine gtt 1 mcg/kg/min, heprain 5000 units SC Q8h,  insulin regular gtt titrate, solumedrol 80mg IV Q6h, roxicodone 5mg PO X1, potassium chloride 20 meq IV X2 
            labs: WBC 23.8, hgb 9.4, hct 28.9, chloride 109, glucose 118, BUN 62, Cr 1.56, Mag 2.7, gfr 44, AST 46, NT pro BNP 16,684 
            cxr: No significant interval change with continued evidence of congestive 
            contrast/pulmonary edema. plan: IV diamox, insulin gtt, dobutamine gtt, AM labs, mechanical soft diet, diet tube feeds, IS, I&O 
             
             
             
                [X] Partial responder, not clinically stable for discharge and requires continued stay, >= One: 
                    [X] Vasoactive or inotrope, Both: 
                        [X] Medication, >= One: 
                            [X] Inotrope (excludes low or fixed dose for end stage heart failure) [X] Continuous IV medication ~--Admin, IQ Admin Admin on 11- 06:53 PM--~ 
                        Dobutamine gtt 
                         
                         
                         
  
Version: InterQual® 2019 Alessio Clark  © 2019 [employer] and/or one of its [place]. All Rights Reserved. CPT only © 2018 American Medical Association. All Rights Reserved. Additional Notes 11/4/19 Pulmonary: In CVICU for management of post op hypoxemia following cabg.  Pt with underlying ILD and pre op tanner with mod restrictive physiology   
  On high flow 80% and 40L - RN reports that he refuses time on NIPPV. Continues to be diuresed and on solumedrol for possible exacerbation of IPF from amiodarone Cardiac Surgery:   
Pt seen with Dr. Nadya Ojeda. Dobutamine at [address] Afebrile, currently on hi-flow NC. Sleepy, refusing BIPAP 1. S/p CABG: on ASA, statin. No BB while on Dobutamine.   
    
2. Acute on chronic systolic CHF, NYHA Class II on admit: EF 35-40% preop. Dobutamine to assist diuresis. No BB/ACE/ARB/AA until vitals/kidney function allows.   
    
3. Acute postop respiratory failure, acute pulm edema, effusions with chronic fibrosis per CXR: refusing bipap mask - monitor ABG's. pBNP worse today. On Diamox. Amiodarone stopped. Solumedrol per pulm. Continue scheduled nebs. Cont mucinex, pulm toileting.   
    
4. Renal insufficiency: Creatinine at baseline. Will continue to diurese today with Diamox.  Discontinue da silva-can use condom catheter.   
    
5. Anemia: had preop, stable H&H. Received 1 unit PRBCs 10/27/19. Iron panel sent preop - iron, iron sat low. Continue PO iron, change to liquid due to swallow concern.   
    
6. Thrombocytopenia: Resolved. Continue to monitor daily CBC   
    
7. Hx of HTN: Controlled on current medications. Dobutamine added back 10/31 and BB stopped. Has PRN Hydralazine.   
    
8. HLD: Continue pravastatin   
    
9. Leukocytosis: likely from steroids. Monitor CBC daily   
    
10. Constipation: Resolved. Having loose BM. Hold pericolace, miralax until resolves   
    
11. Current smoker: smoking cessation education. Cont pulm toileting. Nicotine patch if needed   
    
12. Nutrition: Appreciate Dietician recommendations. Dobhoff placed 10/30 and Enteral feedings started. Transitioned to nocturnal feedings to allow for increased PO intake, but pt is not eating much.   
    
13. Hyperglycemia: Preop A1c 5.3 with no DM history, now with hyperglycemia. Likely due to steroids and tube feedings. Continue insulin ggt   
    
14.  DVT/GI Prophylaxis:  SQ Heparin, PPI   
    
 Dispo: PT/OT as able. Remain in CVI until resp status more stable  
   
LOC:Acute Adult-Extended Stay (11/3/2019) by Kam Acosta RN  
 
   
Review Status Review Entered In Primary 11/4/2019 09:47  
   
Criteria Review REVIEW SUMMARY 
  
Patient: Raina Cheney Review Number: [id number] Review Status: In Primary 
  
Condition Specific: Yes 
  
Condition Level Of Care Code: CRITICAL Condition Level Of Care Description: Critical 
  
  
OUTCOMES Outcome Type: Primary 
  
  
  
REVIEW DETAILS 
  
Service Date: 11/03/2019 Admit Date: 10/19/2019 Product: Carolyn Gip Adult Subset: Extended Stay (Symptom or finding within 24h) 
  (Excludes PO medications unless noted) [X] Select Level of Care, One: 
            [X] CRITICAL, One: 
            ~--Admin, IQ Admin Admin on 11- 09:46 AM--~ 
            Vitals: 97.7, 93, 30, 155/67, 89% Hi Flow NC 50 % Fio2 
            meds: 0.45% NaCl infusion 10mL/hr, diamox  500mg IV BID,  brovana neb BID, pulmicort neb BID, ASA 81mg PO daily, dobutamin gtt 1mcg/kg/min, robitussin 400mg per NG Q6h, heparin 5000 units SC Q8h, insulin regular gtt titrate,  solumedrol 80mg IV Q6h,  
            labs: WBC 15.2, hgb 9.5, hct 29.6, Na 132, glucose 263, BUN 61, Cr 1.91, Mag 2.8, gfr 35, NT pro BNP 16, 199 
            abg: pH 7.397, pCo2 28.8, pO2 75, HCo3 17.7 CXR: No significant interval change. plan: Dobutamine gtt, IV diamox, insulin gtt,  oxygen support, AM labs, mechanical soft diet, Tube feeds, IS, I&O, SCDs [X] Partial responder, not clinically stable for discharge and requires continued stay, >= One: 
                    [X] Vasoactive or inotrope, Both: 
                        [X] Medication, >= One: 
                            [X] Inotrope (excludes low or fixed dose for end stage heart failure) [X] Continuous IV medication ~--Admin, IQ Admin Admin on 11- 09:47 AM--~ 
                        Dobutamine gtt 
                         
                         
                         
  
Version: InterQual® 2019 [facility]  © 2019 Mario Parham and/or one of its [place]. All Rights Reserved. CPT only © 2018 American Medical Association. All Rights Reserved. Additional Notes 11/3/19 Cardiac Surgery: Pt seen with Dr. Giana Hager. Dobutamine at [address] Afebrile, currently on hi-flow NC. More interactive, refusing BIPAP 1. S/p CABG: on ASA, statin. No BB while on Dobutamine.   
    
2. Acute on chronic systolic CHF, NYHA Class II on admit: EF 35-40% preop. Dobutamine to assist diuresis. No BB/ACE/ARB/AA until vitals/kidney function allows.   
    
3. Acute postop respiratory failure, acute pulm edema, effusions with chronic fibrosis per CXR: refusing bipap mask - monitor ABG's. pBNP worse today. On Diamox. Amiodarone stopped. Solumedrol per pulm. Continue scheduled nebs. Cont mucinex, pulm toileting.   
    
4. Renal insufficiency: Creatinine at baseline. Will continue to diurese today with Diamox.  Discontinue da silva-can use condom catheter.   
    
5. Anemia: had preop, stable H&H. Received 1 unit PRBCs 10/27/19. Iron panel sent preop - iron, iron sat low. Continue PO iron, change to liquid due to swallow concern.   
    
6. Thrombocytopenia: Resolved. Continue to monitor daily CBC   
    
7. Hx of HTN: Controlled on current medications. Dobutamine added back 10/31 and BB stopped. Has PRN Hydralazine.   
    
8. HLD: Continue pravastatin   
    
9. Leukocytosis: likely from steroids. Monitor CBC daily   
    
10. Constipation: Resolved. Having loose BM. Hold pericolace, miralax until resolves   
    
11. Current smoker: smoking cessation education. Cont pulm toileting.  Nicotine patch if needed   
    
 12. Nutrition: Appreciate Dietician recommendations. Dobhoff placed 10/30 and Enteral feedings started. Transitioned to nocturnal feedings to allow for increased PO intake, but pt is not eating much.   
    
13. Hyperglycemia: Preop A1c 5.3 with no DM history, now with hyperglycemia. Likely due to steroids and tube feedings. Restart insulin ggt   
    
14. DVT/GI Prophylaxis:  SQ Heparin, PPI   
    
Dispo: PT/OT as able. Remain in CVI until resp status more stable Pulmonary:   
RECOMMENDATIONS:   
· Continue solumedrol · I spoke to the pt about the importance of complying with NIV · Too unstable to BAL · Continue diuresis · I suspect a pneumonitis ( ? amio ) superimposed on his fibrotic ILD · Alternate HFNC ( 4 hours) with bipap ( 1 hour- to offload insp muscles) · Needs workup for ILD at some point ( autoimmune panel etc) but will wait until out of acute window.   
   
LOC:Acute Adult-Extended Stay (11/2/2019) by Shani Cash RN  
 
   
Review Status Review Entered In Primary 11/4/2019 09:30  
   
Criteria Review REVIEW SUMMARY 
  
Patient: Adina Medrano Review Number: [id number] Review Status: In Primary 
  
Condition Specific: Yes 
  
Condition Level Of Care Code: CRITICAL Condition Level Of Care Description: Critical 
  
  
OUTCOMES Outcome Type: Primary 
  
  
  
REVIEW DETAILS 
  
Service Date: 11/02/2019 Admit Date: 10/19/2019 Product: Darshana Buitrago Adult Subset: Extended Stay (Symptom or finding within 24h) 
  (Excludes PO medications unless noted)         [X] Select Level of Care, One: 
            [X] CRITICAL, One: 
            ~--Admin, IQ Admin Admin on 11- 09:28 AM--~ 
            Vitals:  97.6, 88, 29, 142/63, 97% BiPAP 60% Fio2 
            meds: 0.45% NaCl infusion 10mL/hr, diamox 500mg IV BID, brovana neb BID, pulmicort neb BID, ASA 81mg PO daily, dobutamin gtt titrate, heparin 5000 units SC Q8h, solumedrol 80mg IV Q6h, protonix 40mg IV daily, pravachol 40mg PO bedtime,  
            labs: WBC 16.6, hgb 9.7, hct 29.8, Na 131, glucose 221, BUN 54, Cr 1.75, Mag 2.5, gfr 39, AST 51, NT pro BNP 12,714 
            abg: pH 7.414, pCo2 31.3, PO2 70, HCo3 20, o2 94 CXR: Stable interstitial edema pattern 
            plan: Dobutamine gtt, IV diamox, oxygen support, AM albs, mechanical soft diet, IS, I&O, SCDs [X] Partial responder, not clinically stable for discharge and requires continued stay, >= One: 
                    [X] Vasoactive or inotrope, Both: 
                        [X] Medication, >= One: 
                            [X] Inotrope (excludes low or fixed dose for end stage heart failure) [X] Continuous IV medication ~--Admin, IQ Admin Admin on 11- 09:30 AM--~ 
                        Dobutamine gtt 
                         
                         
                         
  
Version: [employer] 2019 Rodrigo Lewis  © 2019 [employer] and/or one of its [place]. All Rights Reserved. CPT only © 2018 American [employer] Association. All Rights Reserved. Additional Notes 11/2/19 Cardiac Surgery:   
Pt seen with Dr. Sarthak Lutz. Dobutamine at [address] Afebrile, currently on Bipap vs hi-flow NC. Unable to tolerate much activity without respiratory decompensation. 1. S/p CABG: on ASA, statin. No BB while on Dobutamine.   
    
2. Acute on chronic systolic CHF, NYHA Class II on admit: EF 35-40% preop. Dobutamine to assist diuresis. No BB/ACE/ARB/AA until vitals/kidney function allows.   
    
3. Acute postop respiratory failure, acute pulm edema, effusions with chronic fibrosis per CXR: cont bipap mask as needed for work of breathing - monitor ABG's. pBNP improving. On Diamox. Amiodarone stopped.  Continue Prednisone daily. Continue scheduled nebs. Cont mucinex, pulm toileting. Pulm consult per Dr. Quin Win.   
    
4. Renal insufficiency: Creatinine stable at 1.75 today. Will continue to diurese today with Diamox.  Discontinue da silva-can use condom catheter.   
    
5. Anemia: had preop, stable H&H. Received 1 unit PRBCs 10/27/19. Iron panel sent preop - iron, iron sat low. Continue PO iron, change to liquid due to swallow concern.   
    
6. Thrombocytopenia: Resolved. Continue to monitor daily CBC   
    
7. Hx of HTN: Controlled on current medications. Dobutamine added back 10/31 and BB stopped. Has PRN Hydralazine.   
    
8. HLD: Continue pravastatin   
    
9. Leukocytosis: likely from steroids. Will discontinue da silva. Monitor CBC daily   
    
10. Constipation: Resolved. Having loose BM. Hold pericolace, miralax until resolves   
    
11. Current smoker: smoking cessation education. Cont pulm toileting. Nicotine patch if needed   
    
12. Nutrition: Appreciate Dietician recommendations. Dobhoff placed 10/30 and Enteral feedings started. Transitioned to nocturnal feedings to allow for increased PO intake, but pt is not eating much. Preop A1c 5.3 with no DM history, now with hyperglycemia. Likely due to steroids and tube feedings. NPH per DTC recs   
    
13. DVT/GI Prophylaxis:  SQ Heparin, PPI   
    
Dispo: PT/OT as able. Remain in CVI until resp status more stable Pulmonary: · Agree with continued diuresis · Would check ESR · Change pred to IV solumedrol 80 mg every 6 hours · Alternate HFNC ( 4 hours) with bipap ( 1 hour- to offload insp muscles) · Needs workup for ILD at some point ( autoimmune panel etc) but will wait until out of acute window.   
    
Pt is acutely ill.   
   
LOC:Acute Adult-Extended Stay (11/1/2019) by Bianca Shah RN  
 
   
Review Status Review Entered In Primary 11/1/2019 14:55  
   
Criteria Review REVIEW SUMMARY 
  
Patient: Rose Vizcarra 
 Review Number: [id number] Review Status: In Primary 
  
Condition Specific: Yes 
  
Condition Level Of Care Code: CRITICAL Condition Level Of Care Description: Critical 
  
  
OUTCOMES Outcome Type: Primary 
  
  
  
REVIEW DETAILS 
  
Service Date: 11/01/2019 Admit Date: 10/19/2019 Product: [facility] [place] Adult Subset: Extended Stay (Symptom or finding within 24h) 
  (Excludes PO medications unless noted) [X] Select Level of Care, One: 
            [ ] INTERMEDIATE, One: 
            ~--Admin, IQ Admin Admin on 11- 02:53 PM--~ 
            11/1/19- 
             
            VS:  97.6- P- 80- R- 28- 126/62. O2 sat= 100% on r/a Abnormal Labs: 
            Glucose= 251, 102, 204, 181. BUN= 53. Cr= 1.94. GFR= 34. Na= 128. Chl= 93. AST= 64. ALP= 122. Albumin= 2.8. Globulin= 4.2. WBC= 12.5. RBC= 2.96. Hgb= 9.2. Hct= 28.1. RDW= 14.7. NT pro-BNP=  [id number]. ABG on 100% o2>  ph= 7.595. pCo2= 32.4. pO2= 141. HCO3= 31.5.  aB5=339. KUB=Feeding tube tip terminates in the expected location of the gastric antrum. No bowel obstruction. CXR=  Interstitial edema superimposed on chronic fibrosis. Cardiothoracic Surgery Note: EXAM: 
            General: Resting comfortably. No acute distress                                                                                      Lungs:  Scattered crackles throughout Incision: Midsternal incision with no significant erythema, drainage, or dehiscence. Heart: Regular rate and rhythm, S1, S2 normal, no murmur, click, rub or gallop. Abdomen: Soft, non-tender. Bowel sounds hypoactive. No masses,  No organomegaly. +BM 10/31 Extremities:          trace edema. Palpable pulses bilat Neurologic:         Gross motor and sensory apparatus intact. NYHA class IV A/C systolic heart failure (EF 25%, NT pro-BNP 15,013) Acute on chronic kidney disease Mild pulmonary edema Mild chronic lung disease Acute on chronic hypoxic respiratory failure PO2 looks better this am  
            Alkalotic on ABG Will switch Bumex to diamox Hgb and platelets look good Creatinine in 2 range Bilirubin and other LFTs look good NT pro-BNP coming down Continues on prednisone. Assessment/Plan: 
            1. S/p CABG: on ASA, statin. No BB while on Dobutamine. 2. Acute on chronic systolic CHF, NYHA Class II on admit: EF 35-40% preop. Dobutamine to assist diuresis. No BB/ACE/ARB/AA until vitals/kidney function allows. 3. Acute postop respiratory failure, acute pulm edema, effusions with chronic fibrosis per CXR: cont bipap mask as needed for work of breathing - monitor ABG's. pBNP still 18.4k. Alkalotic on ABG-switch Bumex to Diamox. Amiodarone stopped. Continue Prednisone daily. Continue scheduled nebs. Cont mucinex, pulm toileting. 4. Renal insufficiency: Creatinine stable at 1.9 today. Will continue to diurese today but switch Bumex to Diamox. Discontinue da silva-can use condom catheter. 5. Anemia: had preop, stable H&H. Received 1 unit PRBCs 10/27/19. Iron panel sent preop - iron, iron sat low. Continue PO iron 6. Thrombocytopenia: Resolved. Continue to monitor daily CBC 7. Hx of HTN: Controlled on current medications. Dobutamine added back 10/31 and BB stopped. Has PRN Hydralazine. 8. HLD: Continue pravastatin 9. Leukocytosis: likely from steroids. Will discontinue da silva. Monitor CBC daily 10. Constipation: Resolved. Having regular BM. Cont pericolace, miralax, prn suppository 11. Current smoker: smoking cessation education. Cont pulm toileting. Nicotine patch if needed 12. Nutrition: Appreciate Dietician recommendations. Dobhoff placed 10/30 and Enteral feedings started. Transitioned to nocturnal feedings to allow for increased PO intake. Continue PO intake as able. Preop A1c 5.3 with no DM history, now with hyperglycemia. Likely due to steroids and tube feedings. Increase lantus to 15 units daily and continue SSI with normal sensitivity. Appreciate DTS recommendations. PT Note: 
            Spoke with RN, who stated patient switched from high flow to BiPAP ~11 am due to labored breathing with RR in 40's while on 40 L at 100%. PT to bedside and observed patient with labored breathing at rest, RR 38, and strong accessory muscle use. Hold PT at this time due to respiratory status at rest while on BiPAP. F/u later as able/apporpriate. Thank you. Orders: ICU/IP. Glucose ac/hs. VS continuous. Daily weights. High Flow O2. Continuous oximetry. OOB with assist. PT/OT. IS. Elevate HOB. Full code. Tube feedings. Mechanical soft diet. Daily CXr. Daily Mg/BMP/CBC/ABG/NT pro-BNP. Dobutamine gtt. ½ NS at 10 ml/hr. NS at 9 ml/hr. Nicardipine gtt. Diamox 500 mg iv bid. Brovana/pulmicort nebs bid. ASA 81 mg po daily. Mucinex 1,200 mg po bid. Heparin 5,000 units SqQ 8 hours. Humalog SSI. NPH 14 units SQ daily. Mg-ox 400 mg po bid. Nicotine patch. Protonix 40 mg po daily. Kcl 40 mEQ po bid. Pravachol 40 mg po daily. Prednisone 10 mg po Q am. NPH 6 units SQ daily. Zoloft 100 mg po dialy. IV Benadryl prn. Dilaudid 0.5-1.0 mg iv Q 2 prn. Versed 1 mg iv q 1 hour prn. Oxycodone IR 5-10 mg po Q 4 prn. [ ] Partial responder, not clinically stable for discharge and requires continued stay, >= One: 
                    [ ] Oxygen >= 40%(0.40) and, >= One: 
                    ~--Admin, IQ Admin Admin on 11- 02:52 PM--~ 
                    Pt on Bipap> 100%. [ ] Oxygen therapy adjustment at least 3x/24h and oximetry ~--Admin,  Pin [place] Rc on 11- 02:53 PM--~ Multiple adjustments between high flow NC and Bipap. [X] CRITICAL, One: 
                [X] Partial responder, not clinically stable for discharge and requires continued stay, >= One: 
                    [X] Vasoactive or inotrope, Both: 
                        [X] Medication, >= One: 
                            [X] Vasopressor ~--Admin,  Pin [place] Rc on 11- 02:55 PM--~ Nicardipine gtt. [X] Continuous IV medication ~--Admin,  Pin G[place] Rc on 11- 02:55 PM--~ Nicardipine gtt.

## 2019-11-07 NOTE — PROCEDURES
[facility] Bronchoscopy Report Procedure: Diagnostic bronchoscopy. Indication: ILD Consent/Treatment: Informed consent was obtained from the  family after risks, benefits and alternatives were explained. Timeout verified the correct patient and correct procedure. Anesthesia:  
Patient on ventilator and receiving  Propofol drip 50 Procedure Details:  
-- The bronchoscope was introduced through an endotracheal tube. -- The vocal cords not seen. -- The trachea and prabhjot were completely inspected and were found to be normal. 
-- The right-sided endobronchial anatomy was completely inspected and was found to be normal. 
-- The left-sided endobronchial anatomy was completely inspected and was found to be normal.  
 
Specimens: The bronchoscope was wedged in the [place] lower division segment and bronchoalveolar lavage was performed; material was sent for  microbiology, cytology, AFB smear and culture, fungal culture and cell count diff Rapid On-Site Evaluation: 1) ETT 2 cm above prabhjot 2) normal airway anatomy b/l to segmental level 3) no suppuration 4) BALF return was clear Complications: none Estimated Blood Loss: Minimal 
 
Pham Jin MD

## 2019-11-07 NOTE — PROGRESS NOTES
NUTRITION COMPLETE ASSESSMENT 
 
RECOMMENDATIONS:  
1. Continue tube feed as ordered 2. Follow BG trends with adjustment to insulin per DTC recommendations Interventions/Plan:  
Food/Nutrient Delivery:    Commercial supplement(Ensure Enlive TID) Feeding assistance at [employer] needed)   Modify rate, concentration, composition, and schedule Assessment:  
Reason for Assessment: Reassessment Diet: NPO Supplements: None Tube feeds: Glucerna 1.2 @ 50ml/hr + 30ml flush q4hr during feeds Nutritionally Significant Medications: [x] Reviewed & Includes: bumex, iron, SSI, lantus (25 units), solu-medrol, liquid MVI, protonix, miralax, potassium bicarb, pericolace, zoloft; DRIPS: dobutamine, insulin drip Subjective: Pt sleeping did not wake to name. Drank some of Ensure at bedside today with encouragement per RN reports. Objective: 
Pt admitted for acute coronary syndrome. PMHx: DM, high cholesterol, CKD3, HTN. S/p CABG x 5 on 10/23. Extubated on 10/24 with some pulmonary edema noted. Plans to re-intubate today with continued respiratory difficulty per discussion with RN. Diuresis continues. Poor prognosis noted with recommendations for Palliative consult per pulmonology. ISAIAH on CKD noted with renal following, bumex reduced. BG remains elevated with IV steroids and hx of DM. DTC following with lantus rx and insulin drip in place. Poor PO intake since surgery with DHT placed on 10/30. Had transition to nocturnal feeds but back on continuous per NP earlier this week d/t respiratory concerns. Glucerna 1.2 @ 50ml/hr providinml, 1440kcal, 72g protein (1.28g/kg), 966ml free fluid + 180ml flush = 1146ml fluid. Meets 95% energy and 100% protein needs. Continue tube feeds as ordered. Will follow for PO intake, respiratory status, renal fx, and BG control. Estimated Nutrition Needs:  
Kcals/day: 1520 Kcals/day(1520-1647kcal) Protein: 56 g(56-67g (1-1.2g/kg)) Fluid: 1520 ml(1ml/kcal) Based On: Keny Rose(x 1.2-1.3) Weight Used: Actual wt(56.2kg) Pt expected to meet estimated nutrient needs:  [x]   Yes     []  No [] Unable to predict at this time Nutrition Diagnosis:  
1. Inadequate protein-energy intake related to swallowing difficulty 2/2 respiratory status as evidenced by NPO with enteral feeds; plans for intubation Goals:   
 EN continuing to meet at least 90% needs until oral intake meeting >60% needs consistently Monitoring & Evaluation: - Total energy intake, Enteral/parenteral nutrition intake, Protein intake - Weight/weight change, Electrolyte and renal profile Previous Nutrition Goals Met:   No 
Previous Recommendations:    No 
 
Education & Discharge Needs: 
 [x] None Identified 
 [] Identified and addressed [x] Participated in care plan, discharge planning, and/or interdisciplinary rounds Cultural, Anglican and ethnic food preferences identified: None Skin Integrity: [x]Intact  []Other Edema: []None [x]Other: 1+ generalized Last BM: 11/6 Food Allergies: []None [x]Other: scallops Diet Restrictions: Cultural/Yarsani Preference(s): None Anthropometrics:   
Weight Loss Metrics 11/7/2019 Today's Wt 123 lb 12.8 oz BMI 19.39 kg/m2 Weight Source: Bed Height: 5' 7\" (170.2 cm), Body mass index is 19.39 kg/m². IBW : 72.6 kg (160 lb), % IBW (Calculated): 93.14 % 
 ,   
 
Labs:   
Lab Results Component Value Date/Time Sodium 139 11/07/2019 03:06 AM  
 Potassium 4.1 11/07/2019 03:06 AM  
 Chloride 98 11/07/2019 03:06 AM  
 CO2 31 11/07/2019 03:06 AM  
 Glucose 214 (H) 11/07/2019 03:06 AM  
  (H) 11/07/2019 03:06 AM  
 Creatinine 2.58 (H) 11/07/2019 03:06 AM  
 Calcium 9.3 11/07/2019 03:06 AM  
 Magnesium 3.4 (H) 11/07/2019 03:06 AM  
 Albumin 2.8 (L) 11/07/2019 03:06 AM  
 
Lab Results Component Value Date/Time Hemoglobin A1c 5.3 10/21/2019 03:04 AM  
 
Norbert Whitten RD [facility], Pager #[phone] or [phone]

## 2019-11-07 NOTE — DIABETES MGMT
[facility] Cardiac Surgery Progress Note Recommendations/ Comments: BG's remain > 200 mg/dL. BG this  mg/dl with LAntus 20 BID and lispro correction 12 untis in past 24 hours Noted Lantus increased to 25 units BID today Transitioned off of insulin gtt 11-5-2019 with Lantus 20 units at 1130 AM  
Since then BG's > 200 mg/dL. BG this  mg/dL at 0610. Noted while on the gtt rates varied widely from 2.8 units/hr - 22 units/hr. He received approx 120 units in 24 hours prior to coming off of the gtt Steroids Solu medrol 80 mg/dL Q 8 hours. Nocturnal TF Osmolite 1.5 are continuous, @ 50/hr Unstable for po due to respiratory Renal noted Current hospital DM medications Lantus 25 units Q 12 hours Lispro normal sensitivity correction scale - hesitate to change this to resistant scale due to renal but may nee dit Patient is 68 y.o. male s/p CABG x 5 followed by re-entry for mediastinal clot evacuation and repair of SVG to PDA  - POD 13.  
Pt with documented hx of DM on no meds PTA. Anemia - A1c inaccurate A1c:  
Lab Results Component Value Date/Time Hemoglobin A1c 5.3 10/21/2019 03:04 AM  
 
 
 
Recent Glucose Results:  
Lab Results Component Value Date/Time  (H) 11/07/2019 03:06 AM  
 GLUCPOC 226 (H) 11/07/2019 05:34 AM  
 GLUCPOC 240 (H) 11/06/2019 11:50 PM  
 GLUCPOC 228 (H) 11/06/2019 06:58 PM  
  
 
Lab Results Component Value Date/Time Creatinine 2.58 (H) 11/07/2019 03:06 AM  
 
Estimated Creatinine Clearance: 20.9 mL/min (A) (based on SCr of 2.58 mg/dL (H)). Active Orders Diet DIET NPO With Tube Feedings PO intake:  
Patient Vitals for the past 72 hrs: 
 % Diet Eaten 11/04/19 1400 10 % Will continue to follow as needed. Thank you. Candida Rivera RN, CDE Time spent: 4 minutes

## 2019-11-07 NOTE — PROGRESS NOTES
Nephrology Progress Note [facility] Date of Admission : 10/19/2019 CC:  Follow up for ISAIAH on CKD, hypervolemia Assessment and Plan ISAIAH on CKD: 
- from ATN post CABG 
- Cr up today, voiding ok 
- reduce bumex to 1mg BID for now 
- daily labs and I/Os CKD III: 
- baseline Cr 1.3 prior to CABG 
- likely from DM and HTN 
  
Hypervolemia: 
- diurese as above 
  
HFrE F: 
- last EF 35-40% on 10/20 
- on dobutamine drip per CTS 
  
CAD s/p CABG x 5 10/19 and reexploration 10/23 for hematoma 
  
Resp Failure: 
- 2/2 pulm edema + ILD 
- on BiPAP 
- diuretics as above - per pulmonary 
  
Chronic Anemia: 
- hgb stable 
- cont to monitor 
  
DM2: 
- on insulin 
  
Protein Malnutrition: 
- has DHT in place 
- NPO now that he is on BiPAP Interval History: 
Seen and examined. Voiding ok, about 3.5 L, net neg about 1.5. Remains encephalopathic, on BiPAP. Cr up today. Current Medications: all current  Medications have been eviewed in [facility] Review of Systems: Pertinent items are noted in HPI. Objective: 
Vitals:   
Vitals:  
 11/07/19 0700 11/07/19 0800 11/07/19 0859 11/07/19 0900 BP: 115/65 108/59  103/55 Pulse: (!) 101 100  97 Resp: 25 27  23 Temp:  97.9 °F (36.6 °C) SpO2: 99% 100% 100% Weight:      
Height:      
 
Intake and Output: 
No intake/output data recorded. 11/05 1901 - 11/07 0700 In: 2706.6 [I.V.:436.6] Out: 4575 [LQFNB:[id number]] Physical Examination: 
Pt intubated     No 
General: Frail, in resp distress Neck:  Supple, no mass Resp:  Crackles b/l, dyspnea CV:  RRR,  no murmur or rub, no LE edema GI:  Soft, NT, + Bowel sounds, no hepatosplenomegaly Neurologic:  confused Psych:             Unable to assess Skin:  No Rash :  Devlin in place 
 
[]    High complexity decision making was performed 
[]    Patient is at high-risk of decompensation with multiple organ involvement Lab Data Personally Reviewed: I have reviewed all the pertinent labs, microbiology data and radiology studies during assessment. Recent Labs 11/07/19 
7685 11/06/19 
0321 11/05/19 
6838  137 139  
K 4.1 4.9 4.5  
CL 98 101 109* CO2 31 27 23 * 218* 149* * 111* 76* CREA 2.58* 2.28* 1.56* CA 9.3 9.4 9.5 MG 3.4* 3.1* 3.0* ALB 2.8* 2.8* 2.8* SGOT 20 26 48* ALT 41 47 51 Recent Labs 11/07/19 
3871 11/06/19 
0321 11/05/19 
1612 WBC 19.1* 21.1* 23.5* HGB 10.2* 10.0* 9.7* HCT 30.9* 31.2* 31.0*  
 317 332 No results found for: SDES Lab Results Component Value Date/Time Culture result: HEAVY NORMAL RESPIRATORY TARIK 10/21/2019 12:08 PM  
 
Recent Results (from the past 24 hour(s)) GLUCOSE, POC Collection Time: 11/06/19 12:22 PM  
Result Value Ref Range Glucose (POC) 230 (H) 65 - 100 mg/dL Performed by ARISTIDES BOUCHER   
GLUCOSE, POC Collection Time: 11/06/19  6:58 PM  
Result Value Ref Range Glucose (POC) 228 (H) 65 - 100 mg/dL Performed by ARISTIDES BOUCHER   
GLUCOSE, POC Collection Time: 11/06/19 11:50 PM  
Result Value Ref Range Glucose (POC) 240 (H) 65 - 100 mg/dL Performed by Regulo [facility] METABOLIC PANEL, COMPREHENSIVE Collection Time: 11/07/19  3:06 AM  
Result Value Ref Range Sodium 139 136 - 145 mmol/L Potassium 4.1 3.5 - 5.1 mmol/L Chloride 98 97 - 108 mmol/L  
 CO2 31 21 - 32 mmol/L Anion gap 10 5 - 15 mmol/L Glucose 214 (H) 65 - 100 mg/dL  (H) 6 - 20 MG/DL Creatinine 2.58 (H) 0.70 - 1.30 MG/DL  
 BUN/Creatinine ratio 56 (H) 12 - 20 GFR est AA 30 (L) >60 ml/min/1.73m2 GFR est non-AA 25 (L) >60 ml/min/1.73m2 Calcium 9.3 8.5 - 10.1 MG/DL Bilirubin, total 0.3 0.2 - 1.0 MG/DL  
 ALT (SGPT) 41 12 - 78 U/L  
 AST (SGOT) 20 15 - 37 U/L Alk. phosphatase 155 (H) 45 - 117 U/L Protein, total 6.9 6.4 - 8.2 g/dL Albumin 2.8 (L) 3.5 - 5.0 g/dL Globulin 4.1 (H) 2.0 - 4.0 g/dL  A-G Ratio 0.7 (L) 1.1 - 2.2    
CBC W/O DIFF  
 Collection Time: 11/07/19  3:06 AM  
Result Value Ref Range WBC 19.1 (H) 4.1 - 11.1 K/uL  
 RBC 3.24 (L) 4.10 - 5.70 M/uL  
 HGB 10.2 (L) 12.1 - 17.0 g/dL HCT 30.9 (L) 36.6 - 50.3 % MCV 95.4 80.0 - 99.0 FL  
 MCH 31.5 26.0 - 34.0 PG  
 MCHC 33.0 30.0 - 36.5 g/dL  
 RDW 15.1 (H) 11.5 - 14.5 % PLATELET 185 894 - 529 K/uL MPV 11.6 8.9 - 12.9 FL  
 NRBC 0.0 0  WBC ABSOLUTE NRBC 0.00 0.00 - 0.01 K/uL MAGNESIUM Collection Time: 11/07/19  3:06 AM  
Result Value Ref Range Magnesium 3.4 (H) 1.6 - 2.4 mg/dL NT-PRO BNP Collection Time: 11/07/19  3:06 AM  
Result Value Ref Range NT pro-BNP 9,898 (H) <125 PG/ML  
GLUCOSE, POC Collection Time: 11/07/19  5:34 AM  
Result Value Ref Range Glucose (POC) 226 (H) 65 - 100 mg/dL Performed by Regulo Parada MD 
[address] [place] [address] [facility] Phone - [phone] Fax - [phone] 
[email or web address]

## 2019-11-07 NOTE — PROGRESS NOTES
Intubation Note Called to bedside secondary to respiratory failure. Patient pre-oxygenated with 100% oxygen. RSI with propofol 70 mg + Succinylcholine 100 mg IV. DVL x 1 with a MAC 3. Grade 1 view 8.0 ETT taped and secured at 22 cm at the teeth. 
 
+ Bilateral BS, + Chest rise, + ETCO2 
 
VSS. CXR pending. Care turned over to covering Attending MD. Ayah Owens MD 
11/7/2019 11:51 AM

## 2019-11-07 NOTE — PROGRESS NOTES
Problem: Self Care Deficits Care Plan (Adult) Goal: *Acute Goals and Plan of Care (Insert Text) Description FUNCTIONAL STATUS PRIOR TO ADMISSION: Patient was independent and active without use of DME.  
 
HOME SUPPORT: The patient lived with his son but did not require assist for self-care tasks. Patient's son assisted him with transportation to the grocery store. Occupational Therapy Goals Goals reviewed and continued as follows 11/7/2019 Goals reviewed and continued as follows 10/31/2019 Initiated 10/25/2019 1. Patient will perform ADLs standing 5 mins without fatigue or LOB with moderate assistance within 5 day(s). 2.  Patient will perform lower body ADLs with moderate assistance within 5 day(s). 3.  Patient will perform gathering ADL items high and low 2/2 with moderate assistance within 5 day(s). 4.  Patient will perform toilet transfers with moderate assistance within 5 day(s). 5.  Patient will perform all aspects of toileting with moderate assistance within 5 day(s). 6.  Patient will participate in cardiac/sternal upper extremity therapeutic exercise/activities to increase independence with ADLs with minimal assistance for 5 minutes within 5 day(s). Outcome: Not Progressing Towards Goal 
 OCCUPATIONAL THERAPY TREATMENT/WEEKLY RE-EVALUATION Patient: Veronica Prasad (61 y.o. male) Date: 11/7/2019 Diagnosis: ACS (acute coronary syndrome) ([facility] [address] [I24.9] Unstable angina (HCC) [I20.0] S/P CABG x 5 Procedure(s) (LRB): 
CARDIAC RE-ENTRY, MARISOL BY DR Shekhar Sherman (N/A) 15 Days Post-Op Precautions: Fall, Sternal(high O2 demands) Chart, occupational therapy assessment, plan of care, and goals were reviewed. ASSESSMENT Based on the objective data described below, patient has made minimal progress towards goals (all goals continued) and continues to be limited by generalized weakness, decreased endurance, fatigue, and decreased activity tolerance with patient requiring continuous Bipap and noted with poor prognosis. Patient verbalizing need to have a bowel movement and required MAX A x 2 for rolling side to side for bed pan placement and subsequent pericare during session today. Patient with fatigue and lethargy following this task and not agreeable for EOB attempt and for progression of functional activity; noted labored breathing. Decreased patient frequency to 3x/week at this time. Will continue to follow for OT in hopes to increased patient strength and independence for participation in ADL tasks. Current Level of Function Impacting Discharge (ADLs): MAX A LB ADLs Other factors to consider for discharge: O2 dependence; prognosis PLAN : 
Goals have been updated based on progression since last assessment. Patient continues to benefit from skilled intervention to address the above impairments. Continue to follow patient 3x/week to address goals. Recommend with staff: positioning Recommend next OT session: grooming task EOB Recommendation for discharge: (in order for the patient to meet his/her long term goals) To be determined: pending medical stability This discharge recommendation: 
Has been made in collaboration with the attending provider and/or case management Equipment recommendations for successful discharge (if) home: TBD SUBJECTIVE:  
Patient stated I am done (regarding BM).  
 
OBJECTIVE DATA SUMMARY:  
Cognitive/Behavioral Status: 
Neurologic State: Drowsy; Lethargic Orientation Level: Appropriate for age Cognition: Follows commands Perception: Appears intact Perseveration: No perseveration noted Safety/Judgement: Decreased insight into deficits Functional Mobility and Transfers for ADLs: 
Bed Mobility: 
Rolling: Maximum assistance;Assist x2; Additional time ADL Intervention: Lower Body Bathing Bathing Assistance: Total assistance(dependent) Toileting Toileting Assistance: Total assistance(dependent) Cognitive Retraining Safety/Judgement: Decreased insight into deficits Activity Tolerance:  
Poor, desaturates with exertion and requires oxygen, requires frequent rest breaks and observed SOB with activity Please refer to the flowsheet for vital signs taken during this treatment. After treatment patient left in no apparent distress:  
Supine in bed and Call bell within reach COMMUNICATION/COLLABORATION:  
The patients plan of care was discussed with: Physical Therapist and Registered Nurse Gigi Davis Time Calculation: 24 mins

## 2019-11-07 NOTE — PROGRESS NOTES
Pulmonary Remains bipap dependent and desats quickly Denies pain but appears anxious with WOB. Patient Vitals for the past 4 hrs: 
 BP Pulse Resp SpO2 Weight 19 0859    100 %   
19 0700 115/65 (!) 101 25 99 %   
19 0600 107/63 100 25 97 % 56.2 kg (123 lb 12.8 oz) Temp (24hrs), Av.4 °F (36.3 °C), Min:97.2 °F (36.2 °C), Max:97.6 °F (36.4 °C) Intake/Output Summary (Last 24 hours) at 2019 0822 Last data filed at 2019 0700 Gross per 24 hour Intake 1549.6 ml Output 3275 ml Net -1725.4 ml  
 
Mild distress on bipap No accessory use Symmetrical chest expansion Rales RRR no rubs Soft/NT no rigidity Warm and dry Trace edema No rashes CXR - no change in ILD with edema Lab: 
Recent Labs 19 
7535 19 
0321 19 
7585 WBC 19.1* 21.1* 23.5* HGB 10.2* 10.0* 9.7*  317 332  137 139  
K 4.1 4.9 4.5  
CL 98 101 109* CO2 31 27 23 * 111* 76* CREA 2.58* 2.28* 1.56* * 218* 149* CA 9.3 9.4 9.5 MG 3.4* 3.1* 3.0* TBILI 0.3 0.3 0.3 SGOT 20 26 48* Impression · S/p 5V CABG 10/19/19 for ACS  With reexploration 10/23 for mediastinal hematoma · Acute resp failure- baseline fibrotic ILD (etiology not clear- this is a new dx but UIP/IPF in DDX) with superimposed pulmonary interstitial edema but I cannot r/o superimposed diffuse alveolar damage from blood/blood products. Amio pulm toxicty in DDX but not clear from [facility] review when he was on it. 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 UIP/IPF) with superimposed DAD- this carries a very poor prognosis and is usually not responsive to steroids. CXR today maybe a bit better but still with diffuse pattern of reticulation and GGO · Treatment is supportive and would strongly consider palliative consult. · Will speak to family today · Agree with continued diuresis. · CCP/RF negative, ESR 70 · D/w ICU RN 
 
30 mins CCT spent excluding any procedures performed.

## 2019-11-07 NOTE — PROGRESS NOTES
Problem: Mobility Impaired (Adult and Pediatric) Goal: *Acute Goals and Plan of Care (Insert Text) Description FUNCTIONAL STATUS PRIOR TO ADMISSION: Patient was independent and active without use of DME.  
 
HOME SUPPORT PRIOR TO ADMISSION: The patient lived with his son, but did not require assist. Son provides transportation since his car is currently broken down. Physical Therapy Goals Initiated 11/4/2019- Goals downgraded due to prolonged need for high levels of O2 support limiting progress and mobility since evaluation 1. Patient will move from supine to sit and sit to supine , scoot up and down and roll side to side in bed with minimal assistance within 7 days. 2.  Patient will perform sit to/from stand with minimal assistance within 7 days. 3.  Patient will complete marching in place x10 with least restrictive assistive device, sats >92%, and minimal assistance within 7 days. 4.  Patient will perform cardiac exercises per protocol with minimal assistance, and proper pacing strategies + deep breathing within 7 days. 5.  Patient will verbally and functionally recall 3/3 sternal precautions within 7 days. Initiated 10/25/2019 1. Patient will move from supine to sit and sit to supine, scoot up and down and roll side to side in bed with minimal assistance/contact guard assist within 5 days. 2.  Patient will perform sit to/from stand with minimal assistance/contact guard assist within 5 days. 3.  Patient will ambulate  feet with least restrictive assistive device and minimal assistance/contact guard assist within 5 days. 4.  Patient will ascend/descend 6 stairs with single handrail(s) with moderate assistance  within 5 days. 5.  Patient will perform cardiac exercises per protocol with minimal assistance/contact guard assist within 5 days. 6.  Patient will verbally and functionally recall 3/3 sternal precautions within 5 days.  
 
 
   
Outcome: Progressing Towards Goal 
 
 PHYSICAL THERAPY TREATMENT Patient: Sunita Song (53 y.o. male) Date: 11/7/2019 Diagnosis: ACS (acute coronary syndrome) ([facility] [address] [I24.9] Unstable angina (HCC) [I20.0] S/P CABG x 5 Procedure(s) (LRB): 
CARDIAC RE-ENTRY, MARISOL BY DR Verner Spoon (N/A) 15 Days Post-Op Precautions: Fall, Sternal(high O2 demands) Chart, physical therapy assessment, plan of care and goals were reviewed. ASSESSMENT Patient continues with skilled PT services and is progressing towards goals. Pt initially agreeable to EOB, but limited by BM. Pt fatigued post rolling and cleaning. Pt  was able to answers yes/no questions and following commands. Pt had decrease activity tolerance at this time. Current Level of Function Impacting Discharge (mobility/balance): max A x2 Other factors to consider for discharge: respiratory status PLAN : 
Patient continues to benefit from skilled intervention to address the above impairments. Continue treatment per established plan of care. to address goals. Recommendation for discharge: (in order for the patient to meet his/her long term goals) To be determined: This discharge recommendation: 
Has not yet been discussed the attending provider and/or case management IF patient discharges home will need the following DME: to be determined (TBD) SUBJECTIVE:  
Patient on bipap- yes no questions OBJECTIVE DATA SUMMARY:  
Critical Behavior: 
Neurologic State: Margot Ec Orientation Level: Appropriate for age Cognition: Follows commands Safety/Judgement: Decreased insight into deficits Functional Mobility Training: 
Bed Mobility: 
Rolling: Maximum assistance;Assist x2; Additional time Transfers: 
  
  
     
  
     
  
  
 
Pain Rating: 
 
 
Activity Tolerance:  
Poor, desaturates with exertion and requires oxygen and observed SOB with activity Please refer to the flowsheet for vital signs taken during this treatment. After treatment patient left in no apparent distress:  
Supine in bed, Heels elevated for pressure relief, Patient positioned in right sidelying for pressure relief and Call bell within reach COMMUNICATION/COLLABORATION:  
The patients plan of care was discussed with: Physical Therapist, Occupational Therapist and Registered Nurse Hugo Brown PTA Time Calculation: 15 mins

## 2019-11-08 NOTE — PROGRESS NOTES
1930 - Bedside and Verbal shift change report given to Parish Mason RN (oncoming nurse) by Bridgett Juarez RN (offgoing nurse). Report included the following information SBAR, Kardex, Intake/Output, MAR, Recent Results, Cardiac Rhythm Sinus Rhythm and Alarm Parameters . Medications verified and pt assessed. Pt in bed on ventilator. 0132 - Pt arousing and breathing noncompliant with ventilator - propofol gtt increased to 50mcg/kg/hr. Will continue to monitor. 0400 - Pt axillary temp rising to 99.8. Will continue to monitor. 0730 - Bedside and Verbal shift change report given to Brandon Holden RN (oncoming nurse) by Parish Mason RN (offgoing nurse). Report included the following information SBAR, Kardex, Intake/Output, MAR, Recent Results, Cardiac Rhythm Sinus Rhythm and Alarm Parameters .

## 2019-11-08 NOTE — PROGRESS NOTES
Pulmonary Critically ill in CVICU post CABG with hypoxemic resp failure Pt now vented on 80% FiO2 CXR with interstitial lung disease Underwent BAL yesterday - few WBC with neutrophil predominance. Not bloody. Cultures pending Pt is on steroids for possible inflammatory process and has been diuresed to the point of increased BUN / Cr without improvement in oxygenation Suspect acute exacerbation of underlying ILD in setting of cardiac surgery, high O2 exposure, ? Amiodarone Has been on steroids without significant improvement Suspect rapidly progressive pulmonary fibrosis Patient Vitals for the past 4 hrs: 
 BP Pulse Resp SpO2 Weight 19 0731  99 20 97 %   
19 0729    98 %   
19 0700 116/63 99 20 97 %   
19 0600 110/60 100 21 97 %   
19 0500 92/55 98 20 94 %   
19 0450     55.9 kg (123 lb 3.2 oz) Temp (24hrs), Av.6 °F (37 °C), Min:96.7 °F (35.9 °C), Max:99.8 °F (37.7 °C) Intake/Output Summary (Last 24 hours) at 2019 Last data filed at 2019 0700 Gross per 24 hour Intake 2470.16 ml Output 2420 ml Net 50.16 ml Sedate Oral ETT Rales Mild accessory use RRR Soft Warm and dry No edema CXR - ETT, interstitial changes Lab: 
Recent Labs 19 
0300 19 
0306 19 
0321 WBC 21.1* 19.1* 21.1* HGB 9.8* 10.2* 10.0*  
 301 317  139 137  
K 4.4 4.1 4.9  98 101 CO2 32 31 27 * 144* 111* CREA 2.92* 2.58* 2.28* * 214* 218* CA 8.6 9.3 9.4 MG 3.5* 3.4* 3.1* TBILI 0.4 0.3 0.3 SGOT 43* 20 26 ABG: 
No results for input(s): PHI, PCO2I, PO2I, HCO3I, SO2I, FIO2I in the last 72 hours. Impression · S/p 5V CABG 10/19/19 for ACS  With reexploration 10/23 for mediastinal hematoma · Acute resp failure- baseline fibrotic ILD (etiology not clear- this is a new dx but UIP/IPF in DDX) with superimposed pulmonary interstitial edema probable superimposed diffuse alveolar damage from blood/blood products. Amio pulm toxicty in DDX but not clear from [facility] review when he was on it. 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 
 
--vent support 
--diuretics will need to be held due to marked increase in BUN / Cr 
--followup cultures from bal 
--on solumedrol for now but not sure it is going to provide significant benefit but nothing else to offer --Will check Chest CT scan for prognostication 
--would consider palliative care eval 
 
The patient is critically ill and is at significant risk of decompensation. Critical Care time spent exclusive of procedures 30 minutes.   
 
Shirin Lopez MD

## 2019-11-08 NOTE — PROGRESS NOTES
Problem: Falls - Risk of 
Goal: *Absence of Falls Description Document Betty Velázquez Fall Risk and appropriate interventions in the flowsheet. 11/8/2019 0017 by Adrian Laureano RN Outcome: Progressing Towards Goal 
Note:  
Fall Risk Interventions: 
Mobility Interventions: Communicate number of staff needed for ambulation/transfer Mentation Interventions: Adequate sleep, hydration, pain control, Evaluate medications/consider consulting pharmacy Medication Interventions: Evaluate medications/consider consulting pharmacy Elimination Interventions: Toileting schedule/hourly rounds History of Falls Interventions: Evaluate medications/consider consulting pharmacy 11/8/2019 0013 by Adrian Laureano RN Outcome: Progressing Towards Goal 
Note:  
Fall Risk Interventions: 
Mobility Interventions: Communicate number of staff needed for ambulation/transfer Mentation Interventions: Adequate sleep, hydration, pain control, Evaluate medications/consider consulting pharmacy Medication Interventions: Evaluate medications/consider consulting pharmacy Elimination Interventions: Toileting schedule/hourly rounds History of Falls Interventions: Evaluate medications/consider consulting pharmacy Problem: Pressure Injury - Risk of 
Goal: *Prevention of pressure injury Description Document Earl Scale and appropriate interventions in the flowsheet. Outcome: Progressing Towards Goal 
Note:  
Pressure Injury Interventions: 
Sensory Interventions: Assess changes in LOC, Assess need for specialty bed, Check visual cues for pain, Minimize linen layers, Pressure redistribution bed/mattress (bed type), Turn and reposition approx. every two hours (pillows and wedges if needed) Moisture Interventions: Apply protective barrier, creams and emollients, Minimize layers, Maintain skin hydration (lotion/cream) Activity Interventions: Assess need for specialty bed, Pressure redistribution bed/mattress(bed type) Mobility Interventions: Assess need for specialty bed, Pressure redistribution bed/mattress (bed type), Turn and reposition approx. every two hours(pillow and wedges) Nutrition Interventions: Document food/fluid/supplement intake, Discuss nutritional consult with provider Friction and Shear Interventions: Apply protective barrier, creams and emollients, Lift sheet Problem: CABG: Post-Op Day 5 Goal: Off Pathway (Use only if patient is Off Pathway) Outcome: Progressing Towards Goal 
  
Problem: Infection - Risk of, Central Venous Catheter-Associated Bloodstream Infection Goal: *Absence of infection signs and symptoms Outcome: Progressing Towards Goal 
  
Problem: Nutrition Deficit Goal: *Optimize nutritional status Outcome: Progressing Towards Goal 
  
Problem: Gas Exchange - Impaired Goal: *Absence of hypoxia Outcome: Not Progressing Towards Goal 
  
Problem: Infection - Risk of, Urinary Catheter-Associated Urinary Tract Infection Goal: *Absence of infection signs and symptoms Outcome: Progressing Towards Goal

## 2019-11-08 NOTE — PROGRESS NOTES
Physical Therapy 11/8/2019 Chart reviewed and noted patient re-intubated 11/7/2019 for acute respiratory failure and remains on vent at this time. Per ABCDE protocol, will work with patient when PEEP is 10.0 or less, FIO2 60% or less, and patient is following basic commands (noted patient unresponsive, FiO2 80%, and PEEP 5). Will follow up Monday, 11/11/19 as medically appropriate. Recommend nursing to complete with patient, as able, in order to promote cardiopulmonary systems, maintain strength, endurance and independence: - Bed in chair position with foot board on 3x/day ~30-60 mins each 
- Passive ROM during bathing B UEs and LEs 
- Positioning to prevent contractures and edema. Thank you for your assistance.

## 2019-11-08 NOTE — PROGRESS NOTES
Nephrology Progress Note Breanna Mcclelland Date of Admission : 10/19/2019 CC:  Follow up for ISAIAH on CKD, hypervolemia Assessment and Plan ISAIAH on CKD: 
- from ATN post CABG 
- Cr up today from diuretics 
- hold bumex today and monitor 
- daily labs for now CKD III: 
- baseline Cr 1.3 prior to CABG 
- likely from DM and HTN 
  
HFrE F: 
- last EF 35-40% on 10/20 
- on dobutamine drip per CTS 
  
CAD s/p CABG x 5 10/19 and reexploration 10/23 for hematoma 
  
Resp Failure: 
- likely 2/2 underlying ILD + volume 
- intubated now 
- hold diuretics for now Chronic Anemia: 
- hgb stable 
- cont to monitor 
  
DM2: 
- on insulin 
  
Protein Malnutrition: 
- on TF via [place] Interval History: 
Seen and examined. Intubated yesterday. On francis and dobutamine. Good UOP w/ bumex. Cr up today. Current Medications: all current  Medications have been eviewed in [facility] Review of Systems: Pertinent items are noted in HPI. Objective: 
Vitals:   
Vitals:  
 19 0729 19 0731 19 0800 19 0900 BP:   106/60 106/58 Pulse:  99 99 98 Resp:  20 18 20 Temp:   99.6 °F (37.6 °C) SpO2: 98% 97% 95% Weight:      
Height:      
 
Intake and Output: 
No intake/output data recorded.  1901 -  0700 In: 3391.8 [I.V.:1451.8] Out: 3795 [GVVC] Physical Examination: 
Pt intubated     Yes General: Frail, sedated on the vent Neck:  Supple, no mass Resp:  Diffuse dry crackles CV:  RRR,  no murmur or rub, no LE edema GI:  Soft, NT, + Bowel sounds, no hepatosplenomegaly Neurologic:  Sedated on the vent Psych:             Unable to assess Skin:  No Rash :  Devlin in place 
 
[]    High complexity decision making was performed 
[]    Patient is at high-risk of decompensation with multiple organ involvement Lab Data Personally Reviewed: I have reviewed all the pertinent labs, microbiology data and radiology studies during assessment. Recent Labs 19 0300 11/07/19 
0306 11/06/19 
0321  139 137  
K 4.4 4.1 4.9  98 101 CO2 32 31 27 * 214* 218* * 144* 111* CREA 2.92* 2.58* 2.28* CA 8.6 9.3 9.4 MG 3.5* 3.4* 3.1* ALB 2.7* 2.8* 2.8* SGOT 43* 20 26 ALT 56 41 47 Recent Labs 11/08/19 
0300 11/07/19 
0306 11/06/19 
0321 WBC 21.1* 19.1* 21.1* HGB 9.8* 10.2* 10.0* HCT 30.4* 30.9* 31.2*  
 301 317 No results found for: SDES Lab Results Component Value Date/Time Culture result: PENDING 11/07/2019 12:45 PM  
 Culture result: HEAVY NORMAL RESPIRATORY TARIK 10/21/2019 12:08 PM  
 
Recent Results (from the past 24 hour(s)) CULTURE, RESPIRATORY/SPUTUM/BRONCH W GRAM STAIN Collection Time: 11/07/19 12:45 PM  
Result Value Ref Range Special Requests: NO SPECIAL REQUESTS    
 GRAM STAIN OCCASIONAL WBCS SEEN    
 GRAM STAIN NO ORGANISMS SEEN Culture result: PENDING   
CELL COUNT, BODY FLUID Collection Time: 11/07/19 12:45 PM  
Result Value Ref Range BODY FLUID TYPE BRONCHOALVEOLAR LAVAGE    
 FLUID COLOR COLORLESS    
 FLUID APPEARANCE CLEAR    
 FLUID RBC CT. >100 (H) 0 /cu mm FLUID NUCLEATED CELLS 49 /cu mm  
 FLD NEUTROPHILS 80 (A) NRRE % FLD LYMPHS 2 (A) NRRE % FLD MONO/MACROPHAGES 18 (A) NRRE %  
IRIS, OTHER SOURCES Collection Time: 11/07/19 12:45 PM  
Result Value Ref Range Special Requests: NO SPECIAL REQUESTS    
 KOH NO YEAST SEEN    
 KOH NO FUNGAL ELEMENTS SEEN    
GLUCOSE, POC Collection Time: 11/07/19  1:27 PM  
Result Value Ref Range Glucose (POC) 204 (H) 65 - 100 mg/dL Performed by Misael Abraham, POC Collection Time: 11/07/19  6:24 PM  
Result Value Ref Range Glucose (POC) 173 (H) 65 - 100 mg/dL Performed by Misael Abraham, POC Collection Time: 11/07/19 11:37 PM  
Result Value Ref Range Glucose (POC) 212 (H) 65 - 100 mg/dL [facility] METABOLIC PANEL, COMPREHENSIVE  
 Collection Time: 11/08/19  3:00 AM  
Result Value Ref Range Sodium 144 136 - 145 mmol/L Potassium 4.4 3.5 - 5.1 mmol/L Chloride 102 97 - 108 mmol/L  
 CO2 32 21 - 32 mmol/L Anion gap 10 5 - 15 mmol/L Glucose 170 (H) 65 - 100 mg/dL  (H) 6 - 20 MG/DL Creatinine 2.92 (H) 0.70 - 1.30 MG/DL  
 BUN/Creatinine ratio 59 (H) 12 - 20 GFR est AA 26 (L) >60 ml/min/1.73m2 GFR est non-AA 21 (L) >60 ml/min/1.73m2 Calcium 8.6 8.5 - 10.1 MG/DL Bilirubin, total 0.4 0.2 - 1.0 MG/DL  
 ALT (SGPT) 56 12 - 78 U/L  
 AST (SGOT) 43 (H) 15 - 37 U/L Alk. phosphatase 150 (H) 45 - 117 U/L Protein, total 6.5 6.4 - 8.2 g/dL Albumin 2.7 (L) 3.5 - 5.0 g/dL Globulin 3.8 2.0 - 4.0 g/dL A-G Ratio 0.7 (L) 1.1 - 2.2    
CBC W/O DIFF Collection Time: 11/08/19  3:00 AM  
Result Value Ref Range WBC 21.1 (H) 4.1 - 11.1 K/uL  
 RBC 3.12 (L) 4.10 - 5.70 M/uL HGB 9.8 (L) 12.1 - 17.0 g/dL HCT 30.4 (L) 36.6 - 50.3 % MCV 97.4 80.0 - 99.0 FL  
 MCH 31.4 26.0 - 34.0 PG  
 MCHC 32.2 30.0 - 36.5 g/dL  
 RDW 15.1 (H) 11.5 - 14.5 % PLATELET 210 075 - 614 K/uL MPV 11.8 8.9 - 12.9 FL  
 NRBC 0.0 0  WBC ABSOLUTE NRBC 0.00 0.00 - 0.01 K/uL MAGNESIUM Collection Time: 11/08/19  3:00 AM  
Result Value Ref Range Magnesium 3.5 (H) 1.6 - 2.4 mg/dL NT-PRO BNP Collection Time: 11/08/19  3:00 AM  
Result Value Ref Range NT pro-BNP 9,465 (H) <125 PG/ML  
GLUCOSE, POC Collection Time: 11/08/19  5:36 AM  
Result Value Ref Range Glucose (POC) 199 (H) 65 - 100 mg/dL Performed by Shorty Portillo MD 
[address] [place] [address], [address] [place] [address] Phone - [phone] Fax - [phone] 
[email or web address]

## 2019-11-08 NOTE — PROGRESS NOTES
Chart reviewed and noted patient re-intubated 11/7/2019 for acute respiratory failure and remains on vent at this time. Per ABCDE protocol, will work with patient when PEEP is 10.0 or less, FIO2 60% or less, and patient is following basic commands (noted patient unresponsive, FiO2 80%, and PEEP 5). Will follow patient peripherally. Recommend nursing to complete with patient, as able, in order to promote cardiopulmonary systems, maintain strength, endurance and independence:  
-bed in chair position with foot board on 3x/day ~30-60 mins each 
-passive ROM during bathing B UEs and LEs 
-positioning to prevent contractures and edema. Thank you for your assistance.

## 2019-11-08 NOTE — PROGRESS NOTES
0750: Bedside and Verbal shift change report given to Jose C Hoffmann RN (oncoming nurse) by Umair Huang RN (offgoing nurse). Report included the following information SBAR, OR Summary, Procedure Summary, Intake/Output, MAR, Recent Results, Cardiac Rhythm SR and Alarm Parameters . 0830: Dr. Renay Núñez at bedside, orders to hold bumex today 
 
0900: Significant amount of urine noted to be on pad underneath pt despite da silva catheter. Water taken out of balloon and catheter advanced, balloon reinflated. Da Silva flushed wit 10cc of sterile NS, no resistance noted while flushing. Will continue to monitor. 1100: RT at bedside, vent alarming with high peak pressures. RT stating that pt's breathing pattern has changed since early this AM and causing the high peak pressures. ABG drawn:  PH: 7.33, pCO2: 59.8, pO2: 174, HCO3: 31.5, sO2: 99. RT decreased FiO2 to 50%. Pt sats >92%. RT to adjust vent settings to help with pt's alarms 1150: PRN 1mg Versed given for pt's increasing agitation/increased breathing over ventilator. 1350: Got in contact with CT,  Plans to bring pt down for chest CT around 1500 
 
1415: Spoke with Kamla Thompson NP over phone regarding pt's increasing agitation and breathing over the ventilator despite increasing sedation. New orders received for fentanyl and versed gtt's to be started 1430: Called down to CT to postpone until pt more sedated. 1625: Fentanyl and Versed gtts both started, will wean propofol. 1720: Pt taken down to CT on tele monitor and ventilator, accompanied by RN, RT, and PCT 
 
1750: Pt back on unit from CT 
 
1950: Bedside and Verbal shift change report given to Lois Castillo RN (oncoming nurse) by Jose C Hoffmann RN (offgoing nurse). Report included the following information SBAR, ED Summary, OR Summary, Procedure Summary, Intake/Output, MAR, Recent Results, Cardiac Rhythm SR and Alarm Parameters .

## 2019-11-08 NOTE — DIABETES MGMT
[facility] Cardiac Surgery Progress Note Recommendations/ Comments: BG's most results > 200 mg/dL. BG this  mg/dl with Lantus 25 BID and lispro correction 11 untis in past 24 hours Transitioned off of insulin gtt 11-5-2019 with Lantus 20 units at 1130 AM  
Noted while on the gtt rates varied widely from 2.8 units/hr - 22 units/hr. He received approx 120 units in 24 hours prior to coming off of the gtt Steroids Solu medrol 80 mg/dL Q 8 hours. TF Osmolite 1.5 are continuous, @ 50/hr Unstable for po due to respiratory Renal noted If appropriate please consider Increase Lantus to 30 units q12 hours Change lispro correction scale to resistant while on steroids - although renal he needs this while on steroids. Change back to normal or high sensitivity as steroids wean or when d/c'd 
 
Current hospital DM medications Lantus 25 units Q 12 hours Lispro normal sensitivity correction scale Patient is 68 y.o. male s/p CABG x 5 followed by re-entry for mediastinal clot evacuation and repair of SVG to PDA  - POD 13.  
Pt with documented hx of DM on no meds PTA. Anemia - A1c inaccurate A1c:  
Lab Results Component Value Date/Time Hemoglobin A1c 5.3 10/21/2019 03:04 AM  
 
 
 
Recent Glucose Results:  
Lab Results Component Value Date/Time  (H) 11/08/2019 03:00 AM  
 GLUCPOC 199 (H) 11/08/2019 05:36 AM  
 GLUCPOC 212 (H) 11/07/2019 11:37 PM  
 GLUCPOC 173 (H) 11/07/2019 06:24 PM  
  
 
Lab Results Component Value Date/Time Creatinine 2.92 (H) 11/08/2019 03:00 AM  
 
Estimated Creatinine Clearance: 18.3 mL/min (A) (based on SCr of 2.92 mg/dL (H)). Active Orders Diet DIET NPO With Tube Feedings PO intake:  
No data found. Will continue to follow as needed. Thank you. Drew Taylor RN, CDE Time spent: 4 minutes

## 2019-11-08 NOTE — PROGRESS NOTES
[facility] ICU Progress Note Admit Date: 10/19/2019 Procedure:  Procedure(s): 
CARDIAC RE-ENTRY, MARISOL BY  [facility] -  [facility]    
 
CABG x 5, LIMA to LAD, RSVG to [id number], RSVG to PDA 10/23/19 Subjective:  
Pt seen with Dr. Mayra Blount. Dobutamine at 1, Neosynephrine at 79, Diprovan, Precedex. Tmax 99.8, intubated and sedated. Objective:  
Vitals: 
Blood pressure 106/61, pulse 92, temperature 99.6 °F (37.6 °C), resp. rate 15, height 5' 7\" (1.702 m), weight 123 lb 3.2 oz (55.9 kg), SpO2 98 %. Temp (24hrs), Av.8 °F (37.1 °C), Min:96.7 °F (35.9 °C), Max:99.8 °F (37.7 °C) EKG/Rhythm: SR in the 90s Oxygen Therapy: 
Oxygen Therapy O2 Sat (%): 98 % (19 1000) Pulse via Oximetry: 98 beats per minute (19 07) O2 Device: Ventilator (19 08) O2 Flow Rate (L/min): 40 l/min (19 2000) O2 Temperature: 98.6 °F (37 °C) (19 0729) FIO2 (%): 80 % (19 08) CXR:  
CXR Results  (Last 48 hours) 19 0508  XR CHEST PORT Final result Impression:  IMPRESSION: No significant change. Narrative:  EXAM:  XR CHEST PORT. INDICATION: Interstitial edema. COMPARISON: 2019. FINDINGS:   
A portable AP radiograph of the chest was obtained at 0401 hours. There are  
sternal sutures. Lines and tubes: The patient is on a cardiac monitor. The endotracheal tube,  
nasoenteric feeding tube and right arm PICC line are all unchanged in position. Lungs: The interstitial disease or interstitial edema throughout the lungs is  
unchanged. Pleura: There is no pneumothorax or pleural effusion. Mediastinum: The cardiac and mediastinal contours and pulmonary vascularity are  
normal.  
Bones and soft tissues: The bones and soft tissues are grossly within normal  
limits. 19 1201  XR CHEST PORT Final result Impression:  IMPRESSION:  
1. ET tube is in satisfactory position Narrative:  EXAM: XR CHEST PORT INDICATION: intubation COMPARISON: 11/7/2019 at [address] FINDINGS: A portable AP radiograph of the chest was obtained at 1138 hours. The  
patient is on a cardiac monitor. ET tube is in satisfactory position. PICC line  
overlies the SVC. Right IJ catheter has been removed. The interstitial edema  
like pattern is unchanged. Overall aeration is slightly improved. No  
pneumothorax. 11/07/19 0521  XR CHEST PORT Final result Impression:  IMPRESSION:  
1. Decrease in the interstitial edema like pattern Narrative:  EXAM: XR CHEST PORT INDICATION: interstitial edema COMPARISON: 11/6/2019 FINDINGS: A portable AP radiograph of the chest was obtained at 0408 hours. The  
patient is on a cardiac monitor. Right IJ catheter overlies the SVC. PICC line  
overlies the junction of right atrium and SVC. Feeding tube courses into the  
stomach and the tip appears to be near the first portion of the duodenum. Interstitial pattern suggesting edema has slightly decreased. Superimposed  
interstitial lung disease could also be contributing to the interstitial  
opacities. No pneumothorax. Admission Weight: Last Weight Weight: 141 lb 5 oz (64.1 kg) Weight: 123 lb 3.2 oz (55.9 kg) Intake / Output / Drain: 
Current Shift: 11/08 0701 - 11/08 1900 In: 458.3 [I.V.:188.3] Out: 260 [Urine:260] Last 24 hrs.:  
 
Intake/Output Summary (Last 24 hours) at 11/8/2019 1108 Last data filed at 11/8/2019 1100 Gross per 24 hour Intake 2788.46 ml Output 2280 ml Net 508.46 ml EXAM: 
General:  Intubated, sedated. Lungs:   +cough, not many secretions, crackles Incision:  Midsternal incision with no significant erythema, drainage, or dehiscence. Heart:  Regular rate and rhythm, S1, S2 normal, no murmur, click, rub or gallop. Abdomen:   Soft, non-tender. Bowel sounds hypoactive.  No masses,  No organomegaly. +BM  Extremities:  No edema. Palpable pulses bilat Neurologic:  Sedated. Moves all extremities Labs:  
Recent Labs 19 
0536 19 
0300 WBC  --  21.1* HGB  --  9.8* HCT  --  30.4* PLT  --  331 NA  --  144 K  --  4.4 BUN  --  172* CREA  --  2.92* GLU  --  170* GLUCPOC 199*  --   
 
 
 Assessment:  
 
Principal Problem: S/P CABG x 5 (10/23/2019) Overview: x 5, LIMA to LAD, RSVG to Diag1, OM2-OM3, RSVG to PDA Active Problems: 
  ACS (acute coronary syndrome) ([facility] [address] (10/19/2019) Unstable angina ([facility] [address] (10/19/2019) Coronary artery disease of native artery of native heart with stable angina pectoris ([facility] [address] (10/21/2019) [facility] (10/22/2019) Plan/Recommendations/Medical Decision Makin. S/p CABG: on ASA, statin. No BB, ACEi while on vasoactive medications 2. Acute on chronic systolic CHF, NYHA Class II on admit: EF 35-40% preop. Dobutamine to assist diuresis/renal function. No BB/ACE/ARB/AA until vitals/kidney function allows. Trend pBNP - stable 3. Acute postop respiratory failure with chronic interstitial fibrosis per CXR: Re-intubated  due to worsening acute respiratory failure. Diuresis per Nephrology. Amiodarone had been stopped on 10/31. Solumedrol per pulm. Continue scheduled nebs. Cont mucinex, pulm toileting, mucomyst. 
 
4. Renal insufficiency: Creatinine worse today at 2.9- cont dobutamine gtt. Nephrology following and appreciate their recommendations. Diuretics stopped for now due to renal function. Devlin in place for accurate I&O 5. Anemia: had preop, stable H&H. Iron panel sent preop - iron, iron sat low. 6. Thrombocytopenia: Resolved. 7. Hx of HTN: Now hypotensive-likely due to sedation. Dobutamine and Neosynephrine drips. 8. HLD: Continue pravastatin 9. Leukocytosis: likely from steroids. Devlin placed . PICC placed . Monitor daily CBC. 10. Constipation: Resolved. Resume bowel meds as needed 11. Current smoker: smoking cessation education. Cont pulm toileting. Nicotine patch ordered 12. Nutrition: Appreciate Dietician recommendations. Dobhoff placed 10/30 and Enteral feedings started. Too unstable from resp standpoint for PO diet, TF's back to continuous at 50 ml/hr. Try and AVOID increased volume amounts 13. Hyperglycemia: Preop A1c 5.3 with no DM history, now with hyperglycemia. Likely due to steroids and tube feedings. DTS following. Lantus 30 mg BID with SSI increased to resistant scale. 14. DVT/GI Prophylaxis: SCD's, SQ Heparin, PPI Dispo: PT/OT as able. Remain in CVI until resp status more stable.   
 
Signed By: Penny Joseph NP

## 2019-11-09 NOTE — PROGRESS NOTES
0800: report received from Snehal Gomez RN. All questions answered. gtts verified, assessment completed, no acute changes noted 0815: Dr. Wilfreod Benson at bedside, updated by RN, plan of care discussed 
 
4182: distant cousin, Catarina Galaviz, at bedside. Briefly updated by RN 
 
1100: Heri Rai, NP with Pulmonary at bedside, updated by RN, plan to discuss plan of care with family on Monday per NP 
 
1425: pts son, Damion Carpio, called unit, updated by RN, all questions answered. Plans to come Monday at 0800 to discuss plan of care with MDs 
 
2000: Bedside shift change report given to Snehal Gomez RN by Umer Pedroza RN and Lamar Rivera, NORMA. Report included the following information SBAR, MAR and Cardiac Rhythm nsr.

## 2019-11-09 NOTE — PROGRESS NOTES
NYHA class IV A/C systolic heart failure (EF 25%, NT pro-BNP 15,013) Acute on chronic kidney disease Mild pulmonary edema Mild chronic lung disease Acute on chronic hypoxic respiratory failure Intubated due to progressive pulmonary decline The patient spoke to me about his wishes - agrees with intubation and tracheostomy Hgb and platelets look good Creatinine in the mid 2's Bilirubin and other LFTs look good NT pro-BNP about the same Discussed with pulmonary Discussed with renal  
 
CXR - mild pulmonary edema Intake/Output Summary (Last 24 hours) at 11/9/2019 1451 Last data filed at 11/9/2019 1300 Gross per 24 hour Intake 2332.51 ml Output 2065 ml Net 267.51 ml Visit Vitals /62 Pulse 79 Temp 97.1 °F (36.2 °C) Resp 14 Ht 5' 7\" (1.702 m) Wt 122 lb 14.4 oz (55.7 kg) SpO2 96% BMI 19.25 kg/m² Risk of morbidity and mortality - high Medical decision making - high complexity Total critical care time - 30 minutes (CPT 84241)

## 2019-11-09 NOTE — PROGRESS NOTES
NYHA class IV A/C systolic heart failure (EF 25%, NT pro-BNP 15,013) Acute on chronic kidney disease Mild pulmonary edema Mild chronic lung disease Acute on chronic hypoxic respiratory failure Remains intubated and sedated Family in to visit earlier Hgb and platelets look good Creatinine in the mid 2's Bilirubin and other LFTs look good Planning for tracheostomy next week ABG looks reasonable CXR - mild pulmonary edema Intake/Output Summary (Last 24 hours) at 11/9/2019 1443 Last data filed at 11/9/2019 1300 Gross per 24 hour Intake 2332.51 ml Output 2065 ml Net 267.51 ml Visit Vitals /62 Pulse 79 Temp 97.1 °F (36.2 °C) Resp 14 Ht 5' 7\" (1.702 m) Wt 122 lb 14.4 oz (55.7 kg) SpO2 96% BMI 19.25 kg/m² Risk of morbidity and mortality - high Medical decision making - high complexity Total critical care time - 30 minutes (CPT 96385)

## 2019-11-09 NOTE — PROGRESS NOTES
[facility] ICU Progress Note Admit Date: 10/19/2019 Procedure:  Procedure(s): 
CARDIAC RE-ENTRY, MARISOL BY  [facility] -  [facility]    
 
CABG x 5, LIMA to LAD, RSVG to [id number], RSVG to PDA 10/23/19 Subjective:  
Pt seen with Dr. Tee Oconnor. Dobutamine at 1, Neosynephrine at 40, Precedex, versed, fentanyl. Tmax 99.6, intubated and sedated. TF at goal.  
 
 Objective:  
Vitals: 
Blood pressure 106/63, pulse 80, temperature 97.7 °F (36.5 °C), resp. rate 14, height 5' 7\" (1.702 m), weight 122 lb 14.4 oz (55.7 kg), SpO2 98 %. Temp (24hrs), Av.1 °F (36.7 °C), Min:97.6 °F (36.4 °C), Max:98.6 °F (37 °C) EKG/Rhythm: SR in the 76s Oxygen Therapy: 
Oxygen Therapy O2 Sat (%): 98 % (19 0900) Pulse via Oximetry: 77 beats per minute (19) O2 Device: Endotracheal tube;Ventilator (19) O2 Flow Rate (L/min): 40 l/min (19) O2 Temperature: 98.6 °F (37 °C) (19 0729) FIO2 (%): 50 % (19 0745) CXR:  
CXR Results  (Last 48 hours) 19 0453  XR CHEST PORT Final result Impression:  IMPRESSION: No significant change. Narrative:  EXAM:  XR CHEST PORT. INDICATION: Interstitial edema. COMPARISON: 2019. FINDINGS:   
A portable AP radiograph of the chest was obtained at 0341 hours. There are  
sternal sutures and mediastinal clips. Lines and tubes: The patient is on a cardiac monitor. The endotracheal tube,  
nasogastric tube and right arm PICC line are unchanged in position. Lungs: Mild interstitial disease or edema throughout the lungs is unchanged. Pleura: There is no pneumothorax or pleural effusion. Mediastinum: The cardiac and mediastinal contours and pulmonary vascularity are  
normal. The aorta is mildly tortuous. There is a coronary stent. Bones and soft tissues: The bones and soft tissues are grossly within normal  
limits. 19 0508  XR CHEST PORT Final result Impression:  IMPRESSION: No significant change. Narrative:  EXAM:  XR CHEST PORT. INDICATION: Interstitial edema. COMPARISON: 11/7/2019. FINDINGS:   
A portable AP radiograph of the chest was obtained at 0401 hours. There are  
sternal sutures. Lines and tubes: The patient is on a cardiac monitor. The endotracheal tube,  
nasoenteric feeding tube and right arm PICC line are all unchanged in position. Lungs: The interstitial disease or interstitial edema throughout the lungs is  
unchanged. Pleura: There is no pneumothorax or pleural effusion. Mediastinum: The cardiac and mediastinal contours and pulmonary vascularity are  
normal.  
Bones and soft tissues: The bones and soft tissues are grossly within normal  
limits. 11/07/19 1201  XR CHEST PORT Final result Impression:  IMPRESSION:  
1. ET tube is in satisfactory position Narrative:  EXAM: XR CHEST PORT INDICATION: intubation COMPARISON: 11/7/2019 at [address] FINDINGS: A portable AP radiograph of the chest was obtained at 1138 hours. The  
patient is on a cardiac monitor. ET tube is in satisfactory position. PICC line  
overlies the SVC. Right IJ catheter has been removed. The interstitial edema  
like pattern is unchanged. Overall aeration is slightly improved. No  
pneumothorax. Admission Weight: Last Weight Weight: 141 lb 5 oz (64.1 kg) Weight: 122 lb 14.4 oz (55.7 kg) Intake / Output / Drain: 
Current Shift: 11/09 0701 - 11/09 1900 In: 38.6 [I.V.:38.6] Out: 125 [Urine:125] Last 24 hrs.:  
 
Intake/Output Summary (Last 24 hours) at 11/9/2019 9831 Last data filed at 11/9/2019 8764 Gross per 24 hour Intake 2306.97 ml Output 2100 ml Net 206.97 ml EXAM: 
General:  Intubated, sedated. Lungs:   +cough, not many secretions, crackles Incision:  Midsternal incision with no significant erythema, drainage, or dehiscence. Heart:  Regular rate and rhythm, S1, S2 normal, no murmur, click, rub or gallop. Abdomen:   Soft, non-tender. Bowel sounds hypoactive. No masses,  No organomegaly. +BM  Extremities:  No edema. Palpable pulses bilat Neurologic:  Sedated. Moves all extremities Labs:  
Recent Labs 19 
0827  19 
0404 WBC  --   --  13.7* HGB  --   --  9.1* HCT  --   --  28.8* PLT  --   --  219 NA  --   --  145 K  --   --  4.0  
BUN  --   --  167* CREA  --   --  2.61* GLU  --   --  147* GLUCPOC 128*   < >  --   
 < > = values in this interval not displayed. Assessment:  
 
Principal Problem: S/P CABG x 5 (10/23/2019) Overview: x 5, LIMA to LAD, RSVG to Diag1, OM2-OM3, RSVG to PDA Active Problems: 
  [facility] (10[facility]/2019) Unstable angina ([facility] [address] (10/19/2019) Coronary artery disease of native artery of native heart with stable angina pectoris ([facility] [address] (10/21/2019) Systolic heart failure ([facility] [address] (10/22/2019) Plan/Recommendations/Medical Decision Makin. S/p CABG: on ASA, statin. No BB, ACEi while on vasoactive medications 2. Acute on chronic systolic CHF, NYHA Class II on admit: EF 35-40% preop. Dobutamine to assist diuresis/renal function. No BB/ACE/ARB/AA until vitals/kidney function allows. Trend pBNP - stable 3. Acute postop respiratory failure with chronic interstitial fibrosis per CXR: Re-intubated  due to worsening acute respiratory failure. Diuresis per Nephrology. Amiodarone had been stopped on 10/31. Solumedrol per pulm. Continue scheduled nebs. Cont mucinex, pulm toileting, mucomyst. Chest CT completed yesterday. Thoracic consulted for trach. 4. Renal insufficiency: Creatinine improved some today at 2.6- cont dobutamine gtt.   Nephrology following and appreciate their recommendations. Diuretics stopped for now due to renal function. Devlin in place for accurate I&O 5. Anemia: had preop, stable H&H. Iron panel sent preop - iron, iron sat low. 6. Thrombocytopenia: Resolved. 7. Hx of HTN: Now hypotensive-likely due to sedation. Dobutamine and Neosynephrine drips. 8. HLD: Continue pravastatin 9. Leukocytosis: likely from steroids. Devlin placed 11/7. PICC placed 11/6. Monitor daily CBC. Improved some today. 10. Constipation: Resolved. Resume bowel meds as needed 11. Current smoker: smoking cessation education. Cont pulm toileting. Nicotine patch ordered 12. Nutrition: Appreciate Dietician recommendations. Dobhoff placed 10/30 and Enteral feedings started. Too unstable from resp standpoint for PO diet, TF's back to continuous at 50 ml/hr. Try and AVOID increased volume amounts 13. Hyperglycemia: Preop A1c 5.3 with no DM history, now with hyperglycemia. Likely due to steroids and tube feedings. DTS following. Lantus 30 mg BID with SSI increased to resistant scale. Improved 14. DVT/GI Prophylaxis: SCD's, SQ Heparin, PPI Dispo: PT/OT as able. Remain in CVI until resp status more stable.   
 
Signed By: Hung Cartagena NP

## 2019-11-09 NOTE — PROGRESS NOTES
Pulmonary Critically ill in CVICU post CABG with hypoxemic resp failure Pt now vented on 50% FiO2 CXR with interstitial lung disease Underwent BAL - few WBC with neutrophil predominance. Not bloody. Cultures pending Pt is on steroids for possible inflammatory process and has been diuresed to the point of increased BUN / Cr without improvement in oxygenation Suspect acute exacerbation of underlying ILD in setting of cardiac surgery, high O2 exposure, ? Amiodarone Has been on steroids without significant improvement Suspect rapidly progressive pulmonary fibrosis Patient Vitals for the past 4 hrs: 
 BP Temp Pulse Resp SpO2  
19 1110   79 14 97 % 19 1100 105/63  81 14 95 % 19 1000 92/61  82 17 98 % 19 0900 106/63  80 14 98 % 19 0800 112/67 97.7 °F (36.5 °C) 80 14 96 % 19 0750     99 % 19 0745   77 14 99 % Temp (24hrs), Av.1 °F (36.7 °C), Min:97.6 °F (36.4 °C), Max:98.6 °F (37 °C) Intake/Output Summary (Last 24 hours) at 2019 1117 Last data filed at 2019 1100 Gross per 24 hour Intake 2349.97 ml Output 2240 ml Net 109.97 ml Sedate Oral ETT Rales Mild accessory use RRR Soft Warm and dry No edema CXR - ETT, interstitial changes Lab: 
Recent Labs 19 
0404 19 
0300 19 
4524 WBC 13.7* 21.1* 19.1* HGB 9.1* 9.8* 10.2*  331 301  144 139  
K 4.0 4.4 4.1  102 98 CO2 33* 32 31 * 172* 144* CREA 2.61* 2.92* 2.58* * 170* 214* CA 8.5 8.6 9.3 MG 3.4* 3.5* 3.4* PHOS 5.9*  --   --   
TBILI 0.5 0.4 0.3 SGOT 28 43* 20 ABG: 
Recent Labs 19 
9211 19 
6139 19 
1124 PHI 7.379 7.396 7.330* PCO2I 53.7* 53.1* 59.8*  
PO2I 93  --  174* HCO3I 31.7* 32.6* 31.5* SO2I 97  --  99* FIO2I 50 50 0.80 Impression · S/p 5V CABG 10/19/19 for ACS  With reexploration 10/23 for mediastinal hematoma · Acute resp failure- baseline fibrotic ILD (etiology not clear- this is a new dx but UIP/IPF in DDX) with superimposed pulmonary interstitial edema probable superimposed diffuse alveolar damage from blood/blood products. Amio pulm toxicty in DDX but not clear from [facility] review when he was on it. 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 
 
--vent support 
--diuretics on hold 
--followup cultures from bal 
--on solumedrol for now but not sure it is going to provide significant benefit but nothing else to offer 
--would consider palliative care eval 
 
The patient is critically ill and is at significant risk of decompensation. Critical Care time spent exclusive of procedures 30 minutes.   
 
Nick Olivares, ANANYA

## 2019-11-09 NOTE — PROGRESS NOTES
Nephrology Progress Note Clifford Pollard Date of Admission : 10/19/2019 CC:  Follow up for ISAIAH on CKD, hypervolemia Assessment and Plan ISAIAH on CKD: 
- from [facility]N post CABG 
- Cr up today from diuretics 
- hold diuretics and aim for net even  
- resume diuretics tomorrow to keep him even CKD III: 
- baseline Cr 1.3 prior to CABG 
- likely from DM and HTN 
  
HFrE F: 
- last EF 35-40% on 10/20 
- on dobutamine drip per CTS 
  
CAD s/p CABG x 5 10/19 and reexploration 10/23 for hematoma 
  
Resp Failure: 
- likely 2/2 underlying ILD + volume 
- intubated now 
- hold diuretics for now Chronic Anemia: 
- hgb stable 
- cont to monitor 
  
DM2: 
- on insulin 
  
Protein Malnutrition: 
- on TF via [place] [address] Interval History: 
Seen and examined. BUN and Cr trending down w/ holding diurettics. UOP ~ 2L. ON VENT . pasisng flatus. CXR unchanged , On 50% gio2 Current Medications: all current  Medications have been eviewed in [facility] hospitals in the [place] Review of Systems: Review of systems not obtained due to patient factors. Objective: 
Vitals:   
Vitals:  
 11/09/19 0400 11/09/19 0430 11/09/19 0500 11/09/19 0600 BP: 107/72  115/59 98/59 Pulse: 83  79 78 Resp: 10  15 16 Temp: 98.2 °F (36.8 °C) SpO2: 98%  99% 100% Weight:  55.7 kg (122 lb 14.4 oz) Height:      
 
Intake and Output: 
11/08 1901 - 11/09 0700 In: 1153.2 [I.V.:433.2] Out: 1030 [Urine:1030] 11/07 0701 - 11/08 1900 In: 8491 [I.V.:2003] Out: 3490 [[facility]:9455] Physical Examination: 
Pt intubated     Yes General: Frail, sedated on the vent Neck:  Supple, no mass Resp:  Diffuse dry crackles CV:  RRR,  no murmur or rub, no LE edema GI:  Soft, NT, + Bowel sounds, no hepatosplenomegaly Neurologic:  Sedated on the vent Psych:             Unable to assess Skin:  No Rash :  Devlin in place 
 
[]    High complexity decision making was performed 
[]    Patient is at high-risk of decompensation with multiple organ involvement Lab Data Personally Reviewed: I have reviewed all the pertinent labs, microbiology data and radiology studies during assessment. Recent Labs 11/09/19 
0404 11/08/19 
0300 11/07/19 
2547  144 139  
K 4.0 4.4 4.1  102 98 CO2 33* 32 31 * 170* 214* * 172* 144* CREA 2.61* 2.92* 2.58* CA 8.5 8.6 9.3 MG 3.4* 3.5* 3.4* PHOS 5.9*  --   --   
ALB 2.4* 2.7* 2.8* SGOT 28 43* 20 ALT 50 56 41 Recent Labs 11/09/19 
0404 11/08/19 
0300 11/07/19 
1800 WBC 13.7* 21.1* 19.1* HGB 9.1* 9.8* 10.2* HCT 28.8* 30.4* 30.9*  331 301 No results found for: SDES Lab Results Component Value Date/Time Culture result: NO GROWTH AFTER 21 HOURS 11/07/2019 12:45 PM  
 Culture result: HEAVY NORMAL RESPIRATORY TARIK 10/21/2019 12:08 PM  
 
Recent Results (from the past 24 hour(s)) GLUCOSE, POC Collection Time: 11/08/19 11:18 AM  
Result Value Ref Range Glucose (POC) 151 (H) 65 - 100 mg/dL Performed by ARISTIDES CAMACHO G3 - PUL Collection Time: 11/08/19 11:24 AM  
Result Value Ref Range FIO2 (POC) 0.80 % pH (POC) 7.330 (L) 7.35 - 7.45    
 pCO2 (POC) 59.8 (H) 35.0 - 45.0 MMHG  
 pO2 (POC) 174 (H) 80 - 100 MMHG  
 HCO3 (POC) 31.5 (H) 22 - 26 MMOL/L  
 sO2 (POC) 99 (H) 92 - 97 % Base excess (POC) 6 mmol/L Site RIGHT RADIAL Device: VENT Mode ASSIST CONTROL Tidal volume 500 ml Set Rate 14 bpm  
 PEEP/CPAP (POC) 5 cmH2O  
 PIP (POC) 18 Allens test (POC) YES Specimen type (POC) ARTERIAL Total resp. rate 18 GLUCOSE, POC Collection Time: 11/08/19  6:23 PM  
Result Value Ref Range Glucose (POC) 160 (H) 65 - 100 mg/dL Performed by ARISTIDES BOUCHER   
GLUCOSE, POC Collection Time: 11/09/19 12:17 AM  
Result Value Ref Range Glucose (POC) 150 (H) 65 - 100 mg/dL Performed by Jules Niño METABOLIC PANEL, COMPREHENSIVE Collection Time: 11/09/19  4:04 AM  
Result Value Ref Range Sodium 145 136 - 145 mmol/L Potassium 4.0 3.5 - 5.1 mmol/L Chloride 106 97 - 108 mmol/L  
 CO2 33 (H) 21 - 32 mmol/L Anion gap 6 5 - 15 mmol/L Glucose 147 (H) 65 - 100 mg/dL  (H) 6 - 20 MG/DL Creatinine 2.61 (H) 0.70 - 1.30 MG/DL  
 BUN/Creatinine ratio 64 (H) 12 - 20 GFR est AA 30 (L) >60 ml/min/1.73m2 GFR est non-AA 24 (L) >60 ml/min/1.73m2 Calcium 8.5 8.5 - 10.1 MG/DL Bilirubin, total 0.5 0.2 - 1.0 MG/DL  
 ALT (SGPT) 50 12 - 78 U/L  
 AST (SGOT) 28 15 - 37 U/L Alk. phosphatase 135 (H) 45 - 117 U/L Protein, total 6.3 (L) 6.4 - 8.2 g/dL Albumin 2.4 (L) 3.5 - 5.0 g/dL Globulin 3.9 2.0 - 4.0 g/dL A-G Ratio 0.6 (L) 1.1 - 2.2 MAGNESIUM Collection Time: 11/09/19  4:04 AM  
Result Value Ref Range Magnesium 3.4 (H) 1.6 - 2.4 mg/dL NT-PRO BNP Collection Time: 11/09/19  4:04 AM  
Result Value Ref Range NT pro-BNP 11,608 (H) <125 PG/ML  
CBC WITH AUTOMATED DIFF Collection Time: 11/09/19  4:04 AM  
Result Value Ref Range WBC 13.7 (H) 4.1 - 11.1 K/uL  
 RBC 2.89 (L) 4.10 - 5.70 M/uL HGB 9.1 (L) 12.1 - 17.0 g/dL HCT 28.8 (L) 36.6 - 50.3 % MCV 99.7 (H) 80.0 - 99.0 FL  
 MCH 31.5 26.0 - 34.0 PG  
 MCHC 31.6 30.0 - 36.5 g/dL  
 RDW 15.2 (H) 11.5 - 14.5 % PLATELET 810 677 - 052 K/uL MPV 11.5 8.9 - 12.9 FL  
 NRBC 0.0 0  WBC ABSOLUTE NRBC 0.00 0.00 - 0.01 K/uL NEUTROPHILS 93 (H) 32 - 75 % BAND NEUTROPHILS 1 0 - 6 % LYMPHOCYTES 2 (L) 12 - 49 % MONOCYTES 4 (L) 5 - 13 % EOSINOPHILS 0 0 - 7 % BASOPHILS 0 0 - 1 % IMMATURE GRANULOCYTES 0 %  
 ABS. NEUTROPHILS 12.9 (H) 1.8 - 8.0 K/UL  
 ABS. LYMPHOCYTES 0.3 (L) 0.8 - 3.5 K/UL  
 ABS. MONOCYTES 0.5 0.0 - 1.0 K/UL  
 ABS. EOSINOPHILS 0.0 0.0 - 0.4 K/UL  
 ABS. BASOPHILS 0.0 0.0 - 0.1 K/UL  
 ABS. IMM. GRANS. 0.0 K/UL  
 DF MANUAL PLATELET COMMENTS CLUMPED PLATELETS    
 RBC COMMENTS ANISOCYTOSIS 1+ 
    
 RBC COMMENTS MACROCYTOSIS 
1+ 
    
 RBC COMMENTS OVALOCYTES PRESENT 
    
PHOSPHORUS Collection Time: 11/09/19  4:04 AM  
Result Value Ref Range Phosphorus 5.9 (H) 2.6 - 4.7 MG/DL  
GLUCOSE, POC Collection Time: 11/09/19  6:30 AM  
Result Value Ref Range Glucose (POC) 136 (H) 65 - 100 mg/dL Performed by Dot Powell MD 
[address] [place] [address], [address] [facility] Phone - [phone] Fax - [phone] 
[email or web address]

## 2019-11-09 NOTE — PROGRESS NOTES
NYHA class IV A/C systolic heart failure (EF 25%, NT pro-BNP 15,013) Acute on chronic kidney disease Mild pulmonary edema Mild chronic lung disease Acute on chronic hypoxic respiratory failure Intubated and sedated Continues on steroids for lung disease Discussed with pulmonary Thoracic consult for tracheostomy Hgb and platelets look good Creatinine in the mid 2's NT pro-BNP a little better Pro-calcitonin normal 
 
CXR - mild pulmonary edema Intake/Output Summary (Last 24 hours) at 11/9/2019 1447 Last data filed at 11/9/2019 1300 Gross per 24 hour Intake 2332.51 ml Output 2065 ml Net 267.51 ml Visit Vitals /62 Pulse 79 Temp 97.1 °F (36.2 °C) Resp 14 Ht 5' 7\" (1.702 m) Wt 122 lb 14.4 oz (55.7 kg) SpO2 96% BMI 19.25 kg/m² Risk of morbidity and mortality - high Medical decision making - high complexity Total critical care time - 30 minutes (CPT 44689)

## 2019-11-09 NOTE — PROGRESS NOTES
Problem: Falls - Risk of 
Goal: *Absence of Falls Description Document Flory Longoria Fall Risk and appropriate interventions in the flowsheet. Outcome: Progressing Towards Goal 
Note:  
Fall Risk Interventions: 
Mobility Interventions: Communicate number of staff needed for ambulation/transfer, Patient to call before getting OOB, Strengthening exercises (ROM-active/passive) Mentation Interventions: Update white board, More frequent rounding, Door open when patient unattended Medication Interventions: Evaluate medications/consider consulting pharmacy Elimination Interventions: Toileting schedule/hourly rounds History of Falls Interventions: Consult care management for discharge planning 
 
 2000: Report received from Gab Barksdale, RN, drips dual RN rate verified and care assumed of patient. Patient on Nomi and dobutamine, fentanyl, versed and precedex for sedation. Will wean sedation if able. 2200: Precedex to 0.2mcg. 0000: Patient reassessed. Attempted to turn off precedex during assessment. Within 5 minutes patient awake, coughing on ETT. Precedex back on at 0.2mcg/kg/hr. 0400: CXR performed, reassessed, labs drawn. 0430: Patient shaved, bathed and linen changed. 0630: Dr. Ansley Peters bedside. 0800: Bedside shift change report given to Gómez Urbina RN (oncoming nurse) by Brant St RN (offgoing nurse). Report included the following information SBAR, Kardex, Procedure Summary, Intake/Output, MAR, Recent Results and Cardiac Rhythm NSR.

## 2019-11-10 NOTE — PROGRESS NOTES
I have read and agree with Zay Reese RN documentation and care. I have reviewed the [facility] and all flowsheets associated with patient's care today.

## 2019-11-10 NOTE — PROGRESS NOTES
[facility] ICU Progress Note Admit Date: 10/19/2019 Procedure:  Procedure(s): 
CARDIAC RE-ENTRY, MARISOL BY  [facility]    
 
CABG x 5, LIMA to LAD, RSVG to [id number], RSVG to PDA 10/23/19 Subjective:  
Pt seen with Dr. Alen Frausto. Dobutamine at 1, Neosynephrine at 40, Precedex, versed, fentanyl. Tmax 99.9, intubated and sedated. FiO2 50%. TF at goal.  
 
 Objective:  
Vitals: 
Blood pressure 123/75, pulse 71, temperature 98.7 °F (37.1 °C), resp. rate 14, height 5' 7\" (1.702 m), weight 122 lb 8 oz (55.6 kg), SpO2 99 %. Temp (24hrs), Av.2 °F (36.8 °C), Min:97.1 °F (36.2 °C), Max:99.9 °F (37.7 °C) EKG/Rhythm: SR in the 76s Oxygen Therapy: 
Oxygen Therapy O2 Sat (%): 99 % (11/10/19 0821) Pulse via Oximetry: 71 beats per minute (11/10/19 0821) O2 Device: Ventilator (11/10/19 6652) O2 Flow Rate (L/min): 40 l/min (19) O2 Temperature: 98.6 °F (37 °C) (19) FIO2 (%): 50 % (11/10/19 0821) CXR:  
CXR Results  (Last 48 hours)  
          
 11/10/19 0458  XR CHEST PORT Final result Impression:  IMPRESSION:  
No significant change. Narrative:  INDICATION: interstitial edema EXAMINATION:  AP CHEST, PORTABLE  
   
COMPARISON: 2019 FINDINGS: Single AP portable view of the chest at 0336 hours demonstrates no  
change in position of the lines and tubes. The cardiomediastinal silhouette is  
unchanged. Chronic interstitial lung disease similar to prior examinations. No  
new airspace disease. No significant pleural effusion. No pneumothorax. 19 0453  XR CHEST PORT Final result Impression:  IMPRESSION: No significant change. Narrative:  EXAM:  XR CHEST PORT. INDICATION: Interstitial edema. COMPARISON: 2019. FINDINGS:   
A portable AP radiograph of the chest was obtained at 0341 hours. There are  
sternal sutures and mediastinal clips. Lines and tubes: The patient is on a cardiac monitor.   The endotracheal tube,  
 nasogastric tube and right arm PICC line are unchanged in position. Lungs: Mild interstitial disease or edema throughout the lungs is unchanged. Pleura: There is no pneumothorax or pleural effusion. Mediastinum: The cardiac and mediastinal contours and pulmonary vascularity are  
normal. The aorta is mildly tortuous. There is a coronary stent. Bones and soft tissues: The bones and soft tissues are grossly within normal  
limits. Admission Weight: Last Weight Weight: 141 lb 5 oz (64.1 kg) Weight: 122 lb 8 oz (55.6 kg) Intake / Output / Drain: 
Current Shift: No intake/output data recorded. Last 24 hrs.:  
 
Intake/Output Summary (Last 24 hours) at 11/10/2019 2661 Last data filed at 11/10/2019 0700 Gross per 24 hour Intake 1657.96 ml Output 1965 ml Net -307.04 ml EXAM: 
General:  Intubated, sedated. Lungs:   +cough, not many secretions, crackles Incision:  Midsternal incision with no significant erythema, drainage, or dehiscence. Heart:  Regular rate and rhythm, S1, S2 normal, no murmur, click, rub or gallop. Abdomen:   Soft, non-tender. Bowel sounds hypoactive. No masses,  No organomegaly. +BM 11/7 Extremities:  No edema. Palpable pulses bilat Neurologic:  Sedated. Moves all extremities Labs:  
Recent Labs 11/10/19 
7325 11/10/19 
0756 WBC  --  10.5 HGB  --  8.3* HCT  --  27.1*  
PLT  --  199 NA  --  148* K  --  4.4 BUN  --  148* CREA  --  2.30* GLU  --  220* GLUCPOC 230*  --   
 
 
 Assessment:  
 
Principal Problem: S/P CABG x 5 (10/23/2019) Overview: x 5, LIMA to LAD, RSVG to Diag1, OM2-OM3, RSVG to PDA Active Problems: 
  ACS (acute coronary syndrome) ([facility] (10/19/2019) Unstable angina ([facility] (10/19/2019) Coronary artery disease of native artery of native heart with stable angina pectoris ([facility] [address] (10/21/2019) Systolic heart failure ([facility] [address] (10/22/2019) Plan/Recommendations/Medical Decision Makin. S/p CABG: on ASA, statin. No BB, ACEi while on vasoactive medications 2. Acute on chronic systolic CHF, NYHA Class II on admit: EF 35-40% preop. Dobutamine to assist diuresis/renal function. No BB/ACE/ARB/AA until vitals/kidney function allows. Trend pBNP - improving 3. Acute postop respiratory failure with chronic interstitial fibrosis per CXR: Re-intubated  due to worsening acute respiratory failure. Diuresis per Nephrology. Amiodarone had been stopped on 10/31. Solumedrol per pulm. Continue scheduled nebs. Cont mucinex, pulm toileting, mucomyst. Chest CT completed yesterday. Thoracic consulted for trach. Daily SAT. 4. Renal insufficiency: Creatinine improved some today at 2.6- cont dobutamine gtt. Nephrology following and appreciate their recommendations. Diuril x1 dose per renal. Devlin in place for accurate I&O 5. Anemia: had preop, stable H&H. Iron panel sent preop - iron, iron sat low. 6. Thrombocytopenia: Resolved. 7. Hx of HTN: Now hypotensive-likely due to sedation. Dobutamine and Neosynephrine drips. 8. HLD: Continue pravastatin 9. Leukocytosis: likely from steroids. Devlin placed . PICC placed . Monitor daily CBC. 10. Constipation: Resolved. Resume bowel meds as needed 11. Current smoker: smoking cessation education. Cont pulm toileting. Nicotine patch ordered 12. Nutrition: Appreciate Dietician recommendations. Dobhoff placed 10/30 and Enteral feedings started. Too unstable from resp standpoint for PO diet, TF's back to continuous at 50 ml/hr. Try and AVOID increased volume amounts 13. Hyperglycemia: Preop A1c 5.3 with no DM history, now with hyperglycemia. Likely due to steroids and tube feedings. DTS following. Increase Lantus 30 mg BID with SSI increased to resistant scale.   
 
14. DVT/GI Prophylaxis: SCD's, SQ Heparin, PPI 
 
 Dispo: PT/OT as able. Remain in CVI until resp status more stable.   
 
Signed By: Darby Cheung NP

## 2019-11-10 NOTE — PROGRESS NOTES
NYHA class IV A/C systolic heart failure (EF 25%, NT pro-BNP 15,013) Acute on chronic kidney disease Mild pulmonary edema Mild chronic lung disease Acute on chronic hypoxic respiratory failure Remains intubated and sedated Family in to visit this am  
 
Planning on Trach this week Hgb and platelets look good Creatinine in the mid 2's Bilirubin and other LFTs look good NT pro-BNP gradually improving Sedation holiday every shift CXR - minimal pulmonary edema Intake/Output Summary (Last 24 hours) at 11/10/2019 1156 Last data filed at 11/10/2019 1100 Gross per 24 hour Intake 1901.69 ml Output 1985 ml Net -83.31 ml Visit Vitals /61 Pulse 85 Temp 97.6 °F (36.4 °C) Resp 16 Ht 5' 7\" (1.702 m) Wt 122 lb 8 oz (55.6 kg) SpO2 95% BMI 19.19 kg/m² Risk of morbidity and mortality - high Medical decision making - high complexity Total critical care time - 30 minutes (CPT 29038)

## 2019-11-10 NOTE — PROGRESS NOTES
0730:  Report given by Adrian Tobar RN to [facility]. Patient intubated and mildly sedated with following vent settings: AC 14, FiO2 50%, , Peep +5. Drips: MIV Precedex 0.4 mcg/kg/min Dobutamine 1mcg/kg/min Fentanyl 2mL/hr 
Versed 5mg/hr Phenylephrine 40mcg/min 
 
0800: Report given from [facility] to [facility]. Patient intubated and mildly sedated. 5446Ples Eng PA, pulmonary in to see patient. Chest x-ray ordered. 0919:  Phenylephrine rate change to 30mcg/min. 0945: X-ray technician in for chest xray. Precedex changed to 0.5mcg/kg/hr. 1123:  Phenylephrine rate change to 20mcg/min. 1357:  Precedex rate change to 0.6mcg/kg/hr. Phenylephrine rate change 10mcg/min. 1446:  Versed rate change to 6mg/hr. 1614: Precedex rate change to 0.7mcg/kg/hr. 1600:  Phenylephrine stopped. Blood pressure stable. 1700: Bedside shift change report given to [address] [place] [address] (oncoming nurse) by [facility] (offgoing nurse). Report included the following information SBAR, Kardex, Intake/Output, MAR and Cardiac Rhythm NSR.

## 2019-11-10 NOTE — PROGRESS NOTES
Pulmonary Critically ill in CVICU post CABG with hypoxemic resp failure Vented on 50% FiO2 CXR with interstitial lung disease, CT with honeycombing and traction bronchiectasis. Underwent BAL - few WBC with neutrophil predominance. Not bloody. Cultures normal respiratory sandro, KOH negative, AFB NGTD. Pt is on steroids for possible inflammatory process and has been diuresed to the point of increased BUN / Cr without improvement in oxygenation Suspect acute exacerbation of underlying ILD in setting of cardiac surgery, high O2 exposure, ? Amiodarone Steroids have not resulted in significant improvement Suspect rapidly progressive pulmonary fibrosis Patient Vitals for the past 4 hrs: 
 BP Pulse Resp SpO2 Weight 11/10/19 0821  71 14 99 %   
11/10/19 0700 123/75 71 14 100 %   
11/10/19 0600 119/70 74 14 99 %   
11/10/19 0500 121/67 76 14 97 % 55.6 kg (122 lb 8 oz) Temp (24hrs), Av.2 °F (36.8 °C), Min:97.1 °F (36.2 °C), Max:99.9 °F (37.7 °C) Intake/Output Summary (Last 24 hours) at 11/10/2019 3562 Last data filed at 11/10/2019 0700 Gross per 24 hour Intake 1707.96 ml Output 1965 ml Net -257.04 ml Sedate Oral ETT Rales Mild accessory use RRR Soft Warm and dry No edema Lab: 
Recent Labs 11/10/19 
0357 19 
0404 19 
0300 WBC 10.5 13.7* 21.1* HGB 8.3* 9.1* 9.8*  219 331 * 145 144  
K 4.4 4.0 4.4 * 106 102 CO2 35* 33* 32 * 167* 172* CREA 2.30* 2.61* 2.92* * 147* 170* CA 8.9 8.5 8.6 MG 3.5* 3.4* 3.5* PHOS  --  5.9*  --   
TBILI 0.3 0.5 0.4 SGOT 17 28 43* ABG: 
Recent Labs 19 
7437 19 
5728 19 
1124 PHI 7.379 7.396 7.330* PCO2I 53.7* 53.1* 59.8*  
PO2I 93  --  174* HCO3I 31.7* 32.6* 31.5* SO2I 97  --  99* FIO2I 50 50 0.80 Impression · S/p 5V CABG 10/19/19 for ACS  With reexploration 10/23 for mediastinal hematoma · Acute resp failure- baseline fibrotic ILD (etiology not clear- this is a new dx but UIP/IPF in DDX) with superimposed pulmonary interstitial edema probable superimposed diffuse alveolar damage from blood/blood products. Amio pulm toxicty in DDX but not clear from [facility] review when he was on it. 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 
 
--vent support 
--diuretics on hold 
--followup AFB from bal 
--on solumedrol for now but not sure it is going to provide significant benefit but nothing else to offer 
--watch dropping hgb, ?occult bleed. On PPI, increase to q12. 
--eval for trach vs palliative care The patient is critically ill and is at significant risk of decompensation. Critical Care time spent exclusive of procedures 30 minutes.   
 
CHELSEA Anderson

## 2019-11-10 NOTE — PROGRESS NOTES
2000: Report received from Gómez Urbina S Bernardino Carson RN, drips dual RN rate verified and care assumed of patient. 0400: Labs drawn & cxr complete. 0500: Patient bathed, linen changed, shaved. 0740: Bedside shift change report given to Linnea Ghotra RN (oncoming nurse) by Connor Cote RN (offgoing nurse). Report included the following information SBAR, Kardex, Procedure Summary, Intake/Output, MAR, Recent Results and Cardiac Rhythm NSR.

## 2019-11-10 NOTE — PROGRESS NOTES
1700 - I have reviewed the care rendered, medication given and documentation done by Susan JANG and agree with the above.

## 2019-11-10 NOTE — PROGRESS NOTES
Problem: Falls - Risk of 
Goal: *Absence of Falls Description Document Tara Dear Fall Risk and appropriate interventions in the flowsheet. Outcome: Progressing Towards Goal 
Note:  
Fall Risk Interventions: 
Mobility Interventions: Communicate number of staff needed for ambulation/transfer Mentation Interventions: Adequate sleep, hydration, pain control, More frequent rounding, Update white board Medication Interventions: Evaluate medications/consider consulting pharmacy Elimination Interventions: Toileting schedule/hourly rounds History of Falls Interventions: Consult care management for discharge planning Problem: Patient Education: Go to Patient Education Activity Goal: Patient/Family Education Outcome: Progressing Towards Goal 
  
Problem: Pressure Injury - Risk of 
Goal: *Prevention of pressure injury Description Document Earl Scale and appropriate interventions in the flowsheet. Outcome: Progressing Towards Goal 
Note:  
Pressure Injury Interventions: 
Sensory Interventions: Assess changes in LOC, Check visual cues for pain, Float heels, Turn and reposition approx. every two hours (pillows and wedges if needed) Moisture Interventions: Minimize layers, Check for incontinence Q2 hours and as needed, Maintain skin hydration (lotion/cream) Activity Interventions: Pressure redistribution bed/mattress(bed type) Mobility Interventions: Assess need for specialty bed, Pressure redistribution bed/mattress (bed type), Turn and reposition approx. every two hours(pillow and wedges) Nutrition Interventions: Document food/fluid/supplement intake Friction and Shear Interventions: Apply protective barrier, creams and emollients, HOB 30 degrees or less, Minimize layers, Lift sheet Problem: Patient Education: Go to Patient Education Activity Goal: Patient/Family Education Outcome: Progressing Towards Goal 
  
Problem: CABG: Discharge Outcomes Goal: *Weight  is stable Outcome: Progressing Towards Goal 
Goal: *No signs and symptoms of infection or wound complications Outcome: Progressing Towards Goal 
Goal: *Anxiety reduced or absent Outcome: Progressing Towards Goal 
  
Problem: Infection - Risk of, Central Venous Catheter-Associated Bloodstream Infection Goal: *Absence of infection signs and symptoms Outcome: Progressing Towards Goal 
  
Problem: Patient Education: Go to Patient Education Activity Goal: Patient/Family Education Outcome: Progressing Towards Goal 
  
Problem: Ventilator Management Goal: *Adequate oxygenation and ventilation Outcome: Progressing Towards Goal 
Goal: *Patient maintains clear airway/free of aspiration Outcome: Progressing Towards Goal 
Goal: *Absence of infection signs and symptoms Outcome: Progressing Towards Goal 
Goal: *Normal spontaneous ventilation Outcome: Progressing Towards Goal 
  
Problem: Patient Education: Go to Patient Education Activity Goal: Patient/Family Education Outcome: Progressing Towards Goal 
  
Problem: Tissue Perfusion - Cardiopulmonary, Altered Goal: *Absence of hypoxia Outcome: Progressing Towards Goal 
  
Problem: Nutrition Deficit Goal: *Optimize nutritional status Outcome: Progressing Towards Goal 
  
Problem: Nutrition Deficit Goal: *Optimize nutritional status Outcome: Progressing Towards Goal 
  
Problem: Infection - Risk of, Urinary Catheter-Associated Urinary Tract Infection Goal: *Absence of infection signs and symptoms Outcome: Progressing Towards Goal 
  
Problem: Patient Education: Go to Patient Education Activity Goal: Patient/Family Education Outcome: Progressing Towards Goal

## 2019-11-10 NOTE — PROGRESS NOTES
Nephrology Progress Note Marcelle Ernandez Date of Admission : 10/19/2019 CC:  Follow up for ISAIAH on CKD, hypervolemia Assessment and Plan ISAIAH on CKD: 
- from ATN post CABG 
- BUN/ cr continues to improve  
- ordered One dose of Diuril and increased water flushes CKD III: 
- baseline Cr 1.3 prior to CABG 
- likely from DM and HTN 
  
HFrE F: 
- last EF 35-40% on 10/20 
- on dobutamine drip per CTS 
  
CAD s/p CABG x 5 10/19 and reexploration 10/23 for hematoma 
  
Resp Failure: 
- likely 2/2 underlying ILD + volume 
- intubated now Chronic Anemia: 
- hgb stable 
- cont to monitor 
  
DM2: 
- on insulin 
  
Protein Malnutrition: 
- on TF via [address] Interval History: 
Seen and examined. Renal labs better Good uop oN 50% Fio2 Current Medications: all current  Medications have been eviewed in [facility] Review of Systems: Review of systems not obtained due to patient factors. Objective: 
Vitals:   
Vitals:  
 11/10/19 0500 11/10/19 0600 11/10/19 0700 11/10/19 4743 BP: 121/67 119/70 123/75 Pulse: 76 74 71 71 Resp: 14 14 14 14 Temp:      
SpO2: 97% 99% 100% 99% Weight: 55.6 kg (122 lb 8 oz) Height:      
 
Intake and Output: 
No intake/output data recorded. 11/08 1901 - 11/10 0700 In: 3258.3 [I.V.:1513.3] Out: [address] [[id number]] Physical Examination: 
Pt intubated     Yes General: Frail, sedated on the vent Neck:  Supple, no mass Resp:  Diffuse dry crackles CV:  RRR,  no murmur or rub, no LE edema GI:  Soft, NT, + Bowel sounds, no hepatosplenomegaly Neurologic:  Sedated on the vent Psych:             Unable to assess Skin:  No Rash :  Devlin in place 
 
[]    High complexity decision making was performed 
[]    Patient is at high-risk of decompensation with multiple organ involvement Lab Data Personally Reviewed: I have reviewed all the pertinent labs, microbiology data and radiology studies during assessment. Recent Labs 11/10/19 
0357 11/09/19 0404 11/08/19 
0300 * 145 144  
K 4.4 4.0 4.4 * 106 102 CO2 35* 33* 32  
* 147* 170* * 167* 172* CREA 2.30* 2.61* 2.92* CA 8.9 8.5 8.6 MG 3.5* 3.4* 3.5* PHOS  --  5.9*  --   
ALB 2.2* 2.4* 2.7* SGOT 17 28 43* ALT 38 50 56 Recent Labs 11/10/19 
0357 11/09/19 
0404 11/08/19 
0300 WBC 10.5 13.7* 21.1* HGB 8.3* 9.1* 9.8* HCT 27.1* 28.8* 30.4*  219 331 No results found for: SDES Lab Results Component Value Date/Time Culture result: NO GROWTH 2 DAYS 11/07/2019 12:45 PM  
 Culture result: HEAVY NORMAL RESPIRATORY TARIK 10/21/2019 12:08 PM  
 
Recent Results (from the past 24 hour(s)) GLUCOSE, POC Collection Time: 11/09/19 12:03 PM  
Result Value Ref Range Glucose (POC) 202 (H) 65 - 100 mg/dL Performed by [facility] GLUCOSE, POC Collection Time: 11/09/19  6:16 PM  
Result Value Ref Range Glucose (POC) 249 (H) 65 - 100 mg/dL Performed by [facility] GLUCOSE, POC Collection Time: 11/10/19 12:26 AM  
Result Value Ref Range Glucose (POC) 209 (H) 65 - 100 mg/dL Performed by [facility] METABOLIC PANEL, COMPREHENSIVE Collection Time: 11/10/19  3:57 AM  
Result Value Ref Range Sodium 148 (H) 136 - 145 mmol/L Potassium 4.4 3.5 - 5.1 mmol/L Chloride 110 (H) 97 - 108 mmol/L  
 CO2 35 (H) 21 - 32 mmol/L Anion gap 3 (L) 5 - 15 mmol/L Glucose 220 (H) 65 - 100 mg/dL  (H) 6 - 20 MG/DL Creatinine 2.30 (H) 0.70 - 1.30 MG/DL  
 BUN/Creatinine ratio 64 (H) 12 - 20 GFR est AA 34 (L) >60 ml/min/1.73m2 GFR est non-AA 28 (L) >60 ml/min/1.73m2 Calcium 8.9 8.5 - 10.1 MG/DL Bilirubin, total 0.3 0.2 - 1.0 MG/DL  
 ALT (SGPT) 38 12 - 78 U/L  
 AST (SGOT) 17 15 - 37 U/L Alk. phosphatase 108 45 - 117 U/L Protein, total 6.3 (L) 6.4 - 8.2 g/dL Albumin 2.2 (L) 3.5 - 5.0 g/dL Globulin 4.1 (H) 2.0 - 4.0 g/dL  A-G Ratio 0.5 (L) 1.1 - 2.2    
CBC W/O DIFF  
 Collection Time: 11/10/19  3:57 AM  
Result Value Ref Range WBC 10.5 4.1 - 11.1 K/uL  
 RBC 2.65 (L) 4.10 - 5.70 M/uL HGB 8.3 (L) 12.1 - 17.0 g/dL HCT 27.1 (L) 36.6 - 50.3 % .3 (H) 80.0 - 99.0 FL  
 MCH 31.3 26.0 - 34.0 PG  
 MCHC 30.6 30.0 - 36.5 g/dL  
 RDW 15.6 (H) 11.5 - 14.5 % PLATELET 509 136 - 005 K/uL MPV 12.0 8.9 - 12.9 FL  
 NRBC 0.2 (H) 0  WBC ABSOLUTE NRBC 0.02 (H) 0.00 - 0.01 K/uL MAGNESIUM Collection Time: 11/10/19  3:57 AM  
Result Value Ref Range Magnesium 3.5 (H) 1.6 - 2.4 mg/dL NT-PRO BNP Collection Time: 11/10/19  3:57 AM  
Result Value Ref Range NT pro-BNP 8,471 (H) <125 PG/ML  
GLUCOSE, POC Collection Time: 11/10/19  6:44 AM  
Result Value Ref Range Glucose (POC) 230 (H) 65 - 100 mg/dL Performed by Jeniffer Canseco MD 
[address] [place] [address] [facility] Phone - [phone] Fax - [phone] 
[email or web address]. [email or web address]

## 2019-11-11 NOTE — PROGRESS NOTES
Chart reviewed and noted patient remains intubated and highly sedated at this time. Will follow up for OT intervention as patient is able and appropriate. Thank you.

## 2019-11-11 NOTE — PROGRESS NOTES
NYHA class IV A/C systolic heart failure (EF 25%, NT pro-BNP 15,013) Acute on chronic kidney disease Mild pulmonary edema Mild chronic lung disease Acute on chronic hypoxic respiratory failure 
  
Remains intubated and sedated Spoke with son this am  
 
Informed him of his father's wishes to be intubated and for tracheostomy Discussed with Thoracic Surgery Hgb and platelets look good Creatinine improved Bilirubin and other LFTs look good NT pro-BNP coming down CXR - mild pulmonary edema Intake/Output Summary (Last 24 hours) at 11/11/2019 1407 Last data filed at 11/11/2019 1400 Gross per 24 hour Intake 2963.33 ml Output 2320 ml Net 643.33 ml Visit Vitals /62 Pulse 88 Temp 97 °F (36.1 °C) Resp 18 Ht 5' 7\" (1.702 m) Wt 124 lb 1.6 oz (56.3 kg) SpO2 93% BMI 19.44 kg/m² Risk of morbidity and mortality - high Medical decision making - high complexity Total critical care time - 30 minutes (CPT 86453)

## 2019-11-11 NOTE — PROGRESS NOTES
[facility] ICU Progress Note Admit Date: 10/19/2019 Procedure:  Procedure(s): 
CARDIAC RE-ENTRY, MARISOL BY  [facility]    
 
CABG x 5, LIMA to LAD, RSVG to [id number], RSVG to PDA 10/23/19 Subjective:  
Pt seen with Dr. Karly Rodriguez. Dobutamine at 1,  Precedex, versed, fentanyl. Afebrile, intubated and sedated. FiO2 50%. TF at goal.  
 
 Objective:  
Vitals: 
Blood pressure 131/57, pulse 96, temperature 97.1 °F (36.2 °C), resp. rate 14, height 5' 7\" (1.702 m), weight 124 lb 1.6 oz (56.3 kg), SpO2 92 %. Temp (24hrs), Av °F (36.7 °C), Min:97.1 °F (36.2 °C), Max:98.7 °F (37.1 °C) EKG/Rhythm: SR in the 90s Oxygen Therapy: 
Oxygen Therapy O2 Sat (%): 92 % (19 1000) Pulse via Oximetry: 82 beats per minute (19) O2 Device: Ventilator (19) O2 Flow Rate (L/min): 40 l/min (19) O2 Temperature: 98.6 °F (37 °C) (19 0729) FIO2 (%): 50 % (19) CXR:  
CXR Results  (Last 48 hours)  
          
 19 0505  XR CHEST PORT Final result Impression:  IMPRESSION:  
No interval change. Narrative:  INDICATION: interstitial edema EXAMINATION:  AP CHEST, PORTABLE  
   
COMPARISON: 11/10/2019 FINDINGS: Single AP portable view of the chest at 344 hours demonstrates no  
change in position of the lines and tubes. The cardiomediastinal silhouette is  
unchanged. Chronic interstitial opacity. PICC line catheter on the right. 11/10/19 0959  XR CHEST PORT Final result Impression:  IMPRESSION:  
No significant change in chronic interstitial lung disease since earlier today. Narrative:  INDICATION: respiratory failure, pulmonary fibrosis EXAMINATION:  AP CHEST, PORTABLE  
   
COMPARISON: Earlier today FINDINGS: Single AP portable view of the chest at 0 940 hours demonstrates no  
change in position of the lines and tubes. The cardiomediastinal silhouette is unchanged. Chronic interstitial lung disease unchanged since earlier today. No  
new airspace disease. No pneumothorax. 11/10/19 0458  XR CHEST PORT Final result Impression:  IMPRESSION:  
No significant change. Narrative:  INDICATION: interstitial edema EXAMINATION:  AP CHEST, PORTABLE  
   
COMPARISON: 11/9/2019 FINDINGS: Single AP portable view of the chest at 0336 hours demonstrates no  
change in position of the lines and tubes. The cardiomediastinal silhouette is  
unchanged. Chronic interstitial lung disease similar to prior examinations. No  
new airspace disease. No significant pleural effusion. No pneumothorax. Admission Weight: Last Weight Weight: 141 lb 5 oz (64.1 kg) Weight: 124 lb 1.6 oz (56.3 kg) Intake / Output / Drain: 
Current Shift: 11/11 0701 - 11/11 1900 In: 425.8 [I.V.:225.8] Out: 260 [Urine:260] Last 24 hrs.:  
 
Intake/Output Summary (Last 24 hours) at 11/11/2019 1005 Last data filed at 11/11/2019 0900 Gross per 24 hour Intake 2359.36 ml Output 2270 ml Net 89.36 ml EXAM: 
General:  Intubated, sedated. Lungs:   +cough, not many secretions, crackles Incision:  Midsternal incision with no significant erythema, drainage, or dehiscence. Heart:  Regular rate and rhythm, S1, S2 normal, no murmur, click, rub or gallop. Abdomen:   Soft, non-tender. Bowel sounds hypoactive. No masses,  No organomegaly. +BM 11/7 Extremities:  Some generalized upper extremity edema. Palpable pulses bilat Neurologic:  Sedated. Moves all extremities Labs:  
Recent Labs 11/11/19 
0603 11/11/19 
0846 WBC  --  10.8 HGB  --  8.7* HCT  --  28.6* PLT  --  145* NA  --  153* K  --  4.3 BUN  --  123* CREA  --  1.74* GLU  --  153* GLUCPOC 180*  --   
 
 
 Assessment:  
 
Principal Problem: S/P CABG x 5 (10/23/2019) Overview: x 5, LIMA to LAD, RSVG to Diag1, OM2-OM3, RSVG to PDA Active Problems: 
  [facility]acute [facility] syndrome) [facility] (10/19/2019) Unstable angina ([facility] (10/19/2019) Coronary artery disease of native artery of native heart with stable angina pectoris ([facility] (10/21/2019) Systolic heart failure ([facility] (10/22/2019) Plan/Recommendations/Medical Decision Makin. S/p CABG: on ASA, statin. No BB, ACEi while on vasoactive medications 2. Acute on chronic systolic CHF, NYHA Class II on admit: EF 35-40% preop. Dobutamine to assist diuresis/renal function. No BB/ACE/ARB/AA until vitals/kidney function allows. Trend pBNP - improving 3. Acute postop respiratory failure with chronic interstitial fibrosis per CXR: Re-intubated  due to worsening acute respiratory failure. Diuresis per Nephrology. Amiodarone had been stopped on 10/31. Solumedrol per pulm. Continue scheduled nebs. Cont mucinex, pulm toileting, mucomyst. Chest CT completed yesterday. Thoracic consulted for trach-timing to be determined. Daily SAT. 4. Renal insufficiency: Creatinine improved some today at 1.74- cont dobutamine gtt. Nephrology following and appreciate their recommendations. Devlin in place for accurate I&O 5. Anemia: had preop, stable H&H. Iron panel sent preop - iron, iron sat low. 6. Thrombocytopenia: Resolved. 7. Hx of HTN: Currently controlled but has needed some francis on and off-likely due to sedation. Dobutamine continues. 8. HLD: Continue pravastatin 9. Leukocytosis: Improved. likely from steroids. Devlin placed . PICC placed . Monitor daily CBC. 10. Constipation: Resolved. Resume bowel meds as needed 11. Current smoker: smoking cessation education. Cont pulm toileting. Nicotine patch ordered 12. Nutrition: Appreciate Dietician recommendations. Dobhoff placed 10/30 and Enteral feedings started.  Too unstable from resp standpoint for PO diet, TF's continuous at 50 ml/hr. Try and AVOID increased volume amounts 13. Hyperglycemia: Preop A1c 5.3 with no DM history, now with hyperglycemia. Likely due to steroids and tube feedings. DTS following. Continue Lantus 30 mg BID with SSI increased to resistant scale. 14. Hypernatremia: Nephrology ordered D5 X 12 hours, increased water flushes and one dose of DDAVP. 15. DVT/GI Prophylaxis: SCD's, SQ Heparin, PPI Dispo: PT/OT as able. Remain in CVI until resp status more stable.   
 
Signed By: Shyam Harrison NP

## 2019-11-11 NOTE — PROGRESS NOTES
8108: Bedside and Verbal shift change report given to Maria Teresa Bird RN (oncoming nurse) by Junior Lee RN (offgoing nurse). Report included the following information SBAR, ED Summary, OR Summary, Procedure Summary, Intake/Output, MAR, Recent Results, Cardiac Rhythm SR and Alarm Parameters . 0830: Dr. Hannah Hope at bedside, updated on pt situation. Will call when consent obtained for trach placement 
 
0900: Pt's son Elda Mims at bedside, updated on weekend. Informed him of the plan for possible trach placement. 0915: Dr. Magnus Prescott at bedside speaking with son and explaining trach placement reasoning. Son agreeable to trach placement 0930: Dr. Dejesus at bedside speaking with pt's son, updating son on situation regarding pt. Son will sign consent for trach placement. Will inform thoracic surgery of plans to proceed with trach placement. 1000: CHELSEA Michelle at bedside to obtain consent with son for trach placement. Consent signed by son and RN, all questions answered by PA about procedure at this time. Procedure planned for Wednesday morning 1030: Pt having continuing high peak pressures, no response to suctioning, and pt is not biting on tube. RT at bedside attempting to adjust ventilator settings. Spoke with Dr. Magnus Prescott over telephone, OK to switch pt over to pressure control in attempt to help him sync with ventilator. 1945: Bedside and Verbal shift change report given to Joshua Blanco RN (oncoming nurse) by Maria Teresa Bird RN (offgoing nurse). Report included the following information SBAR, ED Summary, OR Summary, Procedure Summary, Intake/Output, MAR, Recent Results, Cardiac Rhythm SR and Alarm Parameters .

## 2019-11-11 NOTE — PROGRESS NOTES
Transitions of Care: 
 
 -TBD pending medical progress and POC The patient remains intubated and sedated, spoke with Cardiac Surgery NP and patient is anticipated to undergo trach- timing TBD. The patient is not medically stable, disposition TBD pending medical POC and progress. Of note, [employer] met with patient on 10/23 to screen for Medicaid and patient declined per [employer] Account notes- Patient has Medicare Part A, CM has provided the patient's Medicare number to [facility] team (please see previous notes).  RON Alexandra

## 2019-11-11 NOTE — DIABETES MGMT
[facility] Cardiac Surgery Progress Note Recommendations/ Comments: Elevated BG's most results > 200 mg/dL. BG this  mg/dl with increase in Lantus to 30 units Q12 hours starting last night. Received 9 units of lispro correction in past 24 hours Remains sedated, on vent Possible trach Steroids Solu medrol 80 mg/dL Q 8 hours. TF Osmolite 1.5 are continuous, @ 50/hr Unstable for po due to respiratory Renal noted If appropriate please consider Increase Lantus to 35 units q12 hours Current hospital DM medications Lantus 30 units Q 12 hours Lispro normal sensitivity correction scale Patient is 68 y.o. male s/p CABG x 5 followed by re-entry for mediastinal clot evacuation and repair of SVG to PDA  - POD 16.  
Pt with documented hx of DM on no meds PTA. Anemia - A1c inaccurate A1c:  
Lab Results Component Value Date/Time Hemoglobin A1c 5.3 10/21/2019 03:04 AM  
 
 
 
Recent Glucose Results:  
Lab Results Component Value Date/Time  (H) 11/11/2019 04:15 AM  
 GLUCPOC 180 (H) 11/11/2019 06:03 AM  
 GLUCPOC 150 (H) 11/11/2019 12:10 AM  
 GLUCPOC 219 (H) 11/10/2019 06:09 PM  
  
 
Lab Results Component Value Date/Time Creatinine 1.74 (H) 11/11/2019 04:15 AM  
 
Estimated Creatinine Clearance: 31 mL/min (A) (based on SCr of 1.74 mg/dL (H)). Active Orders There are no active orders of the following types: Diet. PO intake:  
No data found. Will continue to follow as needed. Thank you. Brianne Mederos RN, CDE Time spent: 4 minutes

## 2019-11-11 NOTE — PROGRESS NOTES
Nephrology Progress Note Sunita Song Date of Admission : 10/19/2019 CC:  Follow up for ISAIAH on CKD, hypervolemia Assessment and Plan ISAIAH on CKD: 
- from ATN post CABG 
- BUN/ cr continues to improve - expect continued improvement w/ free water replacement Hypernatremia : 
- Increased water flushes, ordered D5W x12 hrs and one dose of DDAVP  
- recheck labs in am  
 
CKD III: 
- baseline Cr 1.3 prior to CABG 
- likely from DM and HTN 
  
HFrE F: 
- last EF 35-40% on 10/20 
- on dobutamine drip per CTS 
  
CAD s/p CABG x 5 10/19 and reexploration 10/23 for hematoma 
  
Resp Failure: 
- likely 2/2 underlying ILD + volume 
- intubated now Chronic Anemia: 
- hgb stable 
- cont to monitor 
  
DM2: 
- on insulin 
  
Protein Malnutrition: 
- on TF via [address] Interval History: 
Seen and examined. Ja worse Good UOP  
oN 50% Fio2 Current Medications: all current  Medications have been eviewed in [facility] Review of Systems: Review of systems not obtained due to patient factors. Objective: 
Vitals:   
Vitals:  
 11/11/19 0600 11/11/19 0700 11/11/19 0800 11/11/19 0806 BP: 124/62 143/63 142/64 Pulse: 75 72 82 82 Resp: 12 15 15 15 Temp:   97.1 °F (36.2 °C) SpO2: 95% 96% 90% 91% Weight:      
Height:      
 
Intake and Output: 
11/11 0701 - 11/11 1900 In: 200 Out: 135 [Urine:135] 11/09 1901 - 11/11 0700 In: 3135.9 [I.V.:1365.9] Out: 3275 [DVLZL:7361] Physical Examination: 
Pt intubated     Yes General: Frail, sedated on the vent Neck:  Supple, no mass Resp:  Diffuse dry crackles CV:  RRR,  no murmur or rub, no LE edema GI:  Soft, NT, + Bowel sounds, no hepatosplenomegaly Neurologic:  Sedated on the vent Psych:             Unable to assess Skin:  No Rash :  Devlin in place 
 
[]    High complexity decision making was performed 
[]    Patient is at high-risk of decompensation with multiple organ involvement Lab Data Personally Reviewed: I have reviewed all the pertinent labs, microbiology data and radiology studies during assessment. Recent Labs 11/11/19 
0415 11/10/19 
0357 11/09/19 
0404 * 148* 145  
K 4.3 4.4 4.0  
* 110* 106 CO2 35* 35* 33* * 220* 147* * 148* 167* CREA 1.74* 2.30* 2.61* CA 9.0 8.9 8.5 MG 3.2* 3.5* 3.4* PHOS  --   --  5.9* ALB 2.2* 2.2* 2.4* SGOT 20 17 28 ALT 31 38 50 Recent Labs 11/11/19 
0415 11/10/19 
0357 11/09/19 
0404 WBC 10.8 10.5 13.7* HGB 8.7* 8.3* 9.1*  
HCT 28.6* 27.1* 28.8*  
* 199 219 No results found for: [facility] Lab Results Component Value Date/Time Culture result: NO GROWTH 2 DAYS 11/07/2019 12:45 PM  
 Culture result: HEAVY NORMAL RESPIRATORY TARIK 10/21/2019 12:08 PM  
 
Recent Results (from the past 24 hour(s)) GLUCOSE, POC Collection Time: 11/10/19 12:03 PM  
Result Value Ref Range Glucose (POC) 271 (H) 65 - 100 mg/dL Performed by Asha Bradford   
GLUCOSE, POC Collection Time: 11/10/19  6:09 PM  
Result Value Ref Range Glucose (POC) 219 (H) 65 - 100 mg/dL Performed by Anthony Gomez GLUCOSE, POC Collection Time: 11/11/19 12:10 AM  
Result Value Ref Range Glucose (POC) 150 (H) 65 - 100 mg/dL Performed by James Metz METABOLIC PANEL, COMPREHENSIVE Collection Time: 11/11/19  4:15 AM  
Result Value Ref Range Sodium 153 (H) 136 - 145 mmol/L Potassium 4.3 3.5 - 5.1 mmol/L Chloride 114 (H) 97 - 108 mmol/L  
 CO2 35 (H) 21 - 32 mmol/L Anion gap 4 (L) 5 - 15 mmol/L Glucose 153 (H) 65 - 100 mg/dL  (H) 6 - 20 MG/DL Creatinine 1.74 (H) 0.70 - 1.30 MG/DL  
 BUN/Creatinine ratio 71 (H) 12 - 20 GFR est AA 47 (L) >60 ml/min/1.73m2 GFR est non-AA 39 (L) >60 ml/min/1.73m2 Calcium 9.0 8.5 - 10.1 MG/DL Bilirubin, total 0.3 0.2 - 1.0 MG/DL  
 ALT (SGPT) 31 12 - 78 U/L  
 AST (SGOT) 20 15 - 37 U/L Alk.  phosphatase 96 45 - 117 U/L  
 Protein, total 6.1 (L) 6.4 - 8.2 g/dL Albumin 2.2 (L) 3.5 - 5.0 g/dL Globulin 3.9 2.0 - 4.0 g/dL A-G Ratio 0.6 (L) 1.1 - 2.2    
CBC W/O DIFF Collection Time: 11/11/19  4:15 AM  
Result Value Ref Range WBC 10.8 4.1 - 11.1 K/uL  
 RBC 2.76 (L) 4.10 - 5.70 M/uL HGB 8.7 (L) 12.1 - 17.0 g/dL HCT 28.6 (L) 36.6 - 50.3 % .6 (H) 80.0 - 99.0 FL  
 MCH 31.5 26.0 - 34.0 PG  
 MCHC 30.4 30.0 - 36.5 g/dL  
 RDW 15.7 (H) 11.5 - 14.5 % PLATELET 709 (L) 695 - 400 K/uL MPV 12.5 8.9 - 12.9 FL  
 NRBC 0.2 (H) 0  WBC ABSOLUTE NRBC 0.02 (H) 0.00 - 0.01 K/uL MAGNESIUM Collection Time: 11/11/19  4:15 AM  
Result Value Ref Range Magnesium 3.2 (H) 1.6 - 2.4 mg/dL NT-PRO BNP Collection Time: 11/11/19  4:15 AM  
Result Value Ref Range NT pro-BNP 8,560 (H) <125 PG/ML  
GLUCOSE, POC Collection Time: 11/11/19  6:03 AM  
Result Value Ref Range Glucose (POC) 180 (H) 65 - 100 mg/dL Performed by Denzel Castañeda   
POC G3 - PUL Collection Time: 11/11/19  6:13 AM  
Result Value Ref Range FIO2 (POC) 50 % pH (POC) 7.423 7.35 - 7.45    
 pCO2 (POC) 53.7 (H) 35.0 - 45.0 MMHG  
 pO2 (POC) 78 (L) 80 - 100 MMHG  
 HCO3 (POC) 35.1 (H) 22 - 26 MMOL/L  
 sO2 (POC) 95 92 - 97 % Base excess (POC) 11 mmol/L Site RIGHT RADIAL Device: VENT Mode ASSIST CONTROL Tidal volume 500 ml Set Rate 14 bpm  
 PEEP/CPAP (POC) 5 cmH2O Allens test (POC) YES Specimen type (POC) ARTERIAL Total resp. rate 16 Darin Schwartz MD 
[address] [place] [address]  
[address] [place] [address], [address] [place] [address] Phone - [phone] Fax - [phone] 
[email or web address]. [email or web address]

## 2019-11-11 NOTE — PROGRESS NOTES
Problem: Falls - Risk of 
Goal: *Absence of Falls Description Document Liban Rojo Fall Risk and appropriate interventions in the flowsheet. Outcome: Progressing Towards Goal 
Note:  
Fall Risk Interventions: 
Mobility Interventions: Communicate number of staff needed for ambulation/transfer Mentation Interventions: Evaluate medications/consider consulting pharmacy Medication Interventions: Evaluate medications/consider consulting pharmacy Elimination Interventions: Toileting schedule/hourly rounds History of Falls Interventions: Consult care management for discharge planning Problem: Patient Education: Go to Patient Education Activity Goal: Patient/Family Education Outcome: Progressing Towards Goal 
  
Problem: CABG: Discharge Outcomes Goal: *Hemodynamically stable Outcome: Progressing Towards Goal 
  
Problem: Infection - Risk of, Central Venous Catheter-Associated Bloodstream Infection Goal: *Absence of infection signs and symptoms Outcome: Progressing Towards Goal 
  
Problem: Ventilator Management Goal: *Adequate oxygenation and ventilation Outcome: Progressing Towards Goal 
Goal: *Patient maintains clear airway/free of aspiration Outcome: Progressing Towards Goal 
Goal: *Absence of infection signs and symptoms Outcome: Progressing Towards Goal 
Goal: *Normal spontaneous ventilation Outcome: Progressing Towards Goal 
  
Problem: Gas Exchange - Impaired Goal: *Absence of hypoxia Outcome: Progressing Towards Goal 
  
Problem: Infection - Risk of, Urinary Catheter-Associated Urinary Tract Infection Goal: *Absence of infection signs and symptoms Outcome: Progressing Towards Goal

## 2019-11-11 NOTE — PROGRESS NOTES
Physical Therapy 11/11/2019 Chart reviewed and noted patient remains intubated and heavily sedated at this time. Will follow up for PT interventions as patient is able and appropriate. Thank you. Dorita Guido, PT, DPT Recommendation for Nursing: Patient to complete as able in order to maintain strength, endurance, and independence: - Bed in modified chair position with foot board on 3x/day 30-60 mins max each 
- Passive ROM to B UEs and LEs during bathing to prevent contractures - Positioning to prevent edema and contractures

## 2019-11-11 NOTE — PROGRESS NOTES
Pulmonary Critically ill in CVICU post CABG with hypoxemic resp failure  Sedated- versed, fentanyl, prop Possible plans for trach Son at bedside had questions about trach but is agreeable- is aware there is risk of prolonged vent dependence and failure to wean with severe underlying fibrotic lung disease 11/10 Vented on 50% FiO2 CXR with interstitial lung disease, CT with honeycombing and traction bronchiectasis. Underwent BAL - few WBC with neutrophil predominance. Not bloody. Cultures normal respiratory sandro, KOH negative, AFB NGTD. Pt is on steroids for possible inflammatory process and has been diuresed to the point of increased BUN / Cr without improvement in oxygenation Suspect acute exacerbation of underlying ILD in setting of cardiac surgery, high O2 exposure, ? Amiodarone Steroids have not resulted in significant improvement Suspect rapidly progressive pulmonary fibrosis Patient Vitals for the past 4 hrs: 
 BP Temp Pulse Resp SpO2  
19 0900 138/62  84 15 92 % 19 0806   82 15 91 % 19 0800 142/64 97.1 °F (36.2 °C) 82 15 90 % 19 0700 143/63  72 15 96 % 19 0600 124/62  75 12 95 % Temp (24hrs), Av °F (36.7 °C), Min:97.1 °F (36.2 °C), Max:98.7 °F (37.1 °C) Intake/Output Summary (Last 24 hours) at 2019 2727 Last data filed at 2019 0900 Gross per 24 hour Intake 2353.29 ml Output 2310 ml Net 43.29 ml Sedate Oral ETT Rales Mild accessory use RRR Soft Warm and dry No edema Lab: 
Recent Labs 19 
0415 11/10/19 
0357 19 
0404 WBC 10.8 10.5 13.7* HGB 8.7* 8.3* 9.1*  
* 199 219 * 148* 145  
K 4.3 4.4 4.0  
* 110* 106 CO2 35* 35* 33* * 148* 167* CREA 1.74* 2.30* 2.61* * 220* 147* CA 9.0 8.9 8.5 MG 3.2* 3.5* 3.4* PHOS  --   --  5.9* TBILI 0.3 0.3 0.5 SGOT 20 17 28 ABG: 
Recent Labs 19 
0688 19 6568 11/09/19 
6692 11/08/19 
1124 PHI 7.423 7.379 7.396 7.330* PCO2I 53.7* 53.7* 53.1* 59.8*  
PO2I 78* 93  --  174* HCO3I 35.1* 31.7* 32.6* 31.5* SO2I 95 97  --  99* FIO2I 50 50 50 0.80 Impression · S/p 5V CABG 10/19/19 for ACS  With reexploration 10/23 for mediastinal hematoma · Acute resp failure- baseline fibrotic ILD (etiology not clear- this is a new dx but UIP/IPF in DDX) with superimposed pulmonary interstitial edema probable superimposed diffuse alveolar damage from blood/blood products. Amio pulm toxicty in DDX but not clear from [facility] review when he was on it. 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 
 
--vent support 
--diuretics on hold 
--followup AFB from bal 
--on solumedrol for now but not sure it is going to provide significant benefit but nothing else to offer 
--watch dropping hgb, ?occult bleed. On PPI, increase to q12. 
--eval for trach vs palliative care 
-D/W RN and son at bedside The patient is critically ill and is at significant risk of decompensation. Critical Care time spent exclusive of procedures 30 minutes.   
 
Danica Trammell MD

## 2019-11-11 NOTE — PROGRESS NOTES
Thoracic Surgery Simple Progress Note Admit Date: 10/19/2019 POD: 19 Days Post-Op Procedure:  Procedure(s): 
CARDIAC RE-ENTRY, MARISOL BY  [facility] Subjective:  
 
Patient has complaints: unable to assess due to patient factors Son at bedside Objective:  
 
Blood pressure 113/59, pulse 87, temperature 97 °F (36.1 °C), resp. rate 18, height 5' 7\" (1.702 m), weight 124 lb 1.6 oz (56.3 kg), SpO2 94 %. Temp (24hrs), Av.9 °F (36.6 °C), Min:97 °F (36.1 °C), Max:98.7 °F (37.1 °C) Hemodynamics PAP Systolic: 29 PAP 
CO (l/min): 5 l/min CO 
CI (l/min/m2): 3 l/min/m2 CI 
 
 07 -  1900 In: 1168.9 [I.V.:518.9] Out: 520 [Urine:520] 1901 -  0700 In: 3135.9 [I.V.:1365.9] Out: 3275 [DOYWB:8768] EXAM: 
GENERAL: VSS, afrible, intubated & sedated HEART:  regular rate and rhythm LUNG: dec BS bilat,Vented on 50% FiO2 NEURO:  Intubated & sedated INCISION: Clean, dry, and intact EXTREMITIES:No evidence of DVT seen on physical exam. 
GI/: Abd soft, nontender. . Voiding via da silva Labs: 
Recent Results (from the past 24 hour(s)) GLUCOSE, POC Collection Time: 11/10/19  6:09 PM  
Result Value Ref Range Glucose (POC) 219 (H) 65 - 100 mg/dL Performed by Anthony Gomez GLUCOSE, POC Collection Time: 19 12:10 AM  
Result Value Ref Range Glucose (POC) 150 (H) 65 - 100 mg/dL Performed by James Metz METABOLIC PANEL, COMPREHENSIVE Collection Time: 19  4:15 AM  
Result Value Ref Range Sodium 153 (H) 136 - 145 mmol/L Potassium 4.3 3.5 - 5.1 mmol/L Chloride 114 (H) 97 - 108 mmol/L  
 CO2 35 (H) 21 - 32 mmol/L Anion gap 4 (L) 5 - 15 mmol/L Glucose 153 (H) 65 - 100 mg/dL  (H) 6 - 20 MG/DL Creatinine 1.74 (H) 0.70 - 1.30 MG/DL  
 BUN/Creatinine ratio 71 (H) 12 - 20 GFR est AA 47 (L) >60 ml/min/1.73m2 GFR est non-AA 39 (L) >60 ml/min/1.73m2  Calcium 9.0 8.5 - 10.1 MG/DL  
 Bilirubin, total 0.3 0.2 - 1.0 MG/DL  
 ALT (SGPT) 31 12 - 78 U/L  
 AST (SGOT) 20 15 - 37 U/L Alk. phosphatase 96 45 - 117 U/L Protein, total 6.1 (L) 6.4 - 8.2 g/dL Albumin 2.2 (L) 3.5 - 5.0 g/dL Globulin 3.9 2.0 - 4.0 g/dL A-G Ratio 0.6 (L) 1.1 - 2.2    
CBC W/O DIFF Collection Time: 11/11/19  4:15 AM  
Result Value Ref Range WBC 10.8 4.1 - 11.1 K/uL  
 RBC 2.76 (L) 4.10 - 5.70 M/uL HGB 8.7 (L) 12.1 - 17.0 g/dL HCT 28.6 (L) 36.6 - 50.3 % .6 (H) 80.0 - 99.0 FL  
 MCH 31.5 26.0 - 34.0 PG  
 MCHC 30.4 30.0 - 36.5 g/dL  
 RDW 15.7 (H) 11.5 - 14.5 % PLATELET 740 (L) 897 - 400 K/uL MPV 12.5 8.9 - 12.9 FL  
 NRBC 0.2 (H) 0  WBC ABSOLUTE NRBC 0.02 (H) 0.00 - 0.01 K/uL MAGNESIUM Collection Time: 11/11/19  4:15 AM  
Result Value Ref Range Magnesium 3.2 (H) 1.6 - 2.4 mg/dL NT-PRO BNP Collection Time: 11/11/19  4:15 AM  
Result Value Ref Range NT pro-BNP 8,560 (H) <125 PG/ML  
GLUCOSE, POC Collection Time: 11/11/19  6:03 AM  
Result Value Ref Range Glucose (POC) 180 (H) 65 - 100 mg/dL Performed by Isabel Fish   
POC G3 - PUL Collection Time: 11/11/19  6:13 AM  
Result Value Ref Range FIO2 (POC) 50 % pH (POC) 7.423 7.35 - 7.45    
 pCO2 (POC) 53.7 (H) 35.0 - 45.0 MMHG  
 pO2 (POC) 78 (L) 80 - 100 MMHG  
 HCO3 (POC) 35.1 (H) 22 - 26 MMOL/L  
 sO2 (POC) 95 92 - 97 % Base excess (POC) 11 mmol/L Site RIGHT RADIAL Device: VENT Mode ASSIST CONTROL Tidal volume 500 ml Set Rate 14 bpm  
 PEEP/CPAP (POC) 5 cmH2O Allens test (POC) YES Specimen type (POC) ARTERIAL Total resp. rate 16 GLUCOSE, POC Collection Time: 11/11/19 12:07 PM  
Result Value Ref Range Glucose (POC) 270 (H) 65 - 100 mg/dL Performed by Tg Henry Assessment:  
No evidence of DVT. Principal Problem: S/P CABG x 5 (10/23/2019) Overview: x 5, LIMA to LAD, RSVG to Diag1, OM2-OM3, RSVG to PDA Active Problems: ACS (acute coronary syndrome) ([facility] [address] (10/19/2019) Unstable angina ([address] (10/19/2019) Coronary artery disease of native artery of native heart with stable angina pectoris ([address] (10/21/2019) Systolic heart failure ([address] (10/22/2019) Plan/Recommendations:  
 
Plan for Tracheostomy by Dr. Mone Trammell on Wed 11/13/219 at 11:30am 
Consent signed by son Sonali Heparin morning of procedure (11/13/2019) See orders CHELSEA Franks Thoracic Surgery 11/11/2019

## 2019-11-11 NOTE — PROGRESS NOTES
1930: Bedside SBAR report received from [facility]. Labs and plan of care reviewed and gtts verified at this time. 0730: Bedside SBAR report given to Shira Sequeira. Labs and plan of care reviewed and gtts verified at this time.

## 2019-11-12 NOTE — PROGRESS NOTES
0800 Bedside shift change report given to Ayush Ling (oncoming nurse) by Franc Craig (offgoing nurse). Report included the following information SBAR, MAR and Cardiac Rhythm NSR. 
 
1000 Pt received stool softener and miralax. Pt stooled large soft BM. 
 
1200 Consents for Trach in chart, per thoracic surgery trach will be at 1130 11/13.  
 
2000 Bedside shift change report given to Franc Craig (oncoming nurse) by Ayush Ling (offgoing nurse). Report included the following information SBAR, MAR and Cardiac Rhythm NSR.

## 2019-11-12 NOTE — PROGRESS NOTES
This RT got called to pt bed for cuff leak. Cuff leak improved with 8cc of air, however pt continues to breath stack. Changes to Itime, inspiratory flow not successful in resolving breath stacking despite pt being sedated.

## 2019-11-12 NOTE — PROGRESS NOTES
Chart reviewed. Patient continues to be intubated and sedated. Plan for tracheostomy tomorrow at 11:30 AM. Continue to follow peripherally. Thank you.

## 2019-11-12 NOTE — ADT AUTH CERT NOTES
Patient Demographics Patient Name Claribel Vega 
[id number] Sex Male  
1950 Address [address] Phone [phone] (Home) CSN:  
[id number] Admit Date: Admit Time Room Bed Oct 19, 2019  6:17 PM 4336 [[id number]] 01 [[id number]] Attending Providers Provider Pager From To Simona Hammond MD  10/19/19 10/19/19 Walker Putnam MD  10/19/19 10/23/19 Himanshu Stallings MD  10/23/19 Emergency Contact(s) Name Relation Home Work Mobile Lucia Born   [phone] Utilization Reviews  
 
   
LOC:Acute Adult-Extended Stay (2019) by Kev Chaidez RN  
 
   
Review Status Review Entered In Primary 2019 12:07  
   
Criteria Review REVIEW SUMMARY 
  
Patient: Olga Xie Review Number: [id number] Review Status: In Primary 
  
Condition Specific: Yes 
  
Condition Level Of Care Code: CRITICAL Condition Level Of Care Description: Critical 
  
  
OUTCOMES Outcome Type: Primary 
  
  
  
REVIEW DETAILS 
  
Service Date: 2019 Admit Date: 10/19/2019 Product: Tati El Adult Subset: Extended Stay (Symptom or finding within 24h) 
  (Excludes PO medications unless noted) [X] Select Level of Care, One: 
            [X] CRITICAL, One: 
                [X] Partial responder, not clinically stable for discharge and requires continued stay, >= One: 
                    [X] Mechanical ventilation, discharge planning and, >= One: 
                    ~--Admin, IQ Admin Admin on 2019 12:05 PM--~ Admit Date: 10/19/2019 Procedure:  Procedure(s): 
                    [facility]    
                      CABG x 5, LIMA to LAD, RSVG to [id number], RSVG to PDA 10/23/19 
                      
                     Subjective: 
                    Pt seen with Dr. Mayra Blount.  Dobutamine at 1,  Precedex, versed, fentanyl. T max 99.4, intubated and sedated. FiO2 70%. TF at goal.  
                    EXAM: 
                    General: Intubated, sedated. Lungs:         Coarse bilat Incision:      Midsternal incision with no significant erythema, drainage, or dehiscence. Heart:          Regular rate and rhythm, S1, S2 normal, no murmur, click, rub or gallop. Abdomen:   Soft, non-tender. Bowel sounds hypoactive. No masses,  No organomegaly. Extremities:  Some generalized upper extremity edema. Palpable pulses bilat Neurologic:  Sedated. Moves all extremities Assessment: 
                      
                    Principal Problem: S/P CABG x 5 (10/23/2019) Overview: x 5, LIMA to LAD, RSVG to Diag1, OM2-OM3, RSVG to PDA 
                      
                    Active Problems: 
                      ACS (acute coronary syndrome) ([address] (10/19/2019) 
                      
                      Unstable angina ([address] (10/19/2019) 
                      
                      Coronary artery disease of native artery of native heart with stable angina pectoris ([address] (10/21/2019) 
                      
                      Systolic heart failure ([address] (10/22/2019) 
                      
                      
                      
                     
                     Plan/Recommendations/Medical Decision Makin. S/p CABG: on ASA, statin. No BB, ACEi while on vasoactive medications                     2. Acute on chronic systolic CHF, NYHA Class II on admit: EF 35-40% preop. Dobutamine to assist diuresis/renal function. No BB/ACE/ARB/AA until vitals/kidney function allows. Trend pBNP  
                      
                    3. Acute postop respiratory failure with chronic interstitial fibrosis per CXR: Re-intubated 11/7 due to worsening acute respiratory failure. Diuresis per Nephrology. Amiodarone had been stopped on 10/31. Solumedrol per pulm. Continue scheduled nebs. Cont mucinex, pulm toileting, mucomyst. Chest CT revealing emphysema/pulm fibrosis. Thoracic consulted for trach for Wed, consent obtained from patients son 
                      
                    4. Renal insufficiency: Creatinine improved some - cont dobutamine gtt. Nephrology following and appreciate their recommendations. Devlin in place for accurate I&O 
                      
                    5. Anemia: had preop, stable H&H. Iron panel sent preop - iron, iron sat low. H&H cont to trend downward  
                      
                    6. Thrombocytopenia: plts on downward trend - monitor. Hold heparin for trach tomorrow  
                      
                    7. Hx of HTN: Currently controlled but has needed some francis on and off-likely due to sedation. Dobutamine continues. 
                      
                    8. HLD: Continue pravastatin 
                      
                    9. Leukocytosis: WBC back up again, check procalcitonin. Devlin placed 11/7. PICC placed 11/6. Monitor daily CBC.   10. Constipation: No BM in several days. Cont bowel meds 
                      11. Current smoker: smoking cessation education. Cont pulm toileting. D/c nicotine patch 
                      12. Nutrition: Appreciate Dietician recommendations. Dobhoff placed 10/30 and Enteral feedings started. Too unstable from resp standpoint for PO diet, TF's continuous at 50 ml/hr.  Try and AVOID increased volume amounts  
                      13. Hyperglycemia: Preop A1c 5.3 with no DM history, now with hyperglycemia. Likely due to steroids and tube feedings. DTS following. Increase Lantus to 38 mg BID with SSI resistant scale.  
                      14. Hypernatremia: Nephrology ordered D5 X 12 hours, increased water flushes and one dose of DDAVP. Na to 149 today 
                      15. DVT/GI Prophylaxis: SCD's, SQ Heparin, PPI 
                      
                    Dispo: PT/OT as able. Remain in CVI until resp status more stable. VS 
                    99.4 P-96 R-17 119/62 97% VENT 80% FOI2 
                    LABS 
                    WBC 16.3 Hgb 8.1 Hct 27.9 Na 149 Chloride 111 CO2 35 Glucose 154  Creat 1.63 Mg 3 Nt-BNP 50312 IMAGES CXR-IMPRESSION: 
                    No significant interval change. [X] Respiratory interventions every 1-2h 
                        ~--Admin, IQ Admin Admin on 11- 12:07 PM--~ 
                        1/2NS @ 10 ICU 
                         VENT Brovana nebs BID Pulmicort nebs BID Precedex IV gtt Dobutamine IV gtt Heparin 5000u sq q8 Phenylephrine IV gtt Diprivan IV gtt Protonix 40mg IV q12 NPO p midnight Consent TRACH in AM 
                         
                         
                         
  
Version: InterQual® 2019 Scott Acevedo  © 2019 [employer] and/or one of its subsidiaries. All Rights Reserved. CPT only © 2018 American Medical Association. All Rights Reserved.  
   
LOC:Acute Adult-Extended Stay (11/11/2019) by Felice Tomlinson RN  
 
   
Review Status Review Entered In Primary 11/12/2019 12:00  
   
Criteria Review REVIEW SUMMARY 
  
Patient: Harriett Pickard Review Number: [id number] Review Status: In Primary 
  
Condition Specific: Yes 
  
Condition Level Of Care Code: CRITICAL Condition Level Of Care Description: Critical 
  
  
OUTCOMES Outcome Type: Primary 
  
  
  
REVIEW DETAILS 
  
Service Date: 11/11/2019 Admit Date: 10/19/2019 Product: Nichols Jen Adult Subset: Extended Stay (Symptom or finding within 24h) 
  (Excludes PO medications unless noted) [X] Select Level of Care, One: 
            [X] CRITICAL, One: 
                [X] Partial responder, not clinically stable for discharge and requires continued stay, >= One: 
                    [X] Mechanical ventilation, discharge planning and, >= One: 
                    ~--Admin, IQ Admin Admin on 11- 11:59 AM--~ Pulmonary 
                      
                    Critically ill in CVICU post CABG with hypoxemic resp failure 
                      
                    11/11 Sedated- versed, fentanyl, prop Possible plans for trach Son at bedside had questions about trach but is agreeable- is aware there is risk of prolonged vent dependence and failure to wean with severe underlying fibrotic lung disease 
                      
                    11/10 Vented on 50% FiO2 CXR with interstitial lung disease, CT with honeycombing and traction bronchiectasis. Underwent BAL 11/7- few WBC with neutrophil predominance. Not bloody. Cultures normal respiratory sandro, KOH negative, AFB NGTD.  
                      
 Pt is on steroids for possible inflammatory process and has been diuresed to the point of increased BUN / Cr without improvement in oxygenation 
                      
                    Suspect acute exacerbation of underlying ILD in setting of cardiac surgery, high O2 exposure, ? Amiodarone 
                      
                    Steroids have not resulted in significant improvement Suspect rapidly progressive pulmonary fibrosis Impression ·     S/p 5V CABG 10/19/19 for ACS  With reexploration 10/23 for mediastinal hematoma ·     Acute resp failure- baseline fibrotic ILD (etiology not clear- this is a new dx but UIP/IPF in DDX) with superimposed pulmonary interstitial edema probable superimposed diffuse alveolar damage from blood/blood products. Amio pulm toxicty in DDX but not clear from [facility] review when he was on it. 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 
                      
                    --vent support 
                    --diuretics on hold 
                    --followup AFB from [facility] 
                    --on solumedrol for now but not sure it is going to provide significant benefit but nothing else to offer 
                    --watch dropping hgb, ?occult bleed. On PPI, increase to q12. 
                    --eval for trach vs palliative care 
                    -D/W RN and son at bedside VS 
                    98.7 P-84 R-15 138/62 92% on VENT  
                    LABS Hgb 8.7 Hct 28.6 Na 153 CO2 35 Glucose 153  Creat 1.74 Mg 3.2 Nt-BNP 8560 Results for Neo Leavitt (MRN [id number]) as of 11/12/2019 11:37 
                     
                    11/11/2019 06:13 
                    pH (POC): 7.423 pCO2 (POC): 53.7 (H) 
                    pO2 (POC): 78 (L) HCO3 (POC): 35.1 (H) IMAGES- None [X] Respiratory interventions every 1-2h 
                        ~--Admin, IQ Admin Admin on 11- 12:00 PM--~ 
                        1/2NS @ 10 ICU 
                                                VENT Brovana nebs BID Pulmicort nebs BID Precedex IV gtt Dobutamine IV gtt Heparin 5000u sq q8 Phenylephrine IV gtt Diprivan IV gtt Protonix 40mg IV q12 
                         
                         
                         
  
Version: InterQual® 2019 [facility]  © 2019 [employer] and/or one of its [place]. All Rights Reserved. CPT only © 2018 American Medical Association. All Rights Reserved.  
   
LOC:Acute Adult-Extended Stay (11/10/2019) by Severiano Morin, RN  
 
   
Review Status Review Entered In Primary 11/12/2019 11:48  
   
Criteria Review REVIEW SUMMARY 
  
Patient: Neo Leavitt Review Number: [id number] Review Status: In Primary 
  
Condition Specific: Yes 
  
Condition Level Of Care Code: CRITICAL Condition Level Of Care Description: Critical 
  
  
OUTCOMES 
 Outcome Type: Primary 
  
  
  
REVIEW DETAILS 
  
Service Date: 11/10/2019 Admit Date: 10/19/2019 Product: Brittnee Dago Adult Subset: Extended Stay (Symptom or finding within 24h) 
  (Excludes PO medications unless noted) [X] Select Level of Care, One: 
            [X] CRITICAL, One: 
                [X] Partial responder, not clinically stable for discharge and requires continued stay, >= One: 
                    [X] Mechanical ventilation, discharge planning and, >= One: 
                    ~--Admin, IQ Admin Admin on 11- 11:46 AM--~ 
                    NYHA class IV A/C systolic heart failure (EF 25%, NT pro-BNP 15,013) Acute on chronic kidney disease Mild pulmonary edema Mild chronic lung disease Acute on chronic hypoxic respiratory failure 
                      
                    Remains intubated and sedated 
                      
                    Family in to visit this am  
                    Planning on Trach this week Hgb and platelets look good Creatinine in the mid 2's Bilirubin and other LFTs look good NT pro-BNP gradually improving Sedation holiday every shift CXR - minimal pulmonary edema VS 
                    97.6 P-85 R-16 114/61 95% LABS Hgb 8.3 Hct 27.1 Na 148 CO2 35 Glucose 220  Creat 2.3 Mg 3.5 Albumin 2.2 Nt-BNP 8560 IMAGES IMPRESSION: 
                    No significant change in chronic interstitial lung disease since earlier today.  
                     
                     
                     
                        [X] Respiratory interventions every 1-2h 
 ~--Admin, IQ Admin Admin on 11- 11:48 AM--~ 
                        1/2NS @ 10 ICU 
                        VENT Brovana nebs BID Pulmicort nebs BID Precedex IV gtt Dobutamine IV gtt Heparin 5000u sq q8 Phenylephrine IV gtt Diprivan IV gtt Protonix 40mg IV q12 
                         
                         
                         
  
Version: InterQual® 2019 Navin Salguero  © 2019 [employer] and/or one of its [place]. All Rights Reserved. CPT only © 2018 American Medical Association. All Rights Reserved.  
   
LOC:Acute Adult-Extended Stay (11/9/2019) by Bettina Wells RN  
 
   
Review Status Review Entered In Primary 11/12/2019 11:41  
   
Criteria Review REVIEW SUMMARY 
  
Patient: Omega Omalley Review Number: [id number] Review Status: In Primary 
  
Condition Specific: Yes 
  
Condition Level Of Care Code: CRITICAL Condition Level Of Care Description: Critical 
  
  
OUTCOMES Outcome Type: Primary 
  
  
  
REVIEW DETAILS 
  
Service Date: 11/09/2019 Admit Date: 10/19/2019 Product: Boone Graves Adult Subset: Extended Stay (Symptom or finding within 24h) 
  (Excludes PO medications unless noted) [X] Select Level of Care, One: 
            [X] CRITICAL, One: 
                [X] Partial responder, not clinically stable for discharge and requires continued stay, >= One: 
                    [X] Mechanical ventilation, discharge planning and, >= One: 
                    ~--Admin, IQ Admin Admin on 11- 11:39 AM--~ 
                    Critically ill in CVICU post CABG with hypoxemic resp failure Pt now vented on 50% FiO2 CXR with interstitial lung disease Underwent BAL 11/7- few WBC with neutrophil predominance. Not bloody. Cultures pending 
                      
                    Pt is on steroids for possible inflammatory process and has been diuresed to the point of increased BUN / Cr without improvement in oxygenation 
                      
                    Suspect acute exacerbation of underlying ILD in setting of cardiac surgery, high O2 exposure, ? Amiodarone 
                      
                    Has been on steroids without significant improvement 
                      
                    Suspect rapidly progressive pulmonary fibrosis Impression ·     S/p 5V CABG 10/19/19 for ACS  With reexploration 10/23 for mediastinal hematoma ·     Acute resp failure- baseline fibrotic ILD (etiology not clear- this is a new dx but UIP/IPF in DDX) with superimposed pulmonary interstitial edema probable superimposed diffuse alveolar damage from blood/blood products. Amio pulm toxicty in DDX but not clear from [facility] review when he was on it. 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 
                      
                    --vent support 
                    --diuretics on hold 
                    --followup cultures from [facility] 
                    --on solumedrol for now but not sure it is going to provide significant benefit but nothing else to offer 
                    --would consider palliative care eval 
                      
                    The patient is critically ill and is at significant risk of decompensation.   Critical Care time spent exclusive of procedures 30 minutes. Velmicaela Monahan, NP 
                     
                    VS 
                    98.1 P-79 R-14 105/63 97% on VENT 50% FIO2 LABS Hgb 8.7 Hct 28.6 plt 145 Co2 33 Glucose 147  Creat 2.61 Nt-BNP 98182 Mg 3.4 Results for Linda Garcia (MRN [id number]) as of 11/12/2019 11:37 
                     
                    11/9/2019 06:32 
                    pH (POC): 7.396 pCO2 (POC): 53.1 (H) HCO3 (POC): 32.6 (H) [X] Active weaning < 3 attempts ~--Admin, IQ Admin Admin on 11- 11:41 AM--~ 
                        Brovana nebs BID Pulmicort nebs BID 
                        ASA 81mg po qd Precedex IV gtt DobutamineIV gtt Fentanyl IV gtt Heparin 5000u sq q8 SQBS c SSI Solu-medrol 80mg IV q8 Versed IV gtt Phenylephrine IV gtt NS @ 10 
                         
                         
                         
  
Version: InterQual® 2019 Ngozi Linares  © 2019 [employer] and/or one of its [place]. All Rights Reserved. CPT only © 2018 American Medical Association.   All Rights Reserved.

## 2019-11-12 NOTE — PROGRESS NOTES
[facility] ICU Progress Note Admit Date: 10/19/2019 Procedure:  Procedure(s): 
CARDIAC RE-ENTRY, MARISOL BY  [facility]    
 
CABG x 5, LIMA to LAD, RSVG to [id number], RSVG to PDA 10/23/19 Subjective:  
Pt seen with Dr. Nikki Esparza. Dobutamine at 1,  Precedex, versed, fentanyl. T max 99.4, intubated and sedated. FiO2 70%. TF at goal.  
 
 Objective:  
Vitals: 
Blood pressure 117/65, pulse 86, temperature (P) 98.2 °F (36.8 °C), resp. rate 14, height 5' 7\" (1.702 m), weight 129 lb 6.4 oz (58.7 kg), SpO2 98 %. Temp (24hrs), Av.5 °F (36.9 °C), Min:97 °F (36.1 °C), Max:99.4 °F (37.4 °C) EKG/Rhythm: SR in the 90s Oxygen Therapy: 
Oxygen Therapy O2 Sat (%): 98 % (19 0800) Pulse via Oximetry: 85 beats per minute (19 075) O2 Device: Ventilator (19) O2 Flow Rate (L/min): 40 l/min (19) O2 Temperature: 98.6 °F (37 °C) (19 0729) FIO2 (%): 70 % (19 0752) CXR:  
CXR Results  (Last 48 hours)  
          
 19 0574  XR CHEST PORT Final result Impression:  IMPRESSION:  
No significant interval change. Narrative:  PORTABLE CHEST RADIOGRAPH/S: 2019 5:13 AM  
   
Clinical history: Interstitial edema INDICATION:   interstitial edema COMPARISON: 2019 FINDINGS:  
AP portable upright view of the chest demonstrates a stable  cardiopericardial  
silhouette. The lungs are adequately expanded. There is no edema, effusion,  
consolidation, or pneumothorax. ET tube and NG tube unchanged. PICC line  
catheter on the right unchanged. Patchy parenchymal and interstitial opacities  
are superimposed on chronic parenchymal change Poststernotomy. . Patient is on a  
cardiac monitor. 19 9271  XR CHEST PORT Final result Impression:  IMPRESSION:  
No interval change. Narrative:  INDICATION: interstitial edema EXAMINATION:  AP CHEST, PORTABLE  
   
COMPARISON: 11/10/2019 FINDINGS: Single AP portable view of the chest at 344 hours demonstrates no  
change in position of the lines and tubes. The cardiomediastinal silhouette is  
unchanged. Chronic interstitial opacity. PICC line catheter on the right. 11/10/19 0959  XR CHEST PORT Final result Impression:  IMPRESSION:  
No significant change in chronic interstitial lung disease since earlier today. Narrative:  INDICATION: respiratory failure, pulmonary fibrosis EXAMINATION:  AP CHEST, PORTABLE  
   
COMPARISON: Earlier today FINDINGS: Single AP portable view of the chest at 0 940 hours demonstrates no  
change in position of the lines and tubes. The cardiomediastinal silhouette is  
unchanged. Chronic interstitial lung disease unchanged since earlier today. No  
new airspace disease. No pneumothorax. Admission Weight: Last Weight Weight: 141 lb 5 oz (64.1 kg) Weight: 129 lb 6.4 oz (58.7 kg) Intake / Output / Drain: 
Current Shift: No intake/output data recorded. Last 24 hrs.:  
 
Intake/Output Summary (Last 24 hours) at 11/12/2019 6410 Last data filed at 11/12/2019 0700 Gross per 24 hour Intake 3249.17 ml Output 1825 ml Net 1424.17 ml EXAM: 
General:  Intubated, sedated. Lungs:   Coarse bilat Incision:  Midsternal incision with no significant erythema, drainage, or dehiscence. Heart:  Regular rate and rhythm, S1, S2 normal, no murmur, click, rub or gallop. Abdomen:   Soft, non-tender. Bowel sounds hypoactive. No masses,  No organomegaly. Extremities:  Some generalized upper extremity edema. Palpable pulses bilat Neurologic:  Sedated. Moves all extremities Labs:  
Recent Labs 11/12/19 
0701 11/12/19 
9698 WBC  --  16.3* HGB  --  8.1* HCT  --  27.9*  
PLT  --  131* NA  --  149* K  --  5.1 BUN  --  100* CREA  --  1.63* GLU  --  154* GLUCPOC 163*  --   
 
 
 Assessment:  
 
Principal Problem: S/P CABG x 5 (10/23/2019) Overview: x 5, LIMA to LAD, RSVG to Diag1, OM2-OM3, RSVG to PDA Active Problems: 
  [facility] (10/19/2019) Unstable angina ([facility] (10/19/2019) Coronary artery disease of native artery of native heart with stable angina pectoris ([facility] (10/21/2019) Systolic heart failure ([facility] (10/22/2019) Plan/Recommendations/Medical Decision Makin. S/p CABG: on ASA, statin. No BB, ACEi while on vasoactive medications 2. Acute on chronic systolic CHF, NYHA Class II on admit: EF 35-40% preop. Dobutamine to assist diuresis/renal function. No BB/ACE/ARB/AA until vitals/kidney function allows. Trend pBNP 3. Acute postop respiratory failure with chronic interstitial fibrosis per CXR: Re-intubated  due to worsening acute respiratory failure. Diuresis per Nephrology. Amiodarone had been stopped on 10/31. Solumedrol per pulm. Continue scheduled nebs. Cont mucinex, pulm toileting, mucomyst. Chest CT revealing emphysema/pulm fibrosis. Thoracic consulted for trach for Wed, consent obtained from patients son 4. Renal insufficiency: Creatinine improved some - cont dobutamine gtt. Nephrology following and appreciate their recommendations. Devlin in place for accurate I&O 5. Anemia: had preop, stable H&H. Iron panel sent preop - iron, iron sat low. H&H cont to trend downward 6. Thrombocytopenia: plts on downward trend - monitor. Hold heparin for trach tomorrow 7. Hx of HTN: Currently controlled but has needed some francis on and off-likely due to sedation. Dobutamine continues. 8. HLD: Continue pravastatin 9. Leukocytosis: WBC back up again, check procalcitonin. Devlin placed . PICC placed . Monitor daily CBC. 10. Constipation: No BM in several days. Cont bowel meds 11. Current smoker: smoking cessation education. Cont pulm toileting.  D/c nicotine patch 12. Nutrition: Appreciate Dietician recommendations. Dobhoff placed 10/30 and Enteral feedings started. Too unstable from resp standpoint for PO diet, TF's continuous at 50 ml/hr. Try and AVOID increased volume amounts 13. Hyperglycemia: Preop A1c 5.3 with no DM history, now with hyperglycemia. Likely due to steroids and tube feedings. DTS following. Increase Lantus to 38 mg BID with SSI resistant scale. 14. Hypernatremia: Nephrology ordered D5 X 12 hours, increased water flushes and one dose of DDAVP. Na to 149 today 15. DVT/GI Prophylaxis: SCD's, SQ Heparin, PPI Dispo: PT/OT as able. Remain in CVI until resp status more stable. Update: 9 am dose of Lantus missed since order changed, BS high. Giving 10 units lispro now and increase SSI to normal sensitivity. Procalcitonin only 0.6, will hold starting abx. Monitor.   
 
Signed By: Nunu Schmidt NP

## 2019-11-12 NOTE — PROGRESS NOTES
NYHA class IV A/C systolic heart failure (EF 25%, NT pro-BNP 15,013) Acute on chronic kidney disease Mild pulmonary edema Mild chronic lung disease Acute on chronic hypoxic respiratory failure 
  
Remains intubated and sedated Went over issues with patient's son Hgb and platelets look good Creatinine a little improved Bilirubin and other LFTs look good Pro-calcitonin reasonable NT pro-BNP a bit elevated Planning on tracheostomy tomorrow Needs INR in am 
 
CXR - mild pulmonary edema Intake/Output Summary (Last 24 hours) at 11/12/2019 1749 Last data filed at 11/12/2019 1500 Gross per 24 hour Intake 2937.76 ml Output 1945 ml Net 992.76 ml Visit Vitals /60 (BP 1 Location: Left arm, BP Patient Position: At rest) Pulse 82 Temp 96.8 °F (36 °C) Resp 14 Ht 5' 7\" (1.702 m) Wt 129 lb 6.4 oz (58.7 kg) SpO2 94% BMI 20.27 kg/m² Risk of morbidity and mortality - high Medical decision making - high complexity Total critical care time - 30 minutes (CPT 54977)

## 2019-11-12 NOTE — PROGRESS NOTES
Pulmonary Critically ill in CVICU post CABG with hypoxemic resp failure  Intubated Sedation Noted plans for tracheostomy tomorrow  Sedated- versed, fentanyl, prop Possible plans for trach Son at bedside had questions about trach but is agreeable- is aware there is risk of prolonged vent dependence and failure to wean with severe underlying fibrotic lung disease 11/10 Vented on 50% FiO2 CXR with interstitial lung disease, CT with honeycombing and traction bronchiectasis. Underwent BAL - few WBC with neutrophil predominance. Not bloody. Cultures normal respiratory sandro, KOH negative, AFB NGTD. Pt is on steroids for possible inflammatory process and has been diuresed to the point of increased BUN / Cr without improvement in oxygenation Suspect acute exacerbation of underlying ILD in setting of cardiac surgery, high O2 exposure, ? Amiodarone Steroids have not resulted in significant improvement Suspect rapidly progressive pulmonary fibrosis Patient Vitals for the past 4 hrs: 
 BP Temp Pulse Resp SpO2  
19 1300 136/70  81 15 (!) 86 % 19 1200 110/57 98.4 °F (36.9 °C) 83 14 95 % 19 1136   81 15 94 % 19 1111   83 16 97 % 19 1100 115/57  85 15 95 % 19 1000 106/54  88 14 95 % Temp (24hrs), Av.7 °F (37.1 °C), Min:98.2 °F (36.8 °C), Max:99.4 °F (37.4 °C) Intake/Output Summary (Last 24 hours) at 2019 1344 Last data filed at 2019 1300 Gross per 24 hour Intake 3456.56 ml Output 2070 ml Net 1386.56 ml Sedate Oral ETT Rales Mild accessory use RRR Soft Warm and dry No edema Lab: 
Recent Labs 19 
1126 19 
0415 11/10/19 
0602 WBC 16.3* 10.8 10.5 HGB 8.1* 8.7* 8.3*  
* 145* 199 * 153* 148*  
K 5.1 4.3 4.4  
* 114* 110* CO2 35* 35* 35* * 123* 148* CREA 1.63* 1.74* 2.30* * 153* 220* CA 9.0 9.0 8.9 MG 3.0* 3.2* 3.5*  
 TBILI 0.4 0.3 0.3 SGOT 25 20 17 ABG: 
Recent Labs 11/11/19 
2044 11/11/19 
0139 PHI 7.458* 7.423 PCO2I 49.8* 53.7*  
PO2I 64* 78* HCO3I 35.3* 35.1* SO2I 93 95 FIO2I 60 50 Impression · S/p 5V CABG 10/19/19 for ACS  With reexploration 10/23 for mediastinal hematoma · Acute resp failure- baseline fibrotic ILD (etiology not clear- this is a new dx but UIP/IPF in DDX) with superimposed pulmonary interstitial edema probable superimposed diffuse alveolar damage from blood/blood products. Amio pulm toxicty in DDX but not clear from [facility] review when he was on it. 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 
 
--vent support 
--diuretics on hold 
--followup AFB from bal 
--on solumedrol for now but not sure it is going to provide significant benefit but nothing else to offer 
--watch dropping hgb, ?occult bleed. On PPI, increase to q12. 
--eval for trach vs palliative care 
-D/W RN and son at bedside on 11/11 The patient is critically ill and is at significant risk of decompensation.     
 
Fawn Parks MD

## 2019-11-12 NOTE — PROGRESS NOTES
NUTRITION brief Recommendations: 1. Adjust water flush per renal pending sodium levels. Continue tube feeds as ordered. 2. Recheck K+ and Phos tomorrow. 3. Follow BG with further insulin adjustment PRN Diet: NPO Tube feeds: Glucerna 1.2 @ 50ml/hr + 150ml flush q4hr during feeds Medications: bumex, iron syrup, SSI, lantus (38 units BID), solu-medrol, liquid MVI, nystatin (oral), protonix, miralax, pericolace, zoloft; DRIPS: dobutamine, insulin drip    
  
Chart reviewed for tube feed check. Pt reintubated on . Imaging showing pulmonary fibrosis. Plans for trach placement tomorrow. Steroids remain in place with BG trending up. Lantus increased with resistant sliding scale in place. BG continues to fluctuate - DTC following. Tube feeds running at goal rate. Glucerna 1.2 @ 50ml/hr providinml, 1440kcal, 72g protein (1.28g/kg), 966ml free fluid. Meets 95% energy and 100% protein needs. Continue tube feeds as ordered. Constipation with bowel regimen in place. Hypernatremia over past few days with water flush increased by renal to 150ml q4hr = 900ml flush + 966ml free fluid = 1866ml fluid. D5 given x 12hrs yesterday with diuresis per renal. Sodium still elevated but improved. Continue to adjust water flush per renal pending sodium levels. Potassium up over past few days but still WNL, High phos on  but not recheck. Consider rechecking labs to allow for adjustment to renal formula if elevated. Wt stable around 56kg. Last 3 Recorded Weights in this Encounter 11/10/19 0500 19 0418 19 0500 Weight: 55.6 kg (122 lb 8 oz) 56.3 kg (124 lb 1.6 oz) 58.7 kg (129 lb 6.4 oz) See previous RD assessment for additional details, goals and monitoring/evaluation. Estimated Nutrition Needs:  
Kcals/day: 4955 Kcals/day Protein: 56 g(56-67g (1-1.2g/kg) - ISAIAH on CKD) Fluid: 1520 ml(1ml/kcal) Based On: [facility] REJI (1729O) Weight Used: Actual wt(56.2kg) Rebecca Koenig, RD [address] [place] , Pager #[phone] or [phone]

## 2019-11-12 NOTE — DIABETES MGMT
[facility] Cardiac Surgery Progress Note Recommendations/ Comments: Chart reviewed for hyperglycemia with a spike of 390 mg/dL at 1236. Noted Lantus dose increased today, but patient did not receive am dose. If appropriate please consider: - A now dose of NPH 15 units Spoke with NP regarding above Remains sedated, on vent Plans for trach tomorrow Steroids Solu medrol 80 mg/dL Q 8 hours. TF Osmolite 1.5 are continuous, @ 50/hr Unstable for po due to respiratory Renal noted Current hospital DM medications Lantus 38 units Q 12 hours Lispro normal sensitivity correction scale Patient is 68 y.o. male s/p CABG x 5 followed by re-entry for mediastinal clot evacuation and repair of SVG to PDA  - POD 16.  
Pt with documented hx of DM on no meds PTA. Anemia - A1c inaccurate A1c:  
Lab Results Component Value Date/Time Hemoglobin A1c 5.3 10/21/2019 03:04 AM  
 
 
 
Recent Glucose Results:  
Lab Results Component Value Date/Time  (H) 11/12/2019 04:36 AM  
 GLUCPOC 390 (H) 11/12/2019 12:36 PM  
 GLUCPOC 163 (H) 11/12/2019 07:01 AM  
 GLUCPOC 169 (H) 11/12/2019 12:21 AM  
  
 
Lab Results Component Value Date/Time Creatinine 1.63 (H) 11/12/2019 04:36 AM  
 
Estimated Creatinine Clearance: 34.5 mL/min (A) (based on SCr of 1.63 mg/dL (H)). Active Orders Diet DIET NPO  
  
 
PO intake:  
No data found. Will continue to follow as needed. Thank you. Nayeli Young MS, RN, CDE Time spent: 6 minutes

## 2019-11-12 NOTE — ADT AUTH CERT NOTES
Patient Demographics Patient Name Bryon Guerrero 
[id number] Sex Male  
1950 Address [facility] [place]  Phone [phone] (Home) CSN:  
[id number] Admit Date: Admit Time Room Bed Oct 19, 2019  6:17 PM 4336 [93189] 01 [10132] Attending Providers Provider Pager From To Abi Wayne MD  10/19/19 10/19/19 Brigida Mo MD  10/19/19 10/23/19 Prachi Hernandez MD  10/23/19 Emergency Contact(s) Name Relation Home Work Mobile Jeramy Cardenas   [phone] Utilization Reviews  
 
   
LOC:Acute Adult-Extended Stay (2019) by Opal Fish RN  
 
   
Review Status Review Entered In Primary 2019 13:07  
   
Criteria Review REVIEW SUMMARY 
  
Patient: Rose Vizcarra Review Number: [id number] Review Status: In Primary 
  
Condition Specific: Yes 
  
Condition Level Of Care Code: INTERMED Condition Level Of Care Description: Intermediate 
  
  
OUTCOMES Outcome Type: Primary 
  
  
  
REVIEW DETAILS 
  
Service Date: 2019 Admit Date: 10/19/2019 Product: Rochelle Amass Adult Subset: Extended Stay (Symptom or finding within 24h) 
  (Excludes PO medications unless noted) [X] Select Level of Care, One: 
            [X] CRITICAL, One: 
                [X] Partial responder, not clinically stable for discharge and requires continued stay, >= One: 
                    [X] Mechanical ventilation, discharge planning and, >= One: 
                    ~--Admin, IQ Admin Admin on 2019 01:05 PM--~ Admit Date: 10/19/2019 Procedure:  Procedure(s): 
                    CARDIAC RE-ENTRY, MARISOL BY DR [facility]    
                    CABG x 5, LIMA to LAD, RSVG to [id number], RSVG to PDA 10/23/19 
                      
                     Subjective: 
                    Pt seen with Dr. Alen Frausto. Dobutamine at 1, Neosynephrine at 79, Diprovan, Precedex. Tmax 99.8, intubated and sedated.   
                     Objective: 
                    Vitals: 
                    Blood pressure 106/61, pulse 92, temperature 99.6 °F (37.6 °C), resp. rate 15, height 5' 7\" (1.702 m), weight 123 lb 3.2 oz (55.9 kg), SpO2 98 %. Temp (24hrs), Av.8 °F (37.1 °C), Min:96.7 °F (35.9 °C), Max:99.8 °F (37.7 °C) 
                      
                    EKG/Rhythm: SR in the 90s EXAM: 
                    General: Intubated, sedated. Lungs:         +cough, not many secretions, crackles Incision:      Midsternal incision with no significant erythema, drainage, or dehiscence. Heart:          Regular rate and rhythm, S1, S2 normal, no murmur, click, rub or gallop. Abdomen:   Soft, non-tender. Bowel sounds hypoactive. No masses,  No organomegaly. +BM 11/7 Extremities:  No edema. Palpable pulses bilat Neurologic:  Sedated. Moves all extremities Assessment: 
                      
                    Principal Problem: S/P CABG x 5 (10/23/2019)                         Overview: x 5, LIMA to LAD, RSVG to Diag1, OM2-OM3, RSVG to PDA 
                      
                    Active Problems: 
                      ACS (acute coronary syndrome) ([address] (10/19/2019) 
                      
                      Unstable angina ([address] (10/19/2019) 
                      
                      Coronary artery disease of native artery of native heart with stable angina pectoris ([address] (10/21/2019) 
                      
                      Systolic heart failure ([address] (10/22/2019) 
                      
                      
                      
 Plan/Recommendations/Medical Decision Makin. S/p CABG: on ASA, statin. No BB, ACEi while on vasoactive medications 2. Acute on chronic systolic CHF, NYHA Class II on admit: EF 35-40% preop. Dobutamine to assist diuresis/renal function. No BB/ACE/ARB/AA until vitals/kidney function allows. Trend pBNP - stable 
                      
                    3. Acute postop respiratory failure with chronic interstitial fibrosis per CXR: Re-intubated  due to worsening acute respiratory failure. Diuresis per Nephrology. Amiodarone had been stopped on 10/31. Solumedrol per pulm. Continue scheduled nebs. Cont mucinex, pulm toileting, mucomyst. 
                      
                    4. Renal insufficiency: Creatinine worse today at 2.9- cont dobutamine gtt. Nephrology following and appreciate their recommendations. Diuretics stopped for now due to renal function. Devlin in place for accurate I&O 
                      
                    5. Anemia: had preop, stable H&H. Iron panel sent preop - iron, iron sat low.  
                      
                    6. Thrombocytopenia: Resolved.  
                      
                    7. Hx of HTN: Now hypotensive-likely due to sedation. Dobutamine and Neosynephrine drips.  
                      
                    8. HLD: Continue pravastatin 
                      
                    9. Leukocytosis: likely from steroids. Devlin placed . PICC placed . Monitor daily CBC.   10. Constipation: Resolved. Resume bowel meds as needed 
                      11. Current smoker: smoking cessation education. Cont pulm toileting. Nicotine patch ordered 
                      12. Nutrition: Appreciate Dietician recommendations. Dobhoff placed 10/30 and Enteral feedings started. Too unstable from resp standpoint for PO diet, TF's back to continuous at 50 ml/hr. Try and AVOID increased volume amounts  
                      13. Hyperglycemia: Preop A1c 5.3 with no DM history, now with hyperglycemia. Likely due to steroids and tube feedings. DTS following. Lantus 30 mg BID with SSI increased to resistant scale.  
                      14. DVT/GI Prophylaxis: SCD's, SQ Heparin, PPI 
                      
                    Dispo: PT/OT as able. Remain in CVI until resp status more stable.  
                      
                    Signed By: Diandra Chaparro, ANANYA 
                     
                    LABS 
                    WBC 12.1 Hgb 9.8 Hct 30.4 Glucose 170  Creat 2.92 NT-BNP 9465 Results for Jose Camarena (MRN [id number]) as of 11/8/2019 13:04 
                     
                    11/8/2019 11:24 
                    pH (POC): 7.330 (L) 
                    pCO2 (POC): 59.8 (H) 
                    pO2 (POC): 174 (H) HCO3 (POC): 31.5 (H) 
                    sO2 (POC): 99 (H) [X] FiO2 > 50%(0.50) and > baseline ~--Admin,  Pin Isabella Rc on 11- 01:07 PM--~ 
                        1/2NS @ 10 ICU 
                        VENT Brovana nebs BID Pulmicort nebs BID Precedex IV gtt Dobutamine IV gtt Heparin 5000u sq q8 Phenylephrine IV gtt Diprivan IV gtt 
                         
                         
                        ~--Admin, IQ Admin Admin on 11- 01:04 PM--~ 
                        FIO2 80%   
Version: InterQual® 2019 [place]  © 2019 [employer] and/or one of its [place]. All Rights Reserved. CPT only © 2018 American Medical Association. All Rights Reserved.  
   
LOC:Acute Adult-Extended Stay (11/7/2019) by Vikash Casillas RN  
 
   
Review Status Review Entered In Primary 11/7/2019 12:04  
   
Criteria Review REVIEW SUMMARY 
  
Patient: Ti Meza Review Number: [id number] Review Status: In Primary 
  
Condition Specific: Yes 
  
Condition Level Of Care Code: INTERMED Condition Level Of Care Description: Intermediate 
  
  
OUTCOMES Outcome Type: Primary 
  
  
  
REVIEW DETAILS 
  
Service Date: 11/07/2019 Admit Date: 10/19/2019 Product: Donyazmina Alifrida Adult Subset: Extended Stay (Symptom or finding within 24h) 
  (Excludes PO medications unless noted) [X] Select Level of Care, One: 
            [X] INTERMEDIATE, One: 
                [X] Partial responder, not clinically stable for discharge and requires continued stay, >= One: 
                    [X] Noninvasive positive pressure ventilation (NIPPV) and, >= One: 
                    ~--Admin, IQ Admin Admin on 11- 12:03 PM--~ 
                    NOTE Pulmonary Remains bipap dependent and desats quickly Denies pain but appears anxious with WOB. Mild distress on bipap No accessory use Symmetrical chest expansion Rales RRR no rubs Soft/NT no rigidity Warm and dry Trace edema No rashes 
                      
                    CXR - no change in ILD with edema Impression ·     S/p 5V CABG 10/19/19 for ACS  With reexploration 10/23 for mediastinal hematoma ·     Acute resp failure- baseline fibrotic ILD (etiology not clear- this is a new dx but UIP/IPF in DDX) with superimposed pulmonary interstitial edema but I cannot r/o superimposed diffuse alveolar damage from blood/blood products. Amio pulm toxicty in DDX but not clear from [facility] review when he was on it. 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 
                      
                    Plan: 
                    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 UIP/IPF) with superimposed DAD- this carries a very poor prognosis and is usually not responsive to steroids. CXR today maybe a bit better but still with diffuse pattern of reticulation and GGO ·     Treatment is supportive and would strongly consider palliative consult. ·     Will speak to family today ·     Agree with continued diuresis. ·     CCP/RF negative, ESR 70 ·     D/w ICU RN Impression ·     S/p 5V CABG 10/19/19 for ACS  With reexploration 10/23 for mediastinal hematoma ·     Acute resp failure- baseline fibrotic ILD (etiology not clear- this is a new dx but UIP/IPF in DDX) with superimposed pulmonary interstitial edema but I cannot r/o superimposed diffuse alveolar damage from blood/blood products. Amio pulm toxicty in DDX but not clear from [facility] review when he was on it. 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 
                      
                    Plan: 
                    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 UIP/IPF) with superimposed DAD- this carries a very poor prognosis and is usually not responsive to steroids. CXR today maybe a bit better but still with diffuse pattern of reticulation and GGO ·     Treatment is supportive and would strongly consider palliative consult. ·     Will speak to family today ·     Agree with continued diuresis. ·     CCP/RF negative, ESR 70 ·     D/w ICU RN 
                     
                    VS 
                    97.9 P-101, 103, 100 R-27, 25 108/59 99% on  BIPAP 60% FIO2 LABS 
                    WBC 19.1 Hgb 10.2 Hct 30.9 Glucose 214  Creat 2.58 NT-BNP 9898 Alk Phos 155 IMAGES IMPRESSION: 
                    1. Decrease in the interstitial edema like pattern [X] Respiratory interventions every 3-4h 
                        ~--Admin, IQ Admin Admin on 11- 12:04 PM--~ 
                        Dobutamine gtt. ½ NS at 10 ml/hr. Insulin gtt. Solu-Medrol 80 mg iv Q 8 hours. Mucomycst nebs bid. Brovana/pulmicort nebs bid. Bumex 2 mg iv tid. ASA 81 mg po daily. Ferrous 300 mg/NG po daily. Heparin 5,000 units SQ  Q 8 hours. Lantus 20 units SQ  12 hours. Humalog SSI. Lidocaine patch. Mg-ox 400 mg po bid. MVI / ng daily. Nicotine patch. Protonix 40 mg iv daily. Miralax 17 G po daily. Pravachol 40 mg po Q hs.  Zoloft 100 mg po daily. Tylenol prn. Albuterol nebs Q 4 prn. Ca gluconate 1 G iv prn. Benadryl 25 mg po/iv Q 6 prn. Hydralazine 10 mg iv Q 6 hour prn for SBP > 140. Dilaudid 0.5 mg iv Q 2 prn. Zofran 4 mg iv Q 4 prn. Compazine 10 mg iv Q 6 prn. Oxycodone IR 5-10 mg po Q 4 prn. Pheneylephrine IV gtt. Lantus 25u sq qhs 
                         
                         
                         
  
Version: InterQual® 2019 Gianna [place]  © 2019 [employer] and/or one of its [place]. All Rights Reserved. CPT only © 2018 American Medical Association.   All Rights Reserved.

## 2019-11-12 NOTE — PROGRESS NOTES
Problem: Falls - Risk of 
Goal: *Absence of Falls Description Document Kayce Mejia Fall Risk and appropriate interventions in the flowsheet. Outcome: Progressing Towards Goal 
Note:  
Fall Risk Interventions: 
Mobility Interventions: Communicate number of staff needed for ambulation/transfer Mentation Interventions: Adequate sleep, hydration, pain control, Evaluate medications/consider consulting pharmacy, More frequent rounding, Update white board Medication Interventions: Evaluate medications/consider consulting pharmacy Elimination Interventions: Toileting schedule/hourly rounds History of Falls Interventions: Consult care management for discharge planning Problem: Patient Education: Go to Patient Education Activity Goal: Patient/Family Education Outcome: Progressing Towards Goal 
  
Problem: Pressure Injury - Risk of 
Goal: *Prevention of pressure injury Description Document Earl Scale and appropriate interventions in the flowsheet. Outcome: Progressing Towards Goal 
Note:  
Pressure Injury Interventions: 
Sensory Interventions: Assess changes in LOC, Float heels, Keep linens dry and wrinkle-free, Minimize linen layers, Turn and reposition approx. every two hours (pillows and wedges if needed) Moisture Interventions: Apply protective barrier, creams and emollients, Maintain skin hydration (lotion/cream), Minimize layers Activity Interventions: Pressure redistribution bed/mattress(bed type) Mobility Interventions: Assess need for specialty bed, Float heels, Pressure redistribution bed/mattress (bed type), Turn and reposition approx. every two hours(pillow and wedges) Nutrition Interventions: Document food/fluid/supplement intake Friction and Shear Interventions: Apply protective barrier, creams and emollients, HOB 30 degrees or less, Lift sheet, Minimize layers Problem: Patient Education: Go to Patient Education Activity Goal: Patient/Family Education Outcome: Progressing Towards Goal 
  
Problem: AMI: Discharge Outcomes Goal: *Verbalizes name, dosage, time, side effects, and number of days to continue medications Outcome: Progressing Towards Goal 
  
Problem: Patient Education: Go to Patient Education Activity Goal: Patient/Family Education Outcome: Progressing Towards Goal 
  
Problem: Infection - Risk of, Central Venous Catheter-Associated Bloodstream Infection Goal: *Absence of infection signs and symptoms Outcome: Progressing Towards Goal 
  
Problem: Patient Education: Go to Patient Education Activity Goal: Patient/Family Education Outcome: Progressing Towards Goal 
  
Problem: Ventilator Management Goal: *Adequate oxygenation and ventilation Outcome: Progressing Towards Goal 
Goal: *Patient maintains clear airway/free of aspiration Outcome: Progressing Towards Goal 
Goal: *Absence of infection signs and symptoms Outcome: Progressing Towards Goal 
  
Problem: Patient Education: Go to Patient Education Activity Goal: Patient/Family Education Outcome: Progressing Towards Goal 
  
Problem: Nutrition Deficit Goal: *Optimize nutritional status Outcome: Progressing Towards Goal 
  
Problem: Gas Exchange - Impaired Goal: *Absence of hypoxia Outcome: Progressing Towards Goal 
  
Problem: Patient Education: Go to Patient Education Activity Goal: Patient/Family Education Outcome: Progressing Towards Goal 
  
Problem: Nutrition Deficit Goal: *Optimize nutritional status Outcome: Progressing Towards Goal 
  
Problem: Infection - Risk of, Urinary Catheter-Associated Urinary Tract Infection Goal: *Absence of infection signs and symptoms Outcome: Progressing Towards Goal 
  
Problem: Patient Education: Go to Patient Education Activity Goal: Patient/Family Education Outcome: Progressing Towards Goal

## 2019-11-12 NOTE — PROGRESS NOTES
2000: Report received from Maxine Raman RN, drips dual RN rate verified and care assumed of patient. Patient with labored abdominal breathing on vent. RT called for ABG. Patient current sedation: Precedex 0.9 mcg/kg/hr, Fentanyl 125 mcg/hr, & Versed 6 mg/hr. 2045: ABG complete. FIO2 increased for pO2 93, and Fentanyl increased to 200 mcg/hr. Will monitor. 0120: Patient noted to have cuff leak. 1ml of air added to cuff, no change. RT called to bedside further evaluate. 0135: RT instilled another 8ml to ETT cuff and leak subsided. Patient continues to stack breaths, work of breathing has increased and now abdominal breaths and head bounces on pillow with breaths. Versed drip increased to 8 mg/hr. 0500: Patient shaved, bathed and linen changed. 0630: Dr. Ney Melo bedside. 0800: Bedside shift change report given to Malika Payne RN (oncoming nurse) by Cece Correa RN (offgoing nurse). Report included the following information SBAR, Kardex, Procedure Summary, Intake/Output, Accordion, Recent Results and Cardiac Rhythm NSR.

## 2019-11-12 NOTE — PROGRESS NOTES
Physical Therapy 11/12/2019 Chart reviewed. Patient continues to be intubated and sedated. Plan for tracheostomy tomorrow at 11:30 AM. Continue to follow peripherally. Thank you.  
 
Kanwal Hagan, PT, DPT

## 2019-11-12 NOTE — PROGRESS NOTES
Nephrology Progress Note Cliffrod Pollard Date of Admission : 10/19/2019 CC:  Follow up for ISAIAH on CKD, hypervolemia Assessment and Plan ISAIAH on CKD: 
- from ATN post CABG 
- BUN/ cr continues to improve - Restarted Diuretics due to worsening CXR and increased FIO2 Hypernatremia : 
- continue free water flushes CKD III: 
- baseline Cr 1.3 prior to CABG 
- likely from DM and HTN 
  
HFrE F: 
- last EF 35-40% on 10/20 
  
CAD s/p CABG x 5 10/19 and reexploration 10/23 for hematoma 
  
Acute on Chronic Resp Failure: 
- likely 2/2 underlying ILD + volume 
- intubated now Chronic Anemia: 
- hgb stable 
- cont to monitor 
  
DM2: 
- on insulin 
  
Protein Malnutrition: 
- on TF via [address] 76 Interval History: 
Seen and examined. On 80% FIO2 CXR worse Needing more sedation Renal labs improving Current Medications: all current  Medications have been eviewed in [facility] Review of Systems: Review of systems not obtained due to patient factors. Objective: 
Vitals:   
Vitals:  
 11/12/19 0400 11/12/19 0500 11/12/19 0600 11/12/19 0700 BP: 119/62 118/61 128/61 123/62 Pulse: 96 92 86 86 Resp: 17 15 16 18 Temp: 99.4 °F (37.4 °C) SpO2: 97% 98% 99% 99% Weight:  58.7 kg (129 lb 6.4 oz) Height:      
 
Intake and Output: 
No intake/output data recorded. 11/10 1901 - 11/12 0700 In: 4884.4 [I.V.:2029.4] Out: 3360 [FLTKK:9995] Physical Examination: 
Pt intubated     Yes General: Frail, sedated on the vent Neck:  Supple, no mass Resp:  Diffuse dry crackles CV:  RRR,  no murmur or rub, no LE edema GI:  Soft, NT, + Bowel sounds, no hepatosplenomegaly Neurologic:  Sedated on the vent Psych:             Unable to assess Skin:  No Rash :  Devlin in place 
 
[]    High complexity decision making was performed 
[]    Patient is at high-risk of decompensation with multiple organ involvement Lab Data Personally Reviewed: I have reviewed all the pertinent labs, microbiology data and radiology studies during assessment. Recent Labs 11/12/19 
0482 11/11/19 
0415 11/10/19 
2821 * 153* 148*  
K 5.1 4.3 4.4  
* 114* 110* CO2 35* 35* 35* * 153* 220* * 123* 148* CREA 1.63* 1.74* 2.30* CA 9.0 9.0 8.9 MG 3.0* 3.2* 3.5* ALB 2.1* 2.2* 2.2*  
SGOT 25 20 17 ALT 28 31 38 Recent Labs 11/12/19 
1113 11/11/19 
0415 11/10/19 
8922 WBC 16.3* 10.8 10.5 HGB 8.1* 8.7* 8.3* HCT 27.9* 28.6* 27.1*  
* 145* 199 No results found for: SDES Lab Results Component Value Date/Time Culture result: NO GROWTH 2 DAYS 11/07/2019 12:45 PM  
 Culture result: HEAVY NORMAL RESPIRATORY TARIK 10/21/2019 12:08 PM  
 
Recent Results (from the past 24 hour(s)) GLUCOSE, POC Collection Time: 11/11/19 12:07 PM  
Result Value Ref Range Glucose (POC) 270 (H) 65 - 100 mg/dL Performed by ARISTIDES BOUCHER   
GLUCOSE, POC Collection Time: 11/11/19  6:12 PM  
Result Value Ref Range Glucose (POC) 177 (H) 65 - 100 mg/dL Performed by happyviewGARCIA BOUCHER   
POC G3 - PUL Collection Time: 11/11/19  8:44 PM  
Result Value Ref Range FIO2 (POC) 60 % pH (POC) 7.458 (H) 7.35 - 7.45    
 pCO2 (POC) 49.8 (H) 35.0 - 45.0 MMHG  
 pO2 (POC) 64 (L) 80 - 100 MMHG  
 HCO3 (POC) 35.3 (H) 22 - 26 MMOL/L  
 sO2 (POC) 93 92 - 97 % Base excess (POC) 11 mmol/L Site LEFT RADIAL Device: VENT Mode ASSIST CONTROL Set Rate 14 bpm  
 PEEP/CPAP (POC) 5 cmH2O  
 PIP (POC) 10 Allens test (POC) YES Inspiratory Time 1.0 sec Specimen type (POC) ARTERIAL Total resp. rate 16 GLUCOSE, POC Collection Time: 11/12/19 12:21 AM  
Result Value Ref Range Glucose (POC) 169 (H) 65 - 100 mg/dL Performed by Payton Mohs METABOLIC PANEL, COMPREHENSIVE Collection Time: 11/12/19  4:36 AM  
Result Value Ref Range Sodium 149 (H) 136 - 145 mmol/L  Potassium 5.1 3.5 - 5.1 mmol/L  
 Chloride 111 (H) 97 - 108 mmol/L  
 CO2 35 (H) 21 - 32 mmol/L Anion gap 3 (L) 5 - 15 mmol/L Glucose 154 (H) 65 - 100 mg/dL  (H) 6 - 20 MG/DL Creatinine 1.63 (H) 0.70 - 1.30 MG/DL  
 BUN/Creatinine ratio 61 (H) 12 - 20 GFR est AA 51 (L) >60 ml/min/1.73m2 GFR est non-AA 42 (L) >60 ml/min/1.73m2 Calcium 9.0 8.5 - 10.1 MG/DL Bilirubin, total 0.4 0.2 - 1.0 MG/DL  
 ALT (SGPT) 28 12 - 78 U/L  
 AST (SGOT) 25 15 - 37 U/L Alk. phosphatase 85 45 - 117 U/L Protein, total 6.3 (L) 6.4 - 8.2 g/dL Albumin 2.1 (L) 3.5 - 5.0 g/dL Globulin 4.2 (H) 2.0 - 4.0 g/dL A-G Ratio 0.5 (L) 1.1 - 2.2    
CBC W/O DIFF Collection Time: 11/12/19  4:36 AM  
Result Value Ref Range WBC 16.3 (H) 4.1 - 11.1 K/uL  
 RBC 2.62 (L) 4.10 - 5.70 M/uL HGB 8.1 (L) 12.1 - 17.0 g/dL HCT 27.9 (L) 36.6 - 50.3 % .5 (H) 80.0 - 99.0 FL  
 MCH 30.9 26.0 - 34.0 PG  
 MCHC 29.0 (L) 30.0 - 36.5 g/dL  
 RDW 15.9 (H) 11.5 - 14.5 % PLATELET 111 (L) 045 - 400 K/uL MPV 12.8 8.9 - 12.9 FL  
 NRBC 0.1 (H) 0  WBC ABSOLUTE NRBC 0.02 (H) 0.00 - 0.01 K/uL MAGNESIUM Collection Time: 11/12/19  4:36 AM  
Result Value Ref Range Magnesium 3.0 (H) 1.6 - 2.4 mg/dL NT-PRO BNP Collection Time: 11/12/19  4:36 AM  
Result Value Ref Range NT pro-BNP 13,919 (H) <125 PG/ML  
GLUCOSE, POC Collection Time: 11/12/19  7:01 AM  
Result Value Ref Range Glucose (POC) 163 (H) 65 - 100 mg/dL Performed by Genaro Gómez MD 
[address] [place] [address], [facility] Phone - [phone] Fax - [phone] 
[email or web address]

## 2019-11-13 NOTE — PROGRESS NOTES
1100: Bedside and Verbal shift change report given to JACKELINE RN (oncoming nurse) by Eleni Dietz RN (offgoing nurse). Report included the following information SBAR, Kardex, Intake/Output, MAR, Recent Results, Med Rec Status and Cardiac Rhythm NSR.  
1130: Byron Cortez MD at bedside, RT, Anesthesia on unit on standby and 3 RNs present. 1140: Byron Cortez MD prepping surgical site. Propofol started at 50. 
1146: Time out performed. 1148: Tracheostomy done with #8 shiley. Spare trach at bedside. 1150: Pt bronched after trach placed. 1930: Bedside and Verbal shift change report given to Adrian Tobar RN (oncoming nurse) by NORMA VIVEROS (offgoing nurse). Report included the following information SBAR, Kardex, Intake/Output, MAR, Recent Results, Med Rec Status, Cardiac Rhythm NSR and Alarm Parameters .

## 2019-11-13 NOTE — PROGRESS NOTES
Problem: Falls - Risk of 
Goal: *Absence of Falls Description Document Minh Saenz Fall Risk and appropriate interventions in the flowsheet. Outcome: Progressing Towards Goal 
Note:  
Fall Risk Interventions: 
Mobility Interventions: Communicate number of staff needed for ambulation/transfer Mentation Interventions: Update white board, Door open when patient unattended Medication Interventions: Evaluate medications/consider consulting pharmacy Elimination Interventions: Toileting schedule/hourly rounds History of Falls Interventions: Consult care management for discharge planning Problem: Patient Education: Go to Patient Education Activity Goal: Patient/Family Education Outcome: Progressing Towards Goal 
  
Problem: Pressure Injury - Risk of 
Goal: *Prevention of pressure injury Description Document Earl Scale and appropriate interventions in the flowsheet. Outcome: Progressing Towards Goal 
Note:  
Pressure Injury Interventions: 
Sensory Interventions: Assess need for specialty bed, Check visual cues for pain, Float heels, Minimize linen layers, Turn and reposition approx. every two hours (pillows and wedges if needed) Moisture Interventions: Apply protective barrier, creams and emollients, Check for incontinence Q2 hours and as needed, Maintain skin hydration (lotion/cream), Minimize layers Activity Interventions: Pressure redistribution bed/mattress(bed type) Mobility Interventions: Assess need for specialty bed, Float heels, Turn and reposition approx. every two hours(pillow and wedges), Pressure redistribution bed/mattress (bed type) Nutrition Interventions: Document food/fluid/supplement intake Friction and Shear Interventions: Lift sheet, Minimize layers Problem: Patient Education: Go to Patient Education Activity Goal: Patient/Family Education Outcome: Progressing Towards Goal 
  
Problem: CABG: Post-Op Day 5 Goal: Diagnostic Test/Procedures Outcome: Progressing Towards Goal 
  
Problem: CABG: Discharge Outcomes Goal: *Hemodynamically stable Outcome: Progressing Towards Goal 
  
Problem: Patient Education: Go to Patient Education Activity Goal: Patient/Family Education Outcome: Progressing Towards Goal 
  
Problem: Patient Education: Go to Patient Education Activity Goal: Patient/Family Education Outcome: Progressing Towards Goal 
  
Problem: Patient Education: Go to Patient Education Activity Goal: Patient/Family Education Outcome: Progressing Towards Goal 
  
Problem: Tissue Perfusion - Cardiopulmonary, Altered Goal: *Absence of hypoxia Outcome: Progressing Towards Goal 
  
Problem: Nutrition Deficit Goal: *Optimize nutritional status Outcome: Progressing Towards Goal 
  
Problem: Gas Exchange - Impaired Goal: *Absence of hypoxia Outcome: Progressing Towards Goal 
  
Problem: Patient Education: Go to Patient Education Activity Goal: Patient/Family Education Outcome: Progressing Towards Goal 
  
Problem: Nutrition Deficit Goal: *Optimize nutritional status Outcome: Progressing Towards Goal 
  
Problem: Infection - Risk of, Urinary Catheter-Associated Urinary Tract Infection Goal: *Absence of infection signs and symptoms Outcome: Progressing Towards Goal 
  
Problem: Patient Education: Go to Patient Education Activity Goal: Patient/Family Education Outcome: Progressing Towards Goal

## 2019-11-13 NOTE — PROGRESS NOTES
[facility] ICU Progress Note Admit Date: 10/19/2019 Procedure:  Procedure(s): 
CARDIAC RE-ENTRY, MARISOL BY  [facility]  [facility]    
 
CABG x 5, LIMA to LAD, RSVG to [id number], RSVG to PDA 10/23/19 Subjective:  
Pt seen with Dr. Alison Palacios. Dobutamine at 1,  Precedex, versed, fentanyl. Afebrile, intubated and sedated. FiO2 60%. TF on hold for Trach planned for today. Objective:  
Vitals: 
Blood pressure 121/65, pulse 73, temperature 96.2 °F (35.7 °C), resp. rate 11, height 5' 7\" (1.702 m), weight 129 lb 11.2 oz (58.8 kg), SpO2 96 %. Temp (24hrs), Av.5 °F (36.4 °C), Min:96.2 °F (35.7 °C), Max:98.4 °F (36.9 °C) EKG/Rhythm: SR in the 76s Oxygen Therapy: 
Oxygen Therapy O2 Sat (%): 96 % (19 0900) Pulse via Oximetry: 79 beats per minute (19 08) O2 Device: Endotracheal tube (19) O2 Flow Rate (L/min): 40 l/min (19) O2 Temperature: 98.6 °F (37 °C) (19 0729) FIO2 (%): 60 % (19 08) CXR:  
CXR Results  (Last 48 hours)  
          
 19 05  XR CHEST PORT Final result Impression:  IMPRESSION:  
No significant interval change. Narrative:  PORTABLE CHEST RADIOGRAPH/S: 2019 5:11 AM  
   
Clinical history: Interstitial edema INDICATION:   interstitial edema COMPARISON: 2019 FINDINGS:  
AP portable upright view of the chest demonstrates a stable  cardiopericardial  
silhouette. The lungs are adequately expanded. . ET tube and NG tube unchanged. PICC line catheter on the right unchanged. Patchy parenchymal and interstitial  
opacities are superimposed on chronic parenchymal change Poststernotomy. Rolaimelda Soni Patient is on a cardiac monitor. 19 0513  XR CHEST PORT Final result Impression:  IMPRESSION:  
No significant interval change. Narrative:  PORTABLE CHEST RADIOGRAPH/S: 2019 5:13 AM  
   
Clinical history: Interstitial edema INDICATION:   interstitial edema COMPARISON: 2019 FINDINGS:  
AP portable upright view of the chest demonstrates a stable  cardiopericardial  
silhouette. The lungs are adequately expanded. There is no edema, effusion,  
consolidation, or pneumothorax. ET tube and NG tube unchanged. PICC line  
catheter on the right unchanged. Patchy parenchymal and interstitial opacities  
are superimposed on chronic parenchymal change Poststernotomy. . Patient is on a  
cardiac monitor. Admission Weight: Last Weight Weight: 141 lb 5 oz (64.1 kg) Weight: 129 lb 11.2 oz (58.8 kg) Intake / Output / Drain: 
Current Shift: 11/13 0701 - 11/13 1900 In: 105.3 [I.V.:105.3] Out: 250 [Urine:250] Last 24 hrs.:  
 
Intake/Output Summary (Last 24 hours) at 11/13/2019 1059 Last data filed at 11/13/2019 1000 Gross per 24 hour Intake 1969.7 ml Output 2965 ml Net -995.3 ml EXAM: 
General:  Intubated, sedated. Lungs:   Coarse bilat Incision:  Midsternal incision with no significant erythema, drainage, or dehiscence. Heart:  Regular rate and rhythm, S1, S2 normal, no murmur, click, rub or gallop. Abdomen:   Soft, non-tender. Bowel sounds hypoactive. No masses,  No organomegaly. Extremities:  Some generalized upper extremity edema. Palpable pulses bilat Neurologic:  Sedated. Moves all extremities Labs:  
Recent Labs 11/13/19 
0912 11/13/19 
0321 WBC  --  15.1* HGB  --  7.5* HCT  --  25.6* PLT  --  110* NA  --  149* K  --  4.8 BUN  --  114* CREA  --  1.70* GLU  --  257* GLUCPOC 272*  --   
INR  --  1.1 Assessment:  
 
Principal Problem: S/P CABG x 5 (10/23/2019) Overview: x 5, LIMA to LAD, RSVG to Diag1, OM2-OM3, RSVG to PDA Active Problems: 
  [facility] (10/19/2019) Unstable angina ([facility] (10/19/2019)   Coronary artery disease of native artery of native heart with stable angina pectoris ([facility] (10/21/2019) Systolic heart failure ([facility] (10/22/2019) Plan/Recommendations/Medical Decision Makin. S/p CABG: on ASA, statin. No BB, ACEi while on vasoactive medications 2. Acute on chronic systolic CHF, NYHA Class II on admit: EF 35-40% preop. Dobutamine off today. No BB/ACE/ARB/AA until vitals/kidney function allows. Trend pBNP 3. Acute postop respiratory failure with chronic interstitial fibrosis per CXR: Re-intubated  due to worsening acute respiratory failure. Diuresis per Nephrology. Amiodarone had been stopped on 10/31. Solumedrol per pulm. Continue scheduled nebs. Cont mucinex, pulm toileting, mucomyst. Chest CT revealing emphysema/pulm fibrosis. Thoracic consulted for trach for Wed, consent obtained from patients son 4. Renal insufficiency: Creatinine improved some. Will discontinue Dobutamine. Nephrology following and appreciate their recommendations. Devlin in place for accurate I&O 5. Anemia: had preop, stable H&H. Iron panel sent preop - iron, iron sat low. H&H cont to trend downward 6. Thrombocytopenia: plts on downward trend - monitor. Hold heparin for trach tomorrow 7. Hx of HTN: Currently controlled but has needed some francis on and off-likely due to sedation. Dobutamine continues. 8. HLD: Continue pravastatin 9. Leukocytosis: WBC back up again, check procalcitonin. Devlin placed . PICC placed . Monitor daily CBC. 10. Constipation: Resolved. Continue bowel regimen PRN 11. Current smoker: smoking cessation education completed. Cont pulm toileting. 12. Nutrition: Appreciate Dietician recommendations. Dobhoff placed 10/30 and Enteral feedings started. Too unstable from resp standpoint for PO diet, TF's continuous at 50 ml/hr. Try and AVOID increased volume amounts 13. Hyperglycemia: Preop A1c 5.3 with no DM history, now with hyperglycemia.  Worsening hyperglycemia yesterday due to missing am dose of Lantus. Now tube feedings on hold for Trach. Did receive am dose of Lantus. DTS following. Continue Lantus at 38 mg BID with SSI. 14. Hypernatremia: Nephrology following. Na at 149 today 15. DVT/GI Prophylaxis: SCD's, SQ Heparin, PPI Dispo: PT/OT as able. Remain in CVI until resp status more stable.   
 
 
Signed By: Libia Olivares NP

## 2019-11-13 NOTE — PROGRESS NOTES
Nephrology Progress Note William Mercedes Date of Admission : 10/19/2019 CC:  Follow up for ISAIAH on CKD, hypervolemia Assessment and Plan ISAIAH on CKD: 
- from ATN post CABG 
- Cr stable w/ diuresis. BUN has been up -- also on corticosteroids  
- hold Bumex until he can get enteral water flushes again post Trach  
- do not see a need for Dobutamine from renal stand point. Consider stopping it  
- daily labs Hypernatremia : 
- water flushes as above CKD III: 
- baseline Cr 1.3 prior to CABG 
- likely from DM and HTN 
  
HFrE F: 
- last EF 35-40% on 10/20 
  
CAD s/p CABG x 5 10/19 and reexploration 10/23 for hematoma 
  
Acute on Chronic Resp Failure: 
- likely 2/2 underlying ILD + volume 
- intubated Chronic Anemia: 
- hgb stable 
- cont to monitor 
  
DM2: 
- on insulin 
  
Protein Malnutrition: 
- on TF via [place] 76 Interval History: 
Seen and examined. CXR improved after diuresis yesterday and FIO2 down to 605 For Meherrin today Remains on 1 mcg dobutamine Current Medications: all current  Medications have been eviewed in [facility] Review of Systems: Review of systems not obtained due to patient factors. Objective: 
Vitals:   
Vitals:  
 11/13/19 0315 11/13/19 0400 11/13/19 0500 11/13/19 0600 BP:  116/67 137/65 123/62 Pulse: 80 72 79 83 Resp: 14 15 14 14 Temp:  97.7 °F (36.5 °C) SpO2: 98% 96% 96% 94% Weight:   58.8 kg (129 lb 11.2 oz) Height:      
 
Intake and Output: 
No intake/output data recorded. 11/11 1901 - 11/13 0700 In: 4035.9 [I.V.:1375.9] Out: 8452 [TOWME:6052] Physical Examination: 
Pt intubated     Yes General: Frail, sedated on the vent Neck:  Supple, no mass Resp:  Diffuse dry crackles CV:  RRR,  no murmur or rub, no LE edema GI:  Soft, NT, + Bowel sounds, no hepatosplenomegaly Neurologic:  Sedated on the vent Psych:             Unable to assess Skin:  No Rash :  Devlin in place 
 
[]    High complexity decision making was performed []    Patient is at high-risk of decompensation with multiple organ involvement Lab Data Personally Reviewed: I have reviewed all the pertinent labs, microbiology data and radiology studies during assessment. Recent Labs 11/13/19 
0321 11/12/19 
9315 11/11/19 
6964 * 149* 153* K 4.8 5.1 4.3  111* 114* CO2 37* 35* 35* * 154* 153* * 100* 123* CREA 1.70* 1.63* 1.74* CA 8.8 9.0 9.0 MG 2.9* 3.0* 3.2* ALB 1.9* 2.1* 2.2*  
SGOT 23 25 20 ALT 28 28 31 INR 1.1  --   --   
 
Recent Labs 11/13/19 
0321 11/12/19 
5977 11/11/19 
4260 WBC 15.1* 16.3* 10.8 HGB 7.5* 8.1* 8.7* HCT 25.6* 27.9* 28.6*  
* 131* 145* No results found for: SDES Lab Results Component Value Date/Time Culture result: NO GROWTH 2 DAYS 11/07/2019 12:45 PM  
 Culture result: HEAVY NORMAL RESPIRATORY TARIK 10/21/2019 12:08 PM  
 
Recent Results (from the past 24 hour(s)) PROCALCITONIN Collection Time: 11/12/19 10:23 AM  
Result Value Ref Range Procalcitonin 0.6 ng/mL GLUCOSE, POC Collection Time: 11/12/19 12:36 PM  
Result Value Ref Range Glucose (POC) 390 (H) 65 - 100 mg/dL Performed by Bipin Workman, POC Collection Time: 11/12/19  4:47 PM  
Result Value Ref Range Glucose (POC) 289 (H) 65 - 100 mg/dL Performed by Bipin Workman, POC Collection Time: 11/13/19 12:25 AM  
Result Value Ref Range Glucose (POC) 234 (H) 65 - 100 mg/dL Performed by [facility] METABOLIC PANEL, COMPREHENSIVE Collection Time: 11/13/19  3:21 AM  
Result Value Ref Range Sodium 149 (H) 136 - 145 mmol/L Potassium 4.8 3.5 - 5.1 mmol/L Chloride 108 97 - 108 mmol/L  
 CO2 37 (H) 21 - 32 mmol/L Anion gap 4 (L) 5 - 15 mmol/L Glucose 257 (H) 65 - 100 mg/dL  (H) 6 - 20 MG/DL Creatinine 1.70 (H) 0.70 - 1.30 MG/DL  
 BUN/Creatinine ratio 67 (H) 12 - 20 GFR est AA 48 (L) >60 ml/min/1.73m2 GFR est non-AA 40 (L) >60 ml/min/1.73m2 Calcium 8.8 8.5 - 10.1 MG/DL Bilirubin, total 0.3 0.2 - 1.0 MG/DL  
 ALT (SGPT) 28 12 - 78 U/L  
 AST (SGOT) 23 15 - 37 U/L Alk. phosphatase 96 45 - 117 U/L Protein, total 5.9 (L) 6.4 - 8.2 g/dL Albumin 1.9 (L) 3.5 - 5.0 g/dL Globulin 4.0 2.0 - 4.0 g/dL A-G Ratio 0.5 (L) 1.1 - 2.2    
CBC W/O DIFF Collection Time: 11/13/19  3:21 AM  
Result Value Ref Range WBC 15.1 (H) 4.1 - 11.1 K/uL  
 RBC 2.41 (L) 4.10 - 5.70 M/uL HGB 7.5 (L) 12.1 - 17.0 g/dL HCT 25.6 (L) 36.6 - 50.3 % .2 (H) 80.0 - 99.0 FL  
 MCH 31.1 26.0 - 34.0 PG  
 MCHC 29.3 (L) 30.0 - 36.5 g/dL  
 RDW 15.4 (H) 11.5 - 14.5 % PLATELET 497 (L) 829 - 400 K/uL MPV 12.8 8.9 - 12.9 FL  
 NRBC 0.0 0  WBC ABSOLUTE NRBC 0.00 0.00 - 0.01 K/uL MAGNESIUM Collection Time: 11/13/19  3:21 AM  
Result Value Ref Range Magnesium 2.9 (H) 1.6 - 2.4 mg/dL NT-PRO BNP Collection Time: 11/13/19  3:21 AM  
Result Value Ref Range NT pro-BNP 11,827 (H) <125 PG/ML  
PROCALCITONIN Collection Time: 11/13/19  3:21 AM  
Result Value Ref Range Procalcitonin 0.6 ng/mL PROTHROMBIN TIME + INR Collection Time: 11/13/19  3:21 AM  
Result Value Ref Range INR 1.1 0.9 - 1.1 Prothrombin time 10.9 9.0 - 11.1 sec Zari Buchanan MD 
[address] [place] [address], [address] [facility] Phone - [phone] Fax - [phone] 
[email or web address]

## 2019-11-13 NOTE — ANESTHESIA PREPROCEDURE EVALUATION
Relevant Problems No relevant active problems Anesthetic History No history of anesthetic complications Review of Systems / Medical History Patient summary reviewed, nursing notes reviewed and pertinent labs reviewed Pulmonary Within defined limits Neuro/Psych Within defined limits Cardiovascular CHF: NYHA Classification IV 
 
CAD and CABG Exercise tolerance: <4 METS 
  
GI/Hepatic/Renal 
Within defined limits Endo/Other Diabetes: using insulin Other Findings Physical Exam 
 
Airway Mallampati: II 
TM Distance: > 6 cm Neck ROM: normal range of motion Mouth opening: Normal 
Intubated Cardiovascular Regular rate and rhythm,  S1 and S2 normal,  no murmur, click, rub, or gallop Dental 
No notable dental hx Pulmonary Decreased breath sounds Abdominal 
GI exam deferred Other Findings Anesthetic Plan ASA: 4 Anesthesia type: MAC Monitoring Plan: Arterial line and [place] Induction: Intravenous Anesthetic plan and risks discussed with: Patient

## 2019-11-13 NOTE — PROGRESS NOTES
Transitions of Care: 
 
 -TBD pending medical progress and POC 
 -Patient remains critically ill, plan for trach today The CM discussed case with - patient is a [facility] patient (patient previously declined and signed refusal to transfer paperwork x2 to go to [place]), and has Medicare Part A, however, pending patient's status patient is anticipated to have extensive needs (patient is critically ill at this time, status TBD). The patient previously declined Medicaid screening, however, given critical status of patient,  encouraged CM to contact patient's son for consent to have the patient screened for Medicaid given he only has Medicare Part A. Patient undergoing trach currently, no family at bedside this morning- CM will call patient's NOK Alana Gonzalezew to discuss consent for Medicaid screening (patient unable to give consent at this time, intubated). RON Kaiser  
 
11:47 a.m.- CM called and left a voicemail message for the patient's son Alana Patterson requesting a call back ([phone]). RON Kaiser  
 
12:19 p.m.- The CM received a call back from Alana Patterson, the patient's son- CM discussed Medicaid screening for additional financial assistance and services if needed pending patient's medical stability- the patient's son Ekta Munoz is declining at this time, does not feel like it is necessary currently but will keep CM updated. CM explained this writer cannot guarantee coverage from the [place], Ekta Munoz endorses that since patient lives outside of 36 mile radius from the [facility] he is under the impression the [place] will pay-  again endorses this writer cannot guarantee and offered Medicaid screening as a supportive option. Ekta Munoz will let this writer know if he changes his mind, he understands the patient is very ill at this time. Ekta Munoz has been living with the patient for approximately two years prior to hospitalization.  Ekta Martinesmaureen verbalized understanding this writer is available for support if needed and if the family changes their mind regarding Medicaid screening.  RON Joseph

## 2019-11-13 NOTE — PROGRESS NOTES
Physical Therapy 11/13/2019 Chart reviewed. Patient is s/p CABG x 5 on 10/23/19, with current hypoxemic respiratory failure, and remains intubated and sedated. Noted plans for tracheostomy today at 1130. Will continue to follow peripherally. Thank you.  
Donta Toscano, PT, DPT

## 2019-11-13 NOTE — PROGRESS NOTES
0800 Bedside shift change report given to Erma Schwab (oncoming nurse) by Reza Vidal (offgoing nurse). Report included the following information SBAR, MAR and Cardiac Rhythm NSR.  
 
0930 Thoracic at bedside. Medications ordered for planned bedside trach. 1100 Bedside shift change report given to JACKELINE (oncoming nurse) by Erma Schwab (offgoing nurse). Report included the following information SBAR, MAR and Cardiac Rhythm NSR.

## 2019-11-13 NOTE — PROGRESS NOTES
NYHA class IV A/C systolic heart failure (EF 25%, NT pro-BNP 15,013) Acute on chronic kidney disease Mild pulmonary edema Mild chronic lung disease Acute on chronic hypoxic respiratory failure Remains intubated and sedated Hgb and platelets a little low Creatinine in the mid 1's Bilirubin and other LFTs look good Pro-calcitonin mildly elevated NT pro-BNP about the same Trach later today CXR - mild pulmonary edema Addendum S/P bedside trach Will start trach collar trials tomorrow Intake/Output Summary (Last 24 hours) at 11/13/2019 1551 Last data filed at 11/13/2019 1500 Gross per 24 hour Intake 1758.73 ml Output 2770 ml Net -1011.27 ml Visit Vitals /62 Pulse 72 Temp 97.2 °F (36.2 °C) Resp 14 Ht 5' 7\" (1.702 m) Wt 129 lb 11.2 oz (58.8 kg) SpO2 97% BMI 20.31 kg/m² Risk of morbidity and mortality - high Medical decision making - high complexity Total critical care time - 30 minutes (CPT 99584)

## 2019-11-13 NOTE — ANESTHESIA POSTPROCEDURE EVALUATION
Post-Anesthesia Evaluation and Assessment Patient: Skip Sahni MRN: [id number]  SSN: [id number] YOB: 1950  Age: 68 y.o. Sex: male I have evaluated the patient and they are stable and ready for discharge from the PACU. Cardiovascular Function/Vital Signs Visit Vitals /59 Pulse 71 Temp 36.2 °C (97.2 °F) Resp 14 Ht 5' 7\" (1.702 m) Wt 58.8 kg (129 lb 11.2 oz) SpO2 100% BMI 20.31 kg/m² Patient is status post General anesthesia for Procedure(s) with comments: BRONCHOSCOPY - Perc trach at bedside, ICU 36. Nausea/Vomiting: None Postoperative hydration reviewed and adequate. Pain: 
Pain Scale 1: Adult Nonverbal Pain Scale (11/13/19 0800) Pain Intensity 1: 0 (11/13/19 0800) Managed Neurological Status:  
Neuro Neurologic State: Pharmacologically induced (comment) (11/13/19 0800) Orientation Level: Unable to verbalize (11/13/19 0800) Cognition: No command following (11/13/19 0800) Speech: Intubated (11/13/19 0800) Assessment L Pupil: Round;Sluggish (11/13/19 0800) Size L Pupil (mm): 2 (11/13/19 0800) Assessment R Pupil: Round;Sluggish (11/13/19 0800) Size R Pupil (mm): 2 (11/13/19 0800) LUE Motor Response: Withdraws (11/13/19 0800) LLE Motor Response: Withdraws (11/13/19 0800) RUE Motor Response: Withdraws (11/13/19 0800) RLE Motor Response: Withdraws (11/13/19 0800) At baseline Mental Status, Level of Consciousness: Alert and  oriented to person, place, and time Pulmonary Status:  
O2 Device: Other (comment) (11/13/19 1209) Adequate oxygenation and airway patent Complications related to anesthesia: None Post-anesthesia assessment completed. No concerns Signed By: Yarelis Reed MD   
 November 13, 2019 Procedure(s): BRONCHOSCOPY. 
 
total IV anesthesia <BSHSIANPOST> Vitals Value Taken Time /60 11/13/2019 12:30 PM  
Temp Pulse 74 11/13/2019 12:34 PM  
Resp 14 11/13/2019 12:34 PM  
 SpO2 92 % 11/13/2019 12:34 PM  
Vitals shown include unvalidated device data.

## 2019-11-13 NOTE — BRIEF OP NOTE
BRIEF OPERATIVE NOTE Date of Procedure: 11/13/2019 Preoperative Diagnosis: Respiratory Insufficiency Postoperative Diagnosis: same Procedure(s): 
1: Percutaneous Tracheostomy  2: Flexible Bornchoscopy Surgeon(s) and Role: Shahzad Glynn MD - Primary Surgical Assistant: Everardo Taylor Surgical Staff: * No surgical staff found * No case tracking events are documented in the log. Anesthesia: General  
Estimated Blood Loss: minimal 
Specimens: * No specimens in log * Findings: well placed 8 Shiley Complications: none Implants: * No implants in log * Condition: critical 
 
Disposition: to remain in ICU

## 2019-11-13 NOTE — PROGRESS NOTES
Chart reviewed. Patient continues to be intubated and sedated. Plan for tracheostomy today at 11:30 AM. Will continue to follow peripherally for OT intervention. Thank you.

## 2019-11-14 NOTE — PROGRESS NOTES
[facility] ICU Progress Note Admit Date: 10/19/2019 Procedure:  Procedure(s): 
CARDIAC RE-ENTRY, MARISOL BY  [facility]  [facility]    
 
CABG x 5, LIMA to LAD, RSVG to [id number], RSVG to PDA 10/23/19 Subjective:  
Pt seen with Dr. Jesus Browning. Afebrile, intubated and sedated. FiO2 60%. TF at goal. 
 
 Objective:  
Vitals: 
Blood pressure (!) 74/50, pulse 84, temperature 97.6 °F (36.4 °C), resp. rate 14, height 5' 7\" (1.702 m), weight 127 lb 14.4 oz (58 kg), SpO2 99 %. Temp (24hrs), Av.4 °F (36.3 °C), Min:97.2 °F (36.2 °C), Max:97.6 °F (36.4 °C) EKG/Rhythm: SR in the 80s Oxygen Therapy: 
Oxygen Therapy O2 Sat (%): 99 % (19 1000) Pulse via Oximetry: 96 beats per minute (19 09) O2 Device: Tracheostomy (19 8689) O2 Flow Rate (L/min): 40 l/min (19) O2 Temperature: 98.6 °F (37 °C) (19 0729) FIO2 (%): 60 % (19 0903) CXR:  
CXR Results  (Last 48 hours)  
          
 19  XR CHEST PORT Final result Impression:  IMPRESSION:  
ET tube is been removed. Interstitial and parenchymal edema has not significantly changed. Narrative:  PORTABLE CHEST RADIOGRAPH/S: 2019 5:28 AM  
   
Clinical history: Interstitial edema INDICATION:   interstitial edema COMPARISON: 2019 FINDINGS:  
AP portable upright view of the chest demonstrates a stable  cardiopericardial  
silhouette. The lungs are adequately expanded. Interstitial and parenchymal  
edema is not significantly changed. PICC line catheter and tracheostomy tube. NG  
tube as well. The ET tube has been removed. . Patient is on a cardiac monitor. 19 05  XR CHEST PORT Final result Impression:  IMPRESSION:  
No significant interval change. Narrative:  PORTABLE CHEST RADIOGRAPH/S: 2019 5:11 AM  
   
Clinical history: Interstitial edema INDICATION:   interstitial edema COMPARISON: 2019 FINDINGS:  
 AP portable upright view of the chest demonstrates a stable  cardiopericardial  
silhouette. The lungs are adequately expanded. . ET tube and NG tube unchanged. PICC line catheter on the right unchanged. Patchy parenchymal and interstitial  
opacities are superimposed on chronic parenchymal change Poststernotomy. Yamile Lyman Patient is on a cardiac monitor. Admission Weight: Last Weight Weight: 141 lb 5 oz (64.1 kg) Weight: 127 lb 14.4 oz (58 kg) Intake / Output / Drain: 
Current Shift: 11/14 0701 - 11/14 1900 In: 320 [I.V.:20] Out: 30 [Urine:30] Last 24 hrs.:  
 
Intake/Output Summary (Last 24 hours) at 11/14/2019 1209 Last data filed at 11/14/2019 0900 Gross per 24 hour Intake 2452.73 ml Output 1095 ml Net 1357.73 ml EXAM: 
General:  Intubated, sedated. Lungs:   Coarse bilat Incision:  Midsternal incision with no significant erythema, drainage, or dehiscence. Heart:  Regular rate and rhythm, S1, S2 normal, no murmur, click, rub or gallop. Abdomen:   Soft, non-tender. Bowel sounds hypoactive. No masses,  No organomegaly. Extremities:  Some generalized upper extremity edema. Palpable pulses bilat Neurologic:  Sedated. Moves all extremities Labs:  
Recent Labs 11/14/19 
1204  11/14/19 
0423  11/13/19 
0321 WBC  --   --  19.6*  --  15.1* HGB  --   --  8.4*  --  7.5* HCT  --   --  29.5*  --  25.6* PLT  --   --  122*  --  110* NA  --   --  148*  --  149* K  --   --  5.3*  --  4.8 BUN  --   --  121*  --  114* CREA  --   --  1.70*  --  1.70* GLU  --   --  285*  --  257* GLUCPOC 361*   < >  --    < >  --   
INR  --   --   --   --  1.1  
 < > = values in this interval not displayed. Assessment:  
 
Principal Problem: S/P CABG x 5 (10/23/2019) Overview: x 5, LIMA to LAD, RSVG to Diag1, OM2-OM3, RSVG to PDA Active Problems: ACS (acute coronary syndrome) ([facility] (10/19/2019) Unstable angina ([facility] (10/19/2019) Coronary artery disease of native artery of native heart with stable angina pectoris ([facility] (10/21/2019) Systolic heart failure ([facility] (10/22/2019) Plan/Recommendations/Medical Decision Makin. S/p CABG: on ASA, statin. No BB, ACEi while on vasoactive medications 2. Acute on chronic systolic CHF, NYHA Class II on admit: EF 35-40% preop. Dobutamine off today. No BB/ACE/ARB/AA until vitals/kidney function allows. Trend pBNP 3. Acute postop respiratory failure with chronic interstitial fibrosis per CXR: Re-intubated  due to worsening acute respiratory failure. Jonah Mast placed  by Thoracic Surgery. Diuresis per Nephrology-currently holding. Amiodarone had been stopped on 10/31. Solumedrol per pulm-weaning. Continue scheduled nebs. Cont mucinex, pulm toileting, mucomyst. Chest CT revealing emphysema/pulm fibrosis. 4. Renal insufficiency: Creatinine stable. Nephrology following and appreciate their recommendations. Devlin in place for accurate I&O. Urine output decreased overnight-may be dry. 5. Anemia: had preop, stable H&H. Iron panel sent preop - iron, iron sat low. 6. Thrombocytopenia: Platelets improved today at 122k. Daily CBC. 7. Hx of HTN: Issues with hypotension this morning requiring restarting Nomi. May be dehydrated. Albumin ordered. 8. HLD: Continue pravastatin 9. Leukocytosis: WBC back up again, check procalcitonin. Devlin placed . PICC placed . Jonah Mast yesterday. Monitor daily CBC. 10. Constipation: Resolved. Continue bowel regimen PRN 11. Current smoker: smoking cessation education completed. Cont pulm toileting. 12. Nutrition: Appreciate Dietician recommendations. Dobhoff placed 10/30 and Enteral feedings started. Too unstable from resp standpoint for PO diet, TF's continuous at 50 ml/hr. Try and AVOID increased volume amounts 13. Hyperglycemia: Preop A1c 5.3 with no DM history, now with hyperglycemia. Worsening hyperglycemia with BS in the 200s. Orders to increase Lantus but will restart Insulin gtt for now to maintain tighter glycemic control. 14. Hypernatremia: Nephrology following. Na at 148 today. Continue to monitor 15. DVT/GI Prophylaxis: SCD's, SQ Heparin-restarted today, PPI Dispo: PT/OT as able. Remain in CVI until resp status more stable. Addendum: At bedside at 1050 as he was becoming more hypotensive and requiring steadily increasing doses of Neosynephrine. Patient very sedated, not responding to painful stimuli. ABG completed showing acidosis at 7.1pH but incomplete results-likely metabolic. RT at the bedside and adjusted vent settings. STAT labs sent. 12 lead EKG completed. BS of [id number]-Humalog and Insulin gtt restarted. Started on Dobutamine and Levophed in addition to Nomi for pressure support. 1 amp bicarb given and narcan. Patient awake moving but not responding purposefully. Femoral arterial line placed. BP improved and weaning drips. All sedation stopped. Will continue to monitor. Dr. Jimmy Wyman updated.   
 
 
Signed By: Charan Meyers NP

## 2019-11-14 NOTE — PROGRESS NOTES
2000: Report received from JACKELINE, RN, drips dual RN rate verified and care assumed of patient. Trach care completed. 0200: Small amount of bloody drainage oozing from trach. Cleaned, will monitor. 6589: Patient had SVT run up to 158, self terminated. Strip on chart. 1046: Labs drawn. 0500: Patient bathed, shaved, linen changed and trach care complete. 0800: Bedside shift change report given to Faith Garcia RN (oncoming nurse) by Rebecca Ba RN (offgoing nurse). Report included the following information SBAR, Kardex, Procedure Summary, Intake/Output, MAR, Recent Results and Cardiac Rhythm NSR.

## 2019-11-14 NOTE — PROGRESS NOTES
Nephrology Progress Note Breanna Mcclelland Date of Admission : 10/19/2019 CC:  Follow up for ISAIAH on CKD, hypervolemia Assessment and Plan ISAIAH on CKD: 
- from ATN post CABG 
- Cr stable 
- hold diuretics for now and monitor 
- daily labs Hypernatremia : 
- water flushes with DHT Hyperkalemia:  
- mild 
- monitor for now CKD III: 
- baseline Cr 1.3 prior to CABG 
- likely from DM and HTN 
  
HFrE F: 
- last EF 35-40% on 10/20 
  
CAD s/p CABG x 5 10/19 and reexploration 10/23 for hematoma 
  
Acute on Chronic Resp Failure: 
- likely 2/2 underlying ILD + volume 
- now w/ trach Chronic Anemia: 
- hgb stable 
- cont to monitor 
  
DM2: 
- on insulin 
  
Protein Malnutrition: 
- on TF via [place] [address] Interval History: 
Seen and examined. Babatunde Castañeda yesterday. Off pressors and inotropes, on sedation. Cr stable, UOP stable. Current Medications: all current  Medications have been eviewed in [facility] Review of Systems: Review of systems not obtained due to patient factors. Objective: 
Vitals:   
Vitals:  
 11/14/19 0500 11/14/19 0600 11/14/19 0700 11/14/19 0800 BP: 121/59 113/63 117/57 101/57 Pulse: 87 93 94 96 Resp: 16 (!) 4 14 14 Temp:    97.6 °F (36.4 °C) SpO2: 94%  95% 95% Weight: 58 kg (127 lb 14.4 oz) Height:      
 
Intake and Output: 
No intake/output data recorded. 11/12 1901 - 11/14 0700 In: 3264 [I.V.:1174] Out: 3060 [EW:0939] Physical Examination: 
General: Frail, sedated on the vent Neck:  + trach Resp:  Diffuse dry crackles CV:  RRR,  no murmur or rub, no LE edema GI:  Soft, NT, + Bowel sounds, no hepatosplenomegaly Neurologic:  Sedated on the vent Psych:             Unable to assess Skin:  No Rash :  Devlin in place 
 
[]    High complexity decision making was performed 
[]    Patient is at high-risk of decompensation with multiple organ involvement Lab Data Personally Reviewed: I have reviewed all the pertinent labs, microbiology data and radiology studies during assessment. Recent Labs 11/14/19 0423 11/13/19 0321 11/12/19 
5850 * 149* 149*  
K 5.3* 4.8 5.1 * 108 111* CO2 37* 37* 35* * 257* 154* * 114* 100* CREA 1.70* 1.70* 1.63* CA 9.1 8.8 9.0 MG 3.1* 2.9* 3.0* ALB 2.0* 1.9* 2.1*  
SGOT 28 23 25 ALT 29 28 28 INR  --  1.1  --   
 
Recent Labs 11/14/19 0423 11/13/19 0321 11/12/19 
9603 WBC 19.6* 15.1* 16.3* HGB 8.4* 7.5* 8.1* HCT 29.5* 25.6* 27.9*  
* 110* 131* No results found for: SDES Lab Results Component Value Date/Time Culture result: NO GROWTH 2 DAYS 11/07/2019 12:45 PM  
 Culture result: HEAVY NORMAL RESPIRATORY TARIK 10/21/2019 12:08 PM  
 
Recent Results (from the past 24 hour(s)) GLUCOSE, POC Collection Time: 11/13/19  9:12 AM  
Result Value Ref Range Glucose (POC) 272 (H) 65 - 100 mg/dL Performed by Bisi Morley, POC Collection Time: 11/13/19  1:27 PM  
Result Value Ref Range Glucose (POC) 214 (H) 65 - 100 mg/dL Performed by Krunal Downey, POC Collection Time: 11/13/19  7:07 PM  
Result Value Ref Range Glucose (POC) 222 (H) 65 - 100 mg/dL Performed by Krunal Downey, POC Collection Time: 11/14/19 12:20 AM  
Result Value Ref Range Glucose (POC) 260 (H) 65 - 100 mg/dL Performed by Genaro Simpson METABOLIC PANEL, COMPREHENSIVE Collection Time: 11/14/19  4:23 AM  
Result Value Ref Range Sodium 148 (H) 136 - 145 mmol/L Potassium 5.3 (H) 3.5 - 5.1 mmol/L Chloride 109 (H) 97 - 108 mmol/L  
 CO2 37 (H) 21 - 32 mmol/L Anion gap 2 (L) 5 - 15 mmol/L Glucose 285 (H) 65 - 100 mg/dL  (H) 6 - 20 MG/DL Creatinine 1.70 (H) 0.70 - 1.30 MG/DL  
 BUN/Creatinine ratio 71 (H) 12 - 20 GFR est AA 48 (L) >60 ml/min/1.73m2 GFR est non-AA 40 (L) >60 ml/min/1.73m2 Calcium 9.1 8.5 - 10.1 MG/DL  Bilirubin, total 0.3 0.2 - 1.0 MG/DL  
 ALT (SGPT) 29 12 - 78 U/L  
 AST (SGOT) 28 15 - 37 U/L Alk. phosphatase 144 (H) 45 - 117 U/L Protein, total 6.2 (L) 6.4 - 8.2 g/dL Albumin 2.0 (L) 3.5 - 5.0 g/dL Globulin 4.2 (H) 2.0 - 4.0 g/dL A-G Ratio 0.5 (L) 1.1 - 2.2    
CBC W/O DIFF Collection Time: 11/14/19  4:23 AM  
Result Value Ref Range WBC 19.6 (H) 4.1 - 11.1 K/uL  
 RBC 2.68 (L) 4.10 - 5.70 M/uL HGB 8.4 (L) 12.1 - 17.0 g/dL HCT 29.5 (L) 36.6 - 50.3 % .1 (H) 80.0 - 99.0 FL  
 MCH 31.3 26.0 - 34.0 PG  
 MCHC 28.5 (L) 30.0 - 36.5 g/dL  
 RDW 15.4 (H) 11.5 - 14.5 % PLATELET 528 (L) 329 - 400 K/uL NRBC 0.2 (H) 0  WBC ABSOLUTE NRBC 0.04 (H) 0.00 - 0.01 K/uL MAGNESIUM Collection Time: 11/14/19  4:23 AM  
Result Value Ref Range Magnesium 3.1 (H) 1.6 - 2.4 mg/dL NT-PRO BNP Collection Time: 11/14/19  4:23 AM  
Result Value Ref Range NT pro-BNP 11,298 (H) <125 PG/ML  
PROCALCITONIN Collection Time: 11/14/19  4:23 AM  
Result Value Ref Range Procalcitonin 0.5 ng/mL GLUCOSE, POC Collection Time: 11/14/19  7:03 AM  
Result Value Ref Range Glucose (POC) 315 (H) 65 - 100 mg/dL Performed by Christy Abdullahi MD 
[address] [place] [address], [address] [facility] Phone - [phone] Fax - [phone] 
[email or web address]

## 2019-11-14 NOTE — PROGRESS NOTES
Problem: Falls - Risk of 
Goal: *Absence of Falls Description Document Flory Longoria Fall Risk and appropriate interventions in the flowsheet. Outcome: Progressing Towards Goal 
Note:  
Fall Risk Interventions: 
Mobility Interventions: Communicate number of staff needed for ambulation/transfer, Patient to call before getting OOB, Strengthening exercises (ROM-active/passive) Mentation Interventions: Update white board Medication Interventions: Evaluate medications/consider consulting pharmacy Elimination Interventions: Call light in reach, Patient to call for help with toileting needs, Toileting schedule/hourly rounds History of Falls Interventions: Evaluate medications/consider consulting pharmacy Problem: Patient Education: Go to Patient Education Activity Goal: Patient/Family Education Outcome: Progressing Towards Goal 
  
Problem: Pressure Injury - Risk of 
Goal: *Prevention of pressure injury Description Document Earl Scale and appropriate interventions in the flowsheet. Outcome: Progressing Towards Goal 
Note:  
Pressure Injury Interventions: 
Sensory Interventions: Assess changes in LOC, Chair cushion, Check visual cues for pain, Float heels, Minimize linen layers, Turn and reposition approx. every two hours (pillows and wedges if needed) Moisture Interventions: Absorbent underpads, Check for incontinence Q2 hours and as needed, Maintain skin hydration (lotion/cream), Minimize layers Activity Interventions: Assess need for specialty bed, Increase time out of bed, Pressure redistribution bed/mattress(bed type) Mobility Interventions: Chair cushion, HOB 30 degrees or less, Turn and reposition approx. every two hours(pillow and wedges), Pressure redistribution bed/mattress (bed type) Nutrition Interventions: Document food/fluid/supplement intake, Offer support with meals,snacks and hydration Friction and Shear Interventions: Lift sheet, Minimize layers, Foam dressings/transparent film/skin sealants, Apply protective barrier, creams and emollients Problem: Patient Education: Go to Patient Education Activity Goal: Patient/Family Education Outcome: Progressing Towards Goal 
  
Problem: CABG: Post-Op Day 5 Goal: Off Pathway (Use only if patient is Off Pathway) Outcome: Progressing Towards Goal 
Goal: Activity/Safety Outcome: Progressing Towards Goal 
Goal: Diagnostic Test/Procedures Outcome: Progressing Towards Goal 
Goal: Nutrition/Diet Outcome: Progressing Towards Goal 
Goal: Discharge Planning Outcome: Progressing Towards Goal 
Goal: Medications Outcome: Progressing Towards Goal 
Goal: Respiratory Outcome: Progressing Towards Goal 
Goal: Treatments/Interventions/Procedures Outcome: Progressing Towards Goal 
Goal: Psychosocial 
Outcome: Progressing Towards Goal 
  
Problem: CABG: Discharge Outcomes Goal: *Anxiety reduced or absent Outcome: Progressing Towards Goal 
  
Problem: Infection - Risk of, Central Venous Catheter-Associated Bloodstream Infection Goal: *Absence of infection signs and symptoms Outcome: Progressing Towards Goal 
  
Problem: Patient Education: Go to Patient Education Activity Goal: Patient/Family Education Outcome: Progressing Towards Goal 
  
Problem: Ventilator Management Goal: *Adequate oxygenation and ventilation Outcome: Progressing Towards Goal 
Goal: *Patient maintains clear airway/free of aspiration Outcome: Progressing Towards Goal 
Goal: *Absence of infection signs and symptoms Outcome: Progressing Towards Goal 
  
Problem: Patient Education: Go to Patient Education Activity Goal: Patient/Family Education Outcome: Progressing Towards Goal 
  
Problem: Tissue Perfusion - Cardiopulmonary, Altered Goal: *Absence of hypoxia Outcome: Progressing Towards Goal 
  
Problem: Nutrition Deficit Goal: *Optimize nutritional status Outcome: Progressing Towards Goal 
  
Problem: Gas Exchange - Impaired Goal: *Absence of hypoxia Outcome: Progressing Towards Goal 
  
Problem: Patient Education: Go to Patient Education Activity Goal: Patient/Family Education Outcome: Progressing Towards Goal 
  
Problem: Nutrition Deficit Goal: *Optimize nutritional status Outcome: Progressing Towards Goal 
  
Problem: Infection - Risk of, Urinary Catheter-Associated Urinary Tract Infection Goal: *Absence of infection signs and symptoms Outcome: Progressing Towards Goal 
  
Problem: Patient Education: Go to Patient Education Activity Goal: Patient/Family Education Outcome: Progressing Towards Goal

## 2019-11-14 NOTE — PROCEDURES
Left femoral arterial line placed. Area cleansed with chlorhexidine, Lidocaine used to anesthetize the site, and arterial line placed using Seldinger technique. Patient tolerated procedure well.

## 2019-11-14 NOTE — PROGRESS NOTES
Chart reviewed and noted patient s/p trach placement 11/13 and remains sedated on vent support. Will follow up for PT intervention as patient is medically stable and appropriate. Thank you.

## 2019-11-14 NOTE — PROGRESS NOTES
Chart reviewed and noted patient s/p trach placement 11/13 and remains sedated on vent support. Will follow up for OT intervention as patient is medically stable and appropriate. Thank you.

## 2019-11-14 NOTE — PROGRESS NOTES
Mr Yasmin Chavez is POD#1 after a Perc Trach/Bronch. No acute events overnight. Afebrile HD stable, still on dobutamine On exam he is intubated, sedate Trach site c/d/i Lungs coarse breath sounds b/l I have personally reveiwed his CXR,  Trach well seated, b/l increased interstitial markings/fibrosis Diagnosis  1- acute on chronic respiratory failure Mr Yasmin Chavez is recovering well from his tracheostomy. Plan to remove suture sin one week. Thank you for allowing us to care for Mr. Yasmin Chavez.

## 2019-11-14 NOTE — DIABETES MGMT
[facility] Cardiac Surgery Progress Note Recommendations/ Comments: Chart reviewed for hyperglycemia with Lantus 38 units Q 12 hours Received 14 units of lispro correction. AM BG today 315 mg/dL Steroids Solu medrol tapered to 40 mg/dL Q 8 hours. TF Osmolite 1.5 are continuous,held yesterday for trach; resumed Vent, sedated Renal noted If appropriate please consider Increase Lantus to 45 units q 12 hours Current hospital DM medications Lantus 38 units Q 12 hours Lispro normal sensitivity correction scale Patient is 68 y.o. male s/p CABG x 5 followed by re-entry for mediastinal clot evacuation and repair of SVG to PDA  - POD 17.  
Pt with documented hx of DM on no meds PTA. Anemia - A1c inaccurate A1c:  
Lab Results Component Value Date/Time Hemoglobin A1c 5.3 10/21/2019 03:04 AM  
 
 
 
Recent Glucose Results:  
Lab Results Component Value Date/Time  (H) 11/14/2019 04:23 AM  
 GLUCPOC 315 (H) 11/14/2019 07:03 AM  
 GLUCPOC 260 (H) 11/14/2019 12:20 AM  
 GLUCPOC 222 (H) 11/13/2019 07:07 PM  
  
 
Lab Results Component Value Date/Time Creatinine 1.70 (H) 11/14/2019 04:23 AM  
 
Estimated Creatinine Clearance: 32.7 mL/min (A) (based on SCr of 1.7 mg/dL (H)). Active Orders Diet DIET NPO  
  
 
PO intake:  
No data found. Will continue to follow as needed. Thank you. Wilbur Cheadle RN, CDE Time spent: 6 minutes 1300 Addendum  mg/dL at 1117. Noted gtt order placed.  
 
Wilbur Cheadle RN, CDE

## 2019-11-14 NOTE — PROGRESS NOTES
NYHA class IV A/C systolic heart failure (EF 25%, NT pro-BNP 15,013) Acute on chronic kidney disease Mild pulmonary edema Mild chronic lung disease Acute on chronic hypoxic respiratory failure Hypotension and acidotic this am 
 
Has been getting fluid resuscitation over the course of the day Creatinine bumped as well Started back on dobutamine Femoral a-line placed Hgb and platelets look reasonable Bilirubin and other LFTs up a bit Discussed with renal  
 
CXR - less pulmonary edema Addendum: ABG improved Getting scheduled albumin Intake/Output Summary (Last 24 hours) at 11/14/2019 1455 Last data filed at 11/14/2019 1300 Gross per 24 hour Intake 2441.3 ml Output 935 ml Net 1506.3 ml Visit Vitals BP 98/59 Pulse 96 Temp 97.7 °F (36.5 °C) Resp 18 Ht 5' 7\" (1.702 m) Wt 127 lb 14.4 oz (58 kg) SpO2 90% BMI 20.03 kg/m² Risk of morbidity and mortality - high Medical decision making - high complexity Total critical care time - 30 minutes (CPT 55647)

## 2019-11-14 NOTE — OP NOTES
[address]  
OPERATIVE REPORT Name:  Daniella Delgadillo 
MR#:  [id number] :  1950 ACCOUNT #:  [de-identified] DATE OF SERVICE:  2019 CLINICAL SERVICE:  Thoracic Surgery. ATTENDING SURGEON:  Alexia Powell MD 
 
OPERATIONS PERFORMED: 1. Percutaneous tracheostomy. 2.  Flexible bronchoscopy. PREOPERATIVE DIAGNOSES: 
1. Respiratory insufficiency. 2.  Ischemic cardiomyopathy. POSTOPERATIVE DIAGNOSES: 
1. Respiratory insufficiency. 2.  Ischemic cardiomyopathy. FIRST ASSISTANT:  CHELSEA De Paz 
 
SPECIMENS SENT:  None. DRAINS AND TUBES:  An 8-Arabic percutaneous Shiley was left within the trachea. ANESTHESIA:  General. 
 
ESTIMATED BLOOD LOSS:  For this case was minimal 
 
INDICATIONS FOR PROCEDURE:  The patient is a 68-year-old gentleman status post five-vessel CABG with ischemic cardiomyopathy, who has failed to wean from the ventilator. We were requested by the Cardiac Surgery Team to evaluate the patient for tracheostomy for long-term vent wean. The patient had no previous neck surgery, was not on systemic anticoagulation, and was felt to be a good candidate for percutaneous tracheostomy. PROCEDURE IN DETAIL:  After informed consent was obtained and placed on the chart, the patient was placed supine in his ICU bed. The anterior neck and chest were prepped and draped in sterile fashion. Time-out was performed. Preoperative antibiotics were administered. A flexible bronchoscope was introduced through the ET tube. The distal trachea and prabhjot were normal in appearance. The left tracheobronchial tree was normal in appearance. The right tracheobronchial tree had a small amount of thin mucus and secretions, but no pathologic abnormalities. The ET tube was then pulled back under direct visualization for approximately 17 cm.  
 
The anterior neck was infiltrated with approximately 10 mL of 1% lidocaine mixed with 0.25% Marcaine. A tiny finder needle was introduced 2 cm above the sternal notch into the anterior trachea and was visualized under bronchoscopic guidance. An introducer needle was then placed and a guidewire was fed under bronchoscopic guidance down the right mainstem bronchus. A small vertical skin incision was then made around the guidewire. A small 14-Vietnamese dilator was then passed under bronchoscopic guidance. A large blue Rhino percutaneous dilator was then passed multiple times to dilate the tract appropriately under bronchoscopic guidance. The 8-Vietnamese percutaneous Shiley trach was then passed over the guidewire under bronchoscopic guidance over a dilator and set into place in the distal trachea. The guidewire and dilator were withdrawn. The ET tube was withdrawn. The bronchoscope was then introduced through the tracheostomy tube and the prabhjot was well visualized. There was no bleeding running down and no other abnormalities. The inner cannula was then placed and the tracheostomy was connected to the vent without complication. The tracheostomy flanges were then sewn in place and the collar was placed. The patient was then reversed from general anesthesia and remained in the ICU in critical condition. All surgical counts were correct x2 at the end of this case. There were no immediate complications identified during this case. Dr. Jacqui Gomez was present and scrubbed throughout the entire procedure. PA, Benedict Harris, was instrumental in completion of this operation as she assisted with the bronchoscopic portions of this operation. Gary Mendez MD 
 
 
[id number] 
D:  11/13/2019 12:14 
T:  11/13/2019 19:33 
JOB #:  [id number] CC:  Monica Khan MD

## 2019-11-14 NOTE — PROGRESS NOTES
NUTRITION brief 1. Change tube feeds to:  
 - Suplena @ 30ml/hr + 165ml flush q3hr 2. Follow BG trends with adjustment to insulin per DTC recommendations 3. Fluid flush adjustment per renal 
 - new order provides same total fluid as current regimen (~1850ml total fluid/day) Diet: NPO Tube feeds: Glucerna 1.2 @ 50ml/hr + 150ml flush q4hr during feeds Medications: [x] Reviewed & Includes: bumex, iron, SSI, lantus (45 units), solu-medrol, liquid MVI, protonix, miralax, pericolace, zoloft; DRIPS: dobutamine, insulin drip, epi, diprivan (10.6ml/hr) Chart reviewed for tube feed check. Trach placed yesterday. BG elevated with lantus and tube feeds held yesterday. DTC following. Tolerating tube feeds with no issues. Provides: 1200ml, 1440kcal, 72g protein (1.28g/kg), 966ml free fluid + 900ml flush = 1866ml fluid.  K+ elevated. Diprivan providing 280kcal. Recommend switch to renal formula with: Suplena @ 30ml/hr + 165ml flush q3hr. Provides: 720ml, 1296kcal, 32g protein, 531ml free fluid + 1320ml flush = 1850ml fluid. Once off diprivan increase to: Suplena @ 35ml/hr + 155ml flush q3hr. Provides: 840ml, 1512kcal, 38g protein, 620ml free fluid + 1240ml flush. Will continue to follow for renal labs, fluid status, and tube feed tolerance. See previous RD notes for additional details. Estimated Nutrition Needs:  
Kcals/day: 6458 Kcals/day Protein: 33 g(33-45g (0.6-0.8g/kg) ISAIAH on CKD) Fluid: 1520 ml(1ml/kcal) Based On: [facility] (0239T) Weight Used: Actual wt(56.2kg) Benigno Oconnor, RD [address] , Pager #[phone] or [phone]

## 2019-11-14 NOTE — PROGRESS NOTES
PCCM 
 
S/p Jonah Mast On vent 60% FiO2 On TF Patient Vitals for the past 4 hrs: 
 BP Pulse Resp SpO2 Weight 19 0700 117/57 94 14    
19 0600 113/63 93 (!) 4    
19 0500 121/59 87 16 94 % 58 kg (127 lb 14.4 oz) Temp (24hrs), Av.4 °F (36.3 °C), Min:97.2 °F (36.2 °C), Max:97.6 °F (36.4 °C) Intake/Output Summary (Last 24 hours) at 2019 0820 Last data filed at 2019 0700 Gross per 24 hour Intake 2291.13 ml Output 1450 ml Net 841.13 ml No distress Jonah Mast Rales and rhonchi Tachy Soft bs present Warm and dry CXR - trach - no change in interstitial lung disease Lab: 
Recent Labs 19 
0423 19 
0321 19 
0769 WBC 19.6* 15.1* 16.3* HGB 8.4* 7.5* 8.1*  
* 110* 131* * 149* 149*  
K 5.3* 4.8 5.1 * 108 111* CO2 37* 37* 35* * 114* 100* CREA 1.70* 1.70* 1.63* * 257* 154* CA 9.1 8.8 9.0 MG 3.1* 2.9* 3.0* INR  --  1.1  --   
TBILI 0.3 0.3 0.4 SGOT 28 23 25 ABG: 
Recent Labs 19 
204 PHI 7.458* PCO2I 49.8*  
PO2I 64* HCO3I 35.3* SO2I 93 FIO2I 60 Results Procedure Component Value Units Date/Time AFB CULTURE + SMEAR W/RFLX ID FROM CULTURE [[id number]] Collected:  19 1245 Order Status:  Completed Specimen:  Miscellaneous sample Updated:  19 1636 Source BRONCHIAL LAVAGE     
  AFB Specimen processing Concentration Acid Fast Smear NEGATIVE Comment: (NOTE) Performed At: [address] [id number] Kishore Adames MD [id number] Acid Fast Culture PENDING  
 CULTURE, RESPIRATORY/SPUTUM/BRONCH Bernardo Printers [[id number]] Collected:  19 1245 Order Status:  Completed Specimen:  Sputum from Bronchial lavage Updated:  19 1024 Special Requests: NO SPECIAL REQUESTS     
  GRAM STAIN OCCASIONAL WBCS SEEN     
   NO ORGANISMS SEEN Culture result: NO GROWTH 2 DAYS Rip Roldan [[id number]] Collected:  11/07/19 1245 Order Status:  Canceled Specimen:  Bronchial lavage Oscar CORONA [[id number]] Collected:  11/07/19 1245 Order Status:  Completed Specimen:  Other from Bronchial lavage Updated:  11/07/19 1537 Special Requests: NO SPECIAL REQUESTS     
  KOH NO YEAST SEEN     
  KOH NO FUNGAL ELEMENTS SEEN     
  
· S/p 5V CABG 10/19/19 for ACS  With reexploration 10/23 for mediastinal hematoma · Acute resp failure- baseline fibrotic ILD (etiology not clear- this is a new dx but UIP/IPF in DDX) with superimposed pulmonary interstitial edema probable superimposed diffuse alveolar damage from blood/blood products. Amio pulm toxicty in DDX but not clear from [facility] review when he was on it. 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 
  
--vent support - wean FiO2 and TC trials as able --on solumedrol - decrease to 40 mg IV q8h 
--TF Max Reyes MD

## 2019-11-14 NOTE — PROGRESS NOTES
9475:  at bedside, informed of decreasing urine output overnight, no new orders received at this time. 0830: Matthew Ryan NP at bedside, orders to start to wean sedation off 
 
0955: Pt's MAPs in low 60s, orders to give 1 bottle Albumin now, then start nomi gtt if no improvement 1010: Dr. Krysta Hawthorne at bedside for rounds, orders to continue to wean sedation 1050: Pt continues to have low BP despite titrating up on Nomi gtt, questionable rhythm changes as well, NP at bedside. New orders received for labs to be drawn and STAT EKG to be obtained. Dobutamine gtt started 1118: RT at bedside to draw ABG, results unable to fully result, pH 7.1. Orders to give 1 amp of bicarb 1125: Fentanyl and Versed gtts stopped, PRN dose of narcan given, pt's eyes opening spontaneously. No command following at this time. Orders to start Levophed gtt due to Nomi gtt being maxed out at 300mcg/min 1130: Nati TRAN at bedside to place arterial line, pt's BP in 160s after line placement, Levophed gtt able to be weaned off, dobutamine weaned down to 1mcg/kg/min. Will wean nomi appropriately to keep MAP >65 
 
1200: Pt's blood glucose remains elevated (>300), insulin gtt to be started 1400: Informed Court Garner NP of lab results. She will speak with Dr. Kenny Soto and Dr. Krysta Hawthorne 1415Orma Cassjaleel, NP calls and new orders received for albumin q6h, and BMP labs q12h. Will continue to titrate Nomi in attempts to wean off for MAP >65  
 
1645: Pt's son Bella Nguyen at bedside, informed him of events today. Answered all questions. 1945: Bedside and Verbal shift change report given to Steff Madrigal RN (oncoming nurse) by Otto Celestin RN (offgoing nurse). Report included the following information SBAR, OR Summary, Procedure Summary, Intake/Output, MAR, Recent Results, Cardiac Rhythm SR-ST and Alarm Parameters .

## 2019-11-15 NOTE — PROGRESS NOTES
NUTRITION COMPLETE ASSESSMENT 
 
RECOMMENDATIONS:  
1. Increase tube feeds to:  
 - Suplena @ 35ml/hr + 200ml flush q3hr 2. Fluid flush adjustment per renal pending sodium trends 3. Follow BG trends with adjustment to insulin per DTC recommendations 4. Consider Kylie-Q daily with loose stools Interventions/Plan:  
Food/Nutrient Delivery:    (-) (-)   Modify rate, concentration, composition, and schedule Assessment:  
Reason for Assessment: Reassessment Diet: NPO Tube feeds: Suplena @ 30ml/hr + 200ml flush q3hr Nutritionally Significant Medications: [x] Reviewed & Includes: iron, SSI, solu-medrol, liquid MVI, protonix, miralax, pericolace, zoloft; DRIPS: dobutamine, precedex, insulin drip, epi Subjective: Intubated/sedated. Objective: 
Pt admitted for acute coronary syndrome. PMHx: DM, high cholesterol, CKD3, HTN. S/p CABG x 5 on 10/23. Re-intubate 11/8, trach placed on 11/13. ISAIAH on CKD noted with renal following. Water flush increased from 165->200ml flush q3hr with continued hypernatremia. BG remains elevated with IV steroids and hx of DM. DTC following with lantus rx and insulin drip in place. Tolerating tube feeds with no issues. Current tube feeds provide: 720ml, 1296kcal, 32g protein, 531ml free fluid + 1320ml flush = 1850ml fluid. No off diprivan so will increase to: Suplena @ 35ml/hr + 200ml flush q3hr. Provides: 840ml, 1512kcal, 38g protein, 620ml free fluid + 1600ml flush. Loose stools x3  last night with fecal mgmt tube placed. Please add Kylie-Q daily. Estimated Nutrition Needs:  
Kcals/day: 2453 Kcals/day Protein: 33 g(33-45g (0.6-0.8g/kg) ISAIAH on CKD) Fluid: 1520 ml(1ml/kcal) Based On: [facility] (2108H) Weight Used: Actual wt(56.2kg) Pt expected to meet estimated nutrient needs:  [x]   Yes     []  No [] Unable to predict at this time Nutrition Diagnosis:  
1. Inadequate oral intake related to respiratory failure as evidenced by intubated with EN via [address] 2. (decreased protein needs) related to ISAIAH on CKD as evidenced by no HD; pt hx Goals:   
 EN meeting at least 90% needs in 5-7 days Monitoring & Evaluation: - Total energy intake, Enteral/parenteral nutrition intake - Weight/weight change Previous Nutrition Goals Met:   Yes Previous Recommendations:    Yes 
 
Education & Discharge Needs: 
 [x] None Identified 
 [] Identified and addressed [x] Participated in care plan, discharge planning, and/or interdisciplinary rounds Cultural, Islam and ethnic food preferences identified: None Skin Integrity: [x]Intact  []Other Edema: []None [x]Other: 1+ BLLE Last BM: 11/15 - fecal mgmt tube placed 11/15 Food Allergies: []None [x]Other: scallops Diet Restrictions: Cultural/Christianity Preference(s): None Anthropometrics:   
Weight Loss Metrics 11/15/2019 Today's Wt 130 lb 14.4 oz BMI 20.5 kg/m2 Weight Source: Bed Height: 5' 7\" (170.2 cm), Body mass index is 20.5 kg/m². IBW : 72.6 kg (160 lb), % IBW (Calculated): 93.14 % 
 ,   
 
Labs:   
Lab Results Component Value Date/Time Sodium 152 (H) 11/15/2019 04:01 AM  
 Potassium 4.0 11/15/2019 04:01 AM  
 Chloride 113 (H) 11/15/2019 04:01 AM  
 CO2 34 (H) 11/15/2019 04:01 AM  
 Glucose 89 11/15/2019 04:01 AM  
  (H) 11/15/2019 04:01 AM  
 Creatinine 2.07 (H) 11/15/2019 04:01 AM  
 Calcium 8.4 (L) 11/15/2019 04:01 AM  
 Magnesium 2.9 (H) 11/15/2019 04:01 AM  
 Phosphorus 2.6 11/15/2019 04:01 AM  
 Albumin 2.6 (L) 11/15/2019 04:01 AM  
 
Lab Results Component Value Date/Time Hemoglobin A1c 5.3 10/21/2019 03:04 AM  
 
Roque Glez RD [facility], Pager #[phone] or [phone]

## 2019-11-15 NOTE — PROGRESS NOTES
Nephrology Progress Note Rod Sanchez Date of Admission : 10/19/2019 CC:  Follow up for ISAIAH on CKD, hypervolemia Assessment and Plan ISAIAH on CKD: 
- from [facility]N post CABG 
- BP improved, on dobutamine now 
- PRN albumin for hypotension 
- hold diuretics 
- d/c dobutamine today 
- daily labs for now Hypernatremia : 
- increaser FW flushes Hyperkalemia:  
- resolved CKD III: 
- baseline Cr 1.3 prior to CABG 
- likely from DM and HTN 
  
HFrE F: 
- last EF 35-40% on 10/20 
  
CAD s/p CABG x 5 10/19 and reexploration 10/23 for hematoma 
  
Acute on Chronic Resp Failure: 
- likely 2/2 underlying ILD + volume 
- now w/ trach Chronic Anemia: 
- hgb stable 
- cont to monitor 
  
DM2: 
- on insulin 
  
Protein Malnutrition: 
- on TF via [place] [address] Interval History: 
Seen and examined. Off pressors this AM.  On small dose of dobutamine, UOP about 1L. Cr down. Appears agitated. Current Medications: all current  Medications have been eviewed in [facility] Review of Systems: Review of systems not obtained due to patient factors. Objective: 
Vitals:   
Vitals:  
 11/15/19 0700 11/15/19 0743 11/15/19 0753 11/15/19 0800 BP: 125/90 Pulse: (!) 101 (!) 105  (!) 101 Resp: 28 30  28 Temp:    100.3 °F (37.9 °C) SpO2: 100% 100% 100% 100% Weight:      
Height:      
 
Intake and Output: 
11/15 0701 - 11/15 1900 In: 224.9 [I.V.:29.9] Out: 215 [Urine:165; Drains:50] 
11/13 1901 - 11/15 0700 In: 5544.5 [I.V.:2449.5] Out: Lori Dickson [[id number]; QTJWLO:920] Physical Examination: 
General: Frail, sedated Neck:  + trach Resp:  Diffuse dry crackles CV:  RRR,  no murmur or rub, no LE edema GI:  Soft, NT, + Bowel sounds, no hepatosplenomegaly Neurologic:  Sedated on the vent Psych:             Unable to assess Skin:  No Rash :  Devlin in place 
 
[]    High complexity decision making was performed 
[]    Patient is at high-risk of decompensation with multiple organ involvement Lab Data Personally Reviewed: I have reviewed all the pertinent labs, microbiology data and radiology studies during assessment. Recent Labs 11/15/19 
0401 11/14/19 
1646 11/14/19 
1305 11/14/19 
1104 11/14/19 
0423 11/13/19 
0321 * 150* 149* 146* 148* 149*  
K 4.0 4.3 5.2* HEMOLYZED,RECOLLECT REQUESTED 5.3* 4.8  
* 110* 108 107 109* 108 CO2 34* 35* 34* 37* 37* 37* GLU 89 183* 370* 408* 285* 257* * 135* 136* 128* 121* 114* CREA 2.07* 2.30* 2.41* 2.14* 1.70* 1.70* CA 8.4* 8.4* 8.4* 8.7 9.1 8.8 MG 2.9*  --   --  HEMOLYZED,RECOLLECT REQUESTED 3.1* 2.9*  
PHOS 2.6  --   --   --   --   --   
ALB 2.6* 2.6*  --   --  2.0* 1.9*  
SGOT 629* 715*  --   --  28 23 * 536*  --   --  29 28 INR  --   --   --   --   --  1.1 Recent Labs 11/15/19 
0401 11/14/19 
0423 11/13/19 
0321 WBC 10.7 19.6* 15.1* HGB 6.0* 8.4* 7.5* HCT 19.8* 29.5* 25.6* PLT 75* 122* 110* No results found for: SDES Lab Results Component Value Date/Time Culture result: NO GROWTH 2 DAYS 11/07/2019 12:45 PM  
 Culture result: HEAVY NORMAL RESPIRATORY TARIK 10/21/2019 12:08 PM  
 
Recent Results (from the past 24 hour(s)) EKG, 12 LEAD, INITIAL Collection Time: 11/14/19 10:58 AM  
Result Value Ref Range Ventricular Rate 64 BPM  
 Atrial Rate 64 BPM  
 P-R Interval 174 ms QRS Duration 118 ms Q-T Interval 442 ms QTC Calculation (Bezet) 455 ms Calculated P Axis 61 degrees Calculated R Axis 73 degrees Calculated T Axis 9 degrees Diagnosis Normal sinus rhythm with sinus arrhythmia Nonspecific intraventricular conduction delay Marked ST abnormality, possible anterior subendocardial injury When compared with ECG of 24-OCT-2019 03:29, 
Questionable change in QRS duration Criteria for Septal infarct are no longer present METABOLIC PANEL, BASIC Collection Time: 11/14/19 11:04 AM  
Result Value Ref Range  Sodium 146 (H) 136 - 145 mmol/L  
 Potassium HEMOLYZED,RECOLLECT REQUESTED 3.5 - 5.1 mmol/L Chloride 107 97 - 108 mmol/L  
 CO2 37 (H) 21 - 32 mmol/L Anion gap 2 (L) 5 - 15 mmol/L Glucose 408 (H) 65 - 100 mg/dL  (H) 6 - 20 MG/DL Creatinine 2.14 (H) 0.70 - 1.30 MG/DL  
 BUN/Creatinine ratio 60 (H) 12 - 20 GFR est AA 37 (L) >60 ml/min/1.73m2 GFR est non-AA 31 (L) >60 ml/min/1.73m2 Calcium 8.7 8.5 - 10.1 MG/DL MAGNESIUM Collection Time: 11/14/19 11:04 AM  
Result Value Ref Range Magnesium HEMOLYZED,RECOLLECT REQUESTED 1.6 - 2.4 mg/dL TROPONIN I Collection Time: 11/14/19 11:04 AM  
Result Value Ref Range Troponin-I, Qt. 0.27 (H) <0.05 ng/mL GLUCOSE, POC Collection Time: 11/14/19 11:17 AM  
Result Value Ref Range Glucose (POC) 416 (H) 65 - 100 mg/dL Performed by Ankit Mckoy POC EG7 Collection Time: 11/14/19 11:18 AM  
Result Value Ref Range Calcium, ionized (POC) 1.16 1.12 - 1.32 mmol/L  
 FIO2 (POC) 60 % pH (POC) 7.110 (LL) 7.35 - 7.45    
 pCO2 (POC) >90.0 (H) 35.0 - 45.0 MMHG  
 pO2 (POC) 66 (L) 80 - 100 MMHG Site LEFT BRACHIAL Device: VENT Mode ASSIST CONTROL Set Rate 14 bpm  
 PEEP/CPAP (POC) 5 cmH2O  
 PIP (POC) 15 Allens test (POC) YES Specimen type (POC) ARTERIAL Total resp. rate 14 Pressure control YES    
POC VENOUS BLOOD GAS Collection Time: 11/14/19 11:26 AM  
Result Value Ref Range Device: VENT    
 FIO2 (POC) 100 %  
 pH, venous (POC) 7.088 (LL) 7.32 - 7.42    
 pCO2, venous (POC) >90.0 (H) 41 - 51 MMHG  
 pO2, venous (POC) 38 25 - 40 mmHg Mode ASSIST CONTROL Set Rate 22 bpm  
 PEEP/CPAP (POC) 5 cmH2O  
 PIP (POC) 15 Allens test (POC) N/A Inspiratory Time 1.20 sec Total resp. rate 22 Site OTHER Specimen type (POC) VENOUS BLOOD Pressure control YES    
GLUCOSE, POC Collection Time: 11/14/19 12:04 PM  
Result Value Ref Range Glucose (POC) 361 (H) 65 - 100 mg/dL Performed by [facility]   
POC G3 - PUL Collection Time: 11/14/19 12:25 PM  
Result Value Ref Range FIO2 (POC) 100 % pH (POC) 7.299 (L) 7.35 - 7.45    
 pCO2 (POC) 67.8 (H) 35.0 - 45.0 MMHG  
 pO2 (POC) 183 (H) 80 - 100 MMHG  
 HCO3 (POC) 33.2 (H) 22 - 26 MMOL/L  
 sO2 (POC) 99 (H) 92 - 97 % Base excess (POC) 7 mmol/L Site DRAWN FROM ARTERIAL LINE Device: VENT Mode ASSIST CONTROL Set Rate 20 bpm  
 PEEP/CPAP (POC) 5 cmH2O  
 PIP (POC) 16 Allens test (POC) N/A Inspiratory Time 1 sec Specimen type (POC) ARTERIAL Total resp. rate 21 Pressure control YES    
GLUCOSE, POC Collection Time: 11/14/19 12:55 PM  
Result Value Ref Range Glucose (POC) 318 (H) 65 - 100 mg/dL Performed by Agustina Velasquez Collection Time: 11/14/19 12:55 PM  
Result Value Ref Range Glucose 318 mg/dL Insulin order 7.7 units/hour Insulin adminstered 7.7 units/hour Multiplier 0.030 Low target 95 mg/dL High target 130 mg/dL D50 order 0.0 ml  
 D50 administered 0.00 ml Minutes until next BG 60 min Order initials elk Administered initials elk GLSCOM Comments METABOLIC PANEL, BASIC Collection Time: 11/14/19  1:05 PM  
Result Value Ref Range Sodium 149 (H) 136 - 145 mmol/L Potassium 5.2 (H) 3.5 - 5.1 mmol/L Chloride 108 97 - 108 mmol/L  
 CO2 34 (H) 21 - 32 mmol/L Anion gap 7 5 - 15 mmol/L Glucose 370 (H) 65 - 100 mg/dL  (H) 6 - 20 MG/DL Creatinine 2.41 (H) 0.70 - 1.30 MG/DL  
 BUN/Creatinine ratio 56 (H) 12 - 20 GFR est AA 32 (L) >60 ml/min/1.73m2 GFR est non-AA 27 (L) >60 ml/min/1.73m2 Calcium 8.4 (L) 8.5 - 10.1 MG/DL  
GLUCOSE, POC Collection Time: 11/14/19  1:59 PM  
Result Value Ref Range Glucose (POC) 311 (H) 65 - 100 mg/dL Performed by Agustina Velasquez Collection Time: 11/14/19  1:59 PM  
Result Value Ref Range Glucose 311 mg/dL Insulin order 10.0 units/hour Insulin adminstered 10.0 units/hour Multiplier 0.040 Low target 95 mg/dL High target 130 mg/dL D50 order 0.0 ml  
 D50 administered 0.00 ml Minutes until next BG 60 min Order initials katerine Administered initials katerine GLSCOM Comments GLUCOSE, POC Collection Time: 11/14/19  3:06 PM  
Result Value Ref Range Glucose (POC) 307 (H) 65 - 100 mg/dL Performed by Sydni Clements Collection Time: 11/14/19  3:06 PM  
Result Value Ref Range Glucose 307 mg/dL Insulin order 12.4 units/hour Insulin adminstered 12.4 units/hour Multiplier 0.050 Low target 95 mg/dL High target 130 mg/dL D50 order 0.0 ml  
 D50 administered 0.00 ml Minutes until next BG 60 min Order initials ha Administered initials ha GLSCOM Comments POC G3 - PUL Collection Time: 11/14/19  3:37 PM  
Result Value Ref Range FIO2 (POC) 100 % pH (POC) 7.309 (L) 7.35 - 7.45    
 pCO2 (POC) 77.4 (H) 35.0 - 45.0 MMHG  
 pO2 (POC) 220 (H) 80 - 100 MMHG  
 HCO3 (POC) 38.9 (H) 22 - 26 MMOL/L  
 sO2 (POC) 100 (H) 92 - 97 % Base excess (POC) 13 mmol/L Site DRAWN FROM ARTERIAL LINE Device: VENT Mode ASSIST CONTROL Set Rate 20 bpm  
 PEEP/CPAP (POC) 5 cmH2O  
 PIP (POC) 16 Allens test (POC) N/A Inspiratory Time 1.00 sec Specimen type (POC) ARTERIAL Total resp. rate 21 Pressure control YES    
GLUCOSE, POC Collection Time: 11/14/19  4:11 PM  
Result Value Ref Range Glucose (POC) 265 (H) 65 - 100 mg/dL Performed by Larry Figueroa Collection Time: 11/14/19  4:11 PM  
Result Value Ref Range Glucose 265 mg/dL Insulin order 12.3 units/hour Insulin adminstered 12.3 units/hour Multiplier 0.060 Low target 95 mg/dL High target 130 mg/dL D50 order 0.0 ml  
 D50 administered 0.00 ml Minutes until next BG 60 min Order initials katerine Administered initials elk GLSCOM Comments METABOLIC PANEL, COMPREHENSIVE Collection Time: 11/14/19  4:46 PM  
Result Value Ref Range Sodium 150 (H) 136 - 145 mmol/L Potassium 4.3 3.5 - 5.1 mmol/L Chloride 110 (H) 97 - 108 mmol/L  
 CO2 35 (H) 21 - 32 mmol/L Anion gap 5 5 - 15 mmol/L Glucose 183 (H) 65 - 100 mg/dL  (H) 6 - 20 MG/DL Creatinine 2.30 (H) 0.70 - 1.30 MG/DL  
 BUN/Creatinine ratio 59 (H) 12 - 20 GFR est AA 34 (L) >60 ml/min/1.73m2 GFR est non-AA 28 (L) >60 ml/min/1.73m2 Calcium 8.4 (L) 8.5 - 10.1 MG/DL Bilirubin, total 0.4 0.2 - 1.0 MG/DL  
 ALT (SGPT) 536 (H) 12 - 78 U/L  
 AST (SGOT) 715 (H) 15 - 37 U/L Alk. phosphatase 142 (H) 45 - 117 U/L Protein, total 6.1 (L) 6.4 - 8.2 g/dL Albumin 2.6 (L) 3.5 - 5.0 g/dL Globulin 3.5 2.0 - 4.0 g/dL A-G Ratio 0.7 (L) 1.1 - 2.2 GLUCOSE, POC Collection Time: 11/14/19  5:13 PM  
Result Value Ref Range Glucose (POC) 214 (H) 65 - 100 mg/dL Performed by Ann Perez Collection Time: 11/14/19  5:13 PM  
Result Value Ref Range Glucose 214 mg/dL Insulin order 10.8 units/hour Insulin adminstered 10.8 units/hour Multiplier 0.070 Low target 95 mg/dL High target 130 mg/dL D50 order 0.0 ml  
 D50 administered 0.00 ml Minutes until next BG 60 min Order initials elk Administered initials elrebekah GLSCOM Comments GLUCOSE, POC Collection Time: 11/14/19  6:16 PM  
Result Value Ref Range Glucose (POC) 143 (H) 65 - 100 mg/dL Performed by Ann Perez Collection Time: 11/14/19  6:16 PM  
Result Value Ref Range Glucose 143 mg/dL Insulin order 6.6 units/hour Insulin adminstered 6.6 units/hour Multiplier 0.080 Low target 95 mg/dL High target 130 mg/dL D50 order 0.0 ml  
 D50 administered 0.00 ml Minutes until next BG 60 min Order initials elk Administered initials elk GLSCOM Comments GLUCOSE, POC Collection Time: 11/14/19  7:19 PM  
Result Value Ref Range Glucose (POC) 112 (H) 65 - 100 mg/dL Performed by Kartik Tapia Collection Time: 11/14/19  7:36 PM  
Result Value Ref Range Glucose 112 mg/dL Insulin order 4.2 units/hour Insulin adminstered 4.2 units/hour Multiplier 0.080 Low target 95 mg/dL High target 130 mg/dL D50 order 0.0 ml  
 D50 administered 0.00 ml Minutes until next BG 60 min Order initials elk Administered initials katerine GLSCOM Comments GLUCOSE, POC Collection Time: 11/14/19  8:41 PM  
Result Value Ref Range Glucose (POC) 92 65 - 100 mg/dL Performed by Georgina Morales Collection Time: 11/14/19  8:41 PM  
Result Value Ref Range Glucose 92 mg/dL Insulin order 2.0 units/hour Insulin adminstered 2.0 units/hour Multiplier 0.064 Low target 95 mg/dL High target 130 mg/dL D50 order 0.0 ml  
 D50 administered 0.00 ml Minutes until next BG 60 min Order initials audrey Administered initials audrey GLSCOM Comments GLUCOSE, POC Collection Time: 11/14/19  9:43 PM  
Result Value Ref Range Glucose (POC) 89 65 - 100 mg/dL Performed by Georgina Morales Collection Time: 11/14/19  9:43 PM  
Result Value Ref Range Glucose 89 mg/dL Insulin order 1.5 units/hour Insulin adminstered 1.5 units/hour Multiplier 0.051 Low target 95 mg/dL High target 130 mg/dL D50 order 0.0 ml  
 D50 administered 0.00 ml Minutes until next BG 60 min Order initials audrey Administered initials audrey GLSCOM Comments GLUCOSE, POC Collection Time: 11/14/19 10:45 PM  
Result Value Ref Range Glucose (POC) 77 65 - 100 mg/dL Performed by Georgina Morales Collection Time: 11/14/19 10:45 PM  
Result Value Ref Range Glucose 77 mg/dL Insulin order 0.0 units/hour Insulin adminstered 0.0 units/hour Multiplier 0.041 Low target 95 mg/dL High target 130 mg/dL D50 order 9.0 ml  
 D50 administered 9.00 ml Minutes until next BG 15 min Order initials audrey Administered initials audrey GLSCOM Comments GLUCOSE, POC Collection Time: 11/14/19 11:02 PM  
Result Value Ref Range Glucose (POC) 88 65 - 100 mg/dL Performed by Lencho Fragoso Collection Time: 11/14/19 11:02 PM  
Result Value Ref Range Glucose 88 mg/dL Insulin order 0.9 units/hour Insulin adminstered 0.0 units/hour Multiplier 0.031 Low target 95 mg/dL High target 130 mg/dL D50 order 0.0 ml  
 D50 administered 0.00 ml Minutes until next BG 60 min Order initials me Administered initials audrey GLSCOM Comments POC G3 - PUL Collection Time: 11/14/19 11:52 PM  
Result Value Ref Range FIO2 (POC) 50 % pH (POC) 7.468 (H) 7.35 - 7.45    
 pCO2 (POC) 47.0 (H) 35.0 - 45.0 MMHG  
 pO2 (POC) 58 (L) 80 - 100 MMHG  
 HCO3 (POC) 34.1 (H) 22 - 26 MMOL/L  
 sO2 (POC) 91 (L) 92 - 97 % Base excess (POC) 10 mmol/L Site DRAWN FROM ARTERIAL LINE Device: VENT Mode ASSIST CONTROL Set Rate 24 bpm  
 PEEP/CPAP (POC) 5 cmH2O  
 PIP (POC) 22 Allens test (POC) N/A Inspiratory Time 1 sec Specimen type (POC) ARTERIAL Total resp. rate 27 GLUCOSE, POC Collection Time: 11/15/19 12:04 AM  
Result Value Ref Range Glucose (POC) 100 65 - 100 mg/dL Performed by Lencho Fragoso Collection Time: 11/15/19 12:04 AM  
Result Value Ref Range Glucose 100 mg/dL Insulin order 1.2 units/hour Insulin adminstered 1.2 units/hour Multiplier 0.031 Low target 95 mg/dL High target 130 mg/dL D50 order 0.0 ml  
 D50 administered 0.00 ml Minutes until next BG 60 min Order initials audrey Administered initials audrey GLSCOM Comments GLUCOSE, POC  
 Collection Time: 11/15/19  1:04 AM  
Result Value Ref Range Glucose (POC) 132 (H) 65 - 100 mg/dL Performed by Alexandra Francis Collection Time: 11/15/19  1:04 AM  
Result Value Ref Range Glucose 132 mg/dL Insulin order 3.0 units/hour Insulin adminstered 3.0 units/hour Multiplier 0.041 Low target 95 mg/dL High target 130 mg/dL D50 order 0.0 ml  
 D50 administered 0.00 ml Minutes until next BG 60 min Order initials me Administered initials me GLSCOM Comments POC G3 - PUL Collection Time: 11/15/19  1:13 AM  
Result Value Ref Range FIO2 (POC) 70 % pH (POC) 7.444 7.35 - 7.45    
 pCO2 (POC) 47.6 (H) 35.0 - 45.0 MMHG  
 pO2 (POC) 70 (L) 80 - 100 MMHG  
 HCO3 (POC) 32.6 (H) 22 - 26 MMOL/L  
 sO2 (POC) 94 92 - 97 % Base excess (POC) 9 mmol/L Site DRAWN FROM ARTERIAL LINE Device: VENT Mode ASSIST CONTROL Set Rate 24 bpm  
 PEEP/CPAP (POC) 5 cmH2O  
 PIP (POC) 22 Allens test (POC) N/A Inspiratory Time 1 sec Specimen type (POC) ARTERIAL Total resp. rate 30 GLUCOSE, POC Collection Time: 11/15/19  2:06 AM  
Result Value Ref Range Glucose (POC) 112 (H) 65 - 100 mg/dL Performed by Alexandra Francis Collection Time: 11/15/19  2:07 AM  
Result Value Ref Range Glucose 112 mg/dL Insulin order 2.1 units/hour Insulin adminstered 2.1 units/hour Multiplier 0.041 Low target 95 mg/dL High target 130 mg/dL D50 order 0.0 ml  
 D50 administered 0.00 ml Minutes until next BG 60 min Order initials audrey Administered initials me GLSCOM Comments GLUCOSE, POC Collection Time: 11/15/19  2:59 AM  
Result Value Ref Range Glucose (POC) 97 65 - 100 mg/dL Performed by Alexandra Francis Collection Time: 11/15/19  3:00 AM  
Result Value Ref Range Glucose 97 mg/dL Insulin order 1.5 units/hour Insulin adminstered 1.5 units/hour Multiplier 0.041 Low target 95 mg/dL High target 130 mg/dL D50 order 0.0 ml  
 D50 administered 0.00 ml Minutes until next BG 60 min Order initials audrey Administered initials audrey GLSCOM Comments CBC W/O DIFF Collection Time: 11/15/19  4:01 AM  
Result Value Ref Range WBC 10.7 4.1 - 11.1 K/uL  
 RBC 1.93 (L) 4.10 - 5.70 M/uL HGB 6.0 (L) 12.1 - 17.0 g/dL HCT 19.8 (L) 36.6 - 50.3 % .6 (H) 80.0 - 99.0 FL  
 MCH 31.1 26.0 - 34.0 PG  
 MCHC 30.3 30.0 - 36.5 g/dL  
 RDW 15.8 (H) 11.5 - 14.5 % PLATELET 75 (L) 371 - 400 K/uL NRBC 0.3 (H) 0  WBC ABSOLUTE NRBC 0.03 (H) 0.00 - 0.01 K/uL MAGNESIUM Collection Time: 11/15/19  4:01 AM  
Result Value Ref Range Magnesium 2.9 (H) 1.6 - 2.4 mg/dL NT-PRO BNP Collection Time: 11/15/19  4:01 AM  
Result Value Ref Range NT pro-BNP 33,564 (H) <125 PG/ML  
PROCALCITONIN Collection Time: 11/15/19  4:01 AM  
Result Value Ref Range Procalcitonin 2.9 ng/mL METABOLIC PANEL, COMPREHENSIVE Collection Time: 11/15/19  4:01 AM  
Result Value Ref Range Sodium 152 (H) 136 - 145 mmol/L Potassium 4.0 3.5 - 5.1 mmol/L Chloride 113 (H) 97 - 108 mmol/L  
 CO2 34 (H) 21 - 32 mmol/L Anion gap 5 5 - 15 mmol/L Glucose 89 65 - 100 mg/dL  (H) 6 - 20 MG/DL Creatinine 2.07 (H) 0.70 - 1.30 MG/DL  
 BUN/Creatinine ratio 77 (H) 12 - 20 GFR est AA 39 (L) >60 ml/min/1.73m2 GFR est non-AA 32 (L) >60 ml/min/1.73m2 Calcium 8.4 (L) 8.5 - 10.1 MG/DL Bilirubin, total 0.4 0.2 - 1.0 MG/DL  
 ALT (SGPT) 454 (H) 12 - 78 U/L  
 AST (SGOT) 629 (H) 15 - 37 U/L Alk. phosphatase 94 45 - 117 U/L Protein, total 5.5 (L) 6.4 - 8.2 g/dL Albumin 2.6 (L) 3.5 - 5.0 g/dL Globulin 2.9 2.0 - 4.0 g/dL A-G Ratio 0.9 (L) 1.1 - 2.2 PHOSPHORUS Collection Time: 11/15/19  4:01 AM  
Result Value Ref Range Phosphorus 2.6 2.6 - 4.7 MG/DL  
LACTIC ACID  Collection Time: 11/15/19  4:01 AM  
 Result Value Ref Range Lactic acid 1.4 0.4 - 2.0 MMOL/L  
GLUCOSE, POC Collection Time: 11/15/19  4:09 AM  
Result Value Ref Range Glucose (POC) 92 65 - 100 mg/dL [facility]formed by [facility] Collection Time: 11/15/19  4:09 AM  
Result Value Ref Range Glucose 92 mg/dL Insulin order 1.0 units/hour Insulin adminstered 1.0 units/hour Multiplier 0.031 Low target 95 mg/dL High target 130 mg/dL D50 order 0.0 ml  
 D50 administered 0.00 ml Minutes until next BG 60 min Order initials me Administered initials me GLSCOM Comments POC G3 - PUL Collection Time: 11/15/19  4:30 AM  
Result Value Ref Range FIO2 (POC) 90 % pH (POC) 7.398 7.35 - 7.45    
 pCO2 (POC) 54.5 (H) 35.0 - 45.0 MMHG  
 pO2 (POC) 124 (H) 80 - 100 MMHG  
 HCO3 (POC) 33.6 (H) 22 - 26 MMOL/L  
 sO2 (POC) 99 (H) 92 - 97 % Base excess (POC) 9 mmol/L Site DRAWN FROM ARTERIAL LINE Device: VENT Mode ASSIST CONTROL Set Rate 24 bpm  
 PEEP/CPAP (POC) 5 cmH2O  
 PIP (POC) 22 Allens test (POC) N/A Inspiratory Time 1 sec Specimen type (POC) ARTERIAL Total resp. rate 28 GLUCOSE, POC Collection Time: 11/15/19  5:07 AM  
Result Value Ref Range Glucose (POC) 65 65 - 100 mg/dL Performed by Chris Cuevas, POC Collection Time: 11/15/19  5:09 AM  
Result Value Ref Range Glucose (POC) 91 65 - 100 mg/dL [facility]formed by [facility] Collection Time: 11/15/19  5:10 AM  
Result Value Ref Range Glucose 91 mg/dL Insulin order 0.7 units/hour Insulin adminstered 0.7 units/hour Multiplier 0.021 Low target 95 mg/dL High target 130 mg/dL D50 order 0.0 ml  
 D50 administered 0.00 ml Minutes until next BG 60 min Order initials me Administered initials audrey GLSCOM Comments GLUCOSE, POC Collection Time: 11/15/19  6:10 AM  
Result Value Ref Range Glucose (POC) 74 65 - 100 mg/dL Performed by Ryann Paz, POC Collection Time: 11/15/19  6:11 AM  
Result Value Ref Range Glucose (POC) 100 65 - 100 mg/dL Performed by Vee Cassidy Collection Time: 11/15/19  6:12 AM  
Result Value Ref Range Glucose 100 mg/dL Insulin order 0.8 units/hour Insulin adminstered 0.8 units/hour Multiplier 0.021 Low target 95 mg/dL High target 130 mg/dL D50 order 0.0 ml  
 D50 administered 0.00 ml Minutes until next BG 60 min Order initials audrey Administered initials audrey'' GLSCOM Comments GLUCOSE, POC Collection Time: 11/15/19  6:59 AM  
Result Value Ref Range Glucose (POC) 106 (H) 65 - 100 mg/dL Performed by Sih WILLAMS + CROSSMATCH Collection Time: 11/15/19  7:00 AM  
Result Value Ref Range Crossmatch Expiration 11/18/2019 ABO/Rh(D) O POSITIVE Antibody screen NEG Unit number [id number] Blood component type RC LR Unit division 00 Status of unit ISSUED Crossmatch result Compatible Leila Kim Collection Time: 11/15/19  7:02 AM  
Result Value Ref Range Glucose 106 mg/dL Insulin order 1.0 units/hour Insulin adminstered 1.0 units/hour Multiplier 0.021 Low target 95 mg/dL High target 130 mg/dL D50 order 0.0 ml  
 D50 administered 0.00 ml Minutes until next BG 60 min Order initials audrey Administered initials audrey GLSCOM Comments GLUCOSE, POC Collection Time: 11/15/19  8:13 AM  
Result Value Ref Range Glucose (POC) 122 (H) 65 - 100 mg/dL Performed by Lynette Kim Collection Time: 11/15/19  8:14 AM  
Result Value Ref Range Glucose 122 mg/dL Insulin order 1.3 units/hour Insulin adminstered 1.3 units/hour Multiplier 0.021 Low target 95 mg/dL High target 130 mg/dL D50 order 0.0 ml  
 D50 administered 0.00 ml Minutes until next BG 60 min Order initials cw Administered initials cw GLSCOM Comments Harriet Sood MD 
[address] [place] [address]  
[id number] [place] [address], [facility], [facility] Phone - [phone] Fax - [phone] 
[email or web address]. [email or web address]

## 2019-11-15 NOTE — PROGRESS NOTES
Problem: Falls - Risk of 
Goal: *Absence of Falls Description Document Harinder Juarez Fall Risk and appropriate interventions in the flowsheet. Outcome: Progressing Towards Goal 
Note:  
Fall Risk Interventions: 
Mobility Interventions: Communicate number of staff needed for ambulation/transfer Mentation Interventions: Door open when patient unattended, Adequate sleep, hydration, pain control, Evaluate medications/consider consulting pharmacy Medication Interventions: Evaluate medications/consider consulting pharmacy Elimination Interventions: Toileting schedule/hourly rounds History of Falls Interventions: Consult care management for discharge planning, Evaluate medications/consider consulting pharmacy Problem: Pressure Injury - Risk of 
Goal: *Prevention of pressure injury Description Document Earl Scale and appropriate interventions in the flowsheet. Outcome: Progressing Towards Goal 
Note:  
Pressure Injury Interventions: 
Sensory Interventions: Assess changes in LOC, Check visual cues for pain Moisture Interventions: Absorbent underpads, Check for incontinence Q2 hours and as needed, Internal/External fecal devices, Internal/External urinary devices, Maintain skin hydration (lotion/cream) Activity Interventions: Pressure redistribution bed/mattress(bed type) Mobility Interventions: HOB 30 degrees or less, Pressure redistribution bed/mattress (bed type), PT/OT evaluation Nutrition Interventions: Document food/fluid/supplement intake Friction and Shear Interventions: Lift sheet, HOB 30 degrees or less Problem: Ventilator Management Goal: *Adequate oxygenation and ventilation Outcome: Progressing Towards Goal 
Goal: *Patient maintains clear airway/free of aspiration Outcome: Progressing Towards Goal 
Goal: *Absence of infection signs and symptoms Outcome: Progressing Towards Goal 
Goal: *Normal spontaneous ventilation Outcome: Progressing Towards Goal 
  
 Problem: Tissue Perfusion - Cardiopulmonary, Altered Goal: *Absence of hypoxia Outcome: Progressing Towards Goal 
  
Problem: Nutrition Deficit Goal: *Optimize nutritional status Outcome: Progressing Towards Goal 
  
Problem: Gas Exchange - Impaired Goal: *Absence of hypoxia Outcome: Progressing Towards Goal 
  
Problem: Infection - Risk of, Urinary Catheter-Associated Urinary Tract Infection Goal: *Absence of infection signs and symptoms Outcome: Progressing Towards Goal

## 2019-11-15 NOTE — PROGRESS NOTES
Chart reviewed and noted patient remains intubated and unable to follow commands at this time. Will continue to follow peripherally and see patient for OT intervention as he is medically stable. Thank you.

## 2019-11-15 NOTE — DIABETES MGMT
[facility] Cardiac Surgery Progress Note Recommendations/ Comments: Insulin gtt begun yesterday for extreme hyperglycemia, BG > 400 mg/dL. Steroids Solu medrol 40 mg/dL Q 8 hours. TF continuous Suplena @ 30 Vent, noted plan to stop sedation; pt restless, not following commands Renal noted Currently on insulin gtt At 0917  mg/dL, rate 2.2 units/hr, received 7.0 units in past 6 hours. At this time transition dose + meal time dose per gtt = 34 units daily Note pt was poorly controlled on Lantus 38 units Q 12 hours prior to starting gtt Discussed the following with Cr Dupree NP Could poor absorption at SQ injection sites be a factor? Per NP pt was very dehydrated yesterday Remain on gtt for another 24 hours, then transition if more stable overall Plan to re-evaluate later today and outline weekend plans After hour DTC phone [phone] Patient is 68 y.o. male s/p CABG x 5 followed by re-entry for mediastinal clot evacuation and repair of SVG to PDA  - POD 18. Pt with documented hx of DM on no meds PTA. Anemia - A1c inaccurate A1c:  
Lab Results Component Value Date/Time Hemoglobin A1c 5.3 10/21/2019 03:04 AM  
 
 
 
Recent Glucose Results:  
Lab Results Component Value Date/Time GLU 89 11/15/2019 04:01 AM  
  (H) 11/14/2019 04:46 PM  
  (H) 11/14/2019 01:05 PM  
 GLUCPOC 132 (H) 11/15/2019 09:16 AM  
 GLUCPOC 122 (H) 11/15/2019 08:13 AM  
 GLUCPOC 106 (H) 11/15/2019 06:59 AM  
  
 
Lab Results Component Value Date/Time Creatinine 2.07 (H) 11/15/2019 04:01 AM  
 
Estimated Creatinine Clearance: 27.5 mL/min (A) (based on SCr of 2.07 mg/dL (H)). Active Orders Diet DIET NPO  
  
 
PO intake:  
No data found. Will continue to follow as needed. Thank you. Candida Rivera RN, CDE Time spent: 8 minutes 1530  Addendum At this time pt has received approx 10 units over the past 6 hours, which would be 40 units of Lantus daily. The gtt estimates his 24 hour total to be 34 units. Noted Nutrition note TF to be increased, therefore his requirement may be higher tomorrow than currently. Recommend Closer to time of transition, look at gtt weaning tab on the glucostabilizer and give the total daily dose of insulin as Lantus daily. Also order the lispro normal correction scale (in spite of renal, he may need this scale due to steroids and TF) Sandrita Torres RN, CDE

## 2019-11-15 NOTE — PROGRESS NOTES
[facility] 
11/15 Seen and examined Tachypneic Dyssynchronous with ventilator Off fentanyl and versed On precedex Receiving blood Puffy extremities CXR ? Increased edema 
proBNP 33K 
 
 S/p [employer] On vent 60% FiO2 On TF Patient Vitals for the past 4 hrs: 
 BP Temp Pulse Resp SpO2  
11/15/19 1105   (!) 106 (!) 38 99 % 11/15/19 1100 101/46  (!) 103 23 99 % 11/15/19 1000 101/49  (!) 106 (!) 32 98 % 11/15/19 0900 101/50  (!) 105 21 96 % 11/15/19 0800  100.3 °F (37.9 °C) (!) 101 28 100 % 11/15/19 0753     100 % 11/15/19 0743   (!) 105 30 100 % Temp (24hrs), Av.8 °F (37.1 °C), Min:97.7 °F (36.5 °C), Max:100.3 °F (37.9 °C) Intake/Output Summary (Last 24 hours) at 11/15/2019 1124 Last data filed at 11/15/2019 0900 Gross per 24 hour Intake 3482.26 ml Output 1595 ml Net 1887.26 ml No distress [employer] Rales and rhonchi Tachy Soft bs present Warm and dry CXR - trach - no change in interstitial lung disease Lab: 
Recent Labs 11/15/19 
0401 19 
1646 19 
1305 19 
1104 19 
0423 19 
0321 WBC 10.7  --   --   --  19.6* 15.1* HGB 6.0*  --   --   --  8.4* 7.5* PLT 75*  --   --   --  122* 110* * 150* 149* 146* 148* 149*  
K 4.0 4.3 5.2* HEMOLYZED,RECOLLECT REQUESTED 5.3* 4.8  
* 110* 108 107 109* 108 CO2 34* 35* 34* 37* 37* 37* * 135* 136* 128* 121* 114* CREA 2.07* 2.30* 2.41* 2.14* 1.70* 1.70* GLU 89 183* 370* 408* 285* 257* CA 8.4* 8.4* 8.4* 8.7 9.1 8.8 MG 2.9*  --   --  HEMOLYZED,RECOLLECT REQUESTED 3.1* 2.9*  
PHOS 2.6  --   --   --   --   --   
INR  --   --   --   --   --  1.1  
TROIQ  --   --   --  0.27*  --   --   
TBILI 0.4 0.4  --   --  0.3 0.3 SGOT 629* 715*  --   --  28 23 LAC 1.4  --   --   --   --   --   
 
ABG: 
Recent Labs 11/15/19 
0430 11/15/19 
0113 19 
2352 PHI 7.398 7.444 7.468* PCO2I 54.5* 47.6* 47.0*  
PO2I 124* 70* 58* HCO3I 33.6* 32.6* 34.1*  
 SO2I 99* 94 91* FIO2I 90 70 50 Results Procedure Component Value Units Date/Time AFB CULTURE + SMEAR W/RFLX ID FROM CULTURE [[id number]] Collected:  11/07/19 1245 Order Status:  Completed Specimen:  Miscellaneous sample Updated:  11/08/19 1636 Source BRONCHIAL LAVAGE     
  AFB Specimen processing Concentration Acid Fast Smear NEGATIVE Comment: (NOTE) Performed At: [address] [id number] Rachele Salinas MD FH:[id number] Acid Fast Culture PENDING  
 CULTURE, RESPIRATORY/SPUTUM/BRONCH Jen Given [[id number]] Collected:  11/07/19 1245 Order Status:  Completed Specimen:  Sputum from Bronchial lavage Updated:  11/09/19 1024 Special Requests: NO SPECIAL REQUESTS     
  GRAM STAIN OCCASIONAL WBCS SEEN     
   NO ORGANISMS SEEN Culture result: NO GROWTH 2 DAYS     
 Rosa Noble [[id number]] Collected:  11/07/19 1245 Order Status:  Canceled Specimen:  Bronchial lavage Freddie Light SOURCES [[id number]] Collected:  11/07/19 1245 Order Status:  Completed Specimen:  Other from Bronchial lavage Updated:  11/07/19 1538 Special Requests: NO SPECIAL REQUESTS     
  KOH NO YEAST SEEN     
  KOH NO FUNGAL ELEMENTS SEEN     
  
· S/p 5V CABG 10/19/19 for ACS  With reexploration 10/23 for mediastinal hematoma · Acute resp failure- baseline fibrotic ILD (etiology not clear- this is a new dx but UIP/IPF in DDX) with superimposed pulmonary interstitial edema probable superimposed diffuse alveolar damage from blood/blood products. Amio pulm toxicty in DDX but not clear from [facility] review when he was on it. 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 
· Anemia · Encephalopathy 
  
--vent support - wean FiO2 and TC trials as able --on solumedrol - decrease to 40 mg IV q8h >> to q 12 
-- Consider diuretics to correct volume overload as tolerated by renal function 
-- Agree with minimizing sedation 
--TF 
-- Nephrology following electrolytes and diuretics -- D/W Dr. Mona Miller 
-- We will be available to see him for acute issues over the weekend and check back on Monday Laura Duenas MD

## 2019-11-15 NOTE — PROGRESS NOTES
0800- bedside report and drips verified. Patient in bed, intubated on trach, and restless in bed. 4807- Dr. Azalea Phoenix and Neftaly Kate NP at bedside for rounds. Concern for lack of neurological response (no command following with minimal pain response). Will not restart any sedation medications (other than precedex) and wait for him to clear. Plan to attempt trach collar later today. 0825- blood started. Patient still agitated and restless in bed, vent continuously alarming. 3923- RT at bedside to decrease FiO2 from 90 to 70%. 1580- sats 89% on FiO2 70%. Increased FiO2 back to 90%. 1050- discussed comfort issue, even after administering morphine with Krysta Jenkins NP. She will place orders for PRN ativan, and stated not to give versed. 1510- H&H resulted low again (6.6 after 1 unit). Per Krysta Jenkins NP orders to recheck CBC and send stool occult. 1545- stool occult negative, still waiting on CBC results. 1630- CBC still not has resulted, lab stated a back up and will have the results soon. 1730- CBC finally resulted; Hgb 6.7, Plt 61. Paged on-call MD. 
 
1021- Dr. Selma Griffiths returned call. Update given on patient's condition and lab results. Orders to transfuse 2 unit RBC's. 
 
4888- first unit of PRBC's started. 1945- Bedside and Verbal shift change report given to Robson Vuong RN (oncoming nurse) by Yasmin Ramírez RN (offgoing nurse). Report included the following information SBAR, Kardex, Recent Results and Cardiac Rhythm paced.

## 2019-11-15 NOTE — PROGRESS NOTES
Chart reviewed and noted patient remains intubated and unable to follow commands at this time. Will continue to follow peripherally and see patient for PT intervention as he is medically stable. Thank you.

## 2019-11-15 NOTE — PROGRESS NOTES
NYHA class IV A/C systolic heart failure (EF 25%, NT pro-BNP 15,013) Acute on chronic kidney disease Mild pulmonary edema Mild chronic lung disease Acute on chronic hypoxic respiratory failure 
  
Has more confusion this am  
 
Coming off versed and fentanyl Having some fevers ID consult Hgb a little low - giving pRBC Platelets a little low Creatinine midlly improved Bilirubin and other LFTs bumped a bit - likely from hypovolemic shock Lactic acid improved Pro-calcitonin up a bit NT pro-BNP elevated like from volume resuscitation CXR - mild increase in pulmonary edema Intake/Output Summary (Last 24 hours) at 11/15/2019 1023 Last data filed at 11/15/2019 0900 Gross per 24 hour Intake 3779.73 ml Output 1605 ml Net 2174.73 ml Visit Vitals /50 Pulse (!) 105 Temp 100.3 °F (37.9 °C) Resp 21 Ht 5' 7\" (1.702 m) Wt 130 lb 14.4 oz (59.4 kg) SpO2 96% BMI 20.50 kg/m² Risk of morbidity and mortality - high Medical decision making - high complexity Total critical care time - 30 minutes (CPT 80554)

## 2019-11-15 NOTE — PROGRESS NOTES
1930: Bedside SBAR report received from Rafal Herring. Labs and plan of care reviewed and gtts verified at this time. 1945: Patient incontinent of third large, liquid stool. FMS placed to assist with preventing skin breakdown and collection of stool. 0000: ABG drawn to evaluate oxygenation. Patient tachypenic throughout the shift, however, is not following commands, or tracking with eyes. PO2 low, FiO2 increased to 70% and air added to cuff. Will re-evaluate ABG shortly. 0100: ABG redrawn. FiO2 increased to 90% for low PO2. Will continue to monitor. 0700: Updated Dr. Arlette Najera on patient's HGB. Orders received. **Patient tachypenic with shallow respirations throughout the shift. Patient not following commands nor tracking with eyes. Patient will shake his head aggressively with no purposeful answering. Precedex gtts was initiated to assist with agitation and respirations without avail. 0730: Bedside SBAR report given to NORMA Garcia. Labs and plan of care reviewed and gtts verified at this time.

## 2019-11-15 NOTE — PROGRESS NOTES
[facility] ICU Progress Note Admit Date: 10/19/2019 Procedure:  Procedure(s): 
CARDIAC RE-ENTRY, MARISOL BY  [facility]    
 
CABG x 5, LIMA to LAD, RSVG to [id number], RSVG to PDA 10/23/19 Subjective:  
Pt seen with Dr. Laurie Ashton. Dobutamine at 1, Insulin. Tmax 100.3, intubated and sedated. FiO2 90%. TF at goal.   
 
 Objective:  
Vitals: 
Blood pressure 101/52, pulse 95, temperature 99.8 °F (37.7 °C), resp. rate 24, height 5' 7\" (1.702 m), weight 130 lb 14.4 oz (59.4 kg), SpO2 100 %. Temp (24hrs), Av.2 °F (37.3 °C), Min:98.5 °F (36.9 °C), Max:100.3 °F (37.9 °C) EKG/Rhythm: SR in the 90s Oxygen Therapy: 
Oxygen Therapy O2 Sat (%): 100 % (11/15/19 1300) Pulse via Oximetry: 94 beats per minute (11/15/19 1300) O2 Device: Tracheostomy; Ventilator (11/15/19 1200) O2 Flow Rate (L/min): 40 l/min (19 2000) O2 Temperature: 98.6 °F (37 °C) (19 0729) FIO2 (%): 90 % (11/15/19 1200) CXR:  
CXR Results  (Last 48 hours)  
          
 11/15/19 0502  XR CHEST PORT Final result Impression:  IMPRESSION:  
   
Interstitial and parenchymal edema has not significantly changed. Narrative:  PORTABLE CHEST RADIOGRAPH/S: 11/15/2019 5:02 AM  
   
Clinical history: Interstitial edema INDICATION:   interstitial edema COMPARISON: 2019 FINDINGS:  
AP portable upright view of the chest demonstrates a stable  cardiopericardial  
silhouette. The lungs are adequately expanded. Interstitial and parenchymal  
edema is not significantly changed. PICC line catheter and tracheostomy tube. NG  
tube as well. Patient is on a cardiac monitor. 19 0528  XR CHEST PORT Final result Impression:  IMPRESSION:  
ET tube is been removed. Interstitial and parenchymal edema has not significantly changed. Narrative:  PORTABLE CHEST RADIOGRAPH/S: 2019 5:28 AM  
   
Clinical history: Interstitial edema INDICATION:   interstitial edema COMPARISON: 11/13/2019 FINDINGS:  
AP portable upright view of the chest demonstrates a stable  cardiopericardial  
silhouette. The lungs are adequately expanded. Interstitial and parenchymal  
edema is not significantly changed. PICC line catheter and tracheostomy tube. NG  
tube as well. The ET tube has been removed. . Patient is on a cardiac monitor. Admission Weight: Last Weight Weight: 141 lb 5 oz (64.1 kg) Weight: 130 lb 14.4 oz (59.4 kg) Intake / Output / Drain: 
Current Shift: 11/15 0701 - 11/15 1900 In: 873.6 [I.V.:118.6] Out: 485 [Urine:415; Drains:70] Last 24 hrs.:  
 
Intake/Output Summary (Last 24 hours) at 11/15/2019 1345 Last data filed at 11/15/2019 1300 Gross per 24 hour Intake 3702.12 ml Output 1815 ml Net 1887.12 ml EXAM: 
General:  Intubated, sedated. Lungs:   Coarse bilat Incision:  Midsternal incision with no significant erythema, drainage, or dehiscence. Heart:  Regular rate and rhythm, S1, S2 normal, no murmur, click, rub or gallop. Abdomen:   Soft, non-tender. Bowel sounds active. No masses,  No organomegaly. +BM. FMS in place Extremities:  Some generalized edema. Palpable pulses bilat Neurologic:  Moves extremities but without purpose. Does not track or follow commands. Labs:  
Recent Labs 11/15/19 
1342  11/15/19 
0401  11/13/19 
0321 WBC  --   --  10.7   < > 15.1* HGB  --   --  6.0*   < > 7.5* HCT  --   --  19.8*   < > 25.6* PLT  --   --  75*   < > 110* NA  --   --  152*   < > 149* K  --   --  4.0   < > 4.8 BUN  --   --  159*   < > 114* CREA  --   --  2.07*   < > 1.70* GLU  --   --  89   < > 257* GLUCPOC 99   < >  --    < >  --   
INR  --   --   --   --  1.1  
 < > = values in this interval not displayed. Assessment:  
 
Principal Problem: S/P CABG x 5 (10/23/2019) Overview: x 5, LIMA to LAD, RSVG to Diag1, OM2-OM3, RSVG to PDA Active Problems: 
  ACS (acute coronary syndrome) ([facility] (10/19/2019) Unstable angina ([facility] (10/19/2019) Coronary artery disease of native artery of native heart with stable angina pectoris ([facility] (10/21/2019) Systolic heart failure ([facility] (10/22/2019) Plan/Recommendations/Medical Decision Makin. S/p CABG: on ASA, statin. No BB, ACEi while on vasoactive medications 2. Acute on chronic systolic CHF, NYHA Class II on admit: EF 35-40% preop. Dobutamine back on at 1. No BB/ACE/ARB/AA until vitals/kidney function allows. Trend pBNP 3. Acute postop respiratory failure with chronic interstitial fibrosis per CXR: Re-intubated  due to worsening acute respiratory failure. [place] placed  by Thoracic Surgery. Acute respiratory acidosis  thought driven by oversedation. Fentanyl and versed drips stopped. (now just has PRN Morphine and Ativan dosing) Diuresis per Nephrology-currently holding. Amiodarone had been stopped on 10/31. Solumedrol per pulm-weaning. Continue scheduled nebs. Cont mucinex, pulm toileting, mucomyst. Chest CT revealing emphysema/pulm fibrosis. 4. Renal insufficiency: Creatinine slightly improved today. Nephrology following and appreciate their recommendations. Devlin in place for accurate I&O. Urine output improved in the last 24 hours. Nephrology recommendations to continue holding diuretics. Did receive Albumin X 4 doses in the last 24 hours. 5. Anemia: had preop, H&H at -given 1 unit PRBC. Iron panel sent preop - iron, iron sat low. 6. Thrombocytopenia: Platelets worse today at 75k. Daily CBC. Hold SQ heparin. Will get a repeat CBC this afternoon. If still less than 80K will plan to hold ASA too. 
 
7. Hx of HTN: Issues with hypotension improved. Still on Dobutamine at 1 but likely can be weaned off. Volume replaced overnight. 8. HLD: Continue pravastatin 9. Leukocytosis: WBC at 10.7k today, procalcitonin at 2.9 but Lactic at 1.4. Devlin placed 11/7. PICC placed 11/6. Trach 11/13. Monitor daily CBC. Will ask Infectious Disease to consult with this medically complex patient. Antibiotics? 10. Constipation: Resolved. FMS in place 11. Current smoker: smoking cessation education completed. 12. Nutrition: Appreciate Dietician recommendations. Dobhoff placed 10/30 and Enteral feedings started. Too unstable from resp standpoint for PO diet, TF's continuous at 50 ml/hr. Try and AVOID increased volume amounts 13. Hyperglycemia: Preop A1c 5.3 with no DM history, now with hyperglycemia. BS have been much better controlled on Insulin gtt. DTS following. Discussed with [facility] and will keep on Insulin gtt for another 24 hours. She will look at him later today to discuss most likely plan for insulin over the weekend coming off the Insulin gtt. 14. Hypernatremia: Nephrology following-increased FW flushes. Na at 152 today. Continue to monitor 15. DVT/GI Prophylaxis: SCD's, SQ Heparin-currently on hold due to thrombocytopenia. Will restart when improved, PPI Dispo: PT/OT as able. Remain in CVI until resp status more stable.   
 
 
Signed By: Tobin Lewis NP

## 2019-11-16 NOTE — PROGRESS NOTES
Physical Therapy Defer 11.16.19 Pt continues to be on ventilator via trach and not following commands. Will hold PT and follow-up as appropriate. Thank you.  
Becka Landa, PT, DPT

## 2019-11-16 NOTE — PROGRESS NOTES
1930 - Bedside and Verbal shift change report given to Una Tucker RN (oncoming nurse) by Omi Casas RN (offgoing nurse). Report included the following information SBAR, Kardex, Intake/Output, MAR, Recent Results, Cardiac Rhythm Sinus Rhythm and Alarm Parameters . Medications verified and pt assessed. Pt resting in bed in reverse tendelenberg position d/t left femoral arterial line. 2215 - 2/2 unit prbc began infusing. 18 - Dr. Darby Haque, infectious disease, at bedside. Updated on pt condition. MD placed new orders. 2328 - RT at bedside to adjust FIo2 to 80% from 90%. O2 sats 100%. Will continue to monitor. 0354 - RT at bedside to adjust FiO2 to 70% from 80%. O2 sats 199%. Will continue to monitor. 0400 - Paired blood cultures sent per Dr. Darby Haque. 0600 - H/H resulted post 2 prbc infusion. Hgb = 9.1, hct = 28.7 
0700 - RT at bedside to run ABG. Ph = 7.495 CO2 = 38.6 PO2 = 62 Base excess = 6 HCO3 = 29.7 SO2 = 93% RT adjusted vent FiO2 back to 90% from 70%. Will continue to monitor. 0730 - Bedside and Verbal shift change report given to Omi Casas RN (oncoming nurse) by Una Tucker RN (offgoing nurse). Report included the following information SBAR, Kardex, Intake/Output, MAR, Recent Results, Cardiac Rhythm Sinus Rhythm and Alarm Parameters .

## 2019-11-16 NOTE — PROGRESS NOTES
4524- bedside report and drips verified. Patient in bed, intubated with trach; no command following. 1619- Dr. Austen Ribeiro and Whitney TRAN at bedside for rounds. No orders received. Resent CBC for concern of low WBC. 3200- called lab for results of CBC. Tech stated she cannot run the sample sent and needs a new one. Inquired about why RN was not notified, no answer given. Resent another sample. 1945- Bedside and Verbal shift change report given to Dominik Ray RN (oncoming nurse) by Harper Doss RN (offgoing nurse).  Report included the following information SBAR, Kardex, Recent Results and Cardiac Rhythm NSR. '

## 2019-11-16 NOTE — PROGRESS NOTES
Nephrology Progress Note Meg Roper Date of Admission : 10/19/2019 CC:  Follow up for ISAIAH on CKD, hypervolemia Assessment and Plan ISAIAH on CKD: 
- from ATN post CABG 
- BP improved,  
- PRN albumin for hypotension 
- hold diuretics 
- daily labs for now Hypernatremia : 
- increaser FW flushes please  
- na 151 Hyperkalemia:  
- resolved CKD III: 
- baseline Cr 1.3 prior to CABG 
- likely from DM and HTN 
  
HFrE F: 
- last EF 35-40% on 10/20 
  
CAD s/p CABG x 5 10/19 and reexploration 10/23 for hematoma 
  
Acute on Chronic Resp Failure: 
- likely 2/2 underlying ILD + volume 
- now w/ trach Chronic Anemia: 
- hgb stable 
- cont to monitor 
  
DM2: 
- on insulin 
  
Protein Malnutrition: 
- on TF via [place] [address] Interval History: 
Seen and examined. Cr appears better , Na   Mildly better Current Medications: all current  Medications have been eviewed in [facility] Review of Systems: Review of systems not obtained due to patient factors. Objective: 
Vitals:   
Vitals:  
 11/16/19 1130 11/16/19 1200 11/16/19 1300 11/16/19 1400 BP:      
Pulse: (!) 102 (!) 102 99 95 Resp: (!) 33 20 27 30 Temp:  (!) 100.6 °F (38.1 °C) SpO2: 100% 100% 100% 100% Weight:      
Height:      
 
Intake and Output: 
11/16 0701 - 11/16 1900 In: 1074.3 [I.V.:404.3] Out: 765 [Urine:765] 11/14 1901 - 11/16 0700 In: 4764.2 [I.V.:1150.9] Out: 7799 [Urine:3225; Drains:445] Physical Examination: 
General: Frail, sedated Neck:  + trach Resp:  Diffuse dry crackles CV:  RRR,  no murmur or rub, no LE edema GI:  Soft, NT, + Bowel sounds, no hepatosplenomegaly Neurologic:  Sedated on the vent Psych:             Unable to assess Skin:  No Rash :  Devlin in place [x]    High complexity decision making was performed 
[x]    Patient is at high-risk of decompensation with multiple organ involvement Lab Data Personally Reviewed: I have reviewed all the pertinent labs, microbiology data and radiology studies during assessment. Recent Labs 11/16/19 
0310 11/15/19 
0401 11/14/19 
1646  11/14/19 
1104 * 152* 150*   < > 146*  
K 3.8 4.0 4.3   < > HEMOLYZED,RECOLLECT REQUESTED  
* 113* 110*   < > 107 CO2 31 34* 35*   < > 37* * 89 183*   < > 408* * 159* 135*   < > 128* CREA 1.73* 2.07* 2.30*   < > 2.14* CA 8.4* 8.4* 8.4*   < > 8.7 MG 3.1* 2.9*  --   --  HEMOLYZED,RECOLLECT REQUESTED  
PHOS 2.7 2.6  --   --   --   
ALB 2.4* 2.6* 2.6*  --   --   
SGOT 212* 629* 715*  --   --   
* 454* 536*  --   --   
 < > = values in this interval not displayed. Recent Labs 11/16/19 
1018 11/16/19 
0811 11/16/19 
0310 WBC 1.6* 1.2* 1.7* HGB 8.8* 8.8* 9.1*  
HCT 27.7* 27.8* 28.7* PLT 42* 46* 52* No results found for: SDES Lab Results Component Value Date/Time Culture result: NO GROWTH 2 DAYS 11/07/2019 12:45 PM  
 Culture result: HEAVY NORMAL RESPIRATORY TARIK 10/21/2019 12:08 PM  
 
Recent Results (from the past 24 hour(s)) GLUCOSE, POC Collection Time: 11/15/19  2:54 PM  
Result Value Ref Range Glucose (POC) 103 (H) 65 - 100 mg/dL Performed by Jalen Cross Collection Time: 11/15/19  2:54 PM  
Result Value Ref Range Glucose 103 mg/dL Insulin order 1.3 units/hour Insulin adminstered 1.3 units/hour Multiplier 0.031 Low target 95 mg/dL High target 130 mg/dL D50 order 0.0 ml  
 D50 administered 0.00 ml Minutes until next BG 60 min Order initials EW Administered initials EW   
 GLSCOM Comments OCCULT BLOOD, STOOL Collection Time: 11/15/19  3:17 PM  
Result Value Ref Range Occult blood, stool NEGATIVE  NEG    
CBC W/O DIFF Collection Time: 11/15/19  3:19 PM  
Result Value Ref Range WBC 9.2 4.1 - 11.1 K/uL  
 RBC 2.11 (L) 4.10 - 5.70 M/uL HGB 6.7 (L) 12.1 - 17.0 g/dL HCT 21.1 (L) 36.6 - 50.3 %  .0 (H) 80.0 - 99.0 FL  
 MCH 31.8 26.0 - 34.0 PG  
 MCHC 31.8 30.0 - 36.5 g/dL  
 RDW 15.6 (H) 11.5 - 14.5 % PLATELET 61 (L) 820 - 400 K/uL NRBC 0.2 (H) 0  WBC ABSOLUTE NRBC 0.02 (H) 0.00 - 0.01 K/uL GLUCOSE, POC Collection Time: 11/15/19  3:57 PM  
Result Value Ref Range Glucose (POC) 110 (H) 65 - 100 mg/dL Performed by Lynette Kim Collection Time: 11/15/19  3:58 PM  
Result Value Ref Range Glucose 110 mg/dL Insulin order 1.6 units/hour Insulin adminstered 1.6 units/hour Multiplier 0.031 Low target 95 mg/dL High target 130 mg/dL D50 order 0.0 ml  
 D50 administered 0.00 ml Minutes until next  min Order initials cw Administered initials cw GLSCOM Comments GLUCOSE, POC Collection Time: 11/15/19  6:01 PM  
Result Value Ref Range Glucose (POC) 104 (H) 65 - 100 mg/dL Performed by Lynette Kim Collection Time: 11/15/19  6:03 PM  
Result Value Ref Range Glucose 104 mg/dL Insulin order 1.4 units/hour Insulin adminstered 1.4 units/hour Multiplier 0.031 Low target 95 mg/dL High target 130 mg/dL D50 order 0.0 ml  
 D50 administered 0.00 ml Minutes until next  min Order initials cw Administered initials cw GLSCOM Comments GLUCOSE, POC Collection Time: 11/15/19  8:28 PM  
Result Value Ref Range Glucose (POC) 91 65 - 100 mg/dL Performed by Chayito Kim Collection Time: 11/15/19  8:29 PM  
Result Value Ref Range Glucose 91 mg/dL Insulin order 0.7 units/hour Insulin adminstered 0.7 units/hour Multiplier 0.021 Low target 95 mg/dL High target 130 mg/dL D50 order 0.0 ml  
 D50 administered 0.00 ml Minutes until next BG 60 min Order initials ach Administered initials ach GLSCOM Comments GLUCOSE, POC Collection Time: 11/15/19  9:29 PM  
Result Value Ref Range Glucose (POC) 120 (H) 65 - 100 mg/dL Performed by Irene Byersving Collection Time: 11/15/19  9:30 PM  
Result Value Ref Range Glucose 120 mg/dL Insulin order 1.3 units/hour Insulin adminstered 1.3 units/hour Multiplier 0.021 Low target 95 mg/dL High target 130 mg/dL D50 order 0.0 ml  
 D50 administered 0.00 ml Minutes until next BG 60 min Order initials ach Administered initials ach GLSCOM Comments GLUCOSE, POC Collection Time: 11/15/19 10:30 PM  
Result Value Ref Range Glucose (POC) 170 (H) 65 - 100 mg/dL Performed by Irene Hatch Benedict Collection Time: 11/15/19 10:30 PM  
Result Value Ref Range Glucose 170 mg/dL Insulin order 3.4 units/hour Insulin adminstered 3.4 units/hour Multiplier 0.031 Low target 95 mg/dL High target 130 mg/dL D50 order 0.0 ml  
 D50 administered 0.00 ml Minutes until next BG 60 min Order initials ach Administered initials ach GLSCOM Comments GLUCOSE, POC Collection Time: 11/15/19 11:26 PM  
Result Value Ref Range Glucose (POC) 168 (H) 65 - 100 mg/dL Performed by Irene Hatch Benedict Collection Time: 11/15/19 11:27 PM  
Result Value Ref Range Glucose 168 mg/dL Insulin order 4.4 units/hour Insulin adminstered 4.4 units/hour Multiplier 0.041 Low target 95 mg/dL High target 130 mg/dL D50 order 0.0 ml  
 D50 administered 0.00 ml Minutes until next BG 60 min Order initials ach Administered initials ach GLSCOM Comments GLUCOSE, POC Collection Time: 11/16/19 12:23 AM  
Result Value Ref Range Glucose (POC) 151 (H) 65 - 100 mg/dL Performed by Irene Hatch Benedict Collection Time: 11/16/19 12:24 AM  
Result Value Ref Range Glucose 151 mg/dL Insulin order 4.6 units/hour Insulin adminstered 4.6 units/hour Multiplier 0.051 Low target 95 mg/dL High target 130 mg/dL D50 order 0.0 ml  
 D50 administered 0.00 ml Minutes until next BG 60 min Order initials ach Administered initials ach GLSCOM Comments GLUCOSE, POC Collection Time: 11/16/19  1:26 AM  
Result Value Ref Range Glucose (POC) 142 (H) 65 - 100 mg/dL Performed by [employer] Collection Time: 11/16/19  1:26 AM  
Result Value Ref Range Glucose 142 mg/dL Insulin order 5.0 units/hour Insulin adminstered 5.0 units/hour Multiplier 0.061 Low target 95 mg/dL High target 130 mg/dL D50 order 0.0 ml  
 D50 administered 0.00 ml Minutes until next BG 60 min Order initials ach Administered initials ach GLSCOM Comments GLUCOSE, POC Collection Time: 11/16/19  2:24 AM  
Result Value Ref Range Glucose (POC) 139 (H) 65 - 100 mg/dL Performed by [employer] Collection Time: 11/16/19  2:24 AM  
Result Value Ref Range Glucose 139 mg/dL Insulin order 5.6 units/hour Insulin adminstered 5.6 units/hour Multiplier 0.071 Low target 95 mg/dL High target 130 mg/dL D50 order 0.0 ml  
 D50 administered 0.00 ml Minutes until next BG 60 min Order initials ach Administered initials ach GLSCOM Comments CBC W/O DIFF Collection Time: 11/16/19  3:10 AM  
Result Value Ref Range WBC 1.7 (L) 4.1 - 11.1 K/uL  
 RBC 3.02 (L) 4.10 - 5.70 M/uL HGB 9.1 (L) 12.1 - 17.0 g/dL HCT 28.7 (L) 36.6 - 50.3 % MCV 95.0 80.0 - 99.0 FL  
 MCH 30.1 26.0 - 34.0 PG  
 MCHC 31.7 30.0 - 36.5 g/dL  
 RDW 15.9 (H) 11.5 - 14.5 % PLATELET 52 (L) 593 - 400 K/uL NRBC 1.2 (H) 0  WBC ABSOLUTE NRBC 0.02 (H) 0.00 - 0.01 K/uL MAGNESIUM Collection Time: 11/16/19  3:10 AM  
Result Value Ref Range Magnesium 3.1 (H) 1.6 - 2.4 mg/dL NT-PRO BNP Collection Time: 11/16/19  3:10 AM  
Result Value Ref Range NT pro-BNP 33,329 (H) <050 PG/ML  
METABOLIC PANEL, COMPREHENSIVE Collection Time: 11/16/19  3:10 AM  
Result Value Ref Range Sodium 151 (H) 136 - 145 mmol/L Potassium 3.8 3.5 - 5.1 mmol/L Chloride 114 (H) 97 - 108 mmol/L  
 CO2 31 21 - 32 mmol/L Anion gap 6 5 - 15 mmol/L Glucose 117 (H) 65 - 100 mg/dL  (H) 6 - 20 MG/DL Creatinine 1.73 (H) 0.70 - 1.30 MG/DL  
 BUN/Creatinine ratio 86 (H) 12 - 20 GFR est AA 47 (L) >60 ml/min/1.73m2 GFR est non-AA 39 (L) >60 ml/min/1.73m2 Calcium 8.4 (L) 8.5 - 10.1 MG/DL Bilirubin, total 0.6 0.2 - 1.0 MG/DL  
 ALT (SGPT) 297 (H) 12 - 78 U/L  
 AST (SGOT) 212 (H) 15 - 37 U/L Alk. phosphatase 88 45 - 117 U/L Protein, total 5.5 (L) 6.4 - 8.2 g/dL Albumin 2.4 (L) 3.5 - 5.0 g/dL Globulin 3.1 2.0 - 4.0 g/dL A-G Ratio 0.8 (L) 1.1 - 2.2 PHOSPHORUS Collection Time: 11/16/19  3:10 AM  
Result Value Ref Range Phosphorus 2.7 2.6 - 4.7 MG/DL  
GLUCOSE, POC Collection Time: 11/16/19  3:13 AM  
Result Value Ref Range Glucose (POC) 88 65 - 100 mg/dL Performed by Franko Aranda Collection Time: 11/16/19  3:13 AM  
Result Value Ref Range Glucose 88 mg/dL Insulin order 1.6 units/hour Insulin adminstered 1.6 units/hour Multiplier 0.057 Low target 95 mg/dL High target 130 mg/dL D50 order 0.0 ml  
 D50 administered 0.00 ml Minutes until next BG 60 min Order initials ach Administered initials ach GLSCOM Comments GLUCOSE, POC Collection Time: 11/16/19  4:14 AM  
Result Value Ref Range Glucose (POC) 138 (H) 65 - 100 mg/dL Performed by Franko Aranda Collection Time: 11/16/19  4:14 AM  
Result Value Ref Range Glucose 138 mg/dL Insulin order 5.2 units/hour Insulin adminstered 5.2 units/hour Multiplier 0.067 Low target 95 mg/dL High target 130 mg/dL D50 order 0.0 ml  
 D50 administered 0.00 ml Minutes until next BG 60 min Order initials ach Administered initials ach GLSCOM Comments GLUCOSE, POC  Collection Time: 11/16/19  5:19 AM  
 Result Value Ref Range Glucose (POC) 115 (H) 65 - 100 mg/dL Performed by [facility] Collection Time: 11/16/19  5:19 AM  
Result Value Ref Range Glucose 115 mg/dL Insulin order 3.7 units/hour Insulin adminstered 3.7 units/hour Multiplier 0.067 Low target 95 mg/dL High target 130 mg/dL D50 order 0.0 ml  
 D50 administered 0.00 ml Minutes until next BG 60 min Order initials ach Administered initials ach GLSCOM Comments GLUCOSE, POC Collection Time: 11/16/19  6:16 AM  
Result Value Ref Range Glucose (POC) 118 (H) 65 - 100 mg/dL Performed by [facility] Collection Time: 11/16/19  6:16 AM  
Result Value Ref Range Glucose 118 mg/dL Insulin order 3.9 units/hour Insulin adminstered 3.9 units/hour Multiplier 0.067 Low target 95 mg/dL High target 130 mg/dL D50 order 0.0 ml  
 D50 administered 0.00 ml Minutes until next BG 60 min Order initials ach Administered initials ach GLSCOM Comments POC G3 - PUL Collection Time: 11/16/19  7:07 AM  
Result Value Ref Range FIO2 (POC) 0.70 % pH (POC) 7.495 (H) 7.35 - 7.45    
 pCO2 (POC) 38.6 35.0 - 45.0 MMHG  
 pO2 (POC) 62 (L) 80 - 100 MMHG  
 HCO3 (POC) 29.7 (H) 22 - 26 MMOL/L  
 sO2 (POC) 93 92 - 97 % Base excess (POC) 6 mmol/L Site DRAWN FROM ARTERIAL LINE Device: VENT Mode ASSIST CONTROL Set Rate 24 bpm  
 PEEP/CPAP (POC) 5 cmH2O  
 PIP (POC) 22 Allens test (POC) N/A Inspiratory Time 1 sec Specimen type (POC) ARTERIAL Total resp. rate 36 GLUCOSE, POC Collection Time: 11/16/19  7:21 AM  
Result Value Ref Range Glucose (POC) 111 (H) 65 - 100 mg/dL Performed by [facility] Collection Time: 11/16/19  7:21 AM  
Result Value Ref Range Glucose 111 mg/dL Insulin order 3.4 units/hour Insulin adminstered 3.4 units/hour Multiplier 0.067 Low target 95 mg/dL High target 130 mg/dL D50 order 0.0 ml  
 D50 administered 0.00 ml Minutes until next BG 60 min Order initials ach Administered initials ach GLSCOM Comments CBC W/O DIFF Collection Time: 11/16/19  8:11 AM  
Result Value Ref Range WBC 1.2 (L) 4.1 - 11.1 K/uL  
 RBC 2.93 (L) 4.10 - 5.70 M/uL HGB 8.8 (L) 12.1 - 17.0 g/dL HCT 27.8 (L) 36.6 - 50.3 % MCV 94.9 80.0 - 99.0 FL  
 MCH 30.0 26.0 - 34.0 PG  
 MCHC 31.7 30.0 - 36.5 g/dL  
 RDW 16.2 (H) 11.5 - 14.5 % PLATELET 46 (LL) 886 - 400 K/uL NRBC 1.6 (H) 0  WBC ABSOLUTE NRBC 0.02 (H) 0.00 - 0.01 K/uL GLUCOSE, POC Collection Time: 11/16/19  8:24 AM  
Result Value Ref Range Glucose (POC) 124 (H) 65 - 100 mg/dL Performed by Nancy Swan Collection Time: 11/16/19  8:25 AM  
Result Value Ref Range Glucose 124 mg/dL Insulin order 4.3 units/hour Insulin adminstered 4.3 units/hour Multiplier 0.067 Low target 95 mg/dL High target 130 mg/dL D50 order 0.0 ml  
 D50 administered 0.00 ml Minutes until next BG 60 min Order initials cw Administered initials cw GLSCOM Comments GLUCOSE, POC Collection Time: 11/16/19  9:29 AM  
Result Value Ref Range Glucose (POC) 146 (H) 65 - 100 mg/dL Performed by Nancy Swan Collection Time: 11/16/19  9:29 AM  
Result Value Ref Range Glucose 146 mg/dL Insulin order 6.6 units/hour Insulin adminstered 6.6 units/hour Multiplier 0.077 Low target 95 mg/dL High target 130 mg/dL D50 order 0.0 ml  
 D50 administered 0.00 ml Minutes until next BG 60 min Order initials MP Administered initials MP   
 GLSCOM Comments CBC WITH AUTOMATED DIFF Collection Time: 11/16/19 10:18 AM  
Result Value Ref Range WBC 1.6 (L) 4.1 - 11.1 K/uL  
 RBC 2.90 (L) 4.10 - 5.70 M/uL HGB 8.8 (L) 12.1 - 17.0 g/dL HCT 27.7 (L) 36.6 - 50.3 %  MCV 95.5 80.0 - 99.0 FL  
 MCH 30.3 26.0 - 34.0 PG  
 MCHC 31.8 30.0 - 36.5 g/dL  
 RDW 16.1 (H) 11.5 - 14.5 % PLATELET 42 (LL) 362 - 400 K/uL NRBC 0.0 0  WBC ABSOLUTE NRBC 0.00 0.00 - 0.01 K/uL NEUTROPHILS 74 32 - 75 % BAND NEUTROPHILS 8 (H) 0 - 6 % LYMPHOCYTES 7 (L) 12 - 49 % MONOCYTES 5 5 - 13 % EOSINOPHILS 0 0 - 7 % BASOPHILS 0 0 - 1 % METAMYELOCYTES 6 (H) 0 % IMMATURE GRANULOCYTES 0 %  
 ABS. NEUTROPHILS 1.3 (L) 1.8 - 8.0 K/UL  
 ABS. LYMPHOCYTES 0.1 (L) 0.8 - 3.5 K/UL  
 ABS. MONOCYTES 0.1 0.0 - 1.0 K/UL  
 ABS. EOSINOPHILS 0.0 0.0 - 0.4 K/UL  
 ABS. BASOPHILS 0.0 0.0 - 0.1 K/UL  
 ABS. IMM. GRANS. 0.0 K/UL  
 DF MANUAL    
 RBC COMMENTS ANISOCYTOSIS 
1+ WBC COMMENTS VACUOLATED POLYS    
GLUCOSE, POC Collection Time: 11/16/19 10:18 AM  
Result Value Ref Range Glucose (POC) 175 (H) 65 - 100 mg/dL Performed by Lynette Kim Collection Time: 11/16/19 10:19 AM  
Result Value Ref Range Glucose 175 mg/dL Insulin order 10.0 units/hour Insulin adminstered 10.0 units/hour Multiplier 0.087 Low target 95 mg/dL High target 130 mg/dL D50 order 0.0 ml  
 D50 administered 0.00 ml Minutes until next BG 60 min Order initials cw Administered initials cw GLSCOM Comments GLUCOSE, POC Collection Time: 11/16/19 11:26 AM  
Result Value Ref Range Glucose (POC) 176 (H) 65 - 100 mg/dL Performed by Lynette Kim Collection Time: 11/16/19 11:26 AM  
Result Value Ref Range Glucose 176 mg/dL Insulin order 11.3 units/hour Insulin adminstered 11.3 units/hour Multiplier 0.097 Low target 95 mg/dL High target 130 mg/dL D50 order 0.0 ml  
 D50 administered 0.00 ml Minutes until next BG 60 min Order initials cw Administered initials cw GLSCOM Comments GLUCOSE, POC Collection Time: 11/16/19 12:30 PM  
Result Value Ref Range Glucose (POC) 141 (H) 65 - 100 mg/dL Performed by Vidal Walker Collection Time: 11/16/19 12:30 PM  
Result Value Ref Range Glucose 141 mg/dL Insulin order 8.7 units/hour Insulin adminstered 8.7 units/hour Multiplier 0.107 Low target 95 mg/dL High target 130 mg/dL D50 order 0.0 ml  
 D50 administered 0.00 ml Minutes until next BG 60 min Order initials cw Administered initials cw GLSCOM Comments GLUCOSE, POC Collection Time: 11/16/19  1:36 PM  
Result Value Ref Range Glucose (POC) 146 (H) 65 - 100 mg/dL Performed by Vidal Walker Collection Time: 11/16/19  1:36 PM  
Result Value Ref Range Glucose 146 mg/dL Insulin order 10.1 units/hour Insulin adminstered 10.1 units/hour Multiplier 0.117 Low target 95 mg/dL High target 130 mg/dL D50 order 0.0 ml  
 D50 administered 0.00 ml Minutes until next BG 60 min Order initials cw Administered initials cw GLSCOM Comments GLUCOSE, POC Collection Time: 11/16/19  2:41 PM  
Result Value Ref Range Glucose (POC) 121 (H) 65 - 100 mg/dL Performed by Vidal Walker Collection Time: 11/16/19  2:41 PM  
Result Value Ref Range Glucose 121 mg/dL Insulin order 7.1 units/hour Insulin adminstered 7.1 units/hour Multiplier 0.117 Low target 95 mg/dL High target 130 mg/dL D50 order 0.0 ml  
 D50 administered 0.00 ml Minutes until next BG 60 min Order initials cw Administered initials cw GLSCOM Comments Brandee Wyman MD 
[facility] [address], [address] [facility] Phone - [phone] Fax - [phone] 
www. [email or web address]

## 2019-11-16 NOTE — PROGRESS NOTES
Infectious Diseases Consultation Please see dictated note Impression 1. Elevated procalcitonin level -- significance uncertain in this setting (3 weeks of critical illness in ICU): no infection apparent so far 2. OHD -- IHD & CHF -- S/P CABG on 10/22/2019 3. CKD with acute exacerbation 4. Anemia 5. Thrombocytopenia 6. Acute rise in transaminases -- shock liver ? 7. NIDDM 8. HTN 
 
9. Hyperlipidemia Recommend: 1. Blood cultures 2. Consider CT chest, abd, pelvis without IV contrast 
 
3. Empiric Zosyn pending cultures Thanks,  
Ronaldo Correa MD 
11/15/2019 
8:41 PM

## 2019-11-16 NOTE — PROGRESS NOTES
ID Progress Note 2019 Subjective: Low grade fever. On the vent. Has a trach. Objective:  
 
Vitals:  
Visit Vitals /61 (BP 1 Location: Left leg, BP Patient Position: At rest) Pulse (!) 102 Temp (!) 100.6 °F (38.1 °C) Resp 20 Ht 5' 7\" (1.702 m) Wt 60.6 kg (133 lb 9.6 oz) SpO2 100% BMI 20.92 kg/m² Tmax:  Temp (24hrs), Av.2 °F (37.3 °C), Min:98.3 °F (36.8 °C), Max:100.6 °F (38.1 °C) Exam: On the vent Lungs: clear to auscultation Heart: s1, s2, no murmurs Abdomen: soft, nontender Extremities: (+) pedal edema Labs:  
Lab Results Component Value Date/Time WBC 1.6 (L) 2019 10:18 AM  
 HGB 8.8 (L) 2019 10:18 AM  
 HCT 27.7 (L) 2019 10:18 AM  
 PLATELET 42 (LL)  10:18 AM  
 MCV 95.5 2019 10:18 AM  
 
Lab Results Component Value Date/Time Sodium 151 (H) 2019 03:10 AM  
 Potassium 3.8 2019 03:10 AM  
 Chloride 114 (H) 2019 03:10 AM  
 CO2 31 2019 03:10 AM  
 Anion gap 6 2019 03:10 AM  
 Glucose 117 (H) 2019 03:10 AM  
  (H) 2019 03:10 AM  
 Creatinine 1.73 (H) 2019 03:10 AM  
 BUN/Creatinine ratio 86 (H) 2019 03:10 AM  
 GFR est AA 47 (L) 2019 03:10 AM  
 GFR est non-AA 39 (L) 2019 03:10 AM  
 Calcium 8.4 (L) 2019 03:10 AM  
 Bilirubin, total 0.6 2019 03:10 AM  
 AST (SGOT) 212 (H) 2019 03:10 AM  
 Alk. phosphatase 88 2019 03:10 AM  
 Protein, total 5.5 (L) 2019 03:10 AM  
 Albumin 2.4 (L) 2019 03:10 AM  
 Globulin 3.1 2019 03:10 AM  
 A-G Ratio 0.8 (L) 2019 03:10 AM  
 ALT (SGPT) 297 (H) 2019 03:10 AM  
 
 
 
 
Assessment: 1. Elevated procalcitonin level and fever 2. 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 
3.  Chronic kidney disease with acute exacerbation. 4.  Anemia. 5.  Thrombocytopenia. 6.  Acute rise in transaminases-possible shock liver. 7.  Non-insulin-dependent diabetes mellitus. 8.  Hypertension. 9.  Hyperlipidemia. Recommendations: 1. Continue tammi Rojas MD

## 2019-11-16 NOTE — PROGRESS NOTES
[facility] ICU Progress Note Admit Date: 10/19/2019 Procedure:  Procedure(s): 
CARDIAC RE-ENTRY, MARISOL BY DR NEW [facility]    
 
CABG x 5, LIMA to LAD, RSVG to [id number], RSVG to PDA 10/23/19 Subjective:  
Pt seen with Dr. Sushma Sawyer  PLT 52 CXR: patchy infiltratrates Flexiseal 
Cr 1.73 improved.  unchanged  UOP 2390 +450  Bumex 1 mg IV Q12 BNP 34K LFT's improving No changes Objective:  
Vitals: 
Blood pressure 134/61, pulse (!) 108, temperature 100.2 °F (37.9 °C), resp. rate 23, height 5' 7\" (1.702 m), weight 133 lb 9.6 oz (60.6 kg), SpO2 99 %. Temp (24hrs), Av.1 °F (37.3 °C), Min:98.3 °F (36.8 °C), Max:100.2 °F (37.9 °C) EKG/Rhythm: SR in the 100s Oxygen Therapy: 
Oxygen Therapy O2 Sat (%): 99 % (19 1000) Pulse via Oximetry: 108 beats per minute (19 1000) O2 Device: Tracheostomy; Ventilator (19 08) O2 Flow Rate (L/min): 40 l/min (19 2000) O2 Temperature: 98.6 °F (37 °C) (19 0729) FIO2 (%): 90 % (19 0800) CXR:  
CXR Results  (Last 48 hours)  
          
 19 0442  XR CHEST PORT Final result Impression:  IMPRESSION:  
   
Interstitial and parenchymal edema has not changed. Narrative:  PORTABLE CHEST RADIOGRAPH/S: 2019 4:42 AM  
   
Clinical history: Interstitial edema INDICATION:   interstitial edema COMPARISON: 11/15/2019 FINDINGS:  
AP portable upright view of the chest demonstrates a stable  cardiopericardial  
silhouette. The lungs are adequately expanded. Interstitial and parenchymal  
edema is not significantly changed. PICC line catheter and tracheostomy tube. NG  
tube as well. Patient is on a cardiac monitor. 11/15/19 0502  XR CHEST PORT Final result Impression:  IMPRESSION:  
   
Interstitial and parenchymal edema has not significantly changed.    
   
   
   
   
   
  
 Narrative:  PORTABLE CHEST RADIOGRAPH/S: 11/15/2019 5:02 AM  
   
 Clinical history: Interstitial edema INDICATION:   interstitial edema COMPARISON: 11/14/2019 FINDINGS:  
AP portable upright view of the chest demonstrates a stable  cardiopericardial  
silhouette. The lungs are adequately expanded. Interstitial and parenchymal  
edema is not significantly changed. PICC line catheter and tracheostomy tube. NG  
tube as well. Patient is on a cardiac monitor. Admission Weight: Last Weight Weight: 141 lb 5 oz (64.1 kg) Weight: 133 lb 9.6 oz (60.6 kg) Intake / Jenny Fuentes / Drain: 
Current Shift: 11/16 0701 - 11/16 1900 In: 570.6 [I.V.:220.6] Out: 410 [Urine:410] Last 24 hrs.:  
 
Intake/Output Summary (Last 24 hours) at 11/16/2019 1015 Last data filed at 11/16/2019 1000 Gross per 24 hour Intake 3302.43 ml Output 2700 ml Net 602.43 ml EXAM: 
General:  Intubated, sedated. Lungs:   Coarse bilat Incision:  Midsternal incision with no significant erythema, drainage, or dehiscence. Heart:  Regular rate and rhythm, S1, S2 normal, no murmur, click, rub or gallop. Abdomen:   Soft, non-tender. Bowel sounds active. No masses,  No organomegaly. +BM. FMS in place Extremities:  Some generalized edema. Palpable pulses bilat Neurologic:  Moves extremities but without purpose. Does not track or follow commands. Labs:  
Recent Labs 11/16/19 
5451  11/16/19 
2662  11/16/19 
0310 WBC  --   --  1.2*  --  1.7* HGB  --   --  8.8*  --  9.1* HCT  --   --  27.8*  --  28.7*  
PLT  --   --  46*  --  52* NA  --   --   --   --  151* K  --   --   --   --  3.8 BUN  --   --   --   --  149* CREA  --   --   --   --  1.73* GLU  --   --   --   --  117* GLUCPOC 146*   < >  --    < >  --   
 < > = values in this interval not displayed. Assessment:  
 
Principal Problem: S/P CABG x 5 (10/23/2019) Overview: x 5, LIMA to LAD, RSVG to Diag1, OM2-OM3, RSVG to PDA Active Problems: 
  [facility] syndrome[facility] (10/19/2019) Unstable angina ([facility] (10/19/2019) Coronary artery disease of native artery of native heart with stable angina pectoris ([facility] (10/21/2019) Systolic heart failure ([facility] (10/22/2019) Plan/Recommendations/Medical Decision Makin. S/p CABG: on ASA, statin. No BB, ACEi while on vasoactive medications 2. Acute on chronic systolic CHF, NYHA Class II on admit: EF 35-40% preop. Dobutamine back on at 1. No BB/ACE/ARB/AA until vitals/kidney function allows. Trend pBNP 3. Acute postop respiratory failure with chronic interstitial fibrosis per CXR: Re-intubated  due to worsening acute respiratory failure. Talisha Negus placed  by Thoracic Surgery. Acute respiratory acidosis  thought driven by oversedation. Fentanyl and versed drips stopped. (now just has PRN Morphine and Ativan dosing) Diuresis per Nephrology-currently holding. Amiodarone had been stopped on 10/31. Solumedrol per pulm-weaning. Continue scheduled nebs. Cont mucinex, pulm toileting, mucomyst. Chest CT revealing emphysema/pulm fibrosis. 4. Renal insufficiency: Creatinine slightly improved today. Nephrology following and appreciate their recommendations. Devlin in place for accurate I&O. Urine output improved in the last 24 hours. Nephrology recommendations to continue holding diuretics. Did receive Albumin X 4 doses in the last 24 hours. 5. Anemia: had preop, H&H at -given 1 unit PRBC. Iron panel sent preop - iron, iron sat low. 6. Thrombocytopenia: Platelets worse today at 75k. Daily CBC. Hold SQ heparin. Will get a repeat CBC this afternoon. If still less than 80K will plan to hold ASA too. 
 
7. Hx of HTN: Issues with hypotension improved. Still on Dobutamine at 1 but likely can be weaned off. Volume replaced overnight. 8. HLD: Continue pravastatin 9. Leukocytosis: WBC at 10.7k today, procalcitonin at 2.9 but Lactic at 1.4. Devlin placed 11/7. PICC placed 11/6. Trach 11/13. Monitor daily CBC. Will ask Infectious Disease to consult with this medically complex patient. Antibiotics? 10. Constipation: Resolved. FMS in place 11. Current smoker: smoking cessation education completed. 12. Nutrition: Appreciate Dietician recommendations. Dobhoff placed 10/30 and Enteral feedings started. Too unstable from resp standpoint for PO diet, TF's continuous at 50 ml/hr. Try and AVOID increased volume amounts 13. Hyperglycemia: Preop A1c 5.3 with no DM history, now with hyperglycemia. BS have been much better controlled on Insulin gtt. DTS following. Discussed with [facility] CAdrianFAdrianS.E. and will keep on Insulin gtt for another 24 hours. She will look at him later today to discuss most likely plan for insulin over the weekend coming off the Insulin gtt. 14. Hypernatremia: Nephrology following-increased FW flushes. Na at 152 today. Continue to monitor 15. DVT/GI Prophylaxis: SCD's, SQ Heparin-currently on hold due to thrombocytopenia. Will restart when improved, PPI Dispo: PT/OT as able. Remain in CVI until resp status more stable.   
 
 
Signed By: CHELSEA Waters

## 2019-11-16 NOTE — PROGRESS NOTES
Problem: Falls - Risk of 
Goal: *Absence of Falls Description Document Tara Dear Fall Risk and appropriate interventions in the flowsheet. Outcome: Progressing Towards Goal 
Note:  
Fall Risk Interventions: 
Mobility Interventions: Communicate number of staff needed for ambulation/transfer Mentation Interventions: Evaluate medications/consider consulting pharmacy Medication Interventions: Evaluate medications/consider consulting pharmacy Elimination Interventions: Toileting schedule/hourly rounds History of Falls Interventions: Consult care management for discharge planning Problem: Pressure Injury - Risk of 
Goal: *Prevention of pressure injury Description Document Earl Scale and appropriate interventions in the flowsheet. Outcome: Progressing Towards Goal 
Note:  
Pressure Injury Interventions: 
Sensory Interventions: Assess changes in LOC, Assess need for specialty bed, Avoid rigorous massage over bony prominences, Minimize linen layers, Pressure redistribution bed/mattress (bed type), Turn and reposition approx. every two hours (pillows and wedges if needed) Moisture Interventions: Apply protective barrier, creams and emollients, Check for incontinence Q2 hours and as needed, Maintain skin hydration (lotion/cream), Minimize layers Activity Interventions: Pressure redistribution bed/mattress(bed type) Mobility Interventions: Pressure redistribution bed/mattress (bed type), Turn and reposition approx. every two hours(pillow and wedges), Assess need for specialty bed Nutrition Interventions: Document food/fluid/supplement intake, Discuss nutritional consult with provider Friction and Shear Interventions: Lift sheet, HOB 30 degrees or less Problem: CABG: Post-Op Day 5 Goal: Off Pathway (Use only if patient is Off Pathway) Outcome: Progressing Towards Goal 
  
Problem: Infection - Risk of, Central Venous Catheter-Associated Bloodstream Infection Goal: *Absence of infection signs and symptoms Outcome: Progressing Towards Goal 
  
Problem: Nutrition Deficit Goal: *Optimize nutritional status Outcome: Progressing Towards Goal 
  
Problem: Infection - Risk of, Urinary Catheter-Associated Urinary Tract Infection Goal: *Absence of infection signs and symptoms Outcome: Progressing Towards Goal

## 2019-11-17 NOTE — PROGRESS NOTES
NYHA class IV A/C systolic heart failure (EF 25%, NT pro-BNP 15,013) Acute on chronic kidney disease Mild pulmonary edema Mild chronic lung disease Acute on chronic hypoxic respiratory failure 
  
Still having issues with confusion Now on precedex and PRN ativan Hgb a little low Platelets low as well Creatinine improving Bilirubin and other LFTs continue to improve NT pro-BNP comign down Trying to work on hypernatremia issues Not able to start trach collar trials due to high vent settings ABG looks reasonable CXR - pulmonary edema overlying fibrosis Intake/Output Summary (Last 24 hours) at 11/17/2019 1253 Last data filed at 11/17/2019 1200 Gross per 24 hour Intake 2702.72 ml Output 1925 ml Net 777.72 ml Visit Vitals /61 (BP 1 Location: Left leg, BP Patient Position: At rest) Pulse 70 Temp 97.3 °F (36.3 °C) Resp 24 Ht 5' 7\" (1.702 m) Wt 134 lb 6.4 oz (61 kg) SpO2 100% BMI 21.05 kg/m² Risk of morbidity and mortality - high Medical decision making - high complexity Total critical care time - 30 minutes (CPT 36646)

## 2019-11-17 NOTE — PROGRESS NOTES
Chart checked, and note that this morning pt is on the vent and trach. He is not responsive to verbal commands, and is responding to pain stimuli. PT will defer, follow, and see as appropriate.  Thank you, Tresa Mobley, PT

## 2019-11-17 NOTE — PROGRESS NOTES
Nephrology Progress Note Yuki Carrera Date of Admission : 10/19/2019 CC:  Follow up for ISAIAH on CKD, hypervolemia Assessment and Plan ISAIAH on CKD: 
- from ATN post CABG 
- BP improved,  
- PRN albumin for hypotension 
- hold diuretics 
- daily labs for now, recheck renal 6 pm, call with results  
-significant azotemia improving Hypernatremia : 
- increaser FW flushes please 200 cc Q3 HRS  
- na 155 
- Start D5W and adjust Fluids based on labs - DI work up initiated. [place]air[place] however: total UO in 24 hrs was 2000 
-Total In/ total out = 2799/2145 in 24 hrs 
- supect  GI losses from [address] + insensible losses for high NA Hyperkalemia:  
- resolved CKD III: 
- baseline Cr 1.3 prior to CABG 
- likely from DM and HTN 
  
HFrE F: 
- last EF 35-40% on 10/20 
  
CAD s/p CABG x 5 10/19 and reexploration 10/23 for hematoma 
  
Acute on Chronic Resp Failure: 
- likely 2/2 underlying ILD + volume 
- now w/ trach Chronic Anemia: 
- hgb dropped a point , stool occult  
- cont to monitor 
  
DM2: 
- on insulin GGT 
- increase given D5w intiated 
- consider alternate TF  
  
Protein Malnutrition: 
- on TF via [place] Interval History: 
Seen and examined. Cr appears better , Na  Worse, work up and fluids intiated Current Medications: all current  Medications have been eviewed in [facility] Review of Systems: Review of systems not obtained due to patient factors. Objective: 
Vitals:   
Vitals:  
 11/17/19 1100 11/17/19 1132 11/17/19 1200 11/17/19 1300 BP:      
Pulse: 74 71 70 70 Resp: 24 26 24 24 Temp:   97.3 °F (36.3 °C) SpO2: 100% 100% 100% 100% Weight:      
Height:      
 
Intake and Output: 
11/17 0701 - 11/17 1900 In: 891.6 [I.V.:281.6] Out: 470 [Urine:445; Drains:25] 
11/15 1901 - 11/17 0700 In: 4706.9 [I.V.:1148.6] Out: 2647 [IWLWL:3023; Drains:90] Physical Examination: 
General: Frail, sedated Neck:  + trach Resp:  Diffuse dry crackles CV:  RRR,  no murmur or rub, no LE edema GI:  Soft, NT, + Bowel sounds, no hepatosplenomegaly Neurologic:  Sedated on the vent Psych:             Unable to assess Skin:  No Rash :  Devlin in place [x]    High complexity decision making was performed 
[x]    Patient is at high-risk of decompensation with multiple organ involvement Lab Data Personally Reviewed: I have reviewed all the pertinent labs, microbiology data and radiology studies during assessment. Recent Labs 11/17/19 
0606 11/16/19 
0310 11/15/19 
0401 * 151* 152* K 3.7 3.8 4.0  
* 114* 113* CO2 32 31 34* * 117* 89 * 149* 159* CREA 1.75* 1.73* 2.07* CA 8.1* 8.4* 8.4* MG 2.8* 3.1* 2.9*  
PHOS 3.7 2.7 2.6 ALB 2.0* 2.4* 2.6* SGOT 77* 212* 629* * 297* 454* Recent Labs 11/17/19 
0920 11/17/19 
0502 11/16/19 
1018 11/16/19 
9030 WBC  --  3.3* 1.6* 1.2* HGB  --  7.7* 8.8* 8.8* HCT  --  24.8* 27.7* 27.8*  
PLT 39* 36* 42* 46* No results found for: SDES Lab Results Component Value Date/Time Culture result: NO GROWTH AFTER 22 HOURS 11/16/2019 03:10 AM  
 Culture result: NO GROWTH 2 DAYS 11/07/2019 12:45 PM  
 Culture result: HEAVY NORMAL RESPIRATORY TARIK 10/21/2019 12:08 PM  
 
Recent Results (from the past 24 hour(s)) GLUCOSE, POC Collection Time: 11/16/19  2:41 PM  
Result Value Ref Range Glucose (POC) 121 (H) 65 - 100 mg/dL Performed by Ross Barton Collection Time: 11/16/19  2:41 PM  
Result Value Ref Range Glucose 121 mg/dL Insulin order 7.1 units/hour Insulin adminstered 7.1 units/hour Multiplier 0.117 Low target 95 mg/dL High target 130 mg/dL D50 order 0.0 ml  
 D50 administered 0.00 ml Minutes until next BG 60 min Order initials cw Administered initials cw GLSCOM Comments GLUCOSE, POC Collection Time: 11/16/19  3:46 PM  
Result Value Ref Range Glucose (POC) 91 65 - 100 mg/dL Performed by Denson Rinne Collection Time: 11/16/19  3:46 PM  
Result Value Ref Range Glucose 91 mg/dL Insulin order 2.9 units/hour Insulin adminstered 2.9 units/hour Multiplier 0.094 Low target 95 mg/dL High target 130 mg/dL D50 order 0.0 ml  
 D50 administered 0.00 ml Minutes until next BG 60 min Order initials MP Administered initials MP   
 GLSCOM Comments GLUCOSE, POC Collection Time: 11/16/19  4:54 PM  
Result Value Ref Range Glucose (POC) 92 65 - 100 mg/dL Performed by Jimy Gaona Silence Collection Time: 11/16/19  4:54 PM  
Result Value Ref Range Glucose 92 mg/dL Insulin order 2.4 units/hour Insulin adminstered 2.4 units/hour Multiplier 0.075 Low target 95 mg/dL High target 130 mg/dL D50 order 0.0 ml  
 D50 administered 0.00 ml Minutes until next BG 60 min Order initials cw Administered initials cw GLSCOM Comments GLUCOSE, POC Collection Time: 11/16/19  5:59 PM  
Result Value Ref Range Glucose (POC) 139 (H) 65 - 100 mg/dL Performed by Jimy Gaona Silence Collection Time: 11/16/19  6:00 PM  
Result Value Ref Range Glucose 139 mg/dL Insulin order 6.7 units/hour Insulin adminstered 6.7 units/hour Multiplier 0.085 Low target 95 mg/dL High target 130 mg/dL D50 order 0.0 ml  
 D50 administered 0.00 ml Minutes until next BG 60 min Order initials cw Administered initials cw GLSCOM Comments GLUCOSE, POC Collection Time: 11/16/19  7:06 PM  
Result Value Ref Range Glucose (POC) 126 (H) 65 - 100 mg/dL Performed by Leigh Gaona Silence Collection Time: 11/16/19  7:06 PM  
Result Value Ref Range Glucose 126 mg/dL Insulin order 5.6 units/hour Insulin adminstered 5.6 units/hour Multiplier 0.085 Low target 95 mg/dL High target 130 mg/dL D50 order 0.0 ml  
 D50 administered 0.00 ml Minutes until next BG 60 min Order initials bld Administered initials bld GLSCOM Comments GLUCOSE, POC Collection Time: 11/16/19  8:16 PM  
Result Value Ref Range Glucose (POC) 118 (H) 65 - 100 mg/dL Performed by Rosmarcy Smith Collection Time: 11/16/19  8:16 PM  
Result Value Ref Range Glucose 118 mg/dL Insulin order 4.9 units/hour Insulin adminstered 4.9 units/hour Multiplier 0.085 Low target 95 mg/dL High target 130 mg/dL D50 order 0.0 ml  
 D50 administered 0.00 ml Minutes until next BG 60 min Order initials marlena Administered initials marlena GLSCOM Comments GLUCOSE, POC Collection Time: 11/16/19  9:05 PM  
Result Value Ref Range Glucose (POC) 129 (H) 65 - 100 mg/dL Performed by Rosmarcy Smith Collection Time: 11/16/19  9:06 PM  
Result Value Ref Range Glucose 129 mg/dL Insulin order 5.9 units/hour Insulin adminstered 5.9 units/hour Multiplier 0.085 Low target 95 mg/dL High target 130 mg/dL D50 order 0.0 ml  
 D50 administered 0.00 ml Minutes until next BG 60 min Order initials marlena Administered initials marlena GLSCOM Comments GLUCOSE, POC Collection Time: 11/16/19 10:04 PM  
Result Value Ref Range Glucose (POC) 132 (H) 65 - 100 mg/dL Performed by Rosmarcy Adame [employer] Collection Time: 11/16/19 10:05 PM  
Result Value Ref Range Glucose 132 mg/dL Insulin order 6.8 units/hour Insulin adminstered 6.8 units/hour Multiplier 0.095 Low target 95 mg/dL High target 130 mg/dL D50 order 0.0 ml  
 D50 administered 0.00 ml Minutes until next BG 60 min Order initials marlena Administered initials marlena GLSCOM Comments GLUCOSE, POC Collection Time: 11/16/19 11:04 PM  
Result Value Ref Range Glucose (POC) 148 (H) 65 - 100 mg/dL Performed by Rosmarcy Smith  
 Collection Time: 11/16/19 11:04 PM  
Result Value Ref Range Glucose 148 mg/dL Insulin order 9.2 units/hour Insulin adminstered 9.2 units/hour Multiplier 0.105 Low target 95 mg/dL High target 130 mg/dL D50 order 0.0 ml  
 D50 administered 0.00 ml Minutes until next BG 60 min Order initials marlena Administered initials marlena LUND Comments GLUCOSE, POC Collection Time: 11/17/19 12:09 AM  
Result Value Ref Range Glucose (POC) 128 (H) 65 - 100 mg/dL Performed by [facility]as [facility] Collection Time: 11/17/19 12:09 AM  
Result Value Ref Range Glucose 128 mg/dL Insulin order 7.1 units/hour Insulin adminstered 7.1 units/hour Multiplier 0.105 Low target 95 mg/dL High target 130 mg/dL D50 order 0.0 ml  
 D50 administered 0.00 ml Minutes until next BG 60 min Order initials marlena Administered initials marlena LUND Comments GLUCOSE, POC Collection Time: 11/17/19  1:02 AM  
Result Value Ref Range Glucose (POC) 121 (H) 65 - 100 mg/dL Performed by Jas Gilmore [place] Collection Time: 11/17/19  1:04 AM  
Result Value Ref Range Glucose 121 mg/dL Insulin order 6.4 units/hour Insulin adminstered 6.4 units/hour Multiplier 0.105 Low target 95 mg/dL High target 130 mg/dL D50 order 0.0 ml  
 D50 administered 0.00 ml Minutes until next BG 60 min Order initials marlena Administered initials marlena LUND Comments GLUCOSE, POC Collection Time: 11/17/19  2:04 AM  
Result Value Ref Range Glucose (POC) 115 (H) 65 - 100 mg/dL Performed by [facility] Collection Time: 11/17/19  2:05 AM  
Result Value Ref Range Glucose 115 mg/dL Insulin order 5.8 units/hour Insulin adminstered 5.8 units/hour Multiplier 0.105 Low target 95 mg/dL High target 130 mg/dL D50 order 0.0 ml  
 D50 administered 0.00 ml Minutes until next BG 60 min Order initials marlena Administered initials marlena GLSCOM Comments GLUCOSE, POC Collection Time: 11/17/19  2:58 AM  
Result Value Ref Range Glucose (POC) 107 (H) 65 - 100 mg/dL Performed by Jetta Deforest Junior Runner Collection Time: 11/17/19  2:59 AM  
Result Value Ref Range Glucose 107 mg/dL Insulin order 4.9 units/hour Insulin adminstered 4.9 units/hour Multiplier 0.105 Low target 95 mg/dL High target 130 mg/dL D50 order 0.0 ml  
 D50 administered 0.00 ml Minutes until next BG 60 min Order initials marlena Administered initials marlena GLSCOM Comments GLUCOSE, POC Collection Time: 11/17/19  4:01 AM  
Result Value Ref Range Glucose (POC) 91 65 - 100 mg/dL Performed by Jetta Deforest [employer] Collection Time: 11/17/19  4:01 AM  
Result Value Ref Range Glucose 91 mg/dL Insulin order 2.6 units/hour Insulin adminstered 2.6 units/hour Multiplier 0.084 Low target 95 mg/dL High target 130 mg/dL D50 order 0.0 ml  
 D50 administered 0.00 ml Minutes until next BG 60 min Order initials joel Administered initials joel GLSCOM Comments GLUCOSE, POC Collection Time: 11/17/19  4:59 AM  
Result Value Ref Range Glucose (POC) 108 (H) 65 - 100 mg/dL Performed by Jetta Deforest [employer] Collection Time: 11/17/19  5:00 AM  
Result Value Ref Range Glucose 108 mg/dL Insulin order 4.0 units/hour Insulin adminstered 4.0 units/hour Multiplier 0.084 Low target 95 mg/dL High target 130 mg/dL D50 order 0.0 ml  
 D50 administered 0.00 ml Minutes until next BG 60 min Order initials marlena Administered initials marlena GLSCOM Comments CBC WITH AUTOMATED DIFF Collection Time: 11/17/19  5:02 AM  
Result Value Ref Range WBC 3.3 (L) 4.1 - 11.1 K/uL  
 RBC 2.55 (L) 4.10 - 5.70 M/uL HGB 7.7 (L) 12.1 - 17.0 g/dL HCT 24.8 (L) 36.6 - 50.3 % MCV 97.3 80.0 - 99.0 FL  
 MCH 30.2 26.0 - 34.0 PG  
 MCHC 31.0 30.0 - 36.5 g/dL  
 RDW 16.3 (H) 11.5 - 14.5 % PLATELET 36 (LL) 978 - 400 K/uL NRBC 0.0 0  WBC ABSOLUTE NRBC 0.00 0.00 - 0.01 K/uL NEUTROPHILS 78 (H) 32 - 75 % BAND NEUTROPHILS 12 (H) 0 - 6 % LYMPHOCYTES 4 (L) 12 - 49 % MONOCYTES 4 (L) 5 - 13 % EOSINOPHILS 0 0 - 7 % BASOPHILS 0 0 - 1 % METAMYELOCYTES 2 (H) 0 % IMMATURE GRANULOCYTES 0 %  
 ABS. NEUTROPHILS 3.0 1.8 - 8.0 K/UL  
 ABS. LYMPHOCYTES 0.1 (L) 0.8 - 3.5 K/UL  
 ABS. MONOCYTES 0.1 0.0 - 1.0 K/UL  
 ABS. EOSINOPHILS 0.0 0.0 - 0.4 K/UL  
 ABS. BASOPHILS 0.0 0.0 - 0.1 K/UL  
 ABS. IMM. GRANS. 0.0 K/UL  
 DF MANUAL    
 RBC COMMENTS ANISOCYTOSIS 1+ 
    
 RBC COMMENTS MACROCYTOSIS 1+ 
    
 RBC COMMENTS OVALOCYTES PRESENT 
    
 RBC COMMENTS POLYCHROMASIA PRESENT 
    
POC G3 - PUL Collection Time: 11/17/19  5:42 AM  
Result Value Ref Range FIO2 (POC) 90 % pH (POC) 7.425 7.35 - 7.45    
 pCO2 (POC) 45.2 (H) 35.0 - 45.0 MMHG  
 pO2 (POC) 181 (H) 80 - 100 MMHG  
 HCO3 (POC) 29.7 (H) 22 - 26 MMOL/L  
 sO2 (POC) 100 (H) 92 - 97 % Base excess (POC) 5 mmol/L Site DRAWN FROM ARTERIAL LINE Device: VENT Mode ASSIST CONTROL Set Rate 24 bpm  
 PEEP/CPAP (POC) 5 cmH2O  
 PIP (POC) 16 Allens test (POC) N/A Inspiratory Time 1 sec Specimen type (POC) ARTERIAL Total resp. rate 25 GLUCOSE, POC Collection Time: 11/17/19  6:03 AM  
Result Value Ref Range Glucose (POC) 120 (H) 65 - 100 mg/dL Performed by Ros Smith Collection Time: 11/17/19  6:04 AM  
Result Value Ref Range Glucose 120 mg/dL Insulin order 5.0 units/hour Insulin adminstered 5.0 units/hour Multiplier 0.084 Low target 95 mg/dL High target 130 mg/dL D50 order 0.0 ml  
 D50 administered 0.00 ml Minutes until next BG 60 min Order initials marlena Administered initials marlena GLSCOM Comments METABOLIC PANEL, COMPREHENSIVE Collection Time: 11/17/19  6:06 AM  
Result Value Ref Range Sodium 155 (H) 136 - 145 mmol/L Potassium 3.7 3.5 - 5.1 mmol/L Chloride 118 (H) 97 - 108 mmol/L  
 CO2 32 21 - 32 mmol/L Anion gap 5 5 - 15 mmol/L Glucose 110 (H) 65 - 100 mg/dL  (H) 6 - 20 MG/DL Creatinine 1.75 (H) 0.70 - 1.30 MG/DL  
 BUN/Creatinine ratio 73 (H) 12 - 20 GFR est AA 47 (L) >60 ml/min/1.73m2 GFR est non-AA 39 (L) >60 ml/min/1.73m2 Calcium 8.1 (L) 8.5 - 10.1 MG/DL Bilirubin, total 0.6 0.2 - 1.0 MG/DL  
 ALT (SGPT) 174 (H) 12 - 78 U/L  
 AST (SGOT) 77 (H) 15 - 37 U/L Alk. phosphatase 70 45 - 117 U/L Protein, total 5.2 (L) 6.4 - 8.2 g/dL Albumin 2.0 (L) 3.5 - 5.0 g/dL Globulin 3.2 2.0 - 4.0 g/dL A-G Ratio 0.6 (L) 1.1 - 2.2 NT-PRO BNP Collection Time: 11/17/19  6:06 AM  
Result Value Ref Range NT pro-BNP 28,493 (H) <125 PG/ML  
PHOSPHORUS Collection Time: 11/17/19  6:06 AM  
Result Value Ref Range Phosphorus 3.7 2.6 - 4.7 MG/DL MAGNESIUM Collection Time: 11/17/19  6:06 AM  
Result Value Ref Range Magnesium 2.8 (H) 1.6 - 2.4 mg/dL GLUCOSE, POC Collection Time: 11/17/19  7:01 AM  
Result Value Ref Range Glucose (POC) 141 (H) 65 - 100 mg/dL Performed by Jeff Madrid Collection Time: 11/17/19  7:03 AM  
Result Value Ref Range Glucose 141 mg/dL Insulin order 7.6 units/hour Insulin adminstered 7.6 units/hour Multiplier 0.094 Low target 95 mg/dL High target 130 mg/dL D50 order 0.0 ml  
 D50 administered 0.00 ml Minutes until next BG 60 min Order initials jmm Administered initials jmm GLSCOM Comments GLUCOSE, POC Collection Time: 11/17/19  8:05 AM  
Result Value Ref Range Glucose (POC) 140 (H) 65 - 100 mg/dL Performed by Angie Willett Collection Time: 11/17/19  8:05 AM  
Result Value Ref Range Glucose 140 mg/dL Insulin order 8.3 units/hour Insulin adminstered 8.3 units/hour Multiplier 0.104 Low target 95 mg/dL High target 130 mg/dL D50 order 0.0 ml  
 D50 administered 0.00 ml Minutes until next BG 60 min Order initials cw Administered initials cw GLSCOM Comments PLATELET COUNT Collection Time: 11/17/19  9:20 AM  
Result Value Ref Range PLATELET 39 (LL) 900 - 400 K/uL GLUCOSE, POC Collection Time: 11/17/19  9:20 AM  
Result Value Ref Range Glucose (POC) 136 (H) 65 - 100 mg/dL Performed by FarheenMagnolia Fashionia Bubbleballjun InstantLuxebettie Salts Collection Time: 11/17/19  9:20 AM  
Result Value Ref Range Glucose 136 mg/dL Insulin order 8.7 units/hour Insulin adminstered 8.7 units/hour Multiplier 0.114 Low target 95 mg/dL High target 130 mg/dL D50 order 0.0 ml  
 D50 administered 0.00 ml Minutes until next BG 60 min Order initials cw Administered initials cw GLSCBETTYE Comments PLATELETS, ALLOCATE Collection Time: 11/17/19 10:00 AM  
Result Value Ref Range Unit number [id number] Blood component type PLPH LR,PAS2 Unit division 00 Status of unit ISSUED   
GLUCOSE, POC Collection Time: 11/17/19 10:27 AM  
Result Value Ref Range Glucose (POC) 132 (H) 65 - 100 mg/dL Performed by [email or web address] InstantLuxel Salts Collection Time: 11/17/19 10:27 AM  
Result Value Ref Range Glucose 132 mg/dL Insulin order 8.9 units/hour Insulin adminstered 8.9 units/hour Multiplier 0.124 Low target 95 mg/dL High target 130 mg/dL D50 order 0.0 ml  
 D50 administered 0.00 ml Minutes until next BG 60 min Order initials EW Administered initials EW   
 GLSCOM Comments GLUCOSE, POC Collection Time: 11/17/19 11:30 AM  
Result Value Ref Range Glucose (POC) 106 (H) 65 - 100 mg/dL Performed by Dimitri Ferreira Collection Time: 11/17/19 11:31 AM  
Result Value Ref Range Glucose 106 mg/dL Insulin order 5.7 units/hour Insulin adminstered 5.7 units/hour Multiplier 0.124 Low target 95 mg/dL High target 130 mg/dL D50 order 0.0 ml  
 D50 administered 0.00 ml Minutes until next BG 60 min Order initials EW Administered initials EW   
 GLSCOM Comments GLUCOSE, POC Collection Time: 11/17/19 12:33 PM  
Result Value Ref Range Glucose (POC) 112 (H) 65 - 100 mg/dL Performed by Mzinga Collection Time: 11/17/19 12:33 PM  
Result Value Ref Range Glucose 112 mg/dL Insulin order 6.4 units/hour Insulin adminstered 6.4 units/hour Multiplier 0.124 Low target 95 mg/dL High target 130 mg/dL D50 order 0.0 ml  
 D50 administered 0.00 ml Minutes until next BG 60 min Order initials cw Administered initials cw GLSCOM Comments GLUCOSE, POC Collection Time: 11/17/19  1:37 PM  
Result Value Ref Range Glucose (POC) 99 65 - 100 mg/dL Performed by Mzinga Collection Time: 11/17/19  1:37 PM  
Result Value Ref Range Glucose 99 mg/dL Insulin order 4.8 units/hour Insulin adminstered 4.8 units/hour Multiplier 0.124 Low target 95 mg/dL High target 130 mg/dL D50 order 0.0 ml  
 D50 administered 0.00 ml Minutes until next BG 60 min Order initials cw Administered initials cw GLSCOM Comments Brandee Wyman MD 
[address] [place] [address] Nephrology [facility]  
[id number] [place] [address], [address] [place] [address] Phone - [phone] Fax - [phone] 
[email or web address]

## 2019-11-17 NOTE — PROGRESS NOTES
2000 Assumed care of patient from [facility]. Patient restless in bed, moving head back and forth, resp rate 35-40, TV <200. PRN ativan given 2020 remains restless in bed, resp rate increasing >40 (attempted to suction-no secretions noted). Increased precedex gtt to 0.9 
 
2035 Remains restless post ativan dose and precedex increase and SBP now trending up >160 and frequent grimacing noted. PRN morphine given 2115 appears comfortable in bed with eyes closed, resp rate 28-30, SBP <130.  
 
0000 Agitation with stimulation improving- becomes tachypneic and SBP elevates to >130 but resolves within 5 minutes. [id number] Became agitated again during bath time, resp rate >40, TV fluctuate from 0-400. Grimacing frequently- ativan given then morphine 25 min later. After bath patient resting comfortably again with eyes closed- resp rate 24, SBP 120s 
 
0710 PA ish made aware of critically low platelets-36 and decrease to H/H again this morning. Stated will wait for rounds and Dr Dileep Edge for orders. Per morning ABG ok to decrease fi02  
 
0740 Bedside and Verbal shift change report given to [facility] (oncoming nurse) by Melly Beavers (offgoing nurse). Report included the following information SBAR, Kardex, MAR, Recent Results and Cardiac Rhythm NSR. Problem: Falls - Risk of 
Goal: *Absence of Falls Description Document Cesar Mercedes Fall Risk and appropriate interventions in the flowsheet. Outcome: Progressing Towards Goal 
Note:  
Fall Risk Interventions: 
Mobility Interventions: Mechanical lift Mentation Interventions: Evaluate medications/consider consulting pharmacy Medication Interventions: Evaluate medications/consider consulting pharmacy Elimination Interventions: Toileting schedule/hourly rounds History of Falls Interventions: Evaluate medications/consider consulting pharmacy Problem: Pressure Injury - Risk of 
Goal: *Prevention of pressure injury Description Document Earl Scale and appropriate interventions in the flowsheet. Outcome: Progressing Towards Goal 
Note:  
Pressure Injury Interventions: 
Sensory Interventions: Assess changes in LOC, Avoid rigorous massage over bony prominences, Keep linens dry and wrinkle-free, Minimize linen layers, Turn and reposition approx. every two hours (pillows and wedges if needed) Moisture Interventions: Assess need for specialty bed, Internal/External fecal devices, Internal/External urinary devices, Moisture barrier Activity Interventions: Pressure redistribution bed/mattress(bed type) Mobility Interventions: Pressure redistribution bed/mattress (bed type) Nutrition Interventions: Document food/fluid/supplement intake Friction and Shear Interventions: HOB 30 degrees or less, Lift sheet, Minimize layers Problem: CABG: Discharge Outcomes Goal: *Hemodynamically stable Outcome: Progressing Towards Goal 
  
Problem: Infection - Risk of, Central Venous Catheter-Associated Bloodstream Infection Goal: *Absence of infection signs and symptoms Outcome: Progressing Towards Goal 
  
Problem: Ventilator Management Goal: *Patient maintains clear airway/free of aspiration Outcome: Progressing Towards Goal 
Goal: *Absence of infection signs and symptoms Outcome: Progressing Towards Goal 
  
Problem: Nutrition Deficit Goal: *Optimize nutritional status Outcome: Progressing Towards Goal 
  
Problem: Infection - Risk of, Urinary Catheter-Associated Urinary Tract Infection Goal: *Absence of infection signs and symptoms Outcome: Progressing Towards Goal 
  
Problem: AMI: Discharge Outcomes Goal: *Demonstrates ability to perform prescribed activity without shortness of breath or discomfort Outcome: Not Progressing Towards Goal 
Goal: *Verbalizes understanding and describes prescribed diet Outcome: Not Progressing Towards Goal 
Goal: *Describes follow-up/return visits to physicians Outcome: Not Progressing Towards Goal 
Goal: *Describes home care/support arrangements established based on need Outcome: Not Progressing Towards Goal 
Goal: *Understands and describes signs and symptoms to report to providers(Stroke Metric) Outcome: Not Progressing Towards Goal 
Goal: *Verbalizes name, dosage, time, side effects, and number of days to continue medications Outcome: Not Progressing Towards Goal 
  
Problem: CABG: Discharge Outcomes Goal: *Lungs clear or at baseline Outcome: Not Progressing Towards Goal 
Goal: *Demonstrates ability to perform prescribed activity without shortness of breath or discomfort Outcome: Not Progressing Towards Goal 
Goal: *Verbalizes home exercise program, activity guidelines, cardiac precautions Outcome: Not Progressing Towards Goal 
Goal: *Verbalizes understanding and describes prescribed diet Outcome: Not Progressing Towards Goal 
Goal: *Verbalizes name, dosage, time, side effects, and number of days to continue medications Outcome: Not Progressing Towards Goal 
Goal: *Anxiety reduced or absent Outcome: Not Progressing Towards Goal 
Goal: *Verbalizes and demonstrates incision care Outcome: Not Progressing Towards Goal 
Goal: *Understands and describes signs and symptoms to report to providers(Stroke Metric) Outcome: Not Progressing Towards Goal 
Goal: *Describes follow-up/return visits to physicians Outcome: Not Progressing Towards Goal 
Goal: *Describes available resources and support systems Outcome: Not Progressing Towards Goal 
Goal: *Expresses feelings about diagnosis and surgery Outcome: Not Progressing Towards Goal 
Goal: *Influenza immunization Outcome: Not Progressing Towards Goal 
Goal: *Pneumococcal immunization Outcome: Not Progressing Towards Goal 
  
Problem: Ventilator Management Goal: *Adequate oxygenation and ventilation Outcome: Not Progressing Towards Goal 
Goal: *Normal spontaneous ventilation Outcome: Not Progressing Towards Goal 
 Problem: Gas Exchange - Impaired Goal: *Absence of hypoxia Outcome: Not Progressing Towards Goal

## 2019-11-17 NOTE — PROGRESS NOTES
0740- bedside report and drips verified. Patient in bed on vent and trach. Not responsive to verbal commands, will respond to pain stimuli. 3455- PRN ativan given for agitation. 's, RR 40's, stacking breaths on ventilator. 0152- patient still restless and agitated, PRN morphine given. RR 30's, ventilator continuously alarming. 4332- patient now resting, VS have improved. 7397- patient again with 's, RR 30-35, and stacking breaths on vent. Patient will not calm with voice and only seems to make it worse. 9706- Dr. Chris Wong and CHELSEA Henson at bedside for rounds. Update given on patient status, labs, and discussed issue with agitation and comfort. Orders to check platelet level O3C and transfuse for less than 50, and low dose propofol. 1020- Plt resulted at 39, per standing order will transfuse one unit. 1055- called blood bank regarding ordered platelets. Tech stated they have to send out for the platelets and will call when they arrive. 1147- blood bank called, Platelets have arrived. 1200- Platelets started. 1437- platelets completed. 65- son at bedside. Update given and questions answered. 1730- Plt 39; per standing order will transfuse 1 unit Platelets. 1940- Bedside and Verbal shift change report given to Chico Crump RN (oncoming nurse) by Harshal Sawyer RN (offgoing nurse). Report included the following information SBAR, Kardex, Recent Results and Cardiac Rhythm NSR.

## 2019-11-17 NOTE — PROGRESS NOTES
ID Progress Note 2019 Subjective: Afebrile. On the vent. Has a trach. Objective:  
 
Vitals:  
Visit Vitals /61 (BP 1 Location: Left leg, BP Patient Position: At rest) Pulse 70 Temp 97.2 °F (36.2 °C) Resp 19 Ht 5' 7\" (1.702 m) Wt 61 kg (134 lb 6.4 oz) SpO2 100% BMI 21.05 kg/m² Tmax:  Temp (24hrs), Av °F (36.7 °C), Min:97.2 °F (36.2 °C), Max:99.5 °F (37.5 °C) Exam: On the vent Lungs: clear to auscultation Heart: s1, s2, no murmurs Abdomen: soft, nontender Extremities: (+) pedal edema Labs:  
Lab Results Component Value Date/Time WBC 3.3 (L) 2019 05:02 AM  
 HGB 7.7 (L) 2019 05:02 AM  
 HCT 24.8 (L) 2019 05:02 AM  
 PLATELET 39 (LL)  09:20 AM  
 MCV 97.3 2019 05:02 AM  
 
Lab Results Component Value Date/Time Sodium 155 (H) 2019 06:06 AM  
 Potassium 3.7 2019 06:06 AM  
 Chloride 118 (H) 2019 06:06 AM  
 CO2 32 2019 06:06 AM  
 Anion gap 5 2019 06:06 AM  
 Glucose 110 (H) 2019 06:06 AM  
  (H) 2019 06:06 AM  
 Creatinine 1.75 (H) 2019 06:06 AM  
 BUN/Creatinine ratio 73 (H) 2019 06:06 AM  
 GFR est AA 47 (L) 2019 06:06 AM  
 GFR est non-AA 39 (L) 2019 06:06 AM  
 Calcium 8.1 (L) 2019 06:06 AM  
 Bilirubin, total 0.6 2019 06:06 AM  
 AST (SGOT) 77 (H) 2019 06:06 AM  
 Alk. phosphatase 70 2019 06:06 AM  
 Protein, total 5.2 (L) 2019 06:06 AM  
 Albumin 2.0 (L) 2019 06:06 AM  
 Globulin 3.2 2019 06:06 AM  
 A-G Ratio 0.6 (L) 2019 06:06 AM  
 ALT (SGPT) 174 (H) 2019 06:06 AM  
 
 
 
 
Assessment: 1. Elevated procalcitonin level and fever 2. 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 
3.  Chronic kidney disease with acute exacerbation. 4.  Anemia. 5.  Thrombocytopenia. 6.  Acute rise in transaminases-possible shock liver. 7.  Non-insulin-dependent diabetes mellitus. 8.  Hypertension. 9.  Hyperlipidemia. Recommendations: 1. Continue tammi Bryan MD

## 2019-11-17 NOTE — PROGRESS NOTES
[facility] ICU Progress Note Admit Date: 10/19/2019 Procedure:  Procedure(s): 
CARDIAC RE-ENTRY, MARISOL BY  [facility]  [facility]    
 
CABG x 5, LIMA to LAD, RSVG to [id number], RSVG to PDA 10/23/19 Subjective:  
Pt seen with Dr. Markell Pedroza WBC 3.3 HH 7/25 PLT 36 CXR: patchy infiltratrates Flexiseal 
Cr 1.75 improved.  better   UOP 2000 +800  Bumexstopped by renal  
BNP 28K 
BS, SC AF Fungal cultures NGSF: Zosyn for temp& Procalcitonin ABG 7.42/45/181 +5 90%  
wean to 80% Platelets HIT panel Hold ASA Objective:  
Vitals: 
Blood pressure 134/61, pulse 74, temperature 97.7 °F (36.5 °C), resp. rate 24, height 5' 7\" (1.702 m), weight 134 lb 6.4 oz (61 kg), SpO2 100 %. Temp (24hrs), Av.9 °F (37.2 °C), Min:97.7 °F (36.5 °C), Max:100.6 °F (38.1 °C) EKG/Rhythm: SR in the 100s Oxygen Therapy: 
Oxygen Therapy O2 Sat (%): 100 % (19 1100) Pulse via Oximetry: 74 beats per minute (19 1100) O2 Device: Tracheostomy; Ventilator (19 08) O2 Flow Rate (L/min): 40 l/min (19) O2 Temperature: 98.6 °F (37 °C) (19 0729) FIO2 (%): 80 % (19 08) CXR:  
CXR Results  (Last 48 hours)  
          
 19 0408  XR CHEST PORT Final result Impression:  IMPRESSION:  
1. No interval change Narrative:  INDICATION:  interstitial edema EXAM: Portable chest 0354 . Comparison 2019. FINDINGS: There is no significant change in appearance the lungs. Diffuse  
interstitial prominence stable in appearance given technique. Cardiomediastinal  
silhouette is unchanged. No pneumothorax. Lines and tubes are unchanged in  
position. 19 0442  XR CHEST PORT Final result Impression:  IMPRESSION:  
   
Interstitial and parenchymal edema has not changed. Narrative:  PORTABLE CHEST RADIOGRAPH/S: 2019 4:42 AM  
   
Clinical history: Interstitial edema INDICATION:   interstitial edema COMPARISON: 11/15/2019 FINDINGS:  
AP portable upright view of the chest demonstrates a stable  cardiopericardial  
silhouette. The lungs are adequately expanded. Interstitial and parenchymal  
edema is not significantly changed. PICC line catheter and tracheostomy tube. NG  
tube as well. Patient is on a cardiac monitor. Admission Weight: Last Weight Weight: 141 lb 5 oz (64.1 kg) Weight: 134 lb 6.4 oz (61 kg) Intake / Foster Bon / Drain: 
Current Shift: 11/17 0701 - 11/17 1900 In: 576.8 [I.V.:236.8] Out: 335 [Urine:335] Last 24 hrs.:  
 
Intake/Output Summary (Last 24 hours) at 11/17/2019 1108 Last data filed at 11/17/2019 1100 Gross per 24 hour Intake 2815.17 ml Output 1970 ml Net 845.17 ml EXAM: 
General:  Intubated, sedated. Lungs:   Coarse bilat Incision:  Midsternal incision with no significant erythema, drainage, or dehiscence. Heart:  Regular rate and rhythm, S1, S2 normal, no murmur, click, rub or gallop. Abdomen:   Soft, non-tender. Bowel sounds active. No masses,  No organomegaly. +BM. FMS in place Extremities:  Some generalized edema. Palpable pulses bilat Neurologic:  Moves extremities but without purpose. Does not track or follow commands. Labs:  
Recent Labs 11/17/19 
1027 11/17/19 
0920  11/17/19 
0606  11/17/19 
0502 WBC  --   --   --   --   --  3.3* HGB  --   --   --   --   --  7.7* HCT  --   --   --   --   --  24.8* PLT  --  39*  --   --   --  36* NA  --   --   --  155*  --   --   
K  --   --   --  3.7  --   --   
BUN  --   --   --  127*  --   --   
CREA  --   --   --  1.75*  --   --   
GLU  --   --   --  110*  --   --   
GLUCPOC 132* 136*   < >  --    < >  --   
 < > = values in this interval not displayed. Assessment:  
 
Principal Problem: S/P CABG x 5 (10/23/2019) Overview: x 5, LIMA to LAD, RSVG to Diag1, OM2-OM3, RSVG to PDA Active Problems: 
  [facility]acute [facility] (10/19/2019) Unstable angina ([facility] (10/19/2019) Coronary artery disease of native artery of native heart with stable angina pectoris ([facility] (10/21/2019) Systolic heart failure ([facility] (10/22/2019) Plan/Recommendations/Medical Decision Makin. S/p CABG: on ASA, statin. No BB, ACEi while on vasoactive medications 2. Acute on chronic systolic CHF, NYHA Class II on admit: EF 35-40% preop. Dobutamine back on at 1. No BB/ACE/ARB/AA until vitals/kidney function allows. Trend pBNP 3. Acute postop respiratory failure with chronic interstitial fibrosis per CXR: Re-intubated  due to worsening acute respiratory failure. Geralynn Farley placed  by Thoracic Surgery. Acute respiratory acidosis  thought driven by oversedation. Fentanyl and versed drips stopped. (now just has PRN Morphine and Ativan dosing) Diuresis per Nephrology-currently holding. Amiodarone had been stopped on 10/31. Solumedrol per pulm-weaning. Continue scheduled nebs. Cont mucinex, pulm toileting, mucomyst. Chest CT revealing emphysema/pulm fibrosis. 4. Renal insufficiency: Creatinine slightly improved today. Nephrology following and appreciate their recommendations. Devlin in place for accurate I&O. Urine output improved in the last 24 hours. Nephrology recommendations to continue holding diuretics. Did receive Albumin X 4 doses in the last 24 hours. 5. Anemia: had preop, H&H at -given 1 unit PRBC. Iron panel sent preop - iron, iron sat low. 6. Thrombocytopenia: Platelets worse today at 75k. Daily CBC. Hold SQ heparin. Will get a repeat CBC this afternoon. If still less than 80K will plan to hold ASA too. 
 
7. Hx of HTN: Issues with hypotension improved. Still on Dobutamine at 1 but likely can be weaned off. Volume replaced overnight. 8. HLD: Continue pravastatin 9. Leukocytosis: WBC at 10.7k today, procalcitonin at 2.9 but Lactic at 1.4. Devlin placed 11/7. PICC placed 11/6. Trach 11/13. Monitor daily CBC. Will ask Infectious Disease to consult with this medically complex patient. Antibiotics? 10. Constipation: Resolved. FMS in place 11. Current smoker: smoking cessation education completed. 12. Nutrition: Appreciate Dietician recommendations. Dobhoff placed 10/30 and Enteral feedings started. Too unstable from resp standpoint for PO diet, TF's continuous at 50 ml/hr. Try and AVOID increased volume amounts 13. Hyperglycemia: Preop A1c 5.3 with no DM history, now with hyperglycemia. BS have been much better controlled on Insulin gtt. DTS following. Discussed with [facility] CAdrianFAdrianS.E. and will keep on Insulin gtt for another 24 hours. She will look at him later today to discuss most likely plan for insulin over the weekend coming off the Insulin gtt. 14. Hypernatremia: Nephrology following-increased FW flushes. Na at 152 today. Continue to monitor 15. DVT/GI Prophylaxis: SCD's, SQ Heparin-currently on hold due to thrombocytopenia. Will restart when improved, PPI Dispo: PT/OT as able. Remain in CVI until resp status more stable.   
 
 
Signed By: CHELSEA Whitman

## 2019-11-18 NOTE — PROGRESS NOTES
2000 Assumed care of patient from [address] [place] [address] 
 
2009 grimacing frequently and shaking head back and forth, resp rate 35-40 and occasionally not pulling full TV. PRN morphine given and effective 
 
2302 2nd unit of platelets started 2320 increased agitation noted without any stimulation, SBP trending up >150, resp rate >40 and not pulling TV . Also noted tachypnea started after brief episode of bradycardia-HR 50s (trending back up >60 within 1 minute). PRN ativan given and effective  
 
0120 %, RT decreased fi02 to 60% 0745 Bedside and Verbal shift change report given to Reba (oncoming nurse) by Reed Coello (offgoing nurse). Report included the following information SBAR, Kardex, MAR, Recent Results and Cardiac Rhythm NSR . Problem: Falls - Risk of 
Goal: *Absence of Falls Description Document Tommy Olivares Fall Risk and appropriate interventions in the flowsheet. Outcome: Progressing Towards Goal 
Note:  
Fall Risk Interventions: 
Mobility Interventions: Communicate number of staff needed for ambulation/transfer Mentation Interventions: Evaluate medications/consider consulting pharmacy Medication Interventions: Evaluate medications/consider consulting pharmacy Elimination Interventions: Toileting schedule/hourly rounds History of Falls Interventions: Evaluate medications/consider consulting pharmacy Problem: Patient Education: Go to Patient Education Activity Goal: Patient/Family Education Outcome: Progressing Towards Goal 
  
Problem: Pressure Injury - Risk of 
Goal: *Prevention of pressure injury Description Document Earl Scale and appropriate interventions in the flowsheet. Outcome: Progressing Towards Goal 
Note:  
Pressure Injury Interventions: 
Sensory Interventions: Assess changes in LOC, Keep linens dry and wrinkle-free, Minimize linen layers, Turn and reposition approx. every two hours (pillows and wedges if needed) Moisture Interventions: Apply protective barrier, creams and emollients, Check for incontinence Q2 hours and as needed, Maintain skin hydration (lotion/cream), Internal/External fecal devices, Internal/External urinary devices Activity Interventions: Pressure redistribution bed/mattress(bed type) Mobility Interventions: Pressure redistribution bed/mattress (bed type), Turn and reposition approx. every two hours(pillow and wedges) Nutrition Interventions: Document food/fluid/supplement intake Friction and Shear Interventions: Lift sheet Problem: Patient Education: Go to Patient Education Activity Goal: Patient/Family Education Outcome: Progressing Towards Goal 
  
Problem: Infection - Risk of, Central Venous Catheter-Associated Bloodstream Infection Goal: *Absence of infection signs and symptoms Outcome: Progressing Towards Goal 
  
Problem: Ventilator Management Goal: *Adequate oxygenation and ventilation Outcome: Progressing Towards Goal 
Goal: *Patient maintains clear airway/free of aspiration Outcome: Progressing Towards Goal 
Goal: *Absence of infection signs and symptoms Outcome: Progressing Towards Goal 
Goal: *Normal spontaneous ventilation Outcome: Progressing Towards Goal 
  
Problem: Gas Exchange - Impaired Goal: *Absence of hypoxia Outcome: Progressing Towards Goal 
  
Problem: Infection - Risk of, Urinary Catheter-Associated Urinary Tract Infection Goal: *Absence of infection signs and symptoms Outcome: Progressing Towards Goal 
  
Problem: AMI: Discharge Outcomes Goal: *Demonstrates ability to perform prescribed activity without shortness of breath or discomfort Outcome: Not Progressing Towards Goal 
Goal: *Verbalizes understanding and describes prescribed diet Outcome: Not Progressing Towards Goal 
Goal: *Describes follow-up/return visits to physicians Outcome: Not Progressing Towards Goal 
 Goal: *Describes home care/support arrangements established based on need Outcome: Not Progressing Towards Goal 
Goal: *Understands and describes signs and symptoms to report to providers(Stroke Metric) Outcome: Not Progressing Towards Goal 
Goal: *Verbalizes name, dosage, time, side effects, and number of days to continue medications Outcome: Not Progressing Towards Goal 
  
Problem: CABG: Discharge Outcomes Goal: *Hemodynamically stable Outcome: Not Progressing Towards Goal 
Goal: *Lungs clear or at baseline Outcome: Not Progressing Towards Goal 
Goal: *Demonstrates ability to perform prescribed activity without shortness of breath or discomfort Outcome: Not Progressing Towards Goal 
Goal: *Verbalizes home exercise program, activity guidelines, cardiac precautions Outcome: Not Progressing Towards Goal 
Goal: *Verbalizes understanding and describes prescribed diet Outcome: Not Progressing Towards Goal 
Goal: *Verbalizes name, dosage, time, side effects, and number of days to continue medications Outcome: Not Progressing Towards Goal 
Goal: *Anxiety reduced or absent Outcome: Not Progressing Towards Goal 
Goal: *Verbalizes and demonstrates incision care Outcome: Not Progressing Towards Goal 
Goal: *Understands and describes signs and symptoms to report to providers(Stroke Metric) Outcome: Not Progressing Towards Goal 
  
Problem: Heart Failure: Discharge Outcomes Goal: *Demonstrates ability to perform prescribed activity without shortness of breath or discomfort Outcome: Not Progressing Towards Goal 
Goal: *Left ventricular function assessment completed prior to or during stay, or planned for post-discharge Outcome: Not Progressing Towards Goal 
Goal: *ACEI prescribed if LVEF less than 40% and no contraindications or ARB prescribed Outcome: Not Progressing Towards Goal 
Goal: *Verbalizes understanding and describes prescribed diet Outcome: Not Progressing Towards Goal 
 Goal: *Verbalizes understanding/describes prescribed medications Outcome: Not Progressing Towards Goal 
Goal: *Describes available resources and support systems Description 
(eg: Home Health, Palliative Care, Advanced Medical Directive) Outcome: Not Progressing Towards Goal 
Goal: *Describes smoking cessation resources Outcome: Not Progressing Towards Goal 
Goal: *Understands and describes signs and symptoms to report to providers(Stroke Metric) Outcome: Not Progressing Towards Goal 
Goal: *Describes/verbalizes understanding of follow-up/return appt Description 
(eg: to physicians, diabetes treatment coordinator, and other resources Outcome: Not Progressing Towards Goal 
Goal: *Describes importance of continuing daily weights and changes to report to physician Outcome: Not Progressing Towards Goal

## 2019-11-18 NOTE — PROGRESS NOTES
Transitions of Care: 
 
 -Patient has trach, remains on vent- unable to do trach collar at this time 
 -Patient is a [employer] patient, has Medicare Part A The CM participated in [facility] rounds- patient remains sedated due to agitation, trach and vent. Disposition TBD at this time. CM will continue to follow for transitions of care.  RON Lee

## 2019-11-18 NOTE — PROGRESS NOTES
[facility] 
 
10/18 Seen and examined patient at bedside. Mental status -> still an issue. Not much secretions. 11/15 Seen and examined Tachypneic Dyssynchronous with ventilator Off fentanyl and versed On precedex Receiving blood Puffy extremities CXR ? Increased edema 
proBNP 33K 
 
 S/p Amira Medley On vent 60% FiO2 On TF Patient Vitals for the past 4 hrs: 
 BP Temp Pulse Resp SpO2 Weight 19 0900   75 (!) 36 100 %   
19 0820     99 %   
19 0800  97.4 °F (36.3 °C) 87 (!) 31 96 %   
19 0700   79 20 95 %   
19 0630 149/61       
19 0616      62.1 kg (137 lb)  
19 0600   70 26 100 %  Temp (24hrs), Av.4 °F (36.3 °C), Min:97.2 °F (36.2 °C), Max:97.6 °F (36.4 °C) Intake/Output Summary (Last 24 hours) at 2019 4398 Last data filed at 2019 0700 Gross per 24 hour Intake 4686.88 ml Output 2455 ml Net 2231.88 ml No distress Amiradieter Mitchell Rales and rhonchi Tachy Soft bs present Warm and dry CXR - trach - no change in interstitial lung disease Lab: 
Recent Labs 19 
0309 19 
1649 19 
0920 19 
0606 19 
0502 19 
1018  19 
0310 WBC 8.7  --   --   --  3.3* 1.6*   < > 1.7* HGB 7.6*  --   --   --  7.7* 8.8*   < > 9.1*  
PLT 40* 39* 39*  --  36* 42*   < > 52* * 156*  --  155*  --   --   --  151*  
K 3.4* 3.5  --  3.7  --   --   --  3.8 * 120*  --  118*  --   --   --  114* CO2 30 31  --  32  --   --   --  31 * 122*  --  127*  --   --   --  149* CREA 1.39* 1.54*  --  1.75*  --   --   --  1.73* * 111*  --  110*  --   --   --  117* CA 8.2* 8.3*  --  8.1*  --   --   --  8.4* MG 2.5*  --   --  2.8*  --   --   --  3.1*  
PHOS 4.6 4.2  --  3.7  --   --   --  2.7 TBILI 0.5  --   --  0.6  --   --   --  0.6 SGOT 50*  --   --  77*  --   --   --  212*  
 < > = values in this interval not displayed. ABG: 
Recent Labs 19 0610 11/17/19 
0542 11/16/19 
0574 PHI 7.446 7.425 7.495* PCO2I 38.2 45.2* 38.6 PO2I 73* 181* 62* HCO3I 26.3* 29.7* 29.7* SO2I 95 100* 93 FIO2I 60 90 0.70 Results Procedure Component Value Units Date/Time CULTURE, BLOOD, PAIRED [[id number]] Collected:  11/16/19 0310 Order Status:  Completed Specimen:  Blood Updated:  11/18/19 0532 Special Requests: NO SPECIAL REQUESTS Culture result: NO GROWTH 2 DAYS     
 AFB CULTURE + SMEAR W/RFLX ID FROM CULTURE [[id number]] Collected:  11/07/19 1245 Order Status:  Completed Specimen:  Miscellaneous sample Updated:  11/08/19 1636 Source BRONCHIAL LAVAGE     
  AFB Specimen processing Concentration Acid Fast Smear NEGATIVE Comment: (NOTE) Performed At: [address] [id number] Ashok Byers MD [id number] Acid Fast Culture PENDING  
 CULTURE, RESPIRATORY/SPUTUM/BRONCH Romain Manning [[id number]] Collected:  11/07/19 1245 Order Status:  Completed Specimen:  Sputum from Bronchial lavage Updated:  11/09/19 1024 Special Requests: NO SPECIAL REQUESTS     
  GRAM STAIN OCCASIONAL WBCS SEEN     
   NO ORGANISMS SEEN Culture result: NO GROWTH 2 DAYS     
 [place] [[id number]] Collected:  11/07/19 1245 Order Status:  Canceled Specimen:  Bronchial lavage Erwin Madan SOURCES [[id number]] Collected:  11/07/19 1245 Order Status:  Completed Specimen:  Other from Bronchial lavage Updated:  11/07/19 1538 Special Requests: NO SPECIAL REQUESTS     
  KOH NO YEAST SEEN     
  KOH NO FUNGAL ELEMENTS SEEN     
  
· Impression: 
========= 
· S/p 5V CABG 10/19/19 for ACS  With reexploration 10/23 for mediastinal hematoma.  
· Acute resp failure - baseline fibrotic ILD (etiology not clear- this is a new dx but UIP/IPF in DDX) with superimposed pulmonary interstitial edema probable superimposed diffuse alveolar damage from blood/blood products. Amio pulm toxicty in DDX but not clear from [facility] review when he was on it. Suspect that he his significant irreversible fibrotic lung disease. 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 
· Anemia · Encephalopathy Plan: 
==== 
--vent support - wean FiO2 and TC trials as able --on solumedrol - 40 mg IV q12. 
--diuretics as you are doing. --Agree with minimizing sedation. --TF 
--Nephrology following electrolytes and diuretics 
--D/W Dr. Jimmy Wyman --We will be available to see him for acute issues over the weekend and check back on Monday. Niecy Sahu MD  
Pulmonary and Critical Care Medicine

## 2019-11-18 NOTE — PROGRESS NOTES
Chart reviewed and patient remains sedated on vent support. Will follow up for OT re-evaluation as able. Thank you.

## 2019-11-18 NOTE — PROGRESS NOTES
Nephrology Progress Note Veronica Prasad Date of Admission : 10/19/2019 CC:  Follow up for ISAIAH on CKD, hypervolemia Assessment and Plan ISAIAH on CKD: 
- from ATN post CABG, pre renal azotemia from diuretics - severe azotemia :  improving Hypernatremia : 
- No osmolar gap. High urine osm 11/17  
- continue FW flushes please 200 cc Q3 HRS  
- D5W at 65 cc/ hr  
- getting Na load in Zosyn  
- recheck labs in pm 
 
Hyperkalemia:  
- resolved CKD III: 
- baseline Cr 1.3 prior to CABG 
- likely from DM and HTN 
  
HFrE F: 
- last EF 35-40% on 10/20 
  
CAD s/p CABG x 5 10/19 and reexploration 10/23 for hematoma 
  
Acute on Chronic Resp Failure: 
- likely 2/2 underlying ILD + volume 
- now w/ trach Chronic Anemia: 
- hgb dropped a point , stool occult  
- cont to monitor 
  
DM2: 
- on insulin GGT 
- increase given D5w intiated 
- consider alternate TF  
  
Protein Malnutrition: 
- on TF via [place] [address] Interval History: 
Seen and examined. Cr appears better , Na  Improving 
tachypnic intermittently On 605 FIO2 CXR unchanged Na down and Cr down a little Current Medications: all current  Medications have been eviewed in [facility] Review of Systems: Review of systems not obtained due to patient factors. Objective: 
Vitals:   
Vitals:  
 11/18/19 0600 11/18/19 0616 11/18/19 0630 11/18/19 0700 BP:   149/61 Pulse: 70   79 Resp: 26   20 Temp:      
SpO2: 100%   95% Weight:  62.1 kg (137 lb) Height:      
 
Intake and Output: 
No intake/output data recorded. 11/16 1901 - 11/18 0700 In: 6247.7 [I.V.:2384.7] Out: 4589 [Urine:3360; Drains:85] Physical Examination: 
General: Frail, sedated Neck:  + trach Resp:  Diffuse dry crackles CV:  RRR,  no murmur or rub, no LE edema GI:  Soft, NT, + Bowel sounds, no hepatosplenomegaly Neurologic:  Sedated on the vent Psych:             Unable to assess Skin:  No Rash :  Devlin in place [x]    High complexity decision making was performed 
[x]    Patient is at high-risk of decompensation with multiple organ involvement Lab Data Personally Reviewed: I have reviewed all the pertinent labs, microbiology data and radiology studies during assessment. Recent Labs 11/18/19 
0309 11/17/19 
1649 11/17/19 
0606 11/16/19 
0310 * 156* 155* 151*  
K 3.4* 3.5 3.7 3.8 * 120* 118* 114* CO2 30 31 32 31 * 111* 110* 117* * 122* 127* 149* CREA 1.39* 1.54* 1.75* 1.73* CA 8.2* 8.3* 8.1* 8.4* MG 2.5*  --  2.8* 3.1*  
PHOS 4.6 4.2 3.7 2.7 ALB 1.9* 2.0* 2.0* 2.4* SGOT 50*  --  77* 212* *  --  174* 297* Recent Labs 11/18/19 
0309 11/17/19 
1649 11/17/19 
0920 11/17/19 
0502 11/16/19 
1018 WBC 8.7  --   --  3.3* 1.6* HGB 7.6*  --   --  7.7* 8.8* HCT 24.4*  --   --  24.8* 27.7*  
PLT 40* 39* 39* 36* 42* No results found for: SDES Lab Results Component Value Date/Time Culture result: NO GROWTH 2 DAYS 11/16/2019 03:10 AM  
 Culture result: NO GROWTH 2 DAYS 11/07/2019 12:45 PM  
 Culture result: HEAVY NORMAL RESPIRATORY TARIK 10/21/2019 12:08 PM  
 
Recent Results (from the past 24 hour(s)) GLUCOSE, POC Collection Time: 11/17/19  8:05 AM  
Result Value Ref Range Glucose (POC) 140 (H) 65 - 100 mg/dL Performed by Marylou Calzada Collection Time: 11/17/19  8:05 AM  
Result Value Ref Range Glucose 140 mg/dL Insulin order 8.3 units/hour Insulin adminstered 8.3 units/hour Multiplier 0.104 Low target 95 mg/dL High target 130 mg/dL D50 order 0.0 ml  
 D50 administered 0.00 ml Minutes until next BG 60 min Order initials cw Administered initials cw GLSCOM Comments PLATELET COUNT Collection Time: 11/17/19  9:20 AM  
Result Value Ref Range PLATELET 39 (LL) 550 - 400 K/uL GLUCOSE, POC Collection Time: 11/17/19  9:20 AM  
Result Value Ref Range Glucose (POC) 136 (H) 65 - 100 mg/dL Performed by Prema Jiménez Coppersmith Collection Time: 11/17/19  9:20 AM  
Result Value Ref Range Glucose 136 mg/dL Insulin order 8.7 units/hour Insulin adminstered 8.7 units/hour Multiplier 0.114 Low target 95 mg/dL High target 130 mg/dL D50 order 0.0 ml  
 D50 administered 0.00 ml Minutes until next BG 60 min Order initials cw Administered initials cw GLSCOM Comments PLATELETS, ALLOCATE Collection Time: 11/17/19 10:00 AM  
Result Value Ref Range Unit number [id number] Blood component type PLPH LR,PAS2 Unit division 00 Status of unit TRANSFUSED   
GLUCOSE, POC Collection Time: 11/17/19 10:27 AM  
Result Value Ref Range Glucose (POC) 132 (H) 65 - 100 mg/dL Performed by Prema Perezsmith Collection Time: 11/17/19 10:27 AM  
Result Value Ref Range Glucose 132 mg/dL Insulin order 8.9 units/hour Insulin adminstered 8.9 units/hour Multiplier 0.124 Low target 95 mg/dL High target 130 mg/dL D50 order 0.0 ml  
 D50 administered 0.00 ml Minutes until next BG 60 min Order initials EW Administered initials EW   
 GLSCOM Comments GLUCOSE, POC Collection Time: 11/17/19 11:30 AM  
Result Value Ref Range Glucose (POC) 106 (H) 65 - 100 mg/dL Performed by Neil Mcpherson Collection Time: 11/17/19 11:31 AM  
Result Value Ref Range Glucose 106 mg/dL Insulin order 5.7 units/hour Insulin adminstered 5.7 units/hour Multiplier 0.124 Low target 95 mg/dL High target 130 mg/dL D50 order 0.0 ml  
 D50 administered 0.00 ml Minutes until next BG 60 min Order initials EW Administered initials EW   
 GLSCOM Comments GLUCOSE, POC Collection Time: 11/17/19 12:33 PM  
Result Value Ref Range Glucose (POC) 112 (H) 65 - 100 mg/dL  Performed by Neil Mcpherson  
 Collection Time: 11/17/19 12:33 PM  
Result Value Ref Range Glucose 112 mg/dL Insulin order 6.4 units/hour Insulin adminstered 6.4 units/hour Multiplier 0.124 Low target 95 mg/dL High target 130 mg/dL D50 order 0.0 ml  
 D50 administered 0.00 ml Minutes until next BG 60 min Order initials cw Administered initials cw GLSCOM Comments GLUCOSE, POC Collection Time: 11/17/19  1:37 PM  
Result Value Ref Range Glucose (POC) 99 65 - 100 mg/dL Performed by Sara Nevarez Collection Time: 11/17/19  1:37 PM  
Result Value Ref Range Glucose 99 mg/dL Insulin order 4.8 units/hour Insulin adminstered 4.8 units/hour Multiplier 0.124 Low target 95 mg/dL High target 130 mg/dL D50 order 0.0 ml  
 D50 administered 0.00 ml Minutes until next BG 60 min Order initials cw Administered initials cw GLSCOM Comments OSMOLALITY, UR Collection Time: 11/17/19  2:28 PM  
Result Value Ref Range Osmolality,urine 669 MOSM/kg H2O  
SODIUM, UR, RANDOM Collection Time: 11/17/19  2:28 PM  
Result Value Ref Range Sodium,urine random 39 MMOL/L  
CHLORIDE, URINE RANDOM Collection Time: 11/17/19  2:28 PM  
Result Value Ref Range Chloride,urine random 27 MMOL/L  
MICROALBUMIN, UR, RAND W/ MICROALB/CREAT RATIO Collection Time: 11/17/19  2:28 PM  
Result Value Ref Range Microalbumin,urine random 5.68 MG/DL Creatinine, urine 33.70 mg/dL Microalbumin/Creat ratio (mg/g creat) 169 (H) 0 - 30 mg/g OSMOLALITY, SERUM/PLASMA Collection Time: 11/17/19  2:31 PM  
Result Value Ref Range Osmolality, serum/plasma 368 mOsm/kg H2O  
GLUCOSE, POC Collection Time: 11/17/19  2:40 PM  
Result Value Ref Range Glucose (POC) 96 65 - 100 mg/dL Performed by Sara Nevarez Collection Time: 11/17/19  2:40 PM  
Result Value Ref Range Glucose 96 mg/dL Insulin order 4.5 units/hour Insulin adminstered 4.5 units/hour Multiplier 0.124 Low target 95 mg/dL High target 130 mg/dL D50 order 0.0 ml  
 D50 administered 0.00 ml Minutes until next BG 60 min Order initials cw Administered initials cw GLSCOM Comments GLUCOSE, POC Collection Time: 11/17/19  3:45 PM  
Result Value Ref Range Glucose (POC) 99 65 - 100 mg/dL Performed by Juana Hayward Collection Time: 11/17/19  3:45 PM  
Result Value Ref Range Glucose 99 mg/dL Insulin order 4.8 units/hour Insulin adminstered 4.8 units/hour Multiplier 0.124 Low target 95 mg/dL High target 130 mg/dL D50 order 0.0 ml  
 D50 administered 0.00 ml Minutes until next BG 60 min Order initials cw Administered initials cw GLSCOM Comments GLUCOSE, POC Collection Time: 11/17/19  4:48 PM  
Result Value Ref Range Glucose (POC) 114 (H) 65 - 100 mg/dL Performed by Derrell Chavez Collection Time: 11/17/19  4:49 PM  
Result Value Ref Range PLATELET 39 (LL) 409 - 400 K/uL RENAL FUNCTION PANEL Collection Time: 11/17/19  4:49 PM  
Result Value Ref Range Sodium 156 (H) 136 - 145 mmol/L Potassium 3.5 3.5 - 5.1 mmol/L Chloride 120 (H) 97 - 108 mmol/L  
 CO2 31 21 - 32 mmol/L Anion gap 5 5 - 15 mmol/L Glucose 111 (H) 65 - 100 mg/dL  (H) 6 - 20 MG/DL Creatinine 1.54 (H) 0.70 - 1.30 MG/DL  
 BUN/Creatinine ratio 79 (H) 12 - 20 GFR est AA 54 (L) >60 ml/min/1.73m2 GFR est non-AA 45 (L) >60 ml/min/1.73m2 Calcium 8.3 (L) 8.5 - 10.1 MG/DL Phosphorus 4.2 2.6 - 4.7 MG/DL Albumin 2.0 (L) 3.5 - 5.0 g/dL Fer Hayward Collection Time: 11/17/19  4:50 PM  
Result Value Ref Range Glucose 114 mg/dL Insulin order 6.7 units/hour Insulin adminstered 6.7 units/hour Multiplier 0.124 Low target 95 mg/dL High target 130 mg/dL D50 order 0.0 ml  
 D50 administered 0.00 ml Minutes until next  min Order initials EW Administered initials EW   
 GLSCOM Comments PLATELETS, ALLOCATE Collection Time: 11/17/19  5:30 PM  
Result Value Ref Range Unit number [id number] Blood component type PLPH LR,PAS1 Unit division 00 Status of unit TRANSFUSED   
GLUCOSE, POC Collection Time: 11/17/19  6:56 PM  
Result Value Ref Range Glucose (POC) 116 (H) 65 - 100 mg/dL Performed by Ladan Fall Fakrobin Collection Time: 11/17/19  6:57 PM  
Result Value Ref Range Glucose 116 mg/dL Insulin order 6.9 units/hour Insulin adminstered 6.9 units/hour Multiplier 0.124 Low target 95 mg/dL High target 130 mg/dL D50 order 0.0 ml  
 D50 administered 0.00 ml Minutes until next  min Order initials cw Administered initials cw GLSCOM Comments GLUCOSE, POC Collection Time: 11/17/19  9:01 PM  
Result Value Ref Range Glucose (POC) 126 (H) 65 - 100 mg/dL Performed by Amol Blackmon Collection Time: 11/17/19  9:01 PM  
Result Value Ref Range Glucose 126 mg/dL Insulin order 8.2 units/hour Insulin adminstered 8.2 units/hour Multiplier 0.124 Low target 95 mg/dL High target 130 mg/dL D50 order 0.0 ml  
 D50 administered 0.00 ml Minutes until next  min Order initials jmm Administered initials jmm GLSCOM Comments GLUCOSE, POC Collection Time: 11/17/19 10:58 PM  
Result Value Ref Range Glucose (POC) 84 65 - 100 mg/dL Performed by Amol Luevano GLUCOSE, POC Collection Time: 11/17/19 10:59 PM  
Result Value Ref Range Glucose (POC) 83 65 - 100 mg/dL Performed by Amol Blackmon Collection Time: 11/17/19 10:59 PM  
Result Value Ref Range Glucose 83 mg/dL Insulin order 2.3 units/hour Insulin adminstered 2.3 units/hour Multiplier 0.099 Low target 95 mg/dL High target 130 mg/dL D50 order 0.0 ml  
 D50 administered 0.00 ml Minutes until next BG 60 min Order initials marlena Administered initials marlena LUND Comments GLUCOSE, POC Collection Time: 11/18/19 12:01 AM  
Result Value Ref Range Glucose (POC) 93 65 - 100 mg/dL Performed by Mirna Grater Maribeth Opitz Collection Time: 11/18/19 12:02 AM  
Result Value Ref Range Glucose 93 mg/dL Insulin order 2.6 units/hour Insulin adminstered 2.6 units/hour Multiplier 0.079 Low target 95 mg/dL High target 130 mg/dL D50 order 0.0 ml  
 D50 administered 0.00 ml Minutes until next BG 60 min Order initials marlena Administered initials marlena LUND Comments GLUCOSE, POC Collection Time: 11/18/19 12:58 AM  
Result Value Ref Range Glucose (POC) 111 (H) 65 - 100 mg/dL Performed by Mirna Grater Maribeth Opitz Collection Time: 11/18/19 12:58 AM  
Result Value Ref Range Glucose 111 mg/dL Insulin order 4.0 units/hour Insulin adminstered 4.0 units/hour Multiplier 0.079 Low target 95 mg/dL High target 130 mg/dL D50 order 0.0 ml  
 D50 administered 0.00 ml Minutes until next BG 60 min Order initials marlena Administered initials marlena LUND Comments GLUCOSE, POC Collection Time: 11/18/19  2:00 AM  
Result Value Ref Range Glucose (POC) 134 (H) 65 - 100 mg/dL Performed by [employer] Collection Time: 11/18/19  2:01 AM  
Result Value Ref Range Glucose 134 mg/dL Insulin order 6.6 units/hour Insulin adminstered 6.6 units/hour Multiplier 0.089 Low target 95 mg/dL High target 130 mg/dL D50 order 0.0 ml  
 D50 administered 0.00 ml Minutes until next BG 60 min Order initials marlena Administered initials marlena LUND Comments CBC W/O DIFF Collection Time: 11/18/19  3:09 AM  
Result Value Ref Range WBC 8.7 4.1 - 11.1 K/uL  
 RBC 2.48 (L) 4.10 - 5.70 M/uL HGB 7.6 (L) 12.1 - 17.0 g/dL HCT 24.4 (L) 36.6 - 50.3 % MCV 98.4 80.0 - 99.0 FL  
 MCH 30.6 26.0 - 34.0 PG  
 MCHC 31.1 30.0 - 36.5 g/dL  
 RDW 16.0 (H) 11.5 - 14.5 % PLATELET 40 (LL) 569 - 400 K/uL NRBC 0.0 0  WBC ABSOLUTE NRBC 0.00 0.00 - 0.01 K/uL MAGNESIUM Collection Time: 11/18/19  3:09 AM  
Result Value Ref Range Magnesium 2.5 (H) 1.6 - 2.4 mg/dL NT-PRO BNP Collection Time: 11/18/19  3:09 AM  
Result Value Ref Range NT pro-BNP 20,582 (H) <125 PG/ML  
PROCALCITONIN Collection Time: 11/18/19  3:09 AM  
Result Value Ref Range Procalcitonin 1.6 ng/mL METABOLIC PANEL, COMPREHENSIVE Collection Time: 11/18/19  3:09 AM  
Result Value Ref Range Sodium 153 (H) 136 - 145 mmol/L Potassium 3.4 (L) 3.5 - 5.1 mmol/L Chloride 117 (H) 97 - 108 mmol/L  
 CO2 30 21 - 32 mmol/L Anion gap 6 5 - 15 mmol/L Glucose 116 (H) 65 - 100 mg/dL  (H) 6 - 20 MG/DL Creatinine 1.39 (H) 0.70 - 1.30 MG/DL  
 BUN/Creatinine ratio 72 (H) 12 - 20 GFR est AA >60 >60 ml/min/1.73m2 GFR est non-AA 50 (L) >60 ml/min/1.73m2 Calcium 8.2 (L) 8.5 - 10.1 MG/DL Bilirubin, total 0.5 0.2 - 1.0 MG/DL  
 ALT (SGPT) 129 (H) 12 - 78 U/L  
 AST (SGOT) 50 (H) 15 - 37 U/L Alk. phosphatase 91 45 - 117 U/L Protein, total 5.2 (L) 6.4 - 8.2 g/dL Albumin 1.9 (L) 3.5 - 5.0 g/dL Globulin 3.3 2.0 - 4.0 g/dL A-G Ratio 0.6 (L) 1.1 - 2.2 PHOSPHORUS Collection Time: 11/18/19  3:09 AM  
Result Value Ref Range Phosphorus 4.6 2.6 - 4.7 MG/DL  
GLUCOSE, POC Collection Time: 11/18/19  3:10 AM  
Result Value Ref Range Glucose (POC) 121 (H) 65 - 100 mg/dL Performed by Kaylynn Barton Collection Time: 11/18/19  3:11 AM  
Result Value Ref Range Glucose 121 mg/dL Insulin order 5.4 units/hour Insulin adminstered 5.4 units/hour Multiplier 0.089 Low target 95 mg/dL High target 130 mg/dL  D50 order 0.0 ml  
 D50 administered 0.00 ml Minutes until next BG 60 min Order initials marlena Administered initials marlena GLSCOM Comments GLUCOSE, POC Collection Time: 11/18/19  4:01 AM  
Result Value Ref Range Glucose (POC) 121 (H) 65 - 100 mg/dL Performed by Ricmary alice Holland [facility] Collection Time: 11/18/19  4:02 AM  
Result Value Ref Range Glucose 121 mg/dL Insulin order 5.4 units/hour Insulin adminstered 5.4 units/hour Multiplier 0.089 Low target 95 mg/dL High target 130 mg/dL D50 order 0.0 ml  
 D50 administered 0.00 ml Minutes until next BG 60 min Order initials marlena Administered initials marlena LUND Comments PLATELETS, ALLOCATE Collection Time: 11/18/19  4:15 AM  
Result Value Ref Range Unit number [id number] Blood component type PLTPH LR 3 Unit division 00 Status of unit ALLOCATED   
GLUCOSE, POC Collection Time: 11/18/19  4:56 AM  
Result Value Ref Range Glucose (POC) 118 (H) 65 - 100 mg/dL Performed by Ricmary alice Holland [facility] Collection Time: 11/18/19  4:57 AM  
Result Value Ref Range Glucose 118 mg/dL Insulin order 5.2 units/hour Insulin adminstered 5.2 units/hour Multiplier 0.089 Low target 95 mg/dL High target 130 mg/dL D50 order 0.0 ml  
 D50 administered 0.00 ml Minutes until next BG 60 min Order initials marlena Administered initials marlena GLSCBETTYE Comments GLUCOSE, POC Collection Time: 11/18/19  5:53 AM  
Result Value Ref Range Glucose (POC) 99 65 - 100 mg/dL Performed by Ricmary alice Holland [facility] Collection Time: 11/18/19  5:54 AM  
Result Value Ref Range Glucose 99 mg/dL Insulin order 3.5 units/hour Insulin adminstered 3.5 units/hour Multiplier 0.089 Low target 95 mg/dL High target 130 mg/dL D50 order 0.0 ml  
 D50 administered 0.00 ml Minutes until next BG 60 min Order initials marlena Administered initials marlena GLSCOM Comments GLUCOSE, POC Collection Time: 11/18/19  5:59 AM  
Result Value Ref Range Glucose (POC) 100 65 - 100 mg/dL Performed by Allison Lopes POC G3 - PUL Collection Time: 11/18/19  6:02 AM  
Result Value Ref Range FIO2 (POC) 60 % pH (POC) 7.446 7.35 - 7.45    
 pCO2 (POC) 38.2 35.0 - 45.0 MMHG  
 pO2 (POC) 73 (L) 80 - 100 MMHG  
 HCO3 (POC) 26.3 (H) 22 - 26 MMOL/L  
 sO2 (POC) 95 92 - 97 % Base excess (POC) 2 mmol/L Site DRAWN FROM ARTERIAL LINE Device: VENT Mode ASSIST CONTROL Set Rate 24 bpm  
 PEEP/CPAP (POC) 5 cmH2O  
 PIP (POC) 16 Allens test (POC) N/A Inspiratory Time 1 sec Specimen type (POC) ARTERIAL Total resp. rate 26 GLUCOSE, POC Collection Time: 11/18/19  6:56 AM  
Result Value Ref Range Glucose (POC) 107 (H) 65 - 100 mg/dL Performed by Allison Clements Collection Time: 11/18/19  6:56 AM  
Result Value Ref Range Glucose 107 mg/dL Insulin order 4.2 units/hour Insulin adminstered 4.2 units/hour Multiplier 0.089 Low target 95 mg/dL High target 130 mg/dL D50 order 0.0 ml  
 D50 administered 0.00 ml Minutes until next BG 60 min Order initials marlena Administered initials felisa GLSCOM Comments Pasquale Jin MD 
[address] [place] [address], [address] [facility] Phone - [phone] Fax - [phone] 
[email or web address]

## 2019-11-18 NOTE — PROGRESS NOTES
[facility] [place] ICU Progress Note Admit Date: 10/19/2019 Procedure:  Procedure(s): 
CARDI[facility], [facility]  [facility]    
 
CABG x 5, LIMA to LAD, RSVG to [id number], RSVG to PDA 10/23/19 Subjective:  
Pt seen with Dr. Gertrudis Karimi. Precedex gtt. Afebrile, Trach to vent with FiO2 of 60%. HIT panel pending. Platelets remain in the 40k range. Agitated with increased respiratory work when sedation weaned but no purposeful movement. Objective:  
Vitals: 
Blood pressure 149/61, pulse 75, temperature 97.4 °F (36.3 °C), resp. rate (!) 36, height 5' 7\" (1.702 m), weight 137 lb (62.1 kg), SpO2 100 %. Temp (24hrs), Av.4 °F (36.3 °C), Min:97.2 °F (36.2 °C), Max:97.6 °F (36.4 °C) EKG/Rhythm: SR in the 76s Oxygen Therapy: 
Oxygen Therapy O2 Sat (%): 100 % (19 0900) Pulse via Oximetry: 76 beats per minute (19 0900) O2 Device: Endotracheal tube (19) O2 Flow Rate (L/min): 40 l/min (19) O2 Temperature: 98.6 °F (37 °C) (19 0729) FIO2 (%): 60 % (19) CXR:  
CXR Results  (Last 48 hours)  
          
 19 0446  XR CHEST PORT Final result Impression:  IMPRESSION:  
1. Persistent interstitial edema which has decreased. Narrative: EXAM: XR CHEST PORT INDICATION: interstitial edema COMPARISON: 2019 FINDINGS: A portable AP radiograph of the chest was obtained at 0342 hours. The  
patient is on a cardiac monitor. Tracheostomy tube overlies the airway. Feeding  
tube courses into the stomach. PICC line overlies the SVC. Lungs demonstrate interstitial edema which mildly decreased. No pneumothorax. 19 0408  XR CHEST PORT Final result Impression:  IMPRESSION:  
1. No interval change Narrative:  INDICATION:  interstitial edema EXAM: Portable chest 0354 . Comparison 2019. FINDINGS: There is no significant change in appearance the lungs. Diffuse interstitial prominence stable in appearance given technique. Cardiomediastinal  
silhouette is unchanged. No pneumothorax. Lines and tubes are unchanged in  
position. Admission Weight: Last Weight Weight: 141 lb 5 oz (64.1 kg) Weight: 137 lb (62.1 kg) Intake / Output / Drain: 
Current Shift: No intake/output data recorded. Last 24 hrs.:  
 
Intake/Output Summary (Last 24 hours) at 11/18/2019 0932 Last data filed at 11/18/2019 0700 Gross per 24 hour Intake 4686.88 ml Output 2455 ml Net 2231.88 ml EXAM: 
General:  Intubated, sedated. Lungs:   Coarse bilat Incision:  Midsternal incision with no significant erythema, drainage, or dehiscence. Heart:  Regular rate and rhythm, S1, S2 normal, no murmur, click, rub or gallop. Abdomen:   Soft, non-tender. Bowel sounds active. No masses,  No organomegaly. +BM. Extremities:  Some generalized edema. Palpable pulses bilat Neurologic:  Moves extremities but without purpose. Does not track or follow commands. Labs:  
Recent Labs 11/18/19 
9998  11/18/19 
0309 WBC  --   --  8.7 HGB  --   --  7.6* HCT  --   --  24.4*  
PLT  --   --  40* NA  --   --  153* K  --   --  3.4*  
BUN  --   --  100* CREA  --   --  1.39* GLU  --   --  116* GLUCPOC 126*   < >  --   
 < > = values in this interval not displayed. Assessment:  
 
Principal Problem: S/P CABG x 5 (10/23/2019) Overview: x 5, LIMA to LAD, RSVG to Diag1, OM2-OM3, RSVG to PDA Active Problems: 
  [facility]) Unstable angina ([facility] (10/19/2019) Coronary artery disease of native artery of native heart with stable angina pectoris ([facility] (10/21/2019) Systolic heart failure [facility] (10/22/2019) Plan/Recommendations/Medical Decision Making: 1. S/p CABG: on ASA, statin. No ACEi/ARB due to renal function. No BB due to pulmonary fibrosis. 2. Acute on chronic systolic CHF, NYHA Class II on admit: EF 35-40% preop. No BB/ACE/ARB/AA until vitals/kidney function allows. Trend pBNP 3. Acute postop respiratory failure with chronic interstitial fibrosis per CXR: Re-intubated 11/7 due to worsening acute respiratory failure. Gala Stannder placed 11/13 by Thoracic Surgery. Acute respiratory acidosis 11/14 thought driven by oversedation. Fentanyl and versed drips stopped. (now just has PRN Morphine and Ativan dosing) Diuresis per Nephrology-will restart today. Amiodarone had been stopped on 10/31. Solumedrol per pulm-weaning. Continue scheduled nebs. Cont mucinex, pulm toileting, mucomyst. Chest CT revealing emphysema/pulm fibrosis. 4. Renal insufficiency: BUN/Creatinine slightly improved today. Nephrology following and appreciate their recommendations. Devlin in place for accurate I&O. Will give dose of Bumex this morning. 5. Anemia: had preop, H&H stable today. Did receive transfusion of PRBC on Friday. Iron panel sent preop - iron, iron sat low-continue Iron supplement. 6. Thrombocytopenia: Platelets worse today at 40k. HIT and CARMEN send-results pending. Q12 CBC. Hold SQ heparin and ASA. On PPI. Transfuse platelets for less than 20k 7. Hx of HTN: Hydralazine PRN SBP >160 8. HLD: Continue pravastatin 9. Leukocytosis: WBC at 8.7k today, procalcitonin at 1.6,  Devlin placed 11/7. PICC placed 11/6. Trach 11/13. Monitor daily CBC. Appreciate ID consult-continue Zosyn 10. Constipation: Resolved. Continue to monitor and bowel regimen PRN 11. Current smoker: smoking cessation education completed. 12. Nutrition: Appreciate Dietician recommendations. Dobhoff placed 10/30 and Enteral feedings started. Too unstable from resp standpoint for PO diet, TF's continuous at 50 ml/hr. Try and AVOID increased volume amounts 13. Hyperglycemia: Preop A1c 5.3 with no DM history, now with hyperglycemia. BS have been much better controlled on Insulin gtt. DTS following. Now on D5 for hypernatremia. Weaning steroids per Pulmonology. 14. Hypernatremia: Nephrology following-increased FW flushes and added D5. Na at 153 today. Continue to monitor 15. Agitation: Have been unable to wean down on sedation much due to agitation. Continue Precedex, and PRN Morphine, Ativan to keep the patient comfortable. Will get a Head CT today (no contrast due to renal function) to evaluate for any acute issue. 16. DVT/GI Prophylaxis: SCD's, SQ Heparin-currently on hold due to thrombocytopenia. Will restart when improved, PPI Dispo: PT/OT as able. Remain in CVI until resp status more stable.   
 
 
Signed By: Debra Townsend NP

## 2019-11-18 NOTE — PROGRESS NOTES
NYHA class IV A/C systolic heart failure (EF 25%, NT pro-BNP 15,013) Acute on chronic kidney disease Mild pulmonary edema Mild chronic lung disease Acute on chronic hypoxic respiratory failure 
  
Still very confused this am  
 
Will plan on head CT scan today Hgb and platelets running low Sent off MARIBELL Creatinine better Bilirubin and other LFTs looking better Pro-calcitonin a little better ABG looks good On TF's CXR - mild pulmonary edema Addendum:  
 
Head CT scan performed I don't see anything obvious Will wait on official report Ok to add propofol overnight for agitation Intake/Output Summary (Last 24 hours) at 11/18/2019 1433 Last data filed at 11/18/2019 1300 Gross per 24 hour Intake 4931.37 ml Output 2500 ml Net 2431.37 ml Visit Vitals /61 Pulse 95 Temp 98.4 °F (36.9 °C) Resp 30 Ht 5' 7\" (1.702 m) Wt 137 lb (62.1 kg) SpO2 96% BMI 21.46 kg/m² Risk of morbidity and mortality - high Medical decision making - high complexity Total critical care time - 30 minutes (CPT 73774)

## 2019-11-18 NOTE — PROGRESS NOTES
Physical Therapy 11/18/2019 Chart reviewed. Patient remains on vent support with trach, noted tachypnea and agitation overnight per nursing notes. Have held PT re-evaluation for last 10 days due to medically instability/not appropriate for therapy. Will complete orders at this time. Please re-consult when status changes and patient appropriate for therapy. Thank you.  
 
Kanwal Stewart, PT, DPT

## 2019-11-18 NOTE — DIABETES MGMT
[facility] Cardiac Surgery Progress Note Recommendations/ Comments: Insulin gtt begun 10/14/2019 for extreme hyperglycemia, BG > 400 mg/dL. Steroids Solu medrol tapered to 40 mg/dL Q 12 hours IVF D5 @ 65 due to hypernatremia. TF continuous Suplena @ 50 Trach/ Vent, pt restless, not following commands Renal noted Currently on insulin gtt At 0917  mg/dL, rate 6.5 units/hr, received 32.1 units in past 6 hours. At this time transition dose + meal time dose per gtt = 89 units daily per gtt weaning guide Note pt was poorly controlled on Lantus 38 units Q 12 hours prior to starting gtt Patient is 68 y.o. male s/p CABG x 5 followed by re-entry for mediastinal clot evacuation and repair of SVG to PDA  - POD 21. Pt with documented hx of DM on no meds PTA. Anemia - A1c inaccurate A1c:  
Lab Results Component Value Date/Time Hemoglobin A1c 5.3 10/21/2019 03:04 AM  
 
 
 
Recent Glucose Results:  
Lab Results Component Value Date/Time  (H) 11/18/2019 03:09 AM  
  (H) 11/17/2019 04:49 PM  
 GLUCPOC 126 (H) 11/18/2019 09:16 AM  
 GLUCPOC 134 (H) 11/18/2019 08:14 AM  
 GLUCPOC 107 (H) 11/18/2019 06:56 AM  
  
 
Lab Results Component Value Date/Time Creatinine 1.39 (H) 11/18/2019 03:09 AM  
 
Estimated Creatinine Clearance: 42.8 mL/min (A) (based on SCr of 1.39 mg/dL (H)). Active Orders Diet DIET NPO  
  
 
PO intake:  
No data found. Will continue to follow as needed. Thank you. Brianne Mederos RN, CDE Time spent: 8 minutes Brianne Mederos RN, CDE

## 2019-11-18 NOTE — PROGRESS NOTES
1400 :care assumed by Sheryle Rima, RN Bedside shift change report given to Sheryle Rima (oncoming nurse) by Nelida Ocampo (offgoing nurse). Report included the following information SBAR, Kardex, ED Summary, OR Summary, Procedure Summary, Intake/Output, MAR, Accordion, Recent Results, Cardiac Rhythm NSR/ST, Alarm Parameters , Quality Measures and Dual Neuro Assessment. TF had been turned off by Diana Shin. Prepping pt for transport to CT 
 
1500:transported pt to ct 1540:return from CT; TF set changed and restarted- vBG monitored. Glucose stabilizer 1630[facility]. Verbal plan of care established. If Resp effort unaided by morphine, ativan prn and continous precedex, then start Propofol gtt. 1812: Spoke with Dr. Nicole Avila via telephone to update to PLT  = 39. Verbal order to monitor and not treat at this time. Plan of care conveyed- routine repeat check with am labs as ordered. To inform PM RN to call Dr Nicole Avila (on- call) if PLT < 20 
 
1940:Bedside shift change report given to Elise Faulkner (oncoming nurse) by Justino Joe (offgoing nurse). Report included the following information SBAR. Daily shift updates, labwork, NSR, BM's, CT scan

## 2019-11-19 NOTE — PROGRESS NOTES
NYHA class IV A/C systolic heart failure (EF 25%, NT pro-BNP 15,013) Acute on chronic kidney disease Mild pulmonary edema Mild chronic lung disease Acute on chronic hypoxic respiratory failure Remains intubated and sedated Patient's son in earlier - went over issues Still very confused Remains on TF's 
 
Hgb looks good Platelets remains severely low Creatinine remains in the mid 1's Biliurubin and other LFTs continue to improve NT pro-BNP up a bit today Up on Bumex Head CT scan negative yesterday CXR - pulmonary edema on pulmonary fibrosis Intake/Output Summary (Last 24 hours) at 11/19/2019 1500 Last data filed at 11/19/2019 1400 Gross per 24 hour Intake 5194.8 ml Output 2070 ml Net 3124.8 ml Visit Vitals /69 (BP 1 Location: Left arm, BP Patient Position: Supine) Pulse 81 Temp 99.2 °F (37.3 °C) Resp 24 Ht 5' 7\" (1.702 m) Wt 141 lb 3.2 oz (64 kg) SpO2 97% BMI 22.12 kg/m² Risk of morbidity and mortality - high Medical decision making - high complexity Total critical care time - 30 minutes (CPT 15127)

## 2019-11-19 NOTE — CONSULTS
Patient seen, chart reviewed, note dictated. [id number] Briefly, Mr. Panda Alarcon is a 67 y/o man, s/p CABG 10/23/19 after presenting with ACS in the context of ICM. Had LIMA to LAD, RSVG to D1, OM2, OM3, RSVG to PDA and bilateral GSV endovascular harvest. Course complicated by post-op bleeding and tamponade and taken back to OR for evacuation. Required tracheostomy placement for prolonged respiratory failure. Seen by Pulmonary who raised the concern for irreversible fibrotic disease. Bronhoscopy showed normal endobronchial anatomy 11/7/19PLT count normal on admission. PLT count first noted to decline 11/11/19. Seen by ID on 11/16/19 for consideration of broad spectrum ABx for sepsis and Zosyn was started on 11/16/19. Was on heparin intermittenly post-op and HIT Ab and CARMEN were sent and were negative on 11/17/19. Additional work-up thus far has included:  
 
Bilirubin 0.6 11/19/19 HECTOR negative 11/2/19 Legionella DFA negative 11/7/19 Bronchial lavage negative 11/7/19 Blood culture negative 10/21/19 1. T'cytopenia: Diff dx includes occult liver disease, hepatic congestion from right sided heart failure, O-24, folic acid, or copper deficiency. While Zosyn can cause t'cytopenia, his declined started 11/11/19, 5 days before Zosyn was started. HIT panel negative. Will send off additional testing including LAC, ACL Ab, B2G Ab, copper, L-38, folic acid, PT, PTT, fibrinogen. Would recommend PLT transfusion for bleeding or < 10. Thank you for consult. Will follow daily.   
 
Stefania Armstrong MD 
Hem/Onc

## 2019-11-19 NOTE — DIABETES MGMT
[facility] Cardiac Surgery Progress Note Recommendations/ Comments: Insulin gtt begun 10/14/2019 for extreme hyperglycemia, BG > 400 mg/dL at that time. Steroids Solu medrol  40 mg/dL Q 12 hours IVF D5 @ 65 due to hypernatremia begun yesterday - d/c today. TF continuous Suplena @ 35 Trach/ Vent, pt restless, not following commands Renal noted Currently on insulin gtt At 0902  mg/dL, rate 2.3 units/hr, received 10.9 units in past 6 hours. Note pt was poorly controlled on Lantus 38 units Q 12 hours prior to starting gtt Noted initial plan to transition him from the gtt yesterday with Lantus 40 units BID. He received a one-time dose of Lantus 40 units given at 1725 yestrday but the transition did not occur and he remains on the gtt. Patient is 68 y.o. male s/p CABG x 5 followed by re-entry for mediastinal clot evacuation and repair of SVG to PDA  - POD 21. Pt with documented hx of DM on no meds PTA. Anemia - A1c inaccurate A1c:  
Lab Results Component Value Date/Time Hemoglobin A1c 5.3 10/21/2019 03:04 AM  
 
 
 
Recent Glucose Results:  
Lab Results Component Value Date/Time GLU 93 11/19/2019 03:46 AM  
 GLU 78 11/18/2019 05:04 PM  
 GLUCPOC 124 (H) 11/19/2019 09:01 AM  
 GLUCPOC 109 (H) 11/19/2019 07:55 AM  
 GLUCPOC 92 11/19/2019 06:52 AM  
  
 
Lab Results Component Value Date/Time Creatinine 1.49 (H) 11/19/2019 03:46 AM  
 
Estimated Creatinine Clearance: 41.2 mL/min (A) (based on SCr of 1.49 mg/dL (H)). Active Orders There are no active orders of the following types: Diet. PO intake:  
No data found. Will continue to follow as needed. Thank you. Ruben Samuels RN, CDE Time spent: 8 minutes

## 2019-11-19 NOTE — PROGRESS NOTES
0000: Report received from [place], RN, drips dual RN rate verified and care assumed of patient. 0110: Patient with RR in 40s, coughing, asynchronous with vent. Medicated with 2mg IV morphine. 8741: Patient medicated with Ativan for labored breathing, tachypnea. 0345: Labs drawn, cxr complete. [id number] Odin care complete.  
 
0700: Dr. Joss Bernal bedside. 0800: Bedside shift change report given to Noel Luu (oncoming nurse) by Lisy Abdul RN (offgoing nurse). Report included the following information SBAR, Kardex, Procedure Summary, Intake/Output, Recent Results and Cardiac Rhythm NSR.

## 2019-11-19 NOTE — PROGRESS NOTES
Chart reviewed. Patient remains on vent support with trach, with noted tachypnea, labored breathing, and RR in 45s. Have held OT re-evaluation for last 11 days due to medically instability/not appropriate for therapy. Will complete orders at this time. Please re-consult when status changes and patient appropriate for therapy. Thank you.

## 2019-11-19 NOTE — PROGRESS NOTES
Nephrology Progress Note Sacha Alexander Date of Admission : 10/19/2019 CC:  Follow up for ISAIAH on CKD, hypervolemia Assessment and Plan ISAIAH on CKD: 
- from ATN post CABG, pre renal azotemia from diuretics - severe azotemia :  Improving 
- continue w/ Bumex 1 mg BID 
- daily labs Hypernatremia : 
- continue FW flushes please 200 cc Q3 HRS  
- stopped D5W  
- daily labs CKD III: 
- baseline Cr 1.3 prior to CABG 
- likely from DM and HTN 
  
HFrE F: 
- last EF 35-40% on 10/20 
  
CAD s/p CABG x 5 10/19 and reexploration 10/23 for hematoma 
  
Acute on Chronic Resp Failure: 
- likely 2/2 underlying ILD + volume 
- now w/ trach Chronic Anemia: 
- Hb stable  
  
DM2: 
 
Protein Malnutrition: 
- on TF via [place] [address] Interval History: 
Seen and examined. RUE more edematous Remains on vent , sedated Na and Cr trending down Tolerating water flushes w/ TF 
BG better Current Medications: all current  Medications have been eviewed in [facility] Review of Systems: Review of systems not obtained due to patient factors. Objective: 
Vitals:   
Vitals:  
 11/19/19 0448 11/19/19 0500 11/19/19 0537 11/19/19 0600 BP:      
Pulse: 80 79  89 Resp: 27 27  28 Temp:      
SpO2: 98% 99%  97% Weight:   64 kg (141 lb 3.2 oz) Height:      
 
Intake and Output: 
11/18 1901 - 11/19 0700 In: 2248.8 [I.V.:1003.8] Out: 1180 [NYJCJ:2933] 11/17 0701 - 11/18 1900 In: 7602.3 [I.V.:3339.3] Out: 7258 [Urine:3525; Drains:45] Physical Examination: 
General: Frail, sedated Neck:  + trach Resp:  Diffuse dry crackles CV:  RRR,  no murmur or rub, no LE edema GI:  Soft, NT, + Bowel sounds, no hepatosplenomegaly Neurologic:  Sedated on the vent Psych:             Unable to assess Skin:  No Rash :  Devlin in place [x]    High complexity decision making was performed 
[x]    Patient is at high-risk of decompensation with multiple organ involvement Lab Data Personally Reviewed: I have reviewed all the pertinent labs, microbiology data and radiology studies during assessment. Recent Labs 11/19/19 0346 11/18/19 
1704 11/18/19 
0309 11/17/19 
1649 * 150* 153* 156*  
K 3.6 4.1 3.4* 3.5 * 115* 117* 120* CO2 27 29 30 31 GLU 93 78 116* 111* BUN 88* 92* 100* 122* CREA 1.49* 1.40* 1.39* 1.54* CA 8.0* 8.2* 8.2* 8.3*  
MG 2.2 2.6* 2.5*  --   
PHOS 4.8*  --  4.6 4.2 ALB 1.9* 1.9* 1.9* 2.0*  
SGOT 44* 44* 50*  --   
* 112* 129*  --   
 
Recent Labs 11/19/19 0346 11/18/19 1704 11/18/19 
0309 11/17/19 
0502 WBC 9.5  --  8.7  --  3.3* HGB 7.5*  --  7.6*  --  7.7* HCT 24.3*  --  24.4*  --  24.8*  
PLT 38* 39* 40*   < > 36*  
 < > = values in this interval not displayed. No results found for: SDES Lab Results Component Value Date/Time Culture result: NO GROWTH 2 DAYS 11/16/2019 03:10 AM  
 Culture result: NO GROWTH 2 DAYS 11/07/2019 12:45 PM  
 Culture result: HEAVY NORMAL RESPIRATORY TARIK 10/21/2019 12:08 PM  
 
Recent Results (from the past 24 hour(s)) GLUCOSE, POC Collection Time: 11/18/19  6:56 AM  
Result Value Ref Range Glucose (POC) 107 (H) 65 - 100 mg/dL Performed by Mirna Grater Maribeth Opitz Collection Time: 11/18/19  6:56 AM  
Result Value Ref Range Glucose 107 mg/dL Insulin order 4.2 units/hour Insulin adminstered 4.2 units/hour Multiplier 0.089 Low target 95 mg/dL High target 130 mg/dL D50 order 0.0 ml  
 D50 administered 0.00 ml Minutes until next BG 60 min Order initials [email or web address] Administered initials jmm GLSCOM Comments GLUCOSE, POC Collection Time: 11/18/19  8:14 AM  
Result Value Ref Range Glucose (POC) 134 (H) 65 - 100 mg/dL Performed by Elroy Seeds Maribeth Opitz Collection Time: 11/18/19  8:15 AM  
Result Value Ref Range Glucose 134 mg/dL Insulin order 7.3 units/hour Insulin adminstered 7.3 units/hour Multiplier 0.099 Low target 95 mg/dL High target 130 mg/dL D50 order 0.0 ml  
 D50 administered 0.00 ml Minutes until next BG 60 min Order initials dn   
 Administered initials dn   
 GLSCOM Comments GLUCOSE, POC Collection Time: 11/18/19  9:16 AM  
Result Value Ref Range Glucose (POC) 126 (H) 65 - 100 mg/dL Performed by [employer] Collection Time: 11/18/19  9:17 AM  
Result Value Ref Range Glucose 126 mg/dL Insulin order 6.5 units/hour Insulin adminstered 6.5 units/hour Multiplier 0.099 Low target 95 mg/dL High target 130 mg/dL D50 order 0.0 ml  
 D50 administered 0.00 ml Minutes until next BG 60 min Order initials dn   
 Administered initials dn   
 GLSCOM Comments GLUCOSE, POC Collection Time: 11/18/19 10:31 AM  
Result Value Ref Range Glucose (POC) 103 (H) 65 - 100 mg/dL Performed by Philipp Generous [employer] Collection Time: 11/18/19 10:31 AM  
Result Value Ref Range Glucose 103 mg/dL Insulin order 4.3 units/hour Insulin adminstered 4.3 units/hour Multiplier 0.099 Low target 95 mg/dL High target 130 mg/dL D50 order 0.0 ml  
 D50 administered 0.00 ml Minutes until next BG 60 min Order initials dn   
 Administered initials dn   
 GLSCOM Comments GLUCOSE, POC Collection Time: 11/18/19 11:35 AM  
Result Value Ref Range Glucose (POC) 150 (H) 65 - 100 mg/dL Performed by Philipp [employer] [employer] Collection Time: 11/18/19 11:35 AM  
Result Value Ref Range Glucose 150 mg/dL Insulin order 9.8 units/hour Insulin adminstered 9.8 units/hour Multiplier 0.109 Low target 95 mg/dL High target 130 mg/dL D50 order 0.0 ml  
 D50 administered 0.00 ml Minutes until next BG 60 min Order initials dn   
 Administered initials dn   
 GLSCOM Comments GLUCOSE, POC Collection Time: 11/18/19 12:41 PM  
Result Value Ref Range Glucose (POC) 162 (H) 65 - 100 mg/dL Performed by Tony Javier Serhenrique Collection Time: 11/18/19 12:41 PM  
Result Value Ref Range Glucose 162 mg/dL Insulin order 12.1 units/hour Insulin adminstered 12.1 units/hour Multiplier 0.119 Low target 95 mg/dL High target 130 mg/dL D50 order 0.0 ml  
 D50 administered 0.00 ml Minutes until next BG 60 min Order initials dn   
 Administered initials dn   
 GLSCOM Comments GLUCOSE, POC Collection Time: 11/18/19  1:49 PM  
Result Value Ref Range Glucose (POC) 139 (H) 65 - 100 mg/dL Performed by Americo Javier Serge Collection Time: 11/18/19  1:49 PM  
Result Value Ref Range Glucose 139 mg/dL Insulin order 10.2 units/hour Insulin adminstered 10.2 units/hour Multiplier 0.129 Low target 95 mg/dL High target 130 mg/dL D50 order 0.0 ml  
 D50 administered 0.00 ml Minutes until next BG 60 min Order initials dn   
 Administered initials dn   
 GLSCOM Comments GLUCOSE, POC Collection Time: 11/18/19  2:54 PM  
Result Value Ref Range Glucose (POC) 119 (H) 65 - 100 mg/dL Performed by Americo Javier Serge Collection Time: 11/18/19  2:55 PM  
Result Value Ref Range Glucose 119 mg/dL Insulin order 7.6 units/hour Insulin adminstered 7.6 units/hour Multiplier 0.129 Low target 95 mg/dL High target 130 mg/dL D50 order 0.0 ml  
 D50 administered 0.00 ml Minutes until next BG 60 min Order initials ms Administered initials ms GLSCOM Comments GLUCOSE, POC Collection Time: 11/18/19  4:14 PM  
Result Value Ref Range Glucose (POC) 81 65 - 100 mg/dL Performed by Americo Javier Serge Collection Time: 11/18/19  4:14 PM  
Result Value Ref Range Glucose 81 mg/dL Insulin order 2.2 units/hour Insulin adminstered 2.2 units/hour Multiplier 0.103 Low target 95 mg/dL High target 130 mg/dL  D50 order 0.0 ml  
 D50 administered 0.00 ml Minutes until next BG 60 min Order initials ms Administered initials ms GLSCOM Comments TF had been of- will restart now back from [address], COMPREHENSIVE Collection Time: 11/18/19  5:04 PM  
Result Value Ref Range Sodium 150 (H) 136 - 145 mmol/L Potassium 4.1 3.5 - 5.1 mmol/L Chloride 115 (H) 97 - 108 mmol/L  
 CO2 29 21 - 32 mmol/L Anion gap 6 5 - 15 mmol/L Glucose 78 65 - 100 mg/dL BUN 92 (H) 6 - 20 MG/DL Creatinine 1.40 (H) 0.70 - 1.30 MG/DL  
 BUN/Creatinine ratio 66 (H) 12 - 20 GFR est AA >60 >60 ml/min/1.73m2 GFR est non-AA 50 (L) >60 ml/min/1.73m2 Calcium 8.2 (L) 8.5 - 10.1 MG/DL Bilirubin, total 0.6 0.2 - 1.0 MG/DL  
 ALT (SGPT) 112 (H) 12 - 78 U/L  
 AST (SGOT) 44 (H) 15 - 37 U/L Alk. phosphatase 98 45 - 117 U/L Protein, total 5.1 (L) 6.4 - 8.2 g/dL Albumin 1.9 (L) 3.5 - 5.0 g/dL Globulin 3.2 2.0 - 4.0 g/dL A-G Ratio 0.6 (L) 1.1 - 2.2 PLATELET COUNT Collection Time: 11/18/19  5:04 PM  
Result Value Ref Range PLATELET 39 (LL) 489 - 400 K/uL MAGNESIUM Collection Time: 11/18/19  5:04 PM  
Result Value Ref Range Magnesium 2.6 (H) 1.6 - 2.4 mg/dL GLUCOSE, POC Collection Time: 11/18/19  5:19 PM  
Result Value Ref Range Glucose (POC) 81 65 - 100 mg/dL Performed by Aubrey Kim Collection Time: 11/18/19  5:19 PM  
Result Value Ref Range Glucose 81 mg/dL Insulin order 1.7 units/hour Insulin adminstered 1.7 units/hour Multiplier 0.082 Low target 95 mg/dL High target 130 mg/dL D50 order 0.0 ml  
 D50 administered 0.00 ml Minutes until next BG 60 min Order initials ms Administered initials ms GLSCOM Comments GLUCOSE, POC Collection Time: 11/18/19  6:29 PM  
Result Value Ref Range Glucose (POC) 142 (H) 65 - 100 mg/dL Performed by Aubrey Kim  Collection Time: 11/18/19  6:29 PM  
 Result Value Ref Range Glucose 142 mg/dL Insulin order 7.5 units/hour Insulin adminstered 7.5 units/hour Multiplier 0.092 Low target 95 mg/dL High target 130 mg/dL D50 order 0.0 ml  
 D50 administered 0.00 ml Minutes until next BG 60 min Order initials ms Administered initials ms GLSCOM Comments GLUCOSE, POC Collection Time: 11/18/19  7:15 PM  
Result Value Ref Range Glucose (POC) 163 (H) 65 - 100 mg/dL Performed by Darrick Vera GLUCOSE, POC Collection Time: 11/18/19  7:35 PM  
Result Value Ref Range Glucose (POC) 168 (H) 65 - 100 mg/dL Performed by Rickie Swan Collection Time: 11/18/19  7:36 PM  
Result Value Ref Range Glucose 168 mg/dL Insulin order 11.0 units/hour Insulin adminstered 11.0 units/hour Multiplier 0.102 Low target 95 mg/dL High target 130 mg/dL D50 order 0.0 ml  
 D50 administered 0.00 ml Minutes until next BG 60 min Order initials ms Administered initials ms GLSCOM Comments GLUCOSE, POC Collection Time: 11/18/19  8:40 PM  
Result Value Ref Range Glucose (POC) 160 (H) 65 - 100 mg/dL Performed by Rickie Swan Collection Time: 11/18/19  8:40 PM  
Result Value Ref Range Glucose 160 mg/dL Insulin order 11.2 units/hour Insulin adminstered 11.2 units/hour Multiplier 0.112 Low target 95 mg/dL High target 130 mg/dL D50 order 0.0 ml  
 D50 administered 0.00 ml Minutes until next BG 60 min Order initials m Administered initials m GLSCOM Comments GLUCOSE, POC Collection Time: 11/18/19  9:44 PM  
Result Value Ref Range Glucose (POC) 123 (H) 65 - 100 mg/dL Performed by Rickie Swan Collection Time: 11/18/19  9:44 PM  
Result Value Ref Range Glucose 123 mg/dL Insulin order 7.1 units/hour Insulin adminstered 7.1 units/hour  Multiplier 0.112   
 Low target 95 mg/dL High target 130 mg/dL D50 order 0.0 ml  
 D50 administered 0.00 ml Minutes until next BG 60 min Order initials marlena Administered initials marlena LUND Comments GLUCOSE, POC Collection Time: 11/18/19 10:48 PM  
Result Value Ref Range Glucose (POC) 93 65 - 100 mg/dL Performed by Jeff Gardiner Jimenez Silence Collection Time: 11/18/19 10:48 PM  
Result Value Ref Range Glucose 93 mg/dL Insulin order 3.0 units/hour Insulin adminstered 3.0 units/hour Multiplier 0.090 Low target 95 mg/dL High target 130 mg/dL D50 order 0.0 ml  
 D50 administered 0.00 ml Minutes until next BG 60 min Order initials marlena Administered initials marlena LUND Comments GLUCOSE, POC Collection Time: 11/18/19 11:51 PM  
Result Value Ref Range Glucose (POC) 90 65 - 100 mg/dL Performed by Jeff Gardiner Jimenez [employer] Collection Time: 11/18/19 11:51 PM  
Result Value Ref Range Glucose 90 mg/dL Insulin order 2.2 units/hour Insulin adminstered 2.2 units/hour Multiplier 0.072 Low target 95 mg/dL High target 130 mg/dL D50 order 0.0 ml  
 D50 administered 0.00 ml Minutes until next BG 60 min Order initials danielle Administered initials danielle LUND Comments GLUCOSE, POC Collection Time: 11/19/19 12:53 AM  
Result Value Ref Range Glucose (POC) 95 65 - 100 mg/dL Performed by Wayne Gaona Silence Collection Time: 11/19/19 12:54 AM  
Result Value Ref Range Glucose 95 mg/dL Insulin order 2.5 units/hour Insulin adminstered 2.5 units/hour Multiplier 0.072 Low target 95 mg/dL High target 130 mg/dL D50 order 0.0 ml  
 D50 administered 0.00 ml Minutes until next BG 60 min Order initials danielle Administered initials danielle LUND Comments GLUCOSE, POC Collection Time: 11/19/19  1:55 AM  
Result Value Ref Range Glucose (POC) 85 65 - 100 mg/dL Performed by Regino [facility] Collection Time: 11/19/19  1:56 AM  
Result Value Ref Range Glucose 85 mg/dL Insulin order 1.5 units/hour Insulin adminstered 1.5 units/hour Multiplier 0.058 Low target 95 mg/dL High target 130 mg/dL D50 order 0.0 ml  
 D50 administered 0.00 ml Minutes until next BG 60 min Order initials kli Administered initials kli GLSCOM Comments GLUCOSE, POC Collection Time: 11/19/19  2:54 AM  
Result Value Ref Range Glucose (POC) 103 (H) 65 - 100 mg/dL Performed by Regino [facility] Collection Time: 11/19/19  2:54 AM  
Result Value Ref Range Glucose 103 mg/dL Insulin order 2.5 units/hour Insulin adminstered 2.5 units/hour Multiplier 0.058 Low target 95 mg/dL High target 130 mg/dL D50 order 0.0 ml  
 D50 administered 0.00 ml Minutes until next BG 60 min Order initials kli Administered initials kli GLSCOM Comments GLUCOSE, POC Collection Time: 11/19/19  3:44 AM  
Result Value Ref Range Glucose (POC) 95 65 - 100 mg/dL Performed by Regino Palacios CBC W/O DIFF Collection Time: 11/19/19  3:46 AM  
Result Value Ref Range WBC 9.5 4.1 - 11.1 K/uL  
 RBC 2.47 (L) 4.10 - 5.70 M/uL HGB 7.5 (L) 12.1 - 17.0 g/dL HCT 24.3 (L) 36.6 - 50.3 % MCV 98.4 80.0 - 99.0 FL  
 MCH 30.4 26.0 - 34.0 PG  
 MCHC 30.9 30.0 - 36.5 g/dL  
 RDW 15.9 (H) 11.5 - 14.5 % PLATELET 38 (LL) 035 - 400 K/uL NRBC 0.0 0  WBC ABSOLUTE NRBC 0.00 0.00 - 0.01 K/uL MAGNESIUM Collection Time: 11/19/19  3:46 AM  
Result Value Ref Range Magnesium 2.2 1.6 - 2.4 mg/dL NT-PRO BNP Collection Time: 11/19/19  3:46 AM  
Result Value Ref Range NT pro-BNP 30,818 (H) <109 PG/ML  
METABOLIC PANEL, COMPREHENSIVE Collection Time: 11/19/19  3:46 AM  
Result Value Ref Range Sodium 147 (H) 136 - 145 mmol/L  Potassium 3.6 3.5 - 5.1 mmol/L  
 Chloride 112 (H) 97 - 108 mmol/L  
 CO2 27 21 - 32 mmol/L Anion gap 8 5 - 15 mmol/L Glucose 93 65 - 100 mg/dL BUN 88 (H) 6 - 20 MG/DL Creatinine 1.49 (H) 0.70 - 1.30 MG/DL  
 BUN/Creatinine ratio 59 (H) 12 - 20 GFR est AA 56 (L) >60 ml/min/1.73m2 GFR est non-AA 46 (L) >60 ml/min/1.73m2 Calcium 8.0 (L) 8.5 - 10.1 MG/DL Bilirubin, total 0.6 0.2 - 1.0 MG/DL  
 ALT (SGPT) 102 (H) 12 - 78 U/L  
 AST (SGOT) 44 (H) 15 - 37 U/L Alk. phosphatase 102 45 - 117 U/L Protein, total 5.1 (L) 6.4 - 8.2 g/dL Albumin 1.9 (L) 3.5 - 5.0 g/dL Globulin 3.2 2.0 - 4.0 g/dL A-G Ratio 0.6 (L) 1.1 - 2.2 PHOSPHORUS Collection Time: 11/19/19  3:46 AM  
Result Value Ref Range Phosphorus 4.8 (H) 2.6 - 4.7 MG/DL  
GLUCOSTABILIZER Collection Time: 11/19/19  3:48 AM  
Result Value Ref Range Glucose 95 mg/dL Insulin order 2.0 units/hour Insulin adminstered 2.0 units/hour Multiplier 0.058 Low target 95 mg/dL High target 130 mg/dL D50 order 0.0 ml  
 D50 administered 0.00 ml Minutes until next BG 60 min Order initials kli Administered initials kli GLSCOM Comments GLUCOSE, POC Collection Time: 11/19/19  4:47 AM  
Result Value Ref Range Glucose (POC) 90 65 - 100 mg/dL Performed by Claudia Smith Collection Time: 11/19/19  4:47 AM  
Result Value Ref Range Glucose 90 mg/dL Insulin order 1.4 units/hour Insulin adminstered 1.4 units/hour Multiplier 0.046 Low target 95 mg/dL High target 130 mg/dL D50 order 0.0 ml  
 D50 administered 0.00 ml Minutes until next BG 60 min Order initials kli Administered initials kli GLSCOM Comments GLUCOSE, POC Collection Time: 11/19/19  5:48 AM  
Result Value Ref Range Glucose (POC) 107 (H) 65 - 100 mg/dL Performed by Claudia Smith Collection Time: 11/19/19  5:49 AM  
Result Value Ref Range Glucose 107 mg/dL Insulin order 2.2 units/hour Insulin adminstered 2.2 units/hour Multiplier 0.046 Low target 95 mg/dL High target 130 mg/dL D50 order 0.0 ml  
 D50 administered 0.00 ml Minutes until next BG 60 min Order initials ach Administered initials ach GLSCOM Comments Rubens Alvarez MD 
[address] [place] [address], [facility] Phone - [phone] Fax - [phone] 
[email or web address]

## 2019-11-19 NOTE — PROGRESS NOTES
[facility] ICU Progress Note Admit Date: 10/19/2019 Procedure:  Procedure(s): 
CARDIAC RE-ENTRY, MARISOL BY  [facility] -  ZAHIRABOBBY    
 
CABG x 5, LIMA to LAD, RSVG to [id number], RSVG to PDA 10/23/19 
 
11/13/19 - S/p perc trach placement Subjective:  
Pt seen with Dr. Antonia Lindsey. Increased RR/agitation remains. On precedex, insulin gtt, stopping D5 per renal. T max 99.3F, 60% FiO2 Objective:  
Vitals: 
Blood pressure 134/69, pulse 85, temperature 99.4 °F (37.4 °C), resp. rate (!) 33, height 5' 7\" (1.702 m), weight 141 lb 3.2 oz (64 kg), SpO2 99 %. Temp (24hrs), Av.5 °F (36.9 °C), Min:97.5 °F (36.4 °C), Max:99.4 °F (37.4 °C) EKG/Rhythm: SR in the 70-80s Oxygen Therapy: 
Oxygen Therapy O2 Sat (%): 99 % (19) Pulse via Oximetry: 78 beats per minute (19) O2 Device: Ventilator;Tracheostomy (19) O2 Flow Rate (L/min): 40 l/min (19) O2 Temperature: 98.6 °F (37 °C) (19) FIO2 (%): 60 % (19) CXR:  
CXR Results  (Last 48 hours)  
          
 19 0450  XR CHEST PORT Final result Impression:  IMPRESSION:  
1. No change in interstitial edema superimposed upon pulmonary fibrosis. Narrative:  EXAM: XR CHEST PORT INDICATION: interstitial edema COMPARISON: 2019 and 2019 and CT scan of 2019 FINDINGS: A portable AP radiograph of the chest was obtained at 0341 hours. The  
patient is on a cardiac monitor. Tracheostomy tube overlies the airway. PICC  
line overlies the SVC. Feeding tube courses into the stomach. The lungs demonstrate coarse reticular interstitial pattern. This is consistent  
with interstitial edema superimposed upon pulmonary fibrosis this is unchanged  
when compared 2019 No pneumothorax. 19 0446  XR CHEST PORT Final result Impression:  IMPRESSION:  
1. Persistent interstitial edema which has decreased. Narrative: EXAM: XR CHEST PORT  
   
 INDICATION: interstitial edema COMPARISON: 11/17/2019 FINDINGS: A portable AP radiograph of the chest was obtained at 0342 hours. The  
patient is on a cardiac monitor. Tracheostomy tube overlies the airway. Feeding  
tube courses into the stomach. PICC line overlies the SVC. Lungs demonstrate interstitial edema which mildly decreased. No pneumothorax. Admission Weight: Last Weight Weight: 141 lb 5 oz (64.1 kg) Weight: 141 lb 3.2 oz (64 kg) Intake / Output / Drain: 
Current Shift: 11/19 0701 - 11/19 1900 In: 232.7 [I.V.:232.7] Out: 90 [Urine:90] Last 24 hrs.:  
 
Intake/Output Summary (Last 24 hours) at 11/19/2019 0845 Last data filed at 11/19/2019 0800 Gross per 24 hour Intake 4587.53 ml Output 2250 ml Net 2337.53 ml EXAM: 
General:  Trach, sedated. Lungs:   Coarse bilat Incision:  Midsternal incision with no significant erythema, drainage, or dehiscence. Heart:  Regular rate and rhythm, S1, S2 normal, no murmur, click, rub or gallop. Abdomen:   Soft, non-tender. Bowel sounds active. No masses,  No organomegaly. +BM. Extremities:  Some generalized edema, +1-2 edema. Palpable pulses bilat Neurologic:  Moves extremities but without purpose. Does not track or follow commands. Labs:  
Recent Labs 11/19/19 
0755  11/19/19 
9183 WBC  --   --  9.5 HGB  --   --  7.5* HCT  --   --  24.3*  
PLT  --   --  38* NA  --   --  147* K  --   --  3.6 BUN  --   --  88* CREA  --   --  1.49* GLU  --   --  93  
GLUCPOC 109*   < >  --   
 < > = values in this interval not displayed. Assessment:  
 
Principal Problem: S/P CABG x 5 (10/23/2019) Overview: x 5, LIMA to LAD, RSVG to Diag1, OM2-OM3, RSVG to PDA Active Problems: 
  ACS (acute coronary syndrome) ([facility] (10/19/2019) Unstable angina ([facility] (10/19/2019) Coronary artery disease of native artery of native heart with stable angina pectoris ([facility] (10/21/2019) Systolic heart failure ([facility] (10/22/2019) Plan/Recommendations/Medical Decision Makin. S/p CABG: on ASA, statin. No ACEi/ARB due to renal function. No BB due to pulmonary fibrosis. 2. Acute on chronic systolic CHF, NYHA Class II on admit: EF 35-40% preop. No BB/ACE/ARB/AA until vitals/kidney function allows. Trend pBNP - up, receiving a lot of volume. Cont diuresis w/ bumex 1 mg bid 3. Acute postop respiratory failure with chronic interstitial fibrosis per CXR: Trach placed  by Thoracic Surgery. Acute respiratory acidosis  thought driven by oversedation. Fentanyl and versed drips stopped. (now just has PRN Morphine and Ativan dosing) Diuresis per Nephrology. Amiodarone stopped on 10/31. Solumedrol per pulm-weaning. Continue scheduled nebs. Cont mucinex, pulm toileting, mucomyst. Chest CT revealing emphysema/pulm fibrosis. Not able to tolerate vent weaning currently 4. Renal insufficiency: BUN/Creatinine slightly improved today. Nephrology following and appreciate their recommendations. Devlin in place for accurate I&O. Cont diuretics 5. Anemia: had preop, H&H stable today. Did receive transfusion of PRBC on Friday. Iron panel sent preop - iron, iron sat low-continue Iron supplement. 6. Thrombocytopenia: Platelets remain 07H. HIT antibody neg, CARMEN pending. Q12 CBC. Hold SQ heparin and ASA. On PPI. Transfuse platelets for less than 20k or if active bleeding seen. Consult heme 7. Hx of HTN: Hydralazine PRN SBP >160, BP too labile for PO meds 8. HLD: Continue pravastatin 9. Leukocytosis: WBC at 9.5k today, procalcitonin last 1.6 (check every 3 days),  Devlin placed . PICC placed . Trach . Monitor daily CBC. Appreciate ID consult-continue Zosyn. Leukopenic last week 10. Constipation: Resolved.  Now w/ frequent loose stools, holding bowel meds. Start sandro-q 11. Current smoker: smoking cessation education completed. 12. Nutrition: Appreciate Dietician recommendations. Dobhoff placed 10/30 and Enteral feedings started. TF's at goal of 35 ml/hr. Try and AVOID increased volume amounts 13. Hyperglycemia: Preop A1c 5.3 with no DM history, now with hyperglycemia. BS have been much better controlled on Insulin gtt. DTS following. Stopping D5 gtt today. Weaning steroids per Pulmonology. 14. Hypernatremia: Nephrology following-increased FW flushes, d/c D5 gtt. 15.  Agitation, acute encephalopathy: Have been unable to wean down on sedation much due to agitation. Continue Precedex, and PRN Morphine, Ativan to keep the patient comfortable. Head CT neg for any acute process. May need to resume propofol, try seroquel bid. Not able to obtain MRI since V wires were cut. Do not think this is driven by pain, more from neuro source/encephalopathy 16. DVT/GI Prophylaxis: SCD's, SQ Heparin-currently on hold due to thrombocytopenia. Will restart when improved, PPI Dispo: PT/OT as able. Remain in CVI until resp status more stable.   
 
 
Signed By: Anselmo Gaitan, ANANYA

## 2019-11-19 NOTE — PROGRESS NOTES
0800:  Bedside and Verbal shift change report given to NORMA RINALDI (oncoming nurse) by Bj Glover RN (offgoing nurse). Report included the following information SBAR, Kardex, Intake/Output, MAR, Recent Results and Cardiac Rhythm NSR. 
 
0806:  2mg morphine given for agitation. Patient opens eyes spontaneously and withdraws to pain. There are no purposeful movements, flaccid BUE and BLE. Lungs coarse. Diuresing this am. 
 
0900:  Patient appears to be more comfortable. Large BM. Skin intact, venelex applied. Hematology consulted. 1000: Son at bedside, updated by Dr. Adonay Hopper. 1500:  Trach care performed, fairly tolerated. 2mg morphine given before care. 1537:  Nomi started at 20 for BP 84/48. 
 
1749:  SBP 190s, patient is restless. Morphine and ativan given. 1920:  Patient still restless, tachycardia, tachypnea. Started propofol. 2000:  Bedside and Verbal shift change report given to [address] [place] [address] (oncoming nurse) by Marcelino Chaney RN (offgoing nurse). Report included the following information SBAR, Kardex, Intake/Output, MAR, Recent Results and Cardiac Rhythm NSR.

## 2019-11-19 NOTE — PROGRESS NOTES
NUTRITION COMPLETE ASSESSMENT 
 
RECOMMENDATIONS:  
1. Continue current tube feeds. May need to consider PEG placement - with NGT in place for three weeks with difficulty weaning from vent. 2. Fluid flush adjustment per renal based on sodium trends 3. Insulin per DTC recommendations pending BG trends Interventions/Plan:  
Food/Nutrient Delivery:    (-) (-)   (continue EN) Assessment:  
Reason for Assessment: Reassessment Diet: NPO Tube feeds: Suplena @ 30ml/hr + 200ml flush q3hr Nutritionally Significant Medications: [x] Reviewed & Includes: bumex, liquid iron, SSI, solu-medrol IV, liquid MVI, oral nystatin, protonix, zosyn, KCl, seroquel, zoloft; DRIPS: precedex, insulin drip, epi Subjective: Intubated/sedated. Objective: 
Pt admitted for acute coronary syndrome. PMHx: DM, high cholesterol, CKD3, HTN. S/p CABG x 5 on 10/23. Re-intubated 11/8, trach placed on 11/13. ISAIAH on CKD noted with renal following, Azotemia improving with bumex rx. Hypernatremia continued with D5 given yesterday, trending down along with Cr. Water flush being adjusted by renal as indicated. Some agitation over night noted, seroquel rx. Hematology consulted of thrombocytopenia. DHT placed on 10/29. Tolerating tube feeds with no issues. Suplena @ 35ml/hr + 200ml flush q3hr. Provides: 840ml, 1512kcal, 38g protein, 620ml free fluid + 1600ml flush = 2220ml fluid. Hyperphosphatemia today but already on low phos formula. Continue to monitor with phosphate binders per renal if remains elevated. BG better controlled. DTC following with lantus rx and insulin drip in place. Plans to transition off insulin drip noted. Loose stools but bowel regimen discontinued and Kylie-Q rx today. Fecal mgmt tube removed today. Estimated Nutrition Needs:  
Kcals/day: 6754 Kcals/day Protein: 33 g(33-45g (0.6-0.8g/kg) ISAIAH on CKD) Fluid: 1520 ml(1ml/kcal) Based On: [facility] ([id number]) Weight Used: Actual wt(56.2kg) Pt expected to meet estimated nutrient needs:  [x]   Yes     []  No [] Unable to predict at this time Nutrition Diagnosis:  
1. Inadequate oral intake related to respiratory failure as evidenced by intubated with EN via [address] 2. (decreased protein needs) related to ISAIAH on CKD as evidenced by no HD; pt hx Goals:   
 EN meeting at least 90% needs in 5-7 days Monitoring & Evaluation: - Total energy intake, Enteral/parenteral nutrition intake, Carbohydrate intake - Weight/weight change, Electrolyte and renal profile, GI 
 
Previous Nutrition Goals Met:   Yes Previous Recommendations:    Yes 
 
Education & Discharge Needs: 
 [x] None Identified 
 [] Identified and addressed  
 [] Participated in care plan, discharge planning, and/or interdisciplinary rounds Cultural, Jain and ethnic food preferences identified: None Skin Integrity: [x]Intact  []Other Edema: []None [x]Other: 1+ BLLE Last BM: 11/18 - fecal mgmt tube 11/14-11/18 Food Allergies: []None [x]Other: scallops Diet Restrictions: Cultural/Islam Preference(s): None Anthropometrics:   
Weight Loss Metrics 11/19/2019 Today's Wt 141 lb 3.2 oz  
BMI 22.12 kg/m2 Weight Source: Bed Height: 5' 7\" (170.2 cm), Body mass index is 22.12 kg/m². IBW : 72.6 kg (160 lb), % IBW (Calculated): 93.14 % 
 ,   
 
Labs:   
Lab Results Component Value Date/Time Sodium 147 (H) 11/19/2019 03:46 AM  
 Potassium 3.6 11/19/2019 03:46 AM  
 Chloride 112 (H) 11/19/2019 03:46 AM  
 CO2 27 11/19/2019 03:46 AM  
 Glucose 93 11/19/2019 03:46 AM  
 BUN 88 (H) 11/19/2019 03:46 AM  
 Creatinine 1.49 (H) 11/19/2019 03:46 AM  
 Calcium 8.0 (L) 11/19/2019 03:46 AM  
 Magnesium 2.2 11/19/2019 03:46 AM  
 Phosphorus 4.8 (H) 11/19/2019 03:46 AM  
 Albumin 1.9 (L) 11/19/2019 03:46 AM  
 
Lab Results Component Value Date/Time Hemoglobin A1c 5.3 10/21/2019 03:04 AM  
 
Jagdish Trujillo RD [facility], Pager #[phone] or [phone]

## 2019-11-19 NOTE — PROGRESS NOTES
Transitions of Care: 
 
 -TBD, patient critically ill at this time The CM spoke with Cardiac Surgery NP, patient remains critically ill. NP aware patient only has Medicare Part A and [facility] connection, will notify CM when/if appropriate to pursue LTAC or other placement, TBD at this time pending patient's status. The CM met with the patient's son Viv Hurt at bedside, Austen Clarke had paperwork from the [place] that he wanted CM to review- appears letters are notifying family of possible authorization for days at [address] [place] [address]? CM explained to family this writer does not handle the authorization/utilization review, told Viv Hurt that his father previously refused transfer to [place], etc. CM encouraged Viv Hurt to call the [place] directly with any particular questions, and encouraged him to save documents for his and his father's records. Viv Hurt is the patient's only child. CM inquired if the patient had ever previously created AMD, patient's wishes on paper, etc. And Viv Hurt endorses that to his knowledge his father does not have any AMD or documentation. Viv Hurt is aware that his father remains in CVICU and very ill, CM inquired further about possible Medicaid screening if his father were to need additional services and care, financial assistance, etc. Viv Hurt at this time does not want Medicaid screening, will notify this writer later on if he changes his mind. CM provided education that if his father were to need LTC services, Medicaid would be the only product to assist in paying for these services long-term, etc. Viv Hurt did endorse that his father has limited financial resources- only income comes from the [employer] and what sounds like disability. Viv Hurt will keep this writer updated if he changes his mind, currently disposition TBD pending patient's medical status.  CM received voicemail from [address] [place] [address]- CM will follow-up with [facility] directly for coordinations of care. CM will continue to follow for transitions of care. RON Tran 
 
13:56 p.m.- The CM called Felipe Martinez back, Care Coordinator with [facility] ([phone]). [facility] contracts with the [facility] for [facility] and services for veterans that are receiving care in a [facility] facility ([facility] sent the letters to patient's son that he had this writer review this morning for authorizing care at [facility]) stemmed from the \"Veterans' Choice Program.\" Felipe Martinez endorses that [facility] will be available to assist in getting approval/authorizations for services upon discharge, however, [facility] cannot authorize LTAC, DME, or transportation- Felipe Martinez endorses that this writer will have to get authorization directly through [facility], [employer] can assist with authorizations for SNF and home health. CM provided update to Felipe Martinez that the patient is critically ill and remains in CVICU at this time. Felipe Martinez verified that the patient had an approval/authorization for services at [facility] from 10/19-11/19, and requesting that CM complete an additional \"Request for Services\" form and clinical documentation faxed to either the [place] or [facility], Felipe Martinez confirmed that CM can have form faxed to [facility] (f: [phone]) for additional 30-day approval for services at [facility], and [facility] [employer] will communicate with the [facility] directly. Felipe Martinez also provided a number for the [employer] [phone]. RON Tran 
 
16:18 p.m.- CM received forms from [employer], reviewed with - per , since authorization is for continued inpatient stay, not after-services, Utilization Review should be the proper channel to send clinical information for continued authorization for Inpatient Stay- CM sent scanned form to all UR leads and [facility] team for assistance.  RON Tran

## 2019-11-19 NOTE — PROGRESS NOTES
Infectious Diseases Progress Note Antibiotic Summary: 
Zosyn   -- present Subjective:  
 
Remains sedated on vent and poorly responsive ROS: 
Unable to obtain Objective:  
 
Vitals:  
Visit Vitals BP (!) 88/55 Comment: neosynephrine started Pulse 81 Temp 97.5 °F (36.4 °C) Resp 29 Ht 5' 7\" (1.702 m) Wt 62.1 kg (137 lb) SpO2 100% BMI 21.46 kg/m² Tmax:  Temp (24hrs), Av.6 °F (36.4 °C), Min:97.3 °F (36.3 °C), Max:98.4 °F (36.9 °C) Exam:  General appearance: no distress Neck: supple Chest wall: sternal incision is healing well. Lungs: posterior rales Heart: regular rate and rhythm Abdomen: soft, non-tender. Bowel sounds normal. No masses,  no organomegaly Extremities: no cellulitis Skin: no rashsedated IV Lines: Right PICC inserted 2019 Labs:   
Recent Labs 19 
1704 19 
0309 19 
1649 19 
0920 19 
0606 19 
0502 19 
1018 19 
0811 19 
0310 WBC  --  8.7  --   --   --  3.3* 1.6* 1.2* 1.7* HGB  --  7.6*  --   --   --  7.7* 8.8* 8.8* 9.1*  
PLT 39* 40* 39* 39*  --  36* 42* 46* 52*  
BUN 92* 100* 122*  --  127*  --   --   --  149* CREA 1.40* 1.39* 1.54*  --  1.75*  --   --   --  1.73* TBILI 0.6 0.5  --   --  0.6  --   --   --  0.6 SGOT 44* 50*  --   --  77*  --   --   --  212* AP 98 91  --   --  70  --   --   --  88 Assessment: 1. Elevated procalcitonin level -- significance uncertain in this setting (3 weeks of critical illness in ICU): empiric Zosyn begun ; no infection apparent so far 
  
2. OHD -- IHD & CHF -- S/P CABG on 10/22/2019 
  
3. CKD with acute exacerbation 
  
4. Anemia 
  
5. Thrombocytopenia 
  
6. Acute rise in transaminases -- shock liver ? 
  
7. NIDDM 
  
8. HTN 
  
9. Hyperlipidemia Plan: 1. Continue Zosyn Arletha Frankel., MD

## 2019-11-20 NOTE — PROGRESS NOTES
Nephrology Progress Note Sunita Song Date of Admission : 10/19/2019 CC:  Follow up for ISAIAH on CKD, hypervolemia Assessment and Plan ISAIAH on CKD: 
- from ATN post CABG, pre renal azotemia from diuretics - Cr stable and voiding ok 
- continue w/ Bumex 1 mg BID for now 
- daily labs Hypernatremia : 
- improving 
- continue FW flushes for now CKD III: 
- baseline Cr 1.3 prior to CABG 
- likely from DM and HTN 
  
HFrE F: 
- last EF 35-40% on 10/20 
  
CAD s/p CABG x 5 10/19 and reexploration 10/23 for hematoma 
  
Acute on Chronic Resp Failure: 
- likely 2/2 underlying ILD + volume 
- now w/ trach Thrombocytopenia: 
- w/u per hematology Chronic Anemia: 
- Hb stable  
  
DM2: 
 
Protein Malnutrition: 
- on TF via [place] [address] Interval History: 
Seen and examined. On francis, sedated on the vent. Cr stable. Stable UOP. Current Medications: all current  Medications have been eviewed in [facility] Review of Systems: Review of systems not obtained due to patient factors. Objective: 
Vitals:   
Vitals:  
 11/20/19 0300 11/20/19 0400 11/20/19 0500 11/20/19 0600 BP:      
Pulse: 76 76 73 65 Resp: 27 26 29 30 Temp:  97.7 °F (36.5 °C) SpO2: 100% 99% 100% 100% Weight:  64 kg (141 lb 3.2 oz) Height:      
 
Intake and Output: 
11/19 1901 - 11/20 0700 In: 1275.4 [I.V.:430.4] Out: 8419 [[id number]] 11/18 0701 - 11/19 1900 In: 7187.3 [I.V.:3047.3] Out: 2289 [[id number]:0046] Physical Examination: 
General: Frail, sedated Neck:  + trach Resp:  Diffuse dry crackles CV:  RRR,  no murmur or rub, no LE edema GI:  Soft, NT, + Bowel sounds, no hepatosplenomegaly Neurologic:  Sedated on the vent Psych:             Unable to assess Skin:  No Rash :  Devlin in place [x]    High complexity decision making was performed 
[x]    Patient is at high-risk of decompensation with multiple organ involvement Lab Data Personally Reviewed: I have reviewed all the pertinent labs, microbiology data and radiology studies during assessment. Recent Labs  
  11/20/19 
0350 11/19/19 
0346 11/18/19 
1704 11/18/19 
0309  147* 150* 153* K 4.2 3.6 4.1 3.4*  
* 112* 115* 117* CO2 27 27 29 30  
GLU 89 93 78 116* BUN 82* 88* 92* 100* CREA 1.58* 1.49* 1.40* 1.39* CA 8.0* 8.0* 8.2* 8.2* MG 2.0 2.2 2.6* 2.5* PHOS 4.8* 4.8*  --  4.6 ALB 2.0* 1.9* 1.9* 1.9*  
SGOT 52* 44* 44* 50* * 102* 112* 129* INR 1.2*  --   --   --   
 
Recent Labs  
  11/20/19 
0350 11/19/19 0346 11/18/19 
1704 11/18/19 
0309 WBC 11.1 9.5  --  8.7 HGB 7.9* 7.5*  --  7.6* HCT 25.1* 24.3*  --  24.4*  
PLT 49* 38* 39* 40* No results found for: SDES Lab Results Component Value Date/Time Culture result: NO GROWTH 4 DAYS 11/16/2019 03:10 AM  
 Culture result: NO GROWTH 2 DAYS 11/07/2019 12:45 PM  
 Culture result: HEAVY NORMAL RESPIRATORY TARIK 10/21/2019 12:08 PM  
 
Recent Results (from the past 24 hour(s)) GLUCOSE, POC Collection Time: 11/19/19  6:52 AM  
Result Value Ref Range Glucose (POC) 92 65 - 100 mg/dL Performed by Stacey Wheeler Collection Time: 11/19/19  6:52 AM  
Result Value Ref Range Glucose 92 mg/dL Insulin order 1.2 units/hour Insulin adminstered 1.2 units/hour Multiplier 0.036 Low target 95 mg/dL High target 130 mg/dL D50 order 0.0 ml  
 D50 administered 0.00 ml Minutes until next BG 60 min Order initials Ag Administered initials Ag GLSCOM Comments GLUCOSE, POC Collection Time: 11/19/19  7:55 AM  
Result Value Ref Range Glucose (POC) 109 (H) 65 - 100 mg/dL Performed by Pattie Saxon Junior Runner Collection Time: 11/19/19  7:55 AM  
Result Value Ref Range Glucose 109 mg/dL Insulin order 1.8 units/hour Insulin adminstered 1.8 units/hour Multiplier 0.036 Low target 95 mg/dL High target 130 mg/dL D50 order 0.0 ml  
 D50 administered 0.00 ml Minutes until next BG 60 min Order initials dn   
 Administered initials dn   
 GLSCOM Comments GLUCOSE, POC Collection Time: 11/19/19  9:01 AM  
Result Value Ref Range Glucose (POC) 124 (H) 65 - 100 mg/dL Performed by Morris Sutton Salts Collection Time: 11/19/19  9:02 AM  
Result Value Ref Range Glucose 124 mg/dL Insulin order 2.3 units/hour Insulin adminstered 2.3 units/hour Multiplier 0.036 Low target 95 mg/dL High target 130 mg/dL D50 order 0.0 ml  
 D50 administered 0.00 ml Minutes until next BG 60 min Order initials dn   
 Administered initials dn   
 GLSCOM Comments GLUCOSE, POC Collection Time: 11/19/19 10:11 AM  
Result Value Ref Range Glucose (POC) 153 (H) 65 - 100 mg/dL Performed by Morris Reveles Collection Time: 11/19/19 10:12 AM  
Result Value Ref Range Glucose 153 mg/dL Insulin order 4.3 units/hour Insulin adminstered 4.3 units/hour Multiplier 0.046 Low target 95 mg/dL High target 130 mg/dL D50 order 0.0 ml  
 D50 administered 0.00 ml Minutes until next BG 60 min Order initials dn   
 Administered initials dn   
 GLSCOM Comments GLUCOSE, POC Collection Time: 11/19/19 11:22 AM  
Result Value Ref Range Glucose (POC) 150 (H) 65 - 100 mg/dL Performed by Morris Sutton Salts Collection Time: 11/19/19 11:22 AM  
Result Value Ref Range Glucose 150 mg/dL Insulin order 5.0 units/hour Insulin adminstered 5.0 units/hour Multiplier 0.056 Low target 95 mg/dL High target 130 mg/dL D50 order 0.0 ml  
 D50 administered 0.00 ml Minutes until next BG 60 min Order initials dn   
 Administered initials dn   
 GLSCOM Comments GLUCOSE, POC Collection Time: 11/19/19 12:25 PM  
Result Value Ref Range Glucose (POC) 144 (H) 65 - 100 mg/dL Performed by Angelic Hollingsworth  Collection Time: 11/19/19 12:26 PM  
 Result Value Ref Range Glucose 144 mg/dL Insulin order 5.5 units/hour Insulin adminstered 5.5 units/hour Multiplier 0.066 Low target 95 mg/dL High target 130 mg/dL D50 order 0.0 ml  
 D50 administered 0.00 ml Minutes until next BG 60 min Order initials apl Administered initials apl GLSCOM Comments GLUCOSE, POC Collection Time: 11/19/19  1:35 PM  
Result Value Ref Range Glucose (POC) 106 (H) 65 - 100 mg/dL Performed by Jean Pierre Rea Collection Time: 11/19/19  1:38 PM  
Result Value Ref Range Glucose 106 mg/dL Insulin order 3.0 units/hour Insulin adminstered 3.0 units/hour Multiplier 0.066 Low target 95 mg/dL High target 130 mg/dL D50 order 0.0 ml  
 D50 administered 0.00 ml Minutes until next BG 60 min Order initials AG Administered initials Ag GLSCOM Comments GLUCOSE, POC Collection Time: 11/19/19  2:40 PM  
Result Value Ref Range Glucose (POC) 104 (H) 65 - 100 mg/dL Performed by Armando Rousseau Collection Time: 11/19/19  2:40 PM  
Result Value Ref Range Glucose 104 mg/dL Insulin order 2.9 units/hour Insulin adminstered 2.9 units/hour Multiplier 0.066 Low target 95 mg/dL High target 130 mg/dL D50 order 0.0 ml  
 D50 administered 0.00 ml Minutes until next BG 60 min Order initials dn   
 Administered initials dn   
 GLSCOM Comments GLUCOSE, POC Collection Time: 11/19/19  3:42 PM  
Result Value Ref Range Glucose (POC) 80 65 - 100 mg/dL Performed by Armando Rousseau Collection Time: 11/19/19  3:43 PM  
Result Value Ref Range Glucose 80 mg/dL Insulin order 0.0 units/hour Insulin adminstered 0.0 units/hour Multiplier 0.053 Low target 95 mg/dL High target 130 mg/dL D50 order 8.0 ml  
 D50 administered 8.00 ml Minutes until next BG 15 min  Order initials dn   
 Administered initials dn   
 GLSCOM Comments GLUCOSE, POC Collection Time: 11/19/19  4:11 PM  
Result Value Ref Range Glucose (POC) 84 65 - 100 mg/dL Performed by Girma Soto Collection Time: 11/19/19  4:12 PM  
Result Value Ref Range Glucose 84 mg/dL Insulin order 1.0 units/hour Insulin adminstered 0.0 units/hour Multiplier 0.042 Low target 95 mg/dL High target 130 mg/dL D50 order 0.0 ml  
 D50 administered 0.00 ml Minutes until next BG 60 min Order initials dn   
 Administered initials dn   
 GLSCOM Comments BS 80 GLUCOSE, POC Collection Time: 11/19/19  5:24 PM  
Result Value Ref Range Glucose (POC) 110 (H) 65 - 100 mg/dL Performed by Girma Soto Collection Time: 11/19/19  5:25 PM  
Result Value Ref Range Glucose 110 mg/dL Insulin order 2.1 units/hour Insulin adminstered 2.1 units/hour Multiplier 0.042 Low target 95 mg/dL High target 130 mg/dL D50 order 0.0 ml  
 D50 administered 0.00 ml Minutes until next BG 60 min Order initials dn   
 Administered initials dn   
 GLSCOM Comments GLUCOSE, POC Collection Time: 11/19/19  6:29 PM  
Result Value Ref Range Glucose (POC) 119 (H) 65 - 100 mg/dL Performed by Girma Soto Collection Time: 11/19/19  6:29 PM  
Result Value Ref Range Glucose 119 mg/dL Insulin order 2.5 units/hour Insulin adminstered 2.5 units/hour Multiplier 0.042 Low target 95 mg/dL High target 130 mg/dL D50 order 0.0 ml  
 D50 administered 0.00 ml Minutes until next BG 60 min Order initials dn   
 Administered initials dn   
 GLSCOM Comments GLUCOSE, POC Collection Time: 11/19/19  7:35 PM  
Result Value Ref Range Glucose (POC) 133 (H) 65 - 100 mg/dL Performed by Leonidas Cabot Seldon Dodrill Collection Time: 11/19/19  7:36 PM  
Result Value Ref Range Glucose 133 mg/dL Insulin order 3.8 units/hour Insulin adminstered 3.8 units/hour Multiplier 0.052 Low target 95 mg/dL High target 130 mg/dL D50 order 0.0 ml  
 D50 administered 0.00 ml Minutes until next BG 60 min Order initials pls Administered initials pls GLSCOM Comments GLUCOSE, POC Collection Time: 11/19/19  8:37 PM  
Result Value Ref Range Glucose (POC) 146 (H) 65 - 100 mg/dL Performed by Joshua De Santiago Collection Time: 11/19/19  8:41 PM  
Result Value Ref Range Glucose 146 mg/dL Insulin order 5.3 units/hour Insulin adminstered 5.3 units/hour Multiplier 0.062 Low target 95 mg/dL High target 130 mg/dL D50 order 0.0 ml  
 D50 administered 0.00 ml Minutes until next BG 60 min Order initials joelm Administered initials marlena GLSCOM Comments GLUCOSE, POC Collection Time: 11/19/19  9:44 PM  
Result Value Ref Range Glucose (POC) 112 (H) 65 - 100 mg/dL Performed by Rnajit Ingram Collection Time: 11/19/19  9:44 PM  
Result Value Ref Range Glucose 112 mg/dL Insulin order 3.2 units/hour Insulin adminstered 3.2 units/hour Multiplier 0.062 Low target 95 mg/dL High target 130 mg/dL D50 order 0.0 ml  
 D50 administered 0.00 ml Minutes until next BG 60 min Order initials sfm Administered initials sfm GLSCOM Comments GLUCOSE, POC Collection Time: 11/19/19 10:46 PM  
Result Value Ref Range Glucose (POC) 101 (H) 65 - 100 mg/dL Performed by Ranjit Ingram Collection Time: 11/19/19 10:46 PM  
Result Value Ref Range Glucose 101 mg/dL Insulin order 2.5 units/hour Insulin adminstered 2.5 units/hour Multiplier 0.062 Low target 95 mg/dL High target 130 mg/dL D50 order 0.0 ml  
 D50 administered 0.00 ml Minutes until next BG 60 min Order initials joelm Administered initials joelm GLSCOM Comments GLUCOSE, POC  
 Collection Time: 11/19/19 11:51 PM  
Result Value Ref Range Glucose (POC) 113 (H) 65 - 100 mg/dL Performed by Keli Gracia Collection Time: 11/19/19 11:51 PM  
Result Value Ref Range Glucose 113 mg/dL Insulin order 3.3 units/hour Insulin adminstered 3.3 units/hour Multiplier 0.062 Low target 95 mg/dL High target 130 mg/dL D50 order 0.0 ml  
 D50 administered 0.00 ml Minutes until next BG 60 min Order initials ep Administered initials ep GLSCOM Comments GLUCOSE, POC Collection Time: 11/20/19 12:51 AM  
Result Value Ref Range Glucose (POC) 97 65 - 100 mg/dL Performed by Keli Gracia Collection Time: 11/20/19 12:51 AM  
Result Value Ref Range Glucose 97 mg/dL Insulin order 2.3 units/hour Insulin adminstered 2.3 units/hour Multiplier 0.062 Low target 95 mg/dL High target 130 mg/dL D50 order 0.0 ml  
 D50 administered 0.00 ml Minutes until next BG 60 min Order initials ep Administered initials ep GLSCOM Comments GLUCOSE, POC Collection Time: 11/20/19  1:53 AM  
Result Value Ref Range Glucose (POC) 124 (H) 65 - 100 mg/dL Performed by Keli Gracia Collection Time: 11/20/19  1:53 AM  
Result Value Ref Range Glucose 124 mg/dL Insulin order 4.0 units/hour Insulin adminstered 4.0 units/hour Multiplier 0.062 Low target 95 mg/dL High target 130 mg/dL D50 order 0.0 ml  
 D50 administered 0.00 ml Minutes until next BG 60 min Order initials ep Administered initials ep GLSCOM Comments GLUCOSE, POC Collection Time: 11/20/19  3:02 AM  
Result Value Ref Range Glucose (POC) 113 (H) 65 - 100 mg/dL Performed by Keli Gracia Collection Time: 11/20/19  3:02 AM  
Result Value Ref Range Glucose 113 mg/dL Insulin order 3.3 units/hour Insulin adminstered 3.3 units/hour Multiplier 0.062 Low target 95 mg/dL High target 130 mg/dL D50 order 0.0 ml  
 D50 administered 0.00 ml Minutes until next  min Order initials ep Administered initials ep GLSCOM Comments CBC W/O DIFF Collection Time: 11/20/19  3:50 AM  
Result Value Ref Range WBC 11.1 4.1 - 11.1 K/uL  
 RBC 2.58 (L) 4.10 - 5.70 M/uL HGB 7.9 (L) 12.1 - 17.0 g/dL HCT 25.1 (L) 36.6 - 50.3 % MCV 97.3 80.0 - 99.0 FL  
 MCH 30.6 26.0 - 34.0 PG  
 MCHC 31.5 30.0 - 36.5 g/dL  
 RDW 15.4 (H) 11.5 - 14.5 % PLATELET 49 (LL) 883 - 400 K/uL NRBC 0.0 0  WBC ABSOLUTE NRBC 0.00 0.00 - 0.01 K/uL MAGNESIUM Collection Time: 11/20/19  3:50 AM  
Result Value Ref Range Magnesium 2.0 1.6 - 2.4 mg/dL NT-PRO BNP Collection Time: 11/20/19  3:50 AM  
Result Value Ref Range NT pro-BNP 31,042 (H) <706 PG/ML  
METABOLIC PANEL, COMPREHENSIVE Collection Time: 11/20/19  3:50 AM  
Result Value Ref Range Sodium 144 136 - 145 mmol/L Potassium 4.2 3.5 - 5.1 mmol/L Chloride 110 (H) 97 - 108 mmol/L  
 CO2 27 21 - 32 mmol/L Anion gap 7 5 - 15 mmol/L Glucose 89 65 - 100 mg/dL BUN 82 (H) 6 - 20 MG/DL Creatinine 1.58 (H) 0.70 - 1.30 MG/DL  
 BUN/Creatinine ratio 52 (H) 12 - 20 GFR est AA 53 (L) >60 ml/min/1.73m2 GFR est non-AA 43 (L) >60 ml/min/1.73m2 Calcium 8.0 (L) 8.5 - 10.1 MG/DL Bilirubin, total 0.5 0.2 - 1.0 MG/DL  
 ALT (SGPT) 106 (H) 12 - 78 U/L  
 AST (SGOT) 52 (H) 15 - 37 U/L Alk. phosphatase 134 (H) 45 - 117 U/L Protein, total 5.4 (L) 6.4 - 8.2 g/dL Albumin 2.0 (L) 3.5 - 5.0 g/dL Globulin 3.4 2.0 - 4.0 g/dL A-G Ratio 0.6 (L) 1.1 - 2.2 PHOSPHORUS Collection Time: 11/20/19  3:50 AM  
Result Value Ref Range Phosphorus 4.8 (H) 2.6 - 4.7 MG/DL PROTHROMBIN TIME + INR Collection Time: 11/20/19  3:50 AM  
Result Value Ref Range INR 1.2 (H) 0.9 - 1.1 Prothrombin time 12.1 (H) 9.0 - 11.1 sec PTT Collection Time: 11/20/19  3:50 AM  
Result Value Ref Range aPTT 24.0 22.1 - 32.0 sec  
 aPTT, therapeutic range     58.0 - 77.0 SECS FIBRINOGEN Collection Time: 11/20/19  3:50 AM  
Result Value Ref Range Fibrinogen 546 (H) 200 - 475 mg/dL GLUCOSE, POC Collection Time: 11/20/19  5:02 AM  
Result Value Ref Range Glucose (POC) 105 (H) 65 - 100 mg/dL Performed by Armand Lopez Collection Time: 11/20/19  5:02 AM  
Result Value Ref Range Glucose 105 mg/dL Insulin order 2.8 units/hour Insulin adminstered 2.8 units/hour Multiplier 0.062 Low target 95 mg/dL High target 130 mg/dL D50 order 0.0 ml  
 D50 administered 0.00 ml Minutes until next  min Order initials ep Administered initials ep GLSCOM Comments Torie Cunningham MD 
[address] [place] [address], [address] [place] [address] Phone - [phone] Fax - [phone] 
[email or web address]

## 2019-11-20 NOTE — CARDIO/PULMONARY
Cardiac Rehab: Consult for post-op education and enrollment in cardiac rehab noted. Patient is not a candidate for either, at this time. Please re-consult, as needed.  Vitor Bazan RN

## 2019-11-20 NOTE — CONSULTS
[place] Name:  Jovani Campos 
MR#:  [id number] :  1950 ACCOUNT #:  [de-identified] DATE OF SERVICE:  2019 HEMATOLOGY ONCOLOGY CONSULTATION 
 
REASON FOR ADMISSION:  Acute coronary syndrome. REASON FOR CONSULT:  ICU thrombocytopenia. HISTORY OF PRESENT ILLNESS:  The patient is a 68-year-old man who is currently intubated and sedated on pressors, we are asked to see for thrombocytopenia. He was in his normal state of health until 10/19, when he was referred here for acute coronary syndrome. He was evaluated by the Cardiology and Cardiothoracic Surgery Service for consideration of CABG which he underwent on 10/24/2019. Specifically, he underwent LIMA-to-LAD, RCG-to-D1, OM2, OM3, RCG-to-PDA, and bilateral endovascular harvest of greater saphenous veins. Course was complicated by postoperative bleeding and tamponade, and the patient was taken back to the OR for evacuation. He had a prolonged postoperative course requiring ultimate tracheostomy. Given his prolonged respiratory failure, he was seen by the Pulmonary Service who, on 2019, did a fiberoptic bronchoscopy. This showed normal endobronchial anatomy. He had additional testing at that time including washings which were negative for AFB. Cultures showed no organism seen and no growth. Potassium hydroxide showed no  . The Pulmonary Service raised a concern that he may have preexisting fiberoptic disease. On 11/10, he had his last normal platelet count of 593. On , it went to 145; , was 131; , to 122; 15 to 61. The Infectious Disease Service was consulted due to the concern for sepsis and recommended cultures as well as empiric Zosyn. His blood cultures had been consistently negative.   Additional workup in the hospital thus far has included a normal bilirubin from today, normal HECTOR from , normal Legionella DFA from 11/07. The patient currently has no oozing or bleeding from the line sites. His only antibiotic was Zosyn which was started on 11/16, five days after reduction in his platelet count. Given prior use of heparin, he had both PFO antibody and serotonin release acid done, both of which were negative. Platelet count today is 38 with a hemoglobin of 7.5 and a white count of 9.5. PAST MEDICAL HISTORY:  By chart review; 1. History of daily tobacco use. 2.  Alcohol use. 3.  History of coronary artery disease, status post previous stents. 4.  Type 2 diabetes. 5.  Peripheral arterial disease. 6.  Hypertension. 7.  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 ALLERGIES:  SCALLOPS. CURRENT MEDICATIONS IN THE HOSPITAL: 
1.  Mucomyst nebulizer twice daily. 2.  Brovana nebulizer twice daily. 3.  Pulmicort nebulizer twice daily. 4.  Aspirin 81 mg daily. 5.  Bumex injections 1 mg q.12 h. 
6.  Ferrous sulfate oral, served by NG tube daily with breakfast. 
7.  Humalog lispro sliding scale. 8.  Methylprednisolone 40 mg IV q.12 h. 
9.  Protonix 40 mg IV q.12 h. 
10.  Zosyn 3.375 g IV q.6 h., first dose 11/16. 11.  Pravachol 40 mg p.o. at bedtime. 12.  Seroquel 25 mg twice daily. 13.  Zoloft 100 mg once daily. SOCIAL HISTORY:  Prior tobacco and alcohol use. FAMILY HISTORY:  Reviewed and noncontributory. REVIEW OF SYSTEMS:  Unavailable given intubation. PHYSICAL EXAMINATION: 
GENERAL:  Intubated and sedated, tracheostomy. VITAL SIGNS:  Temperature 99.1 and blood pressure 122/79. HEENT:  Sclerae anicteric. NECK:  Supple without lymphadenopathy or thyromegaly. LUNGS:  Clear to auscultation bilaterally. HEART:  Regular rate and rhythm without murmur, rub, or gallop. ABDOMEN:  Nontender, nondistended. Normoactive bowel sounds. No hepatosplenomegaly. EXTREMITIES:  Slight swelling of the hands.   No swelling of the feet or ankles. Examination of the fingertips reveals bruising from blood glucose testing, but no kimani necrosis of the digits. ASSESSMENT AND PLAN:  This is a 68-year-old man with a history of coronary artery disease admitted in transfer with acute coronary artery syndrome status post five-vessel coronary artery bypass grafting, course complicated by postop bleeding. I was asked to see for thrombocytopenia that first began on 11/11, prior to initiation of antibiotics. 1.  Thrombocytopenia:  Differential diagnosis includes occult liver disease, hepatic congestion from right-sided heart failure, L94 deficiency, folic acid deficiency, kappa deficiency, antiphospholipid syndrome, disseminated intravascular coagulation. While this can cause thrombocytopenia, his decline started five days before this was started. Hep panel was negative. We sent off additional blood testing and would recommend transfusion for bleeding or for platelet count less than 10. We will follow daily. Lisa Manning MD 
 
 
[id number] 
D:  11/19/2019 19:04 
T:  11/20/2019 0:40 JOB #:  [id number]

## 2019-11-20 NOTE — PROGRESS NOTES
Hematology-Oncology Progress Note Veronica Prasad 1950 
[id number] 
11/20/2019 Subjective:  
 
Still ventilated on pressors (precedex, francis). Sedated on propofol. Allergies: [place] Current Facility-Administered Medications Medication Dose Route Frequency Provider Last Rate Last Dose  QUEtiapine (SEROquel) tablet 25 mg  25 mg Oral BID Francisco Amin NP   25 mg at 11/20/19 0805  
 0.9% sodium chloride infusion 250 mL  250 mL IntraVENous PRN Razia Saez MD      
 hydrALAZINE (APRESOLINE) 20 mg/mL injection 10 mg  10 mg IntraVENous Q6H PRN Jessy Habermann, NP      
 piperacillin-tazobactam (ZOSYN) 3.375 g in 0.9% sodium chloride (MBP/ADV) 100 mL  3.375 g IntraVENous Q8H Estela Ochoa MD 25 mL/hr at 11/20/19 0200 3.375 g at 11/20/19 0200  morphine injection 2 mg  2 mg IntraVENous Q2H PRN Jessy Habermann, NP   2 mg at 11/19/19 1751  LORazepam (ATIVAN) injection 1-2 mg  1-2 mg IntraVENous Q4H PRN Jessy Habermann, NP   2 mg at 11/19/19 1751  methylPREDNISolone (PF) (SOLU-MEDROL) injection 40 mg  40 mg IntraVENous Q12H Atul Rodríguez MD   40 mg at 11/20/19 2553  
 balsam peru-castor oil (VENELEX) ointment   Topical BID Razia Saez MD      
 insulin regular (NOVOLIN R, HUMULIN R) 100 Units in 0.9% sodium chloride 100 mL infusion  1-50 Units/hr IntraVENous TITRATE Jessy Habermann, NP 1.1 mL/hr at 11/20/19 0803 1.1 Units/hr at 11/20/19 0803  
 0.9% sodium chloride infusion  3 mL/hr IntraVENous CONTINUOUS Razia Saez MD 3 mL/hr at 11/20/19 0745 3 mL/hr at 11/20/19 0745  bumetanide (BUMEX) injection 1 mg  1 mg IntraVENous Q12H Gamal Caldwell MD   1 mg at 11/20/19 6218  [Held by provider] senna-docusate (PERICOLACE) 8.6-50 mg per tablet 1 Tab  1 Tab Oral BID Francisco Amin NP   Stopped at 11/14/19 1800  
 nystatin (MYCOSTATIN) 100,000 unit/mL oral suspension 500,000 Units  500,000 Units Oral QID Francisco Amin NP   500,000 Units at 11/20/19 6324  
 insulin lispro (HUMALOG) injection   SubCUTAneous Q6H Gay Barger Flatness, NP   Stopped at 11/15/19 0100  
 guaiFENesin (ROBITUSSIN) 100 mg/5 mL oral liquid 400 mg  400 mg Per NG tube Q6H PRN June [place] Gay Flatness, NP      
 0.9% sodium chloride infusion  10 mL/hr IntraVENous CONTINUOUS [place] Nick Hurt MD   Stopped at 11/14/19 1125  pantoprazole (PROTONIX) 40 mg in sodium chloride 0.9% 10 mL injection  40 mg IntraVENous Q12H Ashlyn Brennan   40 mg at 11/20/19 8372  balsam peru-castor oil (VENELEX) ointment   Topical PRN Adriana Saravia MD      
 acetylcysteine (MUCOMYST) 200 mg/mL (20 %) solution 200 mg  200 mg Nebulization BID RT Kike Dennis MD   200 mg at 11/20/19 4268  PHENYLephrine (ANTHONY-SYNEPHRINE) 30 mg in 0.9% sodium chloride 250 mL infusion   mcg/min IntraVENous TITRATE Adriana Saravia MD 5 mL/hr at 11/20/19 0745 10 mcg/min at 11/20/19 0745  propofol (DIPRIVAN) infusion  0-50 mcg/kg/min IntraVENous TITRATE Libia Mann MD 6.7 mL/hr at 11/20/19 0745 20 mcg/kg/min at 11/20/19 0745  dexmedeTOMidine (PRECEDEX) 400 mcg in 0.9% sodium chloride 100 mL infusion  0.2-0.9 mcg/kg/hr IntraVENous TITRATE Hanley Sandhoff, NP 12.6 mL/hr at 11/20/19 0745 0.9 mcg/kg/hr at 11/20/19 0745  lidocaine (LIDODERM) 5 % patch 1 Patch  1 Patch TransDERmal Q24H Hanley Sandhoff, NP   1 Patch at 11/19/19 1443  methyl salicylate-menthol (BENGAY) 15-10 % cream   Topical PRN Adriana Saravia MD      
 ferrous sulfate 300 mg (60 mg iron)/5 mL oral syrup 300 mg  300 mg Per NG tube DAILY WITH BREAKFAST Lucero Sands MD   300 mg at 11/20/19 0252  [Held by provider] heparin (porcine) injection 5,000 Units  5,000 Units SubCUTAneous Q8H Hanley Sandhoff, NP   5,000 Units at 11/15/19 2921  
 multivit-folic acid-herbal 491 (WELLESSE PLUS) oral liquid 30 mL  30 mL Per NG tube DAILY Hanley Sandhoff, NP   30 mL at 11/20/19 3666  acetaminophen (TYLENOL) tablet 650 mg  650 mg Oral Q4H PRN Elzbieta Marina MD   650 mg at 11/04/19 1736  
 arformoterol (BROVANA) neb solution 15 mcg  15 mcg Nebulization BID RT Jay Never, NP   15 mcg at 11/20/19 0702 And  budesonide (PULMICORT) 500 mcg/2 ml nebulizer suspension  500 mcg Nebulization BID RT Jay Never, NP   500 mcg at 11/20/19 0829  
 prochlorperazine (COMPAZINE) with saline injection 10 mg  10 mg IntraVENous Q6H PRN Everlene Ana María, PA   5 mg at 10/25/19 1339  phenol throat spray (CHLORASEPTIC) 1 Spray  1 Spray Oral PRN Ranjan Alexander MD   1 Ione at 10/24/19 1110  
 sertraline (ZOLOFT) tablet 100 mg  100 mg Oral DAILY Everlene Ana María, PA   100 mg at 11/20/19 7306  alteplase (CATHFLO) 1 mg in sterile water (preservative free) 1 mL injection  1 mg InterCATHeter PRN Hannah Rothoma, PA      
 bacitracin 500 unit/gram packet 1 Packet  1 Packet Topical PRN Everlene Ana María, [place]   1 Packet at 11/07/19 2369  sodium chloride (NS) flush 5-40 mL  5-40 mL IntraVENous Q8H Hannah Roth [place], PA   10 mL at 11/20/19 6756  
 sodium chloride (NS) flush 5-40 mL  5-40 mL IntraVENous PRN Everlene Ana María, PA   10 mL at 11/19/19 0806  
 oxyCODONE IR (ROXICODONE) tablet 5 mg  5 mg Oral Q4H PRN Everlene Ana María, PA   5 mg at 11/06/19 0006  oxyCODONE IR (ROXICODONE) tablet 10 mg  10 mg Oral Q4H PRN Everlene Ana María, PA   10 mg at 10/25/19 0700  
 naloxone [facility]) injection 0.4 mg  0.4 mg IntraVENous PRN Everlene Ana María, PA   0.4 mg at 11/14/19 1126  ondansetron (ZOFRAN) injection 4 mg  4 mg IntraVENous Q4H PRN Everlene Ana María, PA   4 mg at 10/26/19 2342  albuterol (PROVENTIL VENTOLIN) nebulizer solution 2.5 mg  2.5 mg Nebulization Q4H PRN Hannah Roth PA   2.5 mg at 11/19/19 0840  [Held by provider] aspirin chewable tablet 81 mg  81 mg Oral DAILY Hannah Roth PA   Stopped at 11/16/19 0900  chlorhexidine (PERIDEX) 0.12 % mouthwash 10 mL  10 mL Oral Q12H CHELSEA Rios   10 mL at 11/20/19 4541  calcium chloride 1 g in 0.9% sodium chloride 100 mL IVPB  1 g IntraVENous PRN Rafael Roth PA      
 bisacodyl (DULCOLAX) suppository 10 mg  10 mg Rectal DAILY PRN Rafael Roth PA      
 [Held by provider] polyethylene glycol (MIRALAX) packet 17 g  17 g Oral DAILY Ashlyn Rios   Stopped at 11/14/19 3625  ELECTROLYTE REPLACEMENT NOTE: Nurse to review Serum Potassium and Magnesuim levels and Initiate Electrolyte Replacement Protocol as needed  1 Each Other PRN Rafael Roth PA      
 glucose chewable tablet 16 g  4 Tab Oral PRN Rafael Roth PA      
 glucagon (GLUCAGEN) injection 1 mg  1 mg IntraMUSCular PRN CHELSEA Rios      
 dextrose 10% infusion 0-250 mL  0-250 mL IntraVENous PRN CHELSEA Rios 750 mL/hr at 11/19/19 1545 40 mL at 11/19/19 1545  
 melatonin tablet 3 mg  3 mg Oral QHS PRN CHELSEA Hernández   3 mg at 10/28/19 2135  diphenhydrAMINE (BENADRYL) injection 25 mg  25 mg IntraVENous Q6H PRN Rafael Roth PA      
 diphenhydrAMINE (BENADRYL) capsule 25 mg  25 mg Oral Q6H PRN Rafael Roth PA      
 pravastatin (PRAVACHOL) tablet 40 mg  40 mg Oral QHS Ashlyn Rios   40 mg at 11/19/19 2159 Objective:  
 
Patient Vitals for the past 24 hrs: 
 Temp Pulse Resp SpO2 Weight 11/20/19 0829  75 (!) 32 100 %   
11/20/19 0800 98.4 °F (36.9 °C) 64 29 100 %   
11/20/19 0700  68 26 99 %   
11/20/19 0600  65 30 100 %   
11/20/19 0500  73 29 100 %   
11/20/19 0400 97.7 °F (36.5 °C) 76 26 99 % 64 kg (141 lb 3.2 oz)  
11/20/19 0300  76 27 100 %   
11/20/19 0200  82 (!) 31 97 %   
11/20/19 0100  73 26 100 %   
11/20/19 0000 98.9 °F (37.2 °C) 75 (!) 32 100 %   
11/19/19 2200  70 30    
11/19/19 2100  80 28 100 %   
11/19/19 2043    94 %   
11/19/19 2000 99 °F (37.2 °C) 88 30 95 %   
 11/19/19 1900  94 26 93 %   
11/19/19 1800  77 30 100 %   
11/19/19 1749  90 (!) 39 95 %   
11/19/19 1700  75 (!) 31 100 %   
11/19/19 1638  70 (!) 34 100 %   
11/19/19 1600 99.1 °F (37.3 °C) 69 (!) 33 100 %   
11/19/19 1500  78 25 100 %   
11/19/19 1400  81 24 97 %   
11/19/19 1300  82 24 98 %   
11/19/19 1200 99.2 °F (37.3 °C) 80 20 100 %   
11/19/19 1158  83 30 98 %   
11/19/19 1100  88 (!) 31 96 %   
11/19/19 1000  85 (!) 32 96 %   
11/19/19 0900  80 20 100 %  Gen: Sedated, ventilated by trach HEENT: PERRL, Sclerae anicteric Cv: RRR without m/r/g Pulm: CTA bilaterally Abd: NABS, NTND, No HSM Ext: bilateral hand swelling, no violaceous discoloration of digits. Available labs reviewed: 
Labs:   
Recent Results (from the past 24 hour(s)) GLUCOSE, POC Collection Time: 11/19/19  9:01 AM  
Result Value Ref Range Glucose (POC) 124 (H) 65 - 100 mg/dL [facility] Collection Time: 11/19/19  9:02 AM  
Result Value Ref Range Glucose 124 mg/dL Insulin order 2.3 units/hour Insulin adminstered 2.3 units/hour Multiplier 0.036 Low target 95 mg/dL High target 130 mg/dL D50 order 0.0 ml  
 D50 administered 0.00 ml Minutes until next BG 60 min Order initials dn   
 Administered initials dn   
 [facility] Comments GLUCOSE, POC Collection Time: 11/19/19 10:11 AM  
Result Value Ref Range Glucose (POC) 153 (H) 65 - 100 mg/dL [facility] Collection Time: 11/19/19 10:12 AM  
Result Value Ref Range Glucose 153 mg/dL Insulin order 4.3 units/hour Insulin adminstered 4.3 units/hour Multiplier 0.046 Low target 95 mg/dL High target 130 mg/dL D50 order 0.0 ml  
 D50 administered 0.00 ml Minutes until next BG 60 min Order initials dn   
 Administered initials dn   
 GLSC[facility] Comments GLUCOSE, POC Collection Time: 11/19/19 11:22 AM  
Result Value Ref Range Glucose (POC) 150 (H) 65 - 100 mg/dL Performed by Andres De Santiago Collection Time: 11/19/19 11:22 AM  
Result Value Ref Range Glucose 150 mg/dL Insulin order 5.0 units/hour Insulin adminstered 5.0 units/hour Multiplier 0.056 Low target 95 mg/dL High target 130 mg/dL D50 order 0.0 ml  
 D50 administered 0.00 ml Minutes until next BG 60 min Order initials dn   
 Administered initials dn   
 GL[facility]OM Comments GLUCOSE, POC Collection Time: 11/19/19 12:25 PM  
Result Value Ref Range Glucose (POC) 144 (H) 65 - 100 mg/dL Performed by Susannah De La Fuente Collection Time: 11/19/19 12:26 PM  
Result Value Ref Range Glucose 144 mg/dL Insulin order 5.5 units/hour Insulin adminstered 5.5 units/hour Multiplier 0.066 Low target 95 mg/dL High target 130 mg/dL D50 order 0.0 ml  
 D50 administered 0.00 ml Minutes until next BG 60 min Order initials apl Administered initials apl GLSCOM Comments GLUCOSE, POC Collection Time: 11/19/19  1:35 PM  
Result Value Ref Range Glucose (POC) 106 (H) 65 - 100 mg/dL Performed by Nova Lopez Collection Time: 11/19/19  1:38 PM  
Result Value Ref Range Glucose 106 mg/dL Insulin order 3.0 units/hour Insulin adminstered 3.0 units/hour Multiplier 0.066 Low target 95 mg/dL High target 130 mg/dL D50 order 0.0 ml  
 D50 administered 0.00 ml Minutes until next BG 60 min Order initials AG Administered initials Ag [facility] Comments GLUCOSE, POC Collection Time: 11/19/19  2:40 PM  
Result Value Ref Range Glucose (POC) 104 (H) 65 - 100 mg/dL Performed by Andres De Santiago Collection Time: 11/19/19  2:40 PM  
Result Value Ref Range Glucose 104 mg/dL Insulin order 2.9 units/hour Insulin adminstered 2.9 units/hour Multiplier 0.066 Low target 95 mg/dL High target 130 mg/dL D50 order 0.0 ml  
 D50 administered 0.00 ml Minutes until next BG 60 min Order initials dn   
 Administered initials dn   
 GLSCOM Comments GLUCOSE, POC Collection Time: 11/19/19  3:42 PM  
Result Value Ref Range Glucose (POC) 80 65 - 100 mg/dL Performed by [facility] Collection Time: 11/19/19  3:43 PM  
Result Value Ref Range Glucose 80 mg/dL Insulin order 0.0 units/hour Insulin adminstered 0.0 units/hour Multiplier 0.053 Low target 95 mg/dL High target 130 mg/dL D50 order 8.0 ml  
 D50 administered 8.00 ml Minutes until next BG 15 min Order initials dn   
 Administered initials dn   
 GLSCOM Comments GLUCOSE, POC Collection Time: 11/19/19  4:11 PM  
Result Value Ref Range Glucose (POC) 84 65 - 100 mg/dL Performed by [facility] Collection Time: 11/19/19  4:12 PM  
Result Value Ref Range Glucose 84 mg/dL Insulin order 1.0 units/hour Insulin adminstered 0.0 units/hour Multiplier 0.042 Low target 95 mg/dL High target 130 mg/dL D50 order 0.0 ml  
 D50 administered 0.00 ml Minutes until next BG 60 min Order initials dn   
 Administered initials dn   
 GLSCOM Comments BS 80 GLUCOSE, POC Collection Time: 11/19/19  5:24 PM  
Result Value Ref Range Glucose (POC) 110 (H) 65 - 100 mg/dL Performed by [facility] Collection Time: 11/19/19  5:25 PM  
Result Value Ref Range Glucose 110 mg/dL Insulin order 2.1 units/hour Insulin adminstered 2.1 units/hour Multiplier 0.042 Low target 95 mg/dL High target 130 mg/dL D50 order 0.0 ml  
 D50 administered 0.00 ml Minutes until next BG 60 min Order initials dn   
 Administered initials dn   
 GLSCOM Comments GLUCOSE, POC Collection Time: 11/19/19  6:29 PM  
Result Value Ref Range Glucose (POC) 119 (H) 65 - 100 mg/dL Performed by [facility]  
 Collection Time: 11/19/19  6:29 PM  
Result Value Ref Range Glucose 119 mg/dL Insulin order 2.5 units/hour Insulin adminstered 2.5 units/hour Multiplier 0.042 Low target 95 mg/dL High target 130 mg/dL D50 order 0.0 ml  
 D50 administered 0.00 ml Minutes until next BG 60 min Order initials dn   
 Administered initials dn   
 GLSCOM Comments GLUCOSE, POC Collection Time: 11/19/19  7:35 PM  
Result Value Ref Range Glucose (POC) 133 (H) 65 - 100 mg/dL Performed by Maryln Hatchet Jim Basil Collection Time: 11/19/19  7:36 PM  
Result Value Ref Range Glucose 133 mg/dL Insulin order 3.8 units/hour Insulin adminstered 3.8 units/hour Multiplier 0.052 Low target 95 mg/dL High target 130 mg/dL D50 order 0.0 ml  
 D50 administered 0.00 ml Minutes until next BG 60 min Order initials pls Administered initials pls GLSCOM Comments GLUCOSE, POC Collection Time: 11/19/19  8:37 PM  
Result Value Ref Range Glucose (POC) 146 (H) 65 - 100 mg/dL Performed by Jhon Hayward Collection Time: 11/19/19  8:41 PM  
Result Value Ref Range Glucose 146 mg/dL Insulin order 5.3 units/hour Insulin adminstered 5.3 units/hour Multiplier 0.062 Low target 95 mg/dL High target 130 mg/dL D50 order 0.0 ml  
 D50 administered 0.00 ml Minutes until next BG 60 min Order initials joelm Administered initials jmm [facility] Comments GLUCOSE, POC Collection Time: 11/19/19  9:44 PM  
Result Value Ref Range Glucose (POC) 112 (H) 65 - 100 mg/dL Performed by Alex Nolasco Collection Time: 11/19/19  9:44 PM  
Result Value Ref Range Glucose 112 mg/dL Insulin order 3.2 units/hour Insulin adminstered 3.2 units/hour Multiplier 0.062 Low target 95 mg/dL High target 130 mg/dL D50 order 0.0 ml  
 D50 administered 0.00 ml Minutes until next BG 60 min Order initials sfm Administered initials sfm GLSCOM Comments GLUCOSE, POC Collection Time: 11/19/19 10:46 PM  
Result Value Ref Range Glucose (POC) 101 (H) 65 - 100 mg/dL Performed by Akanksha Hernandez Collection Time: 11/19/19 10:46 PM  
Result Value Ref Range Glucose 101 mg/dL Insulin order 2.5 units/hour Insulin adminstered 2.5 units/hour Multiplier 0.062 Low target 95 mg/dL High target 130 mg/dL D50 order 0.0 ml  
 D50 administered 0.00 ml Minutes until next BG 60 min Order initials jmm Administered initials jmm GL[facility] Comments GLUCOSE, POC Collection Time: 11/19/19 11:51 PM  
Result Value Ref Range Glucose (POC) 113 (H) 65 - 100 mg/dL Performed by Kevin Neuropurebarry Collection Time: 11/19/19 11:51 PM  
Result Value Ref Range Glucose 113 mg/dL Insulin order 3.3 units/hour Insulin adminstered 3.3 units/hour Multiplier 0.062 Low target 95 mg/dL High target 130 mg/dL D50 order 0.0 ml  
 D50 administered 0.00 ml Minutes until next BG 60 min Order initials ep Administered initials ep GLSCOM Comments GLUCOSE, POC Collection Time: 11/20/19 12:51 AM  
Result Value Ref Range Glucose (POC) 97 65 - 100 mg/dL Performed by Kevin Neuropurebarry Collection Time: 11/20/19 12:51 AM  
Result Value Ref Range Glucose 97 mg/dL Insulin order 2.3 units/hour Insulin adminstered 2.3 units/hour Multiplier 0.062 Low target 95 mg/dL High target 130 mg/dL D50 order 0.0 ml  
 D50 administered 0.00 ml Minutes until next BG 60 min Order initials ep Administered initials ep GLSCOM Comments GLUCOSE, POC Collection Time: 11/20/19  1:53 AM  
Result Value Ref Range Glucose (POC) 124 (H) 65 - 100 mg/dL Performed by [employer] Collection Time: 11/20/19  1:53 AM  
Result Value Ref Range Glucose 124 mg/dL Insulin order 4.0 units/hour Insulin adminstered 4.0 units/hour Multiplier 0.062 Low target 95 mg/dL High target 130 mg/dL D50 order 0.0 ml  
 D50 administered 0.00 ml Minutes until next BG 60 min Order initials ep Administered initials ep GLSCOM Comments GLUCOSE, POC Collection Time: 11/20/19  3:02 AM  
Result Value Ref Range Glucose (POC) 113 (H) 65 - 100 mg/dL Performed by Jamee Andrade Collection Time: 11/20/19  3:02 AM  
Result Value Ref Range Glucose 113 mg/dL Insulin order 3.3 units/hour Insulin adminstered 3.3 units/hour Multiplier 0.062 Low target 95 mg/dL High target 130 mg/dL D50 order 0.0 ml  
 D50 administered 0.00 ml Minutes until next  min Order initials ep Administered initials ep [facility] Comments CBC W/O DIFF Collection Time: 11/20/19  3:50 AM  
Result Value Ref Range WBC 11.1 4.1 - 11.1 K/uL  
 RBC 2.58 (L) 4.10 - 5.70 M/uL HGB 7.9 (L) 12.1 - 17.0 g/dL HCT 25.1 (L) 36.6 - 50.3 % MCV 97.3 80.0 - 99.0 FL  
 MCH 30.6 26.0 - 34.0 PG  
 MCHC 31.5 30.0 - 36.5 g/dL  
 RDW 15.4 (H) 11.5 - 14.5 % PLATELET 49 (LL) 323 - 400 K/uL NRBC 0.0 0  WBC ABSOLUTE NRBC 0.00 0.00 - 0.01 K/uL MAGNESIUM Collection Time: 11/20/19  3:50 AM  
Result Value Ref Range Magnesium 2.0 1.6 - 2.4 mg/dL NT-PRO BNP Collection Time: 11/20/19  3:50 AM  
Result Value Ref Range NT pro-BNP 31,042 (H) <481 PG/ML  
METABOLIC PANEL, COMPREHENSIVE Collection Time: 11/20/19  3:50 AM  
Result Value Ref Range Sodium 144 136 - 145 mmol/L Potassium 4.2 3.5 - 5.1 mmol/L Chloride 110 (H) 97 - 108 mmol/L  
 CO2 27 21 - 32 mmol/L Anion gap 7 5 - 15 mmol/L Glucose 89 65 - 100 mg/dL BUN 82 (H) 6 - 20 MG/DL Creatinine 1.58 (H) 0.70 - 1.30 MG/DL  
 BUN/Creatinine ratio 52 (H) 12 - 20 GFR est AA 53 (L) >60 ml/min/1.73m2 GFR est non-AA 43 (L) >60 ml/min/1.73m2 Calcium 8.0 (L) 8.5 - 10.1 MG/DL Bilirubin, total 0.5 0.2 - 1.0 MG/DL  
 ALT (SGPT) 106 (H) 12 - 78 U/L  
 AST (SGOT) 52 (H) 15 - 37 U/L Alk. phosphatase 134 (H) 45 - 117 U/L Protein, total 5.4 (L) 6.4 - 8.2 g/dL Albumin 2.0 (L) 3.5 - 5.0 g/dL Globulin 3.4 2.0 - 4.0 g/dL A-G Ratio 0.6 (L) 1.1 - 2.2 PHOSPHORUS Collection Time: 11/20/19  3:50 AM  
Result Value Ref Range Phosphorus 4.8 (H) 2.6 - 4.7 MG/DL PROTHROMBIN TIME + INR Collection Time: 11/20/19  3:50 AM  
Result Value Ref Range INR 1.2 (H) 0.9 - 1.1 Prothrombin time 12.1 (H) 9.0 - 11.1 sec PTT Collection Time: 11/20/19  3:50 AM  
Result Value Ref Range aPTT 24.0 22.1 - 32.0 sec  
 aPTT, therapeutic range     58.0 - 77.0 SECS FIBRINOGEN Collection Time: 11/20/19  3:50 AM  
Result Value Ref Range Fibrinogen 546 (H) 200 - 475 mg/dL VITAMIN B12 Collection Time: 11/20/19  3:50 AM  
Result Value Ref Range Vitamin B12 >2,000 (H) 193 - 986 pg/mL FOLATE Collection Time: 11/20/19  3:50 AM  
Result Value Ref Range Folate 38.5 (H) 5.0 - 21.0 ng/mL GLUCOSE, POC Collection Time: 11/20/19  5:02 AM  
Result Value Ref Range Glucose (POC) 105 (H) 65 - 100 mg/dL Performed by Kevin Lazaro Collection Time: 11/20/19  5:02 AM  
Result Value Ref Range Glucose 105 mg/dL Insulin order 2.8 units/hour Insulin adminstered 2.8 units/hour Multiplier 0.062 Low target 95 mg/dL High target 130 mg/dL D50 order 0.0 ml  
 D50 administered 0.00 ml Minutes until next  min Order initials ep Administered initials ep [facility]SCOM Comments GLUCOSE, POC Collection Time: 11/20/19  6:57 AM  
Result Value Ref Range Glucose (POC) 93 65 - 100 mg/dL Performed by Kevin Lazaro Collection Time: 11/20/19  6:58 AM  
Result Value Ref Range Glucose 93 mg/dL Insulin order 1.7 units/hour Insulin adminstered 1.7 units/hour Multiplier 0.050 Low target 95 mg/dL High target 130 mg/dL D50 order 0.0 ml  
 D50 administered 0.00 ml Minutes until next BG 60 min Order initials ep Administered initials ep GLSCOM Comments GLUCOSE, POC Collection Time: 11/20/19  8:02 AM  
Result Value Ref Range Glucose (POC) 88 65 - 100 mg/dL Performed by Christiano Foote Collection Time: 11/20/19  8:02 AM  
Result Value Ref Range Glucose 88 mg/dL Insulin order 1.1 units/hour Insulin adminstered 1.1 units/hour Multiplier 0.040 Low target 95 mg/dL High target 130 mg/dL D50 order 0.0 ml  
 D50 administered 0.00 ml Minutes until next BG 60 min Order initials EW Administered initials EW   
 [facility] Comments Assessment and Plan  
 
69 y/o man with CAD, admitted with ACS, s/p CABG. Has had prolonged respiratory failure due in part to preceding fibrotic lung disease. Evaluated by pulmonary with FOB. Asked to see for t'cytopenia. Work-up thus far:  
 
Bilirubin 0.6 11/19/19 HECTOR negative 11/2/19 Legionella DFA negative 11/7/19 Bronchial lavage negative 11/7/19 Blood culture negative 10/21/19 O-86: Normal 
Folic acid: normal 
Fibrinogen: normal 
HIT panel/CARMEN: normal 
Zosyn started 5 days after PLT decline Copper: pending LAC: pending B2G: pending ACL: pending Heparin held T'cytopenia: would recommend PLT transfusion for bleeding or < 10K. Thank you for allowing us to participate in the care of this very pleasant patient. Sola Wolf MD 
Hematology/Oncology Phone [phone]

## 2019-11-20 NOTE — ADT AUTH CERT NOTES
Patient Demographics Patient Name Ki Gordon 
[id number] Sex Male  
1950 Address [address] [place] Phone [phone] (Home) CSN:  
[id number] Admit Date: Admit Time Room Bed Oct 19, 2019  6:17 PM 4336 [[id number]] 01 [[id number]] Attending Providers Provider Pager From To Gisele Akins MD  10/19/19 10/19/19 Cassandra Juarez MD  10/19/19 10/23/19 Elian Meyers MD  10/23/19 Emergency Contact(s) Name Relation Home Work Mobile Iris Taylor   [phone] Utilization Reviews  
 
   
LOC:Acute Adult-Extended Stay (2019) by Krysta Noland RN  
 
   
Review Status Review Entered In Primary 2019 16:20  
   
Criteria Review REVIEW SUMMARY 
  
Patient: Perico Perdomo Review Number: [id number] Review Status: In Primary 
  
Condition Specific: Yes 
  
Condition Level Of Care Code: CRITICAL Condition Level Of Care Description: Critical 
  
  
OUTCOMES Outcome Type: Primary 
  
  
  
REVIEW DETAILS 
  
Service Date: 2019 Admit Date: 10/19/2019 Product: [place] Adult Subset: Extended Stay (Symptom or finding within 24h) 
  (Excludes PO medications unless noted) [X] Select Level of Care, One: 
            [X] CRITICAL, One: 
            ~--Admin, IQ Admin Admin on 2019 04:20 PM--~ Inpatient  ICU 
             
            VS: 99.4-85-33 134/69  99%  vent CXR:No change in interstitial edema superimposed upon pulmonary fibrosis. Labs:  hgb 7.5 hct 24.3  Na 147 cl 112 bun 88 crea 1.49 ca 8.0   phos 4.8 BNP 30,818 Orders: ICU/ IP. Devlin. Glucose ac/hs. Trach care. Routine VS.  Daily weights. Glucose Q 6 hours. PT/OT. I and Wilfrido Escamilla HOB. I and o.  IS. Full code. NPO. Tube feedings > Suplena at 35 ml/hr to 200 ml flush Q 3 hours. CXR every other day,. Daily CBC/Mg/Phos.   BMP Q 12 hours.  , consult Hematology 0.9% NACL  @ 3ml/hr, Mucomyst Neb BID, albuterol neb  q4h prn, brovana neb 2 times daily, pulmicort neb 2 times daily, Bumex 1mg q12h iv, peridex  10ml q12h po, Precedex IV titrate, D10%  250ml IV prn, ferrous sulfate 300mg qd, Novolin R IV titrate, ativan 1-2mg iv q4h prn, solu-medrol 40mg iv q12h, morphine 2mg  iv q2hr prn, wellesse plus 30ml  qd, mycostatin 500,000U qid, protonix  40mg iv q12h, francis-synephrine IV titrate, zosyn 3.375gm  iv q8h, pravachol 40mg qhs, seroquel 25mg bid, zoloft 100mg qd, potassium 20meq  iv q1hr x 2,  [email or web address] Procedure:  Procedure(s): 
            CARDIAC RE-ENTRY, MARISOL BY  [facility]    
              CABG x 5, LIMA to LAD, RSVG to [id number], RSVG to PDA 10/23/19 
              
            11/13/19 - S/p perc trach placement Subjective: 
            Pt seen with Dr. [facility]. Increased RR/agitation remains. On precedex, insulin gtt, stopping D5 per renal. T max 99.3F, 60% FiO2 Oxygen Therapy: 
            Oxygen Therapy O2 Sat (%): 99 % (11/19/19 0840) Pulse via Oximetry: 78 beats per minute (11/19/19 0840) O2 Device: Ventilator;Tracheostomy (11/19/19 0840) O2 Flow Rate (L/min): 40 l/min (11/04/19 2000) O2 Temperature: 98.6 °F (37 °C) (11/19/19 0840) FIO2 (%): 60 % (11/19/19 0840) EXAM: 
            General:               
            Trach, sedated. Lungs:    Coarse bilat Incision:              Midsternal incision with no significant erythema, drainage, or dehiscence. Heart:     Regular rate and rhythm, S1, S2 normal, no murmur, click, rub or gallop. Abdomen:           Soft, non-tender. Bowel sounds active. No masses,  No organomegaly. +BM. Extremities:          Some generalized edema, +1-2 edema. Palpable pulses bilat Neurologic:          Moves extremities but without purpose. Does not track or follow commands. Assessment: 
              
            Principal Problem: S/P CABG x 5 (10/23/2019) Overview: x 5, LIMA to LAD, RSVG to Diag1, OM2-OM3, RSVG to PDA 
              
            Active Problems: 
              ACS (acute coronary syndrome) ([facility] [address] (10/19/2019) 
              
              Unstable angina ([facility] [address] (10/19/2019) 
              
              Coronary artery disease of native artery of native heart with stable angina pectoris ([facility] [address] (10/21/2019) 
              
              Systolic heart failure ([facility] [address] (10/22/2019) 
              
              
              
             Plan/Recommendations/Medical Decision Makin. S/p CABG: on ASA, statin. No ACEi/ARB due to renal function. No BB due to pulmonary fibrosis. 2. Acute on chronic systolic CHF, NYHA Class II on admit: EF 35-40% preop. No BB/ACE/ARB/AA until vitals/kidney function allows. Trend pBNP - up, receiving a lot of volume. Cont diuresis w/ bumex 1 mg bid  
              
            3. Acute postop respiratory failure with chronic interstitial fibrosis per CXR: Trach placed  by Thoracic Surgery. Acute respiratory acidosis  thought driven by oversedation. Fentanyl and versed drips stopped. (now just has PRN Morphine and Ativan dosing) Diuresis per Nephrology. Amiodarone stopped on 10/31. Solumedrol per pulm-weaning. Continue scheduled nebs. Cont mucinex, pulm toileting, mucomyst. Chest CT revealing emphysema/pulm fibrosis.  Not able to tolerate vent weaning currently  
              
 4. Renal insufficiency: BUN/Creatinine slightly improved today. Nephrology following and appreciate their recommendations. Devlin in place for accurate I&O. Cont diuretics 
              
            5. Anemia: had preop, H&H stable today. Did receive transfusion of PRBC on Friday. Iron panel sent preop - iron, iron sat low-continue Iron supplement.  
              
            6. Thrombocytopenia: Platelets remain 33M. HIT antibody neg, CARMEN pending. Q12 CBC. Hold SQ heparin and ASA. On PPI. Transfuse platelets for less than 20k or if active bleeding seen. Consult heme 
              
            7. Hx of HTN: Hydralazine PRN SBP >160, BP too labile for PO meds  
              
            8. HLD: Continue pravastatin 
              
            9. Leukocytosis: WBC at 9.5k today, procalcitonin last 1.6 (check every 3 days),  Devlin placed 11/7. PICC placed 11/6. Trach 11/13. Monitor daily CBC. Appreciate ID consult-continue Zosyn. Leukopenic last week  
              10. Constipation: Resolved. Now w/ frequent loose stools, holding bowel meds. Start sandro-q 
              11. Current smoker: smoking cessation education completed.  
              12. Nutrition: Appreciate Dietician recommendations. Dobhoff placed 10/30 and Enteral feedings started. TF's at goal of 35 ml/hr. Try and AVOID increased volume amounts  
              13. Hyperglycemia: Preop A1c 5.3 with no DM history, now with hyperglycemia. BS have been much better controlled on Insulin gtt. DTS following. Stopping D5 gtt today. Weaning steroids per Pulmonology.  
              14. Hypernatremia: Nephrology following-increased FW flushes, d/c D5 gtt.  
              15.  Agitation, acute encephalopathy: Have been unable to wean down on sedation much due to agitation. Continue Precedex, and PRN Morphine, Ativan to keep the patient comfortable.  Head CT neg for any acute process. May need to resume propofol, try seroquel bid. Not able to obtain MRI since V wires were cut. Do not think this is driven by pain, more from neuro source/encephalopathy  
              16. DVT/GI Prophylaxis: SCD's, SQ Heparin-currently on hold due to thrombocytopenia. Will restart when improved, PPI 
              
            Dispo: PT/OT as able. Remain in CVI until resp status more stable. [X] Partial responder, not clinically stable for discharge and requires continued stay, >= One: 
                    [X] Mechanical ventilation, discharge planning and, >= One: 
                    ~--Admin, IQ Admin Admin on 11- 04:05 PM--~ 
                    Pt. is on mechanical vent Pt. has a trach 
                     
                     
                     
                        [X] FiO2 > 50%(0.50) and > baseline ~--Admin, IQ Admin Admin on 11- 04:05 PM--~ 
                        FIO2  60% 
                         
                         
                         
  
Version: [employer] 2019 Alessio Clark  © 2019 [employer] and/or one of its [place]. All Rights Reserved. CPT only © 2018 American Medical Association. All Rights Reserved.  
   
LOC:Acute Adult-Extended Stay (11/18/2019) by Shaye Jones RN  
 
   
Review Status Review Entered In Primary 11/18/2019 14:58  
   
Criteria Review REVIEW SUMMARY 
  
Patient: Adrian Mugruia Review Number: [id number] Review Status: In Primary 
  
Condition Specific: Yes 
  
  
OUTCOMES Outcome Type: Primary 
  
  
  
REVIEW DETAILS 
  
Service Date: 11/18/2019 Admit Date: 10/19/2019 Product: [place] Adult Subset: Extended Stay (Symptom or finding within 24h) 
  (Excludes PO medications unless noted) [X] Select Level of Care, One: [X] CRITICAL, One: 
                [X] Partial responder, not clinically stable for discharge and requires continued stay, >= One: 
                    [X] Mechanical ventilation, discharge planning and, >= One: 
                        [X] Failed weaning >= 3 attempts and tracheostomy placed <= 24h 
                        ~--Admin, IQ Admin Admin on 11- 02:57 PM--~ 
                        PUlm  note; Impression: 
                        ========= 
                        · S/p 5V CABG 10/19/19 for ACS  With reexploration 10/23 for mediastinal hematoma. · Acute resp failure - baseline fibrotic ILD (etiology not clear- this is a new dx but UIP/IPF in DDX) with superimposed pulmonary interstitial edema probable superimposed diffuse alveolar damage from blood/blood products. Amio pulm toxicty in DDX but not clear from [facility] review when he was on it. Suspect that he his significant irreversible fibrotic lung disease. 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 
                        · Anemia · Encephalopathy 
                          
                        Plan: 
                        ==== 
                        --vent support - wean FiO2 and TC trials as able --on solumedrol - 40 mg IV q12. 
                        --diuretics as you are doing. --Agree with minimizing sedation. --TF 
                        --Nephrology following electrolytes and diuretics 
                        --D/W Dr. Nadya Ojeda --We will be available to see him for acute issues over the weekend and check back on Monday. ~--Admin,  Pin Harwick Rc on 2019 02:56 PM--~ 
                        IM note; 
                         Subjective: Precedex gtt. Afebrile, Trach to vent with FiO2 of 60%. HIT panel pending. Platelets remain in the 40k range. Agitated with increased respiratory work when sedation weaned but no purposeful movement. 97.4  149/61  75  36  100%  VENT 
                         
                         LABS;  HGb-7.6  HCt-24.4  Pkt-40  NA-153  K-3.4  BUN-100  Crt-1.39  Glu-116 Assessment: 
                           S/P CABG x 5 (10/23/2019) Overview: x 5, LIMA to LAD, RSVG to Diag1, OM2-OM3, RSVG to PDA   
                        Active Problems: 
                          ACS (acute coronary syndrome) ([facility] [address] (10/19/2019)   
                          Unstable angina ([address] (10/19/2019)   
                          Coronary artery disease of native artery of native heart with stable angina pectoris ([address] (10/21/2019)   
                          Systolic heart failure ([address] (10/22/2019)   
                         
                         Plan/Recommendations/Medical Decision Makin. S/p CABG: on ASA, statin. No ACEi/ARB due to renal function. No BB due to pulmonary fibrosis. 2. Acute on chronic systolic CHF, NYHA Class II on admit: EF 35-40% preop. No BB/ACE/ARB/AA until vitals/kidney function allows. Trend pBNP  
                          
                        3. Acute postop respiratory failure with chronic interstitial fibrosis per CXR: Re-intubated  due to worsening acute respiratory failure. Donnell Krabbe placed  by Thoracic Surgery.  Acute respiratory acidosis 11/14 thought driven by oversedation. Fentanyl and versed drips stopped. (now just has PRN Morphine and Ativan dosing) Diuresis per Nephrology-will restart today. Amiodarone had been stopped on 10/31. Solumedrol per pulm-weaning. Continue scheduled nebs. Cont mucinex, pulm toileting, mucomyst. Chest CT revealing emphysema/pulm fibrosis.   
                        4. Renal insufficiency: BUN/Creatinine slightly improved today. Nephrology following and appreciate their recommendations. Devlin in place for accurate I&O. Will give dose of Bumex this morning. 
                          
                        5. Anemia: had preop, H&H stable today. Did receive transfusion of PRBC on Friday. Iron panel sent preop - iron, iron sat low-continue Iron supplement.  
                          
                        6. Thrombocytopenia: Platelets worse today at 40k. HIT and CARMEN send-results pending. Q12 CBC. Hold SQ heparin and ASA. On PPI. Transfuse platelets for less than 20k   
                        7. Hx of HTN: Hydralazine PRN SBP >160 
                          
                        8. HLD: Continue pravastatin 
                          
                        9. Leukocytosis: WBC at 8.7k today, procalcitonin at 1.6,  Devlin placed 11/7. PICC placed 11/6. Trach 11/13. Monitor daily CBC. Appreciate ID consult-continue Zosyn 
                          10. Constipation: Resolved. Continue to monitor and bowel regimen PRN 
                          11. Current smoker: smoking cessation education completed.  
                          12. Nutrition: Appreciate Dietician recommendations. Dobhoff placed 10/30 and Enteral feedings started. Too unstable from resp standpoint for PO diet, TF's continuous at 50 ml/hr. Try and AVOID increased volume amounts   
                        13. Hyperglycemia: Preop A1c 5.3 with no DM history, now with hyperglycemia. BS have been much better controlled on Insulin gtt. DTS following. Now on D5 for hypernatremia. Weaning steroids per Pulmonology.  
                          14. Hypernatremia: Nephrology following-increased FW flushes and added D5. Na at 153 today. Continue to monitor 
                          15.  Agitation: Have been unable to wean down on sedation much due to agitation. Continue Precedex, and PRN Morphine, Ativan to keep the patient comfortable. Will get a Head CT today (no contrast due to renal function) to evaluate for any acute issue.  
                          16. DVT/GI Prophylaxis: SCD's, SQ Heparin-currently on hold due to thrombocytopenia. Will restart when improved, PPI 
                          
                        Dispo: PT/OT as able. Remain in CVI until resp status more stable. 
                         
                         
                         
  
Version: [employer]® 2019 [employer]  © 2019 [employer] and/or one of its [place]. All Rights Reserved. CPT only © 2018 American Medical Association. All Rights Reserved.  
   
LOC:Acute Adult-Extended Stay (11/17/2019) by Keya Beltre RN  
 
   
Review Status Review Entered In Primary 11/18/2019 09:19  
   
Criteria Review REVIEW SUMMARY 
  
Patient: Harriett Pickard Review Number: [id number] Review Status: In Primary 
  
Condition Specific: Yes 
  
Condition Level Of Care Code: CRITICAL Condition Level Of Care Description: Critical 
  
  
OUTCOMES Outcome Type: Primary 
  
  
  
REVIEW DETAILS 
  
Service Date: 11/17/2019 Admit Date: 10/19/2019 Product: [place] Adult Subset: Extended Stay (Symptom or finding within 24h) 
  (Excludes PO medications unless noted) [X] Select Level of Care, One: 
            [X] CRITICAL, One: 
            ~--Admin, IQ Admin Admin on 11- 09:19 AM--~ 
            11/17/19- 
             
            VS:  98.5- P- 73- R- 26- O2 sat= 100% on vent. Abnormal Labs: 
            WBC= 3.3.  RBC= 2.55. Hgb= 7.7. Hct= 24.8. RDW= 16.3. Plt= 36. Neutrophils= 78. Band neutrophils= 12. Lymphs= 4. Monocytes= 4. Metamyelocytes= 2.   Abs. Lymphs= 0.1. Na= 155. Chl= 118. Glucose= 110. BUN= 127. Cr= 1.75. BUN/Cr ratio= 73. GFR= 47. Ca= 8.1. Alt= 174. AST= 77. T. protein= 5.2. Albumin= 2.0. NT pro-BNP= [id number]. Mg= 2.8. ABG on vent>  ph= 7.425. pCo2= 45.2. pO2= 181. HCO3= 29.7.  sO2= 100. Blood cx pending. CXR= No interval change. CSS Note/ Attending: S/p CABG, LIMA to LAD, RSVG to [id number], RSVG to PDA 10/23/19. Pt seen with Dr. Pool Au WBC 3.3 HH 7/25 PLT 36 CXR: patchy infiltratrates Flexiseal 
            Cr 1.75 improved.  better   UOP 2000 +800  Bumexstopped by renal  
            BNP 28K 
            BS, SC AF Fungal cultures NGSF: Zosyn for temp& Procalcitonin ABG 7.42/45/181 +5 90%  
            wean to 80% Platelets HIT panel Hold ASA EXAM: 
            General: Intubated, sedated. Lungs:  Coarse bilat Incision: Midsternal incision with no significant erythema, drainage, or dehiscence. Heart: Regular rate and rhythm, S1, S2 normal, no murmur, click, rub or gallop. Abdomen:           Soft, non-tender. Bowel sounds active. No masses,  No organomegaly. +BM. FMS in place Extremities:          Some generalized edema. Palpable pulses bilat Neurologic:         Moves extremities but without purpose. Does not track or follow commands. Assessment/Plan: 
            1. S/p CABG: on ASA, statin. No BB, ACEi while on vasoactive medications 2. Acute on chronic systolic CHF, NYHA Class II on admit: EF 35-40% preop. Dobutamine back on at 1. No BB/ACE/ARB/AA until vitals/kidney function allows. Trend pBNP 3. Acute postop respiratory failure with chronic interstitial fibrosis per CXR: Re-intubated 11/7 due to worsening acute respiratory failure. Chloé Cagle placed 11/13 by Thoracic Surgery. Acute respiratory acidosis 11/14 thought driven by oversedation. Fentanyl and versed drips stopped. (now just has PRN Morphine and Ativan dosing) Diuresis per Nephrology-currently holding. Amiodarone had been stopped on 10/31. Solumedrol per pulm-weaning. Continue scheduled nebs. Cont mucinex, pulm toileting, mucomyst. Chest CT revealing emphysema/pulm fibrosis. 4. Renal insufficiency: Creatinine slightly improved today. Nephrology following and appreciate their recommendations. Devlin in place for accurate I&O. Urine output improved in the last 24 hours. Nephrology recommendations to continue holding diuretics. Did receive Albumin X 4 doses in the last 24 hours. 5. Anemia: had preop, H&H at 6/19/8-given 1 unit PRBC. Iron panel sent preop - iron, iron sat low. 6. Thrombocytopenia: Platelets worse today at 75k. Daily CBC. Hold SQ heparin. Will get a repeat CBC this afternoon. If still less than 80K will plan to hold ASA too. 
            7. Hx of HTN: Issues with hypotension improved. Still on Dobutamine at 1 but likely can be weaned off. Volume replaced overnight. 8. HLD: Continue pravastatin             9. Leukocytosis: WBC at 10.7k today, procalcitonin at 2.9 but Lactic at 1.4. Devlin placed 11/7. PICC placed 11/6. Trach 11/13. Monitor daily CBC. Will ask Infectious Disease to consult with this medically complex patient. Antibiotics? 10. Constipation: Resolved. FMS in place 11. Current smoker: smoking cessation education completed. 12. Nutrition: Appreciate Dietician recommendations. Dobhoff placed 10/30 and Enteral feedings started. Too unstable from resp standpoint for PO diet, TF's continuous at 50 ml/hr. Try and AVOID increased volume amounts 13. Hyperglycemia: Preop A1c 5.3 with no DM history, now with hyperglycemia. BS have been much better controlled on Insulin gtt. DTS following. Discussed with [facility] and will keep on Insulin gtt for another 24 hours. She will look at him later today to discuss most likely plan for insulin over the weekend coming off the Insulin gtt. 14. Hypernatremia: Nephrology following-increased FW flushes. Na at 152 today. Continue to monitor 15. DVT/GI Prophylaxis: SCD's, SQ Heparin-currently on hold due to thrombocytopenia. Will restart when improved, PPI. ID Note: Afebrile. On the vent. Has a trach. Exam: On the vent Lungs: clear to auscultation Heart: s1, s2, no murmurs Abdomen: soft, nontender Extremities: (+) pedal edema Assessment: 1.  Elevated procalcitonin level and fever 2.  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 
            3.  Chronic kidney disease with acute exacerbation. 4.  Anemia. 5.  Thrombocytopenia. 6.  Acute rise in transaminases-possible shock liver. 7.  Non-insulin-dependent diabetes mellitus. 8.  Hypertension. 9.  Hyperlipidemia. Recommendation: 
            Continue iv Zosyn. Nephrology Note: ISAIAH on CKD: 
            - from ATN post CABG 
            - BP improved,  
            - PRN albumin for hypotension 
            - hold diuretics 
            - daily labs for now, recheck renal 6 pm, call with results  
            -significant azotemia improving Hypernatremia : 
            - increaser FW flushes please 200 cc Q3 HRS  
            - na 155 
            - Start D5W and adjust Fluids based on labs - DI work up initiated. Adairley however: total UO in 24 hrs was 2000 
            -Total In/ total out = 2799/2145 in 24 hrs 
            - supect  GI losses from [address] + insensible losses for high NA Hyperkalemia:  
            - resolved CKD III: 
            - baseline Cr 1.3 prior to CABG 
            - likely from DM and HTN 
              
            HFrE F: 
            - last EF 35-40% on 10/20 CAD s/p CABG x 5 10/19 and reexploration 10/23 for hematoma Acute on Chronic Resp Failure: 
            - likely 2/2 underlying ILD + volume 
            - now w/ trach Chronic Anemia: 
            - hgb dropped a point , stool occult  
            - cont to monitor DM2: 
            - on insulin GGT 
            - increase given D5w intiated 
            - consider alternate TF Protein Malnutrition: 
            - on TF via [address] Additional Orders: ICU/ IP. Claus. Glucose ac/hs. Trach care. Routine VS.  Daily weights. Glucose Q 6 hours. PT/OT. I and Catina Turner HOB. I and o.  IS. Full code. NPO.   Tube feedings > Suplena at 35 ml/hr to 200 ml flush Q 3 hours. Daily CXR. Daily CBC/Mg/Phos. BMP Q 12 hours. 1 unit of PRBC's.  1 unit of platelets. D5W at 65 ml/hr. Precedex gtt. Insulin gtt. Mucomyst nebs bid. Solu-Medrol 40 mg iv Q 12 hours. Zosyn 3.375 mg iv Q 8 hours prn. Albuterol nebs Q 4 prn. Brovana/pulmicort nebs bid. ASA 81 mg po daily. Ferrous Sulfate 300 mg/ng daily. Ativan 1-2 mg iv Q 4 prn> rec'd x 4. Morphine 2 mg iv Q 2 prn> rec'd x 3. MVI / NG daily. Protonix 40 mg iv Q 12 hours. Pravachol 40 mg po daily. Zoloft 100 mg po daily. Kcl 20 mEQ iv x 1. [X] Partial responder, not clinically stable for discharge and requires continued stay, >= One: 
                    [X] Mechanical ventilation, discharge planning and, >= One: 
                    ~--Admin, IQ Admin Admin on 11- 09:18 AM--~ 
                    Pt on mechanical ventilator. Pt has a trach. [X] FiO2 > 50%(0.50) and > baseline ~--Admin, IQ Admin Admin on 11- 09:19 AM--~ Oxygen Therapy: 
                        Oxygen Therapy O2 Sat (%): 100 % (11/17/19 1100) Pulse via Oximetry: 74 beats per minute (11/17/19 1100) O2 Device: Tracheostomy; Ventilator (11/17/19 0800) O2 Flow Rate (L/min): 40 l/min (11/04/19 2000) O2 Temperature: 98.6 °F (37 °C) (11/08/19 0729) FIO2 (%): 80 % (11/17/19 0800)   
Version: InterQual® 2019 Dene Roll  © 2019 [employer] and/or one of its [place]. All Rights Reserved.   CPT only © 2018 American Medical Association. All Rights Reserved.  
   
LOC:Acute Adult-Extended Stay (11/16/2019) by Isaias Quiros RN  
 
   
Review Status Review Entered In Primary 11/18/2019 09:04  
   
Criteria Review REVIEW SUMMARY 
  
Patient: Rony Glez Review Number: [id number] Review Status: In Primary 
  
Condition Specific: Yes 
  
Condition Level Of Care Code: CRITICAL Condition Level Of Care Description: Critical 
  
  
OUTCOMES Outcome Type: Primary 
  
  
  
REVIEW DETAILS 
  
Service Date: 11/16/2019 Admit Date: 10/19/2019 Product: Gio Galo Adult Subset: Extended Stay (Symptom or finding within 24h) 
  (Excludes PO medications unless noted) [X] Select Level of Care, One: 
            [X] CRITICAL, One: 
            ~--Admin, IQ Admin Admin on 11- 09:04 AM--~ 
            11/16/19- 
             
            VS:   100- P- 86- R- 30- 134/61. O2 sat= 100% on vent. Abnormal Labs: 
            WBC= 1.7. RBC= 3.02. Hgb= 9.1. Hct= 28.7. RDW= 15.9. Plt= 52. NRBC= 1.2.  Abs. NRBC= 0.02. Mg= 3.1. NT pro-BNP= 33,329. Na= 151. Chl= 114. Glucose= 117. BUN= 149. Cr= 1.73. BUN/Cr ratio= 86. GFR= 47. Ca= 8.4. ALT= 297. AST= 212. T. protein= 5.5. Albumin= 2.4. A-G ratio = 0.8. Blood cx pending. ABG on vent>  ph= 7.495. pCo2= 38.6. pO2= 62. HCO3= 29.7.  sO2= 93. CXR=  Interstitial and parenchymal edema has not changed. CSS Note: 
            s/p CABG x 5, LIMA to LAD, RSVG to Diag1-OM3, RSVG to PDA 10/23/19 Pt seen with Dr. Deon Barraza [facility] 9/28 PLT 52 CXR: patchy infiltratrates Flexiseal 
            Cr 1.73 improved.  unchanged  UOP 2390 +450  Bumex 1 mg IV Q12 BNP 34K LFT's improving No changes.  
            EXAM: 
 General: Intubated, sedated. Lungs:  Coarse bilat Incision: Midsternal incision with no significant erythema, drainage, or dehiscence. Heart: Regular rate and rhythm, S1, S2 normal, no murmur, click, rub or gallop. Abdomen:  Soft, non-tender. Bowel sounds active. No masses,  No organomegaly. +BM. FMS in place Extremities:          Some generalized edema. Palpable pulses bilat Neurologic: Moves extremities but without purpose. Does not track or follow commands. Assessment/Plan: 
            1. S/p CABG: on ASA, statin. No BB, ACEi while on vasoactive medications 2. Acute on chronic systolic CHF, NYHA Class II on admit: EF 35-40% preop. Dobutamine back on at 1. No BB/ACE/ARB/AA until vitals/kidney function allows. Trend pBNP 3. Acute postop respiratory failure with chronic interstitial fibrosis per CXR: Re-intubated 11/7 due to worsening acute respiratory failure. Rissa [place] placed 11/13 by Thoracic Surgery. Acute respiratory acidosis 11/14 thought driven by oversedation. Fentanyl and versed drips stopped. (now just has PRN Morphine and Ativan dosing) Diuresis per Nephrology-currently holding. Amiodarone had been stopped on 10/31. Solumedrol per pulm-weaning. Continue scheduled nebs. Cont mucinex, pulm toileting, mucomyst. Chest CT revealing emphysema/pulm fibrosis. 4. Renal insufficiency: Creatinine slightly improved today. Nephrology following and appreciate their recommendations. Devlin in place for accurate I&O. Urine output improved in the last 24 hours. Nephrology recommendations to continue holding diuretics. Did receive Albumin X 4 doses in the last 24 hours. 5. Anemia: had preop, H&H at 6/19/8-given 1 unit PRBC. Iron panel sent preop - iron, iron sat low. 6. Thrombocytopenia: Platelets worse today at 75k. Daily CBC. Hold SQ heparin. Will get a repeat CBC this afternoon. If still less than 80K will plan to hold ASA too. 
            7. Hx of HTN: Issues with hypotension improved. Still on Dobutamine at 1 but likely can be weaned off. Volume replaced overnight. 8. HLD: Continue pravastatin 9. Leukocytosis: WBC at 10.7k today, procalcitonin at 2.9 but Lactic at 1.4. Devlin placed 11/7. PICC placed 11/6. Trach 11/13. Monitor daily CBC. Will ask Infectious Disease to consult with this medically complex patient. Antibiotics? 10. Constipation: Resolved. FMS in place 11. Current smoker: smoking cessation education completed. 12. Nutrition: Appreciate Dietician recommendations. Dobhoff placed 10/30 and Enteral feedings started. Too unstable from resp standpoint for PO diet, TF's continuous at 50 ml/hr. Try and AVOID increased volume amounts 13. Hyperglycemia: Preop A1c 5.3 with no DM history, now with hyperglycemia. BS have been much better controlled on Insulin gtt. DTS following. Discussed with [facility] [email or web address]. and will keep on Insulin gtt for another 24 hours. She will look at him later today to discuss most likely plan for insulin over the weekend coming off the Insulin gtt. 14. Hypernatremia: Nephrology following-increased FW flushes. Na at 152 today. Continue to monitor 15. DVT/GI Prophylaxis: SCD's, SQ Heparin-currently on hold due to thrombocytopenia. Will restart when improved, PPI Nephrology Note: ISAIAH on CKD: 
            - from ATN post CABG 
            - BP improved,  
            - PRN albumin for hypotension 
            - hold diuretics 
            - daily labs for now Hypernatremia : 
            - increaser FW flushes please  
            - na 151 Hyperkalemia:  
            - resolved CKD III: 
            - baseline Cr 1.3 prior to CABG 
            - likely from DM and HTN 
              
            HFrE F: 
            - last EF 35-40% on 10/20 CAD s/p CABG x 5 10/19 and reexploration 10/23 for hematoma Acute on Chronic Resp Failure: 
            - likely 2/2 underlying ILD + volume 
            - now w/ trach Chronic Anemia: 
            - hgb stable 
            - cont to monitor DM2: 
            - on insulin Protein Malnutrition: 
            - on TF via [address] ID Consult: 
            55-year-old male with a history of NIDDM, hypertension, hyperlipidemia, and ischemic heart disease. He was admitted to hospital with NSTEMI. The patient subsequently underwent CABG on 10/22/2019. He has remained in the intensive care unit on a ventilator since then. He recently had a trach this week. The patient is currently unresponsive to questions and commands. The patient is not currently on antibiotics. He has had hypotension, thrombocytopenia, deterioration of his renal function, and evidence of shock liver. A procalcitonin level was sent and is elevated. There is concern for evolving sepsis and we are asked to see the patient for further evaluation. IMPRESSION: 
            1.  Elevated procalcitonin level-the significance of this is uncertain in this setting (three weeks of critical care illness in the ICU): No infection apparent thus far; however, there are clinical signs which suggest evolving sepsis, and I think we should treat him empirically with antibiotics. 2.  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 3.  Chronic kidney disease with acute exacerbation. 4.  Anemia. 5.  Thrombocytopenia. 6.  Acute rise in transaminases-possible shock liver. 7.  Non-insulin-dependent diabetes mellitus. 8.  Hypertension. 9.  Hyperlipidemia. PLAN: 
            1.  Blood cultures. 2.  Consider CT of the chest, abdomen, and pelvis without IV contrast. 
            3.  Empiric Zosyn pending cultures. Orders: ICU/ IP. Mechanical Ventilator. Devlin. Glucose ac/hs. Trach care. Routine VS.  Daily weights. Glucose Q 6 hours. PT/OT. I and Wilfrido Brunsonory HOB. I and o.  IS. Full code. NPO. Tube feedings > Suplena at 35 ml/hr to 200 ml flush Q 3 hours. Daily CXR. Daily CBC/Mg/Phos. BMP Q 12 hours. Precedex gtt. Insulin gtt. Mucomyst nebs bid. Solu-Medrol 40 mg iv        Q 12 hours. Zosyn 3.375 mg iv Q 8 hours prn. Albuterol nebs Q 4 prn. Brovana/pulmicort nebs bid. ASA 81 mg po daily. Ferrous Sulfate 300 mg/ng daily. Ativan 1-2 mg iv Q 4 prn> rec'd x 4. Morphine 2 mg iv Q 2 prn> rec'd x 1. MVI / NG daily. Protonix 40 mg iv Q 12 hours. Pravachol 40 mg po daily. Zoloft 100 mg po daily. Albumin 12.5 g iv x 1.   Kcl 20 mEQ iv x 1. [X] Partial responder, not clinically stable for discharge and requires continued stay, >= One: 
                    [X] Mechanical ventilation, discharge planning and, >= One: 
                    ~--Admin, IQ Admin Admin on 11- 09:02 AM--~ 
                    PT on mechanical ventilator. [X] FiO2 > 50%(0.50) and > baseline                         ~--Admin, IQ Admin Admin on 11- 09:04 AM--~ 
 Oxygen Therapy: 
                        Oxygen Therapy O2 Sat (%): 99 % (11/16/19 1000) Pulse via Oximetry: 108 beats per minute (11/16/19 1000) O2 Device: Tracheostomy; Ventilator (11/16/19 0800) O2 Flow Rate (L/min): 40 l/min (11/04/19 2000) O2 Temperature: 98.6 °F (37 °C) (11/08/19 0729) FIO2 (%): 90 % (11/16/19 0800)   
Version: [employer] 2019 Sabrina Quiros  © 2019 [employer] and/or one of its [place]. All Rights Reserved. CPT only © 2018 American Medical Association. All Rights Reserved. Additional Notes 1 unit of PRBC's given.   
   
LOC:Acute Adult-Extended Stay (11/15/2019) by Gaila Siemens, RN  
 
   
Review Status Review Entered In Primary 11/15/2019 16:05  
   
Criteria Review REVIEW SUMMARY 
  
Patient: Rony Glez Review Number: [id number] Review Status: In Primary 
  
Condition Specific: Yes 
  
Condition Level Of Care Code: CRITICAL Condition Level Of Care Description: Critical 
  
  
OUTCOMES Outcome Type: Primary 
  
  
  
REVIEW DETAILS 
  
Service Date: 11/15/2019 Admit Date: 10/19/2019 Product: Gio Galo Adult Subset: Extended Stay (Symptom or finding within 24h) 
  (Excludes PO medications unless noted) [X] Select Level of Care, One: 
            [X] CRITICAL, One: 
            ~--Admin, IQ Admin Admin on 11- 04:05 PM--~ Inpatient  ICU 
             
            VS: 99.8-95-24  101/52  100% vent-trach FIO2 90% Labs:  hgb 6.0 hct 19.8  na 152  ck 113 co2 34  bun 159 crea 2.07  ca 8.4   mag 2.9 bnp 33,564   ast 629 ABG: ph 7.398 pCO2 54.5 pO2 124 HCO3 33.6  sO2 99 vent Orders: Tube feeding @ 35ml/hr + 200ml flush q3hr, ativan 1-2mg  q4h prn, consult ID, morphine 2mg  iv q2h prn, blood bank request  one time, transfuse 1 U PRBCs, Art line, ABGs, Mucomyst nebs bid, brovana nebs bid, pulmicort nebs bid, Precedex IV gtt, Dobutamine IV gtt, fentanyl IV gtt, albumin 5% 12.5g  iv q6h, Novolin R IV titrate Procedure:  Procedure(s): 
            CARDIAC RE-ENTRY, MARISOL BY  [facility]    
              CABG x 5, LIMA to LAD, RSVG to [id number], RSVG to PDA 10/23/19 
              
             Subjective: 
            Pt seen with Dr. Antonia Lindsey. Dobutamine at 1, Insulin. Tmax 100.3, intubated and sedated. FiO2 90%. TF at goal.   
             
             CXR : Interstitial and parenchymal edema has not significantly changed. General: Intubated, sedated. Lungs:    Coarse bilat Incision:              Midsternal incision with no significant erythema, drainage, or dehiscence. Heart:     Regular rate and rhythm, S1, S2 normal, no murmur, click, rub or gallop. Abdomen:           Soft, non-tender. Bowel sounds active. No masses,  No organomegaly. +BM. FMS in place Extremities:          Some generalized edema. Palpable pulses bilat Neurologic:          Moves extremities but without purpose. Does not track or follow commands.  
              
             Assessment: 
              
            Principal Problem: S/P CABG x 5 (10/23/2019) Overview: x 5, LIMA to LAD, RSVG to Diag1, OM2-OM3, RSVG to PDA 
              
            Active Problems: 
              ACS (acute coronary syndrome) ([facility] [address] (10/19/2019) 
              
              Unstable angina ([facility] [address] (10/19/2019)   
              Coronary artery disease of native artery of native heart with stable angina pectoris ([facility] [address] (10/21/2019) 
              
              Systolic heart failure ([facility] [address] (10/22/2019) 
              
              
              
             Plan/Recommendations/Medical Decision Makin. S/p CABG: on ASA, statin. No BB, ACEi while on vasoactive medications 2. Acute on chronic systolic CHF, NYHA Class II on admit: EF 35-40% preop. Dobutamine back on at 1. No BB/ACE/ARB/AA until vitals/kidney function allows. Trend pBNP  
              
            3. Acute postop respiratory failure with chronic interstitial fibrosis per CXR: Re-intubated  due to worsening acute respiratory failure. Jordis Letia placed  by Thoracic Surgery. Acute respiratory acidosis  thought driven by oversedation. Fentanyl and versed drips stopped. (now just has PRN Morphine and Ativan dosing) Diuresis per Nephrology-currently holding. Amiodarone had been stopped on 10/31. Solumedrol per pulm-weaning. Continue scheduled nebs. Cont mucinex, pulm toileting, mucomyst. Chest CT revealing emphysema/pulm fibrosis.   
            4. Renal insufficiency: Creatinine slightly improved today. Nephrology following and appreciate their recommendations. Devlin in place for accurate I&O. Urine output improved in the last 24 hours. Nephrology recommendations to continue holding diuretics. Did receive Albumin X 4 doses in the last 24 hours.   
              
            5. Anemia: had preop, H&H at -given 1 unit PRBC. Iron panel sent preop - iron, iron sat low.  
              
            6. Thrombocytopenia: Platelets worse today at 75k. Daily CBC. Hold SQ heparin. Will get a repeat CBC this afternoon. If still less than 80K will plan to hold ASA too. 
              
            7. Hx of HTN: Issues with hypotension improved.  Still on Dobutamine at 1 but likely can be weaned off. Volume replaced overnight.  
              
            8. HLD: Continue pravastatin 
              
            9. Leukocytosis: WBC at 10.7k today, procalcitonin at 2.9 but Lactic at 1.4. Devlin placed 11/7. PICC placed 11/6. Trach 11/13. Monitor daily CBC. Will ask Infectious Disease to consult with this medically complex patient. Antibiotics? 
              10. Constipation: Resolved. FMS in place 
              11. Current smoker: smoking cessation education completed.  
              12. Nutrition: Appreciate Dietician recommendations. Dobhoff placed 10/30 and Enteral feedings started. Too unstable from resp standpoint for PO diet, TF's continuous at 50 ml/hr. Try and AVOID increased volume amounts  
              13. Hyperglycemia: Preop A1c 5.3 with no DM history, now with hyperglycemia. BS have been much better controlled on Insulin gtt. DTS following. Discussed with [facility] NATHALY and will keep on Insulin gtt for another 24 hours. She will look at him later today to discuss most likely plan for insulin over the weekend coming off the Insulin gtt. 
              14. Hypernatremia: Nephrology following-increased FW flushes. Na at 152 today. Continue to monitor 
              15. DVT/GI Prophylaxis: SCD's, SQ Heparin-currently on hold due to thrombocytopenia. Will restart when improved, PPI 
              
            Dispo: PT/OT as able. Remain in CVI until resp status more stable. [X] Partial responder, not clinically stable for discharge and requires continued stay, >= One: 
                    [X] Hemodynamic monitoring, invasive <= 24h 
  
Version: InterQual® 2019 Venkat Paniagua  © 2019 [employer] and/or one of its [place]. All Rights Reserved.   CPT only © 2018 American Medical Association. All Rights Reserved.  
   
LOC:Acute Adult-Extended Stay (11/14/2019) by Sarika Roper RN  
 
   
Review Status Review Entered In Primary 11/14/2019 13:01  
   
Criteria Review REVIEW SUMMARY 
  
Patient: Ajay Hassan Review Number: [id number] Review Status: In Primary 
  
Condition Specific: Yes 
  
Condition Level Of Care Code: CRITICAL Condition Level Of Care Description: Critical 
  
  
OUTCOMES Outcome Type: Primary 
  
  
  
REVIEW DETAILS 
  
Service Date: 11/14/2019 Admit Date: 10/19/2019 Product: Neena Rashid Adult Subset: Extended Stay (Symptom or finding within 24h) 
  (Excludes PO medications unless noted) [X] Select Level of Care, One: 
            [X] CRITICAL, One: 
                [X] Partial responder, not clinically stable for discharge and requires continued stay, >= One: 
                    [X] Hemodynamic monitoring, invasive <= 24h 
                    ~--Admin, IQ Admin Admin on 11- 01:01 PM--~ 
                    MEDS/ORDERS Insert ART LINE Tube feedings EKG-pending ABG's 
                    NS @ 10 Mucomyst nebs BID Brovana nebs BID Pulmicort nebs BID Precedex IV gtt Hold Bumex Dobutamine IV gtt Fentanyl IV gtt Hold Versed Hold Levophed Phenylephrine IV gtt 
                     
                     
                    ~--Admin, IQ Admin Admin on 11- 12:57 PM--~ Admit Date: 10/19/2019 Procedure:  Procedure(s): 
                    CARDIAC RE-ENTRY, MARISOL BY DR [facility]    
                    CABG x 5, LIMA to LAD, RSVG to [id number], RSVG to PDA 10/23/19   
                     Subjective: 
                    Pt seen with Dr. Alison Palacios. Afebrile, intubated and sedated. FiO2 60%. TF at goal. 
                    EXAM: 
                    General: Intubated, sedated. Lungs:         Coarse bilat Incision:      Midsternal incision with no significant erythema, drainage, or dehiscence. Heart:          Regular rate and rhythm, S1, S2 normal, no murmur, click, rub or gallop. Abdomen:   Soft, non-tender. Bowel sounds hypoactive. No masses,  No organomegaly. Extremities:  Some generalized upper extremity edema. Palpable pulses bilat Neurologic:  Sedated. Moves all extremities Assessment: 
                      
                    Principal Problem: S/P CABG x 5 (10/23/2019) Overview: x 5, LIMA to LAD, RSVG to Diag1, OM2-OM3, RSVG to PDA 
                      
                    Active Problems: 
                      ACS (acute coronary syndrome) ([facility] [address] (10/19/2019) 
                      
                      Unstable angina ([address] (10/19/2019) 
                      
                      Coronary artery disease of native artery of native heart with stable angina pectoris ([address] (10/21/2019) 
                      
                      Systolic heart failure ([address] (10/22/2019) 
                      
                      
                      
                     Plan/Recommendations/Medical Decision Makin. S/p CABG: on ASA, statin. No BB, ACEi while on vasoactive medications                     2. Acute on chronic systolic CHF, NYHA Class II on admit: EF 35-40% preop. Dobutamine off today. No BB/ACE/ARB/AA until vitals/kidney function allows. Trend pBNP  
                      
                    3. Acute postop respiratory failure with chronic interstitial fibrosis per CXR: Re-intubated 11/7 due to worsening acute respiratory failure. Owenalyn Chayito placed 11/13 by Thoracic Surgery. Diuresis per Nephrology-currently holding. Amiodarone had been stopped on 10/31. Solumedrol per pulm-weaning. Continue scheduled nebs. Cont mucinex, pulm toileting, mucomyst. Chest CT revealing emphysema/pulm fibrosis.   
                    4. Renal insufficiency: Creatinine stable. Nephrology following and appreciate their recommendations. Devlin in place for accurate I&O. Urine output decreased overnight-may be dry. 
                      
                    5. Anemia: had preop, stable H&H. Iron panel sent preop - iron, iron sat low.  
                      
                    6. Thrombocytopenia: Platelets improved today at 122k. Daily CBC.   
                    7. Hx of HTN: Issues with hypotension this morning requiring restarting Nomi. May be dehydrated. Albumin ordered. 
                      
                    8. HLD: Continue pravastatin 
                      
                    9. Leukocytosis: WBC back up again, check procalcitonin. Devlin placed 11/7. PICC placed 11/6. Farooq Stratton yesterday. Monitor daily CBC.   10. Constipation: Resolved. Continue bowel regimen PRN 
                      11. Current smoker: smoking cessation education completed. Cont pulm toileting.  
                      12. Nutrition: Appreciate Dietician recommendations. Dobhoff placed 10/30 and Enteral feedings started. Too unstable from resp standpoint for PO diet, TF's continuous at 50 ml/hr.  Try and AVOID increased volume amounts  
                      
 13. Hyperglycemia: Preop A1c 5.3 with no DM history, now with hyperglycemia. Worsening hyperglycemia with BS in the 200s. Orders to increase Lantus but will restart Insulin gtt for now to maintain tighter glycemic control. 
                      14. Hypernatremia: Nephrology following. Na at 148 today. Continue to monitor 
                      15. DVT/GI Prophylaxis: SCD's, SQ Heparin-restarted today, PPI 
                      
                    Dispo: PT/OT as able. Remain in CVI until resp status more stable.  
                      
                    Addendum: At bedside at 1050 as he was becoming more hypotensive and requiring steadily increasing doses of Neosynephrine. Patient very sedated, not responding to painful stimuli. ABG completed showing acidosis at 7.1pH but incomplete results-likely metabolic. RT at the bedside and adjusted vent settings. STAT labs sent. 12 lead EKG completed. BS of [id number]-Humalog and Insulin gtt restarted. Started on Dobutamine and Levophed in addition to Nomi for pressure support. 1 amp bicarb given and narcan. Patient awake moving but not responding purposefully. Femoral arterial line placed. BP improved and weaning drips. All sedation stopped. Will continue to monitor. Dr. Molina Delgadillo updated. VS 
                    97.6 P-94 R-14 86/52 and 74/50 99% on VENT-TRACH 60% FIO2 LABS Results for Francisca Inman (MRN [id number]) as of 11/14/2019 12:56 
                     
                    11/14/2019 11:18 
                    pH (POC): 7.110 (LL) 
                    pCO2 (POC): >90.0 (H) 
                    pO2 (POC): 66 (L) Na 148 K 5.3 Glucose 285  Creat 1.70 NT-BNP 83774 Mg 3.1                     NO IMAGES

## 2019-11-20 NOTE — PROGRESS NOTES
Transitions of Care: 
 
 -TBD, patient remains critically ill Per conversations with [facility] ([facility] for care, services, etc.) if patient requires any LTAC/IPR services this writer will have to go through the [place] directly for any authorization in this level of care. The CM informed Cardiac Surgery NP that authorization for these high levels of care will have to go through the [place]- to please notify this writer ASAP when and if it is appropriate to pursue LTAC- currently patient not stable/appropriate for this at this time, but NP aware that CM will have to contact [facility] for this level of care. At this time patient remains on vent with max.  RON Hare

## 2019-11-20 NOTE — PROGRESS NOTES
Infectious Diseases Progress Note Antibiotic Summary: 
Zosyn   -- present Subjective:  
 
Remains on vent and poorly responsive ROS: 
Unable to obtain Objective:  
 
Vitals:  
Visit Vitals /69 (BP 1 Location: Left arm, BP Patient Position: Supine) Pulse 80 Temp 99 °F (37.2 °C) Resp 28 Ht 5' 7\" (1.702 m) Wt 64 kg (141 lb 3.2 oz) SpO2 100% BMI 22.12 kg/m² Tmax:  Temp (24hrs), Av °F (37.2 °C), Min:98.2 °F (36.8 °C), Max:99.4 °F (37.4 °C) Exam:  General appearance: no distress, chronically ill appearing Neck: supple Chest wall: sternal incision is healing well. Lungs: posterior rales Heart: regular rate and rhythm Abdomen: soft, non-tender. Bowel sounds normal. No masses,  no organomegaly Extremities: no cellulitis Skin: no rash IV Lines: Right PICC inserted 2019 Labs:   
Recent Labs 19 
6865 19 
1704 19 
0309 19 
1649 19 
0920 19 
0606 19 
0502 WBC 9.5  --  8.7  --   --   --  3.3* HGB 7.5*  --  7.6*  --   --   --  7.7* PLT 38* 39* 40* 39* 39*  --  36* BUN 88* 92* 100* 122*  --  127*  --   
CREA 1.49* 1.40* 1.39* 1.54*  --  1.75*  --   
TBILI 0.6 0.6 0.5  --   --  0.6  --   
SGOT 44* 44* 50*  --   --  77*  --   
 98 91  --   --  70  -- Assessment: 1. Elevated procalcitonin level -- significance uncertain in this setting (3 weeks of critical illness in ICU): empiric Zosyn begun ; no infection apparent so far 
  
2. OHD -- IHD & CHF -- S/P CABG on 10/22/2019 
  
3. CKD with acute exacerbation 
  
4. Anemia 
  
5. Thrombocytopenia 
  
6. Acute rise in transaminases -- shock liver ? 
  
7. NIDDM 
  
8. HTN 
  
9. Hyperlipidemia Plan: 1. Continue Lamonte Watts MD

## 2019-11-20 NOTE — DIABETES MGMT
[facility] Cardiac Surgery Progress Note Recommendations/ Comments: Insulin gtt begun 10/14/2019 for extreme hyperglycemia, BG > 400 mg/dL at that time. Steroids Solu medrol  40 mg/dL Q 12 hours TF continuous Suplena @ 35 Trach/ Vent, difficulty weaning Currently on insulin gtt At 0905  mg/dL, rate 2 units/hr, received 10.1 units in past 6 hours. Note pt was poorly controlled on Lantus 38 units Q 12 hours prior to starting gtt Transition: 
Stable on gtt for only 2 hours - rec waiting until stable for 6 or more hours to transition or until more medically stable He will need daily basal Lantus. Based on current rates, start with 35 units and titrate as needed Patient is 68 y.o. male s/p CABG x 5 followed by re-entry for mediastinal clot evacuation and repair of SVG to PDA  - POD 21. Pt with documented hx of DM on no meds PTA. Anemia - A1c inaccurate A1c:  
Lab Results Component Value Date/Time Hemoglobin A1c 5.3 10/21/2019 03:04 AM  
 
 
 
Recent Glucose Results:  
Lab Results Component Value Date/Time GLU 89 11/20/2019 03:50 AM  
 GLUCPOC 110 (H) 11/20/2019 09:05 AM  
 GLUCPOC 88 11/20/2019 08:02 AM  
 GLUCPOC 93 11/20/2019 06:57 AM  
  
 
Lab Results Component Value Date/Time Creatinine 1.58 (H) 11/20/2019 03:50 AM  
 
Estimated Creatinine Clearance: 38.8 mL/min (A) (based on SCr of 1.58 mg/dL (H)). Active Orders There are no active orders of the following types: Diet. PO intake:  
No data found. Will continue to follow as needed. Thank you. Royr Torres RN, CDE Time spent: 5 minutes

## 2019-11-20 NOTE — PROGRESS NOTES
2330: [place] [facility] [place]. Gtts checked and verified. Shift Summary: No acute issues overnight. Propofol @ 20mcg and Precedex at 0.9mcg. Patient minimally responsive; flaccid extremities. No withdrawal but will grimace and increase RR to noxious stimuli. Strong cough. RR upper 20s to upper 30s. Small open area noted under right side of trach plate. Able to place Mepelex under trach. Sutures still in. Low-dose Onmi gtt on/off for BP - see MAR for titration. Did not need to go above 10mcg overnight. TF at goal; no BM. Afebrile. Per MD Dontae continue current diuresis and free water flushes. 0745: Bedside shift change report given to Hattie Cruz (oncoming nurse) by Spike Fernandez (offgoing nurse). Report included the following information SBAR, Intake/Output, MAR, Recent Results and Cardiac Rhythm NSR.

## 2019-11-20 NOTE — PROGRESS NOTES
1520 - I have reviewed and agree with the medications given, care rendered and documentation done by Purnima Rubio RN.

## 2019-11-20 NOTE — PROGRESS NOTES
1550: Bedside and Verbal shift change report given to Jose C Hoffmann RN (oncoming nurse) by Cece Carbone RN (offgoing nurse). Report included the following information SBAR, Intake/Output, MAR, Recent Results, Cardiac Rhythm SR and Alarm Parameters . 1630: Dr. Heather Anderson called results of ABG: pH: 7.455, pCO2: 35.1, pO2: 67, HCO3: 24.6, sO2: 94. FiO2: 80%. She states she wants to keep his pO2 >60, no other orders received. Ventilator settings to remain the same. 1950: Bedside and Verbal shift change report given to Lois Cordero.NORMA (oncoming nurse) by Jose C Hoffmann RN (offgoing nurse). Report included the following information SBAR, OR Summary, Procedure Summary, Intake/Output, MAR, Recent Results, Cardiac Rhythm SR and Alarm Parameters .

## 2019-11-20 NOTE — PROGRESS NOTES
2000 
Routine assessment 2040 Nomi restarted [address] Bedside and Verbal shift change report given to [facility] [employer] [facility] (oncoming nurse) by Rosangela Mena (offgoing nurse). Report included the following information SBAR, Kardex, Intake/Output, MAR, Recent Results, Cardiac Rhythm NSR and Alarm Parameters .

## 2019-11-20 NOTE — PROGRESS NOTES
NYHA class IV A/C systolic heart failure (EF 25%, NT pro-BNP 15,013) Acute on chronic kidney disease Mild pulmonary edema Mild chronic lung disease Acute on chronic hypoxic respiratory failure 
  
Remains intubated and sedated Still having issues with neuro status Starting Seroquel Hgb looks reasonable Platelets running a little low Creatinine in the mid 1's Bilirubin and other LFTs look good NT pro-BNP remains elevated May have a little more edema in lung fields Up on Bumex Advancing TF's Intake/Output Summary (Last 24 hours) at 11/20/2019 1603 Last data filed at 11/20/2019 1500 Gross per 24 hour Intake 3117.88 ml Output 4230 ml Net -1112.12 ml Risk of morbidity and mortality - high Medical decision making - high complexity Total critical care time - 30 minutes (CPT 94457)

## 2019-11-20 NOTE — PROGRESS NOTES
[facility] ICU Progress Note Admit Date: 10/19/2019 Procedure:  Procedure(s): 
CARDIAC RE-ENTRY, MARISOL BY  [facility]    
 
CABG x 5, LIMA to LAD, RSVG to [id number], RSVG to PDA 10/23/19 
 
11/13/19 - S/p perc trach placement Subjective:  
Pt seen with Dr. Jesus Browning. Increased RR/agitation remains when off sedation. On precedex, Diprivan, insulin gtt. T max 99.2F, 60% FiO2 Objective:  
Vitals: 
Blood pressure 104/59, pulse 73, temperature 98.4 °F (36.9 °C), resp. rate 27, height 5' 7\" (1.702 m), weight 141 lb 3.2 oz (64 kg), SpO2 94 %. Temp (24hrs), Av.7 °F (37.1 °C), Min:97.7 °F (36.5 °C), Max:99.2 °F (37.3 °C) EKG/Rhythm: SR in the 76s Oxygen Therapy: 
Oxygen Therapy O2 Sat (%): 94 % (19 1000) Pulse via Oximetry: 73 beats per minute (19 1000) O2 Device: Tracheostomy (19 4638) O2 Flow Rate (L/min): 40 l/min (19) O2 Temperature: 98.6 °F (37 °C) (19 0840) FIO2 (%): 60 % (19 0829) CXR:  
CXR Results  (Last 48 hours)  
          
 19 0450  XR CHEST PORT Final result Impression:  IMPRESSION:  
1. No change in interstitial edema superimposed upon pulmonary fibrosis. Narrative:  EXAM: XR CHEST PORT INDICATION: interstitial edema COMPARISON: 2019 and 2019 and CT scan of 2019 FINDINGS: A portable AP radiograph of the chest was obtained at 0341 hours. The  
patient is on a cardiac monitor. Tracheostomy tube overlies the airway. PICC  
line overlies the SVC. Feeding tube courses into the stomach. The lungs demonstrate coarse reticular interstitial pattern. This is consistent  
with interstitial edema superimposed upon pulmonary fibrosis this is unchanged  
when compared 2019 No pneumothorax. Admission Weight: Last Weight Weight: 141 lb 5 oz (64.1 kg) Weight: 141 lb 3.2 oz (64 kg) Intake / Output / Drain: 
Current Shift: 701 - 1900 In: -  
 Out: 336 [[id number]] Last 24 hrs.:  
 
Intake/Output Summary (Last 24 hours) at 2019 1040 Last data filed at 2019 0900 Gross per 24 hour Intake 2734.82 ml Output 2780 ml Net -45.18 ml EXAM: 
General:  Trach, sedated. Lungs:   Coarse bilat Incision:  Midsternal incision with no significant erythema, drainage, or dehiscence. Heart:  Regular rate and rhythm, S1, S2 normal, no murmur, click, rub or gallop. Abdomen:   Soft, non-tender. Bowel sounds active. No masses,  No organomegaly. +BM. Extremities:  Some generalized edema, +1-2 edema. Palpable pulses bilat Neurologic:  Moves extremities but without purpose. Does not track or follow commands. Labs:  
Recent Labs  
  19 
1027  19 
0350 WBC  --   --  11.1 HGB  --   --  7.9* HCT  --   --  25.1*  
PLT  --   --  49* NA  --   --  144 K  --   --  4.2 BUN  --   --  82* CREA  --   --  1.58* GLU  --   --  89  
GLUCPOC 124*   < >  --   
INR  --   --  1.2*  
 < > = values in this interval not displayed. Assessment:  
 
Principal Problem: S/P CABG x 5 (10/23/2019) Overview: x 5, LIMA to LAD, RSVG to Diag1, OM2-OM3, RSVG to PDA Active Problems: 
  ACS (acute coronary syndrome) ([facility] [address] (10/19/2019) [facility] (10/19/2019) Coronary artery disease of native artery of native heart with stable angina pectoris ([facility] [address] (10/21/2019) Systolic heart failure ([facility] (10/22/2019) Plan/Recommendations/Medical Decision Makin. S/p CABG: on ASA, statin. No ACEi/ARB due to renal function. No BB due to pulmonary fibrosis. 2. Acute on chronic systolic CHF, NYHA Class II on admit: EF 35-40% preop. No BB/ACE/ARB/AA until vitals/kidney function allows. Trend pBNP - up, receiving a lot of volume. Cont diuresis w/ bumex 1 mg bid 3. Acute postop respiratory failure with chronic interstitial fibrosis per CXR: Trach placed 11/13 by Thoracic Surgery. Acute respiratory acidosis 11/14 thought driven by oversedation. Fentanyl and versed drips stopped. (now just has PRN Morphine and Ativan dosing) Diuresis per Nephrology. Amiodarone stopped on 10/31. Solumedrol per pulm-weaning. Continue scheduled nebs. Cont mucinex, pulm toileting, mucomyst. Chest CT revealing emphysema/pulm fibrosis. Not able to tolerate vent weaning currently 4. Renal insufficiency: BUN/Creatinine stable today. Nephrology following and appreciate their recommendations. Devlin in place for accurate I&O. Cont diuretics 5. Anemia: had preop, H&H stable today. Did receive transfusion of PRBC on 11/15/19. Iron panel sent preop - iron, iron sat low-continue Iron supplement. 6. Thrombocytopenia: Platelets remain 77G. HIT antibody neg, CARMEN negative. Daily CBC. Hold SQ heparin and ASA. On PPI. Transfuse platelets for less than 10k or if active bleeding seen per Hematology-appreciate their recommendations-labs pending 7. Hx of HTN: Hydralazine PRN SBP >160, BP too labile for PO meds 8. HLD: Continue pravastatin 9. Leukocytosis: WBC at 9.5k today, procalcitonin last 1.6 (check every 3 days),  Devlin placed 11/7. PICC placed 11/6. Trach 11/13. Monitor daily CBC. Appreciate ID consult-continue Zosyn. Leukopenic last week 10. Constipation: Resolved. Now w/ frequent loose stools, holding bowel meds. Start sandro-q 11. Current smoker: smoking cessation education completed. 12. Nutrition: Appreciate Dietician recommendations. Dobhoff placed 10/30 and Enteral feedings started. TF's at goal of 35 ml/hr. Try and AVOID increased volume amounts 13. Hyperglycemia: Preop A1c 5.3 with no DM history, now with hyperglycemia. BS have been much better controlled on Insulin gtt. DTS following. Stopping D5 gtt today. Weaning steroids per Pulmonology. 14. Hypernatremia: Resolved. Nephrology following 15. Agitation, acute encephalopathy: Have been unable to wean down on sedation much due to agitation. Continue Precedex, and PRN Morphine, Ativan to keep the patient comfortable. Head CT neg for any acute process. Had to resume Propofol overnight. Will increase seroquel bid. Not able to obtain MRI since V wires were cut. Do not think this is driven by pain, more from neuro source/encephalopathy 16. DVT/GI Prophylaxis: SCD's, SQ Heparin-currently on hold due to thrombocytopenia. Will restart when improved, PPI Dispo: PT/OT as able. Remain in CVI until resp status more stable.   
 
 
Signed By: Saul Charles NP

## 2019-11-20 NOTE — PROGRESS NOTES
0800: Bedside shift change report given to NORMA Urbina and Sherrill Renner RN (oncoming nurse) by Gilbert Carpio RN (offgoing nurse). Report included the following information SBAR, MAR and Cardiac Rhythm NSR  
 
0825: Dr. Rema Calloway and team at bedside, updated by RN, plan of care discussed, orders received 1150: Dr. Eligio Sellers at bedside, updated by RN, plan of care discussed 1430: trach care completed, 100% FiO2 administered during cannula change, see flowsheet for details. after trach care completed, pt became increasingly tachypnic with a decrease in spO2 
 
1500: Viktor Phillip NP and Selena Wells NP notified of consistent decreased spO2 when tachypnic, orders received 1520: Bedside shift change report given to Alvaro Parry RN (oncoming nurse) by Rohan Medeiros and Sherrill Renner RN (offgoing nurse). Report included the following information SBAR, MAR and Cardiac Rhythm NSR.

## 2019-11-21 NOTE — PROGRESS NOTES
NYHA class IV A/C systolic heart failure (EF 25%, NT pro-BNP 15,013) Acute on chronic kidney disease Mild pulmonary edema Mild chronic lung disease Acute on chronic hypoxic respiratory failure 
  
Remains intubated and sedated Still not waking up very well Trying to limit sedation Hgb a little low Platelets a little low as well Creatinine in the mid 1's Going up on diuretics Had to go up on FIO2 overnight Bilirubin and other LFTs look good Pro-calcitonin reasonable NT pro-BNP remains elevated Family in to visit this afternoon Intake/Output Summary (Last 24 hours) at 11/21/2019 1353 Last data filed at 11/21/2019 1300 Gross per 24 hour Intake 3148.97 ml Output 4380 ml Net -1231.03 ml Visit Vitals /57 (BP 1 Location: Left arm, BP Patient Position: Supine) Pulse 83 Temp 98 °F (36.7 °C) Resp 27 Ht 5' 7\" (1.702 m) Wt 138 lb 8 oz (62.8 kg) SpO2 91% BMI 21.69 kg/m² Risk of morbidity and mortality - high Medical decision making - high complexity Total critical care time - 30 minutes (CPT 66368)

## 2019-11-21 NOTE — PROGRESS NOTES
[facility] 
 Seen and examined D/W NORMA Giron Wean FiO2 for PaO2 > 60 or SPO2 > 88% 10/18 Seen and examined patient at bedside. Mental status -> still an issue. Not much secretions. 11/15 Seen and examined Tachypneic Dyssynchronous with ventilator Off fentanyl and versed On precedex Receiving blood Puffy extremities CXR ? Increased edema 
proBNP 33K 
 
 S/p Keary Cargo On vent 60% FiO2 On TF Patient Vitals for the past 4 hrs: 
 Pulse Resp SpO2  
19 1900 80 (!) 37 (!) 88 % 19 1800 78 30 92 % Temp (24hrs), Av °F (36.7 °C), Min:97.2 °F (36.2 °C), Max:98.9 °F (37.2 °C) Intake/Output Summary (Last 24 hours) at 2019 2139 Last data filed at 2019 1900 Gross per 24 hour Intake 2787.98 ml Output 4710 ml Net -1922.02 ml No distress Keary Cargo Rales and rhonchi Tachy Soft bs present Warm and dry CXR - trach - no change in interstitial lung disease Lab: 
Recent Labs  
  19 
0350 19 
0346 19 
1704 19 
0309 WBC 11.1 9.5  --  8.7 HGB 7.9* 7.5*  --  7.6* PLT 49* 38* 39* 40*  147* 150* 153* K 4.2 3.6 4.1 3.4*  
* 112* 115* 117* CO2 27 27 29 30 BUN 82* 88* 92* 100* CREA 1.58* 1.49* 1.40* 1.39* GLU 89 93 78 116* CA 8.0* 8.0* 8.2* 8.2* MG 2.0 2.2 2.6* 2.5* PHOS 4.8* 4.8*  --  4.6 INR 1.2*  --   --   --   
TBILI 0.5 0.6 0.6 0.5 SGOT 52* 44* 44* 50* ABG: 
Recent Labs  
  19 
1620 19 
0602 PHI 7.455* 7.446 PCO2I 35.1 38.2 PO2I 67* 73* HCO3I 24.6 26.3*  
SO2I 94 95 FIO2I 80 60 Results Procedure Component Value Units Date/Time CULTURE, BLOOD, PAIRED [[id number]] Collected:  19 0310 Order Status:  Completed Specimen:  Blood Updated:  19 Special Requests: NO SPECIAL REQUESTS Culture result: NO GROWTH 4 DAYS     
 AFB CULTURE + SMEAR W/RFLX ID FROM CULTURE [[id number]] Collected:  19 1242 Order Status:  Completed Specimen:  Miscellaneous sample Updated:  11/08/19 1636 Source BRONCHIAL LAVAGE     
  AFB Specimen processing Concentration Acid Fast Smear NEGATIVE Comment: (NOTE) Performed At: [address] [id number] Raymon Dave MD [id number] Acid Fast Culture PENDING  
 CULTURE, RESPIRATORY/SPUTUM/BRONCH Vinay Snowball [[id number]] Collected:  11/07/19 1245 Order Status:  Completed Specimen:  Sputum from Bronchial lavage Updated:  11/09/19 1024 Special Requests: NO SPECIAL REQUESTS     
  GRAM STAIN OCCASIONAL WBCS SEEN     
   NO ORGANISMS SEEN Culture result: NO GROWTH 2 DAYS     
 Elizabet Grass [[id number]] Collected:  11/07/19 1245 Order Status:  Canceled Specimen:  Bronchial lavage Marzella Blocker SOURCES [[id number]] Collected:  11/07/19 1245 Order Status:  Completed Specimen:  Other from Bronchial lavage Updated:  11/07/19 1538 Special Requests: NO SPECIAL REQUESTS     
  KOH NO YEAST SEEN     
  KOH NO FUNGAL ELEMENTS SEEN Impression: 
========= 
· S/p 5V CABG 10/19/19 for ACS  With reexploration 10/23 for mediastinal hematoma. · Acute resp failure - baseline fibrotic ILD (etiology not clear- this is a new dx but UIP/IPF in DDX) with superimposed pulmonary interstitial edema probable superimposed diffuse alveolar damage from blood/blood products. Amio pulm toxicty in DDX but not clear from [facility] review when he was on it. Suspect that he his significant irreversible fibrotic lung disease. 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 
· Anemia · Encephalopathy Plan: 
==== 
--vent support - wean FiO2 and SBT/TC trials as able when criteria satisfied 
--on solumedrol - 40 mg IV q12 - wean 
--diuretics as you are doing. --Agree with minimizing sedation. --TF --Nephrology following electrolytes and diuretics --We are following intermittently--- prolonged VDRF anticipated with poor prognosis -- D/W NORMA Nunez MD  
Pulmonary and Critical Care Medicine

## 2019-11-21 NOTE — PROGRESS NOTES
Infectious Diseases Progress Note Antibiotic Summary: 
Zosyn   -- present Subjective:  
 
Remains on vent and poorly responsive ROS: 
Unable to obtain Objective:  
 
Vitals:  
Visit Vitals BP (!) 89/50 (BP 1 Location: Left arm, BP Patient Position: Lying right side) Pulse 84 Temp 99 °F (37.2 °C) Resp 30 Ht 5' 7\" (1.702 m) Wt 64 kg (141 lb 3.2 oz) SpO2 95% BMI 22.12 kg/m² Tmax:  Temp (24hrs), Av.2 °F (36.8 °C), Min:97.2 °F (36.2 °C), Max:99 °F (37.2 °C) Exam:  General appearance: no distress, chronically ill appearing Neck: supple Chest wall: sternal incision is healing well. Lungs: posterior rales Heart: regular rate and rhythm Abdomen: soft, non-tender. Bowel sounds normal. No masses,  no organomegaly Extremities: no cellulitis Skin: no rash IV Lines: Right PICC inserted 2019 Labs:   
Recent Labs  
  19 
2138 19 
0350 19 
0346 19 
1704 19 
0309 WBC  --  11.1 9.5  --  8.7 HGB  --  7.9* 7.5*  --  7.6* PLT 44* 49* 38* 39* 40* BUN  --  82* 88* 92* 100* CREA  --  1.58* 1.49* 1.40* 1.39* TBILI  --  0.5 0.6 0.6 0.5 SGOT  --  52* 44* 44* 50* AP  --  134* 102 98 91 Assessment: 1. Elevated procalcitonin level -- significance uncertain in this setting (3 weeks of critical illness in ICU): empiric Zosyn begun ; Cultures have been negative. No infection identified but will complete a course of Zosyn 
  
2. OHD -- IHD & CHF -- S/P CABG on 10/22/2019 
  
3. CKD with acute exacerbation 
  
4. Anemia 
  
5. Thrombocytopenia 
  
6. Acute rise in transaminases -- shock liver ? 
  
7. NIDDM 
  
8. HTN 
  
9. Hyperlipidemia Plan: 1. Continue Zosyn Silvana Tomlin MD

## 2019-11-21 NOTE — PROGRESS NOTES
0730: Bedside and Verbal shift change report given to JACKELINE RN (oncoming nurse) by Ta Devi RN (offgoing nurse). Report included the following information SBAR, Kardex, Intake/Output, MAR, Recent Results, Med Rec Status and Cardiac Rhythm NSR.  
0820: Attempted to wean propofol gtt per Azalea Phoenix MD to 10 mcg/kg/min. Increased precedex gtt to 1.2.  
0926: Propofol gtt increased back up to 30 d/t patient becoming tachypneic and appearing labored with his breathing with RR in the high 30s. RT aware. 1240: Paged Rebecca Hernandez MD regarding patient's recent ABG results, pH 7.456, CO2 36.8, pO2 59, HCO3 25.9, sO2 91. Pt currently on 70% FiO2. O2 saturation in the 88-92% range. Per Rebecca Hernandez MD's notes, patients goal pO2 is greater than 60 and sO2 greater than 88.  
1530: Lisha Squires MD. Pt's O2 saturation now 87%. 1605: Spoke with Rebecca Hernandez MD. Orders to increase PEEP from 5 to 7. RT notified. 1630: RT at bedside. Suctioned trach with 14 Martiniquais sterile catheter and hyperventilated patient before and after with ambu bag.  
3572: Patient's O2 saturation remains at 86% after suctioning and has labored breathing. Increased FiO2 to 80%. Pt's O2 saturation is now 93-94%. 1930: Bedside and Verbal shift change report given to Jensen Turcios RN (oncoming nurse) by NORMA VIVEROS (offgoing nurse). Report included the following information SBAR, Kardex, Intake/Output, MAR, Recent Results, Med Rec Status and Cardiac Rhythm NSR.

## 2019-11-21 NOTE — PROGRESS NOTES
Hematology-Oncology Progress Note Rosalba Gonzalez 1950 
[id number] 
11/21/2019 Subjective:  
 
Awake, but non-responsive. Propofol being weaned. Off francis gtt. No oozing/bleeding per staff. Allergies: Scallops Current Facility-Administered Medications Medication Dose Route Frequency Provider Last Rate Last Dose  QUEtiapine (SEROquel) tablet 50 mg  50 mg Oral BID Erwin Petit, NP   50 mg at 11/20/19 1807  bumetanide (BUMEX) injection 1 mg  1 mg IntraVENous Q8H Erwin Petit, NP   1 mg at 11/21/19 0545  
 0.9% sodium chloride infusion 250 mL  250 mL IntraVENous PRN Adam Dinh MD      
 hydrALAZINE (APRESOLINE) 20 mg/mL injection 10 mg  10 mg IntraVENous Q6H PRN Erwin Petit, NP      
 piperacillin-tazobactam (ZOSYN) 3.375 g in 0.9% sodium chloride (MBP/ADV) 100 mL  3.375 g IntraVENous Q8H Grace Howell MD 25 mL/hr at 11/21/19 0221 3.375 g at 11/21/19 0221  morphine injection 2 mg  2 mg IntraVENous Q2H PRN Erwin Petit, NP   2 mg at 11/20/19 1633  LORazepam (ATIVAN) injection 1-2 mg  1-2 mg IntraVENous Q4H PRN Erwin Petit, NP   2 mg at 11/20/19 1700  
 methylPREDNISolone (PF) (SOLU-MEDROL) injection 40 mg  40 mg IntraVENous Q12H Padmaja Del Castillo MD   40 mg at 11/21/19 0545  balsam peru-castor oil (VENELEX) ointment   Topical BID Megha HUGHES MD      
 insulin regular (NOVOLIN R, HUMULIN R) 100 Units in 0.9% sodium chloride 100 mL infusion  1-50 Units/hr IntraVENous TITRATE Rip Pizano NP 4.9 mL/hr at 11/21/19 0801 4.9 Units/hr at 11/21/19 0801  
 0.9% sodium chloride infusion  3 mL/hr IntraVENous CONTINUOUS Adam Dinh MD 3 mL/hr at 11/21/19 0801 3 mL/hr at 11/21/19 3113  [Held by provider] senna-docusate (PERICOLACE) 8.6-50 mg per tablet 1 Tab  1 Tab Oral BID Dottie Mallory Neither, NP   Stopped at 11/14/19 1800  
 nystatin (MYCOSTATIN) 100,000 unit/mL oral suspension 500,000 Units 500,000 Units Oral QID Olvin Lui NP   500,000 Units at 11/20/19 2139  
 insulin lispro (HUMALOG) injection   SubCUTAneous Q6H Olvin Lui NP   Stopped at 11/15/19 0100  
 guaiFENesin (ROBITUSSIN) 100 mg/5 mL oral liquid 400 mg  400 mg Per NG tube Q6H PRN Melvin Ac NP      
 0.9% sodium chloride infusion  10 mL/hr IntraVENous CONTINUOUS Efren Coon Taisha Koroma MD   Stopped at 11/14/19 1125  pantoprazole (PROTONIX) 40 mg in sodium chloride 0.9% 10 mL injection  40 mg IntraVENous Q12H Tevin Chess, [place]   40 mg at 11/20/19 2138  balsam peru-castor oil (VENELEX) ointment   Topical PRN Lynn Cyr MD      
 acetylcysteine (MUCOMYST) 200 mg/mL (20 %) solution 200 mg  200 mg Nebulization BID RT Kike Dennis MD   200 mg at 11/20/19 1956  PHENYLephrine (ANTHONY-SYNEPHRINE) 30 mg in 0.9% sodium chloride 250 mL infusion   mcg/min IntraVENous TITRATE Taisha Araiza MD   Stopped at 11/20/19 1823  
 propofol (DIPRIVAN) infusion  0-50 mcg/kg/min IntraVENous TITRATE Roxann Collins MD 3.4 mL/hr at 11/21/19 0819 10 mcg/kg/min at 11/21/19 0819  
 dexmedeTOMidine (PRECEDEX) 400 mcg in 0.9% sodium chloride 100 mL infusion  0.2-0.9 mcg/kg/hr IntraVENous TITRATE Jordy Cardenas NP 12.6 mL/hr at 11/21/19 0805 0.9 mcg/kg/hr at 11/21/19 0805  lidocaine (LIDODERM) 5 % patch 1 Patch  1 Patch TransDERmal Q24H Jordy Cardenas NP   1 Patch at 11/20/19 2900  
 methyl salicylate-menthol (BENGAY) 15-10 % cream   Topical PRN Lynn Cyr MD      
 ferrous sulfate 300 mg (60 mg iron)/5 mL oral syrup 300 mg  300 mg Per NG tube DAILY WITH BREAKFAST Wayne Venegas MD   300 mg at 11/20/19 3056  [Held by provider] heparin (porcine) injection 5,000 Units  5,000 Units SubCUTAneous Q8H Jordy Cardenas NP   5,000 Units at 11/15/19 2076  
 multivit-folic acid-herbal 027 (WELLESSE PLUS) oral liquid 30 mL  30 mL Per NG tube DAILY Jordy Cardenas NP   30 mL at 11/20/19 9038  acetaminophen (TYLENOL) tablet 650 mg  650 mg Oral Q4H PRN Kathy Castro MD   650 mg at 11/04/19 1736  
 arformoterol (BROVANA) neb solution 15 mcg  15 mcg Nebulization BID RT Francisco Amin, NP   15 mcg at 11/20/19 1956 And  budesonide (PULMICORT) 500 mcg/2 ml nebulizer suspension  500 mcg Nebulization BID RT Francisco Rom, NP   500 mcg at 11/20/19 1956  prochlorperazine (COMPAZINE) with saline injection 10 mg  10 mg IntraVENous Q6H PRN CHELSEA Hair   5 mg at 10/25/19 1339  phenol throat spray (CHLORASEPTIC) 1 Spray  1 Spray Oral PRN Razia Saez MD   1 Spray at 10/24/19 1110  
 sertraline (ZOLOFT) tablet 100 mg  100 mg Oral DAILY CHELSEA Hair   100 mg at 11/20/19 4281  alteplase (CATHFLO) 1 mg in sterile water (preservative free) 1 mL injection  1 mg InterCATHeter PRN Ced Roth PA      
 bacitracin 500 unit/gram packet 1 Packet  1 Packet Topical PRN Ashlyn Hair   1 Packet at 11/07/19 8461  sodium chloride (NS) flush 5-40 mL  5-40 mL IntraVENous Q8H Ced Roth PA   10 mL at 11/21/19 1836  sodium chloride (NS) flush 5-40 mL  5-40 mL IntraVENous PRN CHELSEA Hair   10 mL at 11/19/19 0806  
 oxyCODONE IR (ROXICODONE) tablet 5 mg  5 mg Oral Q4H PRN CHELSEA Hair   5 mg at 11/20/19 1254  
 oxyCODONE IR (ROXICODONE) tablet 10 mg  10 mg Oral Q4H PRN CHELSEA Dhillon   10 mg at 10/25/19 0700  
 naloxone [facility]) injection 0.4 mg  0.4 mg IntraVENous PRN CHELSEA Hair   0.4 mg at 11/14/19 1126  ondansetron (ZOFRAN) injection 4 mg  4 mg IntraVENous Q4H PRN CHELSEA Hair   4 mg at 10/26/19 2342  albuterol (PROVENTIL VENTOLIN) nebulizer solution 2.5 mg  2.5 mg Nebulization Q4H PRN Ced Roth PA   2.5 mg at 11/19/19 0840  [Held by provider] aspirin chewable tablet 81 mg  81 mg Oral DAILY Ced Roth PA   Stopped at 11/16/19 0900  chlorhexidine (PERIDEX) 0.12 % mouthwash 10 mL  10 mL Oral Q12H CHELSEA Barboza   10 mL at 11/20/19 2139  calcium chloride 1 g in 0.9% sodium chloride 100 mL IVPB  1 g IntraVENous PRN Giovanni Roth PA      
 bisacodyl (DULCOLAX) suppository 10 mg  10 mg Rectal DAILY PRN Giovanni Roth PA      
 [Held by provider] polyethylene glycol (MIRALAX) packet 17 g  17 g Oral DAILY Ashlyn Barboza   Stopped at 11/14/19 0586  ELECTROLYTE REPLACEMENT NOTE: Nurse to review Serum Potassium and Magnesuim levels and Initiate Electrolyte Replacement Protocol as needed  1 Each Other PRN Giovanni Roth PA      
 glucose chewable tablet 16 g  4 Tab Oral PRN Giovanni Roth PA      
 glucagon (GLUCAGEN) injection 1 mg  1 mg IntraMUSCular PRN CHELSEA Barboza      
 dextrose 10% infusion 0-250 mL  0-250 mL IntraVENous PRN CHELSEA Barboza 750 mL/hr at 11/19/19 1545 40 mL at 11/19/19 1545  
 melatonin tablet 3 mg  3 mg Oral QHS PRN CHELSEA Ellison   3 mg at 10/28/19 2135  diphenhydrAMINE (BENADRYL) injection 25 mg  25 mg IntraVENous Q6H PRN Giovanni Roth PA      
 diphenhydrAMINE (BENADRYL) capsule 25 mg  25 mg Oral Q6H PRN Giovanni Roth PA      
 pravastatin (PRAVACHOL) tablet 40 mg  40 mg Oral QHS Jaden Barbozama   40 mg at 11/20/19 2139 Objective:  
 
Patient Vitals for the past 24 hrs: 
 BP Temp Pulse Resp SpO2 Weight 11/21/19 0800  97.8 °F (36.6 °C) 67 26 98 %   
11/21/19 0700   71 26 96 %   
11/21/19 0600   89 22 99 %   
11/21/19 0500   77 27 98 %   
11/21/19 0425   78 30 97 %   
11/21/19 0400 119/57 97.7 °F (36.5 °C) 69 25 100 % 62.8 kg (138 lb 8 oz)  
11/21/19 0300   67 26 99 %   
11/21/19 0200   74 25 98 %   
11/21/19 0125   71 25 98 %   
11/21/19 0100   69 27 98 %   
11/21/19 0000 (!) 86/54 98.7 °F (37.1 °C) 76 30 98 %   
11/20/19 2300   86 (!) 34 96 %   
11/20/19 2200   84 30 95 %   
 11/20/19 2100   78 27 95 %   
11/20/19 2005   82 (!) 35 93 %   
11/20/19 2000 (!) 89/50 99 °F (37.2 °C) 82 (!) 35 94 %   
11/20/19 1900   80 (!) 37 (!) 88 %   
11/20/19 1800   78 30 92 %   
11/20/19 1700   90 29 90 %   
11/20/19 1618   88 (!) 36 92 %   
11/20/19 1600  97.9 °F (36.6 °C) 82 (!) 31 93 %   
11/20/19 1500   75 (!) 33 90 %   
11/20/19 1400   71 27 94 %   
11/20/19 1305   77 (!) 32 92 %   
11/20/19 1300   85 (!) 36 (!) 87 %   
11/20/19 1200  97.2 °F (36.2 °C) 72 24 94 %   
11/20/19 1129   72 30 95 %   
11/20/19 1100   73 26 92 %   
11/20/19 1000   73 27 94 %   
11/20/19 0900 104/59  74 29 94 %   
11/20/19 0829   75 (!) 32 100 %  Gen: ventilated through trach HEENT: PERRL, Sclerae anicteric Cv: RRR without m/r/g Pulm: CTA bilaterally Abd: NABS, NTND, No HSM Ext: No c/c/e. No violaceous discoloration of digits. Available labs reviewed: 
Labs:   
Recent Results (from the past 24 hour(s)) GLUCOSE, POC Collection Time: 11/20/19  9:05 AM  
Result Value Ref Range Glucose (POC) 110 (H) 65 - 100 mg/dL Performed by Cheryl Correa Irl Salts Collection Time: 11/20/19  9:05 AM  
Result Value Ref Range Glucose 110 mg/dL Insulin order 2.0 units/hour Insulin adminstered 2.0 units/hour Multiplier 0.040 Low target 95 mg/dL High target 130 mg/dL D50 order 0.0 ml  
 D50 administered 0.00 ml Minutes until next BG 60 min Order initials EW Administered initials EW   
 GLSCOM Comments GLUCOSE, POC Collection Time: 11/20/19 10:27 AM  
Result Value Ref Range Glucose (POC) 124 (H) 65 - 100 mg/dL Performed by Cheryl Sutton Salts Collection Time: 11/20/19 10:34 AM  
Result Value Ref Range Glucose 124 mg/dL Insulin order 2.6 units/hour Insulin adminstered 2.6 units/hour Multiplier 0.040 Low target 95 mg/dL High target 130 mg/dL D50 order 0.0 ml  
 D50 administered 0.00 ml Minutes until next BG 60 min Order initials dn   
 Administered initials dn   
 GLSCOM Comments GLUCOSE, POC Collection Time: 11/20/19 11:37 AM  
Result Value Ref Range Glucose (POC) 119 (H) 65 - 100 mg/dL Performed by Samir Mitchell Collection Time: 11/20/19 11:38 AM  
Result Value Ref Range Glucose 119 mg/dL Insulin order 2.4 units/hour Insulin adminstered 2.4 units/hour Multiplier 0.040 Low target 95 mg/dL High target 130 mg/dL D50 order 0.0 ml  
 D50 administered 0.00 ml Minutes until next BG 60 min Order initials EW Administered initials EW   
 GLSCOM Comments GLUCOSE, POC Collection Time: 11/20/19 12:35 PM  
Result Value Ref Range Glucose (POC) 117 (H) 65 - 100 mg/dL Performed by Kelly Mcmullen Collection Time: 11/20/19 12:39 PM  
Result Value Ref Range Glucose 117 mg/dL Insulin order 2.3 units/hour Insulin adminstered 2.3 units/hour Multiplier 0.040 Low target 95 mg/dL High target 130 mg/dL D50 order 0.0 ml  
 D50 administered 0.00 ml Minutes until next BG 60 min Order initials EW Administered initials EW   
 GLSCOM Comments GLUCOSE, POC Collection Time: 11/20/19  1:39 PM  
Result Value Ref Range Glucose (POC) 114 (H) 65 - 100 mg/dL Performed by Kelly Mcmullen Collection Time: 11/20/19  1:39 PM  
Result Value Ref Range Glucose 114 mg/dL Insulin order 2.2 units/hour Insulin adminstered 2.2 units/hour Multiplier 0.040 Low target 95 mg/dL High target 130 mg/dL D50 order 0.0 ml  
 D50 administered 0.00 ml Minutes until next BG 60 min Order initials EW Administered initials EW   
 GLSCOM Comments GLUCOSE, POC Collection Time: 11/20/19  2:49 PM  
Result Value Ref Range Glucose (POC) 105 (H) 65 - 100 mg/dL Performed by Naila Camacho  Collection Time: 11/20/19  2:49 PM  
 Result Value Ref Range Glucose 105 mg/dL Insulin order 1.8 units/hour Insulin adminstered 1.8 units/hour Multiplier 0.040 Low target 95 mg/dL High target 130 mg/dL D50 order 0.0 ml  
 D50 administered 0.00 ml Minutes until next  min Order initials apl Administered initials apl GLSCOM Comments POC G3 - PUL Collection Time: 11/20/19  4:20 PM  
Result Value Ref Range FIO2 (POC) 80 % pH (POC) 7.455 (H) 7.35 - 7.45    
 pCO2 (POC) 35.1 35.0 - 45.0 MMHG  
 pO2 (POC) 67 (L) 80 - 100 MMHG  
 HCO3 (POC) 24.6 22 - 26 MMOL/L  
 sO2 (POC) 94 92 - 97 % Base excess (POC) 1 mmol/L Site DRAWN FROM ARTERIAL LINE Device: VENT Mode ASSIST CONTROL Set Rate 24 bpm  
 PEEP/CPAP (POC) 5 cmH2O  
 PIP (POC) 16 Allens test (POC) N/A Inspiratory Time 1.00 sec Specimen type (POC) ARTERIAL Total resp. rate 32 Pressure control YES    
GLUCOSE, POC Collection Time: 11/20/19  4:56 PM  
Result Value Ref Range Glucose (POC) 137 (H) 65 - 100 mg/dL Performed by Clovia Speedy Collection Time: 11/20/19  4:56 PM  
Result Value Ref Range Glucose 137 mg/dL Insulin order 3.1 units/hour Insulin adminstered 3.1 units/hour Multiplier 0.040 Low target 95 mg/dL High target 130 mg/dL D50 order 0.0 ml  
 D50 administered 0.00 ml Minutes until next BG 60 min Order initials elk Administered initials elk GLSCOM Comments GLUCOSE, POC Collection Time: 11/20/19  6:01 PM  
Result Value Ref Range Glucose (POC) 146 (H) 65 - 100 mg/dL Performed by Clovia Speedy Collection Time: 11/20/19  6:01 PM  
Result Value Ref Range Glucose 146 mg/dL Insulin order 4.3 units/hour Insulin adminstered 4.3 units/hour Multiplier 0.050 Low target 95 mg/dL High target 130 mg/dL D50 order 0.0 ml  
 D50 administered 0.00 ml Minutes until next BG 60 min Order initials elk Administered initials elk GLSCOM Comments GLUCOSE, POC Collection Time: 11/20/19  7:03 PM  
Result Value Ref Range Glucose (POC) 127 (H) 65 - 100 mg/dL Performed by Sharmaine Galeazzi Lynden Dynes Collection Time: 11/20/19  7:03 PM  
Result Value Ref Range Glucose 127 mg/dL Insulin order 3.4 units/hour Insulin adminstered 3.4 units/hour Multiplier 0.050 Low target 95 mg/dL High target 130 mg/dL D50 order 0.0 ml  
 D50 administered 0.00 ml Minutes until next BG 60 min Order initials dn   
 Administered initials dn   
 GLSCOM Comments GLUCOSE, POC Collection Time: 11/20/19  8:08 PM  
Result Value Ref Range Glucose (POC) 108 (H) 65 - 100 mg/dL Performed by Chanetta Riedel Collection Time: 11/20/19  8:08 PM  
Result Value Ref Range Glucose 108 mg/dL Insulin order 2.4 units/hour Insulin adminstered 2.4 units/hour Multiplier 0.050 Low target 95 mg/dL High target 130 mg/dL D50 order 0.0 ml  
 D50 administered 0.00 ml Minutes until next BG 60 min Order initials kli Administered initials kli GLSCOM Comments GLUCOSE, POC Collection Time: 11/20/19  9:11 PM  
Result Value Ref Range Glucose (POC) 104 (H) 65 - 100 mg/dL Performed by Abundio Shah Collection Time: 11/20/19  9:11 PM  
Result Value Ref Range Glucose 104 mg/dL Insulin order 2.2 units/hour Insulin adminstered 2.2 units/hour Multiplier 0.050 Low target 95 mg/dL High target 130 mg/dL D50 order 0.0 ml  
 D50 administered 0.00 ml Minutes until next BG 60 min Order initials hm Administered initials hm GLSCOM Comments METABOLIC PANEL, COMPREHENSIVE Collection Time: 11/20/19  9:38 PM  
Result Value Ref Range Sodium 144 136 - 145 mmol/L Potassium 3.9 3.5 - 5.1 mmol/L Chloride 110 (H) 97 - 108 mmol/L  
 CO2 28 21 - 32 mmol/L  Anion gap 6 5 - 15 mmol/L  
 Glucose 96 65 - 100 mg/dL BUN 72 (H) 6 - 20 MG/DL Creatinine 1.49 (H) 0.70 - 1.30 MG/DL  
 BUN/Creatinine ratio 48 (H) 12 - 20 GFR est AA 56 (L) >60 ml/min/1.73m2 GFR est non-AA 46 (L) >60 ml/min/1.73m2 Calcium 7.7 (L) 8.5 - 10.1 MG/DL Bilirubin, total 0.6 0.2 - 1.0 MG/DL  
 ALT (SGPT) 84 (H) 12 - 78 U/L  
 AST (SGOT) 43 (H) 15 - 37 U/L Alk. phosphatase 110 45 - 117 U/L Protein, total 4.9 (L) 6.4 - 8.2 g/dL Albumin 1.8 (L) 3.5 - 5.0 g/dL Globulin 3.1 2.0 - 4.0 g/dL A-G Ratio 0.6 (L) 1.1 - 2.2 PLATELET COUNT Collection Time: 11/20/19  9:38 PM  
Result Value Ref Range PLATELET 44 (LL) 478 - 400 K/uL GLUCOSE, POC Collection Time: 11/20/19 10:14 PM  
Result Value Ref Range Glucose (POC) 125 (H) 65 - 100 mg/dL [facility] Collection Time: 11/20/19 10:14 PM  
Result Value Ref Range Glucose 125 mg/dL Insulin order 3.3 units/hour Insulin adminstered 3.3 units/hour Multiplier 0.050 Low target 95 mg/dL High target 130 mg/dL D50 order 0.0 ml  
 D50 administered 0.00 ml Minutes until next BG 60 min Order initials kli Administered initials kli GLSCOM Comments GLUCOSE, POC Collection Time: 11/20/19 11:17 PM  
Result Value Ref Range Glucose (POC) 159 (H) 65 - 100 mg/dL Performed by Momo Lemons Collection Time: 11/20/19 11:17 PM  
Result Value Ref Range Glucose 159 mg/dL Insulin order 5.9 units/hour Insulin adminstered 5.9 units/hour Multiplier 0.060 Low target 95 mg/dL High target 130 mg/dL D50 order 0.0 ml  
 D50 administered 0.00 ml Minutes until next BG 60 min Order initials kli Administered initials danielle GLSCOM Comments GLUCOSE, POC Collection Time: 11/21/19 12:19 AM  
Result Value Ref Range Glucose (POC) 133 (H) 65 - 100 mg/dL  Performed by Hayley Rocha  
 Collection Time: 11/21/19 12:19 AM  
Result Value Ref Range Glucose 133 mg/dL Insulin order 5.1 units/hour Insulin adminstered 5.1 units/hour Multiplier 0.070 Low target 95 mg/dL High target 130 mg/dL D50 order 0.0 ml  
 D50 administered 0.00 ml Minutes until next BG 60 min Order initials sfm Administered initials sfm GLSCOM Comments GLUCOSE, POC Collection Time: 11/21/19  1:21 AM  
Result Value Ref Range Glucose (POC) 117 (H) 65 - 100 mg/dL Performed by Earna Clipper Kirkland Garter Collection Time: 11/21/19  1:21 AM  
Result Value Ref Range Glucose 117 mg/dL Insulin order 4.0 units/hour Insulin adminstered 4.0 units/hour Multiplier 0.070 Low target 95 mg/dL High target 130 mg/dL D50 order 0.0 ml  
 D50 administered 0.00 ml Minutes until next BG 60 min Order initials kli Administered initials kli GLSCOM Comments GLUCOSE, POC Collection Time: 11/21/19  2:23 AM  
Result Value Ref Range Glucose (POC) 134 (H) 65 - 100 mg/dL Performed by Earna Clipper Jack Garter Collection Time: 11/21/19  2:23 AM  
Result Value Ref Range Glucose 134 mg/dL Insulin order 5.9 units/hour Insulin adminstered 5.9 units/hour Multiplier 0.080 Low target 95 mg/dL High target 130 mg/dL D50 order 0.0 ml  
 D50 administered 0.00 ml Minutes until next BG 60 min Order initials kli Administered initials kli GLSCOM Comments GLUCOSE, POC Collection Time: 11/21/19  3:08 AM  
Result Value Ref Range Glucose (POC) 125 (H) 65 - 100 mg/dL Performed by Earna Clipper Jack Garter Collection Time: 11/21/19  3:08 AM  
Result Value Ref Range Glucose 125 mg/dL Insulin order 5.2 units/hour Insulin adminstered 5.2 units/hour Multiplier 0.080 Low target 95 mg/dL High target 130 mg/dL D50 order 0.0 ml  
 D50 administered 0.00 ml Minutes until next BG 60 min Order initials kli Administered initials kli GLSCOM Comments CBC W/O DIFF Collection Time: 11/21/19  4:14 AM  
Result Value Ref Range WBC 8.4 4.1 - 11.1 K/uL  
 RBC 2.54 (L) 4.10 - 5.70 M/uL HGB 7.7 (L) 12.1 - 17.0 g/dL HCT 24.9 (L) 36.6 - 50.3 % MCV 98.0 80.0 - 99.0 FL  
 MCH 30.3 26.0 - 34.0 PG  
 MCHC 30.9 30.0 - 36.5 g/dL  
 RDW 15.2 (H) 11.5 - 14.5 % PLATELET 47 (LL) 984 - 400 K/uL NRBC 0.0 0  WBC ABSOLUTE NRBC 0.00 0.00 - 0.01 K/uL MAGNESIUM Collection Time: 11/21/19  4:14 AM  
Result Value Ref Range Magnesium 1.9 1.6 - 2.4 mg/dL NT-PRO BNP Collection Time: 11/21/19  4:14 AM  
Result Value Ref Range NT pro-BNP 30,948 (H) <335 PG/ML  
METABOLIC PANEL, COMPREHENSIVE Collection Time: 11/21/19  4:14 AM  
Result Value Ref Range Sodium 144 136 - 145 mmol/L Potassium 4.5 3.5 - 5.1 mmol/L Chloride 110 (H) 97 - 108 mmol/L  
 CO2 28 21 - 32 mmol/L Anion gap 6 5 - 15 mmol/L Glucose 124 (H) 65 - 100 mg/dL BUN 70 (H) 6 - 20 MG/DL Creatinine 1.47 (H) 0.70 - 1.30 MG/DL  
 BUN/Creatinine ratio 48 (H) 12 - 20 GFR est AA 57 (L) >60 ml/min/1.73m2 GFR est non-AA 47 (L) >60 ml/min/1.73m2 Calcium 7.6 (L) 8.5 - 10.1 MG/DL Bilirubin, total 0.5 0.2 - 1.0 MG/DL  
 ALT (SGPT) 84 (H) 12 - 78 U/L  
 AST (SGOT) 45 (H) 15 - 37 U/L Alk. phosphatase 116 45 - 117 U/L Protein, total 5.2 (L) 6.4 - 8.2 g/dL Albumin 1.8 (L) 3.5 - 5.0 g/dL Globulin 3.4 2.0 - 4.0 g/dL A-G Ratio 0.5 (L) 1.1 - 2.2 PHOSPHORUS Collection Time: 11/21/19  4:14 AM  
Result Value Ref Range Phosphorus 4.8 (H) 2.6 - 4.7 MG/DL  
GLUCOSE, POC Collection Time: 11/21/19  4:14 AM  
Result Value Ref Range Glucose (POC) 127 (H) 65 - 100 mg/dL Performed by Luan Kim Collection Time: 11/21/19  4:15 AM  
Result Value Ref Range Glucose 127 mg/dL Insulin order 5.4 units/hour Insulin adminstered 5.4 units/hour Multiplier 0.080 Low target 95 mg/dL High target 130 mg/dL D50 order 0.0 ml  
 D50 administered 0.00 ml Minutes until next BG 60 min Order initials kli Administered initials jossiei ASHELY Comments GLUCOSE, POC Collection Time: 11/21/19  5:17 AM  
Result Value Ref Range Glucose (POC) 131 (H) 65 - 100 mg/dL Performed by Serjio Yomishirley Vaughan Collection Time: 11/21/19  5:17 AM  
Result Value Ref Range Glucose 131 mg/dL Insulin order 6.4 units/hour Insulin adminstered 6.4 units/hour Multiplier 0.090 Low target 95 mg/dL High target 130 mg/dL D50 order 0.0 ml  
 D50 administered 0.00 ml Minutes until next BG 60 min Order initials kli Administered initials danielle LUND Comments GLUCOSE, POC Collection Time: 11/21/19  6:19 AM  
Result Value Ref Range Glucose (POC) 133 (H) 65 - 100 mg/dL Performed by Bundle It Yomi Vaughan Collection Time: 11/21/19  6:19 AM  
Result Value Ref Range Glucose 133 mg/dL Insulin order 7.3 units/hour Insulin adminstered 7.3 units/hour Multiplier 0.100 Low target 95 mg/dL High target 130 mg/dL D50 order 0.0 ml  
 D50 administered 0.00 ml Minutes until next BG 60 min Order initials kli Administered initials danielle LUND Comments GLUCOSE, POC Collection Time: 11/21/19  7:22 AM  
Result Value Ref Range Glucose (POC) 109 (H) 65 - 100 mg/dL Performed by Bundle It Yomi Vaughan Collection Time: 11/21/19  7:22 AM  
Result Value Ref Range Glucose 109 mg/dL Insulin order 4.9 units/hour Insulin adminstered 4.9 units/hour Multiplier 0.100 Low target 95 mg/dL High target 130 mg/dL D50 order 0.0 ml  
 D50 administered 0.00 ml Minutes until next BG 60 min Order initials kli Administered initials danielle LUND Comments Assessment and Plan  
 
69 y/o man with a h/o CAD admitted with ACS s/p CABG. 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 Asked to see for t'cytopenia. Work-up thus far:  
 
Bilirubin 0.6 11/19/19 HECTOR negative 11/2/19 Legionella DFA negative 11/7/19 Bronchial lavage negative 11/7/19 Blood culture negative 10/21/19 A-04: Normal 
Folic acid: normal 
Fibrinogen: normal 
HIT panel/CARMEN: normal 
Zosyn started 5 days after PLT decline Copper: pending LAC: pending B2G: pending ACL: pending Heparin held 1. T'cytopenia: Recommend PLT transfusion for bleeding or < 10K. 2. Reduced level of arousal: propofol reduced. Thank you for allowing us to participate in the care of this very pleasant patient. Demetrius Wang MD 
Hematology/Oncology Phone [phone]

## 2019-11-21 NOTE — PROGRESS NOTES
Nephrology Progress Note Estiven Hunt Date of Admission : 10/19/2019 CC:  Follow up for ISAIAH on CKD, hypervolemia Assessment and Plan ISAIAH on CKD: 
- from ATN post CABG, pre renal azotemia from diuretics - Cr stable and voiding ok - bumex increased 
- cont current care 
- daily labs Hypernatremia : 
- improving 
- continue FW flushes for now CKD III: 
- baseline Cr 1.3 prior to CABG 
- likely from DM and HTN 
  
HFrE F: 
- last EF 35-40% on 10/20 
  
CAD s/p CABG x 5 10/19 and reexploration 10/23 for hematoma 
  
Acute on Chronic Resp Failure: 
- likely 2/2 underlying ILD + volume 
- now w/ trach Thrombocytopenia: 
- w/u per hematology Chronic Anemia: 
- Hb stable  
  
DM2: 
 
Protein Malnutrition: 
- on TF via [place] [address] Interval History: 
Seen and examined. Sedated on the vent. Off francis now. Cr stable. Stable UOP. Net neg 1 L in the past 24 hours. Current Medications: all current  Medications have been eviewed in [facility] Review of Systems: Review of systems not obtained due to patient factors. Objective: 
Vitals:   
Vitals:  
 11/21/19 0500 11/21/19 0600 11/21/19 0700 11/21/19 0800 BP:      
Pulse: 77 89 71 67 Resp: 27 22 26 26 Temp:    97.8 °F (36.6 °C) SpO2: 98% 99% 96% 98% Weight:      
Height:      
 
Intake and Output: 
11/21 0701 - 11/21 1900 In: -  
Out: 400 [Urine:400] 11/19 1901 - 11/21 0700 In: 4977.1 [I.V.:1492.1] Out: Cabrera Frock [[id number]] Physical Examination: 
General: Frail, sedated Neck:  + trach Resp:  Diffuse dry crackles CV:  RRR,  no murmur or rub, no LE edema GI:  Soft, NT, + Bowel sounds, no hepatosplenomegaly Neurologic:  Sedated on the vent Psych:             Unable to assess Skin:  No Rash :  Devlin in place [x]    High complexity decision making was performed 
[x]    Patient is at high-risk of decompensation with multiple organ involvement Lab Data Personally Reviewed: I have reviewed all the pertinent labs, microbiology data and radiology studies during assessment. Recent Labs  
  11/21/19 0414 11/20/19 2138 11/20/19 
0350 11/19/19 
0346  144 144 147* K 4.5 3.9 4.2 3.6 * 110* 110* 112* CO2 28 28 27 27 * 96 89 93 BUN 70* 72* 82* 88* CREA 1.47* 1.49* 1.58* 1.49* CA 7.6* 7.7* 8.0* 8.0*  
MG 1.9  --  2.0 2.2 PHOS 4.8*  --  4.8* 4.8* ALB 1.8* 1.8* 2.0* 1.9*  
SGOT 45* 43* 52* 44* ALT 84* 84* 106* 102* INR  --   --  1.2*  --   
 
Recent Labs  
  11/21/19 0414 11/20/19 2138 11/20/19 
0350 11/19/19 
0346 WBC 8.4  --  11.1 9.5 HGB 7.7*  --  7.9* 7.5* HCT 24.9*  --  25.1* 24.3*  
PLT 47* 44* 49* 38* No results found for: SDES Lab Results Component Value Date/Time Culture result: NO GROWTH 5 DAYS 11/16/2019 03:10 AM  
 Culture result: NO GROWTH 2 DAYS 11/07/2019 12:45 PM  
 Culture result: HEAVY NORMAL RESPIRATORY TARIK 10/21/2019 12:08 PM  
 
Recent Results (from the past 24 hour(s)) GLUCOSE, POC Collection Time: 11/20/19  9:05 AM  
Result Value Ref Range Glucose (POC) 110 (H) 65 - 100 mg/dL Performed by Seymour Schafer Collection Time: 11/20/19  9:05 AM  
Result Value Ref Range Glucose 110 mg/dL Insulin order 2.0 units/hour Insulin adminstered 2.0 units/hour Multiplier 0.040 Low target 95 mg/dL High target 130 mg/dL D50 order 0.0 ml  
 D50 administered 0.00 ml Minutes until next BG 60 min Order initials EW Administered initials EW   
 GLSCOM Comments GLUCOSE, POC Collection Time: 11/20/19 10:27 AM  
Result Value Ref Range Glucose (POC) 124 (H) 65 - 100 mg/dL Performed by Seymour Schafer Collection Time: 11/20/19 10:34 AM  
Result Value Ref Range Glucose 124 mg/dL Insulin order 2.6 units/hour Insulin adminstered 2.6 units/hour Multiplier 0.040 Low target 95 mg/dL High target 130 mg/dL D50 order 0.0 ml  
 D50 administered 0.00 ml Minutes until next BG 60 min Order initials dn   
 Administered initials dn   
 GLSCOM Comments GLUCOSE, POC Collection Time: 11/20/19 11:37 AM  
Result Value Ref Range Glucose (POC) 119 (H) 65 - 100 mg/dL Performed by Eli Darby Collection Time: 11/20/19 11:38 AM  
Result Value Ref Range Glucose 119 mg/dL Insulin order 2.4 units/hour Insulin adminstered 2.4 units/hour Multiplier 0.040 Low target 95 mg/dL High target 130 mg/dL D50 order 0.0 ml  
 D50 administered 0.00 ml Minutes until next BG 60 min Order initials EW Administered initials EW   
 GLSCOM Comments GLUCOSE, POC Collection Time: 11/20/19 12:35 PM  
Result Value Ref Range Glucose (POC) 117 (H) 65 - 100 mg/dL Performed by Barry Moses Collection Time: 11/20/19 12:39 PM  
Result Value Ref Range Glucose 117 mg/dL Insulin order 2.3 units/hour Insulin adminstered 2.3 units/hour Multiplier 0.040 Low target 95 mg/dL High target 130 mg/dL D50 order 0.0 ml  
 D50 administered 0.00 ml Minutes until next BG 60 min Order initials EW Administered initials EW   
 GLSCOM Comments GLUCOSE, POC Collection Time: 11/20/19  1:39 PM  
Result Value Ref Range Glucose (POC) 114 (H) 65 - 100 mg/dL Performed by Barry Moses Collection Time: 11/20/19  1:39 PM  
Result Value Ref Range Glucose 114 mg/dL Insulin order 2.2 units/hour Insulin adminstered 2.2 units/hour Multiplier 0.040 Low target 95 mg/dL High target 130 mg/dL D50 order 0.0 ml  
 D50 administered 0.00 ml Minutes until next BG 60 min Order initials EW Administered initials EW   
 GLSCOM Comments GLUCOSE, POC Collection Time: 11/20/19  2:49 PM  
Result Value Ref Range Glucose (POC) 105 (H) 65 - 100 mg/dL Performed by Christy Monroe  Collection Time: 11/20/19  2:49 PM  
 Result Value Ref Range Glucose 105 mg/dL Insulin order 1.8 units/hour Insulin adminstered 1.8 units/hour Multiplier 0.040 Low target 95 mg/dL High target 130 mg/dL D50 order 0.0 ml  
 D50 administered 0.00 ml Minutes until next  min Order initials apl Administered initials apl GLSCOM Comments POC G3 - PUL Collection Time: 11/20/19  4:20 PM  
Result Value Ref Range FIO2 (POC) 80 % pH (POC) 7.455 (H) 7.35 - 7.45    
 pCO2 (POC) 35.1 35.0 - 45.0 MMHG  
 pO2 (POC) 67 (L) 80 - 100 MMHG  
 HCO3 (POC) 24.6 22 - 26 MMOL/L  
 sO2 (POC) 94 92 - 97 % Base excess (POC) 1 mmol/L Site DRAWN FROM ARTERIAL LINE Device: VENT Mode ASSIST CONTROL Set Rate 24 bpm  
 PEEP/CPAP (POC) 5 cmH2O  
 PIP (POC) 16 Allens test (POC) N/A Inspiratory Time 1.00 sec Specimen type (POC) ARTERIAL Total resp. rate 32 Pressure control YES    
GLUCOSE, POC Collection Time: 11/20/19  4:56 PM  
Result Value Ref Range Glucose (POC) 137 (H) 65 - 100 mg/dL Performed by Agustina Velasquez Collection Time: 11/20/19  4:56 PM  
Result Value Ref Range Glucose 137 mg/dL Insulin order 3.1 units/hour Insulin adminstered 3.1 units/hour Multiplier 0.040 Low target 95 mg/dL High target 130 mg/dL D50 order 0.0 ml  
 D50 administered 0.00 ml Minutes until next BG 60 min Order initials elk Administered initials elk GLSCOM Comments GLUCOSE, POC Collection Time: 11/20/19  6:01 PM  
Result Value Ref Range Glucose (POC) 146 (H) 65 - 100 mg/dL Performed by Agustina Velasquez Collection Time: 11/20/19  6:01 PM  
Result Value Ref Range Glucose 146 mg/dL Insulin order 4.3 units/hour Insulin adminstered 4.3 units/hour Multiplier 0.050 Low target 95 mg/dL High target 130 mg/dL D50 order 0.0 ml  
 D50 administered 0.00 ml Minutes until next BG 60 min Order initials elk Administered initials elk GLSCOM Comments GLUCOSE, POC Collection Time: 11/20/19  7:03 PM  
Result Value Ref Range Glucose (POC) 127 (H) 65 - 100 mg/dL Performed by Raghu Dorsey Collection Time: 11/20/19  7:03 PM  
Result Value Ref Range Glucose 127 mg/dL Insulin order 3.4 units/hour Insulin adminstered 3.4 units/hour Multiplier 0.050 Low target 95 mg/dL High target 130 mg/dL D50 order 0.0 ml  
 D50 administered 0.00 ml Minutes until next BG 60 min Order initials dn   
 Administered initials dn   
 GLSCOM Comments GLUCOSE, POC Collection Time: 11/20/19  8:08 PM  
Result Value Ref Range Glucose (POC) 108 (H) 65 - 100 mg/dL Performed by Ann Perez Collection Time: 11/20/19  8:08 PM  
Result Value Ref Range Glucose 108 mg/dL Insulin order 2.4 units/hour Insulin adminstered 2.4 units/hour Multiplier 0.050 Low target 95 mg/dL High target 130 mg/dL D50 order 0.0 ml  
 D50 administered 0.00 ml Minutes until next BG 60 min Order initials kli Administered initials kli GLSCOM Comments GLUCOSE, POC Collection Time: 11/20/19  9:11 PM  
Result Value Ref Range Glucose (POC) 104 (H) 65 - 100 mg/dL Performed by Shanelle Menendez Collection Time: 11/20/19  9:11 PM  
Result Value Ref Range Glucose 104 mg/dL Insulin order 2.2 units/hour Insulin adminstered 2.2 units/hour Multiplier 0.050 Low target 95 mg/dL High target 130 mg/dL D50 order 0.0 ml  
 D50 administered 0.00 ml Minutes until next BG 60 min Order initials hm Administered initials hm GLSCOM Comments METABOLIC PANEL, COMPREHENSIVE Collection Time: 11/20/19  9:38 PM  
Result Value Ref Range Sodium 144 136 - 145 mmol/L Potassium 3.9 3.5 - 5.1 mmol/L Chloride 110 (H) 97 - 108 mmol/L  
 CO2 28 21 - 32 mmol/L  Anion gap 6 5 - 15 mmol/L  
 Glucose 96 65 - 100 mg/dL BUN 72 (H) 6 - 20 MG/DL Creatinine 1.49 (H) 0.70 - 1.30 MG/DL  
 BUN/Creatinine ratio 48 (H) 12 - 20 GFR est AA 56 (L) >60 ml/min/1.73m2 GFR est non-AA 46 (L) >60 ml/min/1.73m2 Calcium 7.7 (L) 8.5 - 10.1 MG/DL Bilirubin, total 0.6 0.2 - 1.0 MG/DL  
 ALT (SGPT) 84 (H) 12 - 78 U/L  
 AST (SGOT) 43 (H) 15 - 37 U/L Alk. phosphatase 110 45 - 117 U/L Protein, total 4.9 (L) 6.4 - 8.2 g/dL Albumin 1.8 (L) 3.5 - 5.0 g/dL Globulin 3.1 2.0 - 4.0 g/dL A-G Ratio 0.6 (L) 1.1 - 2.2 PLATELET COUNT Collection Time: 11/20/19  9:38 PM  
Result Value Ref Range PLATELET 44 (LL) 550 - 400 K/uL GLUCOSE, POC Collection Time: 11/20/19 10:14 PM  
Result Value Ref Range Glucose (POC) 125 (H) 65 - 100 mg/dL [facility] Collection Time: 11/20/19 10:14 PM  
Result Value Ref Range Glucose 125 mg/dL Insulin order 3.3 units/hour Insulin adminstered 3.3 units/hour Multiplier 0.050 Low target 95 mg/dL High target 130 mg/dL D50 order 0.0 ml  
 D50 administered 0.00 ml Minutes until next BG 60 min Order initials kli Administered initials kli GLSCOM Comments GLUCOSE, POC Collection Time: 11/20/19 11:17 PM  
Result Value Ref Range Glucose (POC) 159 (H) 65 - 100 mg/dL Performed by Momo Lemons Collection Time: 11/20/19 11:17 PM  
Result Value Ref Range Glucose 159 mg/dL Insulin order 5.9 units/hour Insulin adminstered 5.9 units/hour Multiplier 0.060 Low target 95 mg/dL High target 130 mg/dL D50 order 0.0 ml  
 D50 administered 0.00 ml Minutes until next BG 60 min Order initials kli Administered initials danielle GLSCOM Comments GLUCOSE, POC Collection Time: 11/21/19 12:19 AM  
Result Value Ref Range Glucose (POC) 133 (H) 65 - 100 mg/dL  Performed by Hayley Rocha  
 Collection Time: 11/21/19 12:19 AM  
Result Value Ref Range Glucose 133 mg/dL Insulin order 5.1 units/hour Insulin adminstered 5.1 units/hour Multiplier 0.070 Low target 95 mg/dL High target 130 mg/dL D50 order 0.0 ml  
 D50 administered 0.00 ml Minutes until next BG 60 min Order initials sfm Administered initials sfm GLSCOM Comments GLUCOSE, POC Collection Time: 11/21/19  1:21 AM  
Result Value Ref Range Glucose (POC) 117 (H) 65 - 100 mg/dL Performed by Connie Mohs Chelsey Opitz Collection Time: 11/21/19  1:21 AM  
Result Value Ref Range Glucose 117 mg/dL Insulin order 4.0 units/hour Insulin adminstered 4.0 units/hour Multiplier 0.070 Low target 95 mg/dL High target 130 mg/dL D50 order 0.0 ml  
 D50 administered 0.00 ml Minutes until next BG 60 min Order initials kli Administered initials kli GLSCOM Comments GLUCOSE, POC Collection Time: 11/21/19  2:23 AM  
Result Value Ref Range Glucose (POC) 134 (H) 65 - 100 mg/dL Performed by Connie Mohs Chelsey Opitz Collection Time: 11/21/19  2:23 AM  
Result Value Ref Range Glucose 134 mg/dL Insulin order 5.9 units/hour Insulin adminstered 5.9 units/hour Multiplier 0.080 Low target 95 mg/dL High target 130 mg/dL D50 order 0.0 ml  
 D50 administered 0.00 ml Minutes until next BG 60 min Order initials kli Administered initials kli GLSCOM Comments GLUCOSE, POC Collection Time: 11/21/19  3:08 AM  
Result Value Ref Range Glucose (POC) 125 (H) 65 - 100 mg/dL Performed by Connie Mohs Chelsey Opitz Collection Time: 11/21/19  3:08 AM  
Result Value Ref Range Glucose 125 mg/dL Insulin order 5.2 units/hour Insulin adminstered 5.2 units/hour Multiplier 0.080 Low target 95 mg/dL High target 130 mg/dL D50 order 0.0 ml  
 D50 administered 0.00 ml Minutes until next BG 60 min Order initials kli Administered initials kli GLSCOM Comments CBC W/O DIFF Collection Time: 11/21/19  4:14 AM  
Result Value Ref Range WBC 8.4 4.1 - 11.1 K/uL  
 RBC 2.54 (L) 4.10 - 5.70 M/uL HGB 7.7 (L) 12.1 - 17.0 g/dL HCT 24.9 (L) 36.6 - 50.3 % MCV 98.0 80.0 - 99.0 FL  
 MCH 30.3 26.0 - 34.0 PG  
 MCHC 30.9 30.0 - 36.5 g/dL  
 RDW 15.2 (H) 11.5 - 14.5 % PLATELET 47 (LL) 196 - 400 K/uL NRBC 0.0 0  WBC ABSOLUTE NRBC 0.00 0.00 - 0.01 K/uL MAGNESIUM Collection Time: 11/21/19  4:14 AM  
Result Value Ref Range Magnesium 1.9 1.6 - 2.4 mg/dL NT-PRO BNP Collection Time: 11/21/19  4:14 AM  
Result Value Ref Range NT pro-BNP 30,948 (H) <317 PG/ML  
METABOLIC PANEL, COMPREHENSIVE Collection Time: 11/21/19  4:14 AM  
Result Value Ref Range Sodium 144 136 - 145 mmol/L Potassium 4.5 3.5 - 5.1 mmol/L Chloride 110 (H) 97 - 108 mmol/L  
 CO2 28 21 - 32 mmol/L Anion gap 6 5 - 15 mmol/L Glucose 124 (H) 65 - 100 mg/dL BUN 70 (H) 6 - 20 MG/DL Creatinine 1.47 (H) 0.70 - 1.30 MG/DL  
 BUN/Creatinine ratio 48 (H) 12 - 20 GFR est AA 57 (L) >60 ml/min/1.73m2 GFR est non-AA 47 (L) >60 ml/min/1.73m2 Calcium 7.6 (L) 8.5 - 10.1 MG/DL Bilirubin, total 0.5 0.2 - 1.0 MG/DL  
 ALT (SGPT) 84 (H) 12 - 78 U/L  
 AST (SGOT) 45 (H) 15 - 37 U/L Alk. phosphatase 116 45 - 117 U/L Protein, total 5.2 (L) 6.4 - 8.2 g/dL Albumin 1.8 (L) 3.5 - 5.0 g/dL Globulin 3.4 2.0 - 4.0 g/dL A-G Ratio 0.5 (L) 1.1 - 2.2 PHOSPHORUS Collection Time: 11/21/19  4:14 AM  
Result Value Ref Range Phosphorus 4.8 (H) 2.6 - 4.7 MG/DL  
GLUCOSE, POC Collection Time: 11/21/19  4:14 AM  
Result Value Ref Range Glucose (POC) 127 (H) 65 - 100 mg/dL Performed by Mingo Blackmon Collection Time: 11/21/19  4:15 AM  
Result Value Ref Range Glucose 127 mg/dL Insulin order 5.4 units/hour Insulin adminstered 5.4 units/hour Multiplier 0.080 Low target 95 mg/dL High target 130 mg/dL D50 order 0.0 ml  
 D50 administered 0.00 ml Minutes until next BG 60 min Order initials kli Administered initials kli GLSCOM Comments GLUCOSE, POC Collection Time: 11/21/19  5:17 AM  
Result Value Ref Range Glucose (POC) 131 (H) 65 - 100 mg/dL Performed by Dot Díaz Collection Time: 11/21/19  5:17 AM  
Result Value Ref Range Glucose 131 mg/dL Insulin order 6.4 units/hour Insulin adminstered 6.4 units/hour Multiplier 0.090 Low target 95 mg/dL High target 130 mg/dL D50 order 0.0 ml  
 D50 administered 0.00 ml Minutes until next BG 60 min Order initials kli Administered initials kli GLSCOM Comments GLUCOSE, POC Collection Time: 11/21/19  6:19 AM  
Result Value Ref Range Glucose (POC) 133 (H) 65 - 100 mg/dL Performed by Dot Díaz Collection Time: 11/21/19  6:19 AM  
Result Value Ref Range Glucose 133 mg/dL Insulin order 7.3 units/hour Insulin adminstered 7.3 units/hour Multiplier 0.100 Low target 95 mg/dL High target 130 mg/dL D50 order 0.0 ml  
 D50 administered 0.00 ml Minutes until next BG 60 min Order initials kli Administered initials kli GLSCOM Comments GLUCOSE, POC Collection Time: 11/21/19  7:22 AM  
Result Value Ref Range Glucose (POC) 109 (H) 65 - 100 mg/dL Performed by Dot Díaz Collection Time: 11/21/19  7:22 AM  
Result Value Ref Range Glucose 109 mg/dL Insulin order 4.9 units/hour Insulin adminstered 4.9 units/hour Multiplier 0.100 Low target 95 mg/dL High target 130 mg/dL D50 order 0.0 ml  
 D50 administered 0.00 ml Minutes until next BG 60 min Order initials kli Administered initials kli GLSCOM Comments GLUCOSE, POC  
 Collection Time: 11/21/19  8:21 AM  
Result Value Ref Range Glucose (POC) 101 (H) 65 - 100 mg/dL Performed by Dulce Maria Bay Collection Time: 11/21/19  8:21 AM  
Result Value Ref Range Glucose 101 mg/dL Insulin order 4.1 units/hour Insulin adminstered 4.1 units/hour Multiplier 0.100 Low target 95 mg/dL High target 130 mg/dL D50 order 0.0 ml  
 D50 administered 0.00 ml Minutes until next BG 60 min Order initials aPL Administered initials APL GLSCOM Comments Iwona Hooper MD 
[address] [place] [address] A [facility] Phone - [phone] Fax - [phone] 
[email or web address]. [email or web address]

## 2019-11-21 NOTE — PROGRESS NOTES
2000: Report received from Stephanie Reza, RN, drips dual RN rate verified and care assumed of patient. 2130: Labs sent. 2200: Trach care complete. 0200: Patient bathed, linen changed. 0400: Labs drawn. 0500: Patient shaved. 0600: Femoral A line tubing changed to hub. Trach care completed, including changing of trach ties. Patient grunting, sounds wet. Oral, NT and ETT suction complete without change. Problem: Falls - Risk of 
Goal: *Absence of Falls Description Document Gricelda Marcos Fall Risk and appropriate interventions in the flowsheet. Outcome: Progressing Towards Goal 
Note: Fall Risk Interventions: 
Mobility Interventions: Communicate number of staff needed for ambulation/transfer Mentation Interventions: More frequent rounding, Update white board Medication Interventions: Evaluate medications/consider consulting pharmacy Elimination Interventions: Toileting schedule/hourly rounds History of Falls Interventions: Consult care management for discharge planning Problem: Patient Education: Go to Patient Education Activity Goal: Patient/Family Education Outcome: Progressing Towards Goal 
  
Problem: Pressure Injury - Risk of 
Goal: *Prevention of pressure injury Description Document Earl Scale and appropriate interventions in the flowsheet. Outcome: Progressing Towards Goal 
Note: Pressure Injury Interventions: 
Sensory Interventions: Assess changes in LOC, Check visual cues for pain, Keep linens dry and wrinkle-free, Minimize linen layers, Turn and reposition approx. every two hours (pillows and wedges if needed) Moisture Interventions: Apply protective barrier, creams and emollients, Check for incontinence Q2 hours and as needed, Minimize layers, Maintain skin hydration (lotion/cream) Activity Interventions: Pressure redistribution bed/mattress(bed type) Mobility Interventions: Assess need for specialty bed, Float heels, Pressure redistribution bed/mattress (bed type), Turn and reposition approx. every two hours(pillow and wedges) Nutrition Interventions: Document food/fluid/supplement intake Friction and Shear Interventions: Apply protective barrier, creams and emollients, Lift sheet, Minimize layers Problem: Patient Education: Go to Patient Education Activity Goal: Patient/Family Education Outcome: Progressing Towards Goal 
  
Problem: Infection - Risk of, Central Venous Catheter-Associated Bloodstream Infection Goal: *Absence of infection signs and symptoms Outcome: Progressing Towards Goal 
  
Problem: Patient Education: Go to Patient Education Activity Goal: Patient/Family Education Outcome: Progressing Towards Goal 
  
Problem: Ventilator Management Goal: *Adequate oxygenation and ventilation Outcome: Progressing Towards Goal 
Goal: *Patient maintains clear airway/free of aspiration Outcome: Progressing Towards Goal 
Goal: *Absence of infection signs and symptoms Outcome: Progressing Towards Goal 
Goal: *Normal spontaneous ventilation Outcome: Progressing Towards Goal 
  
Problem: Patient Education: Go to Patient Education Activity Goal: Patient/Family Education Outcome: Progressing Towards Goal 
  
Problem: Nutrition Deficit Goal: *Optimize nutritional status Outcome: Progressing Towards Goal 
  
Problem: Gas Exchange - Impaired Goal: *Absence of hypoxia Outcome: Progressing Towards Goal 
  
Problem: Patient Education: Go to Patient Education Activity Goal: Patient/Family Education Outcome: Progressing Towards Goal 
  
Problem: Nutrition Deficit Goal: *Optimize nutritional status Outcome: Progressing Towards Goal 
  
Problem: Infection - Risk of, Urinary Catheter-Associated Urinary Tract Infection Goal: *Absence of infection signs and symptoms Outcome: Progressing Towards Goal 
  
Problem: Patient Education: Go to Patient Education Activity Goal: Patient/Family Education Outcome: Progressing Towards Goal

## 2019-11-21 NOTE — PROGRESS NOTES
[facility] ICU Progress Note Admit Date: 10/19/2019 Procedure:  Procedure(s): 
CARDIAC RE-ENTRY, MARISOL BY  [facility] -  S[facility]AIDA    
 
CABG x 5, LIMA to LAD, RSVG to [id number], RSVG to PDA 10/23/19 
 
11/13/19 - S/p perc trach placement Subjective:  
Pt seen with Dr. Dejesus. Increased RR/agitation remains when off sedation. On precedex, Diprivan, insulin gtt. T max 99F, had to increase vent settings to 80% FiO2 Objective:  
Vitals: 
Blood pressure 119/57, pulse 78, temperature 97.8 °F (36.6 °C), resp. rate (!) 31, height 5' 7\" (1.702 m), weight 138 lb 8 oz (62.8 kg), SpO2 98 %. Temp (24hrs), Av.1 °F (36.7 °C), Min:97.2 °F (36.2 °C), Max:99 °F (37.2 °C) EKG/Rhythm: SR in the 76s Oxygen Therapy: 
Oxygen Therapy O2 Sat (%): 98 % (19 0900) Pulse via Oximetry: 79 beats per minute (19 0900) O2 Device: Tracheostomy (19 0700) O2 Flow Rate (L/min): 40 l/min (19 2000) O2 Temperature: 98.6 °F (37 °C) (19 0840) FIO2 (%): 80 % (19 0700) CXR:  
CXR Results  (Last 48 hours)  
          
 19 3785  XR CHEST PORT Final result Impression:  IMPRESSION: No significant change in the diffuse interstitial lung disease. Joey Higgins Narrative:  EXAM:  XR CHEST PORT INDICATION:  interstitial edema COMPARISON:  2019 FINDINGS: A portable AP radiograph of the chest was obtained at 342 hours. Tracheostomy tube and right PICC catheter are unchanged. Dobbhoff tube overlies  
the stomach. There is no significant change in the diffuse interstitial  
prominence. .  Cardiomegaly is stable. .   
   
  
  
 
 
Admission Weight: Last Weight Weight: 141 lb 5 oz (64.1 kg) Weight: 138 lb 8 oz (62.8 kg) Intake / Output / Drain: 
Current Shift:  0701 -  1900 In: -  
Out: 575 [Urine:575] Last 24 hrs.:  
 
Intake/Output Summary (Last 24 hours) at 2019 0843 Last data filed at 2019 0900 Gross per 24 hour Intake 3156.95 ml Output 5055 ml Net -1898.05 ml EXAM: 
General:  Trach, sedated. Lungs:   Coarse bilat Incision:  Midsternal incision with no significant erythema, drainage, or dehiscence. Heart:  Regular rate and rhythm, S1, S2 normal, no murmur, click, rub or gallop. Abdomen:   Soft, non-tender. Bowel sounds active. No masses,  No organomegaly. +BM . Extremities:  Some generalized edema, +1-2 edema. Palpable pulses bilat Neurologic:  Moves extremities but without purpose. Does not track or follow commands. Labs:  
Recent Labs  
  19 
0933  19 
0414  19 
0350 WBC  --   --  8.4  --  11.1 HGB  --   --  7.7*  --  7.9* HCT  --   --  24.9*  --  25.1*  
PLT  --   --  47*   < > 49* NA  --   --  144   < > 144 K  --   --  4.5   < > 4.2 BUN  --   --  70*   < > 82* CREA  --   --  1.47*   < > 1.58* GLU  --   --  124*   < > 89 GLUCPOC 129*   < > 127*   < >  --   
INR  --   --   --   --  1.2*  
 < > = values in this interval not displayed. Assessment:  
 
Principal Problem: S/P CABG x 5 (10/23/2019) Overview: x 5, LIMA to LAD, RSVG to Diag1, OM2-OM3, RSVG to PDA Active Problems: 
  [facility] (10/19/2019) Unstable angina ([facility] (10/19/2019) Coronary artery disease of native artery of native heart with stable angina pectoris ([facility] (10/21/2019) Systolic heart failure ([facility] (10/22/2019) Plan/Recommendations/Medical Decision Makin. S/p CABG: on ASA, statin. No ACEi/ARB due to renal function. No BB due to pulmonary fibrosis. 2. Acute on chronic systolic CHF, NYHA Class II on admit: EF 35-40% preop. No BB/ACE/ARB/AA until vitals/kidney function allows. Trend pBNP - stable today, receiving a lot of volume. Bumex increased to TID yesterday and he was negative 1.2L over the last 24 hours. 3. Acute postop respiratory failure with chronic interstitial fibrosis per CXR: Trach placed 11/13 by Thoracic Surgery. Acute respiratory acidosis 11/14 thought driven by oversedation. Fentanyl and versed drips stopped. (now just has PRN Morphine and Ativan dosing) Diuresis per Nephrology. Amiodarone stopped on 10/31. Solumedrol per pulm-weaning. Continue scheduled nebs. Cont mucinex, pulm toileting, mucomyst. Chest CT revealing emphysema/pulm fibrosis. Has not previously tolerated vent weaning. Will come off the Diprivan today and try weaning again. 4. Renal insufficiency: BUN/Creatinine stable today. Nephrology following and appreciate their recommendations. Devlin in place for accurate I&O. Cont diuretics 5. Anemia: had preop, H&H stable today. Did receive transfusion of PRBC on 11/15/19. Iron panel sent preop - iron, iron sat low-continue Iron supplement. 6. Thrombocytopenia: Platelets remain 08A. HIT antibody neg, CARMEN negative. Daily CBC. Hold SQ heparin and ASA. On PPI. Transfuse platelets for less than 10k or if active bleeding seen per Hematology-appreciate their recommendations-labs pending 7. Hx of HTN: Hydralazine PRN SBP >160, BP too labile for PO meds 8. HLD: Continue pravastatin 9. Leukocytosis: WBC at 8.4k today, procalcitonin last 1.6 (check every 3 days),  Devlin placed 11/7. PICC placed 11/6. Trach 11/13. Monitor daily CBC. Appreciate ID consult-continue Zosyn. Leukopenic last week 10. Constipation: Resolved. Last BM on 11/19. Bowel medications PRN. Continue sandro-q 11. Current smoker: smoking cessation education completed. 12. Nutrition: Appreciate Dietician recommendations. Dobhoff placed 10/30 and Enteral feedings started. TF's at goal of 35 ml/hr. Try and AVOID increased volume amounts 13. Hyperglycemia: Preop A1c 5.3 with no DM history, now with hyperglycemia. BS have been much better controlled on Insulin gtt.   DTS following. Weaning steroids per Pulmonology. 14. Hypernatremia: Resolved. Nephrology following 15. Agitation, acute encephalopathy: Have been unable to wean down on sedation much due to agitation. Continue Precedex, and PRN Morphine, Ativan to keep the patient comfortable. Wean off the Diprivan as able. Head CT neg for any acute process. Had to resume Propofol overnight. Will increase seroquel bid. Not able to obtain MRI since V wires were cut. Do not think this is driven by pain, more from neuro source/encephalopathy 16. DVT/GI Prophylaxis: SCD's, SQ Heparin-currently on hold due to thrombocytopenia. Will restart when improved, PPI Dispo: PT/OT as able. Remain in CVI until resp status more stable.   
 
 
Signed By: Tobin Lewis NP

## 2019-11-21 NOTE — DIABETES MGMT
[facility] Cardiac Surgery Progress Note Recommendations/ Comments: Insulin gtt begun 10/14/2019 for extreme hyperglycemia, BG > 400 mg/dL at that time. Steroids Solu medrol  40 mg/dL Q 12 hours TF continuous Suplena @ 35 Trach/ Vent, difficulty weaning Currently on insulin gtt At 0821  mg/dL, rate 4.1 units/hr, received 33.3 units in past 6 hours. Note pt was poorly controlled on Lantus 38 units Q 12 hours prior to starting gtt Transition: 
Stable on gtt for only 4 hours - rec waiting until stable for 6 or more hours to transition or until more medically stable He will need daily basal Lantus, likey BID. Consider 45 units Q 12 hours Patient is 68 y.o. male s/p CABG x 5 followed by re-entry for mediastinal clot evacuation and repair of SVG to PDA  - POD 21. Pt with documented hx of DM on no meds PTA. Anemia - A1c inaccurate A1c:  
Lab Results Component Value Date/Time Hemoglobin A1c 5.3 10/21/2019 03:04 AM  
 
 
 
Recent Glucose Results:  
Lab Results Component Value Date/Time  (H) 11/21/2019 04:14 AM  
 GLU 96 11/20/2019 09:38 PM  
 GLUCPOC 129 (H) 11/21/2019 09:33 AM  
 GLUCPOC 101 (H) 11/21/2019 08:21 AM  
 GLUCPOC 109 (H) 11/21/2019 07:22 AM  
  
 
Lab Results Component Value Date/Time Creatinine 1.47 (H) 11/21/2019 04:14 AM  
 
Estimated Creatinine Clearance: 40.9 mL/min (A) (based on SCr of 1.47 mg/dL (H)). Active Orders There are no active orders of the following types: Diet. PO intake:  
No data found. Will continue to follow as needed. Thank you. Chester Zhong RN, CDE Time spent: 5 minutes

## 2019-11-22 NOTE — PROGRESS NOTES
NYHA class IV A/C systolic heart failure (EF 25%, NT pro-BNP 15,013) Acute on chronic kidney disease Mild pulmonary edema Mild chronic lung disease Acute on chronic hypoxic respiratory failure 
  
Remains intubated and sedated Had to go up on FiO2 overnight Will increase diuretics Still not following commands well Hgb a little low Will give pRBC Platelets remain low Creatinine improved NT pro-BNP improving CXR - pulmonary edema on interstitial fibrosis Intake/Output Summary (Last 24 hours) at 11/22/2019 8607 Last data filed at 11/22/2019 2028 Gross per 24 hour Intake 3355.18 ml Output 3605 ml Net -249.82 ml Visit Vitals /63 (BP 1 Location: Left arm, BP Patient Position: At rest;Supine) Pulse 78 Temp 97.9 °F (36.6 °C) Resp 25 Ht 5' 7\" (1.702 m) Wt 137 lb 2 oz (62.2 kg) SpO2 100% BMI 21.48 kg/m² Risk of morbidity and mortality - high Medical decision making - high complexity Total critical care time - 30 minutes (CPT 71089)

## 2019-11-22 NOTE — PROGRESS NOTES
Infectious Diseases Progress Note Antibiotic Summary: 
Zosyn   -- present Subjective:  
 
Remains on vent and poorly responsive ROS: 
Unable to obtain Objective:  
 
Vitals:  
Visit Vitals /57 (BP 1 Location: Left arm, BP Patient Position: Supine) Pulse 81 Temp 97.8 °F (36.6 °C) Resp 26 Ht 5' 7\" (1.702 m) Wt 62.8 kg (138 lb 8 oz) SpO2 96% BMI 21.69 kg/m² Tmax:  Temp (24hrs), Av °F (36.7 °C), Min:97.7 °F (36.5 °C), Max:98.7 °F (37.1 °C) Exam:  General appearance: no distress, chronically ill appearing Neck: supple Chest wall: sternal incision is healing well. Lungs: posterior rales Heart: regular rate and rhythm Abdomen: soft, non-tender. Bowel sounds normal. No masses,  no organomegaly Extremities: no cellulitis Skin: no rash IV Lines: Right PICC inserted 2019 Labs:   
Recent Labs  
  19 
1613 19 
0414 19 
2138 19 
0350 19 
5216 WBC  --  8.4  --  11.1 9.5 HGB  --  7.7*  --  7.9* 7.5* PLT 58* 47* 44* 49* 38* BUN 66* 70* 72* 82* 88* CREA 1.42* 1.47* 1.49* 1.58* 1.49* TBILI 0.6 0.5 0.6 0.5 0.6 SGOT 44* 45* 43* 52* 44* * 116 110 134* 102 Assessment: 1. Elevated procalcitonin level -- significance uncertain in this setting (3 weeks of critical illness in ICU): empiric Zosyn begun ; Cultures have been negative. No infection identified but will complete a course of Zosyn 
  
2. OHD -- IHD & CHF -- S/P CABG on 10/22/2019 
  
3. CKD with acute exacerbation 
  
4. Anemia 
  
5. Thrombocytopenia 
  
6. Acute rise in transaminases -- shock liver ? 
  
7. NIDDM 
  
8. HTN 
  
9. Hyperlipidemia Plan: 1. Continue Zosyn Jens Le MD

## 2019-11-22 NOTE — PROGRESS NOTES
1945: Report received from EULALIO OCASIO RN. Insulin, precedex, propofol, and pressure bag drips verified. Pt trach'd w/ #8 shiley and on vent, settings: A/C rate 24, P/C 16, PEEP 7, and FiO2 80%. Pt taking good Vt, ~600. Sats mid 90s. Goal MAP >65, nomi drip on standby. Plan to wean propofol and d/c arterial line by tmw morning. Daily ABG - goal sO2 >60 and pO2 >88. 
 
4107-8573: Insulin drip stopped, BG 71. D10 given per protocol.  following treatment, insulin drip restarted. Propofol drip off for D10 infusion and to see if pt tolerates propofol drip off. 
 
2053: Pt tachypneic, RR 30s. Restarted propofol. 2106: Pt very agitated following turn. Coughing on vent. PRN 2 mg ativan and 2mg  morphine given. 2144: MAP <65, restarted nomi. 2206: MAP <65, increased nomi drip. 2209: Weaning propofol drip. 2237: , weaning nomi drip. 2317: Pt breathing over vent, occasionally stacking breaths, 2 mg morphine given. 2350: Dr. Chris Ruvalcaba at bedside, no orders received at this time. 0267: Nomi drip off. MAP 76. 
 
0055: Trach dressing and inner cannula changed - skin breakdown noted to underside of trach and yellow secretions noted to be oozing out of trach site. 0108: Propofol drip off. 
 
0142: Pt stacking breaths on vent, PRN ativan given. 0144: MAP <65, nomi restarted. 0310: Pt stacking breaths, RR 27-29. PRN morphine given. 3827: Arterial line and cuff correlate. MAP 64-65, increased nomi drip. 
 
0406: , weaning nomi drip. Pt appears to be more wake upon 0400 assessment. Spontaneous, nonpurposeful mvmt of right hand and right leg. Pt allowed me to do mouth care, unsure if mouth opening and closing were per my command or spontaneously done by pt. No command following otherwise. Pt does not w/d to pain, rather facial grimacing noted. 2233: Stacking breaths on vent, 2 mg ativan given. 2538: Dr. Emily Paulson at bedside, plan for CXR this morning. Nomi drip off, MAP >65. 5825: ABG 7.432 / 39.2 / 67 / 2 / 26.1 / 94 - no changes made to vent. 3003: Pt tachypneic, occasionally stacking breaths. 2 mg morphine given. 7476: TF on hold, d/c'd left femoral arterial line. Held manual pressure for 10 mins d/t low plt. 
 
0745: Bedside and Verbal shift change report given to Edgar Daigle RN. Report included the following information SBAR, Intake/Output, MAR, Recent Results, Cardiac Rhythm NSR and Alarm Parameters . Problem: Falls - Risk of 
Goal: *Absence of Falls Description Document Betty Eber Fall Risk and appropriate interventions in the flowsheet. Outcome: Progressing Towards Goal 
Note: Fall Risk Interventions: 
Mobility Interventions: Communicate number of staff needed for ambulation/transfer, Strengthening exercises (ROM-active/passive), PT Consult for mobility concerns Mentation Interventions: Adequate sleep, hydration, pain control, Door open when patient unattended, Evaluate medications/consider consulting pharmacy, More frequent rounding, Room close to nurse's station, Update white board Medication Interventions: Evaluate medications/consider consulting pharmacy Elimination Interventions: Toileting schedule/hourly rounds History of Falls Interventions: Consult care management for discharge planning, Evaluate medications/consider consulting pharmacy, Room close to nurse's station Problem: Pressure Injury - Risk of 
Goal: *Prevention of pressure injury Description Document Earl Scale and appropriate interventions in the flowsheet. Outcome: Progressing Towards Goal 
Note: Pressure Injury Interventions: 
Sensory Interventions: Assess changes in LOC, Check visual cues for pain, Discuss PT/OT consult with provider, Float heels, Keep linens dry and wrinkle-free, Minimize linen layers, Monitor skin under medical devices, Pressure redistribution bed/mattress (bed type), Turn and reposition approx. every two hours (pillows and wedges if needed) Moisture Interventions: Absorbent underpads, Apply protective barrier, creams and emollients, Check for incontinence Q2 hours and as needed, Internal/External urinary devices, Maintain skin hydration (lotion/cream), Minimize layers Activity Interventions: Pressure redistribution bed/mattress(bed type), PT/OT evaluation Mobility Interventions: Float heels, HOB 30 degrees or less, Pressure redistribution bed/mattress (bed type), PT/OT evaluation, Turn and reposition approx. every two hours(pillow and wedges) Nutrition Interventions: Document food/fluid/supplement intake, Discuss nutritional consult with provider Friction and Shear Interventions: Lift sheet, Minimize layers Problem: CABG: Discharge Outcomes Goal: *Lungs clear or at baseline Outcome: Progressing Towards Goal 
Note:  
Coarse breath sounds. Receiving scheduled BTs. Suctioning as needed. Goal: *Demonstrates ability to perform prescribed activity without shortness of breath or discomfort Outcome: Progressing Towards Goal 
Goal: *Anxiety reduced or absent Outcome: Progressing Towards Goal 
Note: PRN ativan for restlessness of vent. Receiving q4h. Problem: Infection - Risk of, Central Venous Catheter-Associated Bloodstream Infection Goal: *Absence of infection signs and symptoms Outcome: Progressing Towards Goal 
  
Problem: Ventilator Management Goal: *Adequate oxygenation and ventilation Outcome: Progressing Towards Goal 
Goal: *Patient maintains clear airway/free of aspiration Outcome: Progressing Towards Goal 
Goal: *Absence of infection signs and symptoms Outcome: Progressing Towards Goal 
Goal: *Normal spontaneous ventilation Outcome: Progressing Towards Goal 
Note:  
Vent settings: A/C 24, P/C 16, PEEP 7, FiO2 80%. Daily ABGs. Pt breathing over vent, 25-28bpm. Sedated w/ precedex. Propofol weaned off. Problem: Nutrition Deficit Goal: *Optimize nutritional status Outcome: Progressing Towards Goal 
Note:  
TF at goal. 
  
 Problem: Gas Exchange - Impaired Goal: *Absence of hypoxia Outcome: Progressing Towards Goal 
  
Problem: Nutrition Deficit Goal: *Optimize nutritional status Outcome: Progressing Towards Goal 
  
Problem: Infection - Risk of, Urinary Catheter-Associated Urinary Tract Infection Goal: *Absence of infection signs and symptoms Outcome: Progressing Towards Goal 
  
Problem: Heart Failure: Discharge Outcomes Goal: *Demonstrates ability to perform prescribed activity without shortness of breath or discomfort Outcome: Progressing Towards Goal 
  
Problem: AMI: Discharge Outcomes Goal: *Demonstrates ability to perform prescribed activity without shortness of breath or discomfort Outcome: Not Progressing Towards Goal 
Goal: *Verbalizes understanding and describes prescribed diet Outcome: Not Progressing Towards Goal 
Goal: *Describes follow-up/return visits to physicians Outcome: Not Progressing Towards Goal 
Goal: *Describes home care/support arrangements established based on need Outcome: Not Progressing Towards Goal 
Goal: *Understands and describes signs and symptoms to report to providers(Stroke Metric) Outcome: Not Progressing Towards Goal 
Goal: *Verbalizes name, dosage, time, side effects, and number of days to continue medications Outcome: Not Progressing Towards Goal 
  
Problem: CABG: Discharge Outcomes Goal: *Verbalizes home exercise program, activity guidelines, cardiac precautions Outcome: Not Progressing Towards Goal 
Goal: *Verbalizes understanding and describes prescribed diet Outcome: Not Progressing Towards Goal 
Goal: *Verbalizes name, dosage, time, side effects, and number of days to continue medications Outcome: Not Progressing Towards Goal 
Goal: *Verbalizes and demonstrates incision care Outcome: Not Progressing Towards Goal 
Goal: *Understands and describes signs and symptoms to report to providers(Stroke Metric) Outcome: Not Progressing Towards Goal 
 Goal: *Describes follow-up/return visits to physicians Outcome: Not Progressing Towards Goal 
Goal: *Describes available resources and support systems Outcome: Not Progressing Towards Goal 
Goal: *Expresses feelings about diagnosis and surgery Outcome: Not Progressing Towards Goal

## 2019-11-22 NOTE — PROGRESS NOTES
1600 - Bedside report received from [facility].  
1700 - O2 sat 100%. RT called, FiO2 decreased to 70%. 1712 - PRN morphine given prior to turning. 1800 - Mouth care completed and pt turned to right side. Pt tachypneic, RR 30's, O2 sat 92%. PRN lorazepam given for anxiety, tachypnea. 1815 - PRN tylenol given. 1830 - Minimal air movement, tachypneic 30-35. RT called for PRN albuterol treatment. 1900 - RT at bedside, breathing treatments started. PRN morphine given. 1915 - O2 sat maintaining 88%, FiO2 increased to 80%. 1945 - Bedside shift change report given to Divina Griffin RN (oncoming nurse) by Gertrude Christopher RN (offgoing nurse). Report included the following information SBAR, Kardex, OR Summary, Procedure Summary, Intake/Output, MAR, Recent Results and Cardiac Rhythm NSR.

## 2019-11-22 NOTE — DIABETES MGMT
[facility] Cardiac Surgery Progress Note Recommendations/ Comments: Insulin gtt begun 10/14/2019 for extreme hyperglycemia. Steroid dose weaned yesterday, now Solu medrol  20 mg/dL Q 12 hours TF continuous Suplena @ 35 Currently on insulin gtt At 1146  mg/dL, rate 5.7 units/hr, received 24.2 units in past 6 hours. Transition: He will need daily basal Lantus, likey BID, but dose will be based on steroid dose at time. Patient is 68 y.o. male s/p CABG x 5 followed by re-entry for mediastinal clot evacuation and repair of SVG to PDA  - POD 21. Pt with documented hx of DM on no meds PTA. Anemia - A1c inaccurate A1c:  
Lab Results Component Value Date/Time Hemoglobin A1c 5.3 10/21/2019 03:04 AM  
 
 
 
Recent Glucose Results:  
Lab Results Component Value Date/Time GLU 80 11/22/2019 03:00 AM  
  (H) 11/21/2019 04:13 PM  
 GLUCPOC 138 (H) 11/22/2019 11:46 AM  
 GLUCPOC 139 (H) 11/22/2019 10:42 AM  
 GLUCPOC 107 (H) 11/22/2019 09:37 AM  
  
 
Lab Results Component Value Date/Time Creatinine 1.32 (H) 11/22/2019 03:00 AM  
 
Estimated Creatinine Clearance: 45.2 mL/min (A) (based on SCr of 1.32 mg/dL (H)). Active Orders There are no active orders of the following types: Diet. PO intake:  
Patient Vitals for the past 72 hrs: 
 % Diet Eaten 11/21/19 0800 0 % Will continue to follow as needed. Thank you. Justo Jefferson MS, RN, CDE Time spent: 5 minutes

## 2019-11-22 NOTE — ADT AUTH CERT NOTES
Patient Demographics Patient Name Sigrid Houser 
[id number] Sex Male  
1950 Address [address] Phone [phone] (Home) CSN:  
[id number] Admit Date: Admit Time Room Bed Oct 19, 2019  6:17 PM 4336 [[id number]] 01 [[id number]] Attending Providers Provider Pager From To Skyla Norman MD  10/19/19 10/19/19 Stephany Saucedo MD  10/19/19 10/23/19 Dino Mckay MD  10/23/19 Emergency Contact(s) Name Relation Home Work Mobile Jimbo Good   [phone] Utilization Reviews  
 
   
LOC:Acute Adult-Extended Stay (2019) by Caryle Brasher, RN  
 
   
Review Status Review Entered In Primary 2019 14:35  
   
Criteria Review REVIEW SUMMARY 
  
Patient: Pino Mckee Review Number: [id number] Review Status: In Primary 
  
Condition Specific: Yes 
  
Condition Level Of Care Code: ACUTE Condition Level Of Care Description: Acute 
  
  
OUTCOMES Outcome Type: Primary 
  
  
  
REVIEW DETAILS 
  
Service Date: 2019 Admit Date: 10/19/2019 Product: Britney Look Adult Subset: Extended Stay (Symptom or finding within 24h) 
  (Excludes PO medications unless noted) [X] Select Level of Care, One: 
            [X] CRITICAL, One: 
                [X] Partial responder, not clinically stable for discharge and requires continued stay, >= One: 
                    [X] Mechanical ventilation, discharge planning and, >= One: 
                    ~--Admin, IQ Admin Admin on 2019 02:35 PM--~ 
                    Procedure:  Procedure(s): 
                    CARDIAC RE-ENTRY, MARISOL BY  [facility] -  [place]    
                      CABG x 5, LIMA to LAD, RSVG to [id number], RSVG to PDA 10/23/19 
                      
                    11/13/19 - S/p perc trach placement  
                      
                     Subjective: 
                    Pt seen with Dr. Alison Palacios.  Increased RR/agitation remains when off sedation. On precedex, Diprivan, insulin gtt. T max 99F, had to increase vent settings to 80% FiO2 EXAM: 
                    General:       
                    Trach, sedated. Lungs:         Coarse bilat Incision:      Midsternal incision with no significant erythema, drainage, or dehiscence. Heart:          Regular rate and rhythm, S1, S2 normal, no murmur, click, rub or gallop. Abdomen:   Soft, non-tender. Bowel sounds active. No masses,  No organomegaly. +BM . Extremities:  Some generalized edema, +1-2 edema. Palpable pulses bilat Neurologic:  Moves extremities but without purpose. Does not track or follow commands. Assessment: 
                      
                    Principal Problem: S/P CABG x 5 (10/23/2019) Overview: x 5, LIMA to LAD, RSVG to Diag1, OM2-OM3, RSVG to PDA 
                      
                    Active Problems: 
                      ACS (acute coronary syndrome) ([address] (10/19/2019) 
                      
                      Unstable angina ([address] (10/19/2019) 
                      
                      Coronary artery disease of native artery of native heart with stable angina pectoris ([address] (10/21/2019) 
                      
                      Systolic heart failure ([address] (10/22/2019) 
                      
                      
                      
                     Plan/Recommendations/Medical Decision Makin. S/p CABG: on ASA, statin. No ACEi/ARB due to renal function. No BB due to pulmonary fibrosis.  
                      
                    2. Acute on chronic systolic CHF, NYHA Class II on admit: EF 35-40% preop. No BB/ACE/ARB/AA until vitals/kidney function allows. Trend pBNP - stable today, receiving a lot of volume. Bumex increased to TID yesterday and he was negative 1.2L over the last 24 hours. 
                      
                    3. Acute postop respiratory failure with chronic interstitial fibrosis per CXR: Trach placed 11/13 by Thoracic Surgery. Acute respiratory acidosis 11/14 thought driven by oversedation. Fentanyl and versed drips stopped. (now just has PRN Morphine and Ativan dosing) Diuresis per Nephrology. Amiodarone stopped on 10/31. Solumedrol per pulm-weaning. Continue scheduled nebs. Cont mucinex, pulm toileting, mucomyst. Chest CT revealing emphysema/pulm fibrosis. Has not previously tolerated vent weaning. Will come off the Diprivan today and try weaning again.  
                      
                    4. Renal insufficiency: BUN/Creatinine stable today. Nephrology following and appreciate their recommendations. Devlin in place for accurate I&O. Cont diuretics 
                      
                    5. Anemia: had preop, H&H stable today. Did receive transfusion of PRBC on 11/15/19. Iron panel sent preop - iron, iron sat low-continue Iron supplement.  
                      
                    6. Thrombocytopenia: Platelets remain 93K. HIT antibody neg, CARMEN negative. Daily CBC. Hold SQ heparin and ASA. On PPI. Transfuse platelets for less than 10k or if active bleeding seen per Hematology-appreciate their recommendations-labs pending 
                      
                    7. Hx of HTN: Hydralazine PRN SBP >160, BP too labile for PO meds  
                      
                    8. HLD: Continue pravastatin 
                      
                    9. Leukocytosis: WBC at 8.4k today, procalcitonin last 1.6 (check every 3 days),  Devlin placed 11/7. PICC placed 11/6. Trach 11/13. Monitor daily CBC. Appreciate ID consult-continue Zosyn.  Leukopenic last week  
                      10. Constipation: Resolved. Last BM on 11/19. Bowel medications PRN. Continue sandro-q 
                      11. Current smoker: smoking cessation education completed.  
                      12. Nutrition: Appreciate Dietician recommendations. Dobhoff placed 10/30 and Enteral feedings started. TF's at goal of 35 ml/hr. Try and AVOID increased volume amounts  
                      13. Hyperglycemia: Preop A1c 5.3 with no DM history, now with hyperglycemia. BS have been much better controlled on Insulin gtt. DTS following. Weaning steroids per Pulmonology.  
                      14. Hypernatremia: Resolved. Nephrology following 
                      15.  Agitation, acute encephalopathy: Have been unable to wean down on sedation much due to agitation. Continue Precedex, and PRN Morphine, Ativan to keep the patient comfortable. Wean off the Diprivan as able. Head CT neg for any acute process. Had to resume Propofol overnight. Will increase seroquel bid. Not able to obtain MRI since V wires were cut. Do not think this is driven by pain, more from neuro source/encephalopathy  
                      16. DVT/GI Prophylaxis: SCD's, SQ Heparin-currently on hold due to thrombocytopenia. Will restart when improved, PPI 
                      
                    Dispo: PT/OT as able. Remain in CVI until resp status more stable. Signed By: Armida Fiore NP 
                     
                    VS 
                    97.8 P-67 R-26 119/57 100% on 80% FIO2 LABS Hgb 7.7 Hct 24.9 plt 47 Results for Anabela Garner (MRN [id number]) as of 11/21/2019 13:50 
                     
                    11/21/2019 12:26 
                    pH (POC): 7.456 (H) pCO2 (POC): 36.8 
                    pO2 (POC): 59 (L) HCO3 (POC): 25.9 
                    sO2 (POC): 91 (L) IMAGES IMPRESSION: No significant change in the diffuse interstitial lung disease. Santiago Muck [X] FiO2 > 50%(0.50) and > baseline ~--Admin, IQ Admin Admin on 11- 02:25 PM--~ 
                        FIO2 80% ICU 
                        VENT/TRACH Jonah Mast NS @3ml/hr Mucomyst nebs BID Brovana nebs BID Pulmicort nebs BID Bumex 1mg IV q8  
                                                Precedex IV gtt Heparin 5000u sq q8 SQBS c SSI IV Insulin gtt Ativan 2mg IV prn- given x1 Morphine 2mg IV prn- given x2 Zosyn 3.375g IV q8 
                                                Diprivan IV gtt 
                         
                         
                         
  
Version: InterQual® 2019 Zane Cords  © 2019 [employer] and/or one of its [place]. All Rights Reserved. CPT only © 2018 [employer]. All Rights Reserved.  
   
LOC:Acute Adult-Extended Stay (11/21/2019) by Risa Ojeda RN  
 
   
Review Status Review Entered In Primary 11/21/2019 14:02  
   
Criteria Review REVIEW SUMMARY 
  
Patient: Ezio Rowland Review Number: [id number] Review Status: In Primary 
  
Condition Specific: Yes 
  
Condition Level Of Care Code: CRITICAL Condition Level Of Care Description: Critical 
  
  
OUTCOMES Outcome Type: Primary 
  
  
  
REVIEW DETAILS 
  
Service Date: 11/21/2019 Admit Date: 10/19/2019 Product: Esme Good Adult Subset: Extended Stay (Symptom or finding within 24h) 
  (Excludes PO medications unless noted) [X] Select Level of Care, One: 
            [X] CRITICAL, One: 
                [X] Partial responder, not clinically stable for discharge and requires continued stay, >= One: 
                    [X] Mechanical ventilation, discharge planning and, >= One: 
                    ~--Admin, IQ Admin Admin on 11- 02:00 PM--~ 
                    NYHA class IV A/C systolic heart failure (EF 25%, NT pro-BNP 15,013) Acute on chronic kidney disease Mild pulmonary edema Mild chronic lung disease Acute on chronic hypoxic respiratory failure 
                      
                    Remains intubated and sedated 
                      
                    Still having issues with neuro status 
                      
                    Starting Seroquel  
                      
                    Hgb looks reasonable 
                      
                    Platelets running a little low 
                      
                    Creatinine in the mid 1's 
                      
                    Bilirubin and other LFTs look good 
                      
                    NT pro-BNP remains elevated 
                      
                    May have a little more edema in lung fields 
                      
                    Up on Bumex 
                      
                    Advancing TF's   
                    VS 
                    99 P-82 R-35 89/50 88% on 80% FIO2 vis trach/vent LABS 
                    plt 49 Chloride 110 BUN 72 Creat 1.49 AST 43 NT-BNP [id number] [X] FiO2 > 50%(0.50) and > baseline ~--Admin, IQ Admin Admin on 11- 01:58 PM--~ 
                        80% FIO2 Suman Fortino NS @3ml/hr Mucomyst nebs BID Brovana nebs BID Pulmicort nebs BID Bumex 1mg IV q8  
                        Precedex IV gtt Heparin 5000u sq q8 SQBS c SSI IV Insulin gtt Ativan 2mg IV prn- given x1 Morphine 2mg IV prn- given x2 Zosyn 3.375g IV q8 
                        Diprivan IV gtt 
                         
                         
                         
  
Version: InterQual® 2019 [employer]  © 2019 [employer] and/or one of its [place]. All Rights Reserved. CPT only © 2018 American [employer] Association. All Rights Reserved.  
   
LOC:Acute Adult-Extended Stay (11/19/2019) by Corby Henry RN  
 
   
Review Status Review Entered In Primary 11/19/2019 16:20  
   
Criteria Review REVIEW SUMMARY 
  
Patient: Carly Scherer Review Number: [id number] Review Status: In Primary 
  
Condition Specific: Yes 
  
Condition Level Of Care Code: CRITICAL Condition Level Of Care Description: Critical 
  
  
OUTCOMES Outcome Type: Primary 
  
  
  
REVIEW DETAILS 
  
Service Date: 11/19/2019 Admit Date: 10/19/2019 Product: Sofía Gomez Adult Subset: Extended Stay (Symptom or finding within 24h) 
  (Excludes PO medications unless noted) [X] Select Level of Care, One: 
            [X] CRITICAL, One: 
            ~--Admin, IQ Admin Admin on 11- 04:20 PM--~ Inpatient  ICU 
             
            VS: 99.4-85-33 134/69  99%  vent CXR:No change in interstitial edema superimposed upon pulmonary fibrosis. Labs:  hgb 7.5 hct 24.3  Na 147 cl 112 bun 88 crea 1.49 ca 8.0   phos 4.8 BNP 30,818 Orders: ICU/ IP. Devlin. Glucose ac/hs. Trach care. Routine VS.  Daily weights. Glucose Q 6 hours. PT/OT. I and [employer] HOB. I and o.  IS. Full code. NPO. Tube feedings > Su[place]ena at 35 ml/hr to 200 ml flush Q 3 hours. CXR every other day,. Daily CBC/Mg/Phos. BMP Q 12 hours. , consult Hematology 0.9% NACL  @ 3ml/hr, Mucomyst Neb BID, albuterol neb  q4h prn, brovana neb 2 times daily, pulmicort neb 2 times daily, Bumex 1mg q12h iv, peridex  10ml q12h po, Precedex IV titrate, D10%  250ml IV prn, ferrous sulfate 300mg qd, Novolin R IV titrate, ativan 1-2mg iv q4h prn, solu-medrol 40mg iv q12h, morphine 2mg  iv q2hr prn, wellesse plus 30ml  qd, mycostatin 500,000U qid, protonix  40mg iv q12h, francis-synephrine IV titrate, zosyn 3.375gm  iv q8h, pravachol 40mg qhs, seroquel 25mg bid, zoloft 100mg qd, potassium 20meq  iv q1hr x 2,  [email or web address] Procedure:  Procedure(s): 
            CARDIAC RE-ENTRY, MARISOL BY [facility]  [place]    
              CABG x 5, LIMA to LAD, RSVG to [id number], RSVG to PDA 10/23/19 
              
            11/13/19 - S/p perc trach placement Subjective: 
            Pt seen with Dr. Molina Delgadillo. Increased RR/agitation remains. On precedex, insulin gtt, stopping D5 per renal. T max 99.3F, 60% FiO2 Oxygen Therapy: Oxygen Therapy O2 Sat (%): 99 % (19) Pulse via Oximetry: 78 beats per minute (19) O2 Device: Ventilator;Tracheostomy (19) O2 Flow Rate (L/min): 40 l/min (19) O2 Temperature: 98.6 °F (37 °C) (19) FIO2 (%): 60 % (19) EXAM: 
            General:               
            Trach, sedated. Lungs:    Coarse bilat Incision:              Midsternal incision with no significant erythema, drainage, or dehiscence. Heart:     Regular rate and rhythm, S1, S2 normal, no murmur, click, rub or gallop. Abdomen:           Soft, non-tender. Bowel sounds active. No masses,  No organomegaly. +BM. Extremities:          Some generalized edema, +1-2 edema. Palpable pulses bilat Neurologic:          Moves extremities but without purpose. Does not track or follow commands. Assessment: 
              
            Principal Problem: S/P CABG x 5 (10/23/2019) Overview: x 5, LIMA to LAD, RSVG to Diag1, OM2-OM3, RSVG to PDA 
              
            Active Problems: 
              ACS (acute coronary syndrome) ([address] (10/19/2019) 
              
              Unstable angina ([address] (10/19/2019) 
              
              Coronary artery disease of native artery of native heart with stable angina pectoris ([address] (10/21/2019) 
              
              Systolic heart failure ([address] (10/22/2019) 
              
              
              
             Plan/Recommendations/Medical Decision Makin. S/p CABG: on ASA, statin. No ACEi/ARB due to renal function. No BB due to pulmonary fibrosis. 2. Acute on chronic systolic CHF, NYHA Class II on admit: EF 35-40% preop. No BB/ACE/ARB/AA until vitals/kidney function allows. Trend pBNP - up, receiving a lot of volume. Cont diuresis w/ bumex 1 mg bid  
              
            3. Acute postop respiratory failure with chronic interstitial fibrosis per CXR: Trach placed 11/13 by Thoracic Surgery. Acute respiratory acidosis 11/14 thought driven by oversedation. Fentanyl and versed drips stopped. (now just has PRN Morphine and Ativan dosing) Diuresis per Nephrology. Amiodarone stopped on 10/31. Solumedrol per pulm-weaning. Continue scheduled nebs. Cont mucinex, pulm toileting, mucomyst. Chest CT revealing emphysema/pulm fibrosis. Not able to tolerate vent weaning currently  
              
            4. Renal insufficiency: BUN/Creatinine slightly improved today. Nephrology following and appreciate their recommendations. Devlin in place for accurate I&O. Cont diuretics 
              
            5. Anemia: had preop, H&H stable today. Did receive transfusion of PRBC on Friday. Iron panel sent preop - iron, iron sat low-continue Iron supplement.  
              
            6. Thrombocytopenia: Platelets remain 48B. HIT antibody neg, CARMEN pending. Q12 CBC. Hold SQ heparin and ASA. On PPI. Transfuse platelets for less than 20k or if active bleeding seen. Consult heme 
              
            7. Hx of HTN: Hydralazine PRN SBP >160, BP too labile for PO meds  
              
            8. HLD: Continue pravastatin 
              
            9. Leukocytosis: WBC at 9.5k today, procalcitonin last 1.6 (check every 3 days),  Devlin placed 11/7. PICC placed 11/6. Trach 11/13. Monitor daily CBC. Appreciate ID consult-continue Zosyn. Leukopenic last week  
              10. Constipation: Resolved. Now w/ frequent loose stools, holding bowel meds.  Start sandro-q 
              
 11. Current smoker: smoking cessation education completed.  
              12. Nutrition: Appreciate Dietician recommendations. Dobhoff placed 10/30 and Enteral feedings started. TF's at goal of 35 ml/hr. Try and AVOID increased volume amounts  
              13. Hyperglycemia: Preop A1c 5.3 with no DM history, now with hyperglycemia. BS have been much better controlled on Insulin gtt. DTS following. Stopping D5 gtt today. Weaning steroids per Pulmonology.  
              14. Hypernatremia: Nephrology following-increased FW flushes, d/c D5 gtt.  
              15.  Agitation, acute encephalopathy: Have been unable to wean down on sedation much due to agitation. Continue Precedex, and PRN Morphine, Ativan to keep the patient comfortable. Head CT neg for any acute process. May need to resume propofol, try seroquel bid. Not able to obtain MRI since V wires were cut. Do not think this is driven by pain, more from neuro source/encephalopathy  
              16. DVT/GI Prophylaxis: SCD's, SQ Heparin-currently on hold due to thrombocytopenia. Will restart when improved, PPI 
              
            Dispo: PT/OT as able. Remain in CVI until resp status more stable. [X] Partial responder, not clinically stable for discharge and requires continued stay, >= One: 
                    [X] Mechanical ventilation, discharge planning and, >= One: 
                    ~--Admin, IQ Admin Admin on 11- 04:05 PM--~ 
                    Pt. is on mechanical vent Pt. has a trach 
                     
                     
                     
                        [X] FiO2 > 50%(0.50) and > baseline ~--Admin, IQ Admin Admin on 11- 04:05 PM--~ 
                        FIO2  60%

## 2019-11-22 NOTE — PROGRESS NOTES
Nephrology Progress Note Kenny Monreal Date of Admission : 10/19/2019 CC:  Follow up for ISAIAH on CKD, hypervolemia Assessment and Plan ISAIAH on CKD: 
- from ATN post CABG, pre renal azotemia from diuretics - Cr stable  
- ordered CXR and increased Bumex to 1.5 mg Q8hr to keep I <<O 
- Daily labs Hypernatremia : 
- resolved 
- continue water flushes 200 ml Q3 hr w/ ongoing diuresis CKD III: 
- baseline Cr 1.3 prior to CABG 
- likely from DM and HTN 
  
HFrE F: 
- last EF 35-40% on 10/20 
  
CAD s/p CABG x 5 10/19 and reexploration 10/23 for hematoma 
  
Acute on Chronic Resp Failure: 
- likely 2/2 underlying ILD + volume 
- now w/ trach Thrombocytopenia: 
- w/u per hematology Chronic Anemia: 
- Hb stable  
  
DM2: 
 
Protein Malnutrition: 
- on TF via [place] [address] Interval History: 
Seen and examined. On and off francis,on 80% FIO2, CXR pending, more tachypenic when sedation off Current Medications: all current  Medications have been eviewed in [facility] Review of Systems: Review of systems not obtained due to patient factors. Objective: 
Vitals:   
Vitals:  
 11/22/19 0323 11/22/19 0400 11/22/19 0443 11/22/19 0500 BP: (!) 86/55 Pulse: 72 70  81 Resp: 19 26  27 Temp:  98.7 °F (37.1 °C) SpO2: 99% 100%  97% Weight:   62.2 kg (137 lb 2 oz) Height:      
 
Intake and Output: 
11/21 1901 - 11/22 0700 In: 1444.2 [I.V.:454.2] Out: 1380 [[id number]] 11/20 0701 - 11/21 1900 In: 5341.3 [I.V.:1491.3] Out: 7065 Donald Alicea Physical Examination: 
General: Frail, sedated Neck:  + trach Resp:  Diffuse dry crackles CV:  RRR,  no murmur or rub, no LE edema GI:  Soft, NT, + Bowel sounds, no hepatosplenomegaly Neurologic:  Sedated on the vent Psych:             Unable to assess Skin:  No Rash :  Devlin in place [x]    High complexity decision making was performed 
[x]    Patient is at high-risk of decompensation with multiple organ involvement Lab Data Personally Reviewed: I have reviewed all the pertinent labs, microbiology data and radiology studies during assessment. Recent Labs  
  11/22/19 
0300 11/21/19 
1613 11/21/19 
0414  11/20/19 
0350  143 144   < > 144 K 4.2 3.8 4.5   < > 4.2  107 110*   < > 110* CO2 30 29 28   < > 27 GLU 80 112* 124*   < > 89 BUN 61* 66* 70*   < > 82* CREA 1.32* 1.42* 1.47*   < > 1.58* CA 7.8* 8.1* 7.6*   < > 8.0*  
MG 1.8 2.1 1.9  --  2.0 PHOS 4.0  --  4.8*  --  4.8* ALB 1.7* 1.9* 1.8*   < > 2.0*  
SGOT 38* 44* 45*   < > 52* ALT 74 87* 84*   < > 106* INR  --   --   --   --  1.2*  
 < > = values in this interval not displayed. Recent Labs  
  11/22/19 0300 11/21/19 1613 11/21/19 0414  11/20/19 
0350 WBC 9.9  --  8.4  --  11.1 HGB 7.3*  --  7.7*  --  7.9*  
HCT 23.3*  --  24.9*  --  25.1*  
PLT 55* 58* 47*   < > 49*  
 < > = values in this interval not displayed. No results found for: [facility] Lab Results Component Value Date/Time Culture result: NO GROWTH 5 DAYS 11/16/2019 03:10 AM  
 Culture result: NO GROWTH 2 DAYS 11/07/2019 12:45 PM  
 Culture result: HEAVY NORMAL RESPIRATORY TARIK 10/21/2019 12:08 PM  
 
Recent Results (from the past 24 hour(s)) GLUCOSE, POC Collection Time: 11/21/19  6:19 AM  
Result Value Ref Range Glucose (POC) 133 (H) 65 - 100 mg/dL Performed by Joie Soto Collection Time: 11/21/19  6:19 AM  
Result Value Ref Range Glucose 133 mg/dL Insulin order 7.3 units/hour Insulin adminstered 7.3 units/hour Multiplier 0.100 Low target 95 mg/dL High target 130 mg/dL D50 order 0.0 ml  
 D50 administered 0.00 ml Minutes until next BG 60 min Order initials kli Administered initials kli GLSCOM Comments GLUCOSE, POC Collection Time: 11/21/19  7:22 AM  
Result Value Ref Range Glucose (POC) 109 (H) 65 - 100 mg/dL Performed by Joie Soto  
 Collection Time: 11/21/19  7:22 AM  
Result Value Ref Range Glucose 109 mg/dL Insulin order 4.9 units/hour Insulin adminstered 4.9 units/hour Multiplier 0.100 Low target 95 mg/dL High target 130 mg/dL D50 order 0.0 ml  
 D50 administered 0.00 ml Minutes until next BG 60 min Order initials kli Administered initials kli GLSCOM Comments GLUCOSE, POC Collection Time: 11/21/19  8:21 AM  
Result Value Ref Range Glucose (POC) 101 (H) 65 - 100 mg/dL Performed by [facility] Collection Time: 11/21/19  8:21 AM  
Result Value Ref Range Glucose 101 mg/dL Insulin order 4.1 units/hour Insulin adminstered 4.1 units/hour Multiplier 0.100 Low target 95 mg/dL High target 130 mg/dL D50 order 0.0 ml  
 D50 administered 0.00 ml Minutes until next BG 60 min Order initials aPL Administered initials APL GLSCOM Comments GLUCOSE, POC Collection Time: 11/21/19  9:33 AM  
Result Value Ref Range Glucose (POC) 129 (H) 65 - 100 mg/dL Performed by [facility] Collection Time: 11/21/19  9:33 AM  
Result Value Ref Range Glucose 129 mg/dL Insulin order 6.9 units/hour Insulin adminstered 6.9 units/hour Multiplier 0.100 Low target 95 mg/dL High target 130 mg/dL D50 order 0.0 ml  
 D50 administered 0.00 ml Minutes until next BG 60 min Order initials apl Administered initials apl GLSCOM Comments GLUCOSE, POC Collection Time: 11/21/19 10:34 AM  
Result Value Ref Range Glucose (POC) 104 (H) 65 - 100 mg/dL Performed by [facility] Collection Time: 11/21/19 10:34 AM  
Result Value Ref Range Glucose 104 mg/dL Insulin order 4.4 units/hour Insulin adminstered 4.4 units/hour Multiplier 0.100 Low target 95 mg/dL High target 130 mg/dL D50 order 0.0 ml  
 D50 administered 0.00 ml Minutes until next BG 60 min Order initials apl Administered initials apl GLSCOM Comments GLUCOSE, POC Collection Time: 11/21/19 11:36 AM  
Result Value Ref Range Glucose (POC) 108 (H) 65 - 100 mg/dL Performed by Marcelina Calzada Collection Time: 11/21/19 11:36 AM  
Result Value Ref Range Glucose 108 mg/dL Insulin order 4.8 units/hour Insulin adminstered 4.8 units/hour Multiplier 0.100 Low target 95 mg/dL High target 130 mg/dL D50 order 0.0 ml  
 D50 administered 0.00 ml Minutes until next BG 60 min Order initials apl Administered initials apl GLSCOM Comments POC G3 - PUL Collection Time: 11/21/19 12:26 PM  
Result Value Ref Range FIO2 (POC) 70 % pH (POC) 7.456 (H) 7.35 - 7.45    
 pCO2 (POC) 36.8 35.0 - 45.0 MMHG  
 pO2 (POC) 59 (L) 80 - 100 MMHG  
 HCO3 (POC) 25.9 22 - 26 MMOL/L  
 sO2 (POC) 91 (L) 92 - 97 % Base excess (POC) 2 mmol/L Site DRAWN FROM ARTERIAL LINE Device: VENT Mode ASSIST CONTROL Set Rate 24 bpm  
 PEEP/CPAP (POC) 5 cmH2O  
 PIP (POC) 16 Allens test (POC) N/A Inspiratory Time 1 sec Specimen type (POC) ARTERIAL Total resp. rate 27 Pressure control YES    
GLUCOSE, POC Collection Time: 11/21/19 12:38 PM  
Result Value Ref Range Glucose (POC) 130 (H) 65 - 100 mg/dL Performed by Marcelina Calzada Collection Time: 11/21/19 12:38 PM  
Result Value Ref Range Glucose 130 mg/dL Insulin order 7.0 units/hour Insulin adminstered 7.0 units/hour Multiplier 0.100 Low target 95 mg/dL High target 130 mg/dL D50 order 0.0 ml  
 D50 administered 0.00 ml Minutes until next  min Order initials apl Administered initials apl GLSCOM Comments GLUCOSE, POC Collection Time: 11/21/19  2:54 PM  
Result Value Ref Range Glucose (POC) 89 65 - 100 mg/dL Performed by Sohail Hayward  Collection Time: 11/21/19  2:54 PM  
 Result Value Ref Range Glucose 89 mg/dL Insulin order 2.3 units/hour Insulin adminstered 2.3 units/hour Multiplier 0.080 Low target 95 mg/dL High target 130 mg/dL D50 order 0.0 ml  
 D50 administered 0.00 ml Minutes until next BG 60 min Order initials apl Administered initials apl GLSCOM Comments GLUCOSE, POC Collection Time: 11/21/19  3:56 PM  
Result Value Ref Range Glucose (POC) 108 (H) 65 - 100 mg/dL Performed by Margarita Frances Collection Time: 11/21/19  3:56 PM  
Result Value Ref Range Glucose 108 mg/dL Insulin order 3.8 units/hour Insulin adminstered 3.8 units/hour Multiplier 0.080 Low target 95 mg/dL High target 130 mg/dL D50 order 0.0 ml  
 D50 administered 0.00 ml Minutes until next BG 60 min Order initials apl Administered initials apl GLSCOM Comments METABOLIC PANEL, COMPREHENSIVE Collection Time: 11/21/19  4:13 PM  
Result Value Ref Range Sodium 143 136 - 145 mmol/L Potassium 3.8 3.5 - 5.1 mmol/L Chloride 107 97 - 108 mmol/L  
 CO2 29 21 - 32 mmol/L Anion gap 7 5 - 15 mmol/L Glucose 112 (H) 65 - 100 mg/dL BUN 66 (H) 6 - 20 MG/DL Creatinine 1.42 (H) 0.70 - 1.30 MG/DL  
 BUN/Creatinine ratio 46 (H) 12 - 20 GFR est AA 60 (L) >60 ml/min/1.73m2 GFR est non-AA 49 (L) >60 ml/min/1.73m2 Calcium 8.1 (L) 8.5 - 10.1 MG/DL Bilirubin, total 0.6 0.2 - 1.0 MG/DL  
 ALT (SGPT) 87 (H) 12 - 78 U/L  
 AST (SGOT) 44 (H) 15 - 37 U/L Alk. phosphatase 133 (H) 45 - 117 U/L Protein, total 5.6 (L) 6.4 - 8.2 g/dL Albumin 1.9 (L) 3.5 - 5.0 g/dL Globulin 3.7 2.0 - 4.0 g/dL A-G Ratio 0.5 (L) 1.1 - 2.2 PLATELET COUNT Collection Time: 11/21/19  4:13 PM  
Result Value Ref Range PLATELET 58 (L) 058 - 400 K/uL MAGNESIUM Collection Time: 11/21/19  4:13 PM  
Result Value Ref Range Magnesium 2.1 1.6 - 2.4 mg/dL GLUCOSE, POC  
 Collection Time: 11/21/19  4:59 PM  
Result Value Ref Range Glucose (POC) 121 (H) 65 - 100 mg/dL Performed by Alycia Landaverde Collection Time: 11/21/19  4:59 PM  
Result Value Ref Range Glucose 121 mg/dL Insulin order 4.9 units/hour Insulin adminstered 4.9 units/hour Multiplier 0.080 Low target 95 mg/dL High target 130 mg/dL D50 order 0.0 ml  
 D50 administered 0.00 ml Minutes until next BG 60 min Order initials apl Administered initials apl GLSCOM Comments GLUCOSE, POC Collection Time: 11/21/19  6:04 PM  
Result Value Ref Range Glucose (POC) 125 (H) 65 - 100 mg/dL Performed by Alycia Landaverde Collection Time: 11/21/19  6:04 PM  
Result Value Ref Range Glucose 125 mg/dL Insulin order 5.2 units/hour Insulin adminstered 5.2 units/hour Multiplier 0.080 Low target 95 mg/dL High target 130 mg/dL D50 order 0.0 ml  
 D50 administered 0.00 ml Minutes until next BG 60 min Order initials apl Administered initials apl GLSCOM Comments GLUCOSE, POC Collection Time: 11/21/19  7:07 PM  
Result Value Ref Range Glucose (POC) 90 65 - 100 mg/dL Performed by Elonda Sicard Purcell Crocker Collection Time: 11/21/19  7:08 PM  
Result Value Ref Range Glucose 90 mg/dL Insulin order 1.9 units/hour Insulin adminstered 1.9 units/hour Multiplier 0.064 Low target 95 mg/dL High target 130 mg/dL D50 order 0.0 ml  
 D50 administered 0.00 ml Minutes until next BG 60 min Order initials apl Administered initials apl GLSCOM Comments GLUCOSE, POC Collection Time: 11/21/19  8:07 PM  
Result Value Ref Range Glucose (POC) 71 65 - 100 mg/dL Performed by Alycia Landaverde Collection Time: 11/21/19  8:07 PM  
Result Value Ref Range Glucose 71 mg/dL Insulin order 0.0 units/hour Insulin adminstered 0.0 units/hour Multiplier 0.051 Low target 95 mg/dL High target 130 mg/dL D50 order 12.0 ml  
 D50 administered 12.00 ml Minutes until next BG 15 min Order initials apl Administered initials apl GLSCOM Comments GLUCOSE, POC Collection Time: 11/21/19  8:24 PM  
Result Value Ref Range Glucose (POC) 114 (H) 65 - 100 mg/dL Performed by Thania Rousseau Collection Time: 11/21/19  8:25 PM  
Result Value Ref Range Glucose 114 mg/dL Insulin order 1.9 units/hour Insulin adminstered 1.9 units/hour Multiplier 0.051 Low target 95 mg/dL High target 130 mg/dL D50 order 0.0 ml  
 D50 administered 0.00 ml Minutes until next BG 60 min Order initials sdq Administered initials sdq GLSCOM Comments GLUCOSE, POC Collection Time: 11/21/19  9:23 PM  
Result Value Ref Range Glucose (POC) 134 (H) 65 - 100 mg/dL Performed by Chacorta Dee Collection Time: 11/21/19  9:23 PM  
Result Value Ref Range Glucose 134 mg/dL Insulin order 4.5 units/hour Insulin adminstered 4.5 units/hour Multiplier 0.061 Low target 95 mg/dL High target 130 mg/dL D50 order 0.0 ml  
 D50 administered 0.00 ml Minutes until next BG 60 min Order initials Counts include 234 beds at the [facility] Administered initials [facility] GLSCOM Comments GLUCOSE, POC Collection Time: 11/21/19 10:14 PM  
Result Value Ref Range Glucose (POC) 173 (H) 65 - 100 mg/dL Performed by Chacorta Dee Collection Time: 11/21/19 10:14 PM  
Result Value Ref Range Glucose 173 mg/dL Insulin order 8.0 units/hour Insulin adminstered 8.0 units/hour Multiplier 0.071 Low target 95 mg/dL High target 130 mg/dL D50 order 0.0 ml  
 D50 administered 0.00 ml Minutes until next BG 60 min Order initials Counts include 234 beds at the [facility] Administered initials edward GLSCOM Comments GLUCOSE, POC Collection Time: 11/21/19 11:07 PM  
Result Value Ref Range Glucose (POC) 152 (H) 65 - 100 mg/dL Performed by Luisa Macias Collection Time: 11/21/19 11:08 PM  
Result Value Ref Range Glucose 152 mg/dL Insulin order 7.5 units/hour Insulin adminstered 7.5 units/hour Multiplier 0.081 Low target 95 mg/dL High target 130 mg/dL D50 order 0.0 ml  
 D50 administered 0.00 ml Minutes until next BG 60 min Order [facility] Administered initials [facility] JACKELINEOM Comments GLUCOSE, POC Collection Time: 11/22/19 12:11 AM  
Result Value Ref Range Glucose (POC) 114 (H) 65 - 100 mg/dL Performed by Luisa Macias Collection Time: 11/22/19 12:11 AM  
Result Value Ref Range Glucose 114 mg/dL Insulin order 4.4 units/hour Insulin adminstered 4.4 units/hour Multiplier 0.081 Low target 95 mg/dL High target 130 mg/dL D50 order 0.0 ml  
 D50 administered 0.00 ml Minutes until next BG 60 min Order [facility] Administered initials [facility] GLSCOM Comments GLUCOSE, POC Collection Time: 11/22/19  1:04 AM  
Result Value Ref Range Glucose (POC) 121 (H) 65 - 100 mg/dL Performed by Luisa Macias Collection Time: 11/22/19  1:05 AM  
Result Value Ref Range Glucose 121 mg/dL Insulin order 4.9 units/hour Insulin adminstered 4.9 units/hour Multiplier 0.081 Low target 95 mg/dL High target 130 mg/dL D50 order 0.0 ml  
 D50 administered 0.00 ml Minutes until next BG 60 min Order [facility] Administered initials edward GLSCOM Comments GLUCOSE, POC Collection Time: 11/22/19  1:54 AM  
Result Value Ref Range Glucose (POC) 103 (H) 65 - 100 mg/dL Performed by Luisa Macias Collection Time: 11/22/19  1:55 AM  
Result Value Ref Range Glucose 103 mg/dL Insulin order 3.5 units/hour Insulin adminstered 3.5 units/hour Multiplier 0.081 Low target 95 mg/dL High target 130 mg/dL D50 order 0.0 ml  
 D50 administered 0.00 ml Minutes until next BG 60 min Order initials [facility] Administered initials [facility] GLSCOM Comments CBC W/O DIFF Collection Time: 11/22/19  3:00 AM  
Result Value Ref Range WBC 9.9 4.1 - 11.1 K/uL  
 RBC 2.38 (L) 4.10 - 5.70 M/uL HGB 7.3 (L) 12.1 - 17.0 g/dL HCT 23.3 (L) 36.6 - 50.3 % MCV 97.9 80.0 - 99.0 FL  
 MCH 30.7 26.0 - 34.0 PG  
 MCHC 31.3 30.0 - 36.5 g/dL  
 RDW 15.3 (H) 11.5 - 14.5 % PLATELET 55 (L) 973 - 400 K/uL MPV PENDING FL  
 NRBC 0.0 0  WBC ABSOLUTE NRBC 0.00 0.00 - 0.01 K/uL MAGNESIUM Collection Time: 11/22/19  3:00 AM  
Result Value Ref Range Magnesium 1.8 1.6 - 2.4 mg/dL NT-PRO BNP Collection Time: 11/22/19  3:00 AM  
Result Value Ref Range NT pro-BNP 25,317 (H) <858 PG/ML  
METABOLIC PANEL, COMPREHENSIVE Collection Time: 11/22/19  3:00 AM  
Result Value Ref Range Sodium 142 136 - 145 mmol/L Potassium 4.2 3.5 - 5.1 mmol/L Chloride 107 97 - 108 mmol/L  
 CO2 30 21 - 32 mmol/L Anion gap 5 5 - 15 mmol/L Glucose 80 65 - 100 mg/dL BUN 61 (H) 6 - 20 MG/DL Creatinine 1.32 (H) 0.70 - 1.30 MG/DL  
 BUN/Creatinine ratio 46 (H) 12 - 20 GFR est AA >60 >60 ml/min/1.73m2 GFR est non-AA 53 (L) >60 ml/min/1.73m2 Calcium 7.8 (L) 8.5 - 10.1 MG/DL Bilirubin, total 0.5 0.2 - 1.0 MG/DL  
 ALT (SGPT) 74 12 - 78 U/L  
 AST (SGOT) 38 (H) 15 - 37 U/L Alk. phosphatase 109 45 - 117 U/L Protein, total 5.2 (L) 6.4 - 8.2 g/dL Albumin 1.7 (L) 3.5 - 5.0 g/dL Globulin 3.5 2.0 - 4.0 g/dL A-G Ratio 0.5 (L) 1.1 - 2.2 PHOSPHORUS Collection Time: 11/22/19  3:00 AM  
Result Value Ref Range Phosphorus 4.0 2.6 - 4.7 MG/DL  
GLUCOSE, POC Collection Time: 11/22/19  3:01 AM  
Result Value Ref Range Glucose (POC) 87 65 - 100 mg/dL Performed by Lena Rivero Collection Time: 11/22/19  3:01 AM  
Result Value Ref Range Glucose 87 mg/dL Insulin order 1.8 units/hour Insulin adminstered 1.8 units/hour Multiplier 0.065 Low target 95 mg/dL High target 130 mg/dL D50 order 0.0 ml  
 D50 administered 0.00 ml Minutes until next BG 60 min Order initials [facility] Administered initials edward GLSCOM Comments GLUCOSE, POC Collection Time: 11/22/19  4:04 AM  
Result Value Ref Range Glucose (POC) 94 65 - 100 mg/dL Performed by Pam Montero Collection Time: 11/22/19  4:04 AM  
Result Value Ref Range Glucose 94 mg/dL Insulin order 1.8 units/hour Insulin adminstered 1.8 units/hour Multiplier 0.052 Low target 95 mg/dL High target 130 mg/dL D50 order 0.0 ml  
 D50 administered 0.00 ml Minutes until next BG 60 min Order initials ach Administered initials ach GLSCOM Comments GLUCOSE, POC Collection Time: 11/22/19  5:07 AM  
Result Value Ref Range Glucose (POC) 109 (H) 65 - 100 mg/dL Performed by Fortino Coffey Collection Time: 11/22/19  5:07 AM  
Result Value Ref Range Glucose 109 mg/dL Insulin order 2.5 units/hour Insulin adminstered 2.5 units/hour Multiplier 0.052 Low target 95 mg/dL High target 130 mg/dL D50 order 0.0 ml  
 D50 administered 0.00 ml Minutes until next BG 60 min Order initials [facility] Administered initials [facility] GLSCOM Comments She Mena MD 
[address] [place] [address], [facility] Phone - [phone] Fax - [phone] 
[email or web address]

## 2019-11-22 NOTE — PROGRESS NOTES
0800 - Bedside and Verbal shift change report given to me (oncoming nurse) by [facility], RN (offgoing nurse). Report included the following information SBAR, Kardex, OR Summary, Procedure Summary, Intake/Output, MAR, Recent Results and Cardiac Rhythm SR. No visual s/s of pain or discomfort. Pt breathing synchronous with ventilator. CS team rounding on patient, no new orders received. Pt's only noticeable response was a slight head turn to the right when name called. 0900 - Pt w/ RASS of +3 (agitated/restless). Breathing 30-40 breaths per min after ETT sx'ing. 2 mg ativan given IV. 
 
1000 - RASS now -2 (drowsy). Respirations in the upper 20's.

## 2019-11-22 NOTE — PROGRESS NOTES
Hematology-Oncology Progress Note [facility] 1950 
[id number] 
11/22/2019 Subjective:  
 
Ventilated. Awake, but not responsive. Allergies: Scallops Current Facility-Administered Medications Medication Dose Route Frequency Provider Last Rate Last Dose  bumetanide (BUMEX) injection 1.5 mg  1.5 mg IntraVENous Q8H Gamal Caldwell MD      
 albumin human 25% (BUMINATE) solution 25 g  25 g IntraVENous Q8H Toy Otto MD   25 g at 11/22/19 2395  methylPREDNISolone (PF) (SOLU-MEDROL) injection 20 mg  20 mg IntraVENous Q12H Lorry Wyandot, NP   20 mg at 11/22/19 8843  QUEtiapine (SEROquel) tablet 50 mg  50 mg Oral BID Lorry Wyandot, NP   50 mg at 11/21/19 1752  
 0.9% sodium chloride infusion 250 mL  250 mL IntraVENous PRN Prachi Hernandez MD      
 hydrALAZINE (APRESOLINE) 20 mg/mL injection 10 mg  10 mg IntraVENous Q6H PRN Lorry Wyandot, NP      
 piperacillin-tazobactam (ZOSYN) 3.375 g in 0.9% sodium chloride (MBP/ADV) 100 mL  3.375 g IntraVENous Q8H Davon Trejo MD 25 mL/hr at 11/22/19 0142 3.375 g at 11/22/19 0142  morphine injection 2 mg  2 mg IntraVENous Q2H PRN Lorry Wyandot, NP   2 mg at 11/22/19 3085  LORazepam (ATIVAN) injection 1-2 mg  1-2 mg IntraVENous Q4H PRN Lorry Wyandot, NP   2 mg at 11/22/19 4449  balsam peru-castor oil (VENELEX) ointment   Topical BID Kevni HUGHES MD      
 insulin regular (NOVOLIN R, HUMULIN R) 100 Units in 0.9% sodium chloride 100 mL infusion  1-50 Units/hr IntraVENous TITRATE Belkys Corbett, NP 4.2 mL/hr at 11/22/19 0802 4.2 Units/hr at 11/22/19 0802  
 0.9% sodium chloride infusion  3 mL/hr IntraVENous CONTINUOUS Strong Bending Phoebe Meanrd MD   Stopped at 11/22/19 3113  [Held by provider] senna-docusate (PERICOLACE) 8.6-50 mg per tablet 1 Tab  1 Tab Oral BID Annel Huang NP   Stopped at 11/14/19 1800  
 nystatin (MYCOSTATIN) 100,000 unit/mL oral suspension 500,000 Units 500,000 Units Oral QID Margarita Mena NP   500,000 Units at 11/21/19 2119  
 insulin lispro (HUMALOG) injection   SubCUTAneous Q6H Margarita Mena NP   Stopped at 11/15/19 0100  
 guaiFENesin (ROBITUSSIN) 100 mg/5 mL oral liquid 400 mg  400 mg Per NG tube Q6H PRN Oneda Bottom Susan Larios NP      
 0.9% sodium chloride infusion  10 mL/hr IntraVENous CONTINUOUS Wei Dia Sousa MD   Stopped at 11/14/19 1125  pantoprazole (PROTONIX) 40 mg in sodium chloride 0.9% 10 mL injection  40 mg IntraVENous Q12H Mariely Hunter [place]   40 mg at 11/21/19 2151  balsam peru-castor oil (VENELEX) ointment   Topical PRN Yojana Covarrubias MD      
 acetylcysteine (MUCOMYST) 200 mg/mL (20 %) solution 200 mg  200 mg Nebulization BID RT Kike Dennis MD   200 mg at 11/22/19 3281  PHENYLephrine (ANTHONY-SYNEPHRINE) 30 mg in 0.9% sodium chloride 250 mL infusion   mcg/min IntraVENous TITRATE Yojana Covarrubias MD   Stopped at 11/22/19 7654  propofol (DIPRIVAN) infusion  0-50 mcg/kg/min IntraVENous TITRATE Jhon Correa MD   Stopped at 11/22/19 0760  dexmedeTOMidine (PRECEDEX) 400 mcg in 0.9% sodium chloride 100 mL infusion  0.2-0.9 mcg/kg/hr IntraVENous TITRATE Angelina Gramajo NP 12.6 mL/hr at 11/22/19 0802 0.9 mcg/kg/hr at 11/22/19 0802  lidocaine (LIDODERM) 5 % patch 1 Patch  1 Patch TransDERmal Q24H Angelina Gramajo NP   1 Patch at 11/20/19 0666  
 methyl salicylate-menthol (BENGAY) 15-10 % cream   Topical PRN Yojana Covarrubias MD      
 ferrous sulfate 300 mg (60 mg iron)/5 mL oral syrup 300 mg  300 mg Per NG tube DAILY WITH BREAKFAST Ladarius Khan MD   300 mg at 11/21/19 5731  [Held by provider] heparin (porcine) injection 5,000 Units  5,000 Units SubCUTAneous Q8H Angelina Gramajo NP   5,000 Units at 11/15/19 4356  
 multivit-folic acid-herbal 880 (WELLESSE PLUS) oral liquid 30 mL  30 mL Per NG tube DAILY Angelina Gramajo NP   30 mL at 11/21/19 6876  acetaminophen (TYLENOL) tablet 650 mg  650 mg Oral Q4H PRN Nani Rivera MD   650 mg at 11/04/19 1736  
 arformoterol (BROVANA) neb solution 15 mcg  15 mcg Nebulization BID RT Shawna Peers, NP   15 mcg at 11/22/19 8324 And  budesonide (PULMICORT) 500 mcg/2 ml nebulizer suspension  500 mcg Nebulization BID RT Shawna Peers, NP   500 mcg at 11/22/19 0197  prochlorperazine (COMPAZINE) with saline injection 10 mg  10 mg IntraVENous Q6H PRN CHELSEA Garza   5 mg at 10/25/19 1339  phenol throat spray (CHLORASEPTIC) 1 Spray  1 Castor Oral PRN Serene Merchant MD   1 Spray at 10/24/19 1110  
 sertraline (ZOLOFT) tablet 100 mg  100 mg Oral DAILY CHELSEA Garza   100 mg at 11/21/19 9602  alteplase (CATHFLO) 1 mg in sterile water (preservative free) 1 mL injection  1 mg InterCATHeter PRN Grace Roth PA      
 bacitracin 500 unit/gram packet 1 Packet  1 Packet Topical PRN Ashlyn Garza   1 Packet at 11/22/19 0617  
 sodium chloride (NS) flush 5-40 mL  5-40 mL IntraVENous Q8H CHELSEA Chaves   10 mL at 11/22/19 3995  sodium chloride (NS) flush 5-40 mL  5-40 mL IntraVENous PRN CHELSEA Garza   10 mL at 11/19/19 0806  
 oxyCODONE IR (ROXICODONE) tablet 5 mg  5 mg Oral Q4H PRN CHELSEA Garza   5 mg at 11/20/19 1254  
 oxyCODONE IR (ROXICODONE) tablet 10 mg  10 mg Oral Q4H PRN CHELSEA Chaves   10 mg at 10/25/19 0700  
 naloxone [facility]) injection 0.4 mg  0.4 mg IntraVENous PRN CHELSEA Garza   0.4 mg at 11/14/19 1126  ondansetron (ZOFRAN) injection 4 mg  4 mg IntraVENous Q4H PRN CHELSEA Garza   4 mg at 10/26/19 2342  albuterol (PROVENTIL VENTOLIN) nebulizer solution 2.5 mg  2.5 mg Nebulization Q4H PRN Grace Roth PA   2.5 mg at 11/19/19 0840  [Held by provider] aspirin chewable tablet 81 mg  81 mg Oral DAILY Grace Roth PA   Stopped at 11/16/19 0900  chlorhexidine (PERIDEX) 0.12 % mouthwash 10 mL  10 mL Oral Q12H Haven Nageotte, PA   10 mL at 11/21/19 2151  calcium chloride 1 g in 0.9% sodium chloride 100 mL IVPB  1 g IntraVENous PRN Lizbeth Roth PA      
 bisacodyl (DULCOLAX) suppository 10 mg  10 mg Rectal DAILY PRN Lizbeth Roth PA      
 [Held by provider] polyethylene glycol (MIRALAX) packet 17 g  17 g Oral DAILY Haven [place], [place]   Stopped at 11/14/19 5448  ELECTROLYTE REPLACEMENT NOTE: Nurse to review Serum Potassium and Magnesuim levels and Initiate Electrolyte Replacement Protocol as needed  1 Each Other PRN Lizbeth Roth PA      
 glucose chewable tablet 16 g  4 Tab Oral PRN Lizbeth Roth PA      
 glucagon (GLUCAGEN) injection 1 mg  1 mg IntraMUSCular PRN Haven Nageotte, PA      
 dextrose 10% infusion 0-250 mL  0-250 mL IntraVENous PRN Haven Nage[place],  mL/hr at 11/21/19 2012 75 mL at 11/21/19 2012  melatonin tablet 3 mg  3 mg Oral QHS PRN Haven Nageotte, PA   3 mg at 10/28/19 2135  diphenhydrAMINE (BENADRYL) injection 25 mg  25 mg IntraVENous Q6H PRN Lizbeth Roth PA      
 diphenhydrAMINE (BENADRYL) capsule 25 mg  25 mg Oral Q6H PRN Lizbeth Roth PA      
 pravastatin (PRAVACHOL) tablet 40 mg  40 mg Oral QHS Haven [place], [place]   40 mg at 11/21/19 2152 Objective:  
 
Patient Vitals for the past 24 hrs: 
 BP Temp Pulse Resp SpO2 Weight 11/22/19 0834   78     
11/22/19 0832     100 %   
11/22/19 0800 111/63 97.9 °F (36.6 °C) 74 20 98 %   
11/22/19 0700 105/53  76 24 99 %   
11/22/19 0600 105/54  77 21 94 %   
11/22/19 0500   81 27 97 %   
11/22/19 0443      62.2 kg (137 lb 2 oz)  
11/22/19 0400  98.7 °F (37.1 °C) 70 26 100 %   
11/22/19 0323 (!) 86/55  72 19 99 %   
11/22/19 0300   70 20 100 %   
11/22/19 0200   68 21 100 %   
11/22/19 0100   76 25 97 %   
11/22/19 0043   82 25 98 %   
 11/22/19 0001  97.6 °F (36.4 °C) 77 26 98 %   
11/21/19 2300   79 22 96 %   
11/21/19 2200   81 26 96 %   
11/21/19 2100   92 (!) 35 90 %   
11/21/19 2037   72 24 99 %   
11/21/19 2035     99 %   
11/21/19 2000  97.8 °F (36.6 °C) 92 27 94 %   
11/21/19 1900   82 30 96 %   
11/21/19 1800   86 28 93 %   
11/21/19 1713   85 28 93 %   
11/21/19 1700   86 29 93 %   
11/21/19 1600  98.2 °F (36.8 °C) 92 28 92 %   
11/21/19 1500   75 26 93 %   
11/21/19 1400   78 25 91 %   
11/21/19 1300   83 27 91 %   
11/21/19 1233   84 27 90 %   
11/21/19 1200  98 °F (36.7 °C) 81 (!) 40 92 %   
11/21/19 1100   74 26 95 %   
11/21/19 1000   73 (!) 31 94 %   
11/21/19 0946   73 28 98 %   
11/21/19 0900   78 (!) 31 98 %  Gen: ventilated with trach HEENT: PERRL, Sclerae anicteric Cv: RRR without m/r/g Pulm: CTA bilaterally Abd: NABS, NTND, No HSM Ext: mild bilateral hand edema. No violaceous discoloration Available labs reviewed: 
Labs:   
Recent Results (from the past 24 hour(s)) GLUCOSE, POC Collection Time: 11/21/19  9:33 AM  
Result Value Ref Range Glucose (POC) 129 (H) 65 - 100 mg/dL Performed by Anthony Splinter Collection Time: 11/21/19  9:33 AM  
Result Value Ref Range Glucose 129 mg/dL Insulin order 6.9 units/hour Insulin adminstered 6.9 units/hour Multiplier 0.100 Low target 95 mg/dL High target 130 mg/dL D50 order 0.0 ml  
 D50 administered 0.00 ml Minutes until next BG 60 min Order initials apl Administered initials apl GLSCOM Comments GLUCOSE, POC Collection Time: 11/21/19 10:34 AM  
Result Value Ref Range Glucose (POC) 104 (H) 65 - 100 mg/dL Performed by Anthony Ferrell Collection Time: 11/21/19 10:34 AM  
Result Value Ref Range Glucose 104 mg/dL Insulin order 4.4 units/hour Insulin adminstered 4.4 units/hour Multiplier 0.100 Low target 95 mg/dL High target 130 mg/dL D50 order 0.0 ml  
 D50 administered 0.00 ml Minutes until next BG 60 min Order initials apl Administered initials apl GLSCOM Comments GLUCOSE, POC Collection Time: 11/21/19 11:36 AM  
Result Value Ref Range Glucose (POC) 108 (H) 65 - 100 mg/dL Performed by Dulce Maria Ee Collection Time: 11/21/19 11:36 AM  
Result Value Ref Range Glucose 108 mg/dL Insulin order 4.8 units/hour Insulin adminstered 4.8 units/hour Multiplier 0.100 Low target 95 mg/dL High target 130 mg/dL D50 order 0.0 ml  
 D50 administered 0.00 ml Minutes until next BG 60 min Order initials apl Administered initials apl GLSCOM Comments POC G3 - PUL Collection Time: 11/21/19 12:26 PM  
Result Value Ref Range FIO2 (POC) 70 % pH (POC) 7.456 (H) 7.35 - 7.45    
 pCO2 (POC) 36.8 35.0 - 45.0 MMHG  
 pO2 (POC) 59 (L) 80 - 100 MMHG  
 HCO3 (POC) 25.9 22 - 26 MMOL/L  
 sO2 (POC) 91 (L) 92 - 97 % Base excess (POC) 2 mmol/L Site DRAWN FROM ARTERIAL LINE Device: VENT Mode ASSIST CONTROL Set Rate 24 bpm  
 PEEP/CPAP (POC) 5 cmH2O  
 PIP (POC) 16 Allens test (POC) N/A Inspiratory Time 1 sec Specimen type (POC) ARTERIAL Total resp. rate 27 Pressure control YES    
GLUCOSE, POC Collection Time: 11/21/19 12:38 PM  
Result Value Ref Range Glucose (POC) 130 (H) 65 - 100 mg/dL Performed by Dulce Maria Ee Collection Time: 11/21/19 12:38 PM  
Result Value Ref Range Glucose 130 mg/dL Insulin order 7.0 units/hour Insulin adminstered 7.0 units/hour Multiplier 0.100 Low target 95 mg/dL High target 130 mg/dL D50 order 0.0 ml  
 D50 administered 0.00 ml Minutes until next  min Order initials apl Administered initials apl GLSCOM Comments GLUCOSE, POC Collection Time: 11/21/19  2:54 PM  
Result Value Ref Range Glucose (POC) 89 65 - 100 mg/dL Performed by Maxime Pace Collection Time: 11/21/19  2:54 PM  
Result Value Ref Range Glucose 89 mg/dL Insulin order 2.3 units/hour Insulin adminstered 2.3 units/hour Multiplier 0.080 Low target 95 mg/dL High target 130 mg/dL D50 order 0.0 ml  
 D50 administered 0.00 ml Minutes until next BG 60 min Order initials apl Administered initials apl GLSCOM Comments GLUCOSE, POC Collection Time: 11/21/19  3:56 PM  
Result Value Ref Range Glucose (POC) 108 (H) 65 - 100 mg/dL Performed by Anthony Ferrell Collection Time: 11/21/19  3:56 PM  
Result Value Ref Range Glucose 108 mg/dL Insulin order 3.8 units/hour Insulin adminstered 3.8 units/hour Multiplier 0.080 Low target 95 mg/dL High target 130 mg/dL D50 order 0.0 ml  
 D50 administered 0.00 ml Minutes until next BG 60 min Order initials apl Administered initials apl GLSCOM Comments METABOLIC PANEL, COMPREHENSIVE Collection Time: 11/21/19  4:13 PM  
Result Value Ref Range Sodium 143 136 - 145 mmol/L Potassium 3.8 3.5 - 5.1 mmol/L Chloride 107 97 - 108 mmol/L  
 CO2 29 21 - 32 mmol/L Anion gap 7 5 - 15 mmol/L Glucose 112 (H) 65 - 100 mg/dL BUN 66 (H) 6 - 20 MG/DL Creatinine 1.42 (H) 0.70 - 1.30 MG/DL  
 BUN/Creatinine ratio 46 (H) 12 - 20 GFR est AA 60 (L) >60 ml/min/1.73m2 GFR est non-AA 49 (L) >60 ml/min/1.73m2 Calcium 8.1 (L) 8.5 - 10.1 MG/DL Bilirubin, total 0.6 0.2 - 1.0 MG/DL  
 ALT (SGPT) 87 (H) 12 - 78 U/L  
 AST (SGOT) 44 (H) 15 - 37 U/L Alk. phosphatase 133 (H) 45 - 117 U/L Protein, total 5.6 (L) 6.4 - 8.2 g/dL Albumin 1.9 (L) 3.5 - 5.0 g/dL Globulin 3.7 2.0 - 4.0 g/dL A-G Ratio 0.5 (L) 1.1 - 2.2 PLATELET COUNT Collection Time: 11/21/19  4:13 PM  
Result Value Ref Range PLATELET 58 (L) 902 - 400 K/uL MAGNESIUM  
 Collection Time: 11/21/19  4:13 PM  
Result Value Ref Range Magnesium 2.1 1.6 - 2.4 mg/dL GLUCOSE, POC Collection Time: 11/21/19  4:59 PM  
Result Value Ref Range Glucose (POC) 121 (H) 65 - 100 mg/dL Performed by Asher Cruz Collection Time: 11/21/19  4:59 PM  
Result Value Ref Range Glucose 121 mg/dL Insulin order 4.9 units/hour Insulin adminstered 4.9 units/hour Multiplier 0.080 Low target 95 mg/dL High target 130 mg/dL D50 order 0.0 ml  
 D50 administered 0.00 ml Minutes until next BG 60 min Order initials apl Administered initials apl GLSCOM Comments GLUCOSE, POC Collection Time: 11/21/19  6:04 PM  
Result Value Ref Range Glucose (POC) 125 (H) 65 - 100 mg/dL Performed by Asher Cruz Collection Time: 11/21/19  6:04 PM  
Result Value Ref Range Glucose 125 mg/dL Insulin order 5.2 units/hour Insulin adminstered 5.2 units/hour Multiplier 0.080 Low target 95 mg/dL High target 130 mg/dL D50 order 0.0 ml  
 D50 administered 0.00 ml Minutes until next BG 60 min Order initials apl Administered initials apl GLSCOM Comments GLUCOSE, POC Collection Time: 11/21/19  7:07 PM  
Result Value Ref Range Glucose (POC) 90 65 - 100 mg/dL Performed by Claire Lopez Collection Time: 11/21/19  7:08 PM  
Result Value Ref Range Glucose 90 mg/dL Insulin order 1.9 units/hour Insulin adminstered 1.9 units/hour Multiplier 0.064 Low target 95 mg/dL High target 130 mg/dL D50 order 0.0 ml  
 D50 administered 0.00 ml Minutes until next BG 60 min Order initials apl Administered initials apl GLSCOM Comments GLUCOSE, POC Collection Time: 11/21/19  8:07 PM  
Result Value Ref Range Glucose (POC) 71 65 - 100 mg/dL Performed by Asher Cruz  Collection Time: 11/21/19  8:07 PM  
 Result Value Ref Range Glucose 71 mg/dL Insulin order 0.0 units/hour Insulin adminstered 0.0 units/hour Multiplier 0.051 Low target 95 mg/dL High target 130 mg/dL D50 order 12.0 ml  
 D50 administered 12.00 ml Minutes until next BG 15 min Order initials apl Administered initials apl GLSCOM Comments GLUCOSE, POC Collection Time: 11/21/19  8:24 PM  
Result Value Ref Range Glucose (POC) 114 (H) 65 - 100 mg/dL Performed by Leelee Vaughan Collection Time: 11/21/19  8:25 PM  
Result Value Ref Range Glucose 114 mg/dL Insulin order 1.9 units/hour Insulin adminstered 1.9 units/hour Multiplier 0.051 Low target 95 mg/dL High target 130 mg/dL D50 order 0.0 ml  
 D50 administered 0.00 ml Minutes until next BG 60 min Order initials sdq Administered initials sdq GLSCOM Comments GLUCOSE, POC Collection Time: 11/21/19  9:23 PM  
Result Value Ref Range Glucose (POC) 134 (H) 65 - 100 mg/dL Performed by Ulises Garcia Collection Time: 11/21/19  9:23 PM  
Result Value Ref Range Glucose 134 mg/dL Insulin order 4.5 units/hour Insulin adminstered 4.5 units/hour Multiplier 0.061 Low target 95 mg/dL High target 130 mg/dL D50 order 0.0 ml  
 D50 administered 0.00 ml Minutes until next BG 60 min Order initials [facility] Administered initials [facility] ASHELY Comments GLUCOSE, POC Collection Time: 11/21/19 10:14 PM  
Result Value Ref Range Glucose (POC) 173 (H) 65 - 100 mg/dL Performed by Ulises Garcia Collection Time: 11/21/19 10:14 PM  
Result Value Ref Range Glucose 173 mg/dL Insulin order 8.0 units/hour Insulin adminstered 8.0 units/hour Multiplier 0.071 Low target 95 mg/dL High target 130 mg/dL D50 order 0.0 ml  
 D50 administered 0.00 ml Minutes until next BG 60 min Order initials [facility] Administered initials murtaza SALAZARSCOM Comments GLUCOSE, POC Collection Time: 11/21/19 11:07 PM  
Result Value Ref Range Glucose (POC) 152 (H) 65 - 100 mg/dL Performed by Ebony Moreau Collection Time: 11/21/19 11:08 PM  
Result Value Ref Range Glucose 152 mg/dL Insulin order 7.5 units/hour Insulin adminstered 7.5 units/hour Multiplier 0.081 Low target 95 mg/dL High target 130 mg/dL D50 order 0.0 ml  
 D50 administered 0.00 ml Minutes until next BG 60 min Order [facility] Administered initials murtaza SALAZARSCOM Comments GLUCOSE, POC Collection Time: 11/22/19 12:11 AM  
Result Value Ref Range Glucose (POC) 114 (H) 65 - 100 mg/dL Performed by Ebony Moreau Collection Time: 11/22/19 12:11 AM  
Result Value Ref Range Glucose 114 mg/dL Insulin order 4.4 units/hour Insulin adminstered 4.4 units/hour Multiplier 0.081 Low target 95 mg/dL High target 130 mg/dL D50 order 0.0 ml  
 D50 administered 0.00 ml Minutes until next BG 60 min Order [facility] Administered initials murtaza SALAZARSCBETTYE Comments GLUCOSE, POC Collection Time: 11/22/19  1:04 AM  
Result Value Ref Range Glucose (POC) 121 (H) 65 - 100 mg/dL Performed by Ebony Moreau Collection Time: 11/22/19  1:05 AM  
Result Value Ref Range Glucose 121 mg/dL Insulin order 4.9 units/hour Insulin adminstered 4.9 units/hour Multiplier 0.081 Low target 95 mg/dL High target 130 mg/dL D50 order 0.0 ml  
 D50 administered 0.00 ml Minutes until next BG 60 min Order [facility] Administered initials murtaza SALAZARSCOM Comments GLUCOSE, POC Collection Time: 11/22/19  1:54 AM  
Result Value Ref Range Glucose (POC) 103 (H) 65 - 100 mg/dL Performed by Ebony Moreau Collection Time: 11/22/19  1:55 AM  
Result Value Ref Range Glucose 103 mg/dL Insulin order 3.5 units/hour Insulin adminstered 3.5 units/hour Multiplier 0.081 Low target 95 mg/dL High target 130 mg/dL D50 order 0.0 ml  
 D50 administered 0.00 ml Minutes until next BG 60 min Order initials [facility] Administered initials [facility] GLSCOM Comments CBC W/O DIFF Collection Time: 11/22/19  3:00 AM  
Result Value Ref Range WBC 9.9 4.1 - 11.1 K/uL  
 RBC 2.38 (L) 4.10 - 5.70 M/uL HGB 7.3 (L) 12.1 - 17.0 g/dL HCT 23.3 (L) 36.6 - 50.3 % MCV 97.9 80.0 - 99.0 FL  
 MCH 30.7 26.0 - 34.0 PG  
 MCHC 31.3 30.0 - 36.5 g/dL  
 RDW 15.3 (H) 11.5 - 14.5 % PLATELET 55 (L) 925 - 400 K/uL NRBC 0.0 0  WBC ABSOLUTE NRBC 0.00 0.00 - 0.01 K/uL MAGNESIUM Collection Time: 11/22/19  3:00 AM  
Result Value Ref Range Magnesium 1.8 1.6 - 2.4 mg/dL NT-PRO BNP Collection Time: 11/22/19  3:00 AM  
Result Value Ref Range NT pro-BNP 25,317 (H) <289 PG/ML  
METABOLIC PANEL, COMPREHENSIVE Collection Time: 11/22/19  3:00 AM  
Result Value Ref Range Sodium 142 136 - 145 mmol/L Potassium 4.2 3.5 - 5.1 mmol/L Chloride 107 97 - 108 mmol/L  
 CO2 30 21 - 32 mmol/L Anion gap 5 5 - 15 mmol/L Glucose 80 65 - 100 mg/dL BUN 61 (H) 6 - 20 MG/DL Creatinine 1.32 (H) 0.70 - 1.30 MG/DL  
 BUN/Creatinine ratio 46 (H) 12 - 20 GFR est AA >60 >60 ml/min/1.73m2 GFR est non-AA 53 (L) >60 ml/min/1.73m2 Calcium 7.8 (L) 8.5 - 10.1 MG/DL Bilirubin, total 0.5 0.2 - 1.0 MG/DL  
 ALT (SGPT) 74 12 - 78 U/L  
 AST (SGOT) 38 (H) 15 - 37 U/L Alk. phosphatase 109 45 - 117 U/L Protein, total 5.2 (L) 6.4 - 8.2 g/dL Albumin 1.7 (L) 3.5 - 5.0 g/dL Globulin 3.5 2.0 - 4.0 g/dL A-G Ratio 0.5 (L) 1.1 - 2.2 PHOSPHORUS Collection Time: 11/22/19  3:00 AM  
Result Value Ref Range Phosphorus 4.0 2.6 - 4.7 MG/DL  
GLUCOSE, POC Collection Time: 11/22/19  3:01 AM  
Result Value Ref Range Glucose (POC) 87 65 - 100 mg/dL Performed by Ulises Garcia Collection Time: 11/22/19  3:01 AM  
Result Value Ref Range Glucose 87 mg/dL Insulin order 1.8 units/hour Insulin adminstered 1.8 units/hour Multiplier 0.065 Low target 95 mg/dL High target 130 mg/dL D50 order 0.0 ml  
 D50 administered 0.00 ml Minutes until next BG 60 min Order initials [facility] Administered initials edward JACKELINEOM Comments GLUCOSE, POC Collection Time: 11/22/19  4:04 AM  
Result Value Ref Range Glucose (POC) 94 65 - 100 mg/dL Performed by Tavia Vaughan Collection Time: 11/22/19  4:04 AM  
Result Value Ref Range Glucose 94 mg/dL Insulin order 1.8 units/hour Insulin adminstered 1.8 units/hour Multiplier 0.052 Low target 95 mg/dL High target 130 mg/dL D50 order 0.0 ml  
 D50 administered 0.00 ml Minutes until next BG 60 min Order initials [facility] Administered initials [facility] ASHELY Comments GLUCOSE, POC Collection Time: 11/22/19  5:07 AM  
Result Value Ref Range Glucose (POC) 109 (H) 65 - 100 mg/dL Performed by Ulises Garcia Collection Time: 11/22/19  5:07 AM  
Result Value Ref Range Glucose 109 mg/dL Insulin order 2.5 units/hour Insulin adminstered 2.5 units/hour Multiplier 0.052 Low target 95 mg/dL High target 130 mg/dL D50 order 0.0 ml  
 D50 administered 0.00 ml Minutes until next BG 60 min Order initials [facility] Administered initials murtaza LUND Comments GLUCOSE, POC Collection Time: 11/22/19  6:12 AM  
Result Value Ref Range Glucose (POC) 145 (H) 65 - 100 mg/dL Performed by Gina Leija   
GLUCOSE, POC Collection Time: 11/22/19  6:13 AM  
Result Value Ref Range Glucose (POC) 145 (H) 65 - 100 mg/dL Performed by Gina Leija   
POC G3 - PUL Collection Time: 11/22/19  6:13 AM  
Result Value Ref Range FIO2 (POC) 80 %  pH (POC) 7.432 7.35 - 7.45    
 pCO2 (POC) 39.2 35.0 - 45.0 MMHG  
 pO2 (POC) 67 (L) 80 - 100 MMHG  
 HCO3 (POC) 26.1 (H) 22 - 26 MMOL/L  
 sO2 (POC) 94 92 - 97 % Base excess (POC) 2 mmol/L Site DRAWN FROM ARTERIAL LINE Device: VENT Mode ASSIST CONTROL Set Rate 24 bpm  
 PEEP/CPAP (POC) 7 cmH2O Allens test (POC) N/A Specimen type (POC) ARTERIAL Total resp. rate 28 Pressure control YES    
GLUCOSTABILIZER Collection Time: 11/22/19  6:13 AM  
Result Value Ref Range Glucose 145 mg/dL Insulin order 5.3 units/hour Insulin adminstered 5.3 units/hour Multiplier 0.062 Low target 95 mg/dL High target 130 mg/dL D50 order 0.0 ml  
 D50 administered 0.00 ml Minutes until next BG 60 min Order initials [facility] Administered initials [facility] GLSCOM Comments GLUCOSE, POC Collection Time: 11/22/19  7:15 AM  
Result Value Ref Range Glucose (POC) 127 (H) 65 - 100 mg/dL Performed by Jamee Andrade Collection Time: 11/22/19  7:15 AM  
Result Value Ref Range Glucose 127 mg/dL Insulin order 4.2 units/hour Insulin adminstered 4.2 units/hour Multiplier 0.062 Low target 95 mg/dL High target 130 mg/dL D50 order 0.0 ml  
 D50 administered 0.00 ml Minutes until next BG 60 min Order initials ep Administered initials ep GLSCOM Comments GLUCOSE, POC Collection Time: 11/22/19  8:36 AM  
Result Value Ref Range Glucose (POC) 111 (H) 65 - 100 mg/dL Performed by Patty Berryole Assessment and Plan  
 
69 y/o man with CAD admitted with ACS, underwent CABG. Course complicated by prolonged respiratory failure to due ILD, s/p tracheostomy placement. Asked to see for t'cytopenia. HIT negative, t'cytopenia present prior to antibiotic initiation. 1. T'cytopenia: Work-up thus far: 
 
Bilirubin 0.6 11/19/19 HECTOR negative 11/2/19 Legionella DFA negative 11/7/19 Bronchial lavage negative 11/7/19 Blood culture negative 10/21/19, NGTD X 5 days: 11/16/19 L-20: Normal 
Folic acid: normal 
Fibrinogen: normal 
HIT panel/CARMEN: normal 
Zosyn started 5 days after PLT decline Copper: normal 
LAC: pending B2G: negative ACL: negative Heparin held Would recommend transfusion for PLT count < 10K or bleeding. PLT count about the same today. PLT count normal on admission, making pre-existing t'cytopenia less likely (MDS or other bone marrow failure state, chronic viral illness). No new recommendations today. Will ask covering team to see this weekend. Thank you for allowing us to participate in the care of this very pleasant patient. Sola Wolf MD 
Hematology/Oncology Phone [phone]'

## 2019-11-22 NOTE — PROGRESS NOTES
Transitions of Care: 
 
 -TBD, patient remains critically ill, patient having increased O2 requirements, remains on vent The CM reviewed Account notes- Utilization Review continues to send clinical updates to [facility]. Disposition TBD at this time, patient remains critically ill.  RON Alexandra

## 2019-11-22 NOTE — PROGRESS NOTES
PCCM 
 
 Seen and examined Increase O2 requirement. Getting more diuretics.  Seen and examined D/W NORMA [place] Wean FiO2 for PaO2 > 60 or SPO2 > 88% 10/18 Seen and examined patient at bedside. Mental status -> still an issue. Not much secretions. 11/15 Seen and examined Tachypneic Dyssynchronous with ventilator Off fentanyl and versed On precedex Receiving blood Puffy extremities CXR ? Increased edema 
proBNP 33K 
 
 S/p Campbell Battiest On vent 60% FiO2 On TF Patient Vitals for the past 4 hrs: 
 BP Temp Pulse Resp SpO2  
19 0900 137/62  90 29 93 % 19 0834   78 25 100 % 19 0832     100 % 19 0800 111/63 97.9 °F (36.6 °C) 74 20 98 % Temp (24hrs), Av °F (36.7 °C), Min:97.6 °F (36.4 °C), Max:98.7 °F (37.1 °C) Intake/Output Summary (Last 24 hours) at 2019 1107 Last data filed at 2019 0900 Gross per 24 hour Intake 3412.96 ml Output 3655 ml Net -242.04 ml No distress Campbell Broderick Rales and rhonchi Tachy Soft bs present Warm and dry CXR - trach - no change in interstitial lung disease Lab: 
Recent Labs  
  19 
0300 19 
1613 19 
0414  19 
0350 WBC 9.9  --  8.4  --  11.1 HGB 7.3*  --  7.7*  --  7.9*  
PLT 55* 58* 47*   < > 49*  143 144   < > 144 K 4.2 3.8 4.5   < > 4.2  107 110*   < > 110* CO2 30 29 28   < > 27 BUN 61* 66* 70*   < > 82* CREA 1.32* 1.42* 1.47*   < > 1.58* GLU 80 112* 124*   < > 89  
CA 7.8* 8.1* 7.6*   < > 8.0*  
MG 1.8 2.1 1.9  --  2.0 PHOS 4.0  --  4.8*  --  4.8* INR  --   --   --   --  1.2* TBILI 0.5 0.6 0.5   < > 0.5 SGOT 38* 44* 45*   < > 52*  
 < > = values in this interval not displayed. ABG: 
Recent Labs  
  19 
0613 19 
1226 19 
1620 PHI 7.432 7.456* 7.455* PCO2I 39.2 36.8 35.1 PO2I 67* 59* 67* HCO3I 26.1* 25.9 24.6 SO2I 94 91* 94  
FIO2I 80 70 80 Results Procedure Component Value Units Date/Time CULTURE, BLOOD, PAIRED [[id number]] Collected:  11/16/19 0310 Order Status:  Completed Specimen:  Blood Updated:  11/21/19 0687 Special Requests: NO SPECIAL REQUESTS Culture result: NO GROWTH 5 DAYS Impression: 
========= 
· S/p 5V CABG 10/19/19 for ACS  With reexploration 10/23 for mediastinal hematoma. · Acute resp failure - baseline fibrotic ILD (etiology not clear- this is a new dx but UIP/IPF in DDX) with superimposed pulmonary interstitial edema probable superimposed diffuse alveolar damage from blood/blood products. Amio pulm toxicty in DDX but not clear from [facility] review when he was on it. Suspect that he his significant irreversible fibrotic lung disease. · Active smoker- 10/19 preop bedside tanner without airflow obst FEV1 61% ratio > 0.7. · ISAIAH, hypernatremia. 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 · HTN. · Anemia. · Encephalopathy. Plan: 
==== 
--vent support - wean FiO2 and SBT/TC trials as able when criteria satisfied. --on solumedrol - 40 mg IV q12. 
--diuretics as you are doing. --Agree with minimizing sedation. 
--TF. 
--Nephrology following electrolytes and diuretics. --will follow as needed. -- D/W NORMA Garcia MD  
Pulmonary and Critical Care Medicine

## 2019-11-23 NOTE — PROGRESS NOTES
: Report received from Conner Daigle RN. Insulin and precedex drips verified. Pt trach'd and sedated. Vent settings: A/C RR 24, P/C 16, PEEP 7, and FiO2 80%. Pt pulling good Vt, ~600s and breathing around set RR, 25-29. Pt's eyes are open and he appears awake, but there is no command following or interaction noted. 2202: Pt stacking breaths on vent, PRN 2 mg ativan given. 2303: Pt stacking breaths and tachypneic - PRN 2 mg morphine given. 2337: PRN albuterol treatment given - coarse breath sounds and tachypnea. 0022: MAP 54-56, nomi drip started. 0041: Pt stacking breaths and tachypneic - PRN 2 mg morphine given. 0135: Pt sating %, decreased FiO2 to 70%. 
 
0309: Nomi drip weaned off, MAP 69. 
 
0325: Stacking breaths on vent, PRN 2 mg ativan given. When asked if in pain, pt nodded head no. I asked pt to move limbs and there was no command following and no further nodding to questions. 0400: Critical plt, 46. Hgb noted to be 5.9, rerun 6.0, resent lab. ..recheck = 6.1. 
 
0403: Pt stacking breaths and tachypneic - PRN 2 mg morphine given. 5294-8023: RT at bedside for ABG, left radial art stick. AB.382 / 49.1 / 168 / 4 / 29.2 / 99 - decreased FiO2 to 60%. 0515: Pt tachypneic, RR mid to high 30s. Air leak noted. Pt following commands -  weak bilat hand grasp and foot mvmt. 0520: RR 34-40. Sats dropped to 84-88%. Increased FiO2 back to 70% until pt recovers. 2 mg morphine given early d/t agitation and tachypnea on vent. 5232: Pt sating 100%, RR mid 25s. Decreased FiO2 back to 60%. 2842: Sats dropped from 100% to 94% to 90% in 4 mins. Increased FiO2 to 65%. 3776: Updated Dr. Aziza Vogt regarding pt's low Hgb. Order received to transfuse 1u PRBCs. 
 
0550-6450: Pt appears more alert, shrunching face and pressing lips together. Pt tachypneic, RR 35-40s. O2 sats dropped to mid 80s. Temporarily increased FiO2 to 70% then PRN ativan and morphine given. 0800: Bedside and Verbal shift change report given to Ileana Gaytan RN. Report included the following information SBAR, Intake/Output, MAR, Recent Results, Cardiac Rhythm NSR and Alarm Parameters . Problem: Falls - Risk of 
Goal: *Absence of Falls Description Document Gricelda Marcos Fall Risk and appropriate interventions in the flowsheet. Outcome: Progressing Towards Goal 
Note: Fall Risk Interventions: 
Mobility Interventions: Communicate number of staff needed for ambulation/transfer, Strengthening exercises (ROM-active/passive), PT Consult for mobility concerns Mentation Interventions: Adequate sleep, hydration, pain control, Door open when patient unattended, Evaluate medications/consider consulting pharmacy, More frequent rounding, Room close to nurse's station, Update white board Medication Interventions: Evaluate medications/consider consulting pharmacy Elimination Interventions: Toileting schedule/hourly rounds History of Falls Interventions: Consult care management for discharge planning, Evaluate medications/consider consulting pharmacy, Room close to nurse's station Problem: Pressure Injury - Risk of 
Goal: *Prevention of pressure injury Description Document Earl Scale and appropriate interventions in the flowsheet. Outcome: Progressing Towards Goal 
Note: Pressure Injury Interventions: 
Sensory Interventions: Assess changes in LOC, Check visual cues for pain, Float heels, Keep linens dry and wrinkle-free, Minimize linen layers, Pressure redistribution bed/mattress (bed type), Turn and reposition approx. every two hours (pillows and wedges if needed) Moisture Interventions: Absorbent underpads, Apply protective barrier, creams and emollients, Check for incontinence Q2 hours and as needed, Internal/External urinary devices, Maintain skin hydration (lotion/cream), Minimize layers Activity Interventions: Pressure redistribution bed/mattress(bed type), PT/OT evaluation Mobility Interventions: Float heels, Pressure redistribution bed/mattress (bed type), HOB 30 degrees or less, Turn and reposition approx. every two hours(pillow and wedges), PT/OT evaluation Nutrition Interventions: Document food/fluid/supplement intake Friction and Shear Interventions: Lift sheet, Minimize layers Problem: CABG: Discharge Outcomes Goal: *Lungs clear or at baseline Outcome: Progressing Towards Goal 
Note:  
Coarse breath sounds. Scheduled breathing treatments BID and PRN. Pt has pulm fibrosis. Pulm following. Diuresing w/ bumex. Goal: *Anxiety reduced or absent Outcome: Progressing Towards Goal 
Note:  
Ativan q4h PRN. Problem: Infection - Risk of, Central Venous Catheter-Associated Bloodstream Infection Goal: *Absence of infection signs and symptoms Outcome: Progressing Towards Goal 
Note: Afebrile. Receiving zosyn. Problem: Ventilator Management Goal: *Adequate oxygenation and ventilation Outcome: Progressing Towards Goal 
Goal: *Patient maintains clear airway/free of aspiration Outcome: Progressing Towards Goal 
Goal: *Absence of infection signs and symptoms Outcome: Progressing Towards Goal 
Goal: *Normal spontaneous ventilation Outcome: Progressing Towards Goal 
  
Problem: Nutrition Deficit Goal: *Optimize nutritional status Outcome: Progressing Towards Goal 
Note:  
Tube feeds at goal. 
  
Problem: Gas Exchange - Impaired Goal: *Absence of hypoxia Outcome: Progressing Towards Goal 
Note:  
Fio2 70%, Sats 97%. Daily ABG. Problem: Nutrition Deficit Goal: *Optimize nutritional status Outcome: Progressing Towards Goal 
  
Problem: Infection - Risk of, Urinary Catheter-Associated Urinary Tract Infection Goal: *Absence of infection signs and symptoms Outcome: Progressing Towards Goal 
Note:  
Qshift da silva care and PRN. Da Silva in place for strict I/O. Problem: CABG: Discharge Outcomes Goal: *Demonstrates ability to perform prescribed activity without shortness of breath or discomfort Outcome: Not Progressing Towards Goal 
Note:  
Trach'd and sedated - awake but not interactive. Goal: *Verbalizes home exercise program, activity guidelines, cardiac precautions Outcome: Not Progressing Towards Goal 
Note:  
Trach'd and sedated - awake but not interactive. Goal: *Verbalizes understanding and describes prescribed diet Outcome: Not Progressing Towards Goal 
Goal: *Verbalizes name, dosage, time, side effects, and number of days to continue medications Outcome: Not Progressing Towards Goal 
Goal: *Verbalizes and demonstrates incision care Outcome: Not Progressing Towards Goal 
Note:  
Trach'd and sedated - awake but not interactive. Goal: *Understands and describes signs and symptoms to report to providers(Stroke Metric) Outcome: Not Progressing Towards Goal 
Note:  
Trach'd and sedated - awake but not interactive. Goal: *Describes follow-up/return visits to physicians Outcome: Not Progressing Towards Goal 
Note:  
Trach'd and sedated - awake but not interactive. Goal: *Describes available resources and support systems Outcome: Not Progressing Towards Goal 
Note:  
Trach'd and sedated - awake but not interactive. Goal: *Expresses feelings about diagnosis and surgery Outcome: Not Progressing Towards Goal 
Note:  
Trach'd and sedated - awake but not interactive. Problem: Heart Failure: Discharge Outcomes Goal: *Demonstrates ability to perform prescribed activity without shortness of breath or discomfort Outcome: Not Progressing Towards Goal 
Goal: *Verbalizes understanding and describes prescribed diet Outcome: Not Progressing Towards Goal 
Goal: *Verbalizes understanding/describes prescribed medications Outcome: Not Progressing Towards Goal 
Goal: *Describes available resources and support systems Description 
(eg: Home Health, Palliative Care, Advanced Medical Directive) Outcome: Not Progressing Towards Goal 
 Goal: *Describes smoking cessation resources Outcome: Not Progressing Towards Goal 
Goal: *Understands and describes signs and symptoms to report to providers(Stroke Metric) Outcome: Not Progressing Towards Goal 
Goal: *Describes/verbalizes understanding of follow-up/return appt Description 
(eg: to physicians, diabetes treatment coordinator, and other resources Outcome: Not Progressing Towards Goal 
Goal: *Describes importance of continuing daily weights and changes to report to physician Outcome: Not Progressing Towards Goal

## 2019-11-23 NOTE — PROGRESS NOTES
Infectious Diseases Progress Note Antibiotic Summary: 
Zosyn   -- present Subjective:  
 
Remains on vent and poorly responsive ROS: 
Unable to obtain Objective:  
 
Vitals:  
Visit Vitals BP 98/58 Pulse 79 Temp 99.2 °F (37.3 °C) Resp 23 Ht 5' 7\" (1.702 m) Wt 62.2 kg (137 lb 2 oz) SpO2 99% BMI 21.48 kg/m² Tmax:  Temp (24hrs), Av.5 °F (36.9 °C), Min:97.6 °F (36.4 °C), Max:99.2 °F (37.3 °C) Exam:  General appearance: no distress, chronically ill appearing Neck: supple Chest wall: sternal incision is healing well. Lungs: posterior rales Heart: regular rate and rhythm Abdomen: soft, non-tender. Bowel sounds normal. No masses,  no organomegaly Extremities: no cellulitis Skin: no rash IV Lines: Right PICC inserted 2019 Labs:   
Recent Labs  
  19 
1617 19 
0300 19 
1613 19 
0414 19 
2138 19 
0350 WBC  --  9.9  --  8.4  --  11.1 HGB  --  7.3*  --  7.7*  --  7.9*  
PLT 50* 55* 58* 47* 44* 49* BUN 58* 61* 66* 70* 72* 82* CREA 1.31* 1.32* 1.42* 1.47* 1.49* 1.58* TBILI 0.6 0.5 0.6 0.5 0.6 0.5 SGOT 33 38* 44* 45* 43* 52* AP 99 109 133* 116 110 134* Assessment: 1. Elevated procalcitonin level -- significance uncertain in this setting (3 weeks of critical illness in ICU): empiric Zosyn begun ; Cultures have been negative. No infection identified but will complete a course of Zosyn 
  
2. OHD -- IHD & CHF -- S/P CABG on 10/22/2019 
  
3. CKD with acute exacerbation 
  
4. Anemia 
  
5. Thrombocytopenia 
  
6. Acute rise in transaminases -- shock liver ? 
  
7. NIDDM 
  
8. HTN 
  
9. Hyperlipidemia Plan: 1. Continue Zosyn -- will consider stopping it the  of the week I'll check the patient again on Monday. Please call if problems arise over the weekend.  
 
Brianne Vences MD

## 2019-11-23 NOTE — PROGRESS NOTES
1400 - I have reviewed and agree with the care rendered, medications given, documentation done by Brian Rodriguez RN. 1507 - O2 sats staying at 98-99%,  FIO2 weaned down to 80%. 1800 - O2 staying at 98%, FIO2 weaned down to 70%. 2000 - Bedside shift change report given to 611 Rosalinda Browne (oncoming nurse) by Hong Hoff (offgoing nurse).  Report included the following information SBAR, Kardex, Intake/Output, MAR, Accordion, Recent Results and Cardiac Rhythm SR.

## 2019-11-23 NOTE — PROGRESS NOTES
Nephrology Progress Note Benedicto Madrigal Date of Admission : 10/19/2019 CC:  Follow up for ISAIAH on CKD, hypervolemia Assessment and Plan ISAIAH on CKD: 
- from ATN post CABG, pre renal azotemia from diuretics - Cr mostly stable, follow for now  
- continue present Bumex for now, adjust per clinical circumstances - Daily labs Hypernatremia - stable 
- continue water flushes 200 ml Q3 hr w/ ongoing diuresis CKD III: 
- baseline Cr 1.3 prior to CABG 
- likely from DM and HTN 
  
HFrE F: 
- last EF 35-40% on 10/20 
  
CAD s/p CABG x 5 10/19 and reexploration 10/23 for hematoma 
  
Acute on Chronic Resp Failure: 
- likely 2/2 underlying ILD + volume 
- now w/ trach Thrombocytopenia: 
- w/u per hematology Chronic Anemia: 
- Hb stable  
  
DM2: 
 
Protein Malnutrition: 
- on TF via [place] [address] Interval History: 
Seen and examined. off pressor, high O2 requirements continue Current Medications: all current  Medications have been eviewed in [facility] Review of Systems: Review of systems not obtained due to patient factors. Objective: 
Vitals:   
Vitals:  
 11/23/19 1400 11/23/19 1500 11/23/19 1507 11/23/19 1600 BP: 106/60 97/62  103/59 Pulse: 88 84 81 88 Resp: 19 18 28 (!) 31 Temp:    98 °F (36.7 °C) SpO2: 100% 99% 99% 98% Weight:      
Height:      
 
Intake and Output: 
11/23 0701 - 11/23 1900 In: 1349.4 [I.V.:299.8] Out: 1270 [OWVZE:5457] 11/21 1901 - 11/23 0700 In: 5247.1 [I.V.:1842.1] Out: 5590 [FAEGX:7040] Physical Examination: 
General: Frail, sedated Neck:  + trach Resp:  ronchi and Diffuse dry crackles CV:  RRR,  no murmur or rub, no LE edema GI:  Soft, NT, + Bowel sounds, no hepatosplenomegaly Neurologic:  Sedated on the vent Psych:             Unable to assess Skin:  No Rash :  Devlin in place [x]    High complexity decision making was performed 
[x]    Patient is at high-risk of decompensation with multiple organ involvement Lab Data Personally Reviewed: I have reviewed all the pertinent labs, microbiology data and radiology studies during assessment. Recent Labs  
  11/23/19 
1351 11/23/19 
1349 11/23/19 
0315 11/22/19 
1617 11/22/19 
0300  11/21/19 
0414 NA  --  141 141 143 142   < > 144 K  --  4.1 3.7 4.1 4.2   < > 4.5 CL  --  105 105 107 107   < > 110* CO2  --  31 30 31 30   < > 28 GLU  --  157* 112* 72 80   < > 124* BUN  --  60* 57* 58* 61*   < > 70* CREA  --  1.52* 1.36* 1.31* 1.32*   < > 1.47* CA  --  8.2* 7.8* 8.1* 7.8*   < > 7.6*  
MG 2.2  --  1.9  --  1.8   < > 1.9 PHOS  --   --  3.8  --  4.0  --  4.8* ALB  --  3.0* 2.7* 2.6* 1.7*   < > 1.8* SGOT  --  33 34 33 38*   < > 45* ALT  --  65 62 63 74   < > 84*  
 < > = values in this interval not displayed. Recent Labs  
  11/23/19 
1351 11/23/19 
1349 11/23/19 
0420 11/23/19 
0315  11/22/19 
0300 WBC  --   --  7.0 7.2  --  9.9 HGB 7.5*  --  6.1* 6.0*  --  7.3* HCT 24.3*  --  19.8* 19.9*  --  23.3*  
PLT  --  51* 48* 46*   < > 55*  
 < > = values in this interval not displayed. No results found for: SDES Lab Results Component Value Date/Time Culture result: NO GROWTH 5 DAYS 11/16/2019 03:10 AM  
 Culture result: NO GROWTH 2 DAYS 11/07/2019 12:45 PM  
 Culture result: HEAVY NORMAL RESPIRATORY TARIK 10/21/2019 12:08 PM  
 
Recent Results (from the past 24 hour(s)) GLUCOSE, POC Collection Time: 11/22/19  6:55 PM  
Result Value Ref Range Glucose (POC) 135 (H) 65 - 100 mg/dL Performed by Chris Mcmullen Collection Time: 11/22/19  6:55 PM  
Result Value Ref Range Glucose 135 mg/dL Insulin order 4.7 units/hour Insulin adminstered 4.7 units/hour Multiplier 0.063 Low target 95 mg/dL High target 130 mg/dL D50 order 0.0 ml  
 D50 administered 0.00 ml Minutes until next BG 60 min Order initials ha Administered initials ha GLSCOM Comments GLUCOSE, POC  
 Collection Time: 11/22/19  7:57 PM  
Result Value Ref Range Glucose (POC) 123 (H) 65 - 100 mg/dL Performed by Yaya Machuca Collection Time: 11/22/19  7:58 PM  
Result Value Ref Range Glucose 123 mg/dL Insulin order 4.0 units/hour Insulin adminstered 4.0 units/hour Multiplier 0.063 Low target 95 mg/dL High target 130 mg/dL D50 order 0.0 ml  
 D50 administered 0.00 ml Minutes until next BG 60 min Order [facility] Administered initials [facility] MARTINSCOM Comments GLUCOSE, POC Collection Time: 11/22/19  8:56 PM  
Result Value Ref Range Glucose (POC) 114 (H) 65 - 100 mg/dL Performed by Yaya Machuca Collection Time: 11/22/19  8:56 PM  
Result Value Ref Range Glucose 114 mg/dL Insulin order 3.4 units/hour Insulin adminstered 3.4 units/hour Multiplier 0.063 Low target 95 mg/dL High target 130 mg/dL D50 order 0.0 ml  
 D50 administered 0.00 ml Minutes until next BG 60 min Order [facility] Administered initials edward ASHELY Comments GLUCOSE, POC Collection Time: 11/22/19  9:58 PM  
Result Value Ref Range Glucose (POC) 82 65 - 100 mg/dL Performed by Yaya Machuca Collection Time: 11/22/19  9:58 PM  
Result Value Ref Range Glucose 82 mg/dL Insulin order 1.1 units/hour Insulin adminstered 1.1 units/hour Multiplier 0.050 Low target 95 mg/dL High target 130 mg/dL D50 order 0.0 ml  
 D50 administered 0.00 ml Minutes until next BG 60 min Order [facility] Administered initials edward GLSCOM Comments GLUCOSE, POC Collection Time: 11/22/19 11:07 PM  
Result Value Ref Range Glucose (POC) 88 65 - 100 mg/dL Performed by Yaya Machuca Collection Time: 11/22/19 11:08 PM  
Result Value Ref Range Glucose 88 mg/dL Insulin order 1.1 units/hour Insulin adminstered 1.1 units/hour Multiplier 0.040 Low target 95 mg/dL High target 130 mg/dL D50 order 0.0 ml  
 D50 administered 0.00 ml Minutes until next BG 60 min Order initials [facility] Administered initials [facility] GLSCOM Comments GLUCOSE, POC Collection Time: 11/23/19 12:09 AM  
Result Value Ref Range Glucose (POC) 121 (H) 65 - 100 mg/dL Performed by Westly Alicia   
GLUCOSE, POC Collection Time: 11/23/19 12:10 AM  
Result Value Ref Range Glucose (POC) 127 (H) 65 - 100 mg/dL Performed by Ebony Moreau Collection Time: 11/23/19 12:10 AM  
Result Value Ref Range Glucose 127 mg/dL Insulin order 2.7 units/hour Insulin adminstered 2.7 units/hour Multiplier 0.040 Low target 95 mg/dL High target 130 mg/dL D50 order 0.0 ml  
 D50 administered 0.00 ml Minutes until next BG 60 min Order initials ach Administered initials ach GLSCBETTYE Comments GLUCOSE, POC Collection Time: 11/23/19  1:12 AM  
Result Value Ref Range Glucose (POC) 134 (H) 65 - 100 mg/dL Performed by [place] Alicia   
GLUCOSE, POC Collection Time: 11/23/19  1:13 AM  
Result Value Ref Range Glucose (POC) 134 (H) 65 - 100 mg/dL Performed by Ebony Moreau Collection Time: 11/23/19  1:13 AM  
Result Value Ref Range Glucose 134 mg/dL Insulin order 3.7 units/hour Insulin adminstered 3.7 units/hour Multiplier 0.050 Low target 95 mg/dL High target 130 mg/dL D50 order 0.0 ml  
 D50 administered 0.00 ml Minutes until next BG 60 min Order initials [facility] Administered initials [facility] GLSCOM Comments GLUCOSE, POC Collection Time: 11/23/19  2:14 AM  
Result Value Ref Range Glucose (POC) 112 (H) 65 - 100 mg/dL Performed by Ebony Moreau Collection Time: 11/23/19  2:15 AM  
Result Value Ref Range Glucose 112 mg/dL Insulin order 2.6 units/hour Insulin adminstered 2.6 units/hour Multiplier 0.050 Low target 95 mg/dL High target 130 mg/dL D50 order 0.0 ml  
 D50 administered 0.00 ml Minutes until next BG 60 min Order initials [facility] Administered initials [facility] GLSCOM Comments CBC W/O DIFF Collection Time: 11/23/19  3:15 AM  
Result Value Ref Range WBC 7.2 4.1 - 11.1 K/uL  
 RBC 2.00 (L) 4.10 - 5.70 M/uL HGB 6.0 (L) 12.1 - 17.0 g/dL HCT 19.9 (L) 36.6 - 50.3 % MCV 99.5 (H) 80.0 - 99.0 FL  
 MCH 30.0 26.0 - 34.0 PG  
 MCHC 30.2 30.0 - 36.5 g/dL  
 RDW 15.5 (H) 11.5 - 14.5 % PLATELET 46 (LL) 199 - 400 K/uL NRBC 0.0 0  WBC ABSOLUTE NRBC 0.00 0.00 - 0.01 K/uL MAGNESIUM Collection Time: 11/23/19  3:15 AM  
Result Value Ref Range Magnesium 1.9 1.6 - 2.4 mg/dL NT-PRO BNP Collection Time: 11/23/19  3:15 AM  
Result Value Ref Range NT pro-BNP 29,038 (H) <697 PG/ML  
METABOLIC PANEL, COMPREHENSIVE Collection Time: 11/23/19  3:15 AM  
Result Value Ref Range Sodium 141 136 - 145 mmol/L Potassium 3.7 3.5 - 5.1 mmol/L Chloride 105 97 - 108 mmol/L  
 CO2 30 21 - 32 mmol/L Anion gap 6 5 - 15 mmol/L Glucose 112 (H) 65 - 100 mg/dL BUN 57 (H) 6 - 20 MG/DL Creatinine 1.36 (H) 0.70 - 1.30 MG/DL  
 BUN/Creatinine ratio 42 (H) 12 - 20 GFR est AA >60 >60 ml/min/1.73m2 GFR est non-AA 52 (L) >60 ml/min/1.73m2 Calcium 7.8 (L) 8.5 - 10.1 MG/DL Bilirubin, total 0.6 0.2 - 1.0 MG/DL  
 ALT (SGPT) 62 12 - 78 U/L  
 AST (SGOT) 34 15 - 37 U/L Alk. phosphatase 97 45 - 117 U/L Protein, total 5.7 (L) 6.4 - 8.2 g/dL Albumin 2.7 (L) 3.5 - 5.0 g/dL Globulin 3.0 2.0 - 4.0 g/dL A-G Ratio 0.9 (L) 1.1 - 2.2 PHOSPHORUS Collection Time: 11/23/19  3:15 AM  
Result Value Ref Range Phosphorus 3.8 2.6 - 4.7 MG/DL  
GLUCOSE, POC Collection Time: 11/23/19  3:16 AM  
Result Value Ref Range Glucose (POC) 115 (H) 65 - 100 mg/dL  Performed by Devendra Guerra  
 Collection Time: 11/23/19  3:17 AM  
Result Value Ref Range Glucose 115 mg/dL Insulin order 2.8 units/hour Insulin adminstered 2.8 units/hour Multiplier 0.050 Low target 95 mg/dL High target 130 mg/dL D50 order 0.0 ml  
 D50 administered 0.00 ml Minutes until next BG 60 min Order [facility] Administered initials [facility] ASHELY Block GLUCOSE, POC Collection Time: 11/23/19  4:17 AM  
Result Value Ref Range Glucose (POC) 125 (H) 65 - 100 mg/dL Performed by Crystal Horton Collection Time: 11/23/19  4:18 AM  
Result Value Ref Range Glucose 125 mg/dL Insulin order 3.3 units/hour Insulin adminstered 3.3 units/hour Multiplier 0.050 Low target 95 mg/dL High target 130 mg/dL D50 order 0.0 ml  
 D50 administered 0.00 ml Minutes until next BG 60 min Order [facility] Administered initials edward ASHELY Comments CBC W/O DIFF Collection Time: 11/23/19  4:20 AM  
Result Value Ref Range WBC 7.0 4.1 - 11.1 K/uL  
 RBC 1.98 (L) 4.10 - 5.70 M/uL HGB 6.1 (L) 12.1 - 17.0 g/dL HCT 19.8 (L) 36.6 - 50.3 % .0 (H) 80.0 - 99.0 FL  
 MCH 30.8 26.0 - 34.0 PG  
 MCHC 30.8 30.0 - 36.5 g/dL  
 RDW 15.3 (H) 11.5 - 14.5 % PLATELET 48 (LL) 624 - 400 K/uL NRBC 0.0 0  WBC ABSOLUTE NRBC 0.00 0.00 - 0.01 K/uL GLUCOSE, POC Collection Time: 11/23/19  5:08 AM  
Result Value Ref Range Glucose (POC) 146 (H) 65 - 100 mg/dL Performed by Crystal Horton Collection Time: 11/23/19  5:08 AM  
Result Value Ref Range Glucose 146 mg/dL Insulin order 5.2 units/hour Insulin adminstered 5.2 units/hour Multiplier 0.060 Low target 95 mg/dL High target 130 mg/dL D50 order 0.0 ml  
 D50 administered 0.00 ml Minutes until next BG 60 min Order initials [facility] Administered initials [facility] GLSCOM Comments POC G3 - PUL  Collection Time: 11/23/19  5:11 AM  
 Result Value Ref Range FIO2 (POC) 70 % pH (POC) 7.382 7.35 - 7.45    
 pCO2 (POC) 49.1 (H) 35.0 - 45.0 MMHG  
 pO2 (POC) 168 (H) 80 - 100 MMHG  
 HCO3 (POC) 29.2 (H) 22 - 26 MMOL/L  
 sO2 (POC) 99 (H) 92 - 97 % Base excess (POC) 4 mmol/L Site RIGHT RADIAL Device: VENT Mode ASSIST CONTROL Set Rate 24 bpm  
 PEEP/CPAP (POC) 7 cmH2O  
 PIP (POC) 16 Allens test (POC) YES Inspiratory Time 1 sec Specimen type (POC) ARTERIAL Total resp. rate 25 GLUCOSE, POC Collection Time: 11/23/19  6:06 AM  
Result Value Ref Range Glucose (POC) 126 (H) 65 - 100 mg/dL Performed by Yaya Machuca Collection Time: 11/23/19  6:07 AM  
Result Value Ref Range Glucose 126 mg/dL Insulin order 4.0 units/hour Insulin adminstered 4.0 units/hour Multiplier 0.060 Low target 95 mg/dL High target 130 mg/dL D50 order 0.0 ml  
 D50 administered 0.00 ml Minutes until next BG 60 min Order initials [facility] Administered initials [facility] GLSCOM Comments TYPE + CROSSMATCH Collection Time: 11/23/19  6:24 AM  
Result Value Ref Range Crossmatch Expiration 11/26/2019 ABO/Rh(D) O POSITIVE Antibody screen NEG Unit number [id number] Blood component type RC LR,2 Unit division 00 Status of unit ISSUED Crossmatch result Compatible GLUCOSE, POC Collection Time: 11/23/19  7:15 AM  
Result Value Ref Range Glucose (POC) 107 (H) 65 - 100 mg/dL Performed by Дмитрий Díaz Collection Time: 11/23/19  7:15 AM  
Result Value Ref Range Glucose 107 mg/dL Insulin order 2.8 units/hour Insulin adminstered 2.8 units/hour Multiplier 0.060 Low target 95 mg/dL High target 130 mg/dL D50 order 0.0 ml  
 D50 administered 0.00 ml Minutes until next BG 60 min Order initials ach Administered initials ach GLSCOM Comments GLUCOSE, POC  Collection Time: 11/23/19  8:18 AM  
 Result Value Ref Range Glucose (POC) 85 65 - 100 mg/dL Performed by Denise Figueroa Collection Time: 11/23/19  8:19 AM  
Result Value Ref Range Glucose 85 mg/dL Insulin order 1.2 units/hour Insulin adminstered 1.2 units/hour Multiplier 0.048 Low target 95 mg/dL High target 130 mg/dL D50 order 0.0 ml  
 D50 administered 0.00 ml Minutes until next BG 60 min Order initials EW Administered initials EW   
 GLSCOM Comments GLUCOSE, POC Collection Time: 11/23/19  9:17 AM  
Result Value Ref Range Glucose (POC) 80 65 - 100 mg/dL Performed by Denise Figueroa Collection Time: 11/23/19  9:17 AM  
Result Value Ref Range Glucose 80 mg/dL Insulin order 0.0 units/hour Insulin adminstered 0.0 units/hour Multiplier 0.038 Low target 95 mg/dL High target 130 mg/dL D50 order 8.0 ml  
 D50 administered 8.00 ml Minutes until next BG 15 min Order initials EW Administered initials EW   
 GLSCOM Comments GLUCOSE, POC Collection Time: 11/23/19  9:35 AM  
Result Value Ref Range Glucose (POC) 84 65 - 100 mg/dL Performed by Bola Bradley Collection Time: 11/23/19  9:36 AM  
Result Value Ref Range Glucose 84 mg/dL Insulin order 0.7 units/hour Insulin adminstered 0.0 units/hour Multiplier 0.028 Low target 95 mg/dL High target 130 mg/dL D50 order 0.0 ml  
 D50 administered 0.00 ml Minutes until next BG 60 min Order initials EW Administered initials EW   
 GLSCOM Comments GLUCOSE, POC Collection Time: 11/23/19 10:39 AM  
Result Value Ref Range Glucose (POC) 147 (H) 65 - 100 mg/dL Performed by Bola Bradley Collection Time: 11/23/19 10:39 AM  
Result Value Ref Range Glucose 147 mg/dL Insulin order 3.3 units/hour Insulin adminstered 3.3 units/hour Multiplier 0.038 Low target 95 mg/dL High target 130 mg/dL D50 order 0.0 ml  
 D50 administered 0.00 ml Minutes until next BG 60 min Order initials EW Administered initials EW   
 GLSCOM Comments GLUCOSE, POC Collection Time: 11/23/19 11:42 AM  
Result Value Ref Range Glucose (POC) 216 (H) 65 - 100 mg/dL Performed by Dimitri Ferreira Collection Time: 11/23/19 11:42 AM  
Result Value Ref Range Glucose 216 mg/dL Insulin order 7.5 units/hour Insulin adminstered 7.5 units/hour Multiplier 0.048 Low target 95 mg/dL High target 130 mg/dL D50 order 0.0 ml  
 D50 administered 0.00 ml Minutes until next BG 60 min Order initials EW Administered initials EW   
 GLSCOM Comments GLUCOSE, POC Collection Time: 11/23/19 12:45 PM  
Result Value Ref Range Glucose (POC) 199 (H) 65 - 100 mg/dL Performed by Dimitri Ferreira Collection Time: 11/23/19 12:46 PM  
Result Value Ref Range Glucose 199 mg/dL Insulin order 8.1 units/hour Insulin adminstered 8.1 units/hour Multiplier 0.058 Low target 95 mg/dL High target 130 mg/dL D50 order 0.0 ml  
 D50 administered 0.00 ml Minutes until next BG 60 min Order initials EW Administered initials EW   
 GLSCOM Comments METABOLIC PANEL, COMPREHENSIVE Collection Time: 11/23/19  1:49 PM  
Result Value Ref Range Sodium 141 136 - 145 mmol/L Potassium 4.1 3.5 - 5.1 mmol/L Chloride 105 97 - 108 mmol/L  
 CO2 31 21 - 32 mmol/L Anion gap 5 5 - 15 mmol/L Glucose 157 (H) 65 - 100 mg/dL BUN 60 (H) 6 - 20 MG/DL Creatinine 1.52 (H) 0.70 - 1.30 MG/DL  
 BUN/Creatinine ratio 39 (H) 12 - 20 GFR est AA 55 (L) >60 ml/min/1.73m2 GFR est non-AA 45 (L) >60 ml/min/1.73m2 Calcium 8.2 (L) 8.5 - 10.1 MG/DL Bilirubin, total 0.8 0.2 - 1.0 MG/DL  
 ALT (SGPT) 65 12 - 78 U/L  
 AST (SGOT) 33 15 - 37 U/L Alk. phosphatase 111 45 - 117 U/L Protein, total 6.2 (L) 6.4 - 8.2 g/dL Albumin 3.0 (L) 3.5 - 5.0 g/dL Globulin 3.2 2.0 - 4.0 g/dL A-G Ratio 0.9 (L) 1.1 - 2.2 PLATELET COUNT Collection Time: 11/23/19  1:49 PM  
Result Value Ref Range PLATELET 51 (L) 048 - 400 K/uL GLUCOSE, POC Collection Time: 11/23/19  1:49 PM  
Result Value Ref Range Glucose (POC) 159 (H) 65 - 100 mg/dL Performed by Héctor Chahal Collection Time: 11/23/19  1:50 PM  
Result Value Ref Range Glucose 159 mg/dL Insulin order 6.7 units/hour Insulin adminstered 6.7 units/hour Multiplier 0.068 Low target 95 mg/dL High target 130 mg/dL D50 order 0.0 ml  
 D50 administered 0.00 ml Minutes until next BG 60 min Order initials EW Administered initials EW   
 GLSCOM Comments MAGNESIUM Collection Time: 11/23/19  1:51 PM  
Result Value Ref Range Magnesium 2.2 1.6 - 2.4 mg/dL HGB & HCT Collection Time: 11/23/19  1:51 PM  
Result Value Ref Range HGB 7.5 (L) 12.1 - 17.0 g/dL HCT 24.3 (L) 36.6 - 50.3 % GLUCOSE, POC Collection Time: 11/23/19  2:55 PM  
Result Value Ref Range Glucose (POC) 113 (H) 65 - 100 mg/dL Performed by [place] Jillyn Meghann Collection Time: 11/23/19  2:55 PM  
Result Value Ref Range Glucose 113 mg/dL Insulin order 3.6 units/hour Insulin adminstered 3.6 units/hour Multiplier 0.068 Low target 95 mg/dL High target 130 mg/dL D50 order 0.0 ml  
 D50 administered 0.00 ml Minutes until next BG 60 min Order initials EW Administered initials EW   
 GLSCOM Comments GLUCOSE, POC Collection Time: 11/23/19  4:04 PM  
Result Value Ref Range Glucose (POC) 100 65 - 100 mg/dL Performed by Héctor Chahal Collection Time: 11/23/19  4:06 PM  
Result Value Ref Range Glucose 100 mg/dL Insulin order 2.7 units/hour Insulin adminstered 2.7 units/hour Multiplier 0.068 Low target 95 mg/dL High target 130 mg/dL  D50 order 0.0 ml  
 D50 administered 0.00 ml Minutes until next BG 60 min Order initials EW Administered initials EW   
 GLSCOM Comments GLUCOSE, POC Collection Time: 11/23/19  5:01 PM  
Result Value Ref Range Glucose (POC) 92 65 - 100 mg/dL Performed by Ramya Maddox Collection Time: 11/23/19  5:02 PM  
Result Value Ref Range Glucose 92 mg/dL Insulin order 1.7 units/hour Insulin adminstered 1.7 units/hour Multiplier 0.054 Low target 95 mg/dL High target 130 mg/dL D50 order 0.0 ml  
 D50 administered 0.00 ml Minutes until next BG 60 min Order initials EW Administered initials EW   
 GLSCOM Comments Jessica Laboy MD 
[facility] [address], [address] [facility] Phone - [phone] Fax - [phone] 
www. [email or web address]

## 2019-11-23 NOTE — PROGRESS NOTES
Hematology-Oncology Progress Note Cindy Cabrera 1950 
[id number] 
11/23/2019 Subjective: No events No bleeding Allergies: Scallops Current Facility-Administered Medications Medication Dose Route Frequency Provider Last Rate Last Dose  
 0.9% sodium chloride infusion 250 mL  250 mL IntraVENous PRN Paul Greer MD      
 bumetanide (BUMEX) injection 1.5 mg  1.5 mg IntraVENous Q8H Rocio Voss MD   1.5 mg at 11/23/19 0530  
 methylPREDNISolone (PF) (SOLU-MEDROL) injection 20 mg  20 mg IntraVENous DAILY Hanny Boo, NP   20 mg at 11/23/19 9894  QUEtiapine (SEROquel) tablet 50 mg  50 mg Oral BID Hanny Amel, NP   50 mg at 11/23/19 0836  
 0.9% sodium chloride infusion 250 mL  250 mL IntraVENous PRN Estella Khan MD      
 hydrALAZINE (APRESOLINE) 20 mg/mL injection 10 mg  10 mg IntraVENous Q6H PRN Hanny Boo, NP      
 piperacillin-tazobactam (ZOSYN) 3.375 g in 0.9% sodium chloride (MBP/ADV) 100 mL  3.375 g IntraVENous Q8H Carrie Marks MD 25 mL/hr at 11/23/19 0930 3.375 g at 11/23/19 0930  morphine injection 2 mg  2 mg IntraVENous Q2H PRN [place] Amel, NP   2 mg at 11/23/19 8462  LORazepam (ATIVAN) injection 1-2 mg  1-2 mg IntraVENous Q4H PRN [place] Amel, NP   2 mg at 11/23/19 4050  balsam peru-castor oil (VENELEX) ointment   Topical BID Jannet HUGHES MD      
 insulin regular (NOVOLIN R, HUMULIN R) 100 Units in 0.9% sodium chloride 100 mL infusion  1-50 Units/hr IntraVENous TITRATE Hanny Boo, NP 8.1 mL/hr at 11/23/19 1246 8.1 Units/hr at 11/23/19 1246  
 0.9% sodium chloride infusion  3 mL/hr IntraVENous CONTINUOUS Kaleb Jamar Davis MD   Stopped at 11/22/19 0650  
 senna-docusate (PERICOLACE) 8.6-50 mg per tablet 1 Tab  1 Tab Oral BID Chet Force, NP   1 Tab at 11/23/19 9847  nystatin (MYCOSTATIN) 100,000 unit/mL oral suspension 500,000 Units 500,000 Units Oral QID Para Roe, NP   500,000 Units at 11/23/19 5351  
 insulin lispro (HUMALOG) injection   SubCUTAneous Q6H Para Roe, NP   Stopped at 11/15/19 0100  
 guaiFENesin (ROBITUSSIN) 100 mg/5 mL oral liquid 400 mg  400 mg Per NG tube Q6H PRN Spencer Roe, NP   400 mg at 11/22/19 1844  
 0.9% sodium chloride infusion  10 mL/hr IntraVENous CONTINUOUS Mariana Raman Meriel Alpers, MD   Stopped at 11/14/19 1125  pantoprazole (PROTONIX) 40 mg in sodium chloride 0.9% 10 mL injection  40 mg IntraVENous Q12H Gicaitlyn Merlos [place]   40 mg at 11/23/19 2887  
 balsam peru-castor oil (VENELEX) ointment   Topical PRN Edelmira Thorpe MD      
 acetylcysteine (MUCOMYST) 200 mg/mL (20 %) solution 200 mg  200 mg Nebulization BID RT Kike Dennis MD   200 mg at 11/23/19 7516  PHENYLephrine (ANTHONY-SYNEPHRINE) 30 mg in 0.9% sodium chloride 250 mL infusion   mcg/min IntraVENous TITRATE Mariana Raman Meriel Alpers, MD   Stopped at 11/23/19 6800  propofol (DIPRIVAN) infusion  0-50 mcg/kg/min IntraVENous TITRATE Papo Dinh MD   Stopped at 11/22/19 6290  dexmedeTOMidine (PRECEDEX) 400 mcg in 0.9% sodium chloride 100 mL infusion  0.2-0.9 mcg/kg/hr IntraVENous TITRATE Frankie Hebert NP 12.6 mL/hr at 11/23/19 0813 0.9 mcg/kg/hr at 11/23/19 1261  
 methyl salicylate-menthol (BENGAY) 15-10 % cream   Topical PRN Edelmira Thorpe MD      
 ferrous sulfate 300 mg (60 mg iron)/5 mL oral syrup 300 mg  300 mg Per NG tube DAILY WITH BREAKFAST Lo Aguero MD   300 mg at 11/23/19 6405  [Held by provider] heparin (porcine) injection 5,000 Units  5,000 Units SubCUTAneous Q8H Frankie Hebert NP   5,000 Units at 11/15/19 8870  
 multivit-folic acid-herbal 106 (WELLESSE PLUS) oral liquid 30 mL  30 mL Per NG tube DAILY Frankie Hebert NP   30 mL at 11/23/19 1637  acetaminophen (TYLENOL) tablet 650 mg  650 mg Oral Q4H PRN Fabien Rose MD   650 mg at 11/22/19 1814  arformoterol (BROVANA) neb solution 15 mcg  15 mcg Nebulization BID RT Hien Hassan, NP   15 mcg at 11/23/19 3145 And  budesonide (PULMICORT) 500 mcg/2 ml nebulizer suspension  500 mcg Nebulization BID RT Hien Hassan, NP   500 mcg at 11/23/19 0725  prochlorperazine (COMPAZINE) with saline injection 10 mg  10 mg IntraVENous Q6H PRN CHELSEA Shaw   5 mg at 10/25/19 1339  phenol throat spray (CHLORASEPTIC) 1 Spray  1 Spray Oral PRN Monty Ray MD   1 Spray at 10/24/19 1110  
 sertraline (ZOLOFT) tablet 100 mg  100 mg Oral DAILY CHELSEA Shaw   100 mg at 11/23/19 3978  alteplase (CATHFLO) 1 mg in sterile water (preservative free) 1 mL injection  1 mg InterCATHeter PRN Mariana Roth PA      
 bacitracin 500 unit/gram packet 1 Packet  1 Packet Topical PRN Ashlyn Shaw   1 Packet at 11/22/19 0617  
 sodium chloride (NS) flush 5-40 mL  5-40 mL IntraVENous Q8H Damián Cuevas PA   10 mL at 11/23/19 1050  sodium chloride (NS) flush 5-40 mL  5-40 mL IntraVENous PRN Damián Cuevas PA   10 mL at 11/22/19 1248  oxyCODONE IR (ROXICODONE) tablet 5 mg  5 mg Oral Q4H PRN CHELSEA Shaw   5 mg at 11/22/19 2047  
 oxyCODONE IR (ROXICODONE) tablet 10 mg  10 mg Oral Q4H PRN Damián Cuevas PA   10 mg at 10/25/19 0700  
 naloxone [facility]) injection 0.4 mg  0.4 mg IntraVENous PRN Damián Cuevas PA   0.4 mg at 11/14/19 1126  ondansetron (ZOFRAN) injection 4 mg  4 mg IntraVENous Q4H PRN Damián Cuevas PA   4 mg at 10/26/19 2342  albuterol (PROVENTIL VENTOLIN) nebulizer solution 2.5 mg  2.5 mg Nebulization Q4H PRN CHELSEA Shaw   2.5 mg at 11/22/19 1417  [Held by provider] aspirin chewable tablet 81 mg  81 mg Oral DAILY Long, CHELSEA Mccord   Stopped at 11/16/19 0900  chlorhexidine (PERIDEX) 0.12 % mouthwash 10 mL  10 mL Oral Q12H CHELSEA Shaw   10 mL at 11/23/19 0840  calcium chloride 1 g in 0.9% sodium chloride 100 mL IVPB  1 g IntraVENous PRN Shirley Roth PA      
 bisacodyl (DULCOLAX) suppository 10 mg  10 mg Rectal DAILY PRN Shirley Roth PA      
 polyethylene glycol [facility]) packet 17 g  17 g Oral DAILY Beth Garber, [address] Heller   Stopped at 11/14/19 1059  ELECTROLYTE REPLACEMENT NOTE: Nurse to review Serum Potassium and Magnesuim levels and Initiate Electrolyte Replacement Protocol as needed  1 Each Other PRN Shirley Roth PA      
 glucose chewable tablet 16 g  4 Tab Oral PRN Shirley Roth PA      
 glucagon (GLUCAGEN) injection 1 mg  1 mg IntraMUSCular PRN CHELSEA Thakur      
 dextrose 10% infusion 0-250 mL  0-250 mL IntraVENous PRN CHELSEA Thakur 750 mL/hr at 11/21/19 2012 75 mL at 11/21/19 2012  melatonin tablet 3 mg  3 mg Oral QHS PRN CHELSEA Thakur   3 mg at 10/28/19 2135  diphenhydrAMINE (BENADRYL) injection 25 mg  25 mg IntraVENous Q6H PRN Shirley Roth PA      
 diphenhydrAMINE (BENADRYL) capsule 25 mg  25 mg Oral Q6H PRN Shirley Roth PA      
 pravastatin (PRAVACHOL) tablet 40 mg  40 mg Oral QHS CHELSEA Thakur   40 mg at 11/22/19 2100 Objective:  
 
Patient Vitals for the past 24 hrs: 
 BP Temp Pulse Resp SpO2 Weight 11/23/19 1300 104/58  89 24 99 %   
11/23/19 1200 109/55 97.4 °F (36.3 °C) 90 23 95 %   
11/23/19 1139 112/55 98.4 °F (36.9 °C) 91 26 94 %   
11/23/19 1114   93 27 92 %   
11/23/19 1100 118/57  97 30 91 %   
11/23/19 1015 122/56  94 30 90 %   
11/23/19 1006   99 23 94 %   
11/23/19 1000 134/59 98.3 °F (36.8 °C) 94 (!) 35 (!) 86 %   
11/23/19 0945 148/62 98.9 °F (37.2 °C) 90 (!) 35 90 %   
11/23/19 0923 126/66 98 °F (36.7 °C) 82 (!) 32 91 %   
11/23/19 0906 116/63 99 °F (37.2 °C) 79 (!) 32 94 %   
11/23/19 0900 114/59  82 27 95 %   
11/23/19 0800 116/60 98.1 °F (36.7 °C) 83 25 91 %   
11/23/19 0735   82 29 90 %   
11/23/19 0734     90 %   
 11/23/19 0700 114/59  81 25 95 %   
11/23/19 0600 120/61  80 21 92 %   
11/23/19 0500 113/57  82 27 96 %   
11/23/19 0445      62.4 kg (137 lb 9.1 oz)  
11/23/19 0400 101/57 97.5 °F (36.4 °C) 77 21 96 %   
11/23/19 0317   78 26 97 %   
11/23/19 0300 97/58  73 28 97 %   
11/23/19 0200 114/59  78 25 97 %   
11/23/19 0100 101/55  84 27 98 %   
11/23/19 0030 (!) 89/53  83 29 97 %   
11/23/19 0006 (!) 75/48  79 26 97 %   
11/23/19 0001  97.6 °F (36.4 °C) 78 24 97 %   
11/23/19 0000 (!) 76/49       
11/22/19 2339   77 30 99 %   
11/22/19 2338     99 %   
11/22/19 2300 98/58  79 23 99 %   
11/22/19 2200 (!) 88/55  76 23 99 %   
11/22/19 2100 106/59  85 21 97 %   
11/22/19 2000 104/52 99.2 °F (37.3 °C) 93 25 96 %   
11/22/19 1906   95 26 91 %   
11/22/19 1901     94 %   
11/22/19 1900 117/53  (!) 102 (!) 31 95 %   
11/22/19 1800 145/64  92 (!) 33 93 %   
11/22/19 1700 96/58  76 24 99 %   
11/22/19 1600 132/62 98.8 °F (37.1 °C) 88 (!) 31 96 %   
11/22/19 1528   88 28 97 %   
11/22/19 1500 (!) 89/49  88 29 97 %   
11/22/19 1400 97/50  87 21 96 %  Gen: ventilated through trach HEENT: PERRL, Sclerae anicteric Cv: RRR without m/r/g Pulm: CTA bilaterally Abd: NABS, NTND, No HSM Ext: No c/c/e. No violaceous discoloration of digits. Available labs reviewed: 
Labs:   
Recent Results (from the past 24 hour(s)) GLUCOSE, POC Collection Time: 11/22/19  2:03 PM  
Result Value Ref Range Glucose (POC) 108 (H) 65 - 100 mg/dL Performed by Seble Maynard Collection Time: 11/22/19  2:03 PM  
Result Value Ref Range Glucose 108 mg/dL Insulin order 3.9 units/hour Insulin adminstered 3.9 units/hour Multiplier 0.082 Low target 95 mg/dL High target 130 mg/dL D50 order 0.0 ml  
 D50 administered 0.00 ml Minutes until next BG 60 min Order initials bld Administered initials bld GLSCOM Comments GLUCOSE, POC Collection Time: 11/22/19  3:09 PM  
Result Value Ref Range Glucose (POC) 107 (H) 65 - 100 mg/dL Performed by Fabien Moses Collection Time: 11/22/19  3:09 PM  
Result Value Ref Range Glucose 107 mg/dL Insulin order 3.9 units/hour Insulin adminstered 3.9 units/hour Multiplier 0.082 Low target 95 mg/dL High target 130 mg/dL D50 order 0.0 ml  
 D50 administered 0.00 ml Minutes until next BG 60 min Order initials ha Administered initials ha GLSCOM Comments METABOLIC PANEL, COMPREHENSIVE Collection Time: 11/22/19  4:17 PM  
Result Value Ref Range Sodium 143 136 - 145 mmol/L Potassium 4.1 3.5 - 5.1 mmol/L Chloride 107 97 - 108 mmol/L  
 CO2 31 21 - 32 mmol/L Anion gap 5 5 - 15 mmol/L Glucose 72 65 - 100 mg/dL BUN 58 (H) 6 - 20 MG/DL Creatinine 1.31 (H) 0.70 - 1.30 MG/DL  
 BUN/Creatinine ratio 44 (H) 12 - 20 GFR est AA >60 >60 ml/min/1.73m2 GFR est non-AA 54 (L) >60 ml/min/1.73m2 Calcium 8.1 (L) 8.5 - 10.1 MG/DL Bilirubin, total 0.6 0.2 - 1.0 MG/DL  
 ALT (SGPT) 63 12 - 78 U/L  
 AST (SGOT) 33 15 - 37 U/L Alk. phosphatase 99 45 - 117 U/L Protein, total 5.7 (L) 6.4 - 8.2 g/dL Albumin 2.6 (L) 3.5 - 5.0 g/dL Globulin 3.1 2.0 - 4.0 g/dL A-G Ratio 0.8 (L) 1.1 - 2.2 PLATELET COUNT Collection Time: 11/22/19  4:17 PM  
Result Value Ref Range PLATELET 50 (L) 843 - 400 K/uL GLUCOSE, POC Collection Time: 11/22/19  4:18 PM  
Result Value Ref Range Glucose (POC) 79 65 - 100 mg/dL Performed by Fabien Vicente GLUCOSE, POC Collection Time: 11/22/19  4:20 PM  
Result Value Ref Range Glucose (POC) 82 65 - 100 mg/dL Performed by Fabien Moses Collection Time: 11/22/19  4:20 PM  
Result Value Ref Range Glucose 82 mg/dL Insulin order 1.5 units/hour Insulin adminstered 1.5 units/hour Multiplier 0.066 Low target 95 mg/dL High target 130 mg/dL D50 order 0.0 ml  
 D50 administered 0.00 ml Minutes until next BG 60 min Order initials ha Administered initials ha GLSCOM Comments GLUCOSE, POC Collection Time: 11/22/19  5:24 PM  
Result Value Ref Range Glucose (POC) 76 65 - 100 mg/dL Performed by [facility] Collection Time: 11/22/19  5:24 PM  
Result Value Ref Range Glucose 76 mg/dL Insulin order 0.0 units/hour Insulin adminstered 0.0 units/hour Multiplier 0.053 Low target 95 mg/dL High target 130 mg/dL D50 order 10.0 ml  
 D50 administered 10.00 ml Minutes until next BG 15 min Order initials ha Administered initials ha GLSCOM Comments GLUCOSE, POC Collection Time: 11/22/19  5:41 PM  
Result Value Ref Range Glucose (POC) 103 (H) 65 - 100 mg/dL Performed by [facility] Collection Time: 11/22/19  5:42 PM  
Result Value Ref Range Glucose 103 mg/dL Insulin order 1.5 units/hour Insulin adminstered 1.5 units/hour Multiplier 0.053 Low target 95 mg/dL High target 130 mg/dL D50 order 0.0 ml  
 D50 administered 0.00 ml Minutes until next BG 60 min Order initials ha Administered initials ha GLSCOM Comments GLUCOSE, POC Collection Time: 11/22/19  6:55 PM  
Result Value Ref Range Glucose (POC) 135 (H) 65 - 100 mg/dL Performed by [facility] Collection Time: 11/22/19  6:55 PM  
Result Value Ref Range Glucose 135 mg/dL Insulin order 4.7 units/hour Insulin adminstered 4.7 units/hour Multiplier 0.063 Low target 95 mg/dL High target 130 mg/dL D50 order 0.0 ml  
 D50 administered 0.00 ml Minutes until next BG 60 min Order initials ha Administered initials ha GLSCOM Comments GLUCOSE, POC Collection Time: 11/22/19  7:57 PM  
Result Value Ref Range Glucose (POC) 123 (H) 65 - 100 mg/dL Performed by Antonioemmanuel Spurharish Collection Time: 11/22/19  7:58 PM  
Result Value Ref Range Glucose 123 mg/dL Insulin order 4.0 units/hour Insulin adminstered 4.0 units/hour Multiplier 0.063 Low target 95 mg/dL High target 130 mg/dL D50 order 0.0 ml  
 D50 administered 0.00 ml Minutes until next BG 60 min Order [facility] Administered initials murtaza Block GLUCOSE, POC Collection Time: 11/22/19  8:56 PM  
Result Value Ref Range Glucose (POC) 114 (H) 65 - 100 mg/dL Performed by Heavenly ClickMechanicharish Collection Time: 11/22/19  8:56 PM  
Result Value Ref Range Glucose 114 mg/dL Insulin order 3.4 units/hour Insulin adminstered 3.4 units/hour Multiplier 0.063 Low target 95 mg/dL High target 130 mg/dL D50 order 0.0 ml  
 D50 administered 0.00 ml Minutes until next BG 60 min Order [facility] Administered initials murtzaa Block GLUCOSE, POC Collection Time: 11/22/19  9:58 PM  
Result Value Ref Range Glucose (POC) 82 65 - 100 mg/dL Performed by Heavenly [employer]Me[employer]arish Collection Time: 11/22/19  9:58 PM  
Result Value Ref Range Glucose 82 mg/dL Insulin order 1.1 units/hour Insulin adminstered 1.1 units/hour Multiplier 0.050 Low target 95 mg/dL High target 130 mg/dL D50 order 0.0 ml  
 D50 administered 0.00 ml Minutes until next BG 60 min [facility] Administered initials murtaza Block GLUCOSE, POC Collection Time: 11/22/19 11:07 PM  
Result Value Ref Range Glucose (POC) 88 65 - 100 mg/dL Performed by Heavenly [employer]Mechan[employer]arish Collection Time: 11/22/19 11:08 PM  
Result Value Ref Range Glucose 88 mg/dL Insulin order 1.1 units/hour Insulin adminstered 1.1 units/hour Multiplier 0.040 Low target 95 mg/dL High target 130 mg/dL D50 order 0.0 ml  
 D50 administered 0.00 ml Minutes until next BG 60 min Order initials [facility] Administered initials murtaza GLSCOM Comments GLUCOSE, POC Collection Time: 11/23/19 12:09 AM  
Result Value Ref Range Glucose (POC) 121 (H) 65 - 100 mg/dL Performed by Tex Ojeda   
GLUCOSE, POC Collection Time: 11/23/19 12:10 AM  
Result Value Ref Range Glucose (POC) 127 (H) 65 - 100 mg/dL Performed by Brit Cancino Collection Time: 11/23/19 12:10 AM  
Result Value Ref Range Glucose 127 mg/dL Insulin order 2.7 units/hour Insulin adminstered 2.7 units/hour Multiplier 0.040 Low target 95 mg/dL High target 130 mg/dL D50 order 0.0 ml  
 D50 administered 0.00 ml Minutes until next BG 60 min Order initials ach Administered initials ach GLSCOM Comments GLUCOSE, POC Collection Time: 11/23/19  1:12 AM  
Result Value Ref Range Glucose (POC) 134 (H) 65 - 100 mg/dL Performed by Tex Ojeda   
GLUCOSE, POC Collection Time: 11/23/19  1:13 AM  
Result Value Ref Range Glucose (POC) 134 (H) 65 - 100 mg/dL Performed by Brit Cancino Collection Time: 11/23/19  1:13 AM  
Result Value Ref Range Glucose 134 mg/dL Insulin order 3.7 units/hour Insulin adminstered 3.7 units/hour Multiplier 0.050 Low target 95 mg/dL High target 130 mg/dL D50 order 0.0 ml  
 D50 administered 0.00 ml Minutes until next BG 60 min Order initials [facility] Administered initials edward JACKELINEOM Comments GLUCOSE, POC Collection Time: 11/23/19  2:14 AM  
Result Value Ref Range Glucose (POC) 112 (H) 65 - 100 mg/dL Performed by Brit Cancino Collection Time: 11/23/19  2:15 AM  
Result Value Ref Range Glucose 112 mg/dL Insulin order 2.6 units/hour Insulin adminstered 2.6 units/hour Multiplier 0.050 Low target 95 mg/dL High target 130 mg/dL D50 order 0.0 ml  
 D50 administered 0.00 ml Minutes until next BG 60 min Order initials [facility] Administered initials [facility] GLSCOM Comments CBC W/O DIFF Collection Time: 11/23/19  3:15 AM  
Result Value Ref Range WBC 7.2 4.1 - 11.1 K/uL  
 RBC 2.00 (L) 4.10 - 5.70 M/uL HGB 6.0 (L) 12.1 - 17.0 g/dL HCT 19.9 (L) 36.6 - 50.3 % MCV 99.5 (H) 80.0 - 99.0 FL  
 MCH 30.0 26.0 - 34.0 PG  
 MCHC 30.2 30.0 - 36.5 g/dL  
 RDW 15.5 (H) 11.5 - 14.5 % PLATELET 46 (LL) 018 - 400 K/uL NRBC 0.0 0  WBC ABSOLUTE NRBC 0.00 0.00 - 0.01 K/uL MAGNESIUM Collection Time: 11/23/19  3:15 AM  
Result Value Ref Range Magnesium 1.9 1.6 - 2.4 mg/dL NT-PRO BNP Collection Time: 11/23/19  3:15 AM  
Result Value Ref Range NT pro-BNP 29,038 (H) <878 PG/ML  
METABOLIC PANEL, COMPREHENSIVE Collection Time: 11/23/19  3:15 AM  
Result Value Ref Range Sodium 141 136 - 145 mmol/L Potassium 3.7 3.5 - 5.1 mmol/L Chloride 105 97 - 108 mmol/L  
 CO2 30 21 - 32 mmol/L Anion gap 6 5 - 15 mmol/L Glucose 112 (H) 65 - 100 mg/dL BUN 57 (H) 6 - 20 MG/DL Creatinine 1.36 (H) 0.70 - 1.30 MG/DL  
 BUN/Creatinine ratio 42 (H) 12 - 20 GFR est AA >60 >60 ml/min/1.73m2 GFR est non-AA 52 (L) >60 ml/min/1.73m2 Calcium 7.8 (L) 8.5 - 10.1 MG/DL Bilirubin, total 0.6 0.2 - 1.0 MG/DL  
 ALT (SGPT) 62 12 - 78 U/L  
 AST (SGOT) 34 15 - 37 U/L Alk. phosphatase 97 45 - 117 U/L Protein, total 5.7 (L) 6.4 - 8.2 g/dL Albumin 2.7 (L) 3.5 - 5.0 g/dL Globulin 3.0 2.0 - 4.0 g/dL A-G Ratio 0.9 (L) 1.1 - 2.2 PHOSPHORUS Collection Time: 11/23/19  3:15 AM  
Result Value Ref Range Phosphorus 3.8 2.6 - 4.7 MG/DL  
GLUCOSE, POC Collection Time: 11/23/19  3:16 AM  
Result Value Ref Range Glucose (POC) 115 (H) 65 - 100 mg/dL Performed by Lena Rivero Collection Time: 11/23/19  3:17 AM  
Result Value Ref Range Glucose 115 mg/dL Insulin order 2.8 units/hour Insulin adminstered 2.8 units/hour Multiplier 0.050 Low target 95 mg/dL High target 130 mg/dL D50 order 0.0 ml  
 D50 administered 0.00 ml Minutes until next BG 60 min Order [facility] Administered initials edward ASHELY Block GLUCOSE, POC Collection Time: 11/23/19  4:17 AM  
Result Value Ref Range Glucose (POC) 125 (H) 65 - 100 mg/dL Performed by Ulises Garcia Collection Time: 11/23/19  4:18 AM  
Result Value Ref Range Glucose 125 mg/dL Insulin order 3.3 units/hour Insulin adminstered 3.3 units/hour Multiplier 0.050 Low target 95 mg/dL High target 130 mg/dL D50 order 0.0 ml  
 D50 administered 0.00 ml Minutes until next BG 60 min Order [facility] Administered initials murtaza Block CBC W/O DIFF Collection Time: 11/23/19  4:20 AM  
Result Value Ref Range WBC 7.0 4.1 - 11.1 K/uL  
 RBC 1.98 (L) 4.10 - 5.70 M/uL HGB 6.1 (L) 12.1 - 17.0 g/dL HCT 19.8 (L) 36.6 - 50.3 % .0 (H) 80.0 - 99.0 FL  
 MCH 30.8 26.0 - 34.0 PG  
 MCHC 30.8 30.0 - 36.5 g/dL  
 RDW 15.3 (H) 11.5 - 14.5 % PLATELET 48 (LL) 787 - 400 K/uL NRBC 0.0 0  WBC ABSOLUTE NRBC 0.00 0.00 - 0.01 K/uL GLUCOSE, POC Collection Time: 11/23/19  5:08 AM  
Result Value Ref Range Glucose (POC) 146 (H) 65 - 100 mg/dL Performed by Ulises Garcia Collection Time: 11/23/19  5:08 AM  
Result Value Ref Range Glucose 146 mg/dL Insulin order 5.2 units/hour Insulin adminstered 5.2 units/hour Multiplier 0.060 Low target 95 mg/dL High target 130 mg/dL D50 order 0.0 ml  
 D50 administered 0.00 ml Minutes until next BG 60 min Order initials [facility] Administered initials [facility] GLSCOM Comments POC G3 - PUL Collection Time: 11/23/19  5:11 AM  
Result Value Ref Range FIO2 (POC) 70 % pH (POC) 7.382 7.35 - 7.45    
 pCO2 (POC) 49.1 (H) 35.0 - 45.0 MMHG pO2 (POC) 168 (H) 80 - 100 MMHG  
 HCO3 (POC) 29.2 (H) 22 - 26 MMOL/L  
 sO2 (POC) 99 (H) 92 - 97 % Base excess (POC) 4 mmol/L Site RIGHT RADIAL Device: VENT Mode ASSIST CONTROL Set Rate 24 bpm  
 PEEP/CPAP (POC) 7 cmH2O  
 PIP (POC) 16 Allens test (POC) YES Inspiratory Time 1 sec Specimen type (POC) ARTERIAL Total resp. rate 25 GLUCOSE, POC Collection Time: 11/23/19  6:06 AM  
Result Value Ref Range Glucose (POC) 126 (H) 65 - 100 mg/dL Performed by Ulises Garcia Collection Time: 11/23/19  6:07 AM  
Result Value Ref Range Glucose 126 mg/dL Insulin order 4.0 units/hour Insulin adminstered 4.0 units/hour Multiplier 0.060 Low target 95 mg/dL High target 130 mg/dL D50 order 0.0 ml  
 D50 administered 0.00 ml Minutes until next BG 60 min Order initials [facility] Administered initials [facility] GLSCOM Comments TYPE + CROSSMATCH Collection Time: 11/23/19  6:24 AM  
Result Value Ref Range Crossmatch Expiration 11/26/2019 ABO/Rh(D) O POSITIVE Antibody screen NEG Unit number [id number] Blood component type RC LR,2 Unit division 00 Status of unit ISSUED Crossmatch result Compatible GLUCOSE, POC Collection Time: 11/23/19  7:15 AM  
Result Value Ref Range Glucose (POC) 107 (H) 65 - 100 mg/dL Performed by Tavia Vaughan Collection Time: 11/23/19  7:15 AM  
Result Value Ref Range Glucose 107 mg/dL Insulin order 2.8 units/hour Insulin adminstered 2.8 units/hour Multiplier 0.060 Low target 95 mg/dL High target 130 mg/dL D50 order 0.0 ml  
 D50 administered 0.00 ml Minutes until next BG 60 min Order initials ach Administered initials ach GLSCOM Comments GLUCOSE, POC Collection Time: 11/23/19  8:18 AM  
Result Value Ref Range Glucose (POC) 85 65 - 100 mg/dL Performed by Aminata Vaughan  
 Collection Time: 11/23/19  8:19 AM  
Result Value Ref Range Glucose 85 mg/dL Insulin order 1.2 units/hour Insulin adminstered 1.2 units/hour Multiplier 0.048 Low target 95 mg/dL High target 130 mg/dL D50 order 0.0 ml  
 D50 administered 0.00 ml Minutes until next BG 60 min Order initials EW Administered initials EW   
 GLSCOM Comments GLUCOSE, POC Collection Time: 11/23/19  9:17 AM  
Result Value Ref Range Glucose (POC) 80 65 - 100 mg/dL Performed by Augusta Reyes Collection Time: 11/23/19  9:17 AM  
Result Value Ref Range Glucose 80 mg/dL Insulin order 0.0 units/hour Insulin adminstered 0.0 units/hour Multiplier 0.038 Low target 95 mg/dL High target 130 mg/dL D50 order 8.0 ml  
 D50 administered 8.00 ml Minutes until next BG 15 min Order initials EW Administered initials EW   
 GLSCOM Comments GLUCOSE, POC Collection Time: 11/23/19  9:35 AM  
Result Value Ref Range Glucose (POC) 84 65 - 100 mg/dL Performed by Ericka Rivers Collection Time: 11/23/19  9:36 AM  
Result Value Ref Range Glucose 84 mg/dL Insulin order 0.7 units/hour Insulin adminstered 0.0 units/hour Multiplier 0.028 Low target 95 mg/dL High target 130 mg/dL D50 order 0.0 ml  
 D50 administered 0.00 ml Minutes until next BG 60 min Order initials EW Administered initials EW   
 [facility]SC[facility] Comments GLUCOSE, POC Collection Time: 11/23/19 10:39 AM  
Result Value Ref Range Glucose (POC) 147 (H) 65 - 100 mg/dL Performed by Ericka Rivers Collection Time: 11/23/19 10:39 AM  
Result Value Ref Range Glucose 147 mg/dL Insulin order 3.3 units/hour Insulin adminstered 3.3 units/hour Multiplier 0.038 Low target 95 mg/dL High target 130 mg/dL D50 order 0.0 ml  
 D50 administered 0.00 ml Minutes until next BG 60 min  Order initials EW   
 Administered initials EW   
 [facility]SCOM Comments GLUCOSE, POC Collection Time: 11/23/19 11:42 AM  
Result Value Ref Range Glucose (POC) 216 (H) 65 - 100 mg/dL Performed by Garnette Medsara Collection Time: 11/23/19 11:42 AM  
Result Value Ref Range Glucose 216 mg/dL Insulin order 7.5 units/hour Insulin adminstered 7.5 units/hour Multiplier 0.048 Low target 95 mg/dL High target 130 mg/dL D50 order 0.0 ml  
 D50 administered 0.00 ml Minutes until next BG 60 min Order initials EW Administered initials EW   
 [facility]SCOM Comments GLUCOSE, POC Collection Time: 11/23/19 12:45 PM  
Result Value Ref Range Glucose (POC) 199 (H) 65 - 100 mg/dL Performed by Garnette Medsara Collection Time: 11/23/19 12:46 PM  
Result Value Ref Range Glucose 199 mg/dL Insulin order 8.1 units/hour Insulin adminstered 8.1 units/hour Multiplier 0.058 Low target 95 mg/dL High target 130 mg/dL D50 order 0.0 ml  
 D50 administered 0.00 ml Minutes until next BG 60 min Order initials EW Administered initials EW   
 [facility]SC[facility] Comments Assessment and Plan  
 
67 y/o man with a h/o CAD admitted with ACS s/p CABG. 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 Asked to see for t'cytopenia. Work-up thus far:  
 
Bilirubin 0.6 11/19/19 HECTOR negative 11/2/19 Legionella DFA negative 11/7/19 Bronchial lavage negative 11/7/19 Blood culture negative 10/21/19 Q-47: Normal 
Folic acid: normal 
Fibrinogen: normal 
HIT panel/CARMEN: normal 
Zosyn started 5 days after PLT decline Copper: pending LAC: pending B2G: pending ACL: pending Heparin held 1. Thrombocytopenia 
  plt count stable today Recommend PLT transfusion for bleeding or < 10K. 2. Anemia Transfusion today

## 2019-11-23 NOTE — PROGRESS NOTES
[facility] ICU Progress Note Admit Date: 10/19/2019 Procedure:  Procedure(s): 
CARDIAC RE-ENTRY, MARISOL BY  [facility]  [facility]    
 
CABG x 5, LIMA to LAD, RSVG to [id number], RSVG to PDA 10/23/19 
 
11/13/19 - S/p perc trach placement Subjective:  
Pt seen with Dr. Svetlana Meyers. Remains on precedex, insulin gtt. Afebrile, on 70% FiO2. Continues to be easily agitated/rarely follows commands. Objective:  
Vitals: 
Blood pressure 114/59, pulse 81, temperature 97.5 °F (36.4 °C), resp. rate 25, height 5' 7\" (1.702 m), weight 137 lb 9.1 oz (62.4 kg), SpO2 95 %. Temp (24hrs), Av.3 °F (36.8 °C), Min:97.5 °F (36.4 °C), Max:99.2 °F (37.3 °C) EKG/Rhythm: SR in the 80s Oxygen Therapy: 
Oxygen Therapy O2 Sat (%): 95 % (19 0700) Pulse via Oximetry: 77 beats per minute (19 2338) O2 Device: Tracheostomy; Ventilator (19 0400) O2 Flow Rate (L/min): 40 l/min (19 2000) O2 Temperature: 98.6 °F (37 °C) (19 0840) FIO2 (%): 65 % (19 0527) CXR:  
CXR Results  (Last 48 hours)  
          
 19 0431  XR CHEST PORT Final result Impression:  IMPRESSION:   
Stable pulmonary edema, superimposed on emphysema and pulmonary fibrosis. Narrative:  PORTABLE CHEST RADIOGRAPH/S: 2019 4:31 AM  
   
INDICATION: Interstitial edema. COMPARISON: 2019, 2019, 11/15/2019, 2019, 2019,  
10/21/2019; CT chest 2019. TECHNIQUE: Portable frontal semiupright radiograph/s of the chest.  
   
FINDINGS: On prior chest CT, there is a background of emphysema and pulmonary fibrosis. Compared to 10/21/2019, however, there is superimposed interstitial and alveolar  
edema. This is stable since at least 2019, however. The central airways  
are patent. No pneumothorax and no large pleural effusion. A right PICC and  
tracheostomy tube are in appropriate position. A feeding tube extends to at  
least the stomach. Post CABG and coronary stenting. 11/22/19 0751  XR CHEST PORT Final result Impression:  IMPRESSION: No significant change. Narrative:  EXAM:  XR CHEST PORT. INDICATION: ARDS. COMPARISON: 11/21/2019. FINDINGS:   
A portable AP radiograph of the chest was obtained at 0721 hours. There are  
sternal sutures. Lines and tubes: The patient is on a cardiac monitor. The tracheostomy tube,  
right arm PICC line and nasoenteric feeding tube are all unchanged in position. Lungs: There is widespread interstitial disease throughout the lungs which is  
unchanged. Pleura: There is no pneumothorax or pleural effusion. Mediastinum: The cardiac and mediastinal contours and pulmonary vascularity are  
normal.  
Bones and soft tissues: The bones and soft tissues are grossly within normal  
limits. Admission Weight: Last Weight Weight: 141 lb 5 oz (64.1 kg) Weight: 137 lb 9.1 oz (62.4 kg) Intake / Output / Drain: 
Current Shift: No intake/output data recorded. Last 24 hrs.:  
 
Intake/Output Summary (Last 24 hours) at 11/23/2019 9497 Last data filed at 11/23/2019 0700 Gross per 24 hour Intake 3292.18 ml Output 3675 ml Net -382.82 ml EXAM: 
General:  Trach, sedated. Lungs:   Coarse bilat Incision:  Midsternal incision with no significant erythema, drainage, or dehiscence. Heart:  Regular rate and rhythm, S1, S2 normal, no murmur, click, rub or gallop. Abdomen:   Soft, non-tender. Bowel sounds active. No masses,  No organomegaly. +BM 11/19. Extremities:  Some generalized edema, +1-2 edema. Palpable pulses bilat Neurologic:  Moves extremities but without purpose. Does not track or follow commands. Labs:  
Recent Labs  
  11/23/19 
0715  11/23/19 
0420  11/23/19 
0315 WBC  --   --  7.0  --  7.2 HGB  --   --  6.1*  --  6.0*  
HCT  --   --  19.8*  --  19.9* PLT  --   --  48*  --  46* NA  --   --   --   --  141 K  --   --   --   --  3.7 BUN  --   --   --   --  57* CREA  --   --   --   --  1.36* GLU  --   --   --   --  112* GLUCPOC 107*   < >  --    < >  --   
 < > = values in this interval not displayed. Assessment:  
 
Principal Problem: S/P CABG x 5 (10/23/2019) Overview: x 5, LIMA to LAD, RSVG to Diag1, OM2-OM3, RSVG to PDA Active Problems: 
  ACS (acute coronary syndrome) ([facility] (10/19/2019) Unstable angina ([facility] (10/19/2019) Coronary artery disease of native artery of native heart with stable angina pectoris ([email or web address] (10/21/2019) Systolic heart failure ([facility] [[email or web address].) (10/22/2019) Plan/Recommendations/Medical Decision Makin. S/p CABG: on ASA, statin. No ACEi/ARB due to renal function. No BB due to pulmonary fibrosis. 2. Acute on chronic systolic CHF, NYHA Class II on admit: EF 35-40% preop. No BB/ACE/ARB/AA until vitals/kidney function allows. Trend pBNP - stable today, receiving a lot of volume. Bumex. 3. Acute postop respiratory failure with chronic interstitial fibrosis per CXR: Trach placed  by Thoracic Surgery. Acute respiratory acidosis  thought driven by oversedation. Fentanyl and versed drips stopped. (now just has PRN Morphine and Ativan dosing) Diuresis per Nephrology. Amiodarone stopped on 10/31. Solumedrol per pulm-weaning. Continue scheduled nebs. Cont mucinex, pulm toileting, mucomyst. Chest CT revealing emphysema/pulm fibrosis. Has not previously tolerated vent weaning. Wean vent settings as able, goal PaO2 > 60 or SpO2 >88% per pulm. 4. Renal insufficiency: BUN/Creatinine stable today. Nephrology following and appreciate their recommendations. Devlin in place for accurate I&O. Increased diuretics 5. Anemia: had preop, H&H with slight drop today. Received another unit pRBC today. Iron panel sent preop - iron, iron sat low-continue Iron supplement. 6. Thrombocytopenia: Platelets stable 74W-15I.  HIT antibody neg, CARMEN negative. Daily CBC. Hold SQ heparin and ASA. On PPI. Transfuse platelets for less than 10k or if active bleeding seen per Hematology-appreciate their recommendations 7. Hx of HTN: Hydralazine PRN SBP >160, BP too labile for PO meds 8. HLD: Continue pravastatin 9. Leukocytosis: WBC WNL,  Devlin placed 11/7. PICC placed 11/6. Trach 11/13. Femoral arterial line out 11/21. Monitor daily CBC. Appreciate ID consult-continue Zosyn. Leukopenic last week 10. Constipation: . Last BM on 11/19. Bowel medications resumed. Continue sandro-q 11. Current smoker: smoking cessation education completed. 12. Nutrition: Appreciate Dietician recommendations. Dobhoff placed 10/30 and Enteral feedings started. TF's at goal of 35 ml/hr. Try and AVOID increased volume amounts 13. Hyperglycemia: Preop A1c 5.3 with no DM history, now with hyperglycemia. BS have been much better controlled on Insulin gtt. DTS following. Weaning steroids per Pulmonology. 14. Hypernatremia: Resolved. Nephrology following 15. Agitation, acute encephalopathy: Have been unable to wean down on sedation much due to agitation. Continue Precedex, and PRN Morphine, Ativan to keep the patient comfortable. Jhony Keller off. Head CT neg for any acute process. Continue seroquel bid. Do not think this is driven by pain, more from neuro source/encephalopathy 16. DVT/GI Prophylaxis: SCD's, SQ Heparin-currently on hold due to thrombocytopenia. Will restart when improved, PPI Dispo: PT/OT as able. Remain in CVI until resp status more stable.   
 
Signed By: CHELSEA Guan

## 2019-11-23 NOTE — PROGRESS NOTES
0800: Bedside shift change report given to Ciara RN and Se Alejandre RN (oncoming nurse) by Jensen Turcios RN (offgoing nurse). Report included the following information SBAR, MAR and Cardiac Rhythm NSR. SpO2 noted to be 88-90% with pt RR of 36, FiO2 increased to 70% on vent 0820: cardiac surgery team at bedside, updated by RN, orders clarified to keep spO2 >88 
 
1000: spO2 86-88 after pain medication admin to attempt to decrease pt RR and pt discomfort, FiO2 increased to 80% on vent 1045: pt spO2 continues to be 80-84, pt RR 32-37, FiO2 on vent increased to 90%

## 2019-11-24 NOTE — PROGRESS NOTES
0800:  Report received from [facility]. Patient is intubated and sedated laying calmly in bed. Drips: MIV, Insulin, Precedex 0.9mcg/kg/min Vent settings: A/C 24, PC 16, FiO2 80%, Peep +7 
 
0820:  Respiratory in to see patient. FiO2 increased to 90%. 5446:  Dr. Sarthak Lutz and Diana TRAN, cardiac surgery in to see patient. No changes to plan of care. 1120: Observed patient sats at 85%. Respiratory rate 50s. FiO2 increased to 100%. 1140: O2 sats improved to 96% and RR to 28. 
 
1200: Dr. Giorgi Roblero, cardiac surgery in to see patient. No changes to plan of care. 1300:  FiO2 decreased to 90% 1500:  FiO2 weaned to 85% 
 
2000: Bedside shift change report given to Akosua Browne (oncoming nurse) by Terry Devine (offgoing nurse).  Report included the following information SBAR, Kardex, Intake/Output, MAR, Accordion, Recent Results and Cardiac Rhythm SR.

## 2019-11-24 NOTE — PROGRESS NOTES
1945: Bedside shift change report given to Madelin Godoy RN (oncoming nurse) by Yareli Valentine RN (offgoing nurse). Report included the following information SBAR, Kardex, Procedure Summary, Intake/Output, MAR, Accordion, Recent Results, Med Rec Status, Cardiac Rhythm NSR, and Alarm Parameters . 2000: Assumed care of patient resting quietly in bed, intubated and sedated. Response to pain and coughs with suction. No interactive. Drips verified (insulin and precedex). 2036: 2 mg ativan given d/t increased RR, work of breathing, and breathing over the vent. 2100: FiO2 increased to 80% d/t saturations < 88%, increased RR (35-45 bpm), and labored breathing. 2227: Morphine given for noted discomfort, frequent coughing, increased work of breathing and RR.  
2330: Trach care completed. 0000: Pt temperature 100.3 axillary, tylenol given and cold compress placed on forehead. Will continue to monitor. 0410: HYPOGLYCEMIC EPISODE DOCUMENTATION Patient with hypoglycemic episode(s) at 0410(time) on 11/24/19(date). BG value(s) pre-treatment 79 Was patient symptomatic? [] yes, [x] no Patient was treated with the following rescue medications/treatments: [] D10 BG value post-treatment: 91 
0645: Trach care completed. 0730: Bedside shift change report given to Yareli Valentine RN (oncoming nurse) by Madelin Godoy RN (offgoing nurse). Report included the following information SBAR, Kardex, Procedure Summary, Intake/Output, MAR, Accordion, Recent Results, Med Rec Status and Cardiac Rhythm NSR. Problem: Falls - Risk of 
Goal: *Absence of Falls Description Document Lilli Trevino Fall Risk and appropriate interventions in the flowsheet. Outcome: Progressing Towards Goal 
Note: Fall Risk Interventions: 
Mobility Interventions: Communicate number of staff needed for ambulation/transfer Mentation Interventions: Adequate sleep, hydration, pain control, More frequent rounding, Room close to nurse's station, Update white board Medication Interventions: Evaluate medications/consider consulting pharmacy Elimination Interventions: Toileting schedule/hourly rounds History of Falls Interventions: Door open when patient unattended Problem: Pressure Injury - Risk of 
Goal: *Prevention of pressure injury Description Document Earl Scale and appropriate interventions in the flowsheet. Outcome: Progressing Towards Goal 
Note: Pressure Injury Interventions: 
Sensory Interventions: Assess changes in LOC, Check visual cues for pain, Discuss PT/OT consult with provider, Float heels, Keep linens dry and wrinkle-free, Maintain/enhance activity level, Minimize linen layers, Monitor skin under medical devices, Pad between skin to skin, Pressure redistribution bed/mattress (bed type), Sit a 90-degree angle/use footstool if needed, Turn and reposition approx. every two hours (pillows and wedges if needed) Moisture Interventions: Absorbent underpads, Apply protective barrier, creams and emollients, Check for incontinence Q2 hours and as needed, Internal/External urinary devices, Maintain skin hydration (lotion/cream), Minimize layers Activity Interventions: Pressure redistribution bed/mattress(bed type) Mobility Interventions: Assess need for specialty bed, Float heels, Pressure redistribution bed/mattress (bed type), Turn and reposition approx. every two hours(pillow and wedges) Nutrition Interventions: Document food/fluid/supplement intake, Offer support with meals,snacks and hydration, Discuss nutritional consult with provider Friction and Shear Interventions: Apply protective barrier, creams and emollients, Lift sheet Problem: Infection - Risk of, Central Venous Catheter-Associated Bloodstream Infection Goal: *Absence of infection signs and symptoms Outcome: Progressing Towards Goal 
  
Problem: Ventilator Management Goal: *Adequate oxygenation and ventilation Outcome: Progressing Towards Goal 
 Goal: *Patient maintains clear airway/free of aspiration Outcome: Progressing Towards Goal 
  
Problem: Nutrition Deficit Goal: *Optimize nutritional status Outcome: Progressing Towards Goal 
Note:  
TF at goal 
  
Problem: Infection - Risk of, Urinary Catheter-Associated Urinary Tract Infection Goal: *Absence of infection signs and symptoms Outcome: Progressing Towards Goal

## 2019-11-24 NOTE — PROGRESS NOTES
NYHA class IV A/C systolic heart failure (EF 25%, NT pro-BNP 15,013) Acute on chronic kidney disease Mild pulmonary edema Mild chronic lung disease Acute on chronic hypoxic respiratory failure 
  
Remains intubated and sedated Still not waking up well Hgb a little better Platelets a little low Creatinine in the mid 1's Bilirubin and other LFTs look good Pro-calcitonin a bit elevated Pulmonary edema looks better CXR - pulmonary fibrosis Intake/Output Summary (Last 24 hours) at 11/24/2019 1347 Last data filed at 11/24/2019 1300 Gross per 24 hour Intake 2789.54 ml Output 4483 ml Net -1693.46 ml Visit Vitals /63 Pulse (!) 102 Temp 98.7 °F (37.1 °C) Resp (!) 32 Ht 5' 7\" (1.702 m) Wt 134 lb 0.6 oz (60.8 kg) SpO2 95% BMI 20.99 kg/m² Risk of morbidity and mortality - high Medical decision making - high complexity Total critical care time - 30 minutes (CPT 29307)

## 2019-11-24 NOTE — PROGRESS NOTES
Hematology-Oncology Progress Note Breanna Mcclelland 1950 
[id number] 
11/24/2019 Subjective: No events No bleeding Allergies: Scallops Current Facility-Administered Medications Medication Dose Route Frequency Provider Last Rate Last Dose  
 0.9% sodium chloride infusion 250 mL  250 mL IntraVENous PRN Wayne Venegas MD      
 bumetanide (BUMEX) injection 1.5 mg  1.5 mg IntraVENous Q8H Gamal Caldwell MD   1.5 mg at 11/24/19 0532  QUEtiapine (SEROquel) tablet 50 mg  50 mg Oral BID Jordy Cardenas NP   50 mg at 11/24/19 0854  
 0.9% sodium chloride infusion 250 mL  250 mL IntraVENous PRN Lynn Cyr MD      
 hydrALAZINE (APRESOLINE) 20 mg/mL injection 10 mg  10 mg IntraVENous Q6H PRN Jordy Cardenas NP      
 piperacillin-tazobactam (ZOSYN) 3.375 g in 0.9% sodium chloride (MBP/ADV) 100 mL  3.375 g IntraVENous Q8H Karen Hastings MD 25 mL/hr at 11/24/19 0956 3.375 g at 11/24/19 1173  morphine injection 2 mg  2 mg IntraVENous Q2H PRN Jordy Cardenas NP   2 mg at 11/24/19 1112  LORazepam (ATIVAN) injection 1-2 mg  1-2 mg IntraVENous Q4H PRN Jordy Cardenas NP   2 mg at 11/24/19 2367  
 balsam peru-castor oil (VENELEX) ointment   Topical BID Lynn Cyr MD      
 insulin regular (NOVOLIN R, HUMULIN R) 100 Units in 0.9% sodium chloride 100 mL infusion  1-50 Units/hr IntraVENous TITRATE Jordy Cardenas NP 4 mL/hr at 11/24/19 1251 4 Units/hr at 11/24/19 1251  
 0.9% sodium chloride infusion  3 mL/hr IntraVENous CONTINUOUS Efren Giovanni Koroma MD   Stopped at 11/22/19 0650  
 senna-docusate (PERICOLACE) 8.6-50 mg per tablet 1 Tab  1 Tab Oral BID Olvinsarah Lui NP   1 Tab at 11/24/19 0721  nystatin (MYCOSTATIN) 100,000 unit/mL oral suspension 500,000 Units  500,000 Units Oral QID Olvin Lui NP   500,000 Units at 11/24/19 1306  insulin lispro (HUMALOG) injection   SubCUTAneous Q6H Jayro Barger NP   Stopped at 11/15/19 0100  guaiFENesin (ROBITUSSIN) 100 mg/5 mL oral liquid 400 mg  400 mg Per NG tube Q6H PRN Christa Bacon NP   400 mg at 11/22/19 1844  
 0.9% sodium chloride infusion  10 mL/hr IntraVENous CONTINUOUS Skyler Rossi MD   Stopped at 11/14/19 1125  pantoprazole (PROTONIX) 40 mg in sodium chloride 0.9% 10 mL injection  40 mg IntraVENous Q12H Ashlyn Rand   40 mg at 11/24/19 9333  balsam peru-castor oil (VENELEX) ointment   Topical PRN Alysia Orosco MD      
 acetylcysteine (MUCOMYST) 200 mg/mL (20 %) solution 200 mg  200 mg Nebulization BID RT Kike Dennis MD   200 mg at 11/24/19 8490  PHENYLephrine (ANTHONY-SYNEPHRINE) 30 mg in 0.9% sodium chloride 250 mL infusion   mcg/min IntraVENous TITRATE Skyler Rossi MD   Stopped at 11/23/19 8151  propofol (DIPRIVAN) infusion  0-50 mcg/kg/min IntraVENous TITRATE Laura Gramajo MD   Stopped at 11/22/19 3908  dexmedeTOMidine (PRECEDEX) 400 mcg in 0.9% sodium chloride 100 mL infusion  0.2-0.9 mcg/kg/hr IntraVENous TITRATE Clara Whiting NP 12.6 mL/hr at 11/24/19 0930 0.9 mcg/kg/hr at 11/24/19 1870  
 methyl salicylate-menthol (BENGAY) 15-10 % cream   Topical PRN Alysia Orosco MD      
 ferrous sulfate 300 mg (60 mg iron)/5 mL oral syrup 300 mg  300 mg Per NG tube DAILY WITH BREAKFAST Berenice Bynum MD   300 mg at 11/24/19 7611  [Held by provider] heparin (porcine) injection 5,000 Units  5,000 Units SubCUTAneous Q8H Clara Whiting NP   5,000 Units at 11/15/19 3018  
 multivit-folic acid-herbal 579 (WELLESSE PLUS) oral liquid 30 mL  30 mL Per NG tube DAILY Clara Whiting NP   30 mL at 11/24/19 8137  acetaminophen (TYLENOL) tablet 650 mg  650 mg Oral Q4H PRN Kiley Andrew MD   650 mg at 11/24/19 0607  
 arformoterol (BROVANA) neb solution 15 mcg  15 mcg Nebulization BID RT Ada ANANYA Bacon   15 mcg at 11/24/19 6977 And  budesonide (PULMICORT) 500 mcg/2 ml nebulizer suspension  500 mcg Nebulization BID RT Hien Hassan NP   500 mcg at 11/24/19 0923  prochlorperazine (COMPAZINE) with saline injection 10 mg  10 mg IntraVENous Q6H PRN CHELSEA Shaw   5 mg at 10/25/19 1339  phenol throat spray (CHLORASEPTIC) 1 Spray  1 Spray Oral PRN Monty Ray MD   1 Spray at 10/24/19 1110  
 sertraline (ZOLOFT) tablet 100 mg  100 mg Oral DAILY CHELSEA Shaw   100 mg at 11/24/19 3990  alteplase (CATHFLO) 1 mg in sterile water (preservative free) 1 mL injection  1 mg InterCATHeter PRN Mariana Roth PA      
 bacitracin 500 unit/gram packet 1 Packet  1 Packet Topical PRN Ashlyn Shaw   1 Packet at 11/22/19 0617  
 sodium chloride (NS) flush 5-40 mL  5-40 mL IntraVENous Q8H Mariana Roth PA   10 mL at 11/24/19 0532  sodium chloride (NS) flush 5-40 mL  5-40 mL IntraVENous PRN CHLESEA Shaw   10 mL at 11/22/19 1248  oxyCODONE IR (ROXICODONE) tablet 5 mg  5 mg Oral Q4H PRN CHELSEA Shaw   5 mg at 11/22/19 2047  
 oxyCODONE IR (ROXICODONE) tablet 10 mg  10 mg Oral Q4H PRN CHELSEA Shaw   10 mg at 10/25/19 0700  
 naloxone Malinda Slate) injection 0.4 mg  0.4 mg IntraVENous PRN CHELSEA Shaw   0.4 mg at 11/14/19 1126  ondansetron (ZOFRAN) injection 4 mg  4 mg IntraVENous Q4H PRN CHELSEA Shaw   4 mg at 10/26/19 2342  albuterol (PROVENTIL VENTOLIN) nebulizer solution 2.5 mg  2.5 mg Nebulization Q4H PRN Mariana Roth PA   2.5 mg at 11/23/19 1950  [Held by provider] aspirin chewable tablet 81 mg  81 mg Oral DAILY Mariana Roth PA   Stopped at 11/16/19 0900  chlorhexidine (PERIDEX) 0.12 % mouthwash 10 mL  10 mL Oral Q12H CHELSEA Shaw   10 mL at 11/24/19 0845  calcium chloride 1 g in 0.9% sodium chloride 100 mL IVPB  1 g IntraVENous PRN Mariana Roth PA      
 bisacodyl (DULCOLAX) suppository 10 mg  10 mg Rectal DAILY PRN Mariana Roth PA      
  polyethylene glycol (MIRALAX) packet 17 g  17 g Oral DAILY Ashlyn Jiménez   Stopped at 11/14/19 5734  ELECTROLYTE REPLACEMENT NOTE: Nurse to review Serum Potassium and Magnesuim levels and Initiate Electrolyte Replacement Protocol as needed  1 Each Other PRN Socorro Roth PA      
 glucose chewable tablet 16 g  4 Tab Oral PRN Socorro Roth PA      
 glucagon (GLUCAGEN) injection 1 mg  1 mg IntraMUSCular PRN CHELSEA Jiménez      
 dextrose 10% infusion 0-250 mL  0-250 mL IntraVENous PRN CHELSEA Jiménez 750 mL/hr at 11/24/19 0423 40 mL at 11/24/19 0423  melatonin tablet 3 mg  3 mg Oral QHS PRN CHELSEA Jiménez   3 mg at 10/28/19 2135  diphenhydrAMINE (BENADRYL) injection 25 mg  25 mg IntraVENous Q6H PRN Socorro Roth PA      
 diphenhydrAMINE (BENADRYL) capsule 25 mg  25 mg Oral Q6H PRN Socorro Roth PA      
 pravastatin (PRAVACHOL) tablet 40 mg  40 mg Oral QHS Jaden Jiménezma   40 mg at 11/23/19 2106 Objective:  
 
Patient Vitals for the past 24 hrs: 
 BP Temp Pulse Resp SpO2 Height Weight 11/24/19 1200  98.7 °F (37.1 °C)       
11/24/19 1127   (!) 102 (!) 32 95 %    
11/24/19 1100 138/63  (!) 103 (!) 34 (!) 86 %    
11/24/19 1000 113/62  90 29     
11/24/19 0900 98/58  83 27     
11/24/19 0808   86 (!) 68 97 %    
11/24/19 0805   83 26 97 %    
11/24/19 0804     98 %    
11/24/19 0800 118/62 98.2 °F (36.8 °C) 84 (!) 31 96 %    
11/24/19 0700 118/66 97.6 °F (36.4 °C) 82 23 97 %    
11/24/19 0600 143/72 99.6 °F (37.6 °C) 90 21 90 % 5' 7\" (1.702 m) 60.8 kg (134 lb 0.6 oz)  
11/24/19 0500 128/61  83 28 99 %    
11/24/19 0400 112/57 99.4 °F (37.4 °C) 82 26 100 %    
11/24/19 0357   81 26 100 %    
11/24/19 0300 104/53 99.1 °F (37.3 °C) 80 24 98 %    
11/24/19 0200 109/56 99.6 °F (37.6 °C) 82 24 98 %    
11/24/19 0100 125/63 99.8 °F (37.7 °C) 91 28 96 %   11/24/19 0000 122/64 100.3 °F (37.9 °C) 91 (!) 31 95 %    
11/23/19 2328   89 26 96 %    
11/23/19 2300 115/57  91 27 96 %    
11/23/19 2200 109/66  83 25 98 %    
11/23/19 2100 113/61  92 (!) 31 96 %    
11/23/19 2000 115/66 99.9 °F (37.7 °C) 86 29 98 %    
11/23/19 1954   88 29 97 %    
11/23/19 1952     96 %    
11/23/19 1900 119/61  92 29 92 %    
11/23/19 1800 96/56  87 25 98 %    
11/23/19 1700 93/56  87 25 99 %    
11/23/19 1600 103/59 98 °F (36.7 °C) 88 (!) 31 98 %    
11/23/19 1507   81 28 99 %    
11/23/19 1500 97/62  84 18 99 %    
11/23/19 1400 106/60  88 19 100 %   Gen: ventilated through trach HEENT: PERRL, Sclerae anicteric Cv: RRR without m/r/g Pulm: CTA bilaterally Abd: NABS, NTND, No HSM Ext: No c/c/e. No violaceous discoloration of digits. Available labs reviewed: 
Labs:   
Recent Results (from the past 24 hour(s)) METABOLIC PANEL, COMPREHENSIVE Collection Time: 11/23/19  1:49 PM  
Result Value Ref Range Sodium 141 136 - 145 mmol/L Potassium 4.1 3.5 - 5.1 mmol/L Chloride 105 97 - 108 mmol/L  
 CO2 31 21 - 32 mmol/L Anion gap 5 5 - 15 mmol/L Glucose 157 (H) 65 - 100 mg/dL BUN 60 (H) 6 - 20 MG/DL Creatinine 1.52 (H) 0.70 - 1.30 MG/DL  
 BUN/Creatinine ratio 39 (H) 12 - 20 GFR est AA 55 (L) >60 ml/min/1.73m2 GFR est non-AA 45 (L) >60 ml/min/1.73m2 Calcium 8.2 (L) 8.5 - 10.1 MG/DL Bilirubin, total 0.8 0.2 - 1.0 MG/DL  
 ALT (SGPT) 65 12 - 78 U/L  
 AST (SGOT) 33 15 - 37 U/L Alk. phosphatase 111 45 - 117 U/L Protein, total 6.2 (L) 6.4 - 8.2 g/dL Albumin 3.0 (L) 3.5 - 5.0 g/dL Globulin 3.2 2.0 - 4.0 g/dL A-G Ratio 0.9 (L) 1.1 - 2.2 PLATELET COUNT Collection Time: 11/23/19  1:49 PM  
Result Value Ref Range PLATELET 51 (L) 637 - 400 K/uL GLUCOSE, POC Collection Time: 11/23/19  1:49 PM  
Result Value Ref Range Glucose (POC) 159 (H) 65 - 100 mg/dL Performed by Sreedhar Basilia Collection Time: 11/23/19  1:50 PM  
Result Value Ref Range Glucose 159 mg/dL Insulin order 6.7 units/hour Insulin adminstered 6.7 units/hour Multiplier 0.068 Low target 95 mg/dL High target 130 mg/dL D50 order 0.0 ml  
 D50 administered 0.00 ml Minutes until next BG 60 min Order initials EW Administered initials EW   
 GLSCOM Comments MAGNESIUM Collection Time: 11/23/19  1:51 PM  
Result Value Ref Range Magnesium 2.2 1.6 - 2.4 mg/dL HGB & HCT Collection Time: 11/23/19  1:51 PM  
Result Value Ref Range HGB 7.5 (L) 12.1 - 17.0 g/dL HCT 24.3 (L) 36.6 - 50.3 % GLUCOSE, POC Collection Time: 11/23/19  2:55 PM  
Result Value Ref Range Glucose (POC) 113 (H) 65 - 100 mg/dL Performed by Veronica Israel Collection Time: 11/23/19  2:55 PM  
Result Value Ref Range Glucose 113 mg/dL Insulin order 3.6 units/hour Insulin adminstered 3.6 units/hour Multiplier 0.068 Low target 95 mg/dL High target 130 mg/dL D50 order 0.0 ml  
 D50 administered 0.00 ml Minutes until next BG 60 min Order initials EW Administered initials EW   
 GLSCOM Comments GLUCOSE, POC Collection Time: 11/23/19  4:04 PM  
Result Value Ref Range Glucose (POC) 100 65 - 100 mg/dL Performed by Sreedhar Basilia Collection Time: 11/23/19  4:06 PM  
Result Value Ref Range Glucose 100 mg/dL Insulin order 2.7 units/hour Insulin adminstered 2.7 units/hour Multiplier 0.068 Low target 95 mg/dL High target 130 mg/dL D50 order 0.0 ml  
 D50 administered 0.00 ml Minutes until next BG 60 min Order initials EW Administered initials EW   
 GLSCOM Comments GLUCOSE, POC Collection Time: 11/23/19  5:01 PM  
Result Value Ref Range Glucose (POC) 92 65 - 100 mg/dL Performed by Sreedhar Basilia  Collection Time: 11/23/19  5:02 PM  
 Result Value Ref Range Glucose 92 mg/dL Insulin order 1.7 units/hour Insulin adminstered 1.7 units/hour Multiplier 0.054 Low target 95 mg/dL High target 130 mg/dL D50 order 0.0 ml  
 D50 administered 0.00 ml Minutes until next BG 60 min Order initials EW Administered initials EW   
 GLSCOM Comments GLUCOSE, POC Collection Time: 11/23/19  6:08 PM  
Result Value Ref Range Glucose (POC) 124 (H) 65 - 100 mg/dL Performed by Freddie Schafer Collection Time: 11/23/19  6:08 PM  
Result Value Ref Range Glucose 124 mg/dL Insulin order 3.5 units/hour Insulin adminstered 3.5 units/hour Multiplier 0.054 Low target 95 mg/dL High target 130 mg/dL D50 order 0.0 ml  
 D50 administered 0.00 ml Minutes until next BG 60 min Order initials pls Administered initials pls GLSCOM Comments GLUCOSE, POC Collection Time: 11/23/19  7:12 PM  
Result Value Ref Range Glucose (POC) 118 (H) 65 - 100 mg/dL Performed by Kamryn Harrison Collection Time: 11/23/19  7:12 PM  
Result Value Ref Range Glucose 119 mg/dL Insulin order 3.2 units/hour Insulin adminstered 3.2 units/hour Multiplier 0.054 Low target 95 mg/dL High target 130 mg/dL D50 order 0.0 ml  
 D50 administered 0.00 ml Minutes until next BG 60 min Order initials EW Administered initials EW   
 GLSCOM Comments GLUCOSE, POC Collection Time: 11/23/19  8:25 PM  
Result Value Ref Range Glucose (POC) 131 (H) 65 - 100 mg/dL Performed by Marzena Julian Collection Time: 11/23/19  8:25 PM  
Result Value Ref Range Glucose 131 mg/dL Insulin order 4.5 units/hour Insulin adminstered 4.5 units/hour Multiplier 0.064 Low target 95 mg/dL High target 130 mg/dL D50 order 0.0 ml  
 D50 administered 0.00 ml Minutes until next BG 60 min Order initials [address]  Administered initials SH   
 GLSCOM Comments GLUCOSE, POC Collection Time: 11/23/19  9:10 PM  
Result Value Ref Range Glucose (POC) 111 (H) 65 - 100 mg/dL Performed by [employer] Collection Time: 11/23/19  9:10 PM  
Result Value Ref Range Glucose 111 mg/dL Insulin order 3.3 units/hour Insulin adminstered 3.3 units/hour Multiplier 0.064 Low target 95 mg/dL High target 130 mg/dL D50 order 0.0 ml  
 D50 administered 0.00 ml Minutes until next BG 60 min Order initials [address] Administered initials [address]   
 GLSCOM Comments GLUCOSE, POC Collection Time: 11/23/19 10:06 PM  
Result Value Ref Range Glucose (POC) 87 65 - 100 mg/dL Performed by [employer] Collection Time: 11/23/19 10:06 PM  
Result Value Ref Range Glucose 87 mg/dL Insulin order 1.4 units/hour Insulin adminstered 1.4 units/hour Multiplier 0.051 Low target 95 mg/dL High target 130 mg/dL D50 order 0.0 ml  
 D50 administered 0.00 ml Minutes until next BG 60 min Order initials [address] Administered initials [address]   
 GLSCOM Comments GLUCOSE, POC Collection Time: 11/23/19 11:05 PM  
Result Value Ref Range Glucose (POC) 95 65 - 100 mg/dL Performed by [employer] Collection Time: 11/23/19 11:06 PM  
Result Value Ref Range Glucose 95 mg/dL Insulin order 1.8 units/hour Insulin adminstered 1.8 units/hour Multiplier 0.051 Low target 95 mg/dL High target 130 mg/dL D50 order 0.0 ml  
 D50 administered 0.00 ml Minutes until next BG 60 min Order initials [address] Administered initials [address]   
 GLSCOM Comments GLUCOSE, POC Collection Time: 11/24/19 12:07 AM  
Result Value Ref Range Glucose (POC) 111 (H) 65 - 100 mg/dL Performed by [employer] Collection Time: 11/24/19 12:08 AM  
Result Value Ref Range Glucose 111 mg/dL Insulin order 2.6 units/hour Insulin adminstered 2.6 units/hour Multiplier 0.051 Low target 95 mg/dL High target 130 mg/dL D50 order 0.0 ml  
 D50 administered 0.00 ml Minutes until next BG 60 min Order initials [address] Administered initials [address]   
 GLSCOM Comments GLUCOSE, POC Collection Time: 11/24/19  1:05 AM  
Result Value Ref Range Glucose (POC) 127 (H) 65 - 100 mg/dL Performed by Nia Gutierrez Collection Time: 11/24/19  1:06 AM  
Result Value Ref Range Glucose 127 mg/dL Insulin order 3.4 units/hour Insulin adminstered 3.4 units/hour Multiplier 0.051 Low target 95 mg/dL High target 130 mg/dL D50 order 0.0 ml  
 D50 administered 0.00 ml Minutes until next BG 60 min Order initials [address] Administered initials [address]   
 GLSCOM Comments GLUCOSE, POC Collection Time: 11/24/19  2:11 AM  
Result Value Ref Range Glucose (POC) 109 (H) 65 - 100 mg/dL Performed by Nia Gutierrez Collection Time: 11/24/19  2:12 AM  
Result Value Ref Range Glucose 109 mg/dL Insulin order 2.5 units/hour Insulin adminstered 2.5 units/hour Multiplier 0.051 Low target 95 mg/dL High target 130 mg/dL D50 order 0.0 ml  
 D50 administered 0.00 ml Minutes until next BG 60 min Order initials [address] Administered initials [address]   
 GLSCOM Comments GLUCOSE, POC Collection Time: 11/24/19  3:04 AM  
Result Value Ref Range Glucose (POC) 96 65 - 100 mg/dL Performed by Nathan Cook   
CBC W/O DIFF Collection Time: 11/24/19  3:05 AM  
Result Value Ref Range WBC 8.2 4.1 - 11.1 K/uL  
 RBC 2.41 (L) 4.10 - 5.70 M/uL HGB 7.2 (L) 12.1 - 17.0 g/dL HCT 23.3 (L) 36.6 - 50.3 % MCV 96.7 80.0 - 99.0 FL  
 MCH 29.9 26.0 - 34.0 PG  
 MCHC 30.9 30.0 - 36.5 g/dL  
 RDW 16.7 (H) 11.5 - 14.5 % PLATELET 54 (L) 958 - 400 K/uL NRBC 0.0 0  WBC ABSOLUTE NRBC 0.00 0.00 - 0.01 K/uL MAGNESIUM  
 Collection Time: 11/24/19  3:05 AM  
Result Value Ref Range Magnesium 2.0 1.6 - 2.4 mg/dL NT-PRO BNP Collection Time: 11/24/19  3:05 AM  
Result Value Ref Range NT pro-BNP >35,000 (H) <125 PG/ML  
PROCALCITONIN Collection Time: 11/24/19  3:05 AM  
Result Value Ref Range Procalcitonin 2.1 ng/mL METABOLIC PANEL, COMPREHENSIVE Collection Time: 11/24/19  3:05 AM  
Result Value Ref Range Sodium 141 136 - 145 mmol/L Potassium 3.5 3.5 - 5.1 mmol/L Chloride 104 97 - 108 mmol/L  
 CO2 32 21 - 32 mmol/L Anion gap 5 5 - 15 mmol/L Glucose 91 65 - 100 mg/dL BUN 60 (H) 6 - 20 MG/DL Creatinine 1.46 (H) 0.70 - 1.30 MG/DL  
 BUN/Creatinine ratio 41 (H) 12 - 20 GFR est AA 58 (L) >60 ml/min/1.73m2 GFR est non-AA 48 (L) >60 ml/min/1.73m2 Calcium 8.1 (L) 8.5 - 10.1 MG/DL Bilirubin, total 0.7 0.2 - 1.0 MG/DL  
 ALT (SGPT) 61 12 - 78 U/L  
 AST (SGOT) 35 15 - 37 U/L Alk. phosphatase 115 45 - 117 U/L Protein, total 5.6 (L) 6.4 - 8.2 g/dL Albumin 2.6 (L) 3.5 - 5.0 g/dL Globulin 3.0 2.0 - 4.0 g/dL A-G Ratio 0.9 (L) 1.1 - 2.2 PHOSPHORUS Collection Time: 11/24/19  3:05 AM  
Result Value Ref Range Phosphorus 3.0 2.6 - 4.7 MG/DL  
GLUCOSTABILIZER Collection Time: 11/24/19  3:08 AM  
Result Value Ref Range Glucose 96 mg/dL Insulin order 1.8 units/hour Insulin adminstered 1.8 units/hour Multiplier 0.051 Low target 95 mg/dL High target 130 mg/dL D50 order 0.0 ml  
 D50 administered 0.00 ml Minutes until next BG 60 min Order initials [address] Administered initials [address]   
 GLSCOM Comments GLUCOSE, POC Collection Time: 11/24/19  4:10 AM  
Result Value Ref Range Glucose (POC) 79 65 - 100 mg/dL Performed by Trina Rodriguez Collection Time: 11/24/19  4:14 AM  
Result Value Ref Range Glucose 79 mg/dL Insulin order 0.0 units/hour Insulin adminstered 0.0 units/hour Multiplier 0.041 Low target 95 mg/dL High target 130 mg/dL D50 order 8.0 ml  
 D50 administered 8.00 ml Minutes until next BG 15 min Order initials [address] Administered initials [address]   
 GLSCOM Comments GLUCOSE, POC Collection Time: 11/24/19  4:28 AM  
Result Value Ref Range Glucose (POC) 91 65 - 100 mg/dL Performed by Speed Dating by Chantilly Lace Collection Time: 11/24/19  4:29 AM  
Result Value Ref Range Glucose 91 mg/dL Insulin order 1.0 units/hour Insulin adminstered 1.0 units/hour Multiplier 0.031 Low target 95 mg/dL High target 130 mg/dL D50 order 0.0 ml  
 D50 administered 0.00 ml Minutes until next BG 60 min Order initials [address] Administered initials [address]   
 GLSCOM Comments POC G3 - PUL Collection Time: 11/24/19  5:13 AM  
Result Value Ref Range FIO2 (POC) 80 % pH (POC) 7.439 7.35 - 7.45    
 pCO2 (POC) 43.9 35.0 - 45.0 MMHG  
 pO2 (POC) 78 (L) 80 - 100 MMHG  
 HCO3 (POC) 29.8 (H) 22 - 26 MMOL/L  
 sO2 (POC) 96 92 - 97 % Base excess (POC) 6 mmol/L Site RIGHT RADIAL Device: VENT Mode ASSIST CONTROL Set Rate 24 bpm  
 PEEP/CPAP (POC) 7 cmH2O  
 PIP (POC) 16 Allens test (POC) YES Inspiratory Time 1 sec Specimen type (POC) ARTERIAL Total resp. rate 29 GLUCOSE, POC Collection Time: 11/24/19  5:29 AM  
Result Value Ref Range Glucose (POC) 95 65 - 100 mg/dL [facility] Collection Time: 11/24/19  5:30 AM  
Result Value Ref Range Glucose 95 mg/dL Insulin order 1.1 units/hour Insulin adminstered 1.1 units/hour Multiplier 0.031 Low target 95 mg/dL High target 130 mg/dL D50 order 0.0 ml  
 D50 administered 0.00 ml Minutes until next BG 60 min Order initials [address] Administered initials [address]   
 GLSCOM Comments GLUCOSE, POC Collection Time: 11/24/19  6:24 AM  
Result Value Ref Range Glucose (POC) 104 (H) 65 - 100 mg/dL Performed by Leopoldo Alicia Collection Time: 11/24/19  6:24 AM  
Result Value Ref Range Glucose 104 mg/dL Insulin order 1.4 units/hour Insulin adminstered 1.4 units/hour Multiplier 0.031 Low target 95 mg/dL High target 130 mg/dL D50 order 0.0 ml  
 D50 administered 0.00 ml Minutes until next BG 60 min Order initials Jennifer Koromaite Administered initials Jennifer Post   
 GLSCOM Comments GLUCOSE, POC Collection Time: 11/24/19  7:26 AM  
Result Value Ref Range Glucose (POC) 105 (H) 65 - 100 mg/dL Performed by Leopoldo Alicia Collection Time: 11/24/19  7:26 AM  
Result Value Ref Range Glucose 105 mg/dL Insulin order 1.4 units/hour Insulin adminstered 1.4 units/hour Multiplier 0.031 Low target 95 mg/dL High target 130 mg/dL D50 order 0.0 ml  
 D50 administered 0.00 ml Minutes until next BG 60 min Order initials Jennifer Koromaite Administered initials Jennifer Rurobin KoromaSejal   
 GLSCOM Comments GLUCOSE, POC Collection Time: 11/24/19  8:24 AM  
Result Value Ref Range Glucose (POC) 115 (H) 65 - 100 mg/dL Performed by Mabel Gonzalez Collection Time: 11/24/19  8:29 AM  
Result Value Ref Range Glucose 115 mg/dL Insulin order 1.7 units/hour Insulin adminstered 1.7 units/hour Multiplier 0.031 Low target 95 mg/dL High target 130 mg/dL D50 order 0.0 ml  
 D50 administered 0.00 ml Minutes until next BG 60 min Order initials EW Administered initials EW   
 GLSCOM Comments GLUCOSE, POC Collection Time: 11/24/19  9:39 AM  
Result Value Ref Range Glucose (POC) 98 65 - 100 mg/dL Performed by Mabel Gonzalez Collection Time: 11/24/19  9:39 AM  
Result Value Ref Range Glucose 98 mg/dL Insulin order 1.2 units/hour Insulin adminstered 1.2 units/hour Multiplier 0.031 Low target 95 mg/dL High target 130 mg/dL  D50 order 0.0 ml  
 D50 administered 0.00 ml Minutes until next BG 60 min Order initials cw Administered initials cw GLSCOM Comments GLUCOSE, POC Collection Time: 11/24/19 10:41 AM  
Result Value Ref Range Glucose (POC) 105 (H) 65 - 100 mg/dL Performed by Stephen Hall Collection Time: 11/24/19 10:42 AM  
Result Value Ref Range Glucose 105 mg/dL Insulin order 1.4 units/hour Insulin adminstered 1.4 units/hour Multiplier 0.031 Low target 95 mg/dL High target 130 mg/dL D50 order 0.0 ml  
 D50 administered 0.00 ml Minutes until next  min Order initials EW Administered initials EW   
 GLSCOM Comments GLUCOSE, POC Collection Time: 11/24/19 12:49 PM  
Result Value Ref Range Glucose (POC) 190 (H) 65 - 100 mg/dL Performed by Cipriano Swan Collection Time: 11/24/19 12:49 PM  
Result Value Ref Range Glucose 190 mg/dL Insulin order 4.0 units/hour Insulin adminstered 4.0 units/hour Multiplier 0.031 Low target 95 mg/dL High target 130 mg/dL D50 order 0.0 ml  
 D50 administered 0.00 ml Minutes until next BG 60 min Order initials llr Administered initials llr GLSCOM Comments Assessment and Plan  
 
67 y/o man with a h/o CAD admitted with ACS s/p CABG. 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 Asked to see for t'cytopenia. Work-up thus far:  
 
Bilirubin 0.6 11/19/19 HECTOR negative 11/2/19 Legionella DFA negative 11/7/19 Bronchial lavage negative 11/7/19 Blood culture negative 10/21/19 S-28: Normal 
Folic acid: normal 
Fibrinogen: normal 
HIT panel/CARMEN: normal 
Zosyn started 5 days after PLT decline Copper: pending LAC: pending B2G: pending ACL: pending Heparin held 1. Thrombocytopenia 
  plt count stable today Recommend PLT transfusion for bleeding or < 10K. 2. Anemia Transfusion yesterday Iron panel pending 
 monitor

## 2019-11-24 NOTE — PROGRESS NOTES
[facility] ICU Progress Note Admit Date: 10/19/2019 Procedure:  Procedure(s): 
CARDIAC RE-ENTRY, MARISOL BY  [facility] -  ZAHIRABOBBY    
 
CABG x 5, LIMA to LAD, RSVG to [id number], RSVG to PDA 10/23/19 
 
11/13/19 - S/p perc trach placement Subjective:  
Pt seen with Dr. Marshell Hashimoto. Remains on precedex, insulin gtt. Tmax 100.3F, on 80% FiO2. Unable to wean FiO2 due to desat/increased RR/work of breathing. Became hypoglycemic overnight. Objective:  
Vitals: 
Blood pressure 143/72, pulse 90, temperature 99.6 °F (37.6 °C), resp. rate 21, height 5' 7\" (1.702 m), weight 134 lb 0.6 oz (60.8 kg), SpO2 90 %. Temp (24hrs), Av.9 °F (37.2 °C), Min:97.4 °F (36.3 °C), Max:100.3 °F (37.9 °C) EKG/Rhythm: SR in the 80s Oxygen Therapy: 
Oxygen Therapy O2 Sat (%): 90 % (19 0600) Pulse via Oximetry: 89 beats per minute (19 195) O2 Device: Tracheostomy; Ventilator (19) O2 Flow Rate (L/min): 40 l/min (19) O2 Temperature: 98.6 °F (37 °C) (19 0840) FIO2 (%): 80 % (190) CXR:  
CXR Results  (Last 48 hours)  
          
 19 0431  XR CHEST PORT Final result Impression:  IMPRESSION:   
Stable pulmonary edema, superimposed on emphysema and pulmonary fibrosis. Narrative:  PORTABLE CHEST RADIOGRAPH/S: 2019 4:31 AM  
   
INDICATION: Interstitial edema. COMPARISON: 2019, 2019, 11/15/2019, 2019, 2019,  
10/21/2019; CT chest 2019. TECHNIQUE: Portable frontal semiupright radiograph/s of the chest.  
   
FINDINGS: On prior chest CT, there is a background of emphysema and pulmonary fibrosis. Compared to 10/21/2019, however, there is superimposed interstitial and alveolar  
edema. This is stable since at least 2019, however. The central airways  
are patent. No pneumothorax and no large pleural effusion. A right PICC and  
tracheostomy tube are in appropriate position. A feeding tube extends to at least the stomach. Post CABG and coronary stenting. 11/22/19 0751  XR CHEST PORT Final result Impression:  IMPRESSION: No significant change. Narrative:  EXAM:  XR CHEST PORT. INDICATION: ARDS. COMPARISON: 11/21/2019. FINDINGS:   
A portable AP radiograph of the chest was obtained at 0721 hours. There are  
sternal sutures. Lines and tubes: The patient is on a cardiac monitor. The tracheostomy tube,  
right arm PICC line and nasoenteric feeding tube are all unchanged in position. Lungs: There is widespread interstitial disease throughout the lungs which is  
unchanged. Pleura: There is no pneumothorax or pleural effusion. Mediastinum: The cardiac and mediastinal contours and pulmonary vascularity are  
normal.  
Bones and soft tissues: The bones and soft tissues are grossly within normal  
limits. Admission Weight: Last Weight Weight: 141 lb 5 oz (64.1 kg) Weight: 134 lb 0.6 oz (60.8 kg) Intake / Output / Drain: 
Current Shift: 11/23 1901 - 11/24 0700 In: 1570.1 [I.V.:265.1] Out: 3454 [TFBYW:7511] Last 24 hrs.:  
 
Intake/Output Summary (Last 24 hours) at 11/24/2019 6194 Last data filed at 11/24/2019 0600 Gross per 24 hour Intake 3939.4 ml Output 4465 ml Net -525.6 ml EXAM: 
General:  Trach, sedated. Lungs:   Coarse bilat Incision:  Midsternal incision with no significant erythema, drainage, or dehiscence. Heart:  Regular rate and rhythm, S1, S2 normal, no murmur, click, rub or gallop. Abdomen:   Soft, non-tender. Bowel sounds active. No masses,  No organomegaly. +BM 11/19. Extremities:  Some generalized edema, +1-2 edema. Palpable pulses bilat Neurologic:  Moves extremities but without purpose. Does not track or follow commands. Labs:  
Recent Labs  
  11/24/19 
4503  11/24/19 
0305 WBC  --   --  8.2 HGB  --   --  7.2* HCT  --   --  23.3*  
 PLT  --   --  54* NA  --   --  141 K  --   --  3.5 BUN  --   --  60* CREA  --   --  1.46* GLU  --   --  91  
GLUCPOC 104*   < >  --   
 < > = values in this interval not displayed. Assessment:  
 
Principal Problem: S/P CABG x 5 (10/23/2019) Overview: x 5, LIMA to LAD, RSVG to Diag1, OM2-OM3, RSVG to PDA Active Problems: 
  ACS (acute coronary syndrome) ([facility] [address] (10/19/2019) Unstable angina ([facility] [address] (10/19/2019) Coronary artery disease of native artery of native heart with stable angina pectoris ([facility] [address] (10/21/2019) Systolic heart failure ([facility] [address] (10/22/2019) Plan/Recommendations/Medical Decision Makin. S/p CABG: on ASA, statin. No ACEi/ARB due to renal function. No BB due to pulmonary fibrosis. 2. Acute on chronic systolic CHF, NYHA Class II on admit: EF 35-40% preop. No BB/ACE/ARB/AA until vitals/kidney function allows. Trend pBNP. Bumex. 3. Acute postop respiratory failure with chronic interstitial fibrosis per CXR: Trach placed  by Thoracic Surgery. Acute respiratory acidosis  thought driven by oversedation. Fentanyl and versed drips stopped. (now just has PRN Morphine and Ativan dosing) Diuresis per Nephrology. Amiodarone stopped on 10/31. Solumedrol per pulm-weaning. Continue scheduled nebs. Cont mucinex, pulm toileting, mucomyst. Chest CT revealing emphysema/pulm fibrosis. Has not previously tolerated vent weaning. Wean vent settings as able, goal PaO2 > 60 or SpO2 >88% per pulm. 4. Renal insufficiency: BUN/Creatinine stable today. Nephrology following and appreciate their recommendations. Devlin in place for accurate I&O, change today. Continue diuretics 5. Anemia: had preop, H&H with slight drop today. Received another unit pRBC yesterday. Iron panel sent preop - iron, iron sat low-continue Iron supplement. 6. Thrombocytopenia: Platelets stable 57D-31O.  HIT antibody neg, CARMEN negative. Daily CBC. Hold SQ heparin and ASA. On PPI. Transfuse platelets for less than 10k or if active bleeding seen per Hematology-appreciate their recommendations 7. Hx of HTN: Hydralazine PRN SBP >160, BP too labile for PO meds 8. HLD: Continue pravastatin 9. Leukocytosis: WBC WNL,  Devlin placed 11/7. PICC placed 11/6. Trach 11/13. Femoral arterial line out 11/21. Monitor daily CBC. Appreciate ID consult-continue Zosyn. Leukopenic last week 10. Constipation: Last BM on 11/19. Bowel medications resumed. Continue sandro-q 11. Current smoker: smoking cessation education completed. 12. Nutrition: Appreciate Dietician recommendations. Dobhoff placed 10/30 and Enteral feedings started. TF's at goal of 35 ml/hr. Try and AVOID increased volume amounts. Will likely need PEG depending on progression. 13. Hyperglycemia: Preop A1c 5.3 with no DM history. Became hypoglycemic overnight. DTS following. Weaning steroids per Pulmonology. 14. Hypernatremia: Resolved. Nephrology following 15. Agitation, acute encephalopathy: Have been unable to wean down on sedation much due to agitation. Continue Precedex, and PRN Morphine, Ativan to keep the patient comfortable. Ashkan Dhaval off. Head CT neg for any acute process. Continue seroquel bid. Do not think this is driven by pain, more from neuro source/encephalopathy 16. DVT/GI Prophylaxis: SCD's, SQ Heparin-currently on hold due to thrombocytopenia. Will restart when improved, PPI Dispo: PT/OT as able. Remain in CVI until resp status more stable.   
 
Signed By: CHELSEA Osullivan

## 2019-11-25 NOTE — PROGRESS NOTES
NUTRITION COMPLETE ASSESSMENT 
 
RECOMMENDATIONS:  
1. Continue current tube feeds. Fluid flush adjustment per renal based on sodium trends. 2. Recommend PEG placement - NGT in place for 4 weeks with difficulty weaning from vent. -  If PEG not placed this week will at least need to remove/replace DHT into alternative nares to prevent skin breakdown. 3. Insulin per DTC recommendations pending BG trends and steroids doses Interventions/Plan:  
Food/Nutrient Delivery:    (-) (-)   (continue EN) Assessment:  
Reason for Assessment: Reassessment Diet: NPO Tube feeds: Suplena @ 35ml/hr + 200ml flush q3hr Nutritionally Significant Medications: [x] Reviewed & Includes: bumex, liquid iron, SSI, mag sulfate, liquid MVI, oral nystatin, protonix, zosyn, miralax, KCl, seroquel, pericolace, zoloft; DRIPS: precedex, insulin drip Subjective: Intubated/sedated. Objective: 
Pt admitted for acute coronary syndrome. PMHx: DM, high cholesterol, CKD3, HTN. S/p CABG x 5 on 10/23. Re-intubated 11/8, trach placed on 11/13. ISAIAH on CKD. Diuresis with bumex per renal. Sodium stable WNL with current water flush. Easily agitastied seroquel rx. Hematology consulted of thrombocytopenia. DHT placed on 10/29. Tolerating tube feeds with no issues. Suplena @ 35ml/hr + 200ml flush q3hr. Provides: 840ml, 1512kcal, 38g protein, 620ml free fluid + 1600ml flush = 2220ml fluid. Phos now WNL on low phos formula. Pt has been with DHT for almost a month. Possible need for PEG notes and agree with this since remains on vent. If PEG not placed this week will at least need to remove/replace DHT into alternative nares to prevent skin breakdown. Previous hyperglycemia but steroids weaning. Hypoglycemia last night. DTC following for transition to lantus BID but dose will be dependent on steroids needs. Stool consistency normal per discussion with RN student with bowel regimen rx. Estimated Nutrition Needs: Kcals/day: 1519 Kcals/day Protein: 33 g(33-45g (0.6-0.8g/kg) ISAIAH on CKD) Fluid: 1520 ml(1ml/kcal) Based On: [facility] (2926H) Weight Used: Actual wt(56.2kg) Pt expected to meet estimated nutrient needs:  [x]   Yes     []  No [] Unable to predict at this time Nutrition Diagnosis:  
1. Inadequate oral intake related to respiratory failure as evidenced by intubated with EN via [address] 2. (decreased protein needs) related to ISAIAH on CKD as evidenced by no HD; pt hx Goals:   
 EN meeting at least 90% needs in 5-7 days Monitoring & Evaluation: - Total energy intake, Enteral/parenteral nutrition intake - Weight/weight change Previous Nutrition Goals Met:   Yes Previous Recommendations:    Yes 
 
Education & Discharge Needs: 
 [x] None Identified 
 [] Identified and addressed [x] Participated in care plan, discharge planning, and/or interdisciplinary rounds Cultural, Confucianism and ethnic food preferences identified: None Skin Integrity: [x]Intact  []Other Edema: []None [x]Other: 2+ BLLE Last BM: 11/25 Food Allergies: []None [x]Other: scallops Diet Restrictions: Cultural/Sikhism Preference(s): None Anthropometrics:   
Weight Loss Metrics 11/25/2019 Today's Wt 132 lb 7.9 oz  
BMI 20.75 kg/m2 Weight Source: Bed Height: 5' 7\" (170.2 cm), Body mass index is 20.75 kg/m². IBW : 72.6 kg (160 lb), % IBW (Calculated): 93.14 % 
 ,   
 
Labs:   
Lab Results Component Value Date/Time Sodium 138 11/25/2019 02:58 AM  
 Potassium 3.7 11/25/2019 02:58 AM  
 Chloride 101 11/25/2019 02:58 AM  
 CO2 32 11/25/2019 02:58 AM  
 Glucose 106 (H) 11/25/2019 02:58 AM  
 BUN 54 (H) 11/25/2019 02:58 AM  
 Creatinine 1.38 (H) 11/25/2019 02:58 AM  
 Calcium 8.1 (L) 11/25/2019 02:58 AM  
 Magnesium 1.9 11/25/2019 02:58 AM  
 Phosphorus 2.9 11/25/2019 02:58 AM  
 Albumin 2.4 (L) 11/25/2019 02:58 AM  
 
Lab Results Component Value Date/Time  Hemoglobin A1c 5.3 10/21/2019 03:04 AM  
 
 Jose Angel Kinsey, RD [address] [place] , Pager #[phone] or [phone]

## 2019-11-25 NOTE — DIABETES MGMT
[facility] Cardiac Surgery Progress Note Recommendations/ Comments: Pt continues on insulin gtt. Current drip rate running at 1.3 units/hr. Has required 7. 2 units in the last 6 hours. Note episode of near hypoglycemia this morning. Pt continues to receive TF via [id number] @ 35 ml/hr. Steroids discontinued yesterday. Remains on vent support via trach - note plan for palliative consult given difficult wean from vent and difficulty waking pt up. Pt may require basal insulin for transition given TF - will need to determine dose closer to time of transition. Will follow. Patient is 68 y.o. male s/p CABG x 5 followed by re-entry for mediastinal clot evacuation and repair of SVG to PDA . Pt with documented hx of DM on no meds PTA. Anemia - A1c inaccurate A1c:  
Lab Results Component Value Date/Time Hemoglobin A1c 5.3 10/21/2019 03:04 AM  
 
 
 
Recent Glucose Results:  
Lab Results Component Value Date/Time  (H) 11/25/2019 02:58 AM  
 GLU 95 11/24/2019 08:05 PM  
 GLUCPOC 115 (H) 11/25/2019 09:22 AM  
 GLUCPOC 94 11/25/2019 08:20 AM  
 GLUCPOC 71 11/25/2019 08:02 AM  
  
 
Lab Results Component Value Date/Time Creatinine 1.38 (H) 11/25/2019 02:58 AM  
 
Estimated Creatinine Clearance: 41.7 mL/min (A) (based on SCr of 1.38 mg/dL (H)). Active Orders There are no active orders of the following types: Diet. PO intake:  
No data found. Will continue to follow as needed. Thank you. Abeba Sawyer RD, Diabetes Clinician Time spent: 7 minutes

## 2019-11-25 NOTE — PROGRESS NOTES
Attempted to visit pt in CVICU. Pt on vent and no family present. Chaplains will continue to offer support as needed. Chaplain Ibrahim MDiv, MS, [facility] 
[phone] [phone]

## 2019-11-25 NOTE — PROGRESS NOTES
[facility] 
 
 Seen and examined. Tachypneic, Vent 100%, peep increased to 8 RN informed me of son's wishes to talk to pulmonology-- called him and discussed low likelihood of liberation from ventilator and informed him that I would agree with goals directed towards comfort if decided upon  Seen and examined Increase O2 requirement. Getting more diuretics.  Seen and examined D/W NORMA Sahni Wean FiO2 for PaO2 > 60 or SPO2 > 88% 10/18 Seen and examined patient at bedside. Mental status -> still an issue. Not much secretions. 11/15 Seen and examined Tachypneic Dyssynchronous with ventilator Off fentanyl and versed On precedex Receiving blood Puffy extremities CXR ? Increased edema 
proBNP 33K 
 
 S/p Ida Bismark On vent 60% FiO2 On TF Patient Vitals for the past 4 hrs: 
 BP Temp Pulse Resp SpO2  
19 0800 102/55 98.8 °F (37.1 °C) 88 26 98 % 19 0725     100 % 19 0700 110/59  85 21 99 % Temp (24hrs), Av °F (37.2 °C), Min:97.8 °F (36.6 °C), Max:100.2 °F (37.9 °C) Intake/Output Summary (Last 24 hours) at 2019 1008 Last data filed at 2019 1000 Gross per 24 hour Intake 2719.35 ml Output 3733 ml Net -1013.65 ml No distress Ida Bismark Rales and rhonchi Tachy Soft bs present Warm and dry CXR - trach - no change in interstitial lung disease Lab: 
Recent Labs  
  19 
0258 19 
0305 19 
1351  19 
0420 19 
0315 WBC 5.8  --  8.2  --   --  7.0 7.2 HGB 8.0*  --  7.2* 7.5*  --  6.1* 6.0*  
PLT 56* 53* 54*  --    < > 48* 46*  140 141  --    < >  --  141  
K 3.7 4.2 3.5  --    < >  --  3.7  102 104  --    < >  --  105 CO2 32 34* 32  --    < >  --  30  
BUN 54* 56* 60*  --    < >  --  57* CREA 1.38* 1.47* 1.46*  --    < >  --  1.36* * 95 91  --    < >  --  112* CA 8.1* 8.2* 8.1*  --    < >  --  7.8*  
MG 1.9  --  2.0 2.2  --   --  1.9 PHOS 2.9  --  3.0  --   --   --  3.8 TBILI 0.8 0.6 0.7  --    < >  --  0.6 SGOT 44* 37 35  --    < >  --  34  
 < > = values in this interval not displayed. ABG: 
Recent Labs  
  11/25/19 
0502 11/24/19 
0513 11/23/19 
3123 PHI 7.416 7.439 7.382 PCO2I 48.6* 43.9 49.1*  
PO2I 85 78* 168* HCO3I 31.2* 29.8* 29.2* SO2I 96 96 99* FIO2I 100 80 70 Results Procedure Component Value Units Date/Time CULTURE, BLOOD, PAIRED [[id number]] Collected:  11/16/19 0310 Order Status:  Completed Specimen:  Blood Updated:  11/21/19 4981 Special Requests: NO SPECIAL REQUESTS Culture result: NO GROWTH 5 DAYS Impression: 
========= 
· S/p 5V CABG 10/19/19 for ACS  With reexploration 10/23 for mediastinal hematoma. · Acute resp failure - baseline fibrotic ILD (etiology not clear- this is a new dx but UIP/IPF in DDX) with superimposed pulmonary interstitial edema probable superimposed diffuse alveolar damage from blood/blood products. Amio pulm toxicty in DDX but not clear from [facility] review when he was on it. Suspect that he his significant irreversible fibrotic lung disease. · Active smoker- 10/19 preop bedside tanner without airflow obst FEV1 61% ratio > 0.7. · ISAIAH, hypernatremia. 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 · HTN. · Anemia. · Encephalopathy. Plan: 
==== 
--vent support - VDRF 
--on solumedrol - 40 mg IV q12. 
--diuretics as you are doing. --Agree with minimizing sedation. 
--TF. 
--Nephrology following electrolytes and diuretics. --Little to add-- will follow as needed. -- D/W RN and son at bedside Marsha Cedeño MD  
Pulmonary and Critical Care Medicine

## 2019-11-25 NOTE — PROGRESS NOTES
2000 - I have reviewed and agree with the care rendered, medications given and documentation done by Susan JANG.

## 2019-11-25 NOTE — PROGRESS NOTES
Palliative Medicine Social Work Consult received. Chart reviewed. No AMD on file Primary Decision Maker: Kranthi Landry - Son - [phone] Mr. Luis Richey is a 68 y.o. male with past medical history significant for CHF (25%),diabetes, high cholesterol, hypertension, CKD3,  MI and coronary artery disease. He has had a long complicated hospitalization and is currently unresponsive in CVICU s/p CABG (10/19) with hypoxemic resp failure (s/p tracheostomy 11/13). Pulmonology has spoken to son re: low likelihood of weaning off vent/consider transitioning to comfort. Palliative consult for care decisions. Social: Patient lives with son Erick Mukherjee who is Lehr Stark; nephew Onelwinsome Rudi ([phone]) and cousin Ute Hendrix also involved at some level; patient is [place]et and gets most care through [address] [facility] Discussed with CM Lacy Morales and NP Columba Hartley. Family meeting with son Tuesday 4pm. Son is also hoping pt brother Ike Smith and sister Keith Bates can participate. Thank you for the opportunity to be involved in the care of Mr. Rick Coleman and his family. Kulwinder Rashid LMSW, Supervisee in Social Work Palliative Medicine  [phone]

## 2019-11-25 NOTE — PROGRESS NOTES
[facility] ICU Progress Note Admit Date: 10/19/2019 Procedure:  Procedure(s): 
CARDIAC RE-ENTRY, MARISOL BY DR NEW [facility] -  [facility]    
 
CABG x 5, LIMA to LAD, RSVG to [id number], RSVG to PDA 10/23/19 
 
11/13/19 - S/p perc trach placement Subjective:  
Pt seen with Dr. Walt Dooley. Remains on precedex, insulin gtt. Tmax 100.2F, on 100% FiO2. Unable to wean FiO2 due to desat/increased RR/work of breathing. Objective:  
Vitals: 
Blood pressure 102/55, pulse 88, temperature 98.8 °F (37.1 °C), resp. rate 26, height 5' 7\" (1.702 m), weight 132 lb 7.9 oz (60.1 kg), SpO2 98 %. Temp (24hrs), Av °F (37.2 °C), Min:97.8 °F (36.6 °C), Max:100.2 °F (37.9 °C) EKG/Rhythm: SR in the 80s Oxygen Therapy: 
Oxygen Therapy O2 Sat (%): 98 % (19 08) Pulse via Oximetry: 87 beats per minute (19 0725) O2 Device: Tracheostomy; Ventilator (19 08) O2 Flow Rate (L/min): 40 l/min (19) O2 Temperature: 98.6 °F (37 °C) (19 0840) FIO2 (%): 100 % (19 08) CXR:  
CXR Results  (Last 48 hours)  
          
 19 0431  XR CHEST PORT Final result Impression:  IMPRESSION: No significant change in the diffuse interstitial lung disease Narrative:  EXAM:  XR CHEST PORT INDICATION:  interstitial edema COMPARISON:  2019 FINDINGS: A portable AP radiograph of the chest was obtained at 344 hours. Tracheostomy tube, Dobbhoff tube traversing mediastinum and right PICC catheter  
are unchanged. .  Diffuse interstitial prominence is stable. Hyacinth Frizzle Heart size is  
stable. . .   
   
  
 19 0415  XR CHEST PORT Final result Impression:  IMPRESSION: No significant change with pulmonary edema superimposed upon  
pulmonary fibrosis. Narrative:  EXAM: XR CHEST PORT INDICATION: pulmonary fibrosis COMPARISON: Chest x-ray 2019 and CT thorax 2019. FINDINGS: A portable AP radiograph of the chest was obtained at 04:04 hours.  The  
 patient is on a cardiac monitor. The lungs redemonstrates diffuse coarse  
interstitial markings with superimposed interstitial and alveolar edema, not  
significantly changed. No pleural effusion or pneumothorax. Tracheostomy,  
enteric tube, and right PICC line traverse stable and expected courses with no  
significant change. There are median sternotomy wires and surgical clips  
compatible with prior CABG. The cardiac and mediastinal contours and pulmonary  
vascularity are normal.  The bones and soft tissues are grossly within normal  
limits. Admission Weight: Last Weight Weight: 141 lb 5 oz (64.1 kg) Weight: 132 lb 7.9 oz (60.1 kg) Intake / Young Pineda / Drain: 
Current Shift: 11/25 0701 - 11/25 1900 In: 48.6 [I.V.:13.6] Out: 275 [Urine:275] Last 24 hrs.:  
 
Intake/Output Summary (Last 24 hours) at 11/25/2019 8560 Last data filed at 11/25/2019 0800 Gross per 24 hour Intake 2868.48 ml Output 3558 ml Net -689.52 ml EXAM: 
General:  Trach, sedated. Lungs:   Coarse bilat Incision:  Midsternal incision with no significant erythema, drainage, or dehiscence. Heart:  Regular rate and rhythm, S1, S2 normal, no murmur, click, rub or gallop. Abdomen:   Soft, non-tender. Bowel sounds active. No masses,  No organomegaly. +BM 11/19. Extremities:  Some generalized edema, +1-2 edema. Palpable pulses bilat Neurologic:  Moves extremities but without purpose. Does not track or follow commands. Labs:  
Recent Labs  
  11/25/19 
8798  11/25/19 
0258 WBC  --   --  5.8 HGB  --   --  8.0* HCT  --   --  25.6* PLT  --   --  56* NA  --   --  138 K  --   --  3.7 BUN  --   --  54* CREA  --   --  1.38* GLU  --   --  106* GLUCPOC 115*   < >  --   
 < > = values in this interval not displayed. Assessment:  
 
Principal Problem: S/P CABG x 5 (10/23/2019) Overview: x 5, LIMA to LAD, RSVG to Diag1, OM2-OM3, RSVG to PDA Active Problems: 
  ACS [facility]acute [facility] syndrome) [facility] (10/19/2019) Unstable angina ([facility] (10/19/2019) Coronary artery disease of native artery of native heart with stable angina pectoris ([facility] (10/21/2019) Systolic heart failure ([facility] (10/22/2019) Plan/Recommendations/Medical Decision Makin. S/p CABG: on ASA, statin. No ACEi/ARB due to renal function. No BB due to pulmonary fibrosis. 2. Acute on chronic systolic CHF, NYHA Class II on admit: EF 35-40% preop. No BB/ACE/ARB/AA until vitals/kidney function allows. Trend pBNP. Bumex. 3. Acute postop respiratory failure with chronic interstitial fibrosis per CXR: Trach placed  by Thoracic Surgery. Acute respiratory acidosis  thought driven by oversedation. Fentanyl and versed drips stopped. (now just has PRN Morphine and Ativan dosing) Diuresis per Nephrology. Amiodarone stopped on 10/31. Solumedrol weaned off. Continue scheduled nebs. Cont mucinex, pulm toileting, mucomyst. Chest CT revealing emphysema/pulm fibrosis. Has not previously tolerated vent weaning. Wean vent settings as able, goal PaO2 > 60 or SpO2 >88% per pulm. 4. Renal insufficiency: BUN/Creatinine stable today. Nephrology following and appreciate their recommendations. Devlin in place for accurate I&O, changed . Continue diuretics 5. Anemia: had preop, H&H stable today. Received another unit pRBC . Iron panel sent preop - iron, iron sat low-continue Iron supplement. 6. Thrombocytopenia: Platelets stable 03C-97C. HIT antibody neg, CARMEN negative. Daily CBC. Hold SQ heparin and ASA. On PPI. Transfuse platelets for less than 10k or if active bleeding seen per Hematology-appreciate their recommendations 7. Hx of HTN: Hydralazine PRN SBP >160, BP too labile for PO meds 8. HLD: Continue pravastatin 9. Leukocytosis: WBC WNL,  Devlin placed 11/7. PICC placed 11/6. Trach 11/13. Femoral arterial line out 11/21. Monitor daily CBC. Appreciate ID consult-continue Zosyn. Leukopenic last week 10. Constipation: Last BM on 11/19. Bowel medications continue. Continue sandro-q 11. Current smoker: smoking cessation education completed. 12. Nutrition: Appreciate Dietician recommendations. Dobhoff placed 10/30 and Enteral feedings started. TF's at goal of 35 ml/hr. Try and AVOID increased volume amounts. Will likely need PEG depending on progression. 13. Hyperglycemia: Preop A1c 5.3 with no DM history. DTS following. Steroids completed. 14. Hypernatremia: Resolved. Nephrology following 15. Agitation, acute encephalopathy: Have been unable to wean down on sedation much due to agitation. Continue Precedex, and PRN Morphine, Ativan to keep the patient comfortable. Radha Musty off. Head CT neg for any acute process. Continue seroquel bid. Do not think this is driven by pain, more from neuro source/encephalopathy 16. DVT/GI Prophylaxis: SCD's, SQ Heparin-currently on hold due to thrombocytopenia. Will restart when improved, PPI Dispo: Will ask Palliative care to consult as he is no closer to waking up or weaning vent settings after a week. Will discuss with consulting teams and patient's son.   
 
Signed By: Tracy Moses NP

## 2019-11-25 NOTE — WOUND CARE
WOCN Note:  
 
 
New consult placed by RN for abrasion at bottom of trach from moisture Chart shows: 
Admitted for Acute Coronary Syndrome ; history of : Pulmonary edema, low platelets, not waking up post op, intubated and sedated, S/P CABG x 5, CHF, [place], ACS, on tube feedings. WBC = 5.8 On = 111-25-19 Admitted from to ER with chest pain. Assessment:  
Patient is vented with a trach. Patient has da silva Bed: Fabiano Reyez Diet: tube feedings Patient vented unable to communicate pain. Bilateral heels, buttocks and sacral skin intact and without erythema. Heels offloaded on pillow. Abrasion noted below bottom of trach: secondary to secretions and moisture. 0.8cm x 1.3cm x <0.1cm / 100% pink. Moisture from secretions. Surrounding skin is moist but all under trach. Recommendations: - Mepilex transfer cut to size: apply after cleaning secretions and moisture from skin under bottomof trach. Cut mepilex transfer to size and apply to skin ( tacky side on skin). Change as needed. Minimize layers of linen/pads under patient to optimize support surface. Turn/reposition approximately every 2 hours and offload heels. Manage incontinence / promote continence; Hydraguard to buttocks and sacrum daily and as needed with incontinence care. Specialty bed: Fabiano Reyez Discussed above plan with patient with RN / Rogelio Villalobos. Transition of Care: Plan to follow weekly and as needed while admitted to hospital.  
 
Jayda LAWSON RN Wound Care Department Office: [phone] Pager: [phone]

## 2019-11-25 NOTE — PROGRESS NOTES
1940: Bedside shift change report given to Mandy Kennedy RN (oncoming nurse) by Se Alejandre RN (offgoing nurse). Report included the following information SBAR, Kardex, Intake/Output, MAR, Accordion, Recent Results, Med Rec Status, and Cardiac Rhythm NSR . 2000: Assumed care of patient resting in bed. Pt not interactive, responds to pain, coughs with suction. Temperature 99.2 orally. Will give tylenol. Drips verified. 2204: Pt bucking vent, RR increased. Pt suctioned. IV morphine given. 2300: Trach care completed. New skin breakdown noted on inferior edge of securement device noted- mepitel and split gauze placed on patient. Wound care consulted. During this time pt had to be taken off vent, O2 saturations dropped to 67%. When placed back on ventilator FiO2 increased to 100%. Lots of gurgling sounds noted. RT paged to check cuff pressure. Will continue to monitor. 2305: RT at bedside, cuff pressure 26. FiO2 decreased to 90% as O2 sats 100% and RR decreased. 2316: FiO2 decreased to 85% as O2 saturations 98%. Will continue to monitor. Pt still producing very thick, yellow secretions. 2354: Pt still appears uncomfortable as evidenced by increased RR and increased BP. IV ativan given. Will continue to monitor. 0000: Assessment completed. Temperature improved. RR improving.  
0100: FiO2 decreased to 80% d/t O2 saturations 97% 0130: FiO2 increased to 85% d/t low O2 saturations 86%> Pt RR increased to 35-42, labored breathing, bucking the vent, 2 mg IV morphine given. Mouth care completed. Thick yellow secretions obtained via trach. 0153: Pt coughing a lot, nothing obtained when suctioning. Robitussin given. 0245: FiO2 increased to 100% as RR increased to > 45, O2 saturations 86%, Pt is coughing mucus around his trach site, when suctioning through trach nothing is coming up. Attempts to wean from % FiO2 have been unsuccessful 
0400: No change to assessment. Still on FiO2 100%. 0700: Trach care completed. 7872: Bedside shift change report given to Camilo Larios RN (oncoming nurse) by Christen Chris RN (offgoing nurse). Report included the following information SBAR, Kardex, Intake/Output, MAR, Accordion, Recent Results, Med Rec Status and Cardiac Rhythm NSR. Problem: Falls - Risk of 
Goal: *Absence of Falls Description Document Sergio [place] Fall Risk and appropriate interventions in the flowsheet. Outcome: Progressing Towards Goal 
Note: Fall Risk Interventions: 
Mobility Interventions: Communicate number of staff needed for ambulation/transfer Mentation Interventions: Adequate sleep, hydration, pain control, Door open when patient unattended, More frequent rounding, Room close to nurse's station, Update white board Medication Interventions: Evaluate medications/consider consulting pharmacy Elimination Interventions: Toileting schedule/hourly rounds History of Falls Interventions: Door open when patient unattended, Room close to nurse's station Problem: Pressure Injury - Risk of 
Goal: *Prevention of pressure injury Description Document Earl Scale and appropriate interventions in the flowsheet. Outcome: Progressing Towards Goal 
Note: Pressure Injury Interventions: 
Sensory Interventions: Assess changes in LOC, Assess need for specialty bed, Check visual cues for pain, Float heels, Keep linens dry and wrinkle-free, Maintain/enhance activity level, Minimize linen layers, Monitor skin under medical devices, Pad between skin to skin, Pressure redistribution bed/mattress (bed type), Turn and reposition approx. every two hours (pillows and wedges if needed) Moisture Interventions: Absorbent underpads, Apply protective barrier, creams and emollients, Check for incontinence Q2 hours and as needed, Internal/External urinary devices, Limit adult briefs, Maintain skin hydration (lotion/cream), Minimize layers Activity Interventions: Pressure redistribution bed/mattress(bed type) Mobility Interventions: Assess need for specialty bed, Pressure redistribution bed/mattress (bed type), Turn and reposition approx. every two hours(pillow and wedges), Float heels Nutrition Interventions: Document food/fluid/supplement intake, Discuss nutritional consult with provider, Offer support with meals,snacks and hydration Friction and Shear Interventions: Apply protective barrier, creams and emollients, Lift sheet Problem: Infection - Risk of, Central Venous Catheter-Associated Bloodstream Infection Goal: *Absence of infection signs and symptoms Outcome: Progressing Towards Goal 
  
Problem: Ventilator Management Goal: *Adequate oxygenation and ventilation Outcome: Progressing Towards Goal 
Goal: *Patient maintains clear airway/free of aspiration Outcome: Progressing Towards Goal 
Goal: *Absence of infection signs and symptoms Outcome: Progressing Towards Goal 
Goal: *Normal spontaneous ventilation Outcome: Progressing Towards Goal 
  
Problem: Patient Education: Go to Patient Education Activity Goal: Patient/Family Education Outcome: Progressing Towards Goal 
  
Problem: Infection - Risk of, Urinary Catheter-Associated Urinary Tract Infection Goal: *Absence of infection signs and symptoms Outcome: Progressing Towards Goal

## 2019-11-25 NOTE — PROGRESS NOTES
Transitions of Care: 
 
 -Patient remains vent dependent and unsuccessful with weaning at this time 
 -Palliative Consultation The CM participated in [facility] rounds- patient vent dependent, unsuccessful wean attempts. Cardiac Surgery consulted Palliative Care to discuss goals of care with the patient's son. CM will continue to follow for transitions of care and case management needs.  RON Greenfield

## 2019-11-25 NOTE — PROGRESS NOTES
0800: Report received from [facility]. Patient tachypneic with labored breathing laying in bed. 0830:  Nephrology in to see patient. No changes to plan of care. 0945: Dr. Claudia Fine, pulmonology in to see patient. Dr. Claudia Fine called the son, Eliseo Hawley, and explained the patient's prognosis. 1015:  Eliseo Hawley, patient's son, in to see patient. Updated in person of prognosis. 1430: Per palliative note at [phone], they reached out to Eliseo Hawley. Family meeting is set up for Tuesday 11/26 at 1600.   
 
2000: Bedside shift change report given to [address] (oncoming nurse) by Raman Mejias (offgoing nurse).  Report included the following information SBAR, Kardex, Intake/Output, MAR, Accordion, Recent Results and Cardiac Rhythm SR.

## 2019-11-25 NOTE — PROGRESS NOTES
Nephrology Progress Note [facility] Date of Admission : 10/19/2019 CC:  Follow up for ISAIAH on CKD, hypervolemia Assessment and Plan ISAIAH on CKD: 
- from ATN post CABG, pre renal azotemia from diuretics - Cr stable 
- cont current diuretics Hypernatremia - stable 
- continue water flushes 200 ml Q3 hr w/ ongoing diuresis CKD III: 
- baseline Cr 1.3 prior to CABG 
- likely from DM and HTN 
  
HFrE F: 
- last EF 35-40% on 10/20 
  
CAD s/p CABG x 5 10/19 and reexploration 10/23 for hematoma 
  
Acute on Chronic Resp Failure: 
- likely 2/2 underlying ILD + volume 
- now w/ trach Thrombocytopenia: 
- w/u per hematology Chronic Anemia: 
- Hb stable  
  
DM2: 
 
Protein Malnutrition: 
- on TF via [place] [address] Interval History: 
Seen and examined. off pressors. high O2 requirements continue. Cr stable, diuresing ok Current Medications: all current  Medications have been eviewed in [facility] Review of Systems: Review of systems not obtained due to patient factors. Objective: 
Vitals:   
Vitals:  
 11/25/19 0600 11/25/19 0700 11/25/19 0725 11/25/19 0800 BP: 129/59 110/59  102/55 Pulse: 86 85  88 Resp: 27 21  26 Temp:    98.8 °F (37.1 °C) SpO2: 100% 99% 100% 98% Weight:      
Height:      
 
Intake and Output: 
11/25 0701 - 11/25 1900 In: 48.6 [I.V.:13.6] Out: 275 [Urine:275] 11/23 1901 - 11/25 0700 In: 9969 [I.V.:1252] Out: 4557 [[id number]] Physical Examination: 
General: Frail, sedated Neck:  + trach Resp:  ronchi and Diffuse dry crackles CV:  RRR,  no murmur or rub, no LE edema GI:  Soft, NT, + Bowel sounds, no hepatosplenomegaly Neurologic:  Sedated on the vent Psych:             Unable to assess Skin:  No Rash :  Devlin in place [x]    High complexity decision making was performed 
[x]    Patient is at high-risk of decompensation with multiple organ involvement Lab Data Personally Reviewed: I have reviewed all the pertinent labs, microbiology data and radiology studies during assessment. Recent Labs  
  11/25/19 0258 11/24/19 2005 11/24/19 0305 11/23/19 1351 11/23/19 
0315  140 141  --    < > 141  
K 3.7 4.2 3.5  --    < > 3.7  102 104  --    < > 105 CO2 32 34* 32  --    < > 30 * 95 91  --    < > 112* BUN 54* 56* 60*  --    < > 57* CREA 1.38* 1.47* 1.46*  --    < > 1.36* CA 8.1* 8.2* 8.1*  --    < > 7.8*  
MG 1.9  --  2.0 2.2  --  1.9 PHOS 2.9  --  3.0  --   --  3.8 ALB 2.4* 2.3* 2.6*  --    < > 2.7* SGOT 44* 37 35  --    < > 34 ALT 69 64 61  --    < > 62  
 < > = values in this interval not displayed. Recent Labs  
  11/25/19 0258 11/24/19 2005 11/24/19 0305 11/23/19 1351 11/23/19 
0420 WBC 5.8  --  8.2  --   --  7.0 HGB 8.0*  --  7.2* 7.5*  --  6.1*  
HCT 25.6*  --  23.3* 24.3*  --  19.8* PLT 56* 53* 54*  --    < > 48*  
 < > = values in this interval not displayed. No results found for: SDES Lab Results Component Value Date/Time Culture result: NO GROWTH 5 DAYS 11/16/2019 03:10 AM  
 Culture result: NO GROWTH 2 DAYS 11/07/2019 12:45 PM  
 Culture result: HEAVY NORMAL RESPIRATORY TARIK 10/21/2019 12:08 PM  
 
Recent Results (from the past 24 hour(s)) GLUCOSE, POC Collection Time: 11/24/19  9:39 AM  
Result Value Ref Range Glucose (POC) 98 65 - 100 mg/dL Performed by VHSquared Flow Collection Time: 11/24/19  9:39 AM  
Result Value Ref Range Glucose 98 mg/dL Insulin order 1.2 units/hour Insulin adminstered 1.2 units/hour Multiplier 0.031 Low target 95 mg/dL High target 130 mg/dL D50 order 0.0 ml  
 D50 administered 0.00 ml Minutes until next BG 60 min Order initials cw Administered initials cw GLSCOM Comments GLUCOSE, POC Collection Time: 11/24/19 10:41 AM  
Result Value Ref Range Glucose (POC) 105 (H) 65 - 100 mg/dL  Performed by Kaylynn Bryan  
 Collection Time: 11/24/19 10:42 AM  
Result Value Ref Range Glucose 105 mg/dL Insulin order 1.4 units/hour Insulin adminstered 1.4 units/hour Multiplier 0.031 Low target 95 mg/dL High target 130 mg/dL D50 order 0.0 ml  
 D50 administered 0.00 ml Minutes until next  min Order initials EW Administered initials EW   
 GLSCOM Comments GLUCOSE, POC Collection Time: 11/24/19 12:49 PM  
Result Value Ref Range Glucose (POC) 190 (H) 65 - 100 mg/dL Performed by Jimmy Hankins Collection Time: 11/24/19 12:49 PM  
Result Value Ref Range Glucose 190 mg/dL Insulin order 4.0 units/hour Insulin adminstered 4.0 units/hour Multiplier 0.031 Low target 95 mg/dL High target 130 mg/dL D50 order 0.0 ml  
 D50 administered 0.00 ml Minutes until next BG 60 min Order initials llr Administered initials llr GLSCOM Comments GLUCOSE, POC Collection Time: 11/24/19  1:58 PM  
Result Value Ref Range Glucose (POC) 244 (H) 65 - 100 mg/dL Performed by Trupti Angel Collection Time: 11/24/19  1:59 PM  
Result Value Ref Range Glucose 244 mg/dL Insulin order 7.5 units/hour Insulin adminstered 7.5 units/hour Multiplier 0.041 Low target 95 mg/dL High target 130 mg/dL D50 order 0.0 ml  
 D50 administered 0.00 ml Minutes until next BG 60 min Order initials EW Administered initials EW   
 GLSCOM Comments GLUCOSE, POC Collection Time: 11/24/19  3:04 PM  
Result Value Ref Range Glucose (POC) 195 (H) 65 - 100 mg/dL Performed by Trupti Angel Collection Time: 11/24/19  3:04 PM  
Result Value Ref Range Glucose 195 mg/dL Insulin order 6.9 units/hour Insulin adminstered 6.9 units/hour Multiplier 0.051 Low target 95 mg/dL High target 130 mg/dL D50 order 0.0 ml  
 D50 administered 0.00 ml Minutes until next BG 60 min Order initials EW Administered initials EW   
 GLSCOM Comments GLUCOSE, POC Collection Time: 11/24/19  4:11 PM  
Result Value Ref Range Glucose (POC) 167 (H) 65 - 100 mg/dL Performed by Sara Nevarez Collection Time: 11/24/19  4:12 PM  
Result Value Ref Range Glucose 167 mg/dL Insulin order 6.5 units/hour Insulin adminstered 6.5 units/hour Multiplier 0.061 Low target 95 mg/dL High target 130 mg/dL D50 order 0.0 ml  
 D50 administered 0.00 ml Minutes until next BG 60 min Order initials EW Administered initials EW   
 GLSCOM Comments GLUCOSE, POC Collection Time: 11/24/19  5:31 PM  
Result Value Ref Range Glucose (POC) 112 (H) 65 - 100 mg/dL Performed by Sravani Matos Collection Time: 11/24/19  5:32 PM  
Result Value Ref Range Glucose 112 mg/dL Insulin order 3.2 units/hour Insulin adminstered 3.2 units/hour Multiplier 0.061 Low target 95 mg/dL High target 130 mg/dL D50 order 0.0 ml  
 D50 administered 0.00 ml Minutes until next BG 60 min Order initials MP Administered initials MP   
 GLSCOM Comments GLUCOSE, POC Collection Time: 11/24/19  6:36 PM  
Result Value Ref Range Glucose (POC) 99 65 - 100 mg/dL Performed by Sara Nevarez Collection Time: 11/24/19  6:42 PM  
Result Value Ref Range Glucose 99 mg/dL Insulin order 2.4 units/hour Insulin adminstered 2.4 units/hour Multiplier 0.061 Low target 95 mg/dL High target 130 mg/dL D50 order 0.0 ml  
 D50 administered 0.00 ml Minutes until next BG 60 min Order initials cw Administered initials cw GLSCOM Comments GLUCOSE, POC Collection Time: 11/24/19  7:46 PM  
Result Value Ref Range Glucose (POC) 110 (H) 65 - 100 mg/dL Performed by Sara Nevarez Collection Time: 11/24/19  7:50 PM  
Result Value Ref Range Glucose 110 mg/dL Insulin order 3.1 units/hour Insulin adminstered 3.1 units/hour Multiplier 0.061 Low target 95 mg/dL High target 130 mg/dL D50 order 0.0 ml  
 D50 administered 0.00 ml Minutes until next BG 60 min Order initials 31 Gema Post Administered initials Jennifer Post   
 GLSCOM Comments METABOLIC PANEL, COMPREHENSIVE Collection Time: 11/24/19  8:05 PM  
Result Value Ref Range Sodium 140 136 - 145 mmol/L Potassium 4.2 3.5 - 5.1 mmol/L Chloride 102 97 - 108 mmol/L  
 CO2 34 (H) 21 - 32 mmol/L Anion gap 4 (L) 5 - 15 mmol/L Glucose 95 65 - 100 mg/dL BUN 56 (H) 6 - 20 MG/DL Creatinine 1.47 (H) 0.70 - 1.30 MG/DL  
 BUN/Creatinine ratio 38 (H) 12 - 20 GFR est AA 57 (L) >60 ml/min/1.73m2 GFR est non-AA 47 (L) >60 ml/min/1.73m2 Calcium 8.2 (L) 8.5 - 10.1 MG/DL Bilirubin, total 0.6 0.2 - 1.0 MG/DL  
 ALT (SGPT) 64 12 - 78 U/L  
 AST (SGOT) 37 15 - 37 U/L Alk. phosphatase 108 45 - 117 U/L Protein, total 5.4 (L) 6.4 - 8.2 g/dL Albumin 2.3 (L) 3.5 - 5.0 g/dL Globulin 3.1 2.0 - 4.0 g/dL A-G Ratio 0.7 (L) 1.1 - 2.2 PLATELET COUNT Collection Time: 11/24/19  8:05 PM  
Result Value Ref Range PLATELET 53 (L) 396 - 400 K/uL GLUCOSE, POC Collection Time: 11/24/19  8:52 PM  
Result Value Ref Range Glucose (POC) 92 65 - 100 mg/dL Performed by Joel Graves Collection Time: 11/24/19  8:52 PM  
Result Value Ref Range Glucose 92 mg/dL Insulin order 1.6 units/hour Insulin adminstered 1.6 units/hour Multiplier 0.049 Low target 95 mg/dL High target 130 mg/dL D50 order 0.0 ml  
 D50 administered 0.00 ml Minutes until next BG 60 min Order initials 31 Gema Post Administered initials 31 Gema Post   
 GLSCOM Comments GLUCOSE, POC Collection Time: 11/24/19  9:53 PM  
Result Value Ref Range Glucose (POC) 121 (H) 65 - 100 mg/dL Performed by Joel Graves  Collection Time: 11/24/19  9:53 PM  
 Result Value Ref Range Glucose 121 mg/dL Insulin order 3.0 units/hour Insulin adminstered 3.0 units/hour Multiplier 0.049 Low target 95 mg/dL High target 130 mg/dL D50 order 0.0 ml  
 D50 administered 0.00 ml Minutes until next BG 60 min Order initials 31 Rurobin ValadezSejal Administered initials 31 Gema Koromaite   
 GLSCOM Comments GLUCOSE, POC Collection Time: 11/24/19 10:55 PM  
Result Value Ref Range Glucose (POC) 97 65 - 100 mg/dL Performed by Skip Vargas Collection Time: 11/24/19 10:55 PM  
Result Value Ref Range Glucose 97 mg/dL Insulin order 1.8 units/hour Insulin adminstered 1.8 units/hour Multiplier 0.049 Low target 95 mg/dL High target 130 mg/dL D50 order 0.0 ml  
 D50 administered 0.00 ml Minutes until next BG 60 min Order initials 31 Rurobin ValadezSejal Administered initials 31 Rurobin KoromaSejal   
 GLSCOM Comments GLUCOSE, POC Collection Time: 11/24/19 11:56 PM  
Result Value Ref Range Glucose (POC) 97 65 - 100 mg/dL Performed by Skip Vargas Collection Time: 11/24/19 11:56 PM  
Result Value Ref Range Glucose 97 mg/dL Insulin order 1.8 units/hour Insulin adminstered 1.8 units/hour Multiplier 0.049 Low target 95 mg/dL High target 130 mg/dL D50 order 0.0 ml  
 D50 administered 0.00 ml Minutes until next BG 60 min Order initials 31 Rurobin ValadezSejal Administered initials 31 Rurobin KoromaSejal   
 GLSCOM Comments GLUCOSE, POC Collection Time: 11/25/19 12:57 AM  
Result Value Ref Range Glucose (POC) 115 (H) 65 - 100 mg/dL Performed by Skip Vargas Collection Time: 11/25/19 12:57 AM  
Result Value Ref Range Glucose 115 mg/dL Insulin order 2.7 units/hour Insulin adminstered 2.7 units/hour Multiplier 0.049 Low target 95 mg/dL High target 130 mg/dL D50 order 0.0 ml  
 D50 administered 0.00 ml Minutes until next BG 60 min Order initials 31 Rurobin ValadezSejal  Administered initials SH   
 GLSCOM Comments GLUCOSE, POC Collection Time: 11/25/19  1:57 AM  
Result Value Ref Range Glucose (POC) 112 (H) 65 - 100 mg/dL Performed by Belle Crowder Collection Time: 11/25/19  1:58 AM  
Result Value Ref Range Glucose 112 mg/dL Insulin order 2.5 units/hour Insulin adminstered 2.5 units/hour Multiplier 0.049 Low target 95 mg/dL High target 130 mg/dL D50 order 0.0 ml  
 D50 administered 0.00 ml Minutes until next BG 60 min Order initials 31 Rue Sejal Administered initials 31 Rue Sejal   
 GLSCOM Comments GLUCOSE, POC Collection Time: 11/25/19  2:57 AM  
Result Value Ref Range Glucose (POC) 120 (H) 65 - 100 mg/dL Performed by Vince Muro   
CBC W/O DIFF Collection Time: 11/25/19  2:58 AM  
Result Value Ref Range WBC 5.8 4.1 - 11.1 K/uL  
 RBC 2.65 (L) 4.10 - 5.70 M/uL HGB 8.0 (L) 12.1 - 17.0 g/dL HCT 25.6 (L) 36.6 - 50.3 % MCV 96.6 80.0 - 99.0 FL  
 MCH 30.2 26.0 - 34.0 PG  
 MCHC 31.3 30.0 - 36.5 g/dL  
 RDW 16.0 (H) 11.5 - 14.5 % PLATELET 56 (L) 388 - 400 K/uL NRBC 0.0 0  WBC ABSOLUTE NRBC 0.00 0.00 - 0.01 K/uL MAGNESIUM Collection Time: 11/25/19  2:58 AM  
Result Value Ref Range Magnesium 1.9 1.6 - 2.4 mg/dL NT-PRO BNP Collection Time: 11/25/19  2:58 AM  
Result Value Ref Range NT pro-BNP 33,953 (H) <823 PG/ML  
METABOLIC PANEL, COMPREHENSIVE Collection Time: 11/25/19  2:58 AM  
Result Value Ref Range Sodium 138 136 - 145 mmol/L Potassium 3.7 3.5 - 5.1 mmol/L Chloride 101 97 - 108 mmol/L  
 CO2 32 21 - 32 mmol/L Anion gap 5 5 - 15 mmol/L Glucose 106 (H) 65 - 100 mg/dL BUN 54 (H) 6 - 20 MG/DL Creatinine 1.38 (H) 0.70 - 1.30 MG/DL  
 BUN/Creatinine ratio 39 (H) 12 - 20 GFR est AA >60 >60 ml/min/1.73m2 GFR est non-AA 51 (L) >60 ml/min/1.73m2 Calcium 8.1 (L) 8.5 - 10.1 MG/DL  Bilirubin, total 0.8 0.2 - 1.0 MG/DL  
 ALT (SGPT) 69 12 - 78 U/L  
 AST (SGOT) 44 (H) 15 - 37 U/L Alk. phosphatase 128 (H) 45 - 117 U/L Protein, total 5.8 (L) 6.4 - 8.2 g/dL Albumin 2.4 (L) 3.5 - 5.0 g/dL Globulin 3.4 2.0 - 4.0 g/dL A-G Ratio 0.7 (L) 1.1 - 2.2 PHOSPHORUS Collection Time: 11/25/19  2:58 AM  
Result Value Ref Range Phosphorus 2.9 2.6 - 4.7 MG/DL  
GLUCOSTABILIZER Collection Time: 11/25/19  2:59 AM  
Result Value Ref Range Glucose 120 mg/dL Insulin order 2.9 units/hour Insulin adminstered 2.9 units/hour Multiplier 0.049 Low target 95 mg/dL High target 130 mg/dL D50 order 0.0 ml  
 D50 administered 0.00 ml Minutes until next  min Order initials 31 Gema Post Administered initials 31 Gema Post   
 GLSCOM Comments GLUCOSE, POC Collection Time: 11/25/19  4:59 AM  
Result Value Ref Range Glucose (POC) 101 (H) 65 - 100 mg/dL Performed by Zohreh Villarreal Collection Time: 11/25/19  4:59 AM  
Result Value Ref Range Glucose 101 mg/dL Insulin order 2.0 units/hour Insulin adminstered 2.0 units/hour Multiplier 0.049 Low target 95 mg/dL High target 130 mg/dL D50 order 0.0 ml  
 D50 administered 0.00 ml Minutes until next  min Order initials 31 Rurobin Post Administered initials 31 Rurobin Post   
 GLSCOM Comments POC G3 - PUL Collection Time: 11/25/19  5:02 AM  
Result Value Ref Range FIO2 (POC) 100 % pH (POC) 7.416 7.35 - 7.45    
 pCO2 (POC) 48.6 (H) 35.0 - 45.0 MMHG  
 pO2 (POC) 85 80 - 100 MMHG  
 HCO3 (POC) 31.2 (H) 22 - 26 MMOL/L  
 sO2 (POC) 96 92 - 97 % Base excess (POC) 7 mmol/L Site RIGHT RADIAL Device: VENT Mode ASSIST CONTROL Set Rate 24 bpm  
 PEEP/CPAP (POC) 7 cmH2O Allens test (POC) YES Inspiratory Time 1 sec Specimen type (POC) ARTERIAL    
GLUCOSE, POC Collection Time: 11/25/19  6:55 AM  
Result Value Ref Range Glucose (POC) 85 65 - 100 mg/dL  Performed by DOUG Almeida  
 Collection Time: 11/25/19  6:57 AM  
Result Value Ref Range Glucose (POC) 81 65 - 100 mg/dL Performed by Trina Rodriguez Collection Time: 11/25/19  6:57 AM  
Result Value Ref Range Glucose 85 mg/dL Insulin order 1.0 units/hour Insulin adminstered 1.0 units/hour Multiplier 0.039 Low target 95 mg/dL High target 130 mg/dL D50 order 0.0 ml  
 D50 administered 0.00 ml Minutes until next BG 60 min Order initials Jennifer Post Administered initials Jennifer Post   
 GLSCOM Comments GLUCOSE, POC Collection Time: 11/25/19  8:01 AM  
Result Value Ref Range Glucose (POC) 78 65 - 100 mg/dL Performed by Rick Greenfield GLUCOSE, POC Collection Time: 11/25/19  8:02 AM  
Result Value Ref Range Glucose (POC) 71 65 - 100 mg/dL Performed by Rick Kim Collection Time: 11/25/19  8:02 AM  
Result Value Ref Range Glucose 78 mg/dL Insulin order 0.0 units/hour Insulin adminstered 0.0 units/hour Multiplier 0.029 Low target 95 mg/dL High target 130 mg/dL D50 order 9.0 ml  
 D50 administered 9.00 ml Minutes until next BG 15 min Order initials mo Administered initials mo GLSCOM Comments GLUCOSE, POC Collection Time: 11/25/19  8:20 AM  
Result Value Ref Range Glucose (POC) 94 65 - 100 mg/dL Performed by Ene Kim Collection Time: 11/25/19  8:21 AM  
Result Value Ref Range Glucose 94 mg/dL Insulin order 0.6 units/hour Insulin adminstered 0.0 units/hour Multiplier 0.019 Low target 95 mg/dL High target 130 mg/dL D50 order 0.0 ml  
 D50 administered 0.00 ml Minutes until next BG 60 min Order initials mo Administered initials mo GLSCOM Comments Jaida Daigle MD 
[facility]  
[address], [address] [facility] Phone - [phone] Fax - [phone] 
www. [facility]

## 2019-11-25 NOTE — PROGRESS NOTES
NYHA class IV A/C systolic heart failure (EF 25%, NT pro-BNP 15,013) Acute on chronic kidney disease Mild pulmonary edema Mild chronic lung disease Acute on chronic hypoxic respiratory failure 
  
Remains intubated and sedated Had to go up on FIO2 over the weekend Has clearly done worse over last week or so Hgb and platelets remain low Creatinine improved Bilirubin and other LFTs look good NT pro-BNP a little better Will get palliative care consult TF's at goal  
 
CXR - pulmonary edema on pulmonary fibrosis Intake/Output Summary (Last 24 hours) at 11/25/2019 1517 Last data filed at 11/25/2019 1300 Gross per 24 hour Intake 2876.33 ml Output 3405 ml Net -528.67 ml Visit Vitals BP 91/55 Pulse (!) 102 Temp 100.3 °F (37.9 °C) Resp 25 Ht 5' 7\" (1.702 m) Wt 132 lb 7.9 oz (60.1 kg) SpO2 97% BMI 20.75 kg/m² Risk of morbidity and mortality - high Medical decision making - high complexity Total critical care time - 30 minutes (CPT 25484)

## 2019-11-26 NOTE — PROGRESS NOTES
Called to examine patient who has . No response to verbal and tactile stimuli. No respiratory effort. Absent heart sounds and pulses. Pupils fixed and dilated. Patient pronounced dead at 2:04 AM hours. Patient's family was notified by nursing staff at the time of passing. Support provided.

## 2019-11-26 NOTE — DISCHARGE SUMMARY
[facility] Discharge Summary Patient ID: Perez Dewitt [id number] 
82 y.o. 
1950 Admit date: 10/19/2019 Discharge date: 11/26/2019 Admitting Physician: Phylicia Mace MD  
 
Referring Cardiologist:  Dr. Gonzalez Saleem  
 
PCP:  Nanette Draper MD  
 
Admitting Diagnoses: CAD Discharge Diagnoses:  
 
Hospital Problems  Date Reviewed: 10/23/2019 Codes Class Noted POA  
 ACS (acute coronary syndrome) ([facility] [address] ICD-10-CM: I24.9 ICD-9-CM: 411.1  10/19/2019 Unknown * (Principal) S/P CABG x 5 ICD-10-CM: Z95.1 ICD-9-CM: V45.81  10/23/2019 Unknown Overview Signed 10/23/2019  3:48 PM by CHELSEA Lo  
  x 5, LIMA to LAD, RSVG to Diag1, OM2-OM3, RSVG to PDA Systolic heart failure (HCC) ICD-10-CM: I50.20 ICD-9-CM: 428.20  10/22/2019 Unknown Coronary artery disease of native artery of native heart with stable angina pectoris [facility]) ICD-10-CM: I25.118 
ICD-9-CM: 414.01, 413.9  10/21/2019 Unknown Unstable angina (HCC) ICD-10-CM: I20.0 ICD-9-CM: 411.1  10/19/2019 Unknown Discharged Condition: improved Disposition: home, see patient instructions for treatment and plan 
 
Procedures for this admission:  Procedure(s) with comments: BRONCHOSCOPY - Perc trach at bedside, ICU 36 Discharge Medications: My Medications ASK your doctor about these medications Instructions Each Dose to Equal Morning Noon Evening Bedtime  
lisinopril 40 mg tablet Commonly known as:  Jose Rho Your last dose was: Your next dose is: Take 40 mg by mouth daily. Indications: high blood pressure 40 mg 
  
  
  
  
  
PLAVIX 75 mg Tab Generic drug:  clopidogrel Your last dose was: Your next dose is: Take  by mouth daily. pravastatin 40 mg tablet Commonly known as:  PRAVACHOL Your last dose was: Your next dose is: Take 40 mg by mouth nightly. 40 mg 
  
  
  
  
  
sertraline 100 mg tablet Commonly known as:  ZOLOFT Your last dose was: Your next dose is: Take 100 mg by mouth daily. Indications: Anxiousness associated with Depression 100 mg 
  
  
  
  
  
  
 
 
 
HPI:   
Perez Dewitt is a 68 y.o. male who was referred for cardiac surgery evaluation of CAD by Dr. Gonzalez Saleem. PMHx significant for previous MI with PCI on plavix, HTN, HLD, DM. Patient presented to the ED with substernal chest pain that radiated into his jaw and left arm similar to that of his previous MI. EKG showed diffuse ST depression. Associated with SOB and fatigue which has been worsening over several weeks. Ultimately, cardiac cath was done showing 3 vessel CAD and EF 25-30%. We are asked to consider CABG. 
  
Currently, the patient is pain free on nitroglycerin drip. No hx of CVA/TIA, Liver/kidney dysfunction, bleeding/clotting disorders, swallowing issues. Current smoker, social drinker. He is here with his son. 
  
Cardiac Testing 
  
Cardiac catheterization:  
Severe 3 vs CAD with LM disease Moderate to severe cardiomyopathy, ischemic, EF 25 to 30% Elevated LVEDP at 27 mmHg Patent LIMA for bypass graftging Abdominal aortic diseases ZHENG. moderate 
  
ECHO: none Hospital Course: Pt had CABG x 5, LIMA to LAD, RSVG to [id number], RSVG to PDA 10/23/19 by Dr. Santos Reese. See operative report for full details. Pt had long complex postop course including acute respiratory failure s/t pulmonary fibrosis and required tracheostomy. Pt S/p perc trach placement on 11/13/19 by Dr. Gallo Lim. Despite trach, pt did not have success with liberating from ventilator. Pt also showed poor neurological function without improvement. Conversations were started with family and medical teams to discuss palliative/comfort care. Plans were to have family mtg on 11/26/19. Pt was found during early morning hours of 11/26/19 to not be breathing over the ventilator.   Pt ultimately coded and did receive CPR/ACLS drugs. Efforts were stopped and pt was pronouced  on 19 at 0204am.   Pt's family was updated by nursing staff. 1. S/p CABG: on ASA, statin. No ACEi/ARB due to renal function. No BB due to pulmonary fibrosis. 2. Acute on chronic systolic CHF, NYHA Class II on admit: EF 35-40% preop. No BB/ACE/ARB/AA until vitals/kidney function allows. Trend pBNP. Bumex.  
  
3. Acute postop respiratory failure with chronic interstitial fibrosis per CXR: Trach placed  by Thoracic Surgery. Acute respiratory acidosis  thought driven by oversedation. Fentanyl and versed drips stopped. (now just has PRN Morphine and Ativan dosing) Diuresis per Nephrology. Amiodarone stopped on 10/31. Solumedrol weaned off. Continue scheduled nebs. Cont mucinex, pulm toileting, mucomyst. Chest CT revealing emphysema/pulm fibrosis. Has not previously tolerated vent weaning. Wean vent settings as able, goal PaO2 > 60 or SpO2 >88% per pulm. 
  
4. Renal insufficiency: BUN/Creatinine stable today. Nephrology following and appreciate their recommendations. Devlin in place for accurate I&O, changed . Continue diuretics 
  
5. Anemia: had preop, H&H stable today. Received another unit pRBC . Iron panel sent preop - iron, iron sat low-continue Iron supplement.  
  
6. Thrombocytopenia: Platelets stable 02D-54X. HIT antibody neg, CARMEN negative. Daily CBC. Hold SQ heparin and ASA. On PPI. Transfuse platelets for less than 10k or if active bleeding seen per Hematology-appreciate their recommendations 
  
7. Hx of HTN: Hydralazine PRN SBP >160, BP too labile for PO meds  
  
8. HLD: Continue pravastatin 
  
9. Leukocytosis: WBC WNL,  Devlin placed . PICC placed . Trach . Femoral arterial line out . Monitor daily CBC. Appreciate ID consult-continue Zosyn. Leukopenic last week  
  
10. Constipation: Last BM on . Bowel medications continue.   Continue sandro-q 
  
 11. Current smoker: smoking cessation education completed.  
  
12. Nutrition: Appreciate Dietician recommendations. Dobhoff placed 10/30 and Enteral feedings started. TF's at goal of 35 ml/hr. Try and AVOID increased volume amounts. Will likely need PEG depending on progression. 
  
13. Hyperglycemia: Preop A1c 5.3 with no DM history. DTS following. Steroids completed. 
  
14. Hypernatremia: Resolved. Nephrology following 
  
15. Agitation, acute encephalopathy: Have been unable to wean down on sedation much due to agitation. Continue Precedex, and PRN Morphine, Ativan to keep the patient comfortable. Versa Pavy off. Head CT neg for any acute process. Continue seroquel bid. Do not think this is driven by pain, more from neuro source/encephalopathy  
  
16. DVT/GI Prophylaxis: SCD's, SQ Heparin-currently on hold due to thrombocytopenia. Will restart when improved, PPI 
  
Dispo: Will ask Palliative care to consult as he is no closer to waking up or weaning vent settings after a week. Will discuss with consulting teams and patient's son.  
  
 
Discharge Vital Signs:  
Visit Vitals BP 94/60 Pulse (!) 108 Temp (!) 100.6 °F (38.1 °C) Resp (!) 32 Ht 5' 7\" (1.702 m) Wt 132 lb 7.9 oz (60.1 kg) SpO2 91% BMI 20.75 kg/m² Labs:  
Recent Labs  
  11/26/19 
0126  11/25/19 
1714  11/25/19 
0258 WBC  --   --   --   --  5.8 HGB  --   --   --   --  8.0* HCT  --   --   --   --  25.6* PLT  --   --  57*  --  56* NA  --   --  138  --  138 K  --   --  4.1  --  3.7 BUN  --   --  55*  --  54* CREA  --   --  1.39*  --  1.38* GLU  --   --  155*  --  106* GLUCPOC 122*   < >  --    < >  --   
 < > = values in this interval not displayed. Diagnostics: n/a Signed: Tyshawn Kraft NP 
11/26/2019 
8:02 AM

## 2019-11-26 NOTE — PROGRESS NOTES
Responded to Code Blue in progress; no family present as pt passed; provided support to family (son) when he arrived around 3:20 am; family to use [facility] in [facility]. Rev. Rosy Luong, Ph.D., M.Div., M.A., /Director of Spiritual Care Services Paging Service: [phone])

## 2019-11-26 NOTE — PROGRESS NOTES
2000- Bedside and Verbal shift change report given to [address] (oncoming nurse) by Russell Robledo (offgoing nurse). Report included the following information SBAR, OR Summary, Procedure Summary, Intake/Output, Recent Results and Cardiac Rhythm Sinus Tach. 0000- Pt reassessed. No change. 0130- While bathing patient noticed that he was no longer breathing over the vent and his heart rate went from 107 to 54. Stayed at patient bedside to continue to closely monitor. 0157- Pt lost pulse. CPR started. 0158 One amp of epinephrine given. Continued with CPR and bagging patient through [facility]. At [address] [place] [address] [address] Pt regained pulse. 0202- Spoke with Dr. Markell Pedroza to update on patient status. While updating MD pt lost pulse again. CPR started. 1mg of Epinephrine given. 0204- Pulse check with no recovery of heart rate. Per MD at bedside,pronounced patient as passed.

## 2019-12-21 LAB
ACID FAST STN SPEC: NEGATIVE
MYCOBACTERIUM SPEC QL CULT: NEGATIVE
SPECIMEN PREPARATION: NORMAL
SPECIMEN SOURCE: NORMAL

## 2020-05-15 NOTE — PROGRESS NOTES
[facility] ICU Progress Note Admit Date: 10/19/2019 Procedure:  Procedure(s): 
[facility]    
 
CABG x 5, LIMA to LAD, RSVG to [id number], RSVG to PDA 10/23/19 
 
11/13/19 - S/p perc trach placement Subjective:  
Pt seen with Dr. Antonia Lindsey. Weaned off of Diprivan overnight, remains on precedex, insulin gtt. Afebrile, remains on 80% FiO2 due to pO2 of 67 on am ABG. Objective:  
Vitals: 
Blood pressure 111/63, pulse 78, temperature 97.9 °F (36.6 °C), resp. rate 20, height 5' 7\" (1.702 m), weight 137 lb 2 oz (62.2 kg), SpO2 100 %. Temp (24hrs), Av °F (36.7 °C), Min:97.6 °F (36.4 °C), Max:98.7 °F (37.1 °C) EKG/Rhythm: SR in the 76s Oxygen Therapy: 
Oxygen Therapy O2 Sat (%): 100 % (19 0832) Pulse via Oximetry: 78 beats per minute (19 0832) O2 Device: Tracheostomy; Ventilator (19 6315) O2 Flow Rate (L/min): 40 l/min (19) O2 Temperature: 98.6 °F (37 °C) (19 0840) FIO2 (%): 80 % (19 0832) CXR:  
CXR Results  (Last 48 hours)  
          
 19 0751  XR CHEST PORT Final result Impression:  IMPRESSION: No significant change. Narrative:  EXAM:  XR CHEST PORT. INDICATION: ARDS. COMPARISON: 2019. FINDINGS:   
A portable AP radiograph of the chest was obtained at 0721 hours. There are  
sternal sutures. Lines and tubes: The patient is on a cardiac monitor. The tracheostomy tube,  
right arm PICC line and nasoenteric feeding tube are all unchanged in position. Lungs: There is widespread interstitial disease throughout the lungs which is  
unchanged. Pleura: There is no pneumothorax or pleural effusion. Mediastinum: The cardiac and mediastinal contours and pulmonary vascularity are  
normal.  
Bones and soft tissues: The bones and soft tissues are grossly within normal  
limits. 19 8265  XR CHEST PORT Final result  Impression:  IMPRESSION: No significant change in the diffuse interstitial lung disease. Malika Shadow Narrative:  EXAM:  XR CHEST PORT INDICATION:  interstitial edema COMPARISON:  11/19/2019 FINDINGS: A portable AP radiograph of the chest was obtained at 342 hours. Tracheostomy tube and right PICC catheter are unchanged. Dobbhoff tube overlies  
the stomach. There is no significant change in the diffuse interstitial  
prominence. .  Cardiomegaly is stable. .   
   
  
  
 
 
Admission Weight: Last Weight Weight: 141 lb 5 oz (64.1 kg) Weight: 137 lb 2 oz (62.2 kg) Intake / [place] / Drain: 
Current Shift: 11/22 0701 - 11/22 1900 In: 100 [I.V.:100] Out: - Last 24 hrs.:  
 
Intake/Output Summary (Last 24 hours) at 11/22/2019 0414 Last data filed at 11/22/2019 2662 Gross per 24 hour Intake 3676.84 ml Output 3780 ml Net -103.16 ml EXAM: 
General:  Trach, sedated. Lungs:   Coarse bilat Incision:  Midsternal incision with no significant erythema, drainage, or dehiscence. Heart:  Regular rate and rhythm, S1, S2 normal, no murmur, click, rub or gallop. Abdomen:   Soft, non-tender. Bowel sounds active. No masses,  No organomegaly. +BM 11/19. Extremities:  Some generalized edema, +1-2 edema. Palpable pulses bilat Neurologic:  Moves extremities but without purpose. Does not track or follow commands. Labs:  
Recent Labs  
  11/22/19 
0715  11/22/19 
0300  11/20/19 
0350 WBC  --   --  9.9   < > 11.1 HGB  --   --  7.3*   < > 7.9* HCT  --   --  23.3*   < > 25.1*  
PLT  --   --  55*   < > 49* NA  --   --  142   < > 144 K  --   --  4.2   < > 4.2 BUN  --   --  61*   < > 82* CREA  --   --  1.32*   < > 1.58* GLU  --   --  80   < > 89 GLUCPOC 127*   < >  --    < >  --   
INR  --   --   --   --  1.2*  
 < > = values in this interval not displayed. Assessment:  
 
Principal Problem: S/P CABG x 5 (10/23/2019) Overview: x 5, LIMA to LAD, RSVG to Diag1, OM2-OM3, RSVG to PDA Active Problems: 
  ACS [facility]acute coronary syndrome) ([facility] (10/19/2019) Unstable angina ([facility] (10/19/2019) Coronary artery disease of native artery of native heart with stable angina pectoris ([facility] (10/21/2019) Systolic heart failure ([facility] (10/22/2019) Plan/Recommendations/Medical Decision Makin. S/p CABG: on ASA, statin. No ACEi/ARB due to renal function. No BB due to pulmonary fibrosis. 2. Acute on chronic systolic CHF, NYHA Class II on admit: EF 35-40% preop. No BB/ACE/ARB/AA until vitals/kidney function allows. Trend pBNP - stable today, receiving a lot of volume. Bumex increased again today. He was negative 539ml over the last 24 hours. 3. Acute postop respiratory failure with chronic interstitial fibrosis per CXR: Trach placed  by Thoracic Surgery. Acute respiratory acidosis  thought driven by oversedation. Fentanyl and versed drips stopped. (now just has PRN Morphine and Ativan dosing) Diuresis per Nephrology. Amiodarone stopped on 10/31. Solumedrol per pulm-weaning. Continue scheduled nebs. Cont mucinex, pulm toileting, mucomyst. Chest CT revealing emphysema/pulm fibrosis. Has not previously tolerated vent weaning. Was able to come off the Diprivan overnight. Wean vent settings as able. 4. Renal insufficiency: BUN/Creatinine improved today. Nephrology following and appreciate their recommendations. Devlin in place for accurate I&O. Increased diuretics 5. Anemia: had preop, H&H with slight drop today. Did receive transfusion of PRBC on 11/15/19. Iron panel sent preop - iron, iron sat low-continue Iron supplement. 6. Thrombocytopenia: Platelets improved at 55K. HIT antibody neg, CARMEN negative. Daily CBC. Hold SQ heparin and ASA. On PPI. Transfuse platelets for less than 10k or if active bleeding seen per Hematology-appreciate their recommendations-labs pending 7. Hx of HTN: Hydralazine PRN SBP >160, BP too labile for PO meds 8. HLD: Continue pravastatin 9. Leukocytosis: WBC WNL,  Devlin placed 11/7. PICC placed 11/6. Trach 11/13. Femoral arterial line out 11/21. Monitor daily CBC. Appreciate ID consult-continue Zosyn. Leukopenic last week 10. Constipation: . Last BM on 11/19. Bowel medications resumed. Continue sandro-q 11. Current smoker: smoking cessation education completed. 12. Nutrition: Appreciate Dietician recommendations. Dobhoff placed 10/30 and Enteral feedings started. TF's at goal of 35 ml/hr. Try and AVOID increased volume amounts 13. Hyperglycemia: Preop A1c 5.3 with no DM history, now with hyperglycemia. BS have been much better controlled on Insulin gtt. DTS following. Weaning steroids per Pulmonology. 14. Hypernatremia: Resolved. Nephrology following 15. Agitation, acute encephalopathy: Have been unable to wean down on sedation much due to agitation. Continue Precedex, and PRN Morphine, Ativan to keep the patient comfortable. Justen Hurt off. Head CT neg for any acute process. Continue seroquel bid. Not able to obtain MRI since V wires were cut. Do not think this is driven by pain, more from neuro source/encephalopathy 16. DVT/GI Prophylaxis: SCD's, SQ Heparin-currently on hold due to thrombocytopenia. Will restart when improved, PPI Dispo: PT/OT as able. Remain in CVI until resp status more stable.   
 
 
Signed By: Tiffany Cooper NP 
 No Other Normal

## 2023-01-23 NOTE — DIABETES MGMT
[address], MD, FACP, [place], [place]      ANNEMARIE Coles, AGPCNP-BC   Adilson Degroot, [facility]   Taras Perez, KEI Luciano FNP-C   Luwanna Closs, AGPCNP-BC      [address]   at [address], [address]   [phone]   FAX: [address]   at [address], [address] [place] [address], [address]   [phone]   FAX: [phone]         PRE-PROCEDURE NOTE - EGD    H and P from last office visit reviewed. Allergies reviewed. Out-patient medicaton list reviewed. 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 Current Outpatient Medications on File Prior to Encounter   Medication Sig Dispense Refill    Basaglar KwikPen U-100 Insulin 100 unit/mL (3 mL) inpn INJECT 10 UNITS BENEATH THE SKIN DAILY, INCREASE BY 2 UNITS EVERY 3RD DAY UNTIL -160MG/DL. MAX DOSE 30 UNITS PER DAY. spironolactone (Aldactone) 100 mg tablet Take 1 Tablet by mouth in the morning.  180 Tablet 3    gabapentin [facility] Cardiac Surgery Progress Note Recommendations/ Comments: Chart reviewed for hyperglycemia. Pt did not receive AM or PM dose of Lantus yesterday He was given a one-time dose of NPH 15 units yesterday. Also received 18 units of lispro correction. AM BG today 272 mg/dL Lantus had been increased to 38 units Q 12 hours. Today is the first day of this increase and TF are on hold Observe BG response and continue to titrate as needed. Remains sedated, on vent Steroids Solu medrol 80 mg/dL Q 8 hours. TF Osmolite 1.5 are continuous, @ 50/hr - on hold at this time for trach Unstable for po due to respiratory Renal noted 11/13/2019 Plans for trach today Current hospital DM medications Lantus 38 units Q 12 hours Lispro normal sensitivity correction scale Patient is 68 y.o. male s/p CABG x 5 followed by re-entry for mediastinal clot evacuation and repair of SVG to PDA  - POD 16.  
Pt with documented hx of DM on no meds PTA. Anemia - A1c inaccurate A1c:  
Lab Results Component Value Date/Time Hemoglobin A1c 5.3 10/21/2019 03:04 AM  
 
 
 
Recent Glucose Results:  
Lab Results Component Value Date/Time  (H) 11/13/2019 03:21 AM  
 GLUCPOC 272 (H) 11/13/2019 09:12 AM  
 GLUCPOC 234 (H) 11/13/2019 12:25 AM  
 GLUCPOC 289 (H) 11/12/2019 04:47 PM  
  
 
Lab Results Component Value Date/Time Creatinine 1.70 (H) 11/13/2019 03:21 AM  
 
Estimated Creatinine Clearance: 33.1 mL/min (A) (based on SCr of 1.7 mg/dL (H)). Active Orders Diet DIET NPO  
  
 
PO intake:  
No data found. Will continue to follow as needed. Thank you. Cabrera Aguilar RN, CDE Time spent: 6 minutes (NEURONTIN) 300 mg capsule Take 1 Capsule by mouth three (3) times daily. Max Daily Amount: 900 mg. 180 Capsule 3    furosemide (Lasix) 40 mg tablet Take 1 Tablet by mouth in the morning. 90 Tablet 3    omeprazole (PRILOSEC) 40 mg capsule Take 1 Capsule by mouth in the morning. 90 Capsule 1    tacrolimus (PROGRAF) 1 mg capsule Take 1 Capsule by mouth every twelve (12) hours. 120 Capsule 6    albuterol (PROVENTIL HFA, VENTOLIN HFA, PROAIR HFA) 90 mcg/actuation inhaler Take 2 Puffs by inhalation. amLODIPine (NORVASC) 10 mg tablet Take 10 mg by mouth.      budesonide-formoteroL (SYMBICORT) 160-4.5 mcg/actuation HFAA Take 2 Puffs by inhalation. ferrous fumarate 324 mg (106 mg iron) tab Take 324 mg by mouth. glipiZIDE SR (GLUCOTROL XL) 5 mg CR tablet Take 5 mg by mouth.      minoxidil (LONITEN) 2.5 mg tablet Take 2.5 mg by mouth. Trulicity 4.83 QK/5.0 mL sub-q pen INJECT 0.75MG BENEATH THE SKIN ONCE A WEEK      ClearLax 17 gram/dose powder  (Patient not taking: Reported on 1/23/2023)         For EGD to assess for esophageal and gastric varices. Plan to perform banding if indicated based upon variceal size and appearance. The risks of the procedure were discussed with the patient. These included reaction to anesthesia, pain, perforation and bleeding. All questions were answered. The patient wishes to proceed with the procedure. PHYSICAL EXAMINATION:  Visit Vitals  BP (!) 134/90   Pulse 91   Temp 98.7 °F (37.1 °C)   Resp 16   Ht 5' 3\" (1.6 m)   Wt 181 lb (82.1 kg)   SpO2 100%   Breastfeeding No   BMI 32.06 kg/m²       General: No acute distress. Eyes: Sclera anicteric. ENT: No oral lesions. Thyroid normal.  Nodes: No adenopathy. Skin: No spider angiomata. No jaundice. No palmar erythema. Respiratory: Lungs clear to auscultation. Cardiovascular: Regular heart rate. No murmurs. No JVD. Abdomen: Soft non-tender, liver size normal to percussion/palpation. Spleen not palpable.  No obvious ascites. Extremities: No edema. No muscle wasting. No gross arthritic changes. Neurologic: Alert and oriented. Cranial nerves grossly intact. No asterixis. MOST RECENT LABORATORY STUDIES:  Lab Results   Component Value Date/Time    WBC 4.8 10/20/2022 10:09 AM    HGB 11.8 (L) 10/20/2022 10:09 AM    HCT 36.1 10/20/2022 10:09 AM    PLATELET 611 49/16/9639 10:09 AM    MCV 82.8 10/20/2022 10:09 AM     Lab Results   Component Value Date/Time    INR 1.2 10/20/2022 10:09 AM    INR 1.2 03/16/2022 04:30 PM    INR 1.2 03/10/2022 10:07 AM    Prothrombin time 15.4 (H) 10/20/2022 10:09 AM    Prothrombin time 14.5 03/16/2022 04:30 PM    Prothrombin time 14.4 03/10/2022 10:07 AM       ASSESSMENT AND PLAN:  EGD to assess for esophageal and/or gastric varices.   Sedation per anesthesiology      Deven Berg MD  [address], [address] [place] [address] Street [address] [phone]  FAX: [address] [place] [address]

## 2023-04-07 NOTE — PROGRESS NOTES
Problem: Self Care Deficits Care Plan (Adult) Goal: *Acute Goals and Plan of Care (Insert Text) Description FUNCTIONAL STATUS PRIOR TO ADMISSION: Patient was independent and active without use of DME.  
 
HOME SUPPORT: The patient lived with his son but did not require assist for self-care tasks. Patient's son assisted him with transportation to the grocery store. Occupational Therapy Goals Goals reviewed and continued as follows 10/31/2019 Initiated 10/25/2019 1. Patient will perform ADLs standing 5 mins without fatigue or LOB with moderate assistance within 5 day(s). 2.  Patient will perform lower body ADLs with moderate assistance within 5 day(s). 3.  Patient will perform gathering ADL items high and low 2/2 with moderate assistance within 5 day(s). 4.  Patient will perform toilet transfers with moderate assistance within 5 day(s). 5.  Patient will perform all aspects of toileting with moderate assistance within 5 day(s). 6.  Patient will participate in cardiac/sternal upper extremity therapeutic exercise/activities to increase independence with ADLs with minimal assistance for 5 minutes within 5 day(s). Outcome: Not Progressing Towards Goal 
 OCCUPATIONAL THERAPY TREATMENT/WEEKLY RE-EVALUATION Patient: Perez Dewitt (77 y.o. male) Date: 10/31/2019 Diagnosis: ACS (acute coronary syndrome) ([facility] [address] [I24.9] Unstable angina (HCC) [I20.0] S/P CABG x 5 Procedure(s) (LRB): 
CARDIAC RE-ENTRY, MARISOL BY DR Karan Izquierdo (N/A) 8 Days Post-Op Precautions: Fall, Sternal 
Chart, occupational therapy assessment, plan of care, and goals were reviewed. ASSESSMENT Based on the objective data described below, patient presents with generalized weakness and continued respiratory stress with poor endurance and activity tolerance in therapy sessions and making little progress with therapy team. Patient required MOD A for bed mobility and MIN A x 2 for bed > chair transfer today however with requiring significantly increased time due to SOB (patient on hi-flow 40L and 100% with O2 sats 84-90% with activity and RR in 20-44. Patient also requiring verbal cues for utilizing pursed lip breathing strategy throughout session secodnary to labored breathing. Patient will continue to benefit from OT services to maximize patient safety and independence with ADL transfers and tasks. Current Level of Function Impacting Discharge (ADLs): MAX A LB ADLs Other factors to consider for discharge: patient significantly below functional baseline PLAN : 
Goals have been updated based on progression since last assessment. Patient continues to benefit from skilled intervention to address the above impairments. Continue to follow patient 5 times a week to address goals. Recommend with staff: OOB x 3 to chair for meals if O2 sats stable Recommend next OT session: compensatory strategies for bathing/dressing tasks Recommendation for discharge: (in order for the patient to meet his/her long term goals) To be determined: pending respiratory status/functional progression with therapy (may need rehab) This discharge recommendation: 
Has been made in collaboration with the attending provider and/or case management Equipment recommendations for successful discharge (if) home: TBD SUBJECTIVE:  
Patient stated I just need a few minutes to catch my breath.  OBJECTIVE DATA SUMMARY:  
Cognitive/Behavioral Status: 
Neurologic State: Drowsy Orientation Level: Oriented X4 Cognition: Appropriate for age attention/concentration Perception: Appears intact Perseveration: No perseveration noted Safety/Judgement: Decreased insight into deficits; Decreased awareness of need for safety;Decreased awareness of need for assistance Functional Mobility and Transfers for ADLs: 
Bed Mobility: 
Supine to Sit: Moderate assistance;Assist x1 
 Scooting: Minimum assistance;Assist x2; Additional time Transfers: 
Sit to Stand: Minimum assistance;Assist x2 Bed to Chair: Minimum assistance;Assist x2 Balance: 
Sitting: Impaired; Without support Sitting - Static: Good (unsupported); Fair (occasional) Sitting - Dynamic: Fair (occasional) Standing: Impaired; Without support Standing - Static: Fair Standing - Dynamic : Poor ADL Intervention: 
  
Patient completed functional mobility from bed > chair and also completed 1 set x 5 reps shoulder flexion, scapular elevation, and horizontal shoulder abduction. Cognitive Retraining Safety/Judgement: Decreased insight into deficits; Decreased awareness of need for safety;Decreased awareness of need for assistance Activity Tolerance:  
Poor, desaturates with exertion and requires oxygen, requires rest breaks and observed SOB with activity Please refer to the flowsheet for vital signs taken during this treatment. After treatment patient left in no apparent distress:  
Sitting in chair and Call bell within reach COMMUNICATION/COLLABORATION:  
The patients plan of care was discussed with: Physical Therapist and Registered Nurse Sarah Beth Chance Time Calculation: 26 mins [Please See PDF for [employer]] : Please See PDF for [employer].

## 2024-10-29 NOTE — PROGRESS NOTES
0750:  Bedside and Verbal shift change report given to NORMA RINALDI (oncoming nurse) by Fer GRANT RN (offgoing nurse). Report included the following information SBAR, Kardex, Intake/Output, MAR, Recent Results and Cardiac Rhythm NSR with PVCs. 9321:  2mg ativan given for agitation. Patient is very restless, RR 30-40s. Lungs coarse, small amount of thick, white secretions suctioned from ET tube. Scant clear oral secretions. Responds to pain, spontaneous but no appropriate movement. 0912:  2mg morphine given for non-verbal pain of 5/10. Orders for head CT today. 0930:  Pulmonary rounding, no further orders at this time. 1115:  Trach care performed; fairly tolerated. 2mg morphine given before procedure. 1245:  2mg ativan given for agitation and increasing RR. Rate 40s, patient is restless. none

## 2025-05-12 NOTE — PROGRESS NOTES
2000 Assumed care of patient from [facility] 
 
2125 Neosynephrine turned off SBP 130s/ MAP 70s 
 
0000 Bedside and Verbal shift change report given to [address] [place] [address] Avenue (oncoming nurse) by Lincoln Hawley (offgoing nurse). Report included the following information SBAR, Kardex, MAR, Recent Results and Cardiac Rhythm NSR. Problem: Falls - Risk of 
Goal: *Absence of Falls Description Document [facility] Fall Risk and appropriate interventions in the flowsheet. Outcome: Progressing Towards Goal 
Note:  
Fall Risk Interventions: 
Mobility Interventions: Mechanical lift Mentation Interventions: Evaluate medications/consider consulting pharmacy Medication Interventions: Evaluate medications/consider consulting pharmacy Elimination Interventions: Toileting schedule/hourly rounds History of Falls Interventions: Evaluate medications/consider consulting pharmacy Problem: Patient Education: Go to Patient Education Activity Goal: Patient/Family Education Outcome: Progressing Towards Goal 
  
Problem: Pressure Injury - Risk of 
Goal: *Prevention of pressure injury Description Document Earl Scale and appropriate interventions in the flowsheet. Outcome: Progressing Towards Goal 
Note:  
Pressure Injury Interventions: 
Sensory Interventions: Assess changes in LOC, Float heels, Keep linens dry and wrinkle-free, Minimize linen layers Moisture Interventions: Apply protective barrier, creams and emollients, Maintain skin hydration (lotion/cream), Moisture barrier Activity Interventions: Pressure redistribution bed/mattress(bed type) Mobility Interventions: Pressure redistribution bed/mattress (bed type), Turn and reposition approx. every two hours(pillow and wedges) Nutrition Interventions: Document food/fluid/supplement intake Friction and Shear Interventions: Lift sheet Problem: CABG: Discharge Outcomes Goal: *Hemodynamically stable Outcome: Progressing Towards Goal 
  
 Problem: Infection - Risk of, Central Venous Catheter-Associated Bloodstream Infection Goal: *Absence of infection signs and symptoms Outcome: Progressing Towards Goal 
  
Problem: Patient Education: Go to Patient Education Activity Goal: Patient/Family Education Outcome: Progressing Towards Goal 
  
Problem: Ventilator Management Goal: *Adequate oxygenation and ventilation Outcome: Progressing Towards Goal 
Goal: *Patient maintains clear airway/free of aspiration Outcome: Progressing Towards Goal 
Goal: *Absence of infection signs and symptoms Outcome: Progressing Towards Goal 
Goal: *Normal spontaneous ventilation Outcome: Progressing Towards Goal 
  
Problem: Nutrition Deficit Goal: *Optimize nutritional status Outcome: Progressing Towards Goal 
  
Problem: Gas Exchange - Impaired Goal: *Absence of hypoxia Outcome: Progressing Towards Goal 
  
Problem: Infection - Risk of, Urinary Catheter-Associated Urinary Tract Infection Goal: *Absence of infection signs and symptoms Outcome: Progressing Towards Goal 
  
 Detail Level: Simple

## (undated) DEVICE — Device

## (undated) DEVICE — TUBING, SUCTION, 3/16" X 6', STRAIGHT: Brand: MEDLINE

## (undated) DEVICE — SOLIDIFIER FLUID 3000 CC ABSORB

## (undated) DEVICE — CUFF BLD PRSS AD L SZ 12L FOR 32-43CM LIMB LNG VYN SFT W/O

## (undated) DEVICE — SOL IRRIGATION INJ NACL 0.9% 500ML BTL

## (undated) DEVICE — TUBING, SUCTION, 1/4" X 12', STRAIGHT: Brand: MEDLINE

## (undated) DEVICE — ELECTRODE,RADIOTRANSLUCENT,FOAM,3PK: Brand: MEDLINE

## (undated) DEVICE — LEAD PCMKR MYOCARDL BPLR TEMP. --

## (undated) DEVICE — 1000ML,PRESSURE INFUSER W/STOPCOCK: Brand: MEDLINE

## (undated) DEVICE — BLADE OPHTH 180DEG CUT SURF BLU STR SHRP DBL BVL GRINDLESS

## (undated) DEVICE — TIDISHIELD TRANSPORT, CONTAINMENT COVER FOR BACK TABLE 4'6" (1.37M) TO 8' (2.43M) IN LENGTH: Brand: TIDISHIELD

## (undated) DEVICE — DRAPE SLUSH DISC W44XL66IN ST FOR RND BSIN HUSH SLUSH SYS

## (undated) DEVICE — TRANSFER PK BLD PROD 300ML --

## (undated) DEVICE — AIRLIFE™ ADULT CUSHION NASAL CANNULA 14 FOOT (4.3) CRUSH-RESISTANT OXYGEN TUBING, AND U/CONNECT-IT ADAPTER: Brand: AIRLIFE™

## (undated) DEVICE — ANGIO-SEAL VIP VASCULAR CLOSURE DEVICE: Brand: ANGIO-SEAL

## (undated) DEVICE — SYRINGE MED 20ML STD CLR PLAS LUERSLIP TIP N CTRL DISP

## (undated) DEVICE — SOLUTION IV 1000ML 0.9% SOD CHL

## (undated) DEVICE — KIT MFLD ISOLATN NACL CNTRST PRT TBNG SPIK W/ PRSS TRNSDUC

## (undated) DEVICE — CATHETER IV 14GA L2IN POLYUR STR ORNG HUB SFTY RADPQ DISP

## (undated) DEVICE — DRAPE FLD WRM W44XL66IN C6L FOR INTRATEMP SYS THERMABASIN

## (undated) DEVICE — SYR 5ML 1/5 GRAD LL NSAF LF --

## (undated) DEVICE — STRAP,POSITIONING,KNEE/BODY,FOAM,4X60": Brand: MEDLINE

## (undated) DEVICE — CARD SMRT DISP TRANSIT TIME MEDISTEM VERIQ

## (undated) DEVICE — BANDAGE COMPR M W6INXL10YD WHT BGE VELC E MTRX HK AND LOOP

## (undated) DEVICE — BAG SPEC BIOHZRD 10 X 10 IN --

## (undated) DEVICE — SYR 3ML LL TIP 1/10ML GRAD --

## (undated) DEVICE — NEEDLE HYPO 18GA L1.5IN PNK S STL HUB POLYPR SHLD REG BVL

## (undated) DEVICE — KIT HND CTRL 3 W STPCOCK ROT END 54IN PREM HI PRSS TBNG AT

## (undated) DEVICE — PRESSURE MONITORING SET: Brand: TRUWAVE

## (undated) DEVICE — NEONATAL-ADULT SPO2 SENSOR: Brand: NELLCOR

## (undated) DEVICE — SYS VSL HARV HEMOPRO2 VASOVIEW -- HARV SYS MINIMUM ORDER 5/EA

## (undated) DEVICE — SUT PROL 7-0 24IN BV1 DA BLU --

## (undated) DEVICE — QUILTED PREMIUM COMFORT UNDERPAD,EXTRA HEAVY: Brand: WINGS

## (undated) DEVICE — GOWN,SIRUS,NONRNF,SETINSLV,XL,20/CS: Brand: MEDLINE

## (undated) DEVICE — REM POLYHESIVE ADULT PATIENT RETURN ELECTRODE: Brand: VALLEYLAB

## (undated) DEVICE — GLOVE SURG SZ 7.5 L11.2IN THK9.8MIL STRW LTX POLYMER BEAD

## (undated) DEVICE — 3M™ CUROS™ DISINFECTING CAP FOR NEEDLELESS CONNECTORS 270/CARTON 20 CARTONS/CASE CFF1-270: Brand: CUROS™

## (undated) DEVICE — COVER LT HNDL PLAS RIG 1 PER PK

## (undated) DEVICE — SINGLE USE BIOPSY VALVE MAJ-210: Brand: SINGLE USE BIOPSY VALVE (STERILE)

## (undated) DEVICE — PREP SKN CHLRAPRP SNGL 1.75ML --

## (undated) DEVICE — DRAIN,WOUND,ROUND,24FR,5/16",FULL-FLUTED: Brand: MEDLINE

## (undated) DEVICE — KIT,ANTI FOG,W/SPONGE & FLUID,SOFT PACK: Brand: MEDLINE

## (undated) DEVICE — STERILE POLYISOPRENE POWDER-FREE SURGICAL GLOVES WITH EMOLLIENT COATING: Brand: PROTEXIS

## (undated) DEVICE — PAD,ABDOMINAL,5"X9",ST,LF,25/BX: Brand: MEDLINE INDUSTRIES, INC.

## (undated) DEVICE — CLIP INT SM WIDE RED TI TRNSVRS GRV CHEVRON SHP W PRECIS

## (undated) DEVICE — SINGLE USE SUCTION VALVE MAJ-209: Brand: SINGLE USE SUCTION VALVE (STERILE)

## (undated) DEVICE — SUTURE PROL SZ 4-0 L36IN NONABSORBABLE BLU L26MM SH 1/2 CIR 8521H

## (undated) DEVICE — 1200 GUARD II KIT W/5MM TUBE W/O VAC TUBE: Brand: GUARDIAN

## (undated) DEVICE — SYRINGE MED 10CC ECC TIP W/O NDL

## (undated) DEVICE — PACK PROCEDURE SURG HRT CATH

## (undated) DEVICE — SPONGE GZ W4XL4IN COT 12 PLY TYP VII WVN C FLD DSGN

## (undated) DEVICE — BLADE OPHTH KNF D3MM 15DEG CATRCT BLU MICRO-SHARP

## (undated) DEVICE — TEMP PACING WIRE: Brand: MYO/WIRE

## (undated) DEVICE — INFECTION CONTROL KIT SYS

## (undated) DEVICE — BASIN SPNG 32OZ BLU STRL --

## (undated) DEVICE — DRESSING AQUACEL ADVANTAGE ALG W4XL5IN RECT GREATER ABSRB HYDRFBR TECHNOLOGY

## (undated) DEVICE — SOLUTION IRRIG 1000ML H2O STRL BLT

## (undated) DEVICE — TUBING INSUF 0.3UM FLTR W/ LUERLOCK CONN

## (undated) DEVICE — DRSG BORDR MPLX HEEL 8.7X9.1IN --

## (undated) DEVICE — TTL1LYR 16FR10ML 100%SIL TMPST TR: Brand: MEDLINE

## (undated) DEVICE — (D)PREP SKN CHLRAPRP APPL 26ML -- CONVERT TO ITEM 371833

## (undated) DEVICE — CATHETER IV 20GA L1IN FEP STR HUB INTROCAN SFTY

## (undated) DEVICE — INTENDED FOR TISSUE SEPARATION, AND OTHER PROCEDURES THAT REQUIRE A SHARP SURGICAL BLADE TO PUNCTURE OR CUT.: Brand: BARD-PARKER ® CARBON RIB-BACK BLADES

## (undated) DEVICE — 6 FOOT DISPOSABLE EXTENSION CABLE WITH SAFE CONNECT / SCREW-DOWN

## (undated) DEVICE — SYR LR LCK 1ML GRAD NSAF 30ML --

## (undated) DEVICE — CATH DIAGIMPLS MP 5FRX110CM -- IMPULSE 16391-300

## (undated) DEVICE — APPLICATOR BNDG 1MM ADH PREMIERPRO EXOFIN

## (undated) DEVICE — KIT BLWR MISTER 5P 15L W/ TBNG SET IRRIG MIST TO IMPROVE

## (undated) DEVICE — SUTURE DEK POLY GRN BR SZ3 0 T 2 2N 6716

## (undated) DEVICE — SUTURE SZ 7 L18IN NONABSORBABLE SIL CCS L48MM 1/2 CIR STRNM M655G

## (undated) DEVICE — SYR 10ML LUER LOK 1/5ML GRAD --

## (undated) DEVICE — SYR 50ML LR LCK 1ML GRAD NSAF --

## (undated) DEVICE — TRAP SUC MUCOUS 70ML -- MEDICHOICE MEDLINE

## (undated) DEVICE — SET ADMIN 16ML TBNG L100IN 2 Y INJ SITE IV PIGGY BK DISP

## (undated) DEVICE — DRAPE PRB US TRNSDCR 6X96IN --

## (undated) DEVICE — SUTURE PROL SZ 6-0 L24IN NONABSORBABLE BLU L13MM C-1 3/8 8726H

## (undated) DEVICE — SOLUTION IV 1000ML PH 7.4 INJ NRMSOL R

## (undated) DEVICE — INTENDED TO STANDARDIZE OR CAMERAS TO ALLOW FOR THE USE OF THE OR CAMERA COVER: Brand: ASPEN® O.R. CAMERA COVER

## (undated) DEVICE — KIT,1200CC CANISTER,3/16"X6' TUBING: Brand: MEDLINE INDUSTRIES, INC.

## (undated) DEVICE — BITE BLK ENDOSCP AD 54FR GRN POLYETH ENDOSCP W STRP SLD

## (undated) DEVICE — SOLUTION IV 500ML 0.9% SOD CHL FLX CONT

## (undated) DEVICE — BANDAGE COMPR ELASTIC 5 YDX6 IN

## (undated) DEVICE — NDL FLTR TIP 5 MIC 18GX1.5IN --

## (undated) DEVICE — SUTURE PROL SZ 8-0 L24IN NONABSORBABLE BLU L6.5MM BV130-5 8732H

## (undated) DEVICE — PINNACLE INTRODUCER SHEATH: Brand: PINNACLE

## (undated) DEVICE — BAG RED 3PLY 2MIL 30X40 IN

## (undated) DEVICE — SENSOR TEMP SKIN DISP

## (undated) DEVICE — INSERT SUT HLD F/OCTBS RETRCTR --

## (undated) DEVICE — CATH SUC CTRL PRT TRIFLO 14FR --

## (undated) DEVICE — AORTIC PUNCHES ARE USED TO CREATE A UNIFORM OPENING IN BLOOD VESSELS DURING CARDIOVASCULAR SURGERY. THE VESSEL GRAFT IS INSERTED INTO THE CREATED OPENING AND SUTURED TO THE VESSEL WALL. AORTIC LANCETS ARE USED TO MAKE A SMALL UNIFORM CUT IN A BLOOD VESSEL TO FACILITATE INSERTION OF AN AORTIC PUNCH.  PUNCHES COME IN VARIOUS LENGTHS, DIAMETERS AND TIP CONFIGURATIONS.: Brand: CLEANCUT ROTATING AORTIC PUNCH

## (undated) DEVICE — PACK HARV VEIN ENDOSCP VASOVIEW

## (undated) DEVICE — KIT COLON W/ 1.1OZ LUB AND 2 END

## (undated) DEVICE — Device: Brand: MEDICAL ACTION INDUSTRIES

## (undated) DEVICE — ANGIOGRAPHY KIT

## (undated) DEVICE — STERNUM BLADE, OFFSET (31.7 X 0.64 X 6.3MM)

## (undated) DEVICE — SUTURE PROL SZ 5-0 L30IN NONABSORBABLE BLU L13MM RB-2 1/2 8710H

## (undated) DEVICE — SUTURE MCRYL SZ 3-0 L27IN ABSRB UD L24MM PS-1 3/8 CIR PRIM Y936H

## (undated) DEVICE — KIT MED IMAG CNTRST AGNT W/ IOPAMIDOL REUSE

## (undated) DEVICE — 40418 TRENDELENBURG ONE-STEP ARM PROTECTORS LARGE (1 PAIR): Brand: 40418 TRENDELENBURG ONE-STEP ARM PROTECTORS LARGE (1 PAIR)